# Patient Record
Sex: FEMALE | Race: WHITE | NOT HISPANIC OR LATINO | Employment: OTHER | URBAN - METROPOLITAN AREA
[De-identification: names, ages, dates, MRNs, and addresses within clinical notes are randomized per-mention and may not be internally consistent; named-entity substitution may affect disease eponyms.]

---

## 2017-01-01 ENCOUNTER — APPOINTMENT (OUTPATIENT)
Dept: GENERAL RADIOLOGY | Facility: CLINIC | Age: 77
End: 2017-01-01
Attending: EMERGENCY MEDICINE
Payer: MEDICARE

## 2017-01-01 ENCOUNTER — HOSPITAL ENCOUNTER (EMERGENCY)
Facility: CLINIC | Age: 77
Discharge: HOME | End: 2017-01-01
Attending: EMERGENCY MEDICINE | Admitting: EMERGENCY MEDICINE
Payer: MEDICARE

## 2017-01-01 VITALS
TEMPERATURE: 97.8 F | SYSTOLIC BLOOD PRESSURE: 140 MMHG | HEART RATE: 82 BPM | HEIGHT: 62 IN | RESPIRATION RATE: 26 BRPM | DIASTOLIC BLOOD PRESSURE: 68 MMHG | OXYGEN SATURATION: 97 %

## 2017-01-01 DIAGNOSIS — R05.9 COUGH: ICD-10-CM

## 2017-01-01 DIAGNOSIS — J02.0 ACUTE STREPTOCOCCAL PHARYNGITIS: ICD-10-CM

## 2017-01-01 LAB
ALBUMIN SERPL-MCNC: 3.3 G/DL (ref 3.4–5)
ALP SERPL-CCNC: 70 U/L (ref 40–150)
ALT SERPL W P-5'-P-CCNC: 17 U/L (ref 0–50)
ANION GAP SERPL CALCULATED.3IONS-SCNC: 8 MMOL/L (ref 3–14)
AST SERPL W P-5'-P-CCNC: 14 U/L (ref 0–45)
BASOPHILS # BLD AUTO: 0 10E9/L (ref 0–0.2)
BASOPHILS NFR BLD AUTO: 0.2 %
BILIRUB SERPL-MCNC: 0.2 MG/DL (ref 0.2–1.3)
BUN SERPL-MCNC: 20 MG/DL (ref 7–30)
CALCIUM SERPL-MCNC: 8.4 MG/DL (ref 8.5–10.1)
CHLORIDE SERPL-SCNC: 107 MMOL/L (ref 94–109)
CO2 SERPL-SCNC: 26 MMOL/L (ref 20–32)
CREAT SERPL-MCNC: 0.73 MG/DL (ref 0.52–1.04)
DEPRECATED S PYO AG THROAT QL EIA: ABNORMAL
DIFFERENTIAL METHOD BLD: NORMAL
EOSINOPHIL # BLD AUTO: 0 10E9/L (ref 0–0.7)
EOSINOPHIL NFR BLD AUTO: 0.3 %
ERYTHROCYTE [DISTWIDTH] IN BLOOD BY AUTOMATED COUNT: 13.8 % (ref 10–15)
GFR SERPL CREATININE-BSD FRML MDRD: 77 ML/MIN/1.7M2
GLUCOSE SERPL-MCNC: 134 MG/DL (ref 70–99)
HCT VFR BLD AUTO: 38.7 % (ref 35–47)
HGB BLD-MCNC: 12.4 G/DL (ref 11.7–15.7)
IMM GRANULOCYTES # BLD: 0 10E9/L (ref 0–0.4)
IMM GRANULOCYTES NFR BLD: 0.2 %
LYMPHOCYTES # BLD AUTO: 1.1 10E9/L (ref 0.8–5.3)
LYMPHOCYTES NFR BLD AUTO: 11.4 %
MCH RBC QN AUTO: 28.6 PG (ref 26.5–33)
MCHC RBC AUTO-ENTMCNC: 32 G/DL (ref 31.5–36.5)
MCV RBC AUTO: 89 FL (ref 78–100)
MICRO REPORT STATUS: ABNORMAL
MONOCYTES # BLD AUTO: 0.4 10E9/L (ref 0–1.3)
MONOCYTES NFR BLD AUTO: 4.1 %
NEUTROPHILS # BLD AUTO: 8 10E9/L (ref 1.6–8.3)
NEUTROPHILS NFR BLD AUTO: 83.8 %
NRBC # BLD AUTO: 0 10*3/UL
NRBC BLD AUTO-RTO: 0 /100
NT-PROBNP SERPL-MCNC: 64 PG/ML (ref 0–1800)
PLATELET # BLD AUTO: 191 10E9/L (ref 150–450)
POTASSIUM SERPL-SCNC: 3.4 MMOL/L (ref 3.4–5.3)
PROT SERPL-MCNC: 7.1 G/DL (ref 6.8–8.8)
RBC # BLD AUTO: 4.34 10E12/L (ref 3.8–5.2)
SODIUM SERPL-SCNC: 141 MMOL/L (ref 133–144)
SPECIMEN SOURCE: ABNORMAL
TROPONIN I SERPL-MCNC: NORMAL UG/L (ref 0–0.04)
WBC # BLD AUTO: 9.6 10E9/L (ref 4–11)

## 2017-01-01 PROCEDURE — 99284 EMERGENCY DEPT VISIT MOD MDM: CPT | Mod: 25

## 2017-01-01 PROCEDURE — 71020 XR CHEST 2 VW: CPT

## 2017-01-01 PROCEDURE — 87880 STREP A ASSAY W/OPTIC: CPT | Performed by: EMERGENCY MEDICINE

## 2017-01-01 PROCEDURE — 84484 ASSAY OF TROPONIN QUANT: CPT | Performed by: EMERGENCY MEDICINE

## 2017-01-01 PROCEDURE — 85025 COMPLETE CBC W/AUTO DIFF WBC: CPT | Performed by: EMERGENCY MEDICINE

## 2017-01-01 PROCEDURE — 80053 COMPREHEN METABOLIC PANEL: CPT | Performed by: EMERGENCY MEDICINE

## 2017-01-01 PROCEDURE — 83880 ASSAY OF NATRIURETIC PEPTIDE: CPT | Performed by: EMERGENCY MEDICINE

## 2017-01-01 RX ORDER — PENICILLIN V POTASSIUM 500 MG/1
500 TABLET, FILM COATED ORAL 3 TIMES DAILY
Qty: 21 TABLET | Refills: 0 | Status: SHIPPED | OUTPATIENT
Start: 2017-01-01 | End: 2017-01-08

## 2017-01-01 ASSESSMENT — ENCOUNTER SYMPTOMS
VOMITING: 1
SHORTNESS OF BREATH: 1
NAUSEA: 1
COUGH: 1

## 2017-01-01 NOTE — ED AVS SNAPSHOT
[facility] Emergency Department    [address] [place] [address]    Phone:  [phone]    Fax:  [phone]                                       Victoria Montalvo   MRN: [id number]    Department:  [facility] Emergency Department   Date of Visit:  1/1/2017           Patient Information     Date Of Birth          1940        Your diagnoses for this visit were:     Acute streptococcal pharyngitis     Cough        You were seen by Lola Molina MD.      Follow-up Information     Follow up with Deisi Liu MD.    Specialties:  Internal Medicine, Pediatrics    Contact information:    [facility]  [address] [place] [id number] [phone]          Discharge Instructions         Pharyngitis: Strep (Presumed)    You have pharyngitis (sore throat). The cause is thought to be the streptococcus, or strep, bacterium. Strep throat infection can cause throat pain that is worse when swallowing, aching all over, headache, and fever. The infection may be spread by coughing, kissing, or touching others after touching your mouth or nose. Antibiotic medications are given to treat the infection.  Home care    Rest at home. Drink plenty of fluids to avoid dehydration.    No work or school for the first 2 days of taking the antibiotics. After this time, you will not be contagious. You can then return to work or school if you are feeling better.     The antibiotic medication must be taken for the full 10 days, even if you feel better. This is very important to ensure the infection is treated. It is also important to prevent drug-resistant organisms from developing. If you were given an antibiotic shot, no more antibiotics are needed.    You may use acetaminophen or ibuprofen to control pain or fever, unless another medicine was prescribed for this. If you have chronic liver or kidney disease or ever had a stomach ulcer or GI bleeding, talk with your doctor  before using these medicines.    Throat lozenges or a throat-numbing sprays can help reduce throat pain. Gargling with warm salt water can also help. Dissolve 1/2 teaspoon of salt in 1 8 ounce glass of warm water.     Avoid salty or spicy foods, which can irritate the throat.  Follow-up care  Follow up with your healthcare provider or our staff if you are not improving over the next week.  When to seek medical advice  Call your healthcare provider right away if any of these occur:    Fever as directed by your doctor.     New or worsening ear pain, sinus pain, or headache    Painful lumps in the back of neck    Stiff neck    Lymph nodes are getting larger    Inability to swallow liquids, excessive drooling, or inability to open mouth wide due to throat pain    Signs of dehydration (very dark urine or no urine, sunken eyes, dizziness)    Trouble breathing or noisy breathing    Muffled voice    New rash    0402-0040 [employer]. [address] [place] [address]. All rights reserved. This information is not intended as a substitute for professional medical care. Always follow your healthcare professional's instructions.          Discharge References/Attachments     PHARYNGITIS, STREP (CONFIRMED) (ENGLISH)      Future Appointments        Provider Department Dept Phone Center    1/23/2017 2:15 PM Yogi Yepez MD [facility] [place] [phone] [facility]      24 Hour Appointment Hotline       To make an appointment at any [facility] clinic, call [phone]). If you don't have a family doctor or clinic, we will help you find one. [facility] clinics are conveniently located to serve the needs of you and your family.             Review of your medicines      START taking        Dose / Directions Last dose taken    penicillin V potassium 500 MG tablet   Commonly known as:  VEETID   Dose:  500 mg   Quantity:  21 tablet        Take 1 tablet (500  mg) by mouth 3 times daily for 7 days   Refills:  0          Our records show that you are taking the medicines listed below. If these are incorrect, please call your family doctor or clinic.        Dose / Directions Last dose taken    * albuterol (2.5 MG/3ML) 0.083% neb solution   Dose:  1 vial   Quantity:  1 Box        Take 1 vial (2.5 mg) by nebulization every 6 hours as needed for shortness of breath / dyspnea   Refills:  3        * albuterol 108 (90 BASE) MCG/ACT Inhaler   Commonly known as:  PROAIR HFA/PROVENTIL HFA/VENTOLIN HFA   Dose:  2 puff   Quantity:  3 Inhaler        Inhale 2 puffs into the lungs every 6 hours as needed for shortness of breath / dyspnea or wheezing   Refills:  0        aspirin 81 MG tablet   Dose:  81 mg        Take 81 mg by mouth daily.   Refills:  0        biotin 2.5 mg/mL        Take by mouth daily   Refills:  0        Calcium 9422-9644 MG-UNIT Chew   Dose:  1 tablet        Take 1 tablet by mouth daily.   Refills:  0        COENZYME Q-10 PO   Dose:  400 mg        Take 400 mg by mouth daily   Refills:  0        FISH OIL DOUBLE STRENGTH 1200 MG capsule   Dose:  1 capsule   Generic drug:  omega-3 fatty acids        Take 1 capsule by mouth 2 times daily   Refills:  0        fluticasone 50 MCG/ACT spray   Commonly known as:  FLONASE   Dose:  1-2 spray   Quantity:  48 g        Spray 1-2 sprays into both nostrils daily   Refills:  3        fluticasone-salmeterol 500-50 MCG/DOSE diskus inhaler   Commonly known as:  ADVAIR   Dose:  1 puff   Quantity:  3 Inhaler        Inhale 1 puff into the lungs 2 times daily   Refills:  3        furosemide 20 MG tablet   Commonly known as:  LASIX   Dose:  20 mg   Quantity:  30 tablet        Take 1 tablet (20 mg) by mouth daily   Refills:  0        HAIR/SKIN/NAILS PO        Take by mouth once   Refills:  0        MAGNESIUM ACETATE   Dose:  250 mg        Take 250 mg by mouth daily   Refills:  0        methimazole 10 MG tablet   Commonly known as:  TAPAZOLE    Dose:  10 mg   Quantity:  30 tablet        Take 1 tablet (10 mg) by mouth daily   Refills:  3        montelukast 10 MG tablet   Commonly known as:  SINGULAIR   Dose:  1 tablet   Quantity:  90 tablet        Take 1 tablet (10 mg) by mouth At Bedtime   Refills:  3        omeprazole 20 MG CR capsule   Commonly known as:  priLOSEC   Dose:  20 mg   Quantity:  90 capsule        Take 1 capsule (20 mg) by mouth daily   Refills:  3        * order for DME   Quantity:  1 Device        Equipment being ordered: CPAP Auto-CPAP 5-25 cm H2O  Full face mask with heated humidified air   Refills:  0        * order for DME   Quantity:  1 Device        Equipment being ordered: APAP 5-20cm/H2O full face mask with heated humidified air.   Refills:  0        * order for DME   Quantity:  1 Device        Equipment being ordered: knee immobilizer   Refills:  0        OSTEO BI-FLEX/5-LOXIN ADVANCED Tabs   Dose:  1500 mg        Take 1,500 mg by mouth 2 times daily   Refills:  0        pravastatin 40 MG tablet   Commonly known as:  PRAVACHOL   Dose:  40 mg   Quantity:  90 tablet        Take 1 tablet (40 mg) by mouth daily   Refills:  3        RESTASIS 0.05 % ophthalmic emulsion   Dose:  1 drop   Generic drug:  cycloSPORINE        Place 1 drop into both eyes 2 times daily   Refills:  0        spironolactone 25 MG tablet   Commonly known as:  ALDACTONE   Dose:  12.5 mg   Quantity:  45 tablet        Take 0.5 tablets (12.5 mg) by mouth daily   Refills:  12        VITAMIN D3 PO   Dose:  2000 Units        Take 2,000 Units by mouth daily   Refills:  0        * Notice:  This list has 5 medication(s) that are the same as other medications prescribed for you. Read the directions carefully, and ask your doctor or other care provider to review them with you.            Prescriptions were sent or printed at these locations (1 Prescription)                   Other Prescriptions                Printed at Department/Unit printer (1 of 1)         penicillin V  potassium (VEETID) 500 MG tablet                Procedures and tests performed during your visit     CBC with platelets differential    Chest XR,  PA & LAT    Comprehensive metabolic panel    Nt probnp inpatient    Peripheral IV catheter    Rapid strep screen    Troponin I      Orders Needing Specimen Collection     None      Pending Results     No orders found from 12/31/2016 to 1/2/2017.       Test Results from your hospital stay           1/1/2017  2:38 AM - Interface, Radiant Ib      Narrative     XR CHEST 2 VW 1/1/2017 2:35 AM    HISTORY: Cough.    COMPARISON: June 11, 2012.        Impression     IMPRESSION: The lungs are clear. No focal pulmonary opacities. Heart  and mediastinum are unremarkable. No acute cardiopulmonary  abnormalities.    ALEJANDRINA LAKE MD         1/1/2017  4:18 AM - Interface, Flexilab Results      Component Results     Component Value Ref Range & Units Status    WBC 9.6 4.0 - 11.0 10e9/L Final    RBC Count 4.34 3.8 - 5.2 10e12/L Final    Hemoglobin 12.4 11.7 - 15.7 g/dL Final    Hematocrit 38.7 35.0 - 47.0 % Final    MCV 89 78 - 100 fl Final    MCH 28.6 26.5 - 33.0 pg Final    MCHC 32.0 31.5 - 36.5 g/dL Final    RDW 13.8 10.0 - 15.0 % Final    Platelet Count 191 150 - 450 10e9/L Final    Diff Method Automated Method  Final    % Neutrophils 83.8 % Final    % Lymphocytes 11.4 % Final    % Monocytes 4.1 % Final    % Eosinophils 0.3 % Final    % Basophils 0.2 % Final    % Immature Granulocytes 0.2 % Final    Nucleated RBCs 0 0 /100 Final    Absolute Neutrophil 8.0 1.6 - 8.3 10e9/L Final    Absolute Lymphocytes 1.1 0.8 - 5.3 10e9/L Final    Absolute Monocytes 0.4 0.0 - 1.3 10e9/L Final    Absolute Eosinophils 0.0 0.0 - 0.7 10e9/L Final    Absolute Basophils 0.0 0.0 - 0.2 10e9/L Final    Abs Immature Granulocytes 0.0 0 - 0.4 10e9/L Final    Absolute Nucleated RBC 0.0  Final         1/1/2017  4:25 AM - Interface, Flexilab Results      Component Results     Component Value Ref Range & Units Status     N-Terminal Pro BNP Inpatient 64 0 - 1800 pg/mL Final    Reference range shown and results flagged as abnormal are suggested inpatient   cut points for confirming diagnosis if CHF in an acute setting. Establishing   a   baseline value for each individual patient is useful for follow-up. An   inpatient or emergency department NT-proPBNP <300 pg/mL effectively rules out   acute CHF, with 99% negative predictive value.  The outpatient non-acute reference range for ruling out CHF is:   0-125 pg/mL (age 18 to less than 75)   0-450 pg/mL (age 75 yrs and older)           1/1/2017  4:25 AM - Interface, Flexilab Results      Component Results     Component Value Ref Range & Units Status    Sodium 141 133 - 144 mmol/L Final    Potassium 3.4 3.4 - 5.3 mmol/L Final    Chloride 107 94 - 109 mmol/L Final    Carbon Dioxide 26 20 - 32 mmol/L Final    Anion Gap 8 3 - 14 mmol/L Final    Glucose 134 (H) 70 - 99 mg/dL Final    Urea Nitrogen 20 7 - 30 mg/dL Final    Creatinine 0.73 0.52 - 1.04 mg/dL Final    GFR Estimate 77 >60 mL/min/1.7m2 Final    Non  GFR Calc    GFR Estimate If Black >90   GFR Calc   >60 mL/min/1.7m2 Final    Calcium 8.4 (L) 8.5 - 10.1 mg/dL Final    Bilirubin Total 0.2 0.2 - 1.3 mg/dL Final    Albumin 3.3 (L) 3.4 - 5.0 g/dL Final    Protein Total 7.1 6.8 - 8.8 g/dL Final    Alkaline Phosphatase 70 40 - 150 U/L Final    ALT 17 0 - 50 U/L Final    AST 14 0 - 45 U/L Final         1/1/2017  4:25 AM - Interface, Flexilab Results      Component Results     Component Value Ref Range & Units Status    Troponin I ES  0.000 - 0.045 ug/L Final    <0.015  The 99th percentile for upper reference range is 0.045 ug/L.  Troponin values in   the range of 0.045 - 0.120 ug/L may be associated with risks of adverse   clinical events.           1/1/2017  4:27 AM - Interface, Flexilab Results      Component Results     Component    Specimen Description    Throat    Rapid Strep A Screen (Abnormal)     POSITIVE: Group A Streptococcal antigen detected by immunoassay.    Micro Report Status    FINAL 01/01/2017                Clinical Quality Measure: Blood Pressure Screening     Your blood pressure was checked while you were in the emergency department today. The last reading we obtained was  BP: 140/68 mmHg . Please read the guidelines below about what these numbers mean and what you should do about them.  If your systolic blood pressure (the top number) is less than 120 and your diastolic blood pressure (the bottom number) is less than 80, then your blood pressure is normal. There is nothing more that you need to do about it.  If your systolic blood pressure (the top number) is 120-139 or your diastolic blood pressure (the bottom number) is 80-89, your blood pressure may be higher than it should be. You should have your blood pressure rechecked within a year by a primary care provider.  If your systolic blood pressure (the top number) is 140 or greater or your diastolic blood pressure (the bottom number) is 90 or greater, you may have high blood pressure. High blood pressure is treatable, but if left untreated over time it can put you at risk for heart attack, stroke, or kidney failure. You should have your blood pressure rechecked by a primary care provider within the next 4 weeks.  If your provider in the emergency department today gave you specific instructions to follow-up with your doctor or provider even sooner than that, you should follow that instruction and not wait for up to 4 weeks for your follow-up visit.        Thank you for choosing [facility]       Thank you for choosing [facility] for your care. Our goal is always to provide you with excellent care. Hearing back from our patients is one way we can continue to improve our services. Please take a few minutes to complete the written survey that you may receive in the mail after you visit with us. Thank you!        [employer] Information     [employer] gives  you secure access to your electronic health record. If you see a primary care provider, you can also send messages to your care team and make appointments. If you have questions, please call your primary care clinic.  If you do not have a primary care provider, please call [phone] and they will assist you.        After Visit Summary       This is your record. Keep this with you and show to your community pharmacist(s) and doctor(s) at your next visit.

## 2017-01-01 NOTE — ED AVS SNAPSHOT
[facility] Emergency Department    [address]    [facility] [address]    Phone:  [phone]    Fax:  [phone]                                       June V Selvin   MRN: [id number]    Department:  [facility] Emergency Department   Date of Visit:  1/1/2017           After Visit Summary Signature Page     I have received my discharge instructions, and my questions have been answered. I have discussed any challenges I see with this plan with the nurse or doctor.    ..........................................................................................................................................  Patient/Patient Representative Signature      ..........................................................................................................................................  Patient Representative Print Name and Relationship to Patient    ..................................................               ................................................  Date                                            Time    ..........................................................................................................................................  Reviewed by Signature/Title    ...................................................              ..............................................  Date                                                            Time

## 2017-01-01 NOTE — ED PROVIDER NOTES
"  History   Chief Complaint:  Shortness of Breath      HPI   Victoria Montalvo is a 76 year old female who with a history of asthma and heart failure presents to the ED for evaluation of shortness of breath, coughing and vomiting. Patient indicates that she was coughing and secondary to that she had one episode of emesis. She notes that she is unable to sleep and feels more short of breath. Patient states that she has an albuterol inhaler at home and used it today without significant relief.   No international travel.  No fevers.  No recent antibiotics.  No chest pain.    Allergies:  NKDA     Medications:    Advair   Tapazole   Aldactone   Lasix   Prilosec    Albuterol   Flonase   Aspirin   Restasis    Past Medical History:    Asthma   GERD  CAD  Hyperlipidemia   Hyperthyroidism      Past Surgical History:    Orthopedic surgery   Vascular surgery     Family History:    Cancer  Alzheimer disease   Heart disease   Hypertension     Social History:  Former smoker   Alcohol use-occasional   Marital Status:      Review of Systems   Respiratory: Positive for cough and shortness of breath.    Gastrointestinal: Positive for nausea and vomiting.   All other systems reviewed and are negative.        SOB with throat pain   Vomiting she thinks from the coughing Unable to sleep              Physical Exam   First Vitals:  BP: 165/80 mmHg  Pulse: 82  Temp: 97.8  F (36.6  C)  Resp: 26  Height: 157.5 cm (5' 2\")  SpO2: 99 %  (normal)    Physical Exam  GEN: patient appears tired  HEAD: atraumatic, normocephalic  EYES: pupils reactive, conjunctivae normal  ENT: TMs flat and white bilaterally, oropharynx normal with minimal erythema but no exudate, mucus membranes dry  NECK: no cervical LAD  RESPIRATORY: no tachypnea, breath sounds clear to auscultation (no rales, wheezes, rhonchi),  normal phonation  CVS: normal S1/S2, I/VI systolic ej murmurs, no rubs/gallops  ABDOMEN: soft, nontender, no masses or organomegaly, no rebound, positive " "bowel sounds  BACK: no CVAT  EXTREMITIES: intact pulses x 2 (radial pulses intact),  no edema  SKIN: warm and dry  NEURO: Alert.  Motor- moves all 4 extremities Overall symmetrical exam  HEME: no bruising      Emergency Department Course   Imaging:  Radiographic findings were communicated with the patient who voiced understanding of the findings.  Chest XR, Pa & LAT  The lungs are clear. No focal pulmonary opacities. Heart  and mediastinum are unremarkable. No acute cardiopulmonary  abnormalities. As per radiology.     Laboratory:  CBC: WBC: 9.6, HGB: 12.4, PLT: 191  CMP: Glucose 134(H), Calcium 8.4(L), albumin 3.3(L), o/w WNL (Creatinine: 0.73)    Probnp: 64 (normal)  Troponin<0.015 (normal)  Rapid strep screen: Positive     Emergency Department Course:  Nursing notes and vitals reviewed. I performed an exam of the patient as documented above.    Blood drawn. This was sent to the lab for further testing, results above.    The patient was sent for x-ray while in the emergency department, findings above.     I reevaluated the patient and provided an update in regards to her ED course.      I personally reviewed the laboratory results with the Patient and answered all related questions prior to discharge.     Findings and plan explained to the Patient. Patient discharged home with instructions regarding supportive care, medications, and reasons to return. The importance of close follow-up was reviewed. The patient was prescribed Penicillin.     /68 mmHg  Pulse 82  Temp(Src) 97.8  F (36.6  C) (Temporal)  Resp 26  Ht 1.575 m (5' 2\")  SpO2 97%  Discussed results with patient.  Gave patient copies of results (applicable labs, CT scans and/or ultrasound).  Answered questions.  Asked patient to followup with PCP.    Impression & Plan    Medical Decision Making:  Victoria Montalvo is a 76 year old female who feels as though she has a sore throat and feels short of breath. She has had a dry cough. She is not running a " fever. She did vomit after the cough but currently does not have any abdominal pain. In triage her respiratory rate was elevated. By the time she presented back to the ED, her lung sounds are clear and she is not hypoxic. Chest x-ray does not show any evidence of pneumonia. Troponin does not show any signs of ischemia. The patient's chemistry panel was normal including CBC. Probnp did not show any evidence of CHF. Her rapid strep screen was positive. She was watched on the cardiac monitor and did not have any hypoxia or ectopy. We will treat her for strep and she will follow up with her PCP.      Diagnosis:    ICD-10-CM    1. Acute streptococcal pharyngitis J02.0    2. Cough R05      Discharge Medication List as of 1/1/2017  5:25 AM      START taking these medications    Details   penicillin V potassium (VEETID) 500 MG tablet Take 1 tablet (500 mg) by mouth 3 times daily for 7 days, Disp-21 tablet, R-0, Local Print           Instructions to patient:  Take all the antibiotics.    All antibiotics can cause antibiotic-associated diarrhea- consider probiotics (pills with Lactobacillus) or yogurt while taking the antibiotics.    Antibiotics can cause diarrhea and can clean out normal bacteria- please use probiotics (pills with Lactobacillus available from the pharmacy) or yogurt 2-3X day while taking antibiotics.    Any antibiotic has the potential to cause a reaction- fever, rash, aching, or other complications.    Side effects can occur with all medications, such as rash, fevers, GI upset, diarrhea.  Reasons to return: chest pain, shortness of breath, nausea/vomiting, bleeding, confusion, blood in stools, dizziness, passing out, increasing headache, weakness, inability to walk.  Also return if cough, difficulty breathing, nausea/vomiting, confusion, or any other problems.        Hair Jane  1/1/2017   [facility] EMERGENCY DEPARTMENT    I, Hair Said, am serving as a scribe on 1/1/2017 at 2:55 AM to  personally document services performed by Lola Molina MD based on my observations and the provider's statements to me.       Lola Molina MD  01/02/17 0064

## 2017-01-01 NOTE — ED NOTES
SOB with throat pain   Vomiting she thinks from the coughing Unable to sleep Came here for treatment  Respiratory rate 26

## 2017-01-02 LAB — INTERPRETATION ECG - MUSE: NORMAL

## 2017-01-02 NOTE — DISCHARGE INSTRUCTIONS
Take all the antibiotics.    All antibiotics can cause antibiotic-associated diarrhea- consider probiotics (pills with Lactobacillus) or yogurt while taking the antibiotics.    Antibiotics can cause diarrhea and can clean out normal bacteria- please use probiotics (pills with Lactobacillus available from the pharmacy) or yogurt 2-3X day while taking antibiotics.    Any antibiotic has the potential to cause a reaction- fever, rash, aching, or other complications.    Side effects can occur with all medications, such as rash, fevers, GI upset, diarrhea.  Reasons to return: chest pain, shortness of breath, nausea/vomiting, bleeding, confusion, blood in stools, dizziness, passing out, increasing headache, weakness, inability to walk.  Also return if cough, difficulty breathing, nausea/vomiting, confusion, or any other problems.    Pharyngitis: Strep (Presumed)    You have pharyngitis (sore throat). The cause is thought to be the streptococcus, or strep, bacterium. Strep throat infection can cause throat pain that is worse when swallowing, aching all over, headache, and fever. The infection may be spread by coughing, kissing, or touching others after touching your mouth or nose. Antibiotic medications are given to treat the infection.  Home care    Rest at home. Drink plenty of fluids to avoid dehydration.    No work or school for the first 2 days of taking the antibiotics. After this time, you will not be contagious. You can then return to work or school if you are feeling better.     The antibiotic medication must be taken for the full 10 days, even if you feel better. This is very important to ensure the infection is treated. It is also important to prevent drug-resistant organisms from developing. If you were given an antibiotic shot, no more antibiotics are needed.    You may use acetaminophen or ibuprofen to control pain or fever, unless another medicine was prescribed for this. If you have chronic liver or kidney  disease or ever had a stomach ulcer or GI bleeding, talk with your doctor before using these medicines.    Throat lozenges or a throat-numbing sprays can help reduce throat pain. Gargling with warm salt water can also help. Dissolve 1/2 teaspoon of salt in 1 8 ounce glass of warm water.     Avoid salty or spicy foods, which can irritate the throat.  Follow-up care  Follow up with your healthcare provider or our staff if you are not improving over the next week.  When to seek medical advice  Call your healthcare provider right away if any of these occur:    Fever as directed by your doctor.     New or worsening ear pain, sinus pain, or headache    Painful lumps in the back of neck    Stiff neck    Lymph nodes are getting larger    Inability to swallow liquids, excessive drooling, or inability to open mouth wide due to throat pain    Signs of dehydration (very dark urine or no urine, sunken eyes, dizziness)    Trouble breathing or noisy breathing    Muffled voice    New rash    4403-7603 [employer]. [address] [place] [address], [place] [address]. All rights reserved. This information is not intended as a substitute for professional medical care. Always follow your healthcare professional's instructions.

## 2017-01-12 ENCOUNTER — CARE COORDINATION (OUTPATIENT)
Dept: CARE COORDINATION | Facility: CLINIC | Age: 77
End: 2017-01-12

## 2017-01-12 NOTE — PROGRESS NOTES
Clinic Care Coordination Contact      Chart review for care coordination update status     Per chart review patient was active in [employer] tele monitoring after hip replacement in 2015    Patient has not been contacted since 10/28/15 by care coordination     Closing care coordination at this time - please refer to care coordination if future needs arise     Yudi Lopez Care Coordinator RN  [facility]  [phone]

## 2017-01-20 ENCOUNTER — PRE VISIT (OUTPATIENT)
Dept: CARDIOLOGY | Facility: CLINIC | Age: 77
End: 2017-01-20

## 2017-01-23 ENCOUNTER — OFFICE VISIT (OUTPATIENT)
Dept: CARDIOLOGY | Facility: CLINIC | Age: 77
End: 2017-01-23
Attending: INTERNAL MEDICINE
Payer: MEDICARE

## 2017-01-23 VITALS
HEART RATE: 95 BPM | BODY MASS INDEX: 28.82 KG/M2 | HEIGHT: 65 IN | DIASTOLIC BLOOD PRESSURE: 68 MMHG | OXYGEN SATURATION: 97 % | SYSTOLIC BLOOD PRESSURE: 116 MMHG | WEIGHT: 173 LBS

## 2017-01-23 DIAGNOSIS — I77.1 SUBCLAVIAN ARTERY STENOSIS, LEFT (H): ICD-10-CM

## 2017-01-23 DIAGNOSIS — R00.2 PALPITATIONS: ICD-10-CM

## 2017-01-23 DIAGNOSIS — E78.5 HYPERLIPIDEMIA LDL GOAL <130: ICD-10-CM

## 2017-01-23 DIAGNOSIS — I50.32 DIASTOLIC CHF, CHRONIC (H): ICD-10-CM

## 2017-01-23 DIAGNOSIS — R60.0 BILATERAL EDEMA OF LOWER EXTREMITY: Primary | ICD-10-CM

## 2017-01-23 PROCEDURE — 99214 OFFICE O/P EST MOD 30 MIN: CPT | Performed by: INTERNAL MEDICINE

## 2017-01-23 NOTE — MR AVS SNAPSHOT
After Visit Summary   1/23/2017    Victoria Montalvo    MRN: [id number]           Patient Information     Date Of Birth          1940        Visit Information        Provider Department      1/23/2017 2:15 PM Yogi Yepez MD [facility] [place]        Today's Diagnoses     Bilateral edema of lower extremity    -  1     Hyperlipidemia LDL goal <130         Diastolic CHF, chronic (H)         Subclavian artery stenosis, left (H)         Palpitations           Care Instructions    Try taking the SPIRONOLACTONE on Monday, Wednesday and Friday to see if we can avoid some of the dehydration.  If your legs swell up too much or if the blood pressure goes up too much, then go back to daily dose.  Please try the compression stockings again.  We will do an ultrasound of the legs to see if leaky veins are contributing.        Follow-ups after your visit        Additional Services     Follow-Up with Cardiac Advanced Practice Provider                 Your next 10 appointments already scheduled     Feb 17, 2017  9:00 AM   US LOWER EXTREMITY VENOUS COMPETENCY BILATERAL with [facility] ([facility])    [address]  [place] [id number] [phone]           Please bring a list of your medicines (including vitamins, minerals and over-the-counter drugs). Also, tell your doctor about any allergies you may have. Wear comfortable clothes and leave your valuables at home.  You do not need to do anything special to prepare for your exam.  Please call the Imaging Department at your exam site with any questions.            Mar 17, 2017 12:30 PM   Return Visit with NIMESH Leslie CNP   [facility] ([facility])    [address]  [place] [id number] [phone]              Future tests that were ordered for you today      "Open Future Orders        Priority Expected Expires Ordered    Follow-Up with Cardiac Advanced Practice Provider Routine 2/22/2017 1/23/2018 1/23/2017    US Venous Competency Bilateral Routine 2/22/2017 1/23/2018 1/23/2017            Who to contact     If you have questions or need follow up information about today's clinic visit or your schedule please contact [facility] [place] directly at [phone].  Normal or non-critical lab and imaging results will be communicated to you by [employer], letter or phone within 4 business days after the clinic has received the results. If you do not hear from us within 7 days, please contact the clinic through [facility] [employer] or phone. If you have a critical or abnormal lab result, we will notify you by phone as soon as possible.  Submit refill requests through [employer] or call your pharmacy and they will forward the refill request to us. Please allow 3 business days for your refill to be completed.          Additional Information About Your Visit        [employer] Information     [employer] gives you secure access to your electronic health record. If you see a primary care provider, you can also send messages to your care team and make appointments. If you have questions, please call your primary care clinic.  If you do not have a primary care provider, please call [phone] and they will assist you.        Care EveryWhere ID     This is your Care EveryWhere ID. This could be used by other organizations to access your [place] medical records  [id number]        Your Vitals Were     Pulse Height BMI (Body Mass Index) Pulse Oximetry          95 1.657 m (5' 5.25\") 28.58 kg/m2 97%         Blood Pressure from Last 3 Encounters:   01/23/17 116/68   01/01/17 140/68   11/08/16 124/70    Weight from Last 3 Encounters:   01/23/17 78.472 kg (173 lb)   10/17/16 77.565 kg (171 lb)   10/05/16 77.202 kg (170 lb 3.2 oz)              We Performed the Following     " Follow-Up with Cardiologist          Today's Medication Changes          These changes are accurate as of: 1/23/17  2:58 PM.  If you have any questions, ask your nurse or doctor.               These medicines have changed or have updated prescriptions.        Dose/Directions    fluticasone-salmeterol 250-50 MCG/DOSE diskus inhaler   Commonly known as:  ADVAIR   This may have changed:  Another medication with the same name was removed. Continue taking this medication, and follow the directions you see here.   Changed by:  Yogi Yepez MD        Dose:  1 puff   Inhale 1 puff into the lungs every 12 hours   Refills:  0                Primary Care Provider Office Phone # Fax #    Deisi Liu [phone]       [facility] [address] [place] [address]        Thank you!     Thank you for choosing [facility] [place]  for your care. Our goal is always to provide you with excellent care. Hearing back from our patients is one way we can continue to improve our services. Please take a few minutes to complete the written survey that you may receive in the mail after your visit with us. Thank you!             Your Updated Medication List - Protect others around you: Learn how to safely use, store and throw away your medicines at www.disposemymeds.org.          This list is accurate as of: 1/23/17  2:58 PM.  Always use your most recent med list.                   Brand Name Dispense Instructions for use    * albuterol (2.5 MG/3ML) 0.083% neb solution     1 Box    Take 1 vial (2.5 mg) by nebulization every 6 hours as needed for shortness of breath / dyspnea       * albuterol 108 (90 BASE) MCG/ACT Inhaler    PROAIR HFA/PROVENTIL HFA/VENTOLIN HFA    3 Inhaler    Inhale 2 puffs into the lungs every 6 hours as needed for shortness of breath / dyspnea or wheezing       aspirin 81 MG tablet      Take 81 mg by mouth daily.       biotin 2.5 mg/mL      Take  by mouth daily       Calcium 5694-2554 MG-UNIT Chew      Take 1 tablet by mouth daily.       COENZYME Q-10 PO      Take 400 mg by mouth daily       FISH OIL DOUBLE STRENGTH 1200 MG capsule   Generic drug:  omega-3 fatty acids      Take 1 capsule by mouth 2 times daily       fluticasone 50 MCG/ACT spray    FLONASE    48 g    Spray 1-2 sprays into both nostrils daily       fluticasone-salmeterol 250-50 MCG/DOSE diskus inhaler    ADVAIR     Inhale 1 puff into the lungs every 12 hours       furosemide 20 MG tablet    LASIX    30 tablet    Take 1 tablet (20 mg) by mouth daily       MAGNESIUM ACETATE      Take 250 mg by mouth daily       methimazole 10 MG tablet    TAPAZOLE    30 tablet    Take 1 tablet (10 mg) by mouth daily       montelukast 10 MG tablet    SINGULAIR    90 tablet    Take 1 tablet (10 mg) by mouth At Bedtime       omeprazole 20 MG CR capsule    priLOSEC    90 capsule    Take 1 capsule (20 mg) by mouth daily       * order for DME     1 Device    Equipment being ordered: CPAP Auto-CPAP 5-25 cm H2O  Full face mask with heated humidified air       * order for DME     1 Device    Equipment being ordered: APAP 5-20cm/H2O full face mask with heated humidified air.       OSTEO BI-FLEX/5-LOXIN ADVANCED Tabs      Take 1,500 mg by mouth 2 times daily       pravastatin 40 MG tablet    PRAVACHOL    90 tablet    Take 1 tablet (40 mg) by mouth daily       RESTASIS 0.05 % ophthalmic emulsion   Generic drug:  cycloSPORINE      Place 1 drop into both eyes 2 times daily       spironolactone 25 MG tablet    ALDACTONE    45 tablet    Take 0.5 tablets (12.5 mg) by mouth daily       VITAMIN D3 PO      Take 2,000 Units by mouth daily       * Notice:  This list has 4 medication(s) that are the same as other medications prescribed for you. Read the directions carefully, and ask your doctor or other care provider to review them with you.

## 2017-01-23 NOTE — Clinical Note
1/23/2017    Deisi Liu MD  [facility]  [address] [place] [address]    RE: Victoria Montalvo       Dear Colleague,    I had the pleasure of seeing Victoria Montalvo in the [facility].    Cardiology Progress Note          Assessment and Plan:     1. Peripheral arterial disease status post subclavian revascularization    Doing very well without recurrence of symptoms.    Continue pravastatin, consider changing to Crestor for better LDL control the future.      2. Lower extremity edema    We'll have her try wearing her compression stockings, may need new stockings.    Venous competency testing and follow-up with Martha Guevara CNP in one month.    Her  will also see Martha for his peripheral arterial disease after his lower extremity arterial testing and increase in Imdur dose.      This note was transcribed using electronic voice recognition software and there may be typographical errors present.                Interval History:   The patient is a very pleasant 76 year old whom I've been following for subclavian disease status post percutaneous revascularization and hypertension with good control currently.  She is a little bit tachycardic on her diuretics.  Without them, she has lower extremity edema that is uncontrolled.      She has mild compression stockings, they do not fit very well.  She would be interested in getting new stockings.  She is also interested in having further evaluation for her lower extremity edema that is quite pronounced by the end of a day.      She denies any chest pain or palpitations currently.                       Review of Systems:   Review of Systems:  Skin:  Positive for     Eyes:  Positive for glasses  ENT:  Negative    Respiratory:  Positive for shortness of breath;sleep apnea;CPAP  Cardiovascular:    edema;Positive for  Gastroenterology: Positive for heartburn;reflux  Genitourinary:  Positive for urinary  "frequency  Musculoskeletal:  Positive for arthritis;joint pain  Neurologic:  Negative    Psychiatric:  Positive for excessive stress;anxiety;depression  Heme/Lymph/Imm:  Positive for easy bruising  Endocrine:  Positive for thyroid disorder              Physical Exam:     Vitals: /68 mmHg  Pulse 95  Ht 1.657 m (5' 5.25\")  Wt 78.472 kg (173 lb)  BMI 28.58 kg/m2  SpO2 97%  Constitutional:  cooperative, alert and oriented, well developed, well nourished, in no acute distress        Skin:  warm and dry to the touch, no apparent skin lesions or masses noted        Head:  normocephalic, no masses or lesions        Eyes:  pupils equal and round, conjunctivae and lids unremarkable, sclera white, no xanthalasma, EOMS intact, no nystagmus        ENT:  no pallor or cyanosis, dentition good        Neck:  JVP normal        Chest:  normal breath sounds, clear to auscultation, normal A-P diameter, normal symmetry, normal respiratory excursion, no use of accessory muscles        Cardiac: regular rhythm;normal S1 and S2       systolic murmur;grade 1          Abdomen:      benign    Extremities and Back:  normal muscle strength and tone        Neurological:  affect appropriate, oriented to time, person and place                 Medications:     Current Outpatient Prescriptions   Medication Sig Dispense Refill     fluticasone-salmeterol (ADVAIR) 250-50 MCG/DOSE diskus inhaler Inhale 1 puff into the lungs every 12 hours       biotin 2.5 mg/mL Take by mouth daily       methimazole (TAPAZOLE) 10 MG tablet Take 1 tablet (10 mg) by mouth daily 30 tablet 3     spironolactone (ALDACTONE) 25 MG tablet Take 0.5 tablets (12.5 mg) by mouth daily 45 tablet 12     furosemide (LASIX) 20 MG tablet Take 1 tablet (20 mg) by mouth daily 30 tablet 0     omeprazole (PRILOSEC) 20 MG capsule Take 1 capsule (20 mg) by mouth daily 90 capsule 3     pravastatin (PRAVACHOL) 40 MG tablet Take 1 tablet (40 mg) by mouth daily 90 tablet 3     montelukast " (SINGULAIR) 10 MG tablet Take 1 tablet (10 mg) by mouth At Bedtime 90 tablet 3     albuterol (PROAIR HFA, PROVENTIL HFA, VENTOLIN HFA) 108 (90 BASE) MCG/ACT inhaler Inhale 2 puffs into the lungs every 6 hours as needed for shortness of breath / dyspnea or wheezing 3 Inhaler 0     albuterol (2.5 MG/3ML) 0.083% nebulizer solution Take 1 vial (2.5 mg) by nebulization every 6 hours as needed for shortness of breath / dyspnea 1 Box 3     fluticasone (FLONASE) 50 MCG/ACT nasal spray Spray 1-2 sprays into both nostrils daily 48 g 3     Cholecalciferol (VITAMIN D3 PO) Take 2,000 Units by mouth daily        COENZYME Q-10 PO Take 400 mg by mouth daily       ORDER FOR DME Equipment being ordered: APAP 5-20cm/H2O full face mask with heated humidified air. 1 Device 0     ORDER FOR DME Equipment being ordered: CPAP  Auto-CPAP 5-25 cm H2O   Full face mask with heated humidified air 1 Device 0     aspirin 81 MG tablet Take 81 mg by mouth daily.       omega-3 fatty acids (FISH OIL DOUBLE STRENGTH) 1200 MG capsule Take 1 capsule by mouth 2 times daily        Calcium 5178-3028 MG-UNIT CHEW Take 1 tablet by mouth daily.       Boswellia-Glucosamine-Vit D (OSTEO BI-FLEX/5-LOXIN ADVANCED) TABS Take 1,500 mg by mouth 2 times daily        MAGNESIUM ACETATE Take 250 mg by mouth daily        cycloSPORINE (RESTASIS) 0.05 % ophthalmic emulsion Place 1 drop into both eyes 2 times daily                   Data:   All laboratory data reviewed  No results found for this or any previous visit (from the past 24 hour(s)).    All laboratory data reviewed  CHOL      193   10/5/2016  HDL       76   10/5/2016  LDL      101   10/5/2016  TRIG       82   10/5/2016  CHOLHDLRATIO      2.5   8/20/2014  TSH   Date Value Ref Range Status   10/17/2016 <0.01* 0.40 - 4.00 mU/L Final     Last Basic Metabolic Panel:  NA      141   1/1/2017   POTASSIUM      3.4   1/1/2017  CHLORIDE      107   1/1/2017  PHILLIP      8.4   1/1/2017  CO2       26   1/1/2017  BUN       20    1/1/2017  CR     0.73   1/1/2017  GLC      134   1/1/2017  WBC      9.6   1/1/2017  RBC     4.34   1/1/2017  HGB     12.4   1/1/2017  HCT     38.7   1/1/2017  MCV       89   1/1/2017  MCH     28.6   1/1/2017  MCHC     32.0   1/1/2017  RDW     13.8   1/1/2017  PLT      191   1/1/2017    Thank you for allowing me to participate in the care of your patient.    Sincerely,     Yogi Yepez MD     [facility]

## 2017-01-23 NOTE — PROGRESS NOTES
Cardiology Progress Note          Assessment and Plan:     1. Peripheral arterial disease status post subclavian revascularization    Doing very well without recurrence of symptoms.    Continue pravastatin, consider changing to Crestor for better LDL control the future.      2. Lower extremity edema    We'll have her try wearing her compression stockings, may need new stockings.    Venous competency testing and follow-up with Martha Guevara CNP in one month.    Her  will also see Martha for his peripheral arterial disease after his lower extremity arterial testing and increase in Imdur dose.      This note was transcribed using electronic voice recognition software and there may be typographical errors present.                Interval History:   The patient is a very pleasant 76 year old whom I've been following for subclavian disease status post percutaneous revascularization and hypertension with good control currently.  She is a little bit tachycardic on her diuretics.  Without them, she has lower extremity edema that is uncontrolled.      She has mild compression stockings, they do not fit very well.  She would be interested in getting new stockings.  She is also interested in having further evaluation for her lower extremity edema that is quite pronounced by the end of a day.      She denies any chest pain or palpitations currently.                       Review of Systems:   Review of Systems:  Skin:  Positive for     Eyes:  Positive for glasses  ENT:  Negative    Respiratory:  Positive for shortness of breath;sleep apnea;CPAP  Cardiovascular:    edema;Positive for  Gastroenterology: Positive for heartburn;reflux  Genitourinary:  Positive for urinary frequency  Musculoskeletal:  Positive for arthritis;joint pain  Neurologic:  Negative    Psychiatric:  Positive for excessive stress;anxiety;depression  Heme/Lymph/Imm:  Positive for easy bruising  Endocrine:  Positive for thyroid disorder               "Physical Exam:     Vitals: /68 mmHg  Pulse 95  Ht 1.657 m (5' 5.25\")  Wt 78.472 kg (173 lb)  BMI 28.58 kg/m2  SpO2 97%  Constitutional:  cooperative, alert and oriented, well developed, well nourished, in no acute distress        Skin:  warm and dry to the touch, no apparent skin lesions or masses noted        Head:  normocephalic, no masses or lesions        Eyes:  pupils equal and round, conjunctivae and lids unremarkable, sclera white, no xanthalasma, EOMS intact, no nystagmus        ENT:  no pallor or cyanosis, dentition good        Neck:  JVP normal        Chest:  normal breath sounds, clear to auscultation, normal A-P diameter, normal symmetry, normal respiratory excursion, no use of accessory muscles        Cardiac: regular rhythm;normal S1 and S2       systolic murmur;grade 1          Abdomen:      benign    Extremities and Back:  normal muscle strength and tone        Neurological:  affect appropriate, oriented to time, person and place                 Medications:     Current Outpatient Prescriptions   Medication Sig Dispense Refill     fluticasone-salmeterol (ADVAIR) 250-50 MCG/DOSE diskus inhaler Inhale 1 puff into the lungs every 12 hours       biotin 2.5 mg/mL Take by mouth daily       methimazole (TAPAZOLE) 10 MG tablet Take 1 tablet (10 mg) by mouth daily 30 tablet 3     spironolactone (ALDACTONE) 25 MG tablet Take 0.5 tablets (12.5 mg) by mouth daily 45 tablet 12     furosemide (LASIX) 20 MG tablet Take 1 tablet (20 mg) by mouth daily 30 tablet 0     omeprazole (PRILOSEC) 20 MG capsule Take 1 capsule (20 mg) by mouth daily 90 capsule 3     pravastatin (PRAVACHOL) 40 MG tablet Take 1 tablet (40 mg) by mouth daily 90 tablet 3     montelukast (SINGULAIR) 10 MG tablet Take 1 tablet (10 mg) by mouth At Bedtime 90 tablet 3     albuterol (PROAIR HFA, PROVENTIL HFA, VENTOLIN HFA) 108 (90 BASE) MCG/ACT inhaler Inhale 2 puffs into the lungs every 6 hours as needed for shortness of breath / dyspnea " or wheezing 3 Inhaler 0     albuterol (2.5 MG/3ML) 0.083% nebulizer solution Take 1 vial (2.5 mg) by nebulization every 6 hours as needed for shortness of breath / dyspnea 1 Box 3     fluticasone (FLONASE) 50 MCG/ACT nasal spray Spray 1-2 sprays into both nostrils daily 48 g 3     Cholecalciferol (VITAMIN D3 PO) Take 2,000 Units by mouth daily        COENZYME Q-10 PO Take 400 mg by mouth daily       ORDER FOR DME Equipment being ordered: APAP 5-20cm/H2O full face mask with heated humidified air. 1 Device 0     ORDER FOR DME Equipment being ordered: CPAP  Auto-CPAP 5-25 cm H2O   Full face mask with heated humidified air 1 Device 0     aspirin 81 MG tablet Take 81 mg by mouth daily.       omega-3 fatty acids (FISH OIL DOUBLE STRENGTH) 1200 MG capsule Take 1 capsule by mouth 2 times daily        Calcium 1164-4943 MG-UNIT CHEW Take 1 tablet by mouth daily.       Boswellia-Glucosamine-Vit D (OSTEO BI-FLEX/5-LOXIN ADVANCED) TABS Take 1,500 mg by mouth 2 times daily        MAGNESIUM ACETATE Take 250 mg by mouth daily        cycloSPORINE (RESTASIS) 0.05 % ophthalmic emulsion Place 1 drop into both eyes 2 times daily                   Data:   All laboratory data reviewed  No results found for this or any previous visit (from the past 24 hour(s)).    All laboratory data reviewed  CHOL      193   10/5/2016  HDL       76   10/5/2016  LDL      101   10/5/2016  TRIG       82   10/5/2016  CHOLHDLRATIO      2.5   8/20/2014  TSH   Date Value Ref Range Status   10/17/2016 <0.01* 0.40 - 4.00 mU/L Final     Last Basic Metabolic Panel:  NA      141   1/1/2017   POTASSIUM      3.4   1/1/2017  CHLORIDE      107   1/1/2017  PHILLIP      8.4   1/1/2017  CO2       26   1/1/2017  BUN       20   1/1/2017  CR     0.73   1/1/2017  GLC      134   1/1/2017  WBC      9.6   1/1/2017  RBC     4.34   1/1/2017  HGB     12.4   1/1/2017  HCT     38.7   1/1/2017  MCV       89   1/1/2017  MCH     28.6   1/1/2017  MCHC     32.0   1/1/2017  RDW     13.8    1/1/2017  PLT      191   1/1/2017

## 2017-02-17 ENCOUNTER — HOSPITAL ENCOUNTER (OUTPATIENT)
Dept: CARDIOLOGY | Facility: CLINIC | Age: 77
Discharge: HOME | End: 2017-02-17
Attending: INTERNAL MEDICINE | Admitting: INTERNAL MEDICINE
Payer: MEDICARE

## 2017-02-17 DIAGNOSIS — R60.0 BILATERAL EDEMA OF LOWER EXTREMITY: ICD-10-CM

## 2017-02-17 PROCEDURE — 93970 EXTREMITY STUDY: CPT

## 2017-02-17 PROCEDURE — 93970 EXTREMITY STUDY: CPT | Mod: 26 | Performed by: INTERNAL MEDICINE

## 2017-03-08 ENCOUNTER — OFFICE VISIT (OUTPATIENT)
Dept: OPTOMETRY | Facility: CLINIC | Age: 77
End: 2017-03-08
Payer: MEDICARE

## 2017-03-08 DIAGNOSIS — H52.01 HYPEROPIA OF RIGHT EYE WITH ASTIGMATISM AND PRESBYOPIA: ICD-10-CM

## 2017-03-08 DIAGNOSIS — H52.4 HYPEROPIA OF RIGHT EYE WITH ASTIGMATISM AND PRESBYOPIA: ICD-10-CM

## 2017-03-08 DIAGNOSIS — H52.02 HYPEROPIA WITH PRESBYOPIA OF LEFT EYE: Primary | ICD-10-CM

## 2017-03-08 DIAGNOSIS — H04.123 DRY EYES: ICD-10-CM

## 2017-03-08 DIAGNOSIS — H52.201 HYPEROPIA OF RIGHT EYE WITH ASTIGMATISM AND PRESBYOPIA: ICD-10-CM

## 2017-03-08 DIAGNOSIS — H25.13 NUCLEAR SCLEROSIS OF BOTH EYES: ICD-10-CM

## 2017-03-08 DIAGNOSIS — H52.4 HYPEROPIA WITH PRESBYOPIA OF LEFT EYE: Primary | ICD-10-CM

## 2017-03-08 PROCEDURE — 92004 COMPRE OPH EXAM NEW PT 1/>: CPT | Performed by: OPTOMETRIST

## 2017-03-08 PROCEDURE — 92015 DETERMINE REFRACTIVE STATE: CPT | Mod: GY | Performed by: OPTOMETRIST

## 2017-03-08 ASSESSMENT — CUP TO DISC RATIO
OS_RATIO: 0.4/0.3
OD_RATIO: 0.4

## 2017-03-08 ASSESSMENT — VISUAL ACUITY
OS_SC: 20/25
METHOD: SNELLEN - LINEAR
OD_CC: 20/20
CORRECTION_TYPE: GLASSES
OD_SC+: +2
OD_CC+: -1
OD_CC: 20/60
OS_CC: 20/30
OD_SC: 20/50
OS_CC: 20/30
OS_CC+: -1

## 2017-03-08 ASSESSMENT — REFRACTION_MANIFEST
OD_AXIS: 178
OD_SPHERE: +0.50
OD_ADD: +2.50
OS_ADD: +2.50
OD_CYLINDER: +1.50
OS_SPHERE: +1.25

## 2017-03-08 ASSESSMENT — SLIT LAMP EXAM - LIDS: COMMENTS: MEIBOMIAN GLAND DYSFUNCTION

## 2017-03-08 ASSESSMENT — REFRACTION_WEARINGRX
OD_ADD: +2.75
OD_AXIS: 178
SPECS_TYPE: BIFOCAL
OD_CYLINDER: +1.50
OD_SPHERE: +1.00
OS_SPHERE: +2.00
OS_ADD: +2.75

## 2017-03-08 ASSESSMENT — TONOMETRY
OS_IOP_MMHG: 16
OD_IOP_MMHG: 16
IOP_METHOD: APPLANATION

## 2017-03-08 ASSESSMENT — CONF VISUAL FIELD
METHOD: COUNTING FINGERS
OS_NORMAL: 1
OD_NORMAL: 1

## 2017-03-08 ASSESSMENT — EXTERNAL EXAM - RIGHT EYE: OD_EXAM: NORMAL

## 2017-03-08 ASSESSMENT — EXTERNAL EXAM - LEFT EYE: OS_EXAM: NORMAL

## 2017-03-08 NOTE — PATIENT INSTRUCTIONS
Add Restasis to L eye  Add artificial tears to L eye or both  eyes twice daily   If L eye still burns we could consider removing plug    Cataracts are mild, but causing a reduction in your prescription for glasses    Monitor yearly

## 2017-03-08 NOTE — PROGRESS NOTES
Chief Complaint   Patient presents with     COMPREHENSIVE EYE EXAM     Routine Dry eyes on Restasis, has had floaters and flashes in the past 5 years ago.    Victoria Montalvo has a history of of dry eye , she has complaints of left eye watering, uses Restasis in OD only , discontinue OS as that eye does not seem dry      Last Eye Exam: 1yr [facility] with Dr. Montaño  Dilated Previously: Yes    What are you currently using to see?  glasses       Distance Vision Acuity: Noticed gradual change in both eyes    Near Vision Acuity: Not satisfied     Eye Comfort: dry  Do you use eye drops? : Yes: Restasis  Occupation or Hobbies: Computer              Medical, surgical and family histories reviewed and updated 3/8/2017.       OBJECTIVE: See Ophthalmology exam    ASSESSMENT:    ICD-10-CM    1. 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 Hyperopia of right eye with astigmatism and presbyopia H52.01     H52.201     H52.4    3. Dry eyes H04.123    4. Nuclear sclerosis of both eyes H25.13     c/o epiphora OS, she has bilatertal mgd and has discontinued restasis OS, and currently not using any artificial tears  (states she has plenty of refills)    PLAN:   Add restasis two times daily OS , continue in OD  Add artificial tears  Two times daily   If no improvement consider removal of punctal plug LLL.  Monitor cataracts yearly, update prescription     Naima Way OD

## 2017-03-08 NOTE — MR AVS SNAPSHOT
After Visit Summary   3/8/2017    Victoria Montalvo    MRN: [id number]           Patient Information     Date Of Birth          1940        Visit Information        Provider Department      3/8/2017 11:00 AM [address] OD [facility]an        Today's Diagnoses     Hyperopia with presbyopia of left eye    -  1    Hyperopia of right eye with astigmatism and presbyopia        Dry eyes        Nuclear sclerosis of both eyes          Care Instructions    Add Restasis to L eye  Add artificial tears to L eye or both  eyes twice daily   If L eye still burns we could consider removing plug    Cataracts are mild, but causing a reduction in your prescription for glasses    Monitor yearly          Follow-ups after your visit        Follow-up notes from your care team     Return in about 1 year (around 3/8/2018).      Your next 10 appointments already scheduled     Mar 17, 2017 12:30 PM CDT   Return Visit with NIMESH Leslie CNP   [facility] PHYS[facility] HE[facility] AT [place] ([facility])    [address]  [facility] [address] [phone]              Who to contact     If you have questions or need follow up information about today's clinic visit or your schedule please contact [facility] directly at [phone].  Normal or non-critical lab and imaging results will be communicated to you by MyChart, letter or phone within 4 business days after the clinic has received the results. If you do not hear from us within 7 days, please contact the clinic through MyChart or phone. If you have a critical or abnormal lab result, we will notify you by phone as soon as possible.  Submit refill requests through [employer] or call your pharmacy and they will forward the refill request to us. Please allow 3 business days for your refill to be completed.          Additional Information About Your Visit        MyChart Information     [employer]  gives you secure access to your electronic health record. If you see a primary care provider, you can also send messages to your care team and make appointments. If you have questions, please call your primary care clinic.  If you do not have a primary care provider, please call [phone] and they will assist you.        Care EveryWhere ID     This is your Care EveryWhere ID. This could be used by other organizations to access your [place] medical records  [id number]         Blood Pressure from Last 3 Encounters:   01/23/17 116/68   01/01/17 140/68   11/08/16 124/70    Weight from Last 3 Encounters:   01/23/17 78.5 kg (173 lb)   10/17/16 77.6 kg (171 lb)   10/05/16 77.2 kg (170 lb 3.2 oz)              Today, you had the following     No orders found for display       Primary Care Provider Office Phone # Fax #    Deisi Liu [phone]       [facility] [address] [place] [address] [address] [place] [address]        Thank you!     Thank you for choosing [facility]  for your care. Our goal is always to provide you with excellent care. Hearing back from our patients is one way we can continue to improve our services. Please take a few minutes to complete the written survey that you may receive in the mail after your visit with us. Thank you!             Your Updated Medication List - Protect others around you: Learn how to safely use, store and throw away your medicines at www.disposemymeds.org.          This list is accurate as of: 3/8/17 11:52 AM.  Always use your most recent med list.                   Brand Name Dispense Instructions for use    * albuterol (2.5 MG/3ML) 0.083% neb solution     1 Box    Take 1 vial (2.5 mg) by nebulization every 6 hours as needed for shortness of breath / dyspnea       * albuterol 108 (90 BASE) MCG/ACT Inhaler    PROAIR HFA/PROVENTIL HFA/VENTOLIN HFA    3 Inhaler    Inhale 2 puffs into the lungs every 6 hours as needed for shortness of  breath / dyspnea or wheezing       aspirin 81 MG tablet      Take 81 mg by mouth daily.       biotin 2.5 mg/mL Susp      Take by mouth daily       Calcium 4471-6215 MG-UNIT Chew      Take 1 tablet by mouth daily.       COENZYME Q-10 PO      Take 400 mg by mouth daily       FISH OIL DOUBLE STRENGTH 1200 MG capsule   Generic drug:  omega-3 fatty acids      Take 1 capsule by mouth 2 times daily       fluticasone 50 MCG/ACT spray    FLONASE    48 g    Spray 1-2 sprays into both nostrils daily       fluticasone-salmeterol 250-50 MCG/DOSE diskus inhaler    ADVAIR     Inhale 1 puff into the lungs every 12 hours       furosemide 20 MG tablet    LASIX    30 tablet    Take 1 tablet (20 mg) by mouth daily       MAGNESIUM ACETATE      Take 250 mg by mouth daily       methimazole 10 MG tablet    TAPAZOLE    30 tablet    Take 1 tablet (10 mg) by mouth daily       montelukast 10 MG tablet    SINGULAIR    90 tablet    Take 1 tablet (10 mg) by mouth At Bedtime       omeprazole 20 MG CR capsule    priLOSEC    90 capsule    Take 1 capsule (20 mg) by mouth daily       * order for DME     1 Device    Equipment being ordered: CPAP Auto-CPAP 5-25 cm H2O  Full face mask with heated humidified air       * order for DME     1 Device    Equipment being ordered: APAP 5-20cm/H2O full face mask with heated humidified air.       OSTEO BI-FLEX/5-LOXIN ADVANCED Tabs      Take 1,500 mg by mouth 2 times daily       pravastatin 40 MG tablet    PRAVACHOL    90 tablet    Take 1 tablet (40 mg) by mouth daily       RESTASIS 0.05 % ophthalmic emulsion   Generic drug:  cycloSPORINE      Place 1 drop into both eyes 2 times daily       spironolactone 25 MG tablet    ALDACTONE    45 tablet    Take 0.5 tablets (12.5 mg) by mouth daily       VITAMIN D3 PO      Take 2,000 Units by mouth daily       * Notice:  This list has 4 medication(s) that are the same as other medications prescribed for you. Read the directions carefully, and ask your doctor or other care  provider to review them with you.

## 2017-03-17 ENCOUNTER — OFFICE VISIT (OUTPATIENT)
Dept: CARDIOLOGY | Facility: CLINIC | Age: 77
End: 2017-03-17
Attending: INTERNAL MEDICINE
Payer: MEDICARE

## 2017-03-17 VITALS
BODY MASS INDEX: 29.32 KG/M2 | SYSTOLIC BLOOD PRESSURE: 110 MMHG | WEIGHT: 176 LBS | DIASTOLIC BLOOD PRESSURE: 68 MMHG | HEART RATE: 80 BPM | HEIGHT: 65 IN

## 2017-03-17 DIAGNOSIS — R06.02 SOB (SHORTNESS OF BREATH): ICD-10-CM

## 2017-03-17 DIAGNOSIS — R00.2 PALPITATIONS: ICD-10-CM

## 2017-03-17 DIAGNOSIS — I83.813 VARICOSE VEINS OF BOTH LOWER EXTREMITIES WITH PAIN: ICD-10-CM

## 2017-03-17 DIAGNOSIS — R60.0 BILATERAL EDEMA OF LOWER EXTREMITY: ICD-10-CM

## 2017-03-17 DIAGNOSIS — I50.32 DIASTOLIC CHF, CHRONIC (H): ICD-10-CM

## 2017-03-17 DIAGNOSIS — I77.1 SUBCLAVIAN ARTERY STENOSIS, LEFT (H): ICD-10-CM

## 2017-03-17 DIAGNOSIS — R07.9 CHEST PAIN, UNSPECIFIED TYPE: Primary | ICD-10-CM

## 2017-03-17 DIAGNOSIS — I87.2 VENOUS (PERIPHERAL) INSUFFICIENCY: ICD-10-CM

## 2017-03-17 DIAGNOSIS — E78.5 HYPERLIPIDEMIA LDL GOAL <130: ICD-10-CM

## 2017-03-17 PROCEDURE — 99214 OFFICE O/P EST MOD 30 MIN: CPT | Performed by: NURSE PRACTITIONER

## 2017-03-17 NOTE — MR AVS SNAPSHOT
After Visit Summary   3/17/2017    Victoria Montalvo    MRN: [id number]           Patient Information     Date Of Birth          1940        Visit Information        Provider Department      3/17/2017 12:30 PM Maureen Guevara APRN CNP [facility] PHYSICIANS HEART AT [place]        Today's Diagnoses     Chest pain, unspecified type    -  1    Hyperlipidemia LDL goal <130        Diastolic CHF, chronic (H)        Subclavian artery stenosis, left (H)        Palpitations        Bilateral edema of lower extremity        SOB (shortness of breath)        Venous (peripheral) insufficiency        Varicose veins of both lower extremities with pain            Follow-ups after your visit        Additional Services     Follow-Up with Cardiac Advanced Practice Provider           Follow-Up with Cardiologist                 Your next 10 appointments already scheduled     Mar 21, 2017  8:00 AM CDT   NM MPI WITH LEXISCAN with RHNM1   [facility] Nuclear Medicine ([facility])    [address] [facility] [address]   [phone]           For a ONE day exam: Allow 3-4 hours for test. For a TWO day exam: Allow 2 hours PER day for test.  You may need to stop some medicines before the test. Follow your doctor s orders. - If you take a beta blocker: Follow your doctor s specific instructions on taking it prior to and on the day of your exam. - If you take Aggrenox or dipyridamole (Persantine, Permole), stop taking it 48 hours before your test. - If you take Viagra, Cialis or Levitra, stop taking it 48 hours before your test. - If you take theophylline or aminophylline, stop taking it 12 hours before your test.  For patients with diabetes: - If you take insulin, call your diabetes care team. Ask if you should take a 1/2 dose the morning of your test. - If you take diabetes medicine by mouth, don t take it on the morning of your test. Bring it with you to take after the  test. (If you have questions, call your diabetes care team.)  Do not take nitrates on the day of your test. Do not wear your Nitro-Patch.  Stop all caffeine 12 hours before the test. This includes coffee, tea, soda pop, chocolate and certain medicines (such as Anacin, Excedrin and NoDoz). Also avoid decaf coffee and tea, as these contain small amounts of caffeine.  No alcohol, smoking or other tobacco for 12 hours before the test.  Stop eating 3 hours before the test. You may drink water.  Please wear a loose two-piece outfit. If you will have an exercise test, bring rubber-soled walking shoes.  When you arrive, please tell us if you: - Have diabetes - Are breastfeeding - May be pregnant - Have a pacemaker of ICD (implantable defibrillator).  Please call your Imaging Department at your exam site with any questions.            Mar 21, 2017  9:30 AM CDT   Ekg Stress Nm Lexiscan with RESRM3   [facility] ([facility])    [address] [facility] [address] [phone]            Mar 24, 2017  2:30 PM CDT   Return Visit with NIMESH Leslie CNP   [facility] PHYSICIANS HEART AT [place] ([facility])    [address]  [facility] [address] [phone]              Future tests that were ordered for you today     Open Future Orders        Priority Expected Expires Ordered    Follow-Up with Cardiologist Routine 3/17/2018 7/30/2018 3/17/2017     Endovenous Ablat Thpy Incmpnt 1Vein R1Vn Routine 6/24/2017 3/17/2018 3/17/2017    US Lower Extremity Venous Duplex Right Routine 6/24/2017 3/17/2018 3/17/2017    Follow-Up with Cardiac Advanced Practice Provider Routine 3/24/2017 3/17/2018 3/17/2017    NM Lexiscan stress test (nuc card) Routine 3/24/2017 3/17/2018 3/17/2017            Who to contact     If you have questions or need follow up information about today's clinic visit or your schedule please contact [facility]  "[facility] HEART AT [place] directly at [phone].  Normal or non-critical lab and imaging results will be communicated to you by MyChart, letter or phone within 4 business days after the clinic has received the results. If you do not hear from us within 7 days, please contact the clinic through MyChart or phone. If you have a critical or abnormal lab result, we will notify you by phone as soon as possible.  Submit refill requests through [employer] or call your pharmacy and they will forward the refill request to us. Please allow 3 business days for your refill to be completed.          Additional Information About Your Visit        [email or web address] Information     [employer] gives you secure access to your electronic health record. If you see a primary care provider, you can also send messages to your care team and make appointments. If you have questions, please call your primary care clinic.  If you do not have a primary care provider, please call [phone] and they will assist you.        Care EveryWhere ID     This is your Care EveryWhere ID. This could be used by other organizations to access your [place] medical records  [id number]        Your Vitals Were     Pulse Height BMI (Body Mass Index)             80 1.657 m (5' 5.25\") 29.06 kg/m2          Blood Pressure from Last 3 Encounters:   03/17/17 110/68   01/23/17 116/68   01/01/17 140/68    Weight from Last 3 Encounters:   03/17/17 79.8 kg (176 lb)   01/23/17 78.5 kg (173 lb)   10/17/16 77.6 kg (171 lb)              We Performed the Following     Follow-Up with Cardiac Advanced Practice Provider        Primary Care Provider Office Phone # Fax #    Deisi Liu [phone]       [facility] [address] [place] [address]        Thank you!     Thank you for choosing [facility]ART [facility]  for your care. Our goal is always to provide you with excellent care. Hearing back " from our patients is one way we can continue to improve our services. Please take a few minutes to complete the written survey that you may receive in the mail after your visit with us. Thank you!             Your Updated Medication List - Protect others around you: Learn how to safely use, store and throw away your medicines at www.disposemymeds.org.          This list is accurate as of: 3/17/17  1:47 PM.  Always use your most recent med list.                   Brand Name Dispense Instructions for use    * albuterol (2.5 MG/3ML) 0.083% neb solution     1 Box    Take 1 vial (2.5 mg) by nebulization every 6 hours as needed for shortness of breath / dyspnea       * albuterol 108 (90 BASE) MCG/ACT Inhaler    PROAIR HFA/PROVENTIL HFA/VENTOLIN HFA    3 Inhaler    Inhale 2 puffs into the lungs every 6 hours as needed for shortness of breath / dyspnea or wheezing       aspirin 81 MG tablet      Take 81 mg by mouth daily.       biotin 2.5 mg/mL Susp      Take by mouth daily       Calcium 8410-1979 MG-UNIT Chew      Take 1 tablet by mouth daily.       COENZYME Q-10 PO      Take 400 mg by mouth daily       FISH OIL DOUBLE STRENGTH 1200 MG capsule   Generic drug:  omega-3 fatty acids      Take 1 capsule by mouth 2 times daily       fluticasone 50 MCG/ACT spray    FLONASE    48 g    Spray 1-2 sprays into both nostrils daily       fluticasone-salmeterol 250-50 MCG/DOSE diskus inhaler    ADVAIR     Inhale 1 puff into the lungs every 12 hours       furosemide 20 MG tablet    LASIX    30 tablet    Take 1 tablet (20 mg) by mouth daily       MAGNESIUM ACETATE      Take 250 mg by mouth daily       methimazole 10 MG tablet    TAPAZOLE    30 tablet    Take 1 tablet (10 mg) by mouth daily       montelukast 10 MG tablet    SINGULAIR    90 tablet    Take 1 tablet (10 mg) by mouth At Bedtime       omeprazole 20 MG CR capsule    priLOSEC    90 capsule    Take 1 capsule (20 mg) by mouth daily       * order for DME     1 Device    Equipment  being ordered: CPAP Auto-CPAP 5-25 cm H2O  Full face mask with heated humidified air       * order for DME     1 Device    Equipment being ordered: APAP 5-20cm/H2O full face mask with heated humidified air.       OSTEO BI-FLEX/5-LOXIN ADVANCED Tabs      Take 1,500 mg by mouth 2 times daily       pravastatin 40 MG tablet    PRAVACHOL    90 tablet    Take 1 tablet (40 mg) by mouth daily       RESTASIS 0.05 % ophthalmic emulsion   Generic drug:  cycloSPORINE      Place 1 drop into both eyes 2 times daily       spironolactone 25 MG tablet    ALDACTONE    45 tablet    Take 0.5 tablets (12.5 mg) by mouth daily       VITAMIN D3 PO      Take 2,000 Units by mouth daily       * Notice:  This list has 4 medication(s) that are the same as other medications prescribed for you. Read the directions carefully, and ask your doctor or other care provider to review them with you.

## 2017-03-17 NOTE — PROGRESS NOTES
HPI and Plan:   See dictation    Orders Placed This Encounter   Procedures     NM Lexiscan stress test (nuc card)     US Endovenous Ablat Thpy Incmpnt 1Vein R1Vn     US Lower Extremity Venous Duplex Right     Follow-Up with Cardiac Advanced Practice Provider     Follow-Up with Cardiologist       No orders of the defined types were placed in this encounter.      There are no discontinued medications.      Encounter Diagnoses   Name Primary?     Hyperlipidemia LDL goal <130      Diastolic CHF, chronic (H)      Subclavian artery stenosis, left (H)      Palpitations      Bilateral edema of lower extremity      Chest pain, unspecified type Yes     SOB (shortness of breath)      Venous (peripheral) insufficiency      Varicose veins of both lower extremities with pain         CURRENT MEDICATIONS:  Current Outpatient Prescriptions   Medication Sig Dispense Refill     fluticasone-salmeterol (ADVAIR) 250-50 MCG/DOSE diskus inhaler Inhale 1 puff into the lungs every 12 hours       biotin 2.5 mg/mL Take by mouth daily       methimazole (TAPAZOLE) 10 MG tablet Take 1 tablet (10 mg) by mouth daily 30 tablet 3     spironolactone (ALDACTONE) 25 MG tablet Take 0.5 tablets (12.5 mg) by mouth daily 45 tablet 12     furosemide (LASIX) 20 MG tablet Take 1 tablet (20 mg) by mouth daily 30 tablet 0     omeprazole (PRILOSEC) 20 MG capsule Take 1 capsule (20 mg) by mouth daily 90 capsule 3     pravastatin (PRAVACHOL) 40 MG tablet Take 1 tablet (40 mg) by mouth daily 90 tablet 3     montelukast (SINGULAIR) 10 MG tablet Take 1 tablet (10 mg) by mouth At Bedtime 90 tablet 3     albuterol (PROAIR HFA, PROVENTIL HFA, VENTOLIN HFA) 108 (90 BASE) MCG/ACT inhaler Inhale 2 puffs into the lungs every 6 hours as needed for shortness of breath / dyspnea or wheezing 3 Inhaler 0     albuterol (2.5 MG/3ML) 0.083% nebulizer solution Take 1 vial (2.5 mg) by nebulization every 6 hours as needed for shortness of breath / dyspnea 1 Box 3     fluticasone  (FLONASE) 50 MCG/ACT nasal spray Spray 1-2 sprays into both nostrils daily 48 g 3     Cholecalciferol (VITAMIN D3 PO) Take 2,000 Units by mouth daily        COENZYME Q-10 PO Take 400 mg by mouth daily       ORDER FOR DME Equipment being ordered: APAP 5-20cm/H2O full face mask with heated humidified air. 1 Device 0     ORDER FOR DME Equipment being ordered: CPAP  Auto-CPAP 5-25 cm H2O   Full face mask with heated humidified air 1 Device 0     aspirin 81 MG tablet Take 81 mg by mouth daily.       omega-3 fatty acids (FISH OIL DOUBLE STRENGTH) 1200 MG capsule Take 1 capsule by mouth 2 times daily        Calcium 0445-2424 MG-UNIT CHEW Take 1 tablet by mouth daily.       Boswellia-Glucosamine-Vit D (OSTEO BI-FLEX/5-LOXIN ADVANCED) TABS Take 1,500 mg by mouth 2 times daily        MAGNESIUM ACETATE Take 250 mg by mouth daily        cycloSPORINE (RESTASIS) 0.05 % ophthalmic emulsion Place 1 drop into both eyes 2 times daily          ALLERGIES     Allergies   Allergen Reactions     Animal Dander      Kiwi Itching     Itching and red all over     Pollen Extract      Pollen,dust, trees, grass, mold-hayfever symptoms       PAST MEDICAL HISTORY:  Past Medical History   Diagnosis Date     Abnormal Papanicolaou smear of cervix and cervical HPV      colposcopy 1/97     Arthritis      Chronic rhinitis      Coronary artery disease      Gastro-oesophageal reflux disease      Hyperlipidemia      Pain in right hip      Personal history of colonic polyps      colonoscopy 9/97, 2002 (due in 2007)     Sleep apnea      Subclinical hyperthyroidism 10/17/2016     Unspecified asthma(493.90)      on preventative meds and has nebulizer       PAST SURGICAL HISTORY:  Past Surgical History   Procedure Laterality Date     C open rx ankle dislocatn+fixatn  4/18/87     Hc reduction of large breast  2/89     C nonspecific procedure  2/89, 1990     liposuction of legs and stomach     C nonspecific procedure  1990     Ankle pins removed     Hc  colonoscopy thru stoma, diagnostic  2002     Colonoscopy  01/1/08     Polyp removed, bx.     Colonoscopy  01/12/10     Colonoscopy N/A 2/17/2015     Procedure: COLONOSCOPY;  Surgeon: Gato Quiles MD;  Location:  [facility]     Vascular surgery  2013     angiogram & left subclavical artery stent     Arthroplasty hip Right 10/19/2015     Procedure: ARTHROPLASTY HIP;  Surgeon: Aron Torres MD;  Location: [facility] OR       FAMILY HISTORY:  Family History   Problem Relation Age of Onset     Alzheimer Disease Mother      GASTROINTESTINAL DISEASE Mother      CANCER Father      throat and lungs, liver,     HEART DISEASE Father 57     CABG     HEART DISEASE Brother      HEART DISEASE Sister      Hypertension Maternal Grandmother      Lipids Maternal Grandmother      CANCER Maternal Grandmother      stomach     CANCER Paternal Grandmother      stomach     Allergies Daughter      Allergies Son      Depression Brother      suicidal       SOCIAL HISTORY:  Social History     Social History     Marital status:      Spouse name: [place]on     Number of children: 2     Years of education: 12     Occupational History     retired      Social History Main Topics     Smoking status: Former Smoker     Packs/day: 0.10     Years: 10.00     Types: Cigarettes     Quit date: 5/19/2003     Smokeless tobacco: Never Used      Comment: off and on x 10 years 3-4 cigs: no smoker in the household     Alcohol use 0.0 oz/week     0 Standard drinks or equivalent per week      Comment: a couple drinks per year     Drug use: No     Sexual activity: Not Currently     Partners: Male     Other Topics Concern     Caffeine Concern No     Special Diet No     regular diet      Exercise No     shopping a lot so [place]      Social History Narrative       Review of Systems:  Skin:  Positive for bruising     Eyes:  Positive for glasses    ENT:  Positive for epistaxis epistaxis occ  Respiratory:  Positive for sleep apnea;CPAP;shortness of breath    "  Cardiovascular:    chest pain;edema;fatigue;lightheadedness;Positive for chest pain occ  Gastroenterology: Positive for heartburn;constipation    Genitourinary:  Positive for urinary frequency    Musculoskeletal:  Positive for arthritis;back pain    Neurologic:         Psychiatric:  Negative for      Heme/Lymph/Imm:  Positive for allergies    Endocrine:  Positive for thyroid disorder      Physical Exam:  Vitals: /68  Pulse 80  Ht 1.657 m (5' 5.25\")  Wt 79.8 kg (176 lb)  BMI 29.06 kg/m2    Constitutional:  cooperative, alert and oriented, well developed, well nourished, in no acute distress        Skin:  warm and dry to the touch, no apparent skin lesions or masses noted        Head:  normocephalic, no masses or lesions        Eyes:  pupils equal and round, conjunctivae and lids unremarkable, sclera white, no xanthalasma, EOMS intact, no nystagmus        ENT:  no pallor or cyanosis, dentition good        Neck:  JVP normal        Chest:  normal breath sounds, clear to auscultation, normal A-P diameter, normal symmetry, normal respiratory excursion, no use of accessory muscles          Cardiac: regular rhythm;normal S1 and S2       systolic murmur;grade 1          Abdomen:  abdomen soft   benign    Vascular: pulses full and equal                                        Extremities and Back:  normal muscle strength and tone   bilateral LE edema;1+     wearing compression stockings    Neurological:  affect appropriate, oriented to time, person and place              CC  Yogi Yepez MD   [facility]  [address]  [place] [id number]              "

## 2017-03-18 NOTE — PROGRESS NOTES
HISTORY OF PRESENT ILLNESS:  I had the pleasure of seeing both Victoria Montalvo and her  today in Cardiology Clinic followup today.  She is a pleasant 76-year-old patient of Dr. Yepez's with a history of subclavian stenosis, status post percutaneous revascularization, hypertension and lower extremity edema.  She recently saw Dr. Yepez and was doing well but complaining of lower extremity edema.  She is wearing compression stockings at the time but wondered if there was anything else that could be done.  He recommended venous competency studies and followup with me today.      Her venous competency studies did confirm venous insufficiency.  The right was worse than the left, but she did have it bilaterally with 6.1 seconds noted at the midcalf on the right and between 1.1 in 4 seconds of reflux noted in the left proximal calf through to the ankle.  She complains of lower extremity edema, worse in the evening.  She also complains of leg fatigue.      She also tells me today that she is having exertional dyspnea.  She notices that it has gotten worse recently.  Going up the stairs will provoke it, walking short distances will also provoke it.  She has vague associated chest pain with it, sometimes she gets chest discomfort just at rest.  She will describe it as a twinge, sometimes she gets it with exertion, and the other thing she has noted more recently is a general sense of fatigue.      PHYSICAL EXAMINATION:   GENERAL:  A 76-year-old woman appears healthy.   VITAL SIGNS:  Blood pressure 110/68, heart rate 80, weight 176 pounds, BMI of 29.   LUNGS:  Clear throughout auscultation.   CARDIAC:  Rate regular, normal S1, S2.   EXTREMITIES:  She has bilateral lower extremity edema, right greater than left, with superficial varicosities.      ASSESSMENT AND PLAN:   1.  Symptomatic venous insufficiency with superficial varicosities and venous competency studies demonstrating venous insufficiency.  She is currently wearing  stockings, but they are not a great fit for her.  I fitted her with proper Jobst stockings, knee high with 20-30 mmHg for compression.  She put a pair of these on in our office and felt already relief just since she put them on.  She was quite excited to have it.  I tentatively set her up for an ablation of her right leg in 3 months with Dr. Yepez, with an ultrasound following it.  My schedule is not out that far yet, but she should see me prior to make sure the stockings are helping.   2.  Exertional shortness of breath with occasional chest discomfort with a history of subclavian stenosis.  I did recommend a nuclear stress test for her to look for any large areas of ischemia.  She is currently on a statin.  She is not on a beta blocker, presumably for her asthma.  Her last echocardiogram showed an EF of 60%-65% and diastolic dysfunction.      Thank you for allowing me to see  today.  I would like her to have her nuclear stress test next week, and I can see her next Friday to review the results.  She should continue to wear her compression stockings between now and for the next few months and see how she does with that.  If that does help with her symptoms, then I would have her follow up with a venous ablation after that.      Thank you for allowing me to see  today.      NIMESH Srivastava, KAYA         D: 2017 13:47   T: 2017 21:01   MT: ARTEMIO      Name:     ALVIN PHILLIPS   MRN:      [id number]        Account:      [id number]   :      1940           Service Date: 2017      Document: [id number]

## 2017-03-21 ENCOUNTER — HOSPITAL ENCOUNTER (OUTPATIENT)
Dept: NUCLEAR MEDICINE | Facility: CLINIC | Age: 77
Setting detail: NUCLEAR MEDICINE
Discharge: HOME | End: 2017-03-21
Attending: NURSE PRACTITIONER | Admitting: NURSE PRACTITIONER
Payer: MEDICARE

## 2017-03-21 ENCOUNTER — HOSPITAL ENCOUNTER (OUTPATIENT)
Dept: CARDIOLOGY | Facility: CLINIC | Age: 77
Discharge: HOME | End: 2017-03-21
Attending: NURSE PRACTITIONER | Admitting: NURSE PRACTITIONER
Payer: MEDICARE

## 2017-03-21 DIAGNOSIS — R06.02 SOB (SHORTNESS OF BREATH): ICD-10-CM

## 2017-03-21 DIAGNOSIS — R07.9 CHEST PAIN, UNSPECIFIED TYPE: ICD-10-CM

## 2017-03-21 PROCEDURE — 93016 CV STRESS TEST SUPVJ ONLY: CPT | Performed by: INTERNAL MEDICINE

## 2017-03-21 PROCEDURE — 78452 HT MUSCLE IMAGE SPECT MULT: CPT | Mod: 26 | Performed by: INTERNAL MEDICINE

## 2017-03-21 PROCEDURE — 93018 CV STRESS TEST I&R ONLY: CPT | Performed by: INTERNAL MEDICINE

## 2017-03-21 PROCEDURE — 93017 CV STRESS TEST TRACING ONLY: CPT

## 2017-03-21 RX ORDER — REGADENOSON 0.08 MG/ML
0.4 INJECTION, SOLUTION INTRAVENOUS ONCE
Status: DISCONTINUED | OUTPATIENT
Start: 2017-03-21 | End: 2017-03-22 | Stop reason: HOSPADM

## 2017-03-21 RX ORDER — REGADENOSON 0.08 MG/ML
INJECTION, SOLUTION INTRAVENOUS
Status: DISPENSED
Start: 2017-03-21 | End: 2017-03-21

## 2017-03-21 RX ADMIN — TETROFOSMIN 33 MCI.: 0.23 INJECTION, POWDER, LYOPHILIZED, FOR SOLUTION INTRAVENOUS at 09:51

## 2017-03-21 RX ADMIN — TETROFOSMIN 10.2 MCI.: 0.23 INJECTION, POWDER, LYOPHILIZED, FOR SOLUTION INTRAVENOUS at 07:40

## 2017-03-21 NOTE — PROGRESS NOTES
Pre-procedure:    Initial vital signs: /84, HR 73, RR 18  Allergies: reviewed   Rhythm: Sinus  Medications taken within 48 hours of procedure: na   Last Caffeine: evening  Lung sounds: CTA, no wheezing, crackles or rtx  Health History (COPD, Asthma, etc): asthma    Procedure: Lexiscan  Reaction/symptoms after receiving Isabella injection: Shortness of breath    Vital Signs:/76, , RR 21    Reversal agent: N/A    Post:   Resolution of symptoms?: YES  Vital signs: /74, , RR 21  Walk: YES  Return to Radiology

## 2017-03-24 ENCOUNTER — OFFICE VISIT (OUTPATIENT)
Dept: CARDIOLOGY | Facility: CLINIC | Age: 77
End: 2017-03-24
Attending: NURSE PRACTITIONER
Payer: MEDICARE

## 2017-03-24 VITALS
HEART RATE: 78 BPM | DIASTOLIC BLOOD PRESSURE: 80 MMHG | SYSTOLIC BLOOD PRESSURE: 142 MMHG | WEIGHT: 176 LBS | HEIGHT: 65 IN | BODY MASS INDEX: 29.32 KG/M2

## 2017-03-24 DIAGNOSIS — R07.9 CHEST PAIN, UNSPECIFIED TYPE: ICD-10-CM

## 2017-03-24 DIAGNOSIS — R06.02 SOB (SHORTNESS OF BREATH): ICD-10-CM

## 2017-03-24 DIAGNOSIS — R94.39 ABNORMAL CARDIOVASCULAR STRESS TEST: Primary | ICD-10-CM

## 2017-03-24 DIAGNOSIS — I87.2 VENOUS (PERIPHERAL) INSUFFICIENCY: ICD-10-CM

## 2017-03-24 PROCEDURE — 99214 OFFICE O/P EST MOD 30 MIN: CPT | Performed by: NURSE PRACTITIONER

## 2017-03-24 RX ORDER — AMIODARONE HYDROCHLORIDE 200 MG/1
100 TABLET ORAL DAILY
COMMUNITY
End: 2017-03-24

## 2017-03-24 NOTE — LETTER
3/24/2017    Deisi Liu MD  [facility]   [address] [place] [address]    RE: Victoria Montalvo       Dear Colleague,    I had the pleasure of seeing Victoria Montalvo and her  today in Cardiology Clinic to follow up the results of her abnormal nuclear stress test and to recommend coronary angiography for further evaluation.  She is a pleasant 76-year-old patient of Dr. Yepez's with a history of subclavian stenosis, status post percutaneous revascularization, hypertension and lower extremity edema.  I saw her recently to follow up her venous competency studies which did confirm venous insufficiency, and I gave her compression stockings at the time.  She has been wearing them ever since and says her legs feel great.      When I saw her last, she also noticed worsening dyspnea on exertion with associated chest pain.  A nuclear stress test showed mostly reversible anterior apical defect consistent with mild to moderate ischemia in the distribution of the LAD artery.  There was also breast attenuation noted.  Her ejection fraction was 88%, and she was symptomatic during the test with shortness of breath.      Today her symptoms are unchanged.  She continues to have exertional shortness of breath with occasional chest pain twinges.  Her blood pressure is a little bit elevated today, but she and her  have been out all day at doctors' visits and shopping, and she says at home it has been well controlled and it was well controlled at our last visit.      PHYSICAL EXAMINATION:   GENERAL:  A 76-year-old woman who appears her age.   VITAL SIGNS:  Blood pressure 142/80, heart rate 78, weight 176 pounds, BMI of 29.   LUNGS:  Clear throughout to auscultation.   CARDIOVASCULAR:  Rate regular, normal S1, S2.   EXTREMITIES:  Bilateral lower extremity edema with bilateral compression stockings on.     Outpatient Encounter Prescriptions as of 3/24/2017   Medication Sig Dispense Refill      fluticasone-salmeterol (ADVAIR) 250-50 MCG/DOSE diskus inhaler Inhale 1 puff into the lungs every 12 hours       biotin 2.5 mg/mL Take by mouth daily 2000mcg       [DISCONTINUED] methimazole (TAPAZOLE) 10 MG tablet Take 1 tablet (10 mg) by mouth daily 30 tablet 3     spironolactone (ALDACTONE) 25 MG tablet Take 0.5 tablets (12.5 mg) by mouth daily (Patient taking differently: Take 12.5 mg by mouth Mon-wed-fri) 45 tablet 12     furosemide (LASIX) 20 MG tablet Take 1 tablet (20 mg) by mouth daily 30 tablet 0     omeprazole (PRILOSEC) 20 MG capsule Take 1 capsule (20 mg) by mouth daily 90 capsule 3     montelukast (SINGULAIR) 10 MG tablet Take 1 tablet (10 mg) by mouth At Bedtime 90 tablet 3     [DISCONTINUED] pravastatin (PRAVACHOL) 40 MG tablet Take 1 tablet (40 mg) by mouth daily 90 tablet 3     albuterol (PROAIR HFA, PROVENTIL HFA, VENTOLIN HFA) 108 (90 BASE) MCG/ACT inhaler Inhale 2 puffs into the lungs every 6 hours as needed for shortness of breath / dyspnea or wheezing 3 Inhaler 0     albuterol (2.5 MG/3ML) 0.083% nebulizer solution Take 1 vial (2.5 mg) by nebulization every 6 hours as needed for shortness of breath / dyspnea (Patient not taking: Reported on 5/11/2017) 1 Box 3     fluticasone (FLONASE) 50 MCG/ACT nasal spray Spray 1-2 sprays into both nostrils daily 48 g 3     Cholecalciferol (VITAMIN D3 PO) Take 2,000 Units by mouth daily        [DISCONTINUED] COENZYME Q-10 PO Take 400 mg by mouth daily       ORDER FOR DME Equipment being ordered: APAP 5-20cm/H2O full face mask with heated humidified air. 1 Device 0     ORDER FOR DME Equipment being ordered: CPAP  Auto-CPAP 5-25 cm H2O   Full face mask with heated humidified air 1 Device 0     aspirin 81 MG tablet Take 81 mg by mouth daily.       omega-3 fatty acids (FISH OIL DOUBLE STRENGTH) 1200 MG capsule Take 1 capsule by mouth 2 times daily        Calcium 5064-7871 MG-UNIT CHEW Take 1 tablet by mouth daily.       Boswellia-Glucosamine-Vit D (OSTEO  BI-FLEX/5-LOXIN ADVANCED) TABS Take 1,500 mg by mouth 2 times daily        MAGNESIUM ACETATE Take 250 mg by mouth daily        cycloSPORINE (RESTASIS) 0.05 % ophthalmic emulsion Place 1 drop into both eyes 2 times daily        [DISCONTINUED] amiodarone (PACERONE/CODARONE) 200 MG tablet Take 100 mg by mouth daily       No facility-administered encounter medications on file as of 3/24/2017.       ASSESSMENT AND PLAN:   1.  Typical exertional chest pain and shortness of breath, with a nuclear stress test showing a possible mild to moderate ischemia in the distribution of the LAD artery.  I recommended, after discussing with Dr. Yepez, coronary angiography for further evaluation and possible treatment of her arteries if necessary.  We reviewed the procedure today as well as the risks and benefits including the risk for bleeding, vascular trauma, stroke, heart attack, death, and renal dysfunction.  I quoted a less than 0.5% risk for complications.  She would like to go ahead and proceed with this.  She has no contraindication to dual antiplatelet therapy.  She does have a history of subclavian stenosis treated with balloon angioplasty by Dr. Rey in 2013.   2.  Venous insufficiency.  This was demonstrated by venous competency studies, and she is doing well with compression stockings.  In fact, she liked them so much that she went on-line and was able to find them at a good deal and has since received them in the mail.  She says that she notices her legs still get fatigued at the end of the day, but she has less discomfort and is feeling quite a lot better walking around when she wears her compression stockings.  This is a good result, and I will see her in June when she returns from her trip to [place] to talk about possible venous ablation at that time.      Thank you for allowing me to see Victoria today.     Sincerely,    NIMESH Espinoza [facility]

## 2017-03-24 NOTE — MR AVS SNAPSHOT
After Visit Summary   3/24/2017    Victoria Montalvo    MRN: [id number]           Patient Information     Date Of Birth          1940        Visit Information        Provider Department      3/24/2017 2:30 PM Maureen Guevara APRN CNP [facility]        Today's Diagnoses     Abnormal cardiovascular stress test    -  1    SOB (shortness of breath)        Chest pain, unspecified type        Venous (peripheral) insufficiency           Follow-ups after your visit        Additional Services     Follow-Up with Cardiac Advanced Practice Provider                 Your next 10 appointments already scheduled     Mar 29, 2017  1:00 PM CDT   LAB with RU LAB   [facility] ([facility])    [id number] [place] [address] [address]  [facility] [address] [phone]           Patient must bring picture ID.  Patient should be prepared to give a urine specimen  Please do not eat 10-12 hours before your appointment if you are coming in fasting for labs on lipids, cholesterol, or glucose (sugar).  Pregnant women should follow their Care Team instructions. Water with medications is okay. Do not drink coffee or other fluids.   If you have concerns about taking  your medications, please ask at office or if scheduling via [employer], send a message by clicking on Secure Messaging, Message Your Care Team.            Apr 06, 2017  3:10 PM CDT   Return Visit with Alia Yoo PA-C   [facility] ([facility])    [address] [place] [address] [address]  [facility] [address] [phone]            Jun 30, 2017 10:10 AM CDT   Return Visit with NIMESH [place] CNP   [facility] ([facility])    [address] [place] [address] [address]  [facility] [address] [phone]              Future tests that were ordered for you today     Open  "Future Orders        Priority Expected Expires Ordered    Follow-Up with Cardiac Advanced Practice Provider Routine 6/30/2017 3/24/2018 3/24/2017    Cardiac Cath: Coronary Angiography Adult Order Routine 3/31/2017 3/19/2018 3/24/2017            Who to contact     If you have questions or need follow up information about today's clinic visit or your schedule please contact [facility] [place] directly at [phone].  Normal or non-critical lab and imaging results will be communicated to you by [employer], letter or phone within 4 business days after the clinic has received the results. If you do not hear from us within 7 days, please contact the clinic through [employer] or phone. If you have a critical or abnormal lab result, we will notify you by phone as soon as possible.  Submit refill requests through [employer] or call your pharmacy and they will forward the refill request to us. Please allow 3 business days for your refill to be completed.          Additional Information About Your Visit        [employer] Information     [employer] gives you secure access to your electronic health record. If you see a primary care provider, you can also send messages to your care team and make appointments. If you have questions, please call your primary care clinic.  If you do not have a primary care provider, please call [phone] and they will assist you.        Care EveryWhere ID     This is your Care EveryWhere ID. This could be used by other organizations to access your [place] medical records  [id number]        Your Vitals Were     Pulse Height BMI (Body Mass Index)             78 1.657 m (5' 5.25\") 29.06 kg/m2          Blood Pressure from Last 3 Encounters:   03/24/17 142/80   03/17/17 110/68   01/23/17 116/68    Weight from Last 3 Encounters:   03/24/17 79.8 kg (176 lb)   03/17/17 79.8 kg (176 lb)   01/23/17 78.5 kg (173 lb)              We Performed the Following     Follow-Up with Cardiac " Advanced Practice Provider          Today's Medication Changes          These changes are accurate as of: 3/24/17  3:12 PM.  If you have any questions, ask your nurse or doctor.               These medicines have changed or have updated prescriptions.        Dose/Directions    spironolactone 25 MG tablet   Commonly known as:  ALDACTONE   This may have changed:    - when to take this  - additional instructions   Used for:  Hyperlipidemia LDL goal <130, Diastolic CHF, chronic (H), Subclavian artery stenosis, left (H), Palpitations        Dose:  12.5 mg   Take 0.5 tablets (12.5 mg) by mouth daily   Quantity:  45 tablet   Refills:  12                Primary Care Provider Office Phone # Fax #    Deisi Liu [phone]       [facility] [address] [place] [address]        Thank you!     Thank you for choosing [facility] [place]  for your care. Our goal is always to provide you with excellent care. Hearing back from our patients is one way we can continue to improve our services. Please take a few minutes to complete the written survey that you may receive in the mail after your visit with us. Thank you!             Your Updated Medication List - Protect others around you: Learn how to safely use, store and throw away your medicines at www.disposemymeds.org.          This list is accurate as of: 3/24/17  3:12 PM.  Always use your most recent med list.                   Brand Name Dispense Instructions for use    * albuterol (2.5 MG/3ML) 0.083% neb solution     1 Box    Take 1 vial (2.5 mg) by nebulization every 6 hours as needed for shortness of breath / dyspnea       * albuterol 108 (90 BASE) MCG/ACT Inhaler    PROAIR HFA/PROVENTIL HFA/VENTOLIN HFA    3 Inhaler    Inhale 2 puffs into the lungs every 6 hours as needed for shortness of breath / dyspnea or wheezing       aspirin 81 MG tablet      Take 81 mg by mouth daily.       biotin  2.5 mg/mL Susp      Take by mouth daily 2000mcg       Calcium 6056-6007 MG-UNIT Chew      Take 1 tablet by mouth daily.       COENZYME Q-10 PO      Take 400 mg by mouth daily       FISH OIL DOUBLE STRENGTH 1200 MG capsule   Generic drug:  omega-3 fatty acids      Take 1 capsule by mouth 2 times daily       fluticasone 50 MCG/ACT spray    FLONASE    48 g    Spray 1-2 sprays into both nostrils daily       fluticasone-salmeterol 250-50 MCG/DOSE diskus inhaler    ADVAIR     Inhale 1 puff into the lungs every 12 hours       furosemide 20 MG tablet    LASIX    30 tablet    Take 1 tablet (20 mg) by mouth daily       MAGNESIUM ACETATE      Take 250 mg by mouth daily       methimazole 10 MG tablet    TAPAZOLE    30 tablet    Take 1 tablet (10 mg) by mouth daily       montelukast 10 MG tablet    SINGULAIR    90 tablet    Take 1 tablet (10 mg) by mouth At Bedtime       omeprazole 20 MG CR capsule    priLOSEC    90 capsule    Take 1 capsule (20 mg) by mouth daily       * order for DME     1 Device    Equipment being ordered: CPAP Auto-CPAP 5-25 cm H2O  Full face mask with heated humidified air       * order for DME     1 Device    Equipment being ordered: APAP 5-20cm/H2O full face mask with heated humidified air.       OSTEO BI-FLEX/5-LOXIN ADVANCED Tabs      Take 1,500 mg by mouth 2 times daily       pravastatin 40 MG tablet    PRAVACHOL    90 tablet    Take 1 tablet (40 mg) by mouth daily       RESTASIS 0.05 % ophthalmic emulsion   Generic drug:  cycloSPORINE      Place 1 drop into both eyes 2 times daily       spironolactone 25 MG tablet    ALDACTONE    45 tablet    Take 0.5 tablets (12.5 mg) by mouth daily       VITAMIN D3 PO      Take 2,000 Units by mouth daily       * Notice:  This list has 4 medication(s) that are the same as other medications prescribed for you. Read the directions carefully, and ask your doctor or other care provider to review them with you.

## 2017-03-24 NOTE — PROGRESS NOTES
HPI and Plan:   See dictation    Orders Placed This Encounter   Procedures     Follow-Up with Cardiac Advanced Practice Provider       Orders Placed This Encounter   Medications     DISCONTD: amiodarone (PACERONE/CODARONE) 200 MG tablet     Sig: Take 100 mg by mouth daily       Medications Discontinued During This Encounter   Medication Reason     amiodarone (PACERONE/CODARONE) 200 MG tablet Erroneous Entry         Encounter Diagnoses   Name Primary?     SOB (shortness of breath)      Chest pain, unspecified type      Abnormal cardiovascular stress test Yes     Venous (peripheral) insufficiency        CURRENT MEDICATIONS:  Current Outpatient Prescriptions   Medication Sig Dispense Refill     fluticasone-salmeterol (ADVAIR) 250-50 MCG/DOSE diskus inhaler Inhale 1 puff into the lungs every 12 hours       biotin 2.5 mg/mL Take by mouth daily 2000mcg       methimazole (TAPAZOLE) 10 MG tablet Take 1 tablet (10 mg) by mouth daily 30 tablet 3     spironolactone (ALDACTONE) 25 MG tablet Take 0.5 tablets (12.5 mg) by mouth daily (Patient taking differently: Take 12.5 mg by mouth Mon-wed-fri) 45 tablet 12     furosemide (LASIX) 20 MG tablet Take 1 tablet (20 mg) by mouth daily 30 tablet 0     omeprazole (PRILOSEC) 20 MG capsule Take 1 capsule (20 mg) by mouth daily 90 capsule 3     pravastatin (PRAVACHOL) 40 MG tablet Take 1 tablet (40 mg) by mouth daily 90 tablet 3     montelukast (SINGULAIR) 10 MG tablet Take 1 tablet (10 mg) by mouth At Bedtime 90 tablet 3     albuterol (PROAIR HFA, PROVENTIL HFA, VENTOLIN HFA) 108 (90 BASE) MCG/ACT inhaler Inhale 2 puffs into the lungs every 6 hours as needed for shortness of breath / dyspnea or wheezing 3 Inhaler 0     albuterol (2.5 MG/3ML) 0.083% nebulizer solution Take 1 vial (2.5 mg) by nebulization every 6 hours as needed for shortness of breath / dyspnea 1 Box 3     fluticasone (FLONASE) 50 MCG/ACT nasal spray Spray 1-2 sprays into both nostrils daily 48 g 3     Cholecalciferol  (VITAMIN D3 PO) Take 2,000 Units by mouth daily        COENZYME Q-10 PO Take 400 mg by mouth daily       ORDER FOR DME Equipment being ordered: APAP 5-20cm/H2O full face mask with heated humidified air. 1 Device 0     ORDER FOR DME Equipment being ordered: CPAP  Auto-CPAP 5-25 cm H2O   Full face mask with heated humidified air 1 Device 0     aspirin 81 MG tablet Take 81 mg by mouth daily.       omega-3 fatty acids (FISH OIL DOUBLE STRENGTH) 1200 MG capsule Take 1 capsule by mouth 2 times daily        Calcium 8887-6275 MG-UNIT CHEW Take 1 tablet by mouth daily.       Boswellia-Glucosamine-Vit D (OSTEO BI-FLEX/5-LOXIN ADVANCED) TABS Take 1,500 mg by mouth 2 times daily        MAGNESIUM ACETATE Take 250 mg by mouth daily        cycloSPORINE (RESTASIS) 0.05 % ophthalmic emulsion Place 1 drop into both eyes 2 times daily          ALLERGIES     Allergies   Allergen Reactions     Animal Dander      Kiwi Itching     Itching and red all over     Pollen Extract      Pollen,dust, trees, grass, mold-hayfever symptoms       PAST MEDICAL HISTORY:  Past Medical History:   Diagnosis Date     Abnormal Papanicolaou smear of cervix and cervical HPV     colposcopy 1/97     Arthritis      Chronic rhinitis      Coronary artery disease      Gastro-oesophageal reflux disease      Hyperlipidemia      Pain in right hip      Personal history of colonic polyps     colonoscopy 9/97, 2002 (due in 2007)     Sleep apnea      Subclinical hyperthyroidism 10/17/2016     Unspecified asthma(493.90)     on preventative meds and has nebulizer       PAST SURGICAL HISTORY:  Past Surgical History:   Procedure Laterality Date     ARTHROPLASTY HIP Right 10/19/2015    Procedure: ARTHROPLASTY HIP;  Surgeon: Aron Torres MD;  Location: [facility] OR     C NONSPECIFIC PROCEDURE  2/89, 1990    liposuction of legs and stomach     C NONSPECIFIC PROCEDURE  1990    Ankle pins removed     C OPEN RX ANKLE DISLOCATN+FIXATN  4/18/87     COLONOSCOPY  01/1/08    Polyp  removed, bx.     COLONOSCOPY  01/12/10     COLONOSCOPY N/A 2/17/2015    Procedure: COLONOSCOPY;  Surgeon: Gato Quiles MD;  Location: [facility] GI     HC COLONOSCOPY THRU STOMA, DIAGNOSTIC  2002     HC REDUCTION OF LARGE BREAST  2/89     VASCULAR SURGERY  2013    angiogram & left subclavical artery stent       FAMILY HISTORY:  Family History   Problem Relation Age of Onset     Alzheimer Disease Mother      GASTROINTESTINAL DISEASE Mother      CANCER Father      throat and lungs, liver,     HEART DISEASE Father 57     CABG     HEART DISEASE Brother      HEART DISEASE Sister      Hypertension Maternal Grandmother      Lipids Maternal Grandmother      CANCER Maternal Grandmother      stomach     CANCER Paternal Grandmother      stomach     Allergies Daughter      Allergies Son      Depression Brother      suicidal       SOCIAL HISTORY:  Social History     Social History     Marital status:      Spouse name: [place]     Number of children: 2     Years of education: 12     Occupational History     retired      Social History Main Topics     Smoking status: Former Smoker     Packs/day: 0.10     Years: 10.00     Types: Cigarettes     Quit date: 5/19/2003     Smokeless tobacco: Never Used      Comment: off and on x 10 years 3-4 cigs: no smoker in the household     Alcohol use 0.0 oz/week     0 Standard drinks or equivalent per week      Comment: a couple drinks per year     Drug use: No     Sexual activity: Not Currently     Partners: Male     Other Topics Concern     Caffeine Concern No     Special Diet No     regular diet      Exercise No     shopping a lot so [place]      Social History Narrative       Review of Systems:  Skin:  Negative       Eyes:  Positive for glasses    ENT:  Positive for epistaxis nosebleeds occ  Respiratory:  Positive for shortness of breath;wheezing;cough;sleep apnea;CPAP     Cardiovascular:    Positive for;fatigue;edema chest discomfort occ  Gastroenterology: Positive for  "heartburn;constipation    Genitourinary:  Positive for urinary frequency    Musculoskeletal:  Positive for arthritis;muscular weakness    Neurologic:  Negative for      Psychiatric:  Negative      Heme/Lymph/Imm:  Positive for allergies    Endocrine:  Positive for thyroid disorder      Physical Exam:  Vitals: /80  Pulse 78  Ht 1.657 m (5' 5.25\")  Wt 79.8 kg (176 lb)  BMI 29.06 kg/m2    Constitutional:           Skin:           Head:           Eyes:           ENT:           Neck:           Chest:             Cardiac:                    Abdomen:           Vascular:                                          Extremities and Back:                 Neurological:                 CC  Maureen Guevara, APRN CNP   [facility]  [address]     [place] [address]              "

## 2017-03-25 NOTE — PROGRESS NOTES
HISTORY OF PRESENT ILLNESS:  I had the pleasure of seeing Victoria Montalvo and her  today in Cardiology Clinic to follow up the results of her abnormal nuclear stress test and to recommend coronary angiography for further evaluation.  She is a pleasant 76-year-old patient of Dr. Moreno with a history of subclavian stenosis, status post percutaneous revascularization, hypertension and lower extremity edema.  I saw her recently to follow up her venous competency studies which did confirm venous insufficiency, and I gave her compression stockings at the time.  She has been wearing them ever since and says her legs feel great.      When I saw her last, she also noticed worsening dyspnea on exertion with associated chest pain.  A nuclear stress test showed mostly reversible anterior apical defect consistent with mild to moderate ischemia in the distribution of the LAD artery.  There was also breast attenuation noted.  Her ejection fraction was 88%, and she was symptomatic during the test with shortness of breath.      Today her symptoms are unchanged.  She continues to have exertional shortness of breath with occasional chest pain twinges.  Her blood pressure is a little bit elevated today, but she and her  have been out all day at doctors' visits and shopping, and she says at home it has been well controlled and it was well controlled at our last visit.      PHYSICAL EXAMINATION:   GENERAL:  A 76-year-old woman who appears her age.   VITAL SIGNS:  Blood pressure 142/80, heart rate 78, weight 176 pounds, BMI of 29.   LUNGS:  Clear throughout to auscultation.   CARDIOVASCULAR:  Rate regular, normal S1, S2.   EXTREMITIES:  Bilateral lower extremity edema with bilateral compression stockings on.      ASSESSMENT AND PLAN:   1.  Typical exertional chest pain and shortness of breath, with a nuclear stress test showing a possible mild to moderate ischemia in the distribution of the LAD artery.  I recommended, after  discussing with Dr. Yepez, coronary angiography for further evaluation and possible treatment of her arteries if necessary.  We reviewed the procedure today as well as the risks and benefits including the risk for bleeding, vascular trauma, stroke, heart attack, death, and renal dysfunction.  I quoted a less than 0.5% risk for complications.  She would like to go ahead and proceed with this.  She has no contraindication to dual antiplatelet therapy.  She does have a history of subclavian stenosis treated with balloon angioplasty by Dr. Rey in .   2.  Venous insufficiency.  This was demonstrated by venous competency studies, and she is doing well with compression stockings.  In fact, she liked them so much that she went on-line and was able to find them at a good deal and has since received them in the mail.  She says that she notices her legs still get fatigued at the end of the day, but she has less discomfort and is feeling quite a lot better walking around when she wears her compression stockings.  This is a good result, and I will see her in  when she returns from her trip to [place] to talk about possible venous ablation at that time.      Thank you for allowing me to see Victoria today.        NIMESH HELM, KAYA             D: 2017 15:10   T: 2017 05:25   MT: ARTEMIO      Name:     VICTORIA PHILLIPS   MRN:      [id number]        Account:      [id number]   :      1940           Service Date: 2017      Document: [id number]

## 2017-03-29 ENCOUNTER — DOCUMENTATION ONLY (OUTPATIENT)
Dept: CARDIOLOGY | Facility: CLINIC | Age: 77
End: 2017-03-29

## 2017-03-29 DIAGNOSIS — I99.8 OTHER DISORDER OF CIRCULATORY SYSTEM: ICD-10-CM

## 2017-03-29 DIAGNOSIS — I50.32 DIASTOLIC CHF, CHRONIC (H): ICD-10-CM

## 2017-03-29 DIAGNOSIS — I25.10 MILD CORONARY ARTERY DISEASE: ICD-10-CM

## 2017-03-29 DIAGNOSIS — I25.10 MILD CORONARY ARTERY DISEASE: Primary | ICD-10-CM

## 2017-03-29 LAB
ANION GAP SERPL CALCULATED.3IONS-SCNC: 7 MMOL/L (ref 3–14)
APTT PPP: 29 SEC (ref 22–37)
BUN SERPL-MCNC: 16 MG/DL (ref 7–30)
CALCIUM SERPL-MCNC: 8.4 MG/DL (ref 8.5–10.1)
CHLORIDE SERPL-SCNC: 107 MMOL/L (ref 94–109)
CO2 SERPL-SCNC: 28 MMOL/L (ref 20–32)
CREAT SERPL-MCNC: 0.76 MG/DL (ref 0.52–1.04)
ERYTHROCYTE [DISTWIDTH] IN BLOOD BY AUTOMATED COUNT: 13.6 % (ref 10–15)
GFR SERPL CREATININE-BSD FRML MDRD: 74 ML/MIN/1.7M2
GLUCOSE SERPL-MCNC: 88 MG/DL (ref 70–99)
HCT VFR BLD AUTO: 41.2 % (ref 35–47)
HGB BLD-MCNC: 13.3 G/DL (ref 11.7–15.7)
INR PPP: 1.04 (ref 0.86–1.14)
MCH RBC QN AUTO: 28.7 PG (ref 26.5–33)
MCHC RBC AUTO-ENTMCNC: 32.3 G/DL (ref 31.5–36.5)
MCV RBC AUTO: 89 FL (ref 78–100)
PLATELET # BLD AUTO: 194 10E9/L (ref 150–450)
POTASSIUM SERPL-SCNC: 3.9 MMOL/L (ref 3.4–5.3)
RBC # BLD AUTO: 4.63 10E12/L (ref 3.8–5.2)
SODIUM SERPL-SCNC: 142 MMOL/L (ref 133–144)
WBC # BLD AUTO: 4.9 10E9/L (ref 4–11)

## 2017-03-29 PROCEDURE — 85610 PROTHROMBIN TIME: CPT | Performed by: NURSE PRACTITIONER

## 2017-03-29 PROCEDURE — 85730 THROMBOPLASTIN TIME PARTIAL: CPT | Performed by: NURSE PRACTITIONER

## 2017-03-29 PROCEDURE — 36415 COLL VENOUS BLD VENIPUNCTURE: CPT | Performed by: NURSE PRACTITIONER

## 2017-03-29 PROCEDURE — 80048 BASIC METABOLIC PNL TOTAL CA: CPT | Performed by: NURSE PRACTITIONER

## 2017-03-29 PROCEDURE — 85027 COMPLETE CBC AUTOMATED: CPT | Performed by: NURSE PRACTITIONER

## 2017-03-31 ENCOUNTER — TELEPHONE (OUTPATIENT)
Dept: CARDIOLOGY | Facility: CLINIC | Age: 77
End: 2017-03-31

## 2017-03-31 DIAGNOSIS — R06.02 SOB (SHORTNESS OF BREATH): Primary | ICD-10-CM

## 2017-03-31 RX ORDER — LIDOCAINE 40 MG/G
CREAM TOPICAL
Status: CANCELLED | OUTPATIENT
Start: 2017-04-04

## 2017-03-31 RX ORDER — ASPIRIN 81 MG/1
81 TABLET ORAL DAILY
Status: CANCELLED | OUTPATIENT
Start: 2017-03-31

## 2017-03-31 RX ORDER — POTASSIUM CHLORIDE 750 MG/1
20 TABLET, EXTENDED RELEASE ORAL
Status: CANCELLED | OUTPATIENT
Start: 2017-04-04

## 2017-03-31 RX ORDER — SODIUM CHLORIDE 9 MG/ML
INJECTION, SOLUTION INTRAVENOUS CONTINUOUS
Status: CANCELLED | OUTPATIENT
Start: 2017-04-04

## 2017-03-31 NOTE — PATIENT INSTRUCTIONS
LEFT HEART CATH PREP INSTRUCTIONS    Patient is scheduled for a left heart cath at [facility], on Tuesday 04-04-17.    Patient to be NPO after midnight on Monday 04-03-17.      Patient is on a diuretic. Patient should hold her Lasix the morning of the procedure and take it after the procedure. Patient takes her Spironolactone only on MWF.     Patient is not on insulin or oral diabetic medications. Patient is not on an anticoagulant.     Patient is on aspirin and will continue 81 mg daily.     Patient can take her other medications prior to procedure with small sips of water.    Patient will need someone available to drive her to the hospital and to drive her home.    Patient should have someone available to stay with her for at least 24 hours after the procedure.     This is a same day procedure, however I would recommend that she pack an overnight bag just in case she may need to stay overnight.    Check in time is at 930 am and procedure time is at 11 am.    Reviewed prep with patient over the phone. She had no questions. Patient to call me back if needed.          Calista YBARRA  [facility]

## 2017-04-04 ENCOUNTER — APPOINTMENT (OUTPATIENT)
Dept: CARDIOLOGY | Facility: CLINIC | Age: 77
End: 2017-04-04
Attending: NURSE PRACTITIONER
Payer: MEDICARE

## 2017-04-04 ENCOUNTER — HOSPITAL ENCOUNTER (OUTPATIENT)
Facility: CLINIC | Age: 77
Discharge: HOME | End: 2017-04-04
Attending: INTERNAL MEDICINE | Admitting: INTERNAL MEDICINE
Payer: MEDICARE

## 2017-04-04 VITALS
SYSTOLIC BLOOD PRESSURE: 164 MMHG | RESPIRATION RATE: 13 BRPM | OXYGEN SATURATION: 97 % | DIASTOLIC BLOOD PRESSURE: 72 MMHG | HEART RATE: 74 BPM

## 2017-04-04 DIAGNOSIS — Z98.61 STATUS POST CORONARY ANGIOPLASTY: ICD-10-CM

## 2017-04-04 DIAGNOSIS — R94.39 ABNORMAL CARDIOVASCULAR STRESS TEST: ICD-10-CM

## 2017-04-04 DIAGNOSIS — I25.110 CORONARY ARTERY DISEASE INVOLVING NATIVE CORONARY ARTERY OF NATIVE HEART WITH UNSTABLE ANGINA PECTORIS (H): ICD-10-CM

## 2017-04-04 DIAGNOSIS — R06.02 SOB (SHORTNESS OF BREATH): ICD-10-CM

## 2017-04-04 DIAGNOSIS — R07.9 CHEST PAIN, UNSPECIFIED TYPE: ICD-10-CM

## 2017-04-04 DIAGNOSIS — Z98.61 POSTSURGICAL PERCUTANEOUS TRANSLUMINAL CORONARY ANGIOPLASTY STATUS: ICD-10-CM

## 2017-04-04 PROCEDURE — 93010 ELECTROCARDIOGRAM REPORT: CPT | Performed by: INTERNAL MEDICINE

## 2017-04-04 PROCEDURE — 027034Z DILATION OF CORONARY ARTERY, ONE ARTERY WITH DRUG-ELUTING INTRALUMINAL DEVICE, PERCUTANEOUS APPROACH: ICD-10-PCS | Performed by: INTERNAL MEDICINE

## 2017-04-04 PROCEDURE — 75710 ARTERY X-RAYS ARM/LEG: CPT

## 2017-04-04 PROCEDURE — 93458 L HRT ARTERY/VENTRICLE ANGIO: CPT | Mod: XU

## 2017-04-04 PROCEDURE — A9270 NON-COVERED ITEM OR SERVICE: HCPCS | Mod: GY | Performed by: INTERNAL MEDICINE

## 2017-04-04 PROCEDURE — C9600 PERC DRUG-EL COR STENT SING: HCPCS | Mod: LD

## 2017-04-04 PROCEDURE — 93458 L HRT ARTERY/VENTRICLE ANGIO: CPT | Mod: 26 | Performed by: INTERNAL MEDICINE

## 2017-04-04 PROCEDURE — 27210802 ZZH SHEATH CR1

## 2017-04-04 PROCEDURE — 99153 MOD SED SAME PHYS/QHP EA: CPT

## 2017-04-04 PROCEDURE — 27210856 ZZH ACCESS HEART CATH CR2

## 2017-04-04 PROCEDURE — C1725 CATH, TRANSLUMIN NON-LASER: HCPCS

## 2017-04-04 PROCEDURE — 27210827 ZZH KIT ACIST INJECTOR CR6

## 2017-04-04 PROCEDURE — C1874 STENT, COATED/COV W/DEL SYS: HCPCS

## 2017-04-04 PROCEDURE — C1769 GUIDE WIRE: HCPCS

## 2017-04-04 PROCEDURE — C1887 CATHETER, GUIDING: HCPCS

## 2017-04-04 PROCEDURE — 25000132 ZZH RX MED GY IP 250 OP 250 PS 637: Mod: GY | Performed by: INTERNAL MEDICINE

## 2017-04-04 PROCEDURE — 25000128 H RX IP 250 OP 636

## 2017-04-04 PROCEDURE — C1760 CLOSURE DEV, VASC: HCPCS

## 2017-04-04 PROCEDURE — 25000128 H RX IP 250 OP 636: Performed by: INTERNAL MEDICINE

## 2017-04-04 PROCEDURE — 27210946 ZZH KIT HC TOTES DISP CR8

## 2017-04-04 PROCEDURE — 92928 PRQ TCAT PLMT NTRAC ST 1 LES: CPT | Mod: RC | Performed by: INTERNAL MEDICINE

## 2017-04-04 PROCEDURE — 27210759 ZZH DEVICE INFLATION CR6

## 2017-04-04 PROCEDURE — B2151ZZ FLUOROSCOPY OF LEFT HEART USING LOW OSMOLAR CONTRAST: ICD-10-PCS | Performed by: INTERNAL MEDICINE

## 2017-04-04 PROCEDURE — 25000125 ZZHC RX 250

## 2017-04-04 PROCEDURE — 4A023N7 MEASUREMENT OF CARDIAC SAMPLING AND PRESSURE, LEFT HEART, PERCUTANEOUS APPROACH: ICD-10-PCS | Performed by: INTERNAL MEDICINE

## 2017-04-04 PROCEDURE — 99152 MOD SED SAME PHYS/QHP 5/>YRS: CPT

## 2017-04-04 PROCEDURE — 99152 MOD SED SAME PHYS/QHP 5/>YRS: CPT | Performed by: INTERNAL MEDICINE

## 2017-04-04 PROCEDURE — B2111ZZ FLUOROSCOPY OF MULTIPLE CORONARY ARTERIES USING LOW OSMOLAR CONTRAST: ICD-10-PCS | Performed by: INTERNAL MEDICINE

## 2017-04-04 PROCEDURE — 99153 MOD SED SAME PHYS/QHP EA: CPT | Performed by: INTERNAL MEDICINE

## 2017-04-04 PROCEDURE — 27210742 ZZH CATH CR1

## 2017-04-04 RX ORDER — ADENOSINE 3 MG/ML
12-12000 INJECTION, SOLUTION INTRAVENOUS
Status: DISCONTINUED | OUTPATIENT
Start: 2017-04-04 | End: 2017-04-04 | Stop reason: HOSPADM

## 2017-04-04 RX ORDER — SODIUM CHLORIDE 9 MG/ML
INJECTION, SOLUTION INTRAVENOUS CONTINUOUS
Status: DISCONTINUED | OUTPATIENT
Start: 2017-04-04 | End: 2017-04-04 | Stop reason: HOSPADM

## 2017-04-04 RX ORDER — FLUMAZENIL 0.1 MG/ML
0.2 INJECTION, SOLUTION INTRAVENOUS
Status: DISCONTINUED | OUTPATIENT
Start: 2017-04-04 | End: 2017-04-04 | Stop reason: HOSPADM

## 2017-04-04 RX ORDER — PRASUGREL 10 MG/1
10 TABLET, FILM COATED ORAL DAILY
Qty: 90 TABLET | Refills: 3 | Status: SHIPPED | OUTPATIENT
Start: 2017-04-05 | End: 2017-04-17

## 2017-04-04 RX ORDER — POTASSIUM CHLORIDE 7.45 MG/ML
10 INJECTION INTRAVENOUS
Status: DISCONTINUED | OUTPATIENT
Start: 2017-04-04 | End: 2017-04-04 | Stop reason: HOSPADM

## 2017-04-04 RX ORDER — NIFEDIPINE 10 MG/1
10 CAPSULE ORAL
Status: DISCONTINUED | OUTPATIENT
Start: 2017-04-04 | End: 2017-04-04 | Stop reason: HOSPADM

## 2017-04-04 RX ORDER — METHYLPREDNISOLONE SODIUM SUCCINATE 125 MG/2ML
125 INJECTION, POWDER, LYOPHILIZED, FOR SOLUTION INTRAMUSCULAR; INTRAVENOUS
Status: DISCONTINUED | OUTPATIENT
Start: 2017-04-04 | End: 2017-04-04 | Stop reason: HOSPADM

## 2017-04-04 RX ORDER — LIDOCAINE HYDROCHLORIDE 10 MG/ML
1-10 INJECTION, SOLUTION INFILTRATION; PERINEURAL
Status: DISCONTINUED | OUTPATIENT
Start: 2017-04-04 | End: 2017-04-04 | Stop reason: HOSPADM

## 2017-04-04 RX ORDER — PRASUGREL 10 MG/1
10 TABLET, FILM COATED ORAL ONCE
Status: DISCONTINUED | OUTPATIENT
Start: 2017-04-05 | End: 2017-04-04 | Stop reason: HOSPADM

## 2017-04-04 RX ORDER — FENTANYL CITRATE 50 UG/ML
25-50 INJECTION, SOLUTION INTRAMUSCULAR; INTRAVENOUS
Status: DISCONTINUED | OUTPATIENT
Start: 2017-04-04 | End: 2017-04-04 | Stop reason: HOSPADM

## 2017-04-04 RX ORDER — ONDANSETRON 2 MG/ML
4 INJECTION INTRAMUSCULAR; INTRAVENOUS EVERY 4 HOURS PRN
Status: DISCONTINUED | OUTPATIENT
Start: 2017-04-04 | End: 2017-04-04 | Stop reason: HOSPADM

## 2017-04-04 RX ORDER — ACETAMINOPHEN 325 MG/1
325-650 TABLET ORAL EVERY 4 HOURS PRN
Status: DISCONTINUED | OUTPATIENT
Start: 2017-04-04 | End: 2017-04-04 | Stop reason: HOSPADM

## 2017-04-04 RX ORDER — NALOXONE HYDROCHLORIDE 0.4 MG/ML
0.4 INJECTION, SOLUTION INTRAMUSCULAR; INTRAVENOUS; SUBCUTANEOUS EVERY 5 MIN PRN
Status: DISCONTINUED | OUTPATIENT
Start: 2017-04-04 | End: 2017-04-04 | Stop reason: HOSPADM

## 2017-04-04 RX ORDER — DIPHENHYDRAMINE HYDROCHLORIDE 50 MG/ML
25-50 INJECTION INTRAMUSCULAR; INTRAVENOUS
Status: DISCONTINUED | OUTPATIENT
Start: 2017-04-04 | End: 2017-04-04 | Stop reason: HOSPADM

## 2017-04-04 RX ORDER — NITROGLYCERIN 0.4 MG/1
0.4 TABLET SUBLINGUAL EVERY 5 MIN PRN
Status: DISCONTINUED | OUTPATIENT
Start: 2017-04-04 | End: 2017-04-04 | Stop reason: HOSPADM

## 2017-04-04 RX ORDER — CLOPIDOGREL BISULFATE 75 MG/1
75 TABLET ORAL
Status: DISCONTINUED | OUTPATIENT
Start: 2017-04-04 | End: 2017-04-04 | Stop reason: HOSPADM

## 2017-04-04 RX ORDER — LIDOCAINE HYDROCHLORIDE 10 MG/ML
30 INJECTION, SOLUTION EPIDURAL; INFILTRATION; INTRACAUDAL; PERINEURAL
Status: DISCONTINUED | OUTPATIENT
Start: 2017-04-04 | End: 2017-04-04 | Stop reason: HOSPADM

## 2017-04-04 RX ORDER — FUROSEMIDE 10 MG/ML
20-100 INJECTION INTRAMUSCULAR; INTRAVENOUS
Status: DISCONTINUED | OUTPATIENT
Start: 2017-04-04 | End: 2017-04-04 | Stop reason: HOSPADM

## 2017-04-04 RX ORDER — CLOPIDOGREL BISULFATE 75 MG/1
300-600 TABLET ORAL
Status: DISCONTINUED | OUTPATIENT
Start: 2017-04-04 | End: 2017-04-04 | Stop reason: HOSPADM

## 2017-04-04 RX ORDER — PROTAMINE SULFATE 10 MG/ML
1-5 INJECTION, SOLUTION INTRAVENOUS
Status: DISCONTINUED | OUTPATIENT
Start: 2017-04-04 | End: 2017-04-04 | Stop reason: HOSPADM

## 2017-04-04 RX ORDER — NALOXONE HYDROCHLORIDE 0.4 MG/ML
.1-.4 INJECTION, SOLUTION INTRAMUSCULAR; INTRAVENOUS; SUBCUTANEOUS
Status: DISCONTINUED | OUTPATIENT
Start: 2017-04-04 | End: 2017-04-04 | Stop reason: HOSPADM

## 2017-04-04 RX ORDER — DEXTROSE MONOHYDRATE 25 G/50ML
12.5-5 INJECTION, SOLUTION INTRAVENOUS EVERY 30 MIN PRN
Status: DISCONTINUED | OUTPATIENT
Start: 2017-04-04 | End: 2017-04-04 | Stop reason: HOSPADM

## 2017-04-04 RX ORDER — ASPIRIN 325 MG
325 TABLET ORAL
Status: DISCONTINUED | OUTPATIENT
Start: 2017-04-04 | End: 2017-04-04 | Stop reason: HOSPADM

## 2017-04-04 RX ORDER — DOPAMINE HYDROCHLORIDE 160 MG/100ML
2-20 INJECTION, SOLUTION INTRAVENOUS CONTINUOUS PRN
Status: DISCONTINUED | OUTPATIENT
Start: 2017-04-04 | End: 2017-04-04 | Stop reason: HOSPADM

## 2017-04-04 RX ORDER — ATORVASTATIN CALCIUM 40 MG/1
40 TABLET, FILM COATED ORAL DAILY
Status: DISCONTINUED | OUTPATIENT
Start: 2017-04-04 | End: 2017-04-04 | Stop reason: HOSPADM

## 2017-04-04 RX ORDER — LIDOCAINE 40 MG/G
CREAM TOPICAL
Status: DISCONTINUED | OUTPATIENT
Start: 2017-04-04 | End: 2017-04-04 | Stop reason: HOSPADM

## 2017-04-04 RX ORDER — NITROGLYCERIN 5 MG/ML
100-500 VIAL (ML) INTRAVENOUS
Status: DISCONTINUED | OUTPATIENT
Start: 2017-04-04 | End: 2017-04-04 | Stop reason: HOSPADM

## 2017-04-04 RX ORDER — HYDRALAZINE HYDROCHLORIDE 20 MG/ML
10-20 INJECTION INTRAMUSCULAR; INTRAVENOUS
Status: DISCONTINUED | OUTPATIENT
Start: 2017-04-04 | End: 2017-04-04 | Stop reason: HOSPADM

## 2017-04-04 RX ORDER — SODIUM NITROPRUSSIDE 25 MG/ML
100-200 INJECTION INTRAVENOUS
Status: DISCONTINUED | OUTPATIENT
Start: 2017-04-04 | End: 2017-04-04 | Stop reason: HOSPADM

## 2017-04-04 RX ORDER — LORAZEPAM 2 MG/ML
.5-2 INJECTION INTRAMUSCULAR EVERY 4 HOURS PRN
Status: DISCONTINUED | OUTPATIENT
Start: 2017-04-04 | End: 2017-04-04 | Stop reason: HOSPADM

## 2017-04-04 RX ORDER — BISOPROLOL FUMARATE 5 MG/1
2.5 TABLET, FILM COATED ORAL DAILY
Qty: 60 TABLET | Refills: 3 | Status: SHIPPED | OUTPATIENT
Start: 2017-04-04 | End: 2017-04-17

## 2017-04-04 RX ORDER — PRASUGREL 10 MG/1
10-60 TABLET, FILM COATED ORAL
Status: COMPLETED | OUTPATIENT
Start: 2017-04-04 | End: 2017-04-04

## 2017-04-04 RX ORDER — MORPHINE SULFATE 4 MG/ML
1-2 INJECTION, SOLUTION INTRAMUSCULAR; INTRAVENOUS EVERY 5 MIN PRN
Status: DISCONTINUED | OUTPATIENT
Start: 2017-04-04 | End: 2017-04-04 | Stop reason: HOSPADM

## 2017-04-04 RX ORDER — PRASUGREL 10 MG/1
TABLET, FILM COATED ORAL
Status: DISCONTINUED
Start: 2017-04-04 | End: 2017-04-04 | Stop reason: HOSPADM

## 2017-04-04 RX ORDER — EPTIFIBATIDE 2 MG/ML
180 INJECTION, SOLUTION INTRAVENOUS EVERY 10 MIN PRN
Status: DISCONTINUED | OUTPATIENT
Start: 2017-04-04 | End: 2017-04-04 | Stop reason: HOSPADM

## 2017-04-04 RX ORDER — ENALAPRILAT 1.25 MG/ML
1.25-2.5 INJECTION INTRAVENOUS
Status: DISCONTINUED | OUTPATIENT
Start: 2017-04-04 | End: 2017-04-04 | Stop reason: HOSPADM

## 2017-04-04 RX ORDER — PROTAMINE SULFATE 10 MG/ML
25-100 INJECTION, SOLUTION INTRAVENOUS EVERY 5 MIN PRN
Status: DISCONTINUED | OUTPATIENT
Start: 2017-04-04 | End: 2017-04-04 | Stop reason: HOSPADM

## 2017-04-04 RX ORDER — ASPIRIN 81 MG/1
81-324 TABLET, CHEWABLE ORAL
Status: DISCONTINUED | OUTPATIENT
Start: 2017-04-04 | End: 2017-04-04 | Stop reason: HOSPADM

## 2017-04-04 RX ORDER — HEPARIN SODIUM 1000 [USP'U]/ML
INJECTION, SOLUTION INTRAVENOUS; SUBCUTANEOUS
Status: DISCONTINUED
Start: 2017-04-04 | End: 2017-04-04 | Stop reason: HOSPADM

## 2017-04-04 RX ORDER — NITROGLYCERIN 0.4 MG/1
TABLET SUBLINGUAL
Qty: 25 TABLET | Refills: 3 | Status: SHIPPED | OUTPATIENT
Start: 2017-04-04 | End: 2017-04-17

## 2017-04-04 RX ORDER — NALOXONE HYDROCHLORIDE 0.4 MG/ML
.2-.4 INJECTION, SOLUTION INTRAMUSCULAR; INTRAVENOUS; SUBCUTANEOUS
Status: DISCONTINUED | OUTPATIENT
Start: 2017-04-04 | End: 2017-04-04 | Stop reason: HOSPADM

## 2017-04-04 RX ORDER — POTASSIUM CHLORIDE 29.8 MG/ML
20 INJECTION INTRAVENOUS
Status: DISCONTINUED | OUTPATIENT
Start: 2017-04-04 | End: 2017-04-04 | Stop reason: HOSPADM

## 2017-04-04 RX ORDER — FENTANYL CITRATE 50 UG/ML
25-50 INJECTION, SOLUTION INTRAMUSCULAR; INTRAVENOUS
Status: CANCELLED | OUTPATIENT
Start: 2017-04-04 | End: 2017-04-05

## 2017-04-04 RX ORDER — NICARDIPINE HYDROCHLORIDE 2.5 MG/ML
100 INJECTION INTRAVENOUS
Status: DISCONTINUED | OUTPATIENT
Start: 2017-04-04 | End: 2017-04-04 | Stop reason: HOSPADM

## 2017-04-04 RX ORDER — IOPAMIDOL 755 MG/ML
100 INJECTION, SOLUTION INTRAVASCULAR ONCE
Status: COMPLETED | OUTPATIENT
Start: 2017-04-04 | End: 2017-04-04

## 2017-04-04 RX ORDER — HYDROCODONE BITARTRATE AND ACETAMINOPHEN 5; 325 MG/1; MG/1
1-2 TABLET ORAL EVERY 4 HOURS PRN
Status: DISCONTINUED | OUTPATIENT
Start: 2017-04-04 | End: 2017-04-04 | Stop reason: HOSPADM

## 2017-04-04 RX ORDER — HEPARIN SODIUM 1000 [USP'U]/ML
1000-10000 INJECTION, SOLUTION INTRAVENOUS; SUBCUTANEOUS EVERY 5 MIN PRN
Status: DISCONTINUED | OUTPATIENT
Start: 2017-04-04 | End: 2017-04-04 | Stop reason: HOSPADM

## 2017-04-04 RX ORDER — NITROGLYCERIN 20 MG/100ML
.07-2 INJECTION INTRAVENOUS CONTINUOUS PRN
Status: DISCONTINUED | OUTPATIENT
Start: 2017-04-04 | End: 2017-04-04 | Stop reason: HOSPADM

## 2017-04-04 RX ORDER — ASPIRIN 81 MG/1
81 TABLET ORAL DAILY
Status: DISCONTINUED | OUTPATIENT
Start: 2017-04-04 | End: 2017-04-04 | Stop reason: HOSPADM

## 2017-04-04 RX ORDER — POTASSIUM CHLORIDE 1500 MG/1
20 TABLET, EXTENDED RELEASE ORAL
Status: DISCONTINUED | OUTPATIENT
Start: 2017-04-04 | End: 2017-04-04 | Stop reason: HOSPADM

## 2017-04-04 RX ORDER — PROMETHAZINE HYDROCHLORIDE 25 MG/ML
6.25-25 INJECTION, SOLUTION INTRAMUSCULAR; INTRAVENOUS EVERY 4 HOURS PRN
Status: DISCONTINUED | OUTPATIENT
Start: 2017-04-04 | End: 2017-04-04 | Stop reason: HOSPADM

## 2017-04-04 RX ORDER — VERAPAMIL HYDROCHLORIDE 2.5 MG/ML
1-2.5 INJECTION, SOLUTION INTRAVENOUS
Status: DISCONTINUED | OUTPATIENT
Start: 2017-04-04 | End: 2017-04-04 | Stop reason: HOSPADM

## 2017-04-04 RX ORDER — BUPIVACAINE HYDROCHLORIDE 2.5 MG/ML
1-10 INJECTION, SOLUTION EPIDURAL; INFILTRATION; INTRACAUDAL
Status: DISCONTINUED | OUTPATIENT
Start: 2017-04-04 | End: 2017-04-04 | Stop reason: HOSPADM

## 2017-04-04 RX ORDER — FENTANYL CITRATE 50 UG/ML
INJECTION, SOLUTION INTRAMUSCULAR; INTRAVENOUS
Status: COMPLETED
Start: 2017-04-04 | End: 2017-04-04

## 2017-04-04 RX ORDER — DOBUTAMINE HYDROCHLORIDE 200 MG/100ML
2-20 INJECTION INTRAVENOUS CONTINUOUS PRN
Status: DISCONTINUED | OUTPATIENT
Start: 2017-04-04 | End: 2017-04-04 | Stop reason: HOSPADM

## 2017-04-04 RX ORDER — ATORVASTATIN CALCIUM 40 MG/1
40 TABLET, FILM COATED ORAL DAILY
Qty: 90 TABLET | Refills: 3 | Status: SHIPPED | OUTPATIENT
Start: 2017-04-04 | End: 2017-04-17

## 2017-04-04 RX ORDER — NITROGLYCERIN 5 MG/ML
100-200 VIAL (ML) INTRAVENOUS
Status: DISCONTINUED | OUTPATIENT
Start: 2017-04-04 | End: 2017-04-04 | Stop reason: HOSPADM

## 2017-04-04 RX ORDER — PHENYLEPHRINE HCL IN 0.9% NACL 1 MG/10 ML
20-100 SYRINGE (ML) INTRAVENOUS
Status: DISCONTINUED | OUTPATIENT
Start: 2017-04-04 | End: 2017-04-04 | Stop reason: HOSPADM

## 2017-04-04 RX ADMIN — Medication 59.9 MG: at 12:11

## 2017-04-04 RX ADMIN — SODIUM CHLORIDE: 9 INJECTION, SOLUTION INTRAVENOUS at 10:09

## 2017-04-04 RX ADMIN — IOPAMIDOL 240 ML: 755 INJECTION, SOLUTION INTRAVASCULAR at 12:00

## 2017-04-04 RX ADMIN — MIDAZOLAM 2 MG: 1 INJECTION INTRAMUSCULAR; INTRAVENOUS at 12:55

## 2017-04-04 RX ADMIN — PRASUGREL HYDROCHLORIDE 60 MG: 10 TABLET, FILM COATED ORAL at 12:12

## 2017-04-04 RX ADMIN — FENTANYL CITRATE 100 MCG: 50 INJECTION INTRAMUSCULAR; INTRAVENOUS at 12:55

## 2017-04-04 NOTE — IP AVS SNAPSHOT
[facility]    [address]    [facility] [address]    Phone:  [phone]                                       After Visit Summary   4/4/2017 June BALTA Montalvo    MRN: [id number]           After Visit Summary Signature Page     I have received my discharge instructions, and my questions have been answered. I have discussed any challenges I see with this plan with the nurse or doctor.    ..........................................................................................................................................  Patient/Patient Representative Signature      ..........................................................................................................................................  Patient Representative Print Name and Relationship to Patient    ..................................................               ................................................  Date                                            Time    ..........................................................................................................................................  Reviewed by Signature/Title    ...................................................              ..............................................  Date                                                            Time

## 2017-04-04 NOTE — IP AVS SNAPSHOT
"                  MRN:[id number]                      After Visit Summary   4/4/2017    Victoria Montalvo    MRN: [id number]           Visit Information        Department      4/4/2017  9:12 AM [facility]          Review of your medicines      START taking        Dose / Directions    atorvastatin 40 MG tablet   Commonly known as:  LIPITOR   Used for:  Coronary artery disease involving native coronary artery of native heart with unstable angina pectoris (H), Status post coronary angioplasty        Dose:  40 mg   Take 1 tablet (40 mg) by mouth daily   Quantity:  90 tablet   Refills:  3       bisoprolol 5 MG tablet   Commonly known as:  ZEBETA   Used for:  Coronary artery disease involving native coronary artery of native heart with unstable angina pectoris (H)        Dose:  2.5 mg   Take 0.5 tablets (2.5 mg) by mouth daily   Quantity:  60 tablet   Refills:  3       nitroglycerin 0.4 MG sublingual tablet   Commonly known as:  NITROSTAT   Used for:  Coronary artery disease involving native coronary artery of native heart with unstable angina pectoris (H), Status post coronary angioplasty        One tablet under the tongue every 5 minutes if needed for chest pain. May repeat every 5 minutes for a maximum of 3 doses in 15 minutes\"   Quantity:  25 tablet   Refills:  3       prasugrel 10 MG Tabs tablet   Commonly known as:  EFFIENT   Used for:  Coronary artery disease involving native coronary artery of native heart with unstable angina pectoris (H), Status post coronary angioplasty        Dose:  10 mg   Start taking on:  4/5/2017   Take 1 tablet (10 mg) by mouth daily Do not crush or break tablet.   Quantity:  90 tablet   Refills:  3         CONTINUE these medicines which may have CHANGED, or have new prescriptions. If we are uncertain of the size of tablets/capsules you have at home, strength may be listed as something that might have changed.        Dose / Directions    spironolactone 25 MG tablet   Commonly " known as:  ALDACTONE   This may have changed:    - when to take this  - additional instructions   Used for:  Hyperlipidemia LDL goal <130, Diastolic CHF, chronic (H), Subclavian artery stenosis, left (H), Palpitations        Dose:  12.5 mg   Take 0.5 tablets (12.5 mg) by mouth daily   Quantity:  45 tablet   Refills:  12         CONTINUE these medicines which have NOT CHANGED        Dose / Directions    * albuterol (2.5 MG/3ML) 0.083% neb solution   Used for:  Moderate persistent asthma, uncomplicated        Dose:  1 vial   Take 1 vial (2.5 mg) by nebulization every 6 hours as needed for shortness of breath / dyspnea   Quantity:  1 Box   Refills:  3       * albuterol 108 (90 BASE) MCG/ACT Inhaler   Commonly known as:  PROAIR HFA/PROVENTIL HFA/VENTOLIN HFA   Used for:  Moderate persistent asthma, uncomplicated        Dose:  2 puff   Inhale 2 puffs into the lungs every 6 hours as needed for shortness of breath / dyspnea or wheezing   Quantity:  3 Inhaler   Refills:  0       aspirin 81 MG tablet        Dose:  81 mg   Take 81 mg by mouth daily.   Refills:  0       biotin 2.5 mg/mL Susp        Take by mouth daily 2000mcg   Refills:  0       Calcium 5047-2421 MG-UNIT Chew        Dose:  1 tablet   Take 1 tablet by mouth daily.   Refills:  0       FISH OIL DOUBLE STRENGTH 1200 MG capsule   Generic drug:  omega-3 fatty acids        Dose:  1 capsule   Take 1 capsule by mouth 2 times daily   Refills:  0       fluticasone 50 MCG/ACT spray   Commonly known as:  FLONASE   Used for:  Chronic rhinitis        Dose:  1-2 spray   Spray 1-2 sprays into both nostrils daily   Quantity:  48 g   Refills:  3       fluticasone-salmeterol 250-50 MCG/DOSE diskus inhaler   Commonly known as:  ADVAIR        Dose:  1 puff   Inhale 1 puff into the lungs every 12 hours   Refills:  0       furosemide 20 MG tablet   Commonly known as:  LASIX   Used for:  Diastolic CHF, chronic (H)        Dose:  20 mg   Take 1 tablet (20 mg) by mouth daily   Quantity:   30 tablet   Refills:  0       MAGNESIUM ACETATE        Dose:  250 mg   Take 250 mg by mouth daily   Refills:  0       methimazole 10 MG tablet   Commonly known as:  TAPAZOLE   Used for:  Subclinical hyperthyroidism        Dose:  10 mg   Take 1 tablet (10 mg) by mouth daily   Quantity:  30 tablet   Refills:  3       montelukast 10 MG tablet   Commonly known as:  SINGULAIR   Used for:  Chronic rhinitis        Dose:  1 tablet   Take 1 tablet (10 mg) by mouth At Bedtime   Quantity:  90 tablet   Refills:  3       omeprazole 20 MG CR capsule   Commonly known as:  priLOSEC   Used for:  Gastroesophageal reflux disease without esophagitis        Dose:  20 mg   Take 1 capsule (20 mg) by mouth daily   Quantity:  90 capsule   Refills:  3       * order for DME   Used for:  KEN (obstructive sleep apnea), Obstructive lung disease (H)        Equipment being ordered: CPAP Auto-CPAP 5-25 cm H2O  Full face mask with heated humidified air   Quantity:  1 Device   Refills:  0       * order for DME   Used for:  Obstructive sleep apnea (adult) (pediatric), Chronic airway obstruction, not elsewhere classified        Equipment being ordered: APAP 5-20cm/H2O full face mask with heated humidified air.   Quantity:  1 Device   Refills:  0       OSTEO BI-FLEX/5-LOXIN ADVANCED Tabs        Dose:  1500 mg   Take 1,500 mg by mouth 2 times daily   Refills:  0       RESTASIS 0.05 % ophthalmic emulsion   Generic drug:  cycloSPORINE        Dose:  1 drop   Place 1 drop into both eyes 2 times daily   Refills:  0       VITAMIN D3 PO        Dose:  2000 Units   Take 2,000 Units by mouth daily   Refills:  0       * Notice:  This list has 4 medication(s) that are the same as other medications prescribed for you. Read the directions carefully, and ask your doctor or other care provider to review them with you.      STOP taking     COENZYME Q-10 PO           pravastatin 40 MG tablet   Commonly known as:  PRAVACHOL                Where to get your medicines       Some of these will need a paper prescription and others can be bought over the counter. Ask your nurse if you have questions.     Bring a paper prescription for each of these medications     atorvastatin 40 MG tablet    bisoprolol 5 MG tablet    nitroglycerin 0.4 MG sublingual tablet    prasugrel 10 MG Tabs tablet               Prescriptions were sent or printed at these locations (4 Prescriptions)                   Other Prescriptions                Printed at Department/Unit printer (4 of 4)         nitroglycerin (NITROSTAT) 0.4 MG sublingual tablet               prasugrel (EFFIENT) 10 MG TABS tablet               atorvastatin (LIPITOR) 40 MG tablet               bisoprolol (ZEBETA) 5 MG tablet                 Protect others around you: Learn how to safely use, store and throw away your medicines at www.disposemymeds.org.         Follow-ups after your visit        Additional Services     CARDIAC REHAB REFERRAL       Please be aware that coverage of these services is subject to the terms and limitations of your health insurance plan.  Call member services at your health plan with any benefit or coverage questions.     Order is sent electronically to central rehab scheduling. Call [phone] if you haven't been contacted regarding these appointments within 2 business days of discharge.                  Your next 10 appointments already scheduled     Apr 06, 2017  3:10 PM CDT   Return Visit with Alia Yoo PA-C   [facility])    [id number] [place] [address] [address]  [facility] [address] [phone]            Jun 30, 2017 10:10 AM CDT   Vein Return with NIMESH Leslie CNP   [facility] ([facility])    [address]  [facility] [address] [phone]               Care Instructions        After Care Instructions     Discharge Instructions - Follow up with [facility]  Nurse Practitioner        Follow up with [facility] Nurse Practitioner at [facility] of patient preference in 7-10 days.            Discharge Instructions - IF on Metformin (Glucophage or Glucovance) or Metformin containing medications       IF on Metformin (Glucophage or Glucovance) or Metformin containing medications , schedule a Basic Metabolic Panel at [facility] or Primary Clinic in 48 - 72 hours post procedure and PRIOR TO resuming the Metformin or Metformin containing medications.  Hold Metformin (Glucophage or Glucovance) or Metformin containing medications until after the Basic Metabolic Panel on the 2nd or 3rd day following the procedure.  May resume after blood draw is complete.                  Further instructions from your care team         Going Home after an Angioplasty or Stent Placement (Cardiac)  ______________________________________________    Patient Name: Victoria Montalvo  Date of Procedure: April 4, 2017    Doctor: Camelia  After you go home:    Have an adult stay with you for 24 hours.    Drink plenty of fluids.    You may eat your normal diet, unless your doctor tells you otherwise.    For 24 hours:    Relax and take it easy.    Do NOT smoke.    Do NOT make any important or legal decisions.    Do NOT drive or operate machines at home or at work.    Do NOT drink alcohol.    Remove the Band-Aid after 24 hours. If there is minor oozing, apply another Band-aid and remove it after 12 hours.    For 2 days, do NOT have sex or do any heavy exercise.    Do NOT take a bath, or use a hot tub or pool for at least 3 days. You may shower.    Care of groin site  It is normal to have a small bruise or lump at the site.    Do not scrub the site.    For the first 2 days: Do not stoop or squat. When you cough, sneeze or move your bowels, hold your hand over the puncture site and press gently.    Do not lift more than 10 pounds for at least 3 to 5 days.    Do not use lotion or powder near the puncture site  for 3 days.    If you start bleeding from the site in your groin, lie down flat and press firmly  on the site. Call your doctor as soon as you can.        Medicines    If you have started taking Plavix or Effient, do not stop taking it until you talk to your heart doctor (cardiologist).    If you are on metformin (Glucophage), do not restart it until you have blood tests (within 2 to 3 days after discharge). When your doctor tells you it is safe, you may restart the metformin.    If you have stopped any other medicines, check with your nurse or provider about when to restart them.    Call 911 right away if you have bleeding that is heavy or does not stop.    Call your doctor if:    You have a large or growing hard lump around the site.    The site is red, swollen, hot or tender.    Blood or fluid is draining from the site.    You have chills or a fever greater than 101 F (38 C).    Your leg or arm feels numb or cool.    You have hives, a rash or unusual itching.      [facility] [place]:  [phone] (7 days a week)             Additional Information About Your Visit        [employer] Information     [employer] gives you secure access to your electronic health record. If you see a primary care provider, you can also send messages to your care team and make appointments. If you have questions, please call your primary care clinic.  If you do not have a primary care provider, please call [phone] and they will assist you.        Care EveryWhere ID     This is your Care EveryWhere ID. This could be used by other organizations to access your [place] medical records  [id number]        Your Vitals Were     Blood Pressure Pulse Respirations Pulse Oximetry          133/81 72 14 98%         Primary Care Provider Office Phone # Fax #    Deisi Liu [phone]      Thank you!     Thank you for choosing [facility] for your care. Our goal is always to  provide you with excellent care. Hearing back from our patients is one way we can continue to improve our services. Please take a few minutes to complete the written survey that you may receive in the mail after you visit. If you would like to speak to someone directly about your visit please contact Patient Relations at [phone]. Thank you!               Medication List: This is a list of all your medications and when to take them. Check marks below indicate your daily home schedule. Keep this list as a reference.      Medications           Morning Afternoon Evening Bedtime As Needed    * albuterol (2.5 MG/3ML) 0.083% neb solution   Take 1 vial (2.5 mg) by nebulization every 6 hours as needed for shortness of breath / dyspnea                                * albuterol 108 (90 BASE) MCG/ACT Inhaler   Commonly known as:  PROAIR HFA/PROVENTIL HFA/VENTOLIN HFA   Inhale 2 puffs into the lungs every 6 hours as needed for shortness of breath / dyspnea or wheezing                                aspirin 81 MG tablet   Take 81 mg by mouth daily.                                atorvastatin 40 MG tablet   Commonly known as:  LIPITOR   Take 1 tablet (40 mg) by mouth daily                                biotin 2.5 mg/mL Susp   Take by mouth daily 2000mcg                                bisoprolol 5 MG tablet   Commonly known as:  ZEBETA   Take 0.5 tablets (2.5 mg) by mouth daily                                Calcium 9037-1510 MG-UNIT Chew   Take 1 tablet by mouth daily.                                FISH OIL DOUBLE STRENGTH 1200 MG capsule   Take 1 capsule by mouth 2 times daily   Generic drug:  omega-3 fatty acids                                fluticasone 50 MCG/ACT spray   Commonly known as:  FLONASE   Spray 1-2 sprays into both nostrils daily                                fluticasone-salmeterol 250-50 MCG/DOSE diskus inhaler   Commonly known as:  ADVAIR   Inhale 1 puff into the lungs every 12 hours                       "          furosemide 20 MG tablet   Commonly known as:  LASIX   Take 1 tablet (20 mg) by mouth daily                                MAGNESIUM ACETATE   Take 250 mg by mouth daily                                methimazole 10 MG tablet   Commonly known as:  TAPAZOLE   Take 1 tablet (10 mg) by mouth daily                                montelukast 10 MG tablet   Commonly known as:  SINGULAIR   Take 1 tablet (10 mg) by mouth At Bedtime                                nitroglycerin 0.4 MG sublingual tablet   Commonly known as:  NITROSTAT   One tablet under the tongue every 5 minutes if needed for chest pain. May repeat every 5 minutes for a maximum of 3 doses in 15 minutes\"                                omeprazole 20 MG CR capsule   Commonly known as:  priLOSEC   Take 1 capsule (20 mg) by mouth daily                                * order for DME   Equipment being ordered: CPAP Auto-CPAP 5-25 cm H2O  Full face mask with heated humidified air                                * order for DME   Equipment being ordered: APAP 5-20cm/H2O full face mask with heated humidified air.                                OSTEO BI-FLEX/5-LOXIN ADVANCED Tabs   Take 1,500 mg by mouth 2 times daily                                prasugrel 10 MG Tabs tablet   Commonly known as:  EFFIENT   Take 1 tablet (10 mg) by mouth daily Do not crush or break tablet.   Start taking on:  4/5/2017   Last time this was given:  60 mg on 4/4/2017 12:12 PM                                RESTASIS 0.05 % ophthalmic emulsion   Place 1 drop into both eyes 2 times daily   Generic drug:  cycloSPORINE                                spironolactone 25 MG tablet   Commonly known as:  ALDACTONE   Take 0.5 tablets (12.5 mg) by mouth daily                                VITAMIN D3 PO   Take 2,000 Units by mouth daily                                * Notice:  This list has 4 medication(s) that are the same as other medications prescribed for you. Read the directions " carefully, and ask your doctor or other care provider to review them with you.

## 2017-04-04 NOTE — DISCHARGE INSTRUCTIONS
Going Home after an Angioplasty or Stent Placement (Cardiac)  ______________________________________________    Patient Name: Victoria Montalvo  Date of Procedure: April 4, 2017    Doctor: Camelia  After you go home:    Have an adult stay with you for 24 hours.    Drink plenty of fluids.    You may eat your normal diet, unless your doctor tells you otherwise.    For 24 hours:    Relax and take it easy.    Do NOT smoke.    Do NOT make any important or legal decisions.    Do NOT drive or operate machines at home or at work.    Do NOT drink alcohol.    Remove the Band-Aid after 24 hours. If there is minor oozing, apply another Band-aid and remove it after 12 hours.    For 2 days, do NOT have sex or do any heavy exercise.    Do NOT take a bath, or use a hot tub or pool for at least 3 days. You may shower.    Care of groin site  It is normal to have a small bruise or lump at the site.    Do not scrub the site.    For the first 2 days: Do not stoop or squat. When you cough, sneeze or move your bowels, hold your hand over the puncture site and press gently.    Do not lift more than 10 pounds for at least 3 to 5 days.    Do not use lotion or powder near the puncture site for 3 days.    If you start bleeding from the site in your groin, lie down flat and press firmly  on the site. Call your doctor as soon as you can.        Medicines    If you have started taking Plavix or Effient, do not stop taking it until you talk to your heart doctor (cardiologist).    If you are on metformin (Glucophage), do not restart it until you have blood tests (within 2 to 3 days after discharge). When your doctor tells you it is safe, you may restart the metformin.    If you have stopped any other medicines, check with your nurse or provider about when to restart them.    Call 911 right away if you have bleeding that is heavy or does not stop.    Call your doctor if:    You have a large or growing hard lump around the site.    The site is red,  swollen, hot or tender.    Blood or fluid is draining from the site.    You have chills or a fever greater than 101 F (38 C).    Your leg or arm feels numb or cool.    You have hives, a rash or unusual itching.      [facility] at [place]:  [phone] (7 days a week)

## 2017-04-04 NOTE — PLAN OF CARE
Pt awake, alert and oriented, denies any pain or discomfort, VSS, right groin site clean and dry, right pedal pulses present, good CWMS to right foot, pt and  stated understanding of D/C instructions, accompanied home by spouse

## 2017-04-06 LAB
INTERPRETATION ECG - MUSE: NORMAL
INTERPRETATION ECG - MUSE: NORMAL

## 2017-04-07 ENCOUNTER — HOSPITAL ENCOUNTER (OUTPATIENT)
Dept: CARDIAC REHAB | Facility: CLINIC | Age: 77
End: 2017-04-07
Attending: INTERNAL MEDICINE
Payer: MEDICARE

## 2017-04-07 VITALS — BODY MASS INDEX: 32.5 KG/M2 | WEIGHT: 176.6 LBS | HEIGHT: 62 IN

## 2017-04-07 DIAGNOSIS — Z98.61 STATUS POST CORONARY ANGIOPLASTY: ICD-10-CM

## 2017-04-07 DIAGNOSIS — I25.110 CORONARY ARTERY DISEASE INVOLVING NATIVE CORONARY ARTERY OF NATIVE HEART WITH UNSTABLE ANGINA PECTORIS (H): ICD-10-CM

## 2017-04-07 PROCEDURE — 93797 PHYS/QHP OP CAR RHAB WO ECG: CPT | Mod: 59

## 2017-04-07 PROCEDURE — 40000575 ZZH STATISTIC OP CARDIAC VISIT #2

## 2017-04-07 PROCEDURE — 40000116 ZZH STATISTIC OP CR VISIT

## 2017-04-07 PROCEDURE — 93798 PHYS/QHP OP CAR RHAB W/ECG: CPT

## 2017-04-07 ASSESSMENT — 6 MINUTE WALK TEST (6MWT)
FEMALE CALC: 1214.91
PREDICTED: 1172.5
TOTAL DISTANCE WALKED (FT): 1550
MALE CALC: 1165.4
GENDER SELECTION: FEMALE

## 2017-04-07 NOTE — PROGRESS NOTES
04/07/17 0800   Session  June V Selvin  76 year old  Stent to PDA    I have established, reviewed and made necessary changes to the individualized treatment plan and exercise prescription for this patient.    Physician Name (printed): ________________________   Date: _______  Time: ______    Physician Signature: ___________________________________________     Session Initial Evaluation and Exercise Prescription   Certified through this date 05/06/17   Cardiac Rehab Assessment   Cardiac Rehab Assessment PT had been experiencing exertion shortness of breath and chest discomfort. PT failed a nuclear stress test and was found to have an 80% blockage in PDA that was treated with a SHERLEY. PT does have a history of peripheral vascular disease having had a previous angioplasty of a subclavian artery. PT continues to manage venous insufficiency in legs by wearing compression stockings and taking a diuretic. PT reports that she does not feel her walking is limited by venous insufficiency most of the time.  PT's main focus is to establish an exercise routine by walking and attending Silver Sneakers to maximize strength and endurance gains for better heart heatlh.     The patient's history and clinical status including hemodynamics and ECG were evaluated.  The patient was assessed to be stable and appropriate to begin exercise.   The patient's functional capacity and exercise prescription were determined by the completion of the 6 minute walk test.  See results below.  The patient was oriented to the program.  Risk factor profile was completed. Goals and objectives were discussed. CV response was WNL. No symptoms, complaints or pain were reported. Good prognosis for reaching above goals. Skilled therapy is necessary in order to monitor CV response to exercise, to provide education on risk factors and behavior change counseling needed to achieve patient's goals.  Plan to progress to 30-40 minutes of exercise prior to discharge  from cardiac rehab.  Initial THR of 20-30 beats above RHR; Effort rating of 4-6.  Initiate muscle conditioning as appropriate.  Provide risk factor education and behavior change counseling.      General Information   Treatment Diagnosis Stent to PDA   Date of Treatment Diagnosis 04/04/17   Secondary Treatment Diagnosis (None)   Significant Past CV History Clinical significant depression or depressive symptoms;PAD  (40 years ago)   Comorbidities None;PAD   Other Medical History PT did have a episode of heart failure in 2011.    Lead up symptoms Chest discomfort and dyspnea on exertion.    Hospital Location [facility]   Hospital Discharge Date 04/05/17   Signs and Symptoms Post Hospital Discharge None   Outpatient Cardiac Rehab Start Date 04/07/17   Primary Physician Deisi Liu MD   Primary Physician Follow Up Scheduled   Surgeon WILL   Cardiologist Dr. Yogi Yepez   Cardiologist Follow Up Scheduled   Ejection Fraction 50-55%  (per angiogram 4/4/2017. )   Risk Stratification High  (history of depression many years ago. )   Summary of Cath Report   Summary of Cath Report Available   Date Performed 04/04/17   Left Main Trivial plaque under 5%   LAD mild scattered plaque under 20-30% throughout.   LCX Proximal  has a smooth 35-40% narrowing. Right before PDA the LCX has a severe narrowing of 80%.   RCA moderate to severe diffuse disease in its midportion with narrowing up to 80%. Small vessel.   PDA Stent placed to left PDA due to 80% lesion.   Living and Work Status    Living Arrangements and Social Status house   Support System Live with an adult   Return to Employment Retired   Occupation Retired   Preventative Medications   CMS recommended medications Antiplatelets;Lipid Lowering  (no vaccinations. )   Falls Screen   Have you fallen two or more times in the past year? No   Have you fallen and had an injury in the past year? No   Timed up and go score if applicable NA   Referral Initiated to Physical  "Therapy No   Pain   Patient Currently in Pain Denies   Physical Assessments   Incisions WNL   Edema +3 Moderate   Right Lung Sounds normal   Left Lung Sounds normal   Limitations No limitations   Comments PT has venous insufficiency in legs and wears compression stockings.    Individualized Treatment Plan   Monitored Sessions Scheduled 24   Monitored Sessions Attended 1   Oxygen   Supplemental Oxygen needed No   Nutrition Management - Weight Management   Assessment Initial Assessment   Age 76   Weight 80.1 kg (176 lb 9.6 oz)   Height 1.575 m (5' 2\")   BMI (Calculated) 32.37   Goal Weight 68 kg (150 lb)   Initial Rate Your Plate Score. Dietary tool to assess eating patterns. Scores range from 24 to 72. The higher the score the healthier the eating pattern. 56   Weight Management Comments PT does report eating healthy and has appropriate portion sizes. PT does have edema. PT reports less exercise due to not having to take care of a yard.    Nutrition Management - Lipids   Lipids Labs Available   Date 10/05/16   Total Cholesterol 193   Triglycerides 82   HDL 76      Prescribed Lipid Medication Yes   Statin Intensity High Intensity   Nutrition Management - Diabetes   Diabetes No   Nutrition Management Summary   Dietary Recommendations Low Fat;Low Sodium   Stages of Change for Diet Compliance Maintenance   Interventions Planned Attend Nutrition Education Class(es);Educate on Weight Management Principles;Educate on Benefits of Exercise   Nutrition Summary Comments PT reports that she has been following a cardiac diet for many years as  has a strong heart history.    Psychosocial Management   Psychosocial Assessment Initial   Is there history of clinical depression or increased risk of depression? History of clinical depression  (40 years ago. )   Current Level of Stress per Patient Report Moderate    Current Coping Skills Other (see comments)  (play games and puzzles. )   Initial Patient Health Questionnaire " -9 Score (PHQ-9) for depression. 5-9 Minimal symptoms, 10-14 Minor depression, 15-19 Major depression, moderately severe, > 20 Major depression, severe  5   Initial [facility] Survey score.  Quality of Life:   If total score > 25 review individual areas where patient rated a 4 or 5.  Consider patients current medical condition and what role that plays on the score.   Adjust treatment protocol to improve areas of concern.  Consider the following:  PHQ9 score, DASI, and re-assessment within the next 30 days to assist with developing treatments.  15   Stages of Change Pre-Contemplation   Interventions Planned Patient to verbalize understanding of behavioral assessment results;Patient to verbalize understanding of negative impact of stress to personal health;Patient will recognize signs and symptoms of depression;Patient to attend stress management class(es)   Patient Goal No  (Not at this time. )   Psychosocial Comments PT's sister currently lives with her. PT has a very caring a nuturing personality and can find it hard to find time for herself.    Psychosocial Target Outcome Identify absence or presence of depression using valid screening tool;Maximize coping skills;Develop positive support system   Other Core Components - Hypertension   History of or Diagnosis of Hypertension Yes   Currently taking Anti-Hypertensives No   Other Core Components - Tobacco   History of Tobacco Use Yes   Quit Date or Planned Quit Date 01/01/03   Tobacco Use Status Former (Quit > 6 mo ago)   Tobacco Habit Cigarettes   Tobacco Use per Day (average) (3 a week to 1 ppd. )   Years of Tobacco Use 20   Stages of Change Maintenance   Tobacco Comments PT is confident she will not smoke again.    Other Core Components Summary   Interventions Planned List benefits of weight management;Educate on importance of maintaining low sodium diet;Instruct and educate to self manage Heart Failure symptoms;Attend education class(es) on Nutrition   Patient  Goals No   Activity/Exercise History   Activity/Exercise Assessment Initial   Activity/Exercise Status prior to event? Was Physically Active   Number of Days Currently participating in Moderate Physical Activity? 7   Number of Days Currently performing  Aerobic Exercise (including rehab)? 0   Number of Minutes per Session Currently of Aerobic Exercise (average)? 0   Current Stage of Change (Physical Activity) Maintenance   Current Stage of Change (Aerobic Exercise) Preparation   Patient Goals Goal #1   Goal #1 Description PT wants to walk 1-2 additional days per week for at least 30 minutes outside or at a store to maximize strength and endurance.    Goal #1 Target Date 05/31/17   Goal #1 Progress Towards Goal PT currently is very physically activie. PT plans to attend 2-3 sessions of cardiac rehab to gain a better understanding of her exercise tolerance and perform similar workloads outside of rehab.    Activity/Exercise Comments PT used to have an exercise routine about 10 years ago, but then PT got rid of equipment.    Activity/Exercise Target Outcome An Accumulation of 150  Minutes of Aerobic Activity per Week   Exercise Assessment   6 Minute Walk Predicted - Gender Selection Female   6 Minute Walk Predicted (Male) 1165.4   6 Minute Walk Predicted (Female) 1214.91   Initial 6 Minute Walk Distance (Feet) 1550 ft   Resting HR 61 bpm   Exercise  bpm   Post Exercise HR 71 bpm   Resting /60   Exercise /78   Post Exercise /62   Effort Rating 6   Current MET Level 3.2   MET Level Goal 4.5-5   ECG Rhythm Sinus rhythm   Ectopy PVCs   Current Symptoms Denies symptoms   Limitations/Restrictions Other (see comments)  (venous insufficiency in legs. Wears compression stockings. )   Exercise Prescription   Mode Treadmill;Recumbant bike;Weights   Duration/Time 15-30 min   Frequency Other (see comments)  (1-3 days per week. Depending on other appts. )   THR (85% of age predicted max HR) 121.55   OMNI  Effort Rating (0-10 Scale) 4-6/10   Progression Continuous bouts;Total exercise time of 20-30 minutes;Aerobic exercise to OMNI rating of 5-7, and heart rate at or below target;Progress peak intensity by 1/4 MET per week   Recommended Home Exercise   Type of Exercise Walking   Frequency (days per week) 1-2   Duration (minutes per session) 15-30 min   Effort Rating Recommended 4-6/10   30 Day Exercise Plan Despite venous insufficiency in legs and edema, PT reports that most of the time she does not feel limited with ambulation. PT enjoys walking. PT plans to add 1-2 additional days of structured walking for 30 minutes after attending rehab for one week. Strength training will be initiated after 3rd session.    Current Home Exercise   Type of Exercise None   Frequency (days per week) 0   Duration (minutes per session) 0   Follow-up/On-going Support   Provider follow-up needed on the following No follow-up needed   Learning Assessment   Learner Patient   Primary Language English   Preferred Learning Style Listening;Reading;Demonstration;Pictures/Video   Barriers to Learning No barriers noted   Patient Education   Education recommended Anatomy and Physiology of the Heart;Blood Pressure;Exercise Principles;Heart Failure;Medication Overview;Muscle Conditioning;Nutrition;Risk Factors;Stress Management;Weight Loss   Education Comments PT has many other appointments she attends for family members. PT is unsure of how many recommended education classes she will be able to attend.

## 2017-04-10 ENCOUNTER — HOSPITAL ENCOUNTER (OUTPATIENT)
Dept: CARDIAC REHAB | Facility: CLINIC | Age: 77
End: 2017-04-10
Attending: INTERNAL MEDICINE
Payer: MEDICARE

## 2017-04-10 PROCEDURE — 40000116 ZZH STATISTIC OP CR VISIT: Performed by: OCCUPATIONAL THERAPIST

## 2017-04-10 PROCEDURE — 93798 PHYS/QHP OP CAR RHAB W/ECG: CPT | Performed by: OCCUPATIONAL THERAPIST

## 2017-04-10 ASSESSMENT — 6 MINUTE WALK TEST (6MWT)
GENDER SELECTION: FEMALE
TOTAL DISTANCE WALKED (FT): 1550

## 2017-04-10 NOTE — PROGRESS NOTES
04/10/17 1400  Victoria V Selvin  76 year old     Physician cosignature/electronic signature indicates approval of this ITP document. I have established, reviewed and made necessary changes to the individualized treatment plan and exercise prescription for this patient.   Session 30 Day Individualized Treatment Plan  (ITP forwarded for medical director review.)   Certified through this date 05/13/17   Cardiac Rehab Assessment   Cardiac Rehab Assessment PT had been experiencing exertion shortness of breath and chest discomfort. PT failed a nuclear stress test and was found to have an 80% blockage in PDA that was treated with a SHERLEY. PT does have a history of peripheral vascular disease having had a previous angioplasty of a subclavian artery. PT continues to manage venous insufficiency in legs by wearing compression stockings and taking a diuretic. PT's main focus is to establish an exercise routine by walking and attending Silver Sneakers to maximize strength and endurance gains for better heart heatlh.    General Information   Treatment Diagnosis Stent  (to PDA)   Date of Treatment Diagnosis 04/04/17   Secondary Treatment Diagnosis (None)   Significant Past CV History Clinical significant depression or depressive symptoms;PAD  (40 years ago)   Comorbidities None;PAD   Other Medical History PT did have a episode of heart failure in 2011.    Lead up symptoms Chest discomfort and dyspnea on exertion.    Hospital Location [facility]   Hospital Discharge Date 04/05/17   Signs and Symptoms Post Hospital Discharge None   Outpatient Cardiac Rehab Start Date 04/07/17   Primary Physician Deisi Liu MD   Primary Physician Follow Up Scheduled   Surgeon WILL   Cardiologist Dr. Yogi Yepez   Cardiologist Follow Up Scheduled   Ejection Fraction 50-55%  (per angiogram 4/4/2017. )   Risk Stratification High  (history of depression many years ago. )   Summary of Cath Report   Summary of Cath Report Available   Date  Performed 04/04/17   Left Main Trivial plaque under 5%   LAD mild scattered plaque under 20-30% throughout.   LCX Proximal  has a smooth 35-40% narrowing. Right before PDA the LCX has a severe narrowing of 80%.   RCA moderate to severe diffuse disease in its midportion with narrowing up to 80%. Small vessel.   PDA Stent placed to left PDA due to 80% lesion.   Living and Work Status    Living Arrangements and Social Status house   Support System Live with an adult   Return to Employment Retired   Occupation Retired   Preventative Medications   CMS recommended medications Antiplatelets;Lipid Lowering  (no vaccinations. )   Falls Screen   Have you fallen two or more times in the past year? No   Have you fallen and had an injury in the past year? No   Timed up and go score if applicable NA   Referral Initiated to Physical Therapy No   Pain   Patient Currently in Pain Denies   Physical Assessments   Incisions WNL   Edema +3 Moderate   Right Lung Sounds normal   Left Lung Sounds normal   Limitations No limitations   Comments PT has venous insufficiency in legs and wears compression stockings.    Individualized Treatment Plan   Monitored Sessions Scheduled 24   Monitored Sessions Attended 1   Oxygen   Supplemental Oxygen needed No   Nutrition Management - Weight Management   Assessment Initial Assessment   Age 76   Goal Weight 68 kg (150 lb)   Initial Rate Your Plate Score. Dietary tool to assess eating patterns. Scores range from 24 to 72. The higher the score the healthier the eating pattern. 56   Weight Management Comments PT does report eating healthy and has appropriate portion sizes. PT does have edema. PT reports less exercise due to not having to take care of a yard.    Nutrition Management - Lipids   Lipids Labs Available   Date 10/05/16   Total Cholesterol 193   Triglycerides 82   HDL 76      Prescribed Lipid Medication Yes   Statin Intensity High Intensity   Nutrition Management - Diabetes   Diabetes No    Nutrition Management Summary   Dietary Recommendations Low Fat;Low Sodium   Stages of Change for Diet Compliance Maintenance   Interventions Planned Attend Nutrition Education Class(es);Educate on Weight Management Principles;Educate on Benefits of Exercise   Nutrition Summary Comments PT reports that she has been following a cardiac diet for many years as  has a strong heart history.    Psychosocial Management   Psychosocial Assessment Initial   Is there history of clinical depression or increased risk of depression? History of clinical depression  (40 years ago. )   Current Level of Stress per Patient Report Moderate    Current Coping Skills Other (see comments)  (play games and puzzles. )   Initial Patient Health Questionnaire -9 Score (PHQ-9) for depression. 5-9 Minimal symptoms, 10-14 Minor depression, 15-19 Major depression, moderately severe, > 20 Major depression, severe  5   Initial [facility] Survey score.  Quality of Life:   If total score > 25 review individual areas where patient rated a 4 or 5.  Consider patients current medical condition and what role that plays on the score.   Adjust treatment protocol to improve areas of concern.  Consider the following:  PHQ9 score, DASI, and re-assessment within the next 30 days to assist with developing treatments.  15   Stages of Change Pre-Contemplation   Interventions Planned Patient to verbalize understanding of behavioral assessment results;Patient to verbalize understanding of negative impact of stress to personal health;Patient will recognize signs and symptoms of depression;Patient to attend stress management class(es)   Patient Goal No  (Not at this time. )   Psychosocial Comments PT's sister currently lives with her. PT has a very caring a nuturing personality and can find it hard to find time for herself.    Psychosocial Target Outcome Identify absence or presence of depression using valid screening tool;Maximize coping skills;Develop  positive support system   Other Core Components - Hypertension   History of or Diagnosis of Hypertension Yes   Currently taking Anti-Hypertensives No   Other Core Components - Tobacco   History of Tobacco Use Yes   Quit Date or Planned Quit Date 01/01/03   Tobacco Use Status Former (Quit > 6 mo ago)   Tobacco Habit Cigarettes   Tobacco Use per Day (average) (3 a week to 1 ppd. )   Years of Tobacco Use 20   Stages of Change Maintenance   Tobacco Comments PT is confident she will not smoke again.    Other Core Components Summary   Interventions Planned List benefits of weight management;Educate on importance of maintaining low sodium diet;Instruct and educate to self manage Heart Failure symptoms;Attend education class(es) on Nutrition   Patient Goals No   Activity/Exercise History   Activity/Exercise Assessment Initial   Activity/Exercise Status prior to event? Was Physically Active   Number of Days Currently participating in Moderate Physical Activity? 7   Number of Days Currently performing  Aerobic Exercise (including rehab)? 0   Number of Minutes per Session Currently of Aerobic Exercise (average)? 0   Current Stage of Change (Physical Activity) Maintenance   Current Stage of Change (Aerobic Exercise) Preparation   Patient Goals Goal #1   Goal #1 Description PT wants to walk 1-2 additional days per week for at least 30 minutes outside or at a store to maximize strength and endurance.    Goal #1 [employer] Date 05/31/17   Goal #1 Progress Towards Goal PT currently is very physically activie. PT plans to attend 2-3 sessions of cardiac rehab to gain a better understanding of her exercise tolerance and perform similar workloads outside of rehab.    Activity/Exercise Comments PT used to have an exercise routine about 10 years ago, but then PT got rid of equipment.    Activity/Exercise Target Outcome An Accumulation of 150  Minutes of Aerobic Activity per Week   Exercise Assessment   6 Minute Walk Predicted - Gender  Selection Female   Initial 6 Minute Walk Distance (Feet) 1550 ft   Resting HR 61 bpm   Exercise  bpm   Post Exercise HR 71 bpm   Resting /60   Exercise /78   Post Exercise /62   Effort Rating 6   Current MET Level 3.2   MET Level Goal 4.5-5   ECG Rhythm Sinus rhythm   Ectopy PVCs   Current Symptoms Denies symptoms   Limitations/Restrictions Other (see comments)  (venous insufficiency in legs. Wears compression stockings. )   Exercise Prescription   Mode Treadmill;Recumbant bike;Weights   Duration/Time 15-30 min   Frequency Other (see comments)  (1-3 days per week. Depending on other appts. )   THR (85% of age predicted max HR) 121.55   OMNI Effort Rating (0-10 Scale) 4-6/10   Progression Continuous bouts;Total exercise time of 20-30 minutes;Aerobic exercise to OMNI rating of 5-7, and heart rate at or below target;Progress peak intensity by 1/4 MET per week   Recommended Home Exercise   Type of Exercise Walking   Frequency (days per week) 1-2   Duration (minutes per session) 15-30 min   Effort Rating Recommended 4-6/10   30 Day Exercise Plan Despite venous insufficiency in legs and edema, PT reports that most of the time she does not feel limited with ambulation. PT enjoys walking. PT plans to add 1-2 additional days of structured walking for 30 minutes after attending rehab for one week. Strength training will be initiated after 3rd session.    Current Home Exercise   Type of Exercise None   Frequency (days per week) 0   Duration (minutes per session) 0   Follow-up/On-going Support   Provider follow-up needed on the following No follow-up needed   Learning Assessment   Learner Patient   Primary Language English   Preferred Learning Style Listening;Reading;Demonstration;Pictures/Video   Barriers to Learning No barriers noted   Patient Education   Education recommended Anatomy and Physiology of the Heart;Blood Pressure;Exercise Principles;Heart Failure;Medication Overview;Muscle  Conditioning;Nutrition;Risk Factors;Stress Management;Weight Loss   Education Comments PT has many other appointments she attends for family members. PT is unsure of how many recommended education classes she will be able to attend.

## 2017-04-11 ENCOUNTER — HOSPITAL ENCOUNTER (OUTPATIENT)
Dept: CARDIAC REHAB | Facility: CLINIC | Age: 77
End: 2017-04-11
Attending: INTERNAL MEDICINE
Payer: MEDICARE

## 2017-04-11 PROCEDURE — 93798 PHYS/QHP OP CAR RHAB W/ECG: CPT

## 2017-04-11 PROCEDURE — 40000116 ZZH STATISTIC OP CR VISIT

## 2017-04-13 ENCOUNTER — OFFICE VISIT (OUTPATIENT)
Dept: CARDIOLOGY | Facility: CLINIC | Age: 77
End: 2017-04-13
Payer: MEDICARE

## 2017-04-13 VITALS
OXYGEN SATURATION: 97 % | WEIGHT: 180 LBS | DIASTOLIC BLOOD PRESSURE: 66 MMHG | SYSTOLIC BLOOD PRESSURE: 116 MMHG | HEART RATE: 60 BPM | HEIGHT: 65 IN | BODY MASS INDEX: 29.99 KG/M2

## 2017-04-13 DIAGNOSIS — I25.10 ASHD (ARTERIOSCLEROTIC HEART DISEASE): ICD-10-CM

## 2017-04-13 DIAGNOSIS — E78.2 MIXED HYPERLIPIDEMIA: Primary | ICD-10-CM

## 2017-04-13 PROCEDURE — 99214 OFFICE O/P EST MOD 30 MIN: CPT | Performed by: PHYSICIAN ASSISTANT

## 2017-04-13 NOTE — LETTER
4/13/2017    Deisi Liu MD  [facility]   [address] [place] [address] [place] [address]    RE: Victoria Montalvo       Dear Colleague,    I had the pleasure of meeting Victoria Montalvo along with her , Evgeny, today in the Cardiology Clinic to follow up on recent coronary angiogram.  Victoria is a patient of Dr. Yepez.      Victoria is a very pleasant 76-year-old woman with a history of hypertension, hyperlipidemia, diastolic dysfunction, left subclavian stenosis status post percutaneous revascularization in 2013, venous insufficiency, sleep apnea on CPAP and recently diagnosed coronary artery disease.      She saw Martha Guevara NP, in March and she had noted worsening dyspnea on exertion with some chest pain.  A nuclear stress test was performed that showed mostly reversible anteroapical defect consistent with mild to moderate ischemia in the distribution of the LAD.  There was also breast attenuation noted.  She was symptomatic during the test with shortness of breath.  Due to her symptoms and the results of the stress test, she was scheduled for a coronary angiogram.  This was carried out on 04/04/2017 via the right femoral artery by Dr. Denise.  There is an 80% left PDA that was stented.  There was a tiny nondominant right coronary artery that was severely diseased and was not intervened upon.  Dr. Denise started bisoprolol.  He also intensified her statin switching her from pravastatin to atorvastatin.      Victoria presents today telling me that she has done pretty well since the angiogram.  She has no chest pain.  She does have some mild shortness of breath, but this is much improved from before the angiogram.  She has more energy.  She has no bleeding concerns on dual antiplatelet therapy.  She is tolerating the atorvastatin without myalgias.  Her main concern is weight gain.  She says over the last few months she has gained at least 10 pounds and it seems like it is coming on so rapidly without any  "change in diet or activity.  She said she is going to follow up with her primary care provider or her endocrinologist regarding her thyroid function medication.  She is also losing her hair so much so that she is wearing a wig today.  When she saw Martha, Martha gave her some compression stockings and she has been wearing them ever since and her legs feel much better.      PHYSICAL EXAMINATION:   VITAL SIGNS:  116/66, pulse 60, weight 180 pounds, BMI 29.79.   GENERAL:  A 76-year-old woman in no apparent distress.   LUNGS:  Clear to auscultation bilaterally.   CARDIAC:  Regular S1, S2, very soft systolic murmur.   ABDOMEN:  Soft.  Bowel sounds positive, nontender.   EXTREMITIES:  Warm without pitting edema, though she is wearing bilateral compression stockings.  The right femoral arteriotomy site was assessed.  It was soft without hematoma.  There was mild ecchymosis and no bruit.   NEUROLOGIC:  Alert and oriented x3, no focal deficits.     Outpatient Encounter Prescriptions as of 4/13/2017   Medication Sig Dispense Refill     [DISCONTINUED] nitroglycerin (NITROSTAT) 0.4 MG sublingual tablet One tablet under the tongue every 5 minutes if needed for chest pain. May repeat every 5 minutes for a maximum of 3 doses in 15 minutes\" 25 tablet 3     [DISCONTINUED] prasugrel (EFFIENT) 10 MG TABS tablet Take 1 tablet (10 mg) by mouth daily Do not crush or break tablet. 90 tablet 3     [DISCONTINUED] atorvastatin (LIPITOR) 40 MG tablet Take 1 tablet (40 mg) by mouth daily 90 tablet 3     [DISCONTINUED] bisoprolol (ZEBETA) 5 MG tablet Take 0.5 tablets (2.5 mg) by mouth daily 60 tablet 3     fluticasone-salmeterol (ADVAIR) 250-50 MCG/DOSE diskus inhaler Inhale 1 puff into the lungs every 12 hours       biotin 2.5 mg/mL Take by mouth daily 2000mcg       [DISCONTINUED] methimazole (TAPAZOLE) 10 MG tablet Take 1 tablet (10 mg) by mouth daily 30 tablet 3     spironolactone (ALDACTONE) 25 MG tablet Take 0.5 tablets (12.5 mg) by mouth " daily (Patient taking differently: Take 12.5 mg by mouth Mon-wed-fri) 45 tablet 12     furosemide (LASIX) 20 MG tablet Take 1 tablet (20 mg) by mouth daily 30 tablet 0     omeprazole (PRILOSEC) 20 MG capsule Take 1 capsule (20 mg) by mouth daily 90 capsule 3     montelukast (SINGULAIR) 10 MG tablet Take 1 tablet (10 mg) by mouth At Bedtime 90 tablet 3     albuterol (PROAIR HFA, PROVENTIL HFA, VENTOLIN HFA) 108 (90 BASE) MCG/ACT inhaler Inhale 2 puffs into the lungs every 6 hours as needed for shortness of breath / dyspnea or wheezing 3 Inhaler 0     albuterol (2.5 MG/3ML) 0.083% nebulizer solution Take 1 vial (2.5 mg) by nebulization every 6 hours as needed for shortness of breath / dyspnea 1 Box 3     fluticasone (FLONASE) 50 MCG/ACT nasal spray Spray 1-2 sprays into both nostrils daily 48 g 3     Cholecalciferol (VITAMIN D3 PO) Take 2,000 Units by mouth daily        ORDER FOR DME Equipment being ordered: APAP 5-20cm/H2O full face mask with heated humidified air. 1 Device 0     ORDER FOR DME Equipment being ordered: CPAP  Auto-CPAP 5-25 cm H2O   Full face mask with heated humidified air 1 Device 0     aspirin 81 MG tablet Take 81 mg by mouth daily.       omega-3 fatty acids (FISH OIL DOUBLE STRENGTH) 1200 MG capsule Take 1 capsule by mouth 2 times daily        Calcium 9875-6678 MG-UNIT CHEW Take 1 tablet by mouth daily.       Boswellia-Glucosamine-Vit D (OSTEO BI-FLEX/5-LOXIN ADVANCED) TABS Take 1,500 mg by mouth 2 times daily        MAGNESIUM ACETATE Take 250 mg by mouth daily        cycloSPORINE (RESTASIS) 0.05 % ophthalmic emulsion Place 1 drop into both eyes 2 times daily        No facility-administered encounter medications on file as of 4/13/2017.       ASSESSMENT:   1.  Coronary artery disease.   a.  Symptoms of dyspnea on exertion and chest pain with abnormal nuclear stress test.     b.  Coronary angiogram 04/04/2017 - 80% left PDA status post drug-eluting stent.  There is a tiny nondominant right coronary  artery with severe disease that was not intervened upon.   c.  She has mild symptoms of shortness of breath, however, much improved.   2.  Hypertension, controlled.   3.  Hyperlipidemia.   a.  Recently switched from pravastatin to atorvastatin due to her known coronary artery disease.   4.  Diastolic dysfunction.   5.  Left subclavian stenosis, status post stenting in 2013.   6.  Venous insufficiency, wearing compression stockings with improvement in symptoms.   7.  Obstructive sleep apnea on CPAP.   8.  Thyroid disease.      PLAN:  Victoria is doing well following her coronary angiogram and stenting procedure.  Her symptoms are much improved.  She is tolerating her medications:   1.  Continue dual antiplatelet therapy, aspirin lifelong and Effient for 1 year minimum following stent placement.   2.  Cardiac rehab.   3.  Continued risk factor reduction.  I wonder if over these past couple of months she actually was less active due to her symptoms from her obstructive coronary disease and therefore this led to her weight gain.  Nonetheless, she will follow up with her primary care office/endocrinologist regarding her thyroid disease and medication management.  She will follow with Martha Guevara, nurse practitioner, in the clinic at the end of June regarding her venous insufficiency and possible ablation.  I will have her get a repeat lipid panel and ALT at that time given the change in the statin.   4.  Should she continue to have shortness of breath or worsening shortness of breath, she will give our office a call and we can consider adding Imdur for the known severely diseased small nondominant right coronary artery.            Thank you very much for allowing me to participate in the care of Victoria Montalvo.     Sincerely,    Alia Yoo PA-C     [facility]

## 2017-04-13 NOTE — PROGRESS NOTES
"HPI and Plan:   See dictation  [id number]    Orders this Visit:  Orders Placed This Encounter   Procedures     Lipid panel reflex to direct LDL     ALT     No orders of the defined types were placed in this encounter.    There are no discontinued medications.      Encounter Diagnoses   Name Primary?     Mixed hyperlipidemia Yes     ASHD (arteriosclerotic heart disease)        CURRENT MEDICATIONS:  Current Outpatient Prescriptions   Medication Sig Dispense Refill     nitroglycerin (NITROSTAT) 0.4 MG sublingual tablet One tablet under the tongue every 5 minutes if needed for chest pain. May repeat every 5 minutes for a maximum of 3 doses in 15 minutes\" 25 tablet 3     prasugrel (EFFIENT) 10 MG TABS tablet Take 1 tablet (10 mg) by mouth daily Do not crush or break tablet. 90 tablet 3     atorvastatin (LIPITOR) 40 MG tablet Take 1 tablet (40 mg) by mouth daily 90 tablet 3     bisoprolol (ZEBETA) 5 MG tablet Take 0.5 tablets (2.5 mg) by mouth daily 60 tablet 3     fluticasone-salmeterol (ADVAIR) 250-50 MCG/DOSE diskus inhaler Inhale 1 puff into the lungs every 12 hours       biotin 2.5 mg/mL Take by mouth daily 2000mcg       methimazole (TAPAZOLE) 10 MG tablet Take 1 tablet (10 mg) by mouth daily 30 tablet 3     spironolactone (ALDACTONE) 25 MG tablet Take 0.5 tablets (12.5 mg) by mouth daily (Patient taking differently: Take 12.5 mg by mouth Mon-wed-fri) 45 tablet 12     furosemide (LASIX) 20 MG tablet Take 1 tablet (20 mg) by mouth daily 30 tablet 0     omeprazole (PRILOSEC) 20 MG capsule Take 1 capsule (20 mg) by mouth daily 90 capsule 3     montelukast (SINGULAIR) 10 MG tablet Take 1 tablet (10 mg) by mouth At Bedtime 90 tablet 3     albuterol (PROAIR HFA, PROVENTIL HFA, VENTOLIN HFA) 108 (90 BASE) MCG/ACT inhaler Inhale 2 puffs into the lungs every 6 hours as needed for shortness of breath / dyspnea or wheezing 3 Inhaler 0     albuterol (2.5 MG/3ML) 0.083% nebulizer solution Take 1 vial (2.5 mg) by nebulization every 6 " hours as needed for shortness of breath / dyspnea 1 Box 3     fluticasone (FLONASE) 50 MCG/ACT nasal spray Spray 1-2 sprays into both nostrils daily 48 g 3     Cholecalciferol (VITAMIN D3 PO) Take 2,000 Units by mouth daily        ORDER FOR DME Equipment being ordered: APAP 5-20cm/H2O full face mask with heated humidified air. 1 Device 0     ORDER FOR DME Equipment being ordered: CPAP  Auto-CPAP 5-25 cm H2O   Full face mask with heated humidified air 1 Device 0     aspirin 81 MG tablet Take 81 mg by mouth daily.       omega-3 fatty acids (FISH OIL DOUBLE STRENGTH) 1200 MG capsule Take 1 capsule by mouth 2 times daily        Calcium 0668-1750 MG-UNIT CHEW Take 1 tablet by mouth daily.       Boswellia-Glucosamine-Vit D (OSTEO BI-FLEX/5-LOXIN ADVANCED) TABS Take 1,500 mg by mouth 2 times daily        MAGNESIUM ACETATE Take 250 mg by mouth daily        cycloSPORINE (RESTASIS) 0.05 % ophthalmic emulsion Place 1 drop into both eyes 2 times daily          ALLERGIES  Allergies   Allergen Reactions     Animal Dander      Kiwi Itching     Itching and red all over     Pollen Extract      Pollen,dust, trees, grass, mold-hayfever symptoms       PAST MEDICAL HISTORY:  Past Medical History:   Diagnosis Date     Abnormal Papanicolaou smear of cervix and cervical HPV     colposcopy 1/97     Arthritis      Chronic rhinitis      Coronary artery disease      Gastro-oesophageal reflux disease      Hyperlipidemia      Pain in right hip      Personal history of colonic polyps     colonoscopy 9/97, 2002 (due in 2007)     Sleep apnea      Subclinical hyperthyroidism 10/17/2016     Unspecified asthma(493.90)     on preventative meds and has nebulizer       PAST SURGICAL HISTORY:  Past Surgical History:   Procedure Laterality Date     ARTHROPLASTY HIP Right 10/19/2015    Procedure: ARTHROPLASTY HIP;  Surgeon: Aron Torres MD;  Location: [facility] OR     C NONSPECIFIC PROCEDURE  2/89, 1990    liposuction of legs and stomach     C  NONSPECIFIC PROCEDURE  1990    Ankle pins removed     C OPEN RX ANKLE DISLOCATN+FIXATN  4/18/87     COLONOSCOPY  01/1/08    Polyp removed, bx.     COLONOSCOPY  01/12/10     COLONOSCOPY N/A 2/17/2015    Procedure: COLONOSCOPY;  Surgeon: Gato Quiles MD;  Location: [facility]     HC COLONOSCOPY THRU STOMA, DIAGNOSTIC  2002     HC REDUCTION OF LARGE BREAST  2/89     VASCULAR SURGERY  2013    angiogram & left subclavical artery stent       FAMILY HISTORY:  Family History   Problem Relation Age of Onset     Alzheimer Disease Mother      GASTROINTESTINAL DISEASE Mother      CANCER Father      throat and lungs, liver,     HEART DISEASE Father 57     CABG     HEART DISEASE Brother      HEART DISEASE Sister      Hypertension Maternal Grandmother      Lipids Maternal Grandmother      CANCER Maternal Grandmother      stomach     CANCER Paternal Grandmother      stomach     Allergies Daughter      Allergies Son      Depression Brother      suicidal       SOCIAL HISTORY:  Social History     Social History     Marital status:      Spouse name: [place]on     Number of children: 2     Years of education: 12     Occupational History     retired      Social History Main Topics     Smoking status: Former Smoker     Packs/day: 0.10     Years: 10.00     Types: Cigarettes     Quit date: 5/19/2003     Smokeless tobacco: Never Used      Comment: off and on x 10 years 3-4 cigs: no smoker in the household     Alcohol use 0.0 oz/week     0 Standard drinks or equivalent per week      Comment: a couple drinks per year     Drug use: No     Sexual activity: Not Currently     Partners: Male     Other Topics Concern     Caffeine Concern No     Special Diet No     regular diet      Exercise No     shopping a lot so [place]      Social History Narrative       Review of Systems:  Skin:  Positive for     Eyes:  Positive for glasses  ENT:  Positive for epistaxis  Respiratory:  Positive for shortness of breath;sleep apnea  Cardiovascular:     "Positive for;edema  Gastroenterology: Positive for heartburn  Genitourinary:  Positive for urinary frequency  Musculoskeletal:  Positive for arthritis  Neurologic:  Negative    Psychiatric:  Positive for excessive stress  Heme/Lymph/Imm:  Positive for allergies  Endocrine:  Positive for thyroid disorder      Physical Exam:  Vitals: /66 (BP Location: Right arm, Patient Position: Chair, Cuff Size: Adult Regular)  Pulse 60  Ht 1.657 m (5' 5.25\")  Wt 81.6 kg (180 lb)  SpO2 97%  BMI 29.72 kg/m2    Constitutional:  cooperative, alert and oriented, well developed, well nourished, in no acute distress        Skin:  warm and dry to the touch, no apparent skin lesions or masses noted        Head:  normocephalic, no masses or lesions   hair loss, wearing a wig    Eyes:  pupils equal and round;sclera white;no xanthalasma        ENT:  no pallor or cyanosis, dentition good        Neck:  JVP normal        Chest:  normal breath sounds, clear to auscultation, normal A-P diameter, normal symmetry, normal respiratory excursion, no use of accessory muscles        Cardiac: regular rhythm;normal S1 and S2       systolic murmur;grade 1          Abdomen:  abdomen soft;BS normoactive;non-tender        Vascular:                                   right femoral arteriotomy site: soft, no hematoma, very mild ecchymosis, no bruit    Extremities and Back:  normal muscle strength and tone   wearing compression stockings    Neurological:  affect appropriate, oriented to time, person and place;no gross motor deficits          Recent Lab Results:  LIPID RESULTS:  Lab Results   Component Value Date    CHOL 193 10/05/2016    HDL 76 10/05/2016     (H) 10/05/2016    TRIG 82 10/05/2016    CHOLHDLRATIO 2.5 08/20/2014       LIVER ENZYME RESULTS:  Lab Results   Component Value Date    AST 14 01/01/2017    ALT 17 01/01/2017       CBC RESULTS:  Lab Results   Component Value Date    WBC 4.9 03/29/2017    RBC 4.63 03/29/2017    HGB 13.3 " 03/29/2017    HCT 41.2 03/29/2017    MCV 89 03/29/2017    MCH 28.7 03/29/2017    MCHC 32.3 03/29/2017    RDW 13.6 03/29/2017     03/29/2017       BMP RESULTS:  Lab Results   Component Value Date     03/29/2017    POTASSIUM 3.9 03/29/2017    CHLORIDE 107 03/29/2017    CO2 28 03/29/2017    ANIONGAP 7 03/29/2017    GLC 88 03/29/2017    BUN 16 03/29/2017    CR 0.76 03/29/2017    GFRESTIMATED 74 03/29/2017    GFRESTBLACK 89 03/29/2017    PHILLIP 8.4 (L) 03/29/2017        A1C RESULTS:  No results found for: A1C    INR RESULTS:  Lab Results   Component Value Date    INR 1.04 03/29/2017    INR 1.03 09/04/2013           CC  Aron Colby MD   [facility]  [id number]  [place] [id number]

## 2017-04-13 NOTE — MR AVS SNAPSHOT
After Visit Summary   4/13/2017 June BALTA Montalvo    MRN: [id number]           Patient Information     Date Of Birth          1940        Visit Information        Provider Department      4/13/2017 1:50 PM Alia Yoo PA-C [facility] [place]        Today's Diagnoses     Mixed hyperlipidemia    -  1    ASHD (arteriosclerotic heart disease)          Care Instructions    Continue to follow your weight. If it keeps trending up without any obvious change in diet or decrease in activity, please call here and we can consider increasing the diuretic for a couple days. But first, please talk with your primary/endocrinolgist regarding your thyroid function.     I am glad to hear your shortness of breath is much improved. If you find that with cardiac rehab and increasing activity, you are still having shortness of breath, please call here and we can trial Imdur (long acting nitro) for the tiny right coronary artery that can't be stented.     You are scheduled to see SHANT Thomas at the end of June. I will order the repeat lipids at that time.           Follow-ups after your visit        Your next 10 appointments already scheduled     Apr 14, 2017 10:00 AM CDT   Treatment 60 with Rh Cardiac Rehab 1   [facility] ([facility])    [id number] [place] [address], [facility] [address]  [facility] [address]   [phone]            Apr 17, 2017  3:00 PM CDT   Treatment 60 with Rh Cardiac Rehab 1   [facility] ([facility])    [address]  [facility] [address]   [phone]            Apr 19, 2017  1:00 PM CDT   Treatment 60 with Rh Cardiac Rehab 1   [facility] ([facility])    [address]  [facility] [address]   [phone]            Apr 20, 2017  1:00 PM CDT   Treatment 60 with Rh Cardiac Rehab 3   [facility] ([facility])     [address]  [facility] [address]   [phone]            Apr 24, 2017 10:00 AM CDT   Treatment 60 with Rh Cardiac Rehab 1   [facility] ([facility])    [address]  [facility] [address]   [phone]            Apr 26, 2017 10:00 AM CDT   Treatment 60 with Rh Cardiac Rehab 1   [facility] ([facility])    [address]  [facility] [address]   [phone]            Apr 28, 2017 10:00 AM CDT   Treatment 60 with Rh Cardiac Rehab 1   [facility] ([facility])    [address]  [facility] [address]   [phone]            May 01, 2017  1:00 PM CDT   Treatment 60 with Rh Cardiac Rehab 1   [facility] ([facility])    [address]  [facility] [address]   [phone]            May 03, 2017 10:00 AM CDT   Treatment 60 with Rh Cardiac Rehab 1   [facility] ([facility])    [address]  [facility] [address]   [phone]            May 05, 2017 10:00 AM CDT   Treatment 60 with Rh Cardiac Rehab 1   [facility] ([facility])    [address]  [facility] [address]   [phone]              Future tests that were ordered for you today     Open Future Orders        Priority Expected Expires Ordered    Lipid panel reflex to direct LDL Routine 6/30/2017 4/13/2019 4/13/2017    ALT Routine 6/30/2017 4/13/2019 4/13/2017            Who to contact     If you have questions or need follow up information about today's clinic visit or your schedule please contact [facility] [place] directly at [phone].  Normal or non-critical lab and imaging results will be communicated to you by MyChart, letter or phone within 4 business days after the clinic has  "received the results. If you do not hear from us within 7 days, please contact the clinic through [employer] or phone. If you have a critical or abnormal lab result, we will notify you by phone as soon as possible.  Submit refill requests through [employer] or call your pharmacy and they will forward the refill request to us. Please allow 3 business days for your refill to be completed.          Additional Information About Your Visit        [employer] Information     [employer] gives you secure access to your electronic health record. If you see a primary care provider, you can also send messages to your care team and make appointments. If you have questions, please call your primary care clinic.  If you do not have a primary care provider, please call [phone] and they will assist you.        Care EveryWhere ID     This is your Care EveryWhere ID. This could be used by other organizations to access your [facility] medical records  [id number]        Your Vitals Were     Pulse Height Pulse Oximetry BMI (Body Mass Index)          60 1.657 m (5' 5.25\") 97% 29.72 kg/m2         Blood Pressure from Last 3 Encounters:   04/13/17 116/66   04/04/17 164/72   03/24/17 142/80    Weight from Last 3 Encounters:   04/13/17 81.6 kg (180 lb)   04/07/17 80.1 kg (176 lb 9.6 oz)   03/24/17 79.8 kg (176 lb)                 Today's Medication Changes          These changes are accurate as of: 4/13/17  2:32 PM.  If you have any questions, ask your nurse or doctor.               These medicines have changed or have updated prescriptions.        Dose/Directions    spironolactone 25 MG tablet   Commonly known as:  ALDACTONE   This may have changed:    - when to take this  - additional instructions   Used for:  Hyperlipidemia LDL goal <130, Diastolic CHF, chronic (H), Subclavian artery stenosis, left (H), Palpitations        Dose:  12.5 mg   Take 0.5 tablets (12.5 mg) by mouth daily   Quantity:  45 tablet   Refills:  12                Primary Care " Provider Office Phone # Fax #    Deisi Liu [phone]       [facility] [address] [place] [address]        Thank you!     Thank you for choosing [facility] [place]  for your care. Our goal is always to provide you with excellent care. Hearing back from our patients is one way we can continue to improve our services. Please take a few minutes to complete the written survey that you may receive in the mail after your visit with us. Thank you!             Your Updated Medication List - Protect others around you: Learn how to safely use, store and throw away your medicines at www.disposemymeds.org.          This list is accurate as of: 4/13/17  2:32 PM.  Always use your most recent med list.                   Brand Name Dispense Instructions for use    * albuterol (2.5 MG/3ML) 0.083% neb solution     1 Box    Take 1 vial (2.5 mg) by nebulization every 6 hours as needed for shortness of breath / dyspnea       * albuterol 108 (90 BASE) MCG/ACT Inhaler    PROAIR HFA/PROVENTIL HFA/VENTOLIN HFA    3 Inhaler    Inhale 2 puffs into the lungs every 6 hours as needed for shortness of breath / dyspnea or wheezing       aspirin 81 MG tablet      Take 81 mg by mouth daily.       atorvastatin 40 MG tablet    LIPITOR    90 tablet    Take 1 tablet (40 mg) by mouth daily       biotin 2.5 mg/mL Susp      Take by mouth daily 2000mcg       bisoprolol 5 MG tablet    ZEBETA    60 tablet    Take 0.5 tablets (2.5 mg) by mouth daily       Calcium 7754-1804 MG-UNIT Chew      Take 1 tablet by mouth daily.       FISH OIL DOUBLE STRENGTH 1200 MG capsule   Generic drug:  omega-3 fatty acids      Take 1 capsule by mouth 2 times daily       fluticasone 50 MCG/ACT spray    FLONASE    48 g    Spray 1-2 sprays into both nostrils daily       fluticasone-salmeterol 250-50 MCG/DOSE diskus inhaler    ADVAIR     Inhale 1 puff into the lungs every 12 hours        "furosemide 20 MG tablet    LASIX    30 tablet    Take 1 tablet (20 mg) by mouth daily       MAGNESIUM ACETATE      Take 250 mg by mouth daily       methimazole 10 MG tablet    TAPAZOLE    30 tablet    Take 1 tablet (10 mg) by mouth daily       montelukast 10 MG tablet    SINGULAIR    90 tablet    Take 1 tablet (10 mg) by mouth At Bedtime       nitroglycerin 0.4 MG sublingual tablet    NITROSTAT    25 tablet    One tablet under the tongue every 5 minutes if needed for chest pain. May repeat every 5 minutes for a maximum of 3 doses in 15 minutes\"       omeprazole 20 MG CR capsule    priLOSEC    90 capsule    Take 1 capsule (20 mg) by mouth daily       * order for DME     1 Device    Equipment being ordered: CPAP Auto-CPAP 5-25 cm H2O  Full face mask with heated humidified air       * order for DME     1 Device    Equipment being ordered: APAP 5-20cm/H2O full face mask with heated humidified air.       OSTEO BI-FLEX/5-LOXIN ADVANCED Tabs      Take 1,500 mg by mouth 2 times daily       prasugrel 10 MG Tabs tablet    EFFIENT    90 tablet    Take 1 tablet (10 mg) by mouth daily Do not crush or break tablet.       RESTASIS 0.05 % ophthalmic emulsion   Generic drug:  cycloSPORINE      Place 1 drop into both eyes 2 times daily       spironolactone 25 MG tablet    ALDACTONE    45 tablet    Take 0.5 tablets (12.5 mg) by mouth daily       VITAMIN D3 PO      Take 2,000 Units by mouth daily       * Notice:  This list has 4 medication(s) that are the same as other medications prescribed for you. Read the directions carefully, and ask your doctor or other care provider to review them with you.      "

## 2017-04-13 NOTE — PATIENT INSTRUCTIONS
Continue to follow your weight. If it keeps trending up without any obvious change in diet or decrease in activity, please call here and we can consider increasing the diuretic for a couple days. But first, please talk with your primary/endocrinolgist regarding your thyroid function.     I am glad to hear your shortness of breath is much improved. If you find that with cardiac rehab and increasing activity, you are still having shortness of breath, please call here and we can trial Imdur (long acting nitro) for the tiny right coronary artery that can't be stented.     You are scheduled to see SHANT Thomas at the end of June. I will order the repeat lipids at that time.

## 2017-04-14 ENCOUNTER — HOSPITAL ENCOUNTER (OUTPATIENT)
Dept: CARDIAC REHAB | Facility: CLINIC | Age: 77
End: 2017-04-14
Attending: INTERNAL MEDICINE
Payer: MEDICARE

## 2017-04-14 PROCEDURE — 40000116 ZZH STATISTIC OP CR VISIT

## 2017-04-14 PROCEDURE — 93798 PHYS/QHP OP CAR RHAB W/ECG: CPT

## 2017-04-14 NOTE — PROGRESS NOTES
HISTORY OF PRESENT ILLNESS:  I had the pleasure of meeting Victoria Montalvo along with her , Evgeny, today in the Cardiology Clinic to follow up on recent coronary angiogram.  Victoria is a patient of Dr. Yepez.      Victoria is a very pleasant 76-year-old woman with a history of hypertension, hyperlipidemia, diastolic dysfunction, left subclavian stenosis status post percutaneous revascularization in 2013, venous insufficiency, sleep apnea on CPAP and recently diagnosed coronary artery disease.      She saw Martha Guevara NP, in March and she had noted worsening dyspnea on exertion with some chest pain.  A nuclear stress test was performed that showed mostly reversible anteroapical defect consistent with mild to moderate ischemia in the distribution of the LAD.  There was also breast attenuation noted.  She was symptomatic during the test with shortness of breath.  Due to her symptoms and the results of the stress test, she was scheduled for a coronary angiogram.  This was carried out on 04/04/2017 via the right femoral artery by Dr. Denise.  There is an 80% left PDA that was stented.  There was a tiny nondominant right coronary artery that was severely diseased and was not intervened upon.  Dr. Denise started bisoprolol.  He also intensified her statin switching her from pravastatin to atorvastatin.      Victoria presents today telling me that she has done pretty well since the angiogram.  She has no chest pain.  She does have some mild shortness of breath, but this is much improved from before the angiogram.  She has more energy.  She has no bleeding concerns on dual antiplatelet therapy.  She is tolerating the atorvastatin without myalgias.  Her main concern is weight gain.  She says over the last few months she has gained at least 10 pounds and it seems like it is coming on so rapidly without any change in diet or activity.  She said she is going to follow up with her primary care provider or her endocrinologist  regarding her thyroid function medication.  She is also losing her hair so much so that she is wearing a wig today.  When she saw Martha, Martha gave her some compression stockings and she has been wearing them ever since and her legs feel much better.      PHYSICAL EXAMINATION:   VITAL SIGNS:  116/66, pulse 60, weight 180 pounds, BMI 29.79.   GENERAL:  A 76-year-old woman in no apparent distress.   LUNGS:  Clear to auscultation bilaterally.   CARDIAC:  Regular S1, S2, very soft systolic murmur.   ABDOMEN:  Soft.  Bowel sounds positive, nontender.   EXTREMITIES:  Warm without pitting edema, though she is wearing bilateral compression stockings.  The right femoral arteriotomy site was assessed.  It was soft without hematoma.  There was mild ecchymosis and no bruit.   NEUROLOGIC:  Alert and oriented x3, no focal deficits.      ASSESSMENT:   1.  Coronary artery disease.   a.  Symptoms of dyspnea on exertion and chest pain with abnormal nuclear stress test.     b.  Coronary angiogram 04/04/2017 - 80% left PDA status post drug-eluting stent.  There is a tiny nondominant right coronary artery with severe disease that was not intervened upon.   c.  She has mild symptoms of shortness of breath, however, much improved.   2.  Hypertension, controlled.   3.  Hyperlipidemia.   a.  Recently switched from pravastatin to atorvastatin due to her known coronary artery disease.   4.  Diastolic dysfunction.   5.  Left subclavian stenosis, status post stenting in 2013.   6.  Venous insufficiency, wearing compression stockings with improvement in symptoms.   7.  Obstructive sleep apnea on CPAP.   8.  Thyroid disease.      PLAN:  Victoria is doing well following her coronary angiogram and stenting procedure.  Her symptoms are much improved.  She is tolerating her medications:   1.  Continue dual antiplatelet therapy, aspirin lifelong and Effient for 1 year minimum following stent placement.   2.  Cardiac rehab.   3.  Continued risk factor  reduction.  I wonder if over these past couple of months she actually was less active due to her symptoms from her obstructive coronary disease and therefore this led to her weight gain.  Nonetheless, she will follow up with her primary care office/endocrinologist regarding her thyroid disease and medication management.  She will follow with Martha Guevara, nurse practitioner, in the clinic at the end of  regarding her venous insufficiency and possible ablation.  I will have her get a repeat lipid panel and ALT at that time given the change in the statin.   4.  Should she continue to have shortness of breath or worsening shortness of breath, she will give our office a call and we can consider adding Imdur for the known severely diseased small nondominant right coronary artery.      Thank you very much for allowing me to participate in the care of Victoria Phillips.         RONEN CONNER PA-C             D: 2017 14:38   T: 2017 12:04   MT: EMA      Name:     VICTORIA PHILLIPS   MRN:      [id number]        Account:      [id number]   :      1940           Service Date: 2017      Document: [id number]

## 2017-04-17 ENCOUNTER — HOSPITAL ENCOUNTER (OUTPATIENT)
Dept: CARDIAC REHAB | Facility: CLINIC | Age: 77
End: 2017-04-17
Attending: INTERNAL MEDICINE
Payer: MEDICARE

## 2017-04-17 DIAGNOSIS — I25.110 CORONARY ARTERY DISEASE INVOLVING NATIVE CORONARY ARTERY OF NATIVE HEART WITH UNSTABLE ANGINA PECTORIS (H): ICD-10-CM

## 2017-04-17 DIAGNOSIS — Z98.61 STATUS POST CORONARY ANGIOPLASTY: ICD-10-CM

## 2017-04-17 DIAGNOSIS — E05.90 SUBCLINICAL HYPERTHYROIDISM: ICD-10-CM

## 2017-04-17 PROCEDURE — 40000116 ZZH STATISTIC OP CR VISIT: Performed by: OCCUPATIONAL THERAPIST

## 2017-04-17 PROCEDURE — 93798 PHYS/QHP OP CAR RHAB W/ECG: CPT | Performed by: OCCUPATIONAL THERAPIST

## 2017-04-17 RX ORDER — ATORVASTATIN CALCIUM 40 MG/1
40 TABLET, FILM COATED ORAL DAILY
Qty: 90 TABLET | Refills: 3 | Status: SHIPPED | OUTPATIENT
Start: 2017-04-17 | End: 2018-03-21

## 2017-04-17 RX ORDER — METHIMAZOLE 10 MG/1
10 TABLET ORAL DAILY
Qty: 30 TABLET | Refills: 1 | Status: SHIPPED | OUTPATIENT
Start: 2017-04-17 | End: 2017-05-11

## 2017-04-17 RX ORDER — BISOPROLOL FUMARATE 5 MG/1
2.5 TABLET, FILM COATED ORAL DAILY
Qty: 45 TABLET | Refills: 3 | Status: SHIPPED | OUTPATIENT
Start: 2017-04-17 | End: 2018-05-25

## 2017-04-17 RX ORDER — NITROGLYCERIN 0.4 MG/1
TABLET SUBLINGUAL
Qty: 25 TABLET | Refills: 3 | Status: SHIPPED | OUTPATIENT
Start: 2017-04-17 | End: 2020-04-29

## 2017-04-17 RX ORDER — PRASUGREL 10 MG/1
10 TABLET, FILM COATED ORAL DAILY
Qty: 90 TABLET | Refills: 1 | Status: SHIPPED | OUTPATIENT
Start: 2017-04-17 | End: 2017-09-28

## 2017-04-17 NOTE — TELEPHONE ENCOUNTER
Due for labs and clinic appointment.  Can see Mercedes Holliday at [facility] ([phone]) if she has earlier openings.    Please send reminder letter.

## 2017-04-17 NOTE — TELEPHONE ENCOUNTER
methimazole     Last Written Prescription Date: 11/8/16  Last Quantity: 30, # refills: 3  Last Office Visit with [facility] or [facility] prescribing provider: 11/8/16        TSH   Date Value Ref Range Status   10/17/2016 <0.01 (L) 0.40 - 4.00 mU/L Final

## 2017-04-18 NOTE — TELEPHONE ENCOUNTER
Spoke with pt.  Informed her she is due for labs and appt.  Advised she can also see Mercedes Holliday at the AV clinic.

## 2017-04-19 ENCOUNTER — HOSPITAL ENCOUNTER (OUTPATIENT)
Dept: CARDIAC REHAB | Facility: CLINIC | Age: 77
End: 2017-04-19
Attending: INTERNAL MEDICINE
Payer: MEDICARE

## 2017-04-19 VITALS — BODY MASS INDEX: 29.59 KG/M2 | WEIGHT: 177.6 LBS | HEIGHT: 65 IN

## 2017-04-19 PROCEDURE — 93798 PHYS/QHP OP CAR RHAB W/ECG: CPT | Performed by: OCCUPATIONAL THERAPIST

## 2017-04-19 PROCEDURE — 40000116 ZZH STATISTIC OP CR VISIT: Performed by: OCCUPATIONAL THERAPIST

## 2017-04-19 ASSESSMENT — 6 MINUTE WALK TEST (6MWT)
FEMALE CALC: 1268.38
PREDICTED: 1375.19
TOTAL DISTANCE WALKED (FT): 1550
MALE CALC: 1366.86
GENDER SELECTION: FEMALE

## 2017-04-19 NOTE — PROGRESS NOTES
Victoria Montalvo 76 year old   Stent PDA 04/19/17 1300   Session   Session 60 Day Individualized Treatment Plan   Certified through this date 06/10/17   Physician cosignature/electronic signature indicates approval of this ITP document. I have established, reviewed and made necessary changes to the individualized treatment plan and exercise prescription for this patient.   Cardiac Rehab Assessment   Cardiac Rehab Assessment PT had been experiencing exertion shortness of breath and chest discomfort. PT failed a nuclear stress test and was found to have an 80% blockage in PDA that was treated with a SHERLEY. PT does have a history of peripheral vascular disease having had a previous angioplasty of a subclavian artery. PT continues to manage venous insufficiency in legs by wearing compression stockings and taking a diuretic. PT's main focus is to establish an exercise routine by walking and attending [employer] to maximize strength and endurance gains for better heart heatlh.  4/19/2017 PT has had some occasional discomfort across her chest that lasts about 30-60 seconds. She has not had any sx for a couple days or when exercising. PT has yet to start an exercise program. 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 PT is planning to go to [place] in June. She states she walks alot, but has been too busy to go for a walk since her event. Due to sx. PT will need close monitoring with exercise levels as well as counseling for improved management of stress levels and establishment of a more regular exercise program.   General Information   Treatment Diagnosis Stent  (to PDA)   Date of Treatment Diagnosis 04/04/17   Secondary Treatment Diagnosis (None)   Significant Past CV History Clinical significant depression or depressive symptoms;PAD  (40 years ago)   Comorbidities None;PAD   Other Medical History PT did have a episode of heart failure in 2011.    Lead up symptoms Chest discomfort and dyspnea  "on exertion.    Hospital Location [facility]   Hospital Discharge Date 04/05/17   Signs and Symptoms Post Hospital Discharge None   Outpatient Cardiac Rehab Start Date 04/07/17   Primary Physician Deisi Liu MD   Primary Physician Follow Up Scheduled   Surgeon NA   Cardiologist Dr. Yogi Yepez   Cardiologist Follow Up Scheduled   Ejection Fraction 50-55%  (per angiogram 4/4/2017. )   Risk Stratification High  (history of depression many years ago. )   Summary of Cath Report   Summary of Cath Report Available   Date Performed 04/04/17   Left Main Trivial plaque under 5%   LAD mild scattered plaque under 20-30% throughout.   LCX Proximal  has a smooth 35-40% narrowing. Right before PDA the LCX has a severe narrowing of 80%.   RCA moderate to severe diffuse disease in its midportion with narrowing up to 80%. Small vessel.   PDA Stent placed to left PDA due to 80% lesion.   Living and Work Status    Living Arrangements and Social Status house   Support System Live with an adult   Return to Employment Retired   Occupation Retired   Preventative Medications   CMS recommended medications Antiplatelets;Lipid Lowering  (no vaccinations. )   Falls Screen   Have you fallen two or more times in the past year? No   Have you fallen and had an injury in the past year? No   Timed up and go score if applicable NA   Referral Initiated to Physical Therapy No   Pain   Patient Currently in Pain Denies   Physical Assessments   Incisions WNL   Edema +3 Moderate   Right Lung Sounds normal   Left Lung Sounds normal   Limitations No limitations   Comments PT has venous insufficiency in legs and wears compression stockings.    Individualized Treatment Plan   Monitored Sessions Scheduled 24   Monitored Sessions Attended 6   Oxygen   Supplemental Oxygen needed No   Nutrition Management - Weight Management   Assessment Re-assessment   Age 76   Weight 80.6 kg (177 lb 9.6 oz)   Height 1.657 m (5' 5.24\")   BMI (Calculated) 29.4 " "  Goal Weight 68 kg (150 lb)   Initial Rate Your Plate Score. Dietary tool to assess eating patterns. Scores range from 24 to 72. The higher the score the healthier the eating pattern. 56   Weight Management Comments PT does report eating healthy and has appropriate portion sizes. PT does have edema. PT reports less exercise due to not having to take care of a yard. 4/19/2017 PT states she has been losing weight. She is not interested in logging her diet as she loses weight by \"not thinking about it.\" She does admit she regained some weight since her sister moved in with her.   Nutrition Management - Lipids   Lipids Labs Available   Date 10/05/16   Total Cholesterol 193   Triglycerides 82   HDL 76      Prescribed Lipid Medication Yes   Statin Intensity High Intensity   Lipid Comments 4/19/2017 Reviewed goals for cholesterol values.   Nutrition Management - Diabetes   Diabetes No   Nutrition Management Summary   Dietary Recommendations Low Fat;Low Sodium   Stages of Change for Diet Compliance Maintenance   Interventions Planned Attend Nutrition Education Class(es);Educate on Weight Management Principles;Educate on Benefits of Exercise   Nutrition Summary Comments PT reports that she has been following a cardiac diet for many years as  has a strong heart history. 4/19/2017 PT does not feel she needs to attend the nutrition classes.    Psychosocial Management   Psychosocial Assessment Re-assessment   Is there history of clinical depression or increased risk of depression? History of clinical depression  (40 years ago. )   Current Level of Stress per Patient Report Moderate    Current Coping Skills Other (see comments)  (play games and puzzles. )   Initial Patient Health Questionnaire -9 Score (PHQ-9) for depression. 5-9 Minimal symptoms, 10-14 Minor depression, 15-19 Major depression, moderately severe, > 20 Major depression, severe  5   Initial [facility] Survey score.  Quality of Life:   If total " score > 25 review individual areas where patient rated a 4 or 5.  Consider patients current medical condition and what role that plays on the score.   Adjust treatment protocol to improve areas of concern.  Consider the following:  PHQ9 score, DASI, and re-assessment within the next 30 days to assist with developing treatments.  15   Stages of Change Pre-Contemplation   Interventions Planned Patient to verbalize understanding of behavioral assessment results;Patient to verbalize understanding of negative impact of stress to personal health;Patient will recognize signs and symptoms of depression;Patient to attend stress management class(es)   Patient Goal No  (Not at this time. )   Psychosocial Comments PT's sister currently lives with her. PT has a very caring a nuturing personality and can find it hard to find time for herself. 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 They expect her to make all appointments, cook and coordinate everything. She likes being busy, but not certain if she is able to relax. Encouraged her to attend the class on relaxation, but she is in pre-contemplation for any changes.   Psychosocial Target Outcome Identify absence or presence of depression using valid screening tool;Maximize coping skills;Develop positive support system   Other Core Components - Hypertension   History of or Diagnosis of Hypertension Yes   Currently taking Anti-Hypertensives No   Hypertension Comments 4/19/2017 BP has been well-controlled.   Other Core Components - Tobacco   History of Tobacco Use Yes   Quit Date or Planned Quit Date 01/01/03   Tobacco Use Status Former (Quit > 6 mo ago)   Tobacco Habit Cigarettes   Tobacco Use per Day (average) (3 a week to 1 ppd. )   Years of Tobacco Use 20   Stages of Change Maintenance   Tobacco Comments PT is confident she will not smoke again.    Other Core Components Summary   Interventions Planned List benefits of weight management;Educate on importance of  maintaining low sodium diet;Instruct and educate to self manage Heart Failure symptoms;Attend education class(es) on Nutrition   Patient Goals No   Activity/Exercise History   Activity/Exercise Assessment Re-assessment   Activity/Exercise Status prior to event? Was Physically Active   Number of Days Currently participating in Moderate Physical Activity? 7   Number of Days Currently performing  Aerobic Exercise (including rehab)? 2-3   Number of Minutes per Session Currently of Aerobic Exercise (average)? 30  (only in rehab)   Current Stage of Change (Physical Activity) Maintenance   Current Stage of Change (Aerobic Exercise) Preparation   Patient Goals Goal #1   Goal #1 Description PT wants to walk 1-2 additional days per week for at least 30 minutes outside or at a store to maximize strength and endurance.    Goal #1 Target Date 05/31/17   Goal #1 Progress Towards Goal PT currently is very physically active. PT plans to attend 2-3 sessions of cardiac rehab to gain a better understanding of her exercise tolerance and perform similar workloads outside of rehab. 4/19/2017 PT feels she is too busy to exercise. Encouraged her to try for a 15 minute walk at least 2 days per week and then gradually increase by 5 minute increments.   Activity/Exercise Comments PT used to have an exercise routine about 10 years ago, but then PT got rid of equipment. 4/19/2017 PT is thinking about joining [employer].   Activity/Exercise Target Outcome An Accumulation of 150  Minutes of Aerobic Activity per Week   Exercise Assessment   6 Minute Walk Predicted - Gender Selection Female   6 Minute Walk Predicted (Male) 1366.86   6 Minute Walk Predicted (Female) 1268.38   Initial 6 Minute Walk Distance (Feet) 1550 ft   Resting HR 75 bpm   Exercise  bpm   Resting /62   Exercise /70   Effort Rating 5   Current MET Level 3.5   MET Level Goal 4.5-5   ECG Rhythm Sinus rhythm   Ectopy PVCs   Current Symptoms Denies symptoms    Limitations/Restrictions Other (see comments)  (venous insufficiency in legs. Wears compression stockings. )   Exercise Prescription   Mode Treadmill;Weights;Nustep   Duration/Time 30-45 min   Frequency Other (see comments)  (1-3 days per week. Depending on other appts. )   THR (85% of age predicted max HR) 121.55   OMNI Effort Rating (0-10 Scale) 4-6/10   Progression Continuous bouts;Progress peak intensity by 1/4 MET per week;Total exercise time of 30-45 minutes;Aerobic exercise to OMNI rating of 6 or below and at or below THR   Recommended Home Exercise   Type of Exercise Walking   Frequency (days per week) 1-2   Duration (minutes per session) 15-30 min   Effort Rating Recommended 4-6/10   30 Day Exercise Plan Despite venous insufficiency in legs and edema, PT reports that most of the time she does not feel limited with ambulation. PT enjoys walking. PT plans to add 1-2 additional days of structured walking for 30 minutes after attending rehab for one week. Strength training will be initiated after 3rd session.    Current Home Exercise   Type of Exercise None   Frequency (days per week) 0   Duration (minutes per session) 0   Follow-up/On-going Support   Provider follow-up needed on the following No follow-up needed   Learning Assessment   Learner Patient   Primary Language English   Preferred Learning Style Listening;Reading;Demonstration;Pictures/Video   Barriers to Learning No barriers noted   Patient Education   Education recommended Anatomy and Physiology of the Heart;Blood Pressure;Exercise Principles;Heart Failure;Medication Overview;Muscle Conditioning;Nutrition;Risk Factors;Stress Management;Weight Loss   Education Comments PT has many other appointments she attends for family members. PT is unsure of how many recommended education classes she will be able to attend. 4/19/2017 Uncertain about attending education classes as she has in the past been to many with her .

## 2017-04-20 ENCOUNTER — HOSPITAL ENCOUNTER (OUTPATIENT)
Dept: CARDIAC REHAB | Facility: CLINIC | Age: 77
End: 2017-04-20
Attending: INTERNAL MEDICINE
Payer: MEDICARE

## 2017-04-20 PROCEDURE — 40000116 ZZH STATISTIC OP CR VISIT: Performed by: OCCUPATIONAL THERAPIST

## 2017-04-20 PROCEDURE — 93798 PHYS/QHP OP CAR RHAB W/ECG: CPT | Performed by: OCCUPATIONAL THERAPIST

## 2017-04-22 ENCOUNTER — HOSPITAL ENCOUNTER (EMERGENCY)
Facility: CLINIC | Age: 77
Discharge: HOME | End: 2017-04-22
Attending: EMERGENCY MEDICINE | Admitting: EMERGENCY MEDICINE
Payer: MEDICARE

## 2017-04-22 VITALS
OXYGEN SATURATION: 98 % | HEART RATE: 71 BPM | DIASTOLIC BLOOD PRESSURE: 86 MMHG | TEMPERATURE: 98 F | RESPIRATION RATE: 16 BRPM | BODY MASS INDEX: 32.39 KG/M2 | SYSTOLIC BLOOD PRESSURE: 139 MMHG | WEIGHT: 176 LBS | HEIGHT: 62 IN

## 2017-04-22 DIAGNOSIS — R04.0 EPISTAXIS: ICD-10-CM

## 2017-04-22 PROCEDURE — 99283 EMERGENCY DEPT VISIT LOW MDM: CPT

## 2017-04-22 RX ORDER — OXYMETAZOLINE HYDROCHLORIDE 0.05 G/100ML
SPRAY NASAL
Status: DISCONTINUED
Start: 2017-04-22 | End: 2017-04-22 | Stop reason: HOSPADM

## 2017-04-22 RX ORDER — LIDOCAINE HYDROCHLORIDE AND EPINEPHRINE 10; 10 MG/ML; UG/ML
INJECTION, SOLUTION INFILTRATION; PERINEURAL
Status: DISCONTINUED
Start: 2017-04-22 | End: 2017-04-22 | Stop reason: HOSPADM

## 2017-04-22 NOTE — ED AVS SNAPSHOT
" [facility] Emergency Department    [address] [place] [address]    Phone:  [phone]    Fax:  [phone]                                       Victoria Montalvo   MRN: [id number]    Department:  [facility] Emergency Department   Date of Visit:  4/22/2017           Patient Information     Date Of Birth          1940        Your diagnoses for this visit were:     Epistaxis        You were seen by Sonya Brar MD.      Follow-up Information     Follow up with Deisi Liu MD In 3 days.    Specialties:  Internal Medicine, Pediatrics    Contact information:    [facility] [address] [place] [address]  [phone]          Discharge Instructions       Please follow up closely with your regular physician. Please return to the ED if your symptoms worsen or if you develop new or concerning symptoms.       Discharge Instructions  Epistaxis    Today you were seen for a nosebleed.   Nosebleeds (the medical term is \"epistaxis\") are very common. Almost every person has had at least one in their lifetime.  Although the amount of blood loss can appear dramatic, nosebleeds rarely cause serious problems.  The most common causes are dry air or nose picking, but they also are common in people who have allergies, high blood pressure or are on blood thinners, such as Coumadin, Aspirin or Plavix. If you or your child gets a nosebleed, the important thing is to know how to take care of it. With the right self-care, most nosebleeds will stop on their own.    Return to the Emergency Department if:    Your nose is bleeding a very large amount of blood.    You get very pale, faint, or tired.    You cannot get the bleeding to stop after following these instructions.    A nosebleed happens after recent nasal surgery or if you have a known nasal tumor.    You have new, serious symptoms, such as chest pain.    You get a nosebleed after an injury, such " as being hit in the face, and you are concerned that you could have other injuries (like a broken bone).    Treatment:    Your doctor may tell you to use a decongestant nose spray, like Afrin  (oxymetazoline), in both nostrils in the morning and at night for the next three days. Do not use this medicine for more than three days at a time.  If you do it will cause nasal congestion.     You should apply a small amount of Vaseline  to the inside of your nostrils for moisture before bed.  Using a humidifier in your bedroom will help as well.    For the next three days, do not blow your nose or put anything in your nose. You may sniffle, or dab the outside of your nose.    Do not bend with your head below your waist for the next three days. Do not lift anything so heavy that you have to strain.     If you received nasal packing, please do not remove the packing until seen by an Ear Nose and Throat specialist.  If antibiotics have been given with the packing please take as directed.    If your nose starts to bleed again:    Blow your nose to get rid of some of the clots that have formed inside your nostrils. This may increase the bleeding temporarily, but that's OK.    Spray decongestant nose spray (like Afrin ) into both nostrils to constrict the blood vessels.    Sit or stand while bending forward slightly at the waist. Do not lie down or tilt your head back. This may cause you to swallow blood and can lead to vomiting.     the soft part of BOTH nostrils at the bottom of your nose and squeeze your nose closed for at least 5 minutes (for children) or 10 to 15 minutes (for adults). You can use your fingers, or the clip we gave you here. Use a clock to time yourself. Do not release the pressure every so often to check whether the bleeding has stopped. Many people hurt their chances of stopping the bleeding by releasing the pressure too soon or too often.    If you follow the steps outlined above, and your nose  continues to bleed, repeat all the steps once more. Apply pressure for a total of at least 30 minutes. If you continue to bleed even then, return to the Emergency Department or go to an urgent care clinic.    If you keep having nose bleeds, schedule an appointment with your regular doctor, or with an Ear, Nose, and Throat Specialist.  If you were given a prescription for medicine here today, be sure to read all of the information (including the package insert) that comes with your prescription.  This will include important information about the medicine, its side effects, and any warnings that you need to know about.  The pharmacist who fills the prescription can provide more information and answer questions you may have about the medicine.  If you have questions or concerns that the pharmacist cannot address, please call or return to the Emergency Department.   Opioid Medication Information    Pain medications are among the most commonly prescribed medicines, so we are including this information for all our patients. If you did not receive pain medication or get a prescription for pain medicine, you can ignore it.     You may have been given a prescription for an opioid (narcotic) pain medicine and/or have received a pain medicine while here in the Emergency Department. These medicines can make you drowsy or impaired. You must not drive, operate dangerous equipment, or engage in any other dangerous activities while taking these medications. If you drive while taking these medications, you could be arrested for DUI, or driving under the influence. Do not drink any alcohol while you are taking these medications.     Opioid pain medications can cause addiction. If you have a history of chemical dependency of any type, you are at a higher risk of becoming addicted to pain medications.  Only take these prescribed medications to treat your pain when all other options have been tried. Take it for as short a time and as  few doses as possible. Store your pain pills in a secure place, as they are frequently stolen and provide a dangerous opportunity for children or visitors in your house to start abusing these powerful medications. We will not replace any lost or stolen medicine.  As soon as your pain is better, you should flush all your remaining medication.     Many prescription pain medications contain Tylenol  (acetaminophen), including Vicodin , Tylenol #3 , Norco , Lortab , and Percocet .  You should not take any extra pills of Tylenol  if you are using these prescription medications or you can get very sick.  Do not ever take more than 3000 mg of acetaminophen in any 24 hour period.    All opioids tend to cause constipation. Drink plenty of water and eat foods that have a lot of fiber, such as fruits, vegetables, prune juice, apple juice and high fiber cereal.  Take a laxative if you don t move your bowels at least every other day. Miralax , Milk of Magnesia, Colace , or Senna  can be used to keep you regular.      Remember that you can always come back to the Emergency Department if you are not able to see your regular doctor in the amount of time listed above, if you get any new symptoms, or if there is anything that worries you.        Future Appointments        Provider Department Dept Phone Center    4/24/2017 10:00 AM [facility] [phone] [place]    4/26/2017 10:00 AM [facility] [place] [phone] [place]    4/28/2017 10:00 AM [facility] [place] [phone] [place]    5/1/2017 1:00 PM [facility] [place] [phone] [place]    5/2/2017 9:30 AM [facility] [phone] [place]    5/3/2017 10:00 AM [facility] [place] [phone] [place]    5/5/2017 10:00 AM [facility]  [place] [phone] [place]    5/8/2017 10:00 AM [facility] Rehab [facility] [place] [phone] [place]    5/10/2017 10:00 AM [facility] [place] [phone] [place]    5/11/2017 11:30 AM Annie Alvarez MD [facility] [phone] [place]    5/12/2017 10:00 AM [facility] [phone] [place]    5/15/2017 10:00 AM [facility] [phone] [place]    5/17/2017 10:00 AM [facility] [place] [phone] [place]    5/19/2017 10:00 AM [facility] [place] [phone] [place]    5/22/2017 10:00 AM [facility] [place] [phone] [place]    5/24/2017 10:00 AM [facility] [place] [phone] [place]    5/26/2017 10:00 AM [facility] [place] [phone] [place]    6/30/2017 9:15 AM [facility] [place] [address] [phone] [facility]    6/30/2017 10:10 AM NIMESH Espinoza CNP [facility] AT [place] [address] [phone] [facility]      24 Hour Appointment Hotline       To make an appointment at any [facility] clinic, call [phone] ([phone]). If you don't have a family doctor or clinic, we will help you find one. [facility] clinics are conveniently located to serve the needs of you and your family.             Review of your medicines      Our records show that you are taking the medicines listed below. If these are incorrect, please call your family doctor or clinic.        Dose / Directions Last dose taken    * albuterol (2.5 MG/3ML) 0.083% neb solution   Dose:  1 vial   Quantity:  1 Box        Take 1 vial (2.5 mg) by nebulization every 6 hours as needed for shortness of breath / dyspnea    Refills:  3        * albuterol 108 (90 BASE) MCG/ACT Inhaler   Commonly known as:  PROAIR HFA/PROVENTIL HFA/VENTOLIN HFA   Dose:  2 puff   Quantity:  3 Inhaler        Inhale 2 puffs into the lungs every 6 hours as needed for shortness of breath / dyspnea or wheezing   Refills:  0        aspirin 81 MG tablet   Dose:  81 mg        Take 81 mg by mouth daily.   Refills:  0        atorvastatin 40 MG tablet   Commonly known as:  LIPITOR   Dose:  40 mg   Quantity:  90 tablet        Take 1 tablet (40 mg) by mouth daily   Refills:  3        biotin 2.5 mg/mL Susp        Take by mouth daily 2000mcg   Refills:  0        bisoprolol 5 MG tablet   Commonly known as:  ZEBETA   Dose:  2.5 mg   Quantity:  45 tablet        Take 0.5 tablets (2.5 mg) by mouth daily   Refills:  3        Calcium 2370-2741 MG-UNIT Chew   Dose:  1 tablet        Take 1 tablet by mouth daily.   Refills:  0        FISH OIL DOUBLE STRENGTH 1200 MG capsule   Dose:  1 capsule   Generic drug:  omega-3 fatty acids        Take 1 capsule by mouth 2 times daily   Refills:  0        fluticasone 50 MCG/ACT spray   Commonly known as:  FLONASE   Dose:  1-2 spray   Quantity:  48 g        Spray 1-2 sprays into both nostrils daily   Refills:  3        fluticasone-salmeterol 250-50 MCG/DOSE diskus inhaler   Commonly known as:  ADVAIR   Dose:  1 puff        Inhale 1 puff into the lungs every 12 hours   Refills:  0        furosemide 20 MG tablet   Commonly known as:  LASIX   Dose:  20 mg   Quantity:  30 tablet        Take 1 tablet (20 mg) by mouth daily   Refills:  0        MAGNESIUM ACETATE   Dose:  250 mg        Take 250 mg by mouth daily   Refills:  0        methimazole 10 MG tablet   Commonly known as:  TAPAZOLE   Dose:  10 mg   Quantity:  30 tablet        Take 1 tablet (10 mg) by mouth daily   Refills:  1        montelukast 10 MG tablet   Commonly known as:  SINGULAIR   Dose:  1 tablet   Quantity:  90 tablet        Take 1 tablet (10 mg) by mouth At Bedtime   Refills:  3  "       nitroglycerin 0.4 MG sublingual tablet   Commonly known as:  NITROSTAT   Quantity:  25 tablet        One tablet under the tongue every 5 minutes if needed for chest pain. May repeat every 5 minutes for a maximum of 3 doses in 15 minutes\"   Refills:  3        omeprazole 20 MG CR capsule   Commonly known as:  priLOSEC   Dose:  20 mg   Quantity:  90 capsule        Take 1 capsule (20 mg) by mouth daily   Refills:  3        * order for DME   Quantity:  1 Device        Equipment being ordered: CPAP Auto-CPAP 5-25 cm H2O  Full face mask with heated humidified air   Refills:  0        * order for DME   Quantity:  1 Device        Equipment being ordered: APAP 5-20cm/H2O full face mask with heated humidified air.   Refills:  0        OSTEO BI-FLEX/5-LOXIN ADVANCED Tabs   Dose:  1500 mg        Take 1,500 mg by mouth 2 times daily   Refills:  0        prasugrel 10 MG Tabs tablet   Commonly known as:  EFFIENT   Dose:  10 mg   Quantity:  90 tablet        Take 1 tablet (10 mg) by mouth daily Do not crush or break tablet.   Refills:  1        RESTASIS 0.05 % ophthalmic emulsion   Dose:  1 drop   Generic drug:  cycloSPORINE        Place 1 drop into both eyes 2 times daily   Refills:  0        spironolactone 25 MG tablet   Commonly known as:  ALDACTONE   Dose:  12.5 mg   Quantity:  45 tablet        Take 0.5 tablets (12.5 mg) by mouth daily   Refills:  12        VITAMIN D3 PO   Dose:  2000 Units        Take 2,000 Units by mouth daily   Refills:  0        * Notice:  This list has 4 medication(s) that are the same as other medications prescribed for you. Read the directions carefully, and ask your doctor or other care provider to review them with you.            Orders Needing Specimen Collection     Ordered          04/22/17 0316  Hemoglobin - STAT, Prio: STAT, Needs to be Collected     Scheduled Task Status   04/22/17 0316 Collect Hemoglobin Open   Order Class:  PCU Collect                04/22/17 0316  INR - STAT, Prio: STAT, " Needs to be Collected     Scheduled Task Status   04/22/17 0317 Collect INR Open   Order Class:  PCU Collect                  Pending Results     No orders found from 4/20/2017 to 4/23/2017.            Pending Culture Results     No orders found from 4/20/2017 to 4/23/2017.            Test Results From Your Hospital Stay               Clinical Quality Measure: Blood Pressure Screening     Your blood pressure was checked while you were in the emergency department today. The last reading we obtained was  BP: 149/89 . Please read the guidelines below about what these numbers mean and what you should do about them.  If your systolic blood pressure (the top number) is less than 120 and your diastolic blood pressure (the bottom number) is less than 80, then your blood pressure is normal. There is nothing more that you need to do about it.  If your systolic blood pressure (the top number) is 120-139 or your diastolic blood pressure (the bottom number) is 80-89, your blood pressure may be higher than it should be. You should have your blood pressure rechecked within a year by a primary care provider.  If your systolic blood pressure (the top number) is 140 or greater or your diastolic blood pressure (the bottom number) is 90 or greater, you may have high blood pressure. High blood pressure is treatable, but if left untreated over time it can put you at risk for heart attack, stroke, or kidney failure. You should have your blood pressure rechecked by a primary care provider within the next 4 weeks.  If your provider in the emergency department today gave you specific instructions to follow-up with your doctor or provider even sooner than that, you should follow that instruction and not wait for up to 4 weeks for your follow-up visit.        Thank you for choosing [facility]       Thank you for choosing [facility] for your care. Our goal is always to provide you with excellent care. Hearing back from our patients is one way we  can continue to improve our services. Please take a few minutes to complete the written survey that you may receive in the mail after you visit with us. Thank you!        [facility] Information     [facility] gives you secure access to your electronic health record. If you see a primary care provider, you can also send messages to your care team and make appointments. If you have questions, please call your primary care clinic.  If you do not have a primary care provider, please call [phone] and they will assist you.        Care EveryWhere ID     This is your Care EveryWhere ID. This could be used by other organizations to access your [facility] medical records  [id number]        After Visit Summary       This is your record. Keep this with you and show to your community pharmacist(s) and doctor(s) at your next visit.

## 2017-04-22 NOTE — ED NOTES
Pt stated that she feels the bleeding has stopped, denies any bloody post nasal drip. Pt stated that she wants to go home and refuse blood draw. Pt stated that she has a cardiology appointment on Monday and can call for an appointment to see her PMD. Will notify MD of pt's refusal for blood draw and wanting to go home.

## 2017-04-22 NOTE — ED PROVIDER NOTES
"  History     Chief Complaint:  Epistaxis      HPI   Victoria Montalvo is a 76 year old female who presents with epistaxis. The patient states that she developed bleeding out of her left nare at 2315. She states that the nose bleed started while she was laying in bed watching her Ipad. The patient states that she put ice on her nose, however, the bleeding would not stop. She notes that earlier she did feel blood running down the back of her throat. She notes that since she has been in the ED, bleeding has stopped. She denies trauma/injury to the nose or other concerns or complaints at this time.      Allergies:  No known drug allergies.      Medications:    Tapazole  Zebeta  Lipitor  Biotin   Aldactone  Lasix  Prilosec  Singulair  Albuterol nebulizer   Cholecalciferol  Aspirin   Omega-3 fatty acids  Boswellia-Glucosamine  Magnesium acetate  Nitroglycerin   Prasurgel  Advair     Past Medical History:    Arthritis  Chronic rhinitis  CAD  GERD  Hyperlipidemia  Asthma  Subclinical hyperthyroidism    Past Surgical History:    Right arthroplasty hip  Liposuction of legs and stomach  Ankle pins removed  Vascular surgery   Reduction of breast    Family History:    Alzheimer Disease-Mother  GI-Mother  Cancer-Father  Heart Disease-Father, Brother, Sister  Allergies-Daughter, Son  Depression-Brother    Social History:  Marital Status:   Presents to the ED alone  Tobacco Use: Former Smoker  Alcohol Use: Yes  PCP: Deisi Way [facility]      Review of Systems   HENT: Positive for nosebleeds.    All other systems reviewed and are negative.      Physical Exam   First Vitals:  BP: 149/89  Pulse: 67  Temp: 98  F (36.7  C)  Resp: 17  Height: 157.5 cm (5' 2\")  Weight: 79.8 kg (176 lb)  SpO2: 97 %    Physical Exam  Constitutional: The patient is oriented to person, place, and time. Alert and cooperative.   HENT:   Right Ear: External ear normal.   Left Ear: External ear normal.   Nose: Dried blood present in the left nostril. No " active bleeding.    Eyes: Conjunctivae, EOM and lids are normal. Pupils are equal, round, and reactive to light.   Cardiovascular: RRR, no murmurs, rubs, or gallops  Oropharynx: posterior oropharynx is unremarkable.   Pulmonary/Chest: Effort normal.   Musculoskeletal: Normal range of motion.  Neurological: Normal strength. No cranial nerve deficit or sensory deficit.   Skin: No rash noted.   Psychiatric:The patient has a normal mood and affect.       Emergency Department Course   Emergency Department Course:  Nursing notes and vitals reviewed.  I performed an exam of the patient as documented above.   Findings and plan explained to the patient. Patient discharged home with instructions regarding supportive care, medications, and reasons to return. The importance of close follow-up was reviewed.     Impression & Plan      Medical Decision Making:  Victoria Montalvo is a 76 year old female with a history of hyperlipidemia and coronary artery disease who presents to the emergency department for evaluation of epistaxis. On exam, she is well appearing. She is alert and moves all extremities equally. Cardiopulmonary exam is unremarkable. On nasal examination, the patient does have dried blood present in the left nostril. There is no active bleeding. The rest of her exam is as mentioned above. I did have the patient thoroughly blow her nose with removal of blood clots. There are no signs of active bleeding after this. Afrin was applied into the nostrils bilaterally. The patient was observed and continued to appear well without recurrent epistaxis. I did discuss with the patient that I would recommend lab evaluation of hemoglobin and coagulation studies, but the patient declined this at this time. She notes that she feels well and now that the epistaxis has resolved, she desires to be discharged to home at this time. Overall, I do feel that this is reasonable. I did discuss with the patient that I recommend close follow up with  her primary care physician, and she notes understanding and agreement with this plan.  Return instructions were given. She was stable/improved at the time of discharge.     Diagnosis:    ICD-10-CM    1. Epistaxis R04.0        Disposition:  discharged to home        Emily YOUNG am serving as a scribe on 4/22/2017 at 3:30 AM to personally document services performed by Dr. Brar based on my observations and the provider's statements to me.    4/22/2017   [facility] EMERGENCY DEPARTMENT       Sonya Brar MD  04/23/17 0019

## 2017-04-22 NOTE — ED NOTES
About 11 pm  Left nare bleeding   Has hx of nose bleeds but now on blood thinner   Nasal clip applied  But still feels it running to back of throat   Here for eval

## 2017-04-22 NOTE — ED AVS SNAPSHOT
[facility] Emergency Department    [address]    [facility] [address]    Phone:  [phone]    Fax:  [phone]                                       June V Selvin   MRN: [id number]    Department:  [facility] Emergency Department   Date of Visit:  4/22/2017           After Visit Summary Signature Page     I have received my discharge instructions, and my questions have been answered. I have discussed any challenges I see with this plan with the nurse or doctor.    ..........................................................................................................................................  Patient/Patient Representative Signature      ..........................................................................................................................................  Patient Representative Print Name and Relationship to Patient    ..................................................               ................................................  Date                                            Time    ..........................................................................................................................................  Reviewed by Signature/Title    ...................................................              ..............................................  Date                                                            Time

## 2017-04-22 NOTE — DISCHARGE INSTRUCTIONS
"Please follow up closely with your regular physician. Please return to the ED if your symptoms worsen or if you develop new or concerning symptoms.       Discharge Instructions  Epistaxis    Today you were seen for a nosebleed.   Nosebleeds (the medical term is \"epistaxis\") are very common. Almost every person has had at least one in their lifetime.  Although the amount of blood loss can appear dramatic, nosebleeds rarely cause serious problems.  The most common causes are dry air or nose picking, but they also are common in people who have allergies, high blood pressure or are on blood thinners, such as Coumadin, Aspirin or Plavix. If you or your child gets a nosebleed, the important thing is to know how to take care of it. With the right self-care, most nosebleeds will stop on their own.    Return to the Emergency Department if:    Your nose is bleeding a very large amount of blood.    You get very pale, faint, or tired.    You cannot get the bleeding to stop after following these instructions.    A nosebleed happens after recent nasal surgery or if you have a known nasal tumor.    You have new, serious symptoms, such as chest pain.    You get a nosebleed after an injury, such as being hit in the face, and you are concerned that you could have other injuries (like a broken bone).    Treatment:    Your doctor may tell you to use a decongestant nose spray, like Afrin  (oxymetazoline), in both nostrils in the morning and at night for the next three days. Do not use this medicine for more than three days at a time.  If you do it will cause nasal congestion.     You should apply a small amount of Vaseline  to the inside of your nostrils for moisture before bed.  Using a humidifier in your bedroom will help as well.    For the next three days, do not blow your nose or put anything in your nose. You may sniffle, or dab the outside of your nose.    Do not bend with your head below your waist for the next three days. Do " not lift anything so heavy that you have to strain.     If you received nasal packing, please do not remove the packing until seen by an Ear Nose and Throat specialist.  If antibiotics have been given with the packing please take as directed.    If your nose starts to bleed again:    Blow your nose to get rid of some of the clots that have formed inside your nostrils. This may increase the bleeding temporarily, but that's OK.    Spray decongestant nose spray (like Afrin ) into both nostrils to constrict the blood vessels.    Sit or stand while bending forward slightly at the waist. Do not lie down or tilt your head back. This may cause you to swallow blood and can lead to vomiting.     the soft part of BOTH nostrils at the bottom of your nose and squeeze your nose closed for at least 5 minutes (for children) or 10 to 15 minutes (for adults). You can use your fingers, or the clip we gave you here. Use a clock to time yourself. Do not release the pressure every so often to check whether the bleeding has stopped. Many people hurt their chances of stopping the bleeding by releasing the pressure too soon or too often.    If you follow the steps outlined above, and your nose continues to bleed, repeat all the steps once more. Apply pressure for a total of at least 30 minutes. If you continue to bleed even then, return to the Emergency Department or go to an urgent care clinic.    If you keep having nose bleeds, schedule an appointment with your regular doctor, or with an Ear, Nose, and Throat Specialist.  If you were given a prescription for medicine here today, be sure to read all of the information (including the package insert) that comes with your prescription.  This will include important information about the medicine, its side effects, and any warnings that you need to know about.  The pharmacist who fills the prescription can provide more information and answer questions you may have about the medicine.  If  you have questions or concerns that the pharmacist cannot address, please call or return to the Emergency Department.   Opioid Medication Information    Pain medications are among the most commonly prescribed medicines, so we are including this information for all our patients. If you did not receive pain medication or get a prescription for pain medicine, you can ignore it.     You may have been given a prescription for an opioid (narcotic) pain medicine and/or have received a pain medicine while here in the Emergency Department. These medicines can make you drowsy or impaired. You must not drive, operate dangerous equipment, or engage in any other dangerous activities while taking these medications. If you drive while taking these medications, you could be arrested for DUI, or driving under the influence. Do not drink any alcohol while you are taking these medications.     Opioid pain medications can cause addiction. If you have a history of chemical dependency of any type, you are at a higher risk of becoming addicted to pain medications.  Only take these prescribed medications to treat your pain when all other options have been tried. Take it for as short a time and as few doses as possible. Store your pain pills in a secure place, as they are frequently stolen and provide a dangerous opportunity for children or visitors in your house to start abusing these powerful medications. We will not replace any lost or stolen medicine.  As soon as your pain is better, you should flush all your remaining medication.     Many prescription pain medications contain Tylenol  (acetaminophen), including Vicodin , Tylenol #3 , Norco , Lortab , and Percocet .  You should not take any extra pills of Tylenol  if you are using these prescription medications or you can get very sick.  Do not ever take more than 3000 mg of acetaminophen in any 24 hour period.    All opioids tend to cause constipation. Drink plenty of water and eat  foods that have a lot of fiber, such as fruits, vegetables, prune juice, apple juice and high fiber cereal.  Take a laxative if you don t move your bowels at least every other day. Miralax , Milk of Magnesia, Colace , or Senna  can be used to keep you regular.      Remember that you can always come back to the Emergency Department if you are not able to see your regular doctor in the amount of time listed above, if you get any new symptoms, or if there is anything that worries you.

## 2017-04-24 ENCOUNTER — HOSPITAL ENCOUNTER (OUTPATIENT)
Dept: CARDIAC REHAB | Facility: CLINIC | Age: 77
End: 2017-04-24
Attending: INTERNAL MEDICINE
Payer: MEDICARE

## 2017-04-24 PROCEDURE — 93798 PHYS/QHP OP CAR RHAB W/ECG: CPT

## 2017-04-24 PROCEDURE — 40000116 ZZH STATISTIC OP CR VISIT

## 2017-04-26 ENCOUNTER — HOSPITAL ENCOUNTER (OUTPATIENT)
Dept: CARDIAC REHAB | Facility: CLINIC | Age: 77
End: 2017-04-26
Attending: INTERNAL MEDICINE
Payer: MEDICARE

## 2017-04-26 PROCEDURE — 93798 PHYS/QHP OP CAR RHAB W/ECG: CPT | Performed by: REHABILITATION PRACTITIONER

## 2017-04-26 PROCEDURE — 40000116 ZZH STATISTIC OP CR VISIT: Performed by: REHABILITATION PRACTITIONER

## 2017-04-27 ENCOUNTER — TELEPHONE (OUTPATIENT)
Dept: PEDIATRICS | Facility: CLINIC | Age: 77
End: 2017-04-27

## 2017-04-27 DIAGNOSIS — Z29.89 NEED FOR SUBACUTE BACTERIAL ENDOCARDITIS PROPHYLAXIS: ICD-10-CM

## 2017-04-27 RX ORDER — AMOXICILLIN 500 MG/1
2000 CAPSULE ORAL ONCE
Qty: 4 CAPSULE | Refills: 0 | Status: SHIPPED | OUTPATIENT
Start: 2017-04-27 | End: 2017-04-27

## 2017-04-27 NOTE — TELEPHONE ENCOUNTER
rx sent for amoxicillin. Please let patient know.    Thanks,  Deisi Liu MD  Internal Medicine/Pediatrics

## 2017-04-27 NOTE — TELEPHONE ENCOUNTER
Reason for Call:  Other prescription    Detailed comments: Patient is having a dental procedure done on Monday 5/1. She previously had a prescription from a provider in [place].    Phone Number Patient can be reached at: Home number on file [phone] (home)    Best Time: anytime    Can we leave a detailed message on this number? YES    Sheyla Dodson,   [facility]

## 2017-04-28 ENCOUNTER — HOSPITAL ENCOUNTER (OUTPATIENT)
Dept: CARDIAC REHAB | Facility: CLINIC | Age: 77
End: 2017-04-28
Attending: INTERNAL MEDICINE
Payer: MEDICARE

## 2017-04-28 PROCEDURE — 93798 PHYS/QHP OP CAR RHAB W/ECG: CPT

## 2017-04-28 PROCEDURE — 40000116 ZZH STATISTIC OP CR VISIT

## 2017-05-02 DIAGNOSIS — E05.90 SUBCLINICAL HYPERTHYROIDISM: ICD-10-CM

## 2017-05-02 LAB
ERYTHROCYTE [DISTWIDTH] IN BLOOD BY AUTOMATED COUNT: 14.5 % (ref 10–15)
HCT VFR BLD AUTO: 40.1 % (ref 35–47)
HGB BLD-MCNC: 13.3 G/DL (ref 11.7–15.7)
MCH RBC QN AUTO: 29.6 PG (ref 26.5–33)
MCHC RBC AUTO-ENTMCNC: 33.2 G/DL (ref 31.5–36.5)
MCV RBC AUTO: 89 FL (ref 78–100)
PLATELET # BLD AUTO: 171 10E9/L (ref 150–450)
RBC # BLD AUTO: 4.5 10E12/L (ref 3.8–5.2)
T3FREE SERPL-MCNC: 2.8 PG/ML (ref 2.3–4.2)
WBC # BLD AUTO: 5.8 10E9/L (ref 4–11)

## 2017-05-02 PROCEDURE — 36415 COLL VENOUS BLD VENIPUNCTURE: CPT | Performed by: INTERNAL MEDICINE

## 2017-05-02 PROCEDURE — 84439 ASSAY OF FREE THYROXINE: CPT | Performed by: INTERNAL MEDICINE

## 2017-05-02 PROCEDURE — 85027 COMPLETE CBC AUTOMATED: CPT | Performed by: INTERNAL MEDICINE

## 2017-05-02 PROCEDURE — 84443 ASSAY THYROID STIM HORMONE: CPT | Performed by: INTERNAL MEDICINE

## 2017-05-02 PROCEDURE — 80076 HEPATIC FUNCTION PANEL: CPT | Performed by: INTERNAL MEDICINE

## 2017-05-02 PROCEDURE — 84481 FREE ASSAY (FT-3): CPT | Performed by: INTERNAL MEDICINE

## 2017-05-03 ENCOUNTER — HOSPITAL ENCOUNTER (OUTPATIENT)
Dept: CARDIAC REHAB | Facility: CLINIC | Age: 77
End: 2017-05-03
Attending: INTERNAL MEDICINE
Payer: MEDICARE

## 2017-05-03 LAB
ALBUMIN SERPL-MCNC: 3.8 G/DL (ref 3.4–5)
ALP SERPL-CCNC: 81 U/L (ref 40–150)
ALT SERPL W P-5'-P-CCNC: 19 U/L (ref 0–50)
AST SERPL W P-5'-P-CCNC: 12 U/L (ref 0–45)
BILIRUB DIRECT SERPL-MCNC: 0.2 MG/DL (ref 0–0.2)
BILIRUB SERPL-MCNC: 0.5 MG/DL (ref 0.2–1.3)
PROT SERPL-MCNC: 7.5 G/DL (ref 6.8–8.8)
T4 FREE SERPL-MCNC: 0.89 NG/DL (ref 0.76–1.46)
TSH SERPL DL<=0.05 MIU/L-ACNC: 2.28 MU/L (ref 0.4–4)

## 2017-05-03 PROCEDURE — 93798 PHYS/QHP OP CAR RHAB W/ECG: CPT

## 2017-05-03 PROCEDURE — 40000116 ZZH STATISTIC OP CR VISIT

## 2017-05-05 ENCOUNTER — HOSPITAL ENCOUNTER (OUTPATIENT)
Dept: CARDIAC REHAB | Facility: CLINIC | Age: 77
End: 2017-05-05
Attending: INTERNAL MEDICINE
Payer: MEDICARE

## 2017-05-05 PROCEDURE — 40000116 ZZH STATISTIC OP CR VISIT: Performed by: REHABILITATION PRACTITIONER

## 2017-05-05 PROCEDURE — 93798 PHYS/QHP OP CAR RHAB W/ECG: CPT | Performed by: REHABILITATION PRACTITIONER

## 2017-05-08 ENCOUNTER — HOSPITAL ENCOUNTER (OUTPATIENT)
Dept: CARDIAC REHAB | Facility: CLINIC | Age: 77
End: 2017-05-08
Attending: INTERNAL MEDICINE
Payer: MEDICARE

## 2017-05-08 PROCEDURE — 93798 PHYS/QHP OP CAR RHAB W/ECG: CPT | Performed by: REHABILITATION PRACTITIONER

## 2017-05-08 PROCEDURE — 40000116 ZZH STATISTIC OP CR VISIT: Performed by: REHABILITATION PRACTITIONER

## 2017-05-10 ENCOUNTER — HOSPITAL ENCOUNTER (OUTPATIENT)
Dept: CARDIAC REHAB | Facility: CLINIC | Age: 77
End: 2017-05-10
Attending: INTERNAL MEDICINE
Payer: MEDICARE

## 2017-05-10 PROCEDURE — 40000116 ZZH STATISTIC OP CR VISIT: Performed by: REHABILITATION PRACTITIONER

## 2017-05-10 PROCEDURE — 93798 PHYS/QHP OP CAR RHAB W/ECG: CPT | Performed by: REHABILITATION PRACTITIONER

## 2017-05-11 ENCOUNTER — OFFICE VISIT (OUTPATIENT)
Dept: ENDOCRINOLOGY | Facility: CLINIC | Age: 77
End: 2017-05-11
Payer: MEDICARE

## 2017-05-11 VITALS
BODY MASS INDEX: 32.54 KG/M2 | HEART RATE: 77 BPM | TEMPERATURE: 97.1 F | OXYGEN SATURATION: 97 % | DIASTOLIC BLOOD PRESSURE: 84 MMHG | WEIGHT: 176.8 LBS | SYSTOLIC BLOOD PRESSURE: 128 MMHG | HEIGHT: 62 IN

## 2017-05-11 DIAGNOSIS — M85.80 OSTEOPENIA: ICD-10-CM

## 2017-05-11 DIAGNOSIS — E05.90 SUBCLINICAL HYPERTHYROIDISM: Primary | ICD-10-CM

## 2017-05-11 PROCEDURE — 99213 OFFICE O/P EST LOW 20 MIN: CPT | Performed by: INTERNAL MEDICINE

## 2017-05-11 RX ORDER — METHIMAZOLE 5 MG/1
5 TABLET ORAL DAILY
Qty: 90 TABLET | Refills: 1 | Status: SHIPPED | OUTPATIENT
Start: 2017-05-11 | End: 2017-11-14

## 2017-05-11 NOTE — NURSING NOTE
"Chief Complaint   Patient presents with     Consult     hyperthyroidism and wants to talk about medication pt is lossing hair and gaining weight        Initial /84 (BP Location: Right arm, Patient Position: Chair, Cuff Size: Adult Large)  Pulse 77  Temp 97.1  F (36.2  C) (Oral)  Ht 1.581 m (5' 2.25\")  Wt 80.2 kg (176 lb 12.8 oz)  SpO2 97%  BMI 32.08 kg/m2 Estimated body mass index is 32.08 kg/(m^2) as calculated from the following:    Height as of this encounter: 1.581 m (5' 2.25\").    Weight as of this encounter: 80.2 kg (176 lb 12.8 oz).  Medication Reconciliation: complete     ENDOCRINOLOGY INTAKE FORM    Patient Name:  Victoria Montalvo  :  1940    Is patient Diabetic?   No  Does patient have non-diabetic or other endocrine issues?  Yes: hyperthyroidism    Vitals: /84 (BP Location: Right arm, Patient Position: Chair, Cuff Size: Adult Large)  Pulse 77  Temp 97.1  F (36.2  C) (Oral)  Ht 1.581 m (5' 2.25\")  Wt 80.2 kg (176 lb 12.8 oz)  SpO2 97%  BMI 32.08 kg/m2  BMI= Body mass index is 32.08 kg/(m^2).    Flu vaccine:  No  Pneumonia vaccine:  Yes: 12/8/10    Smoking and Alcohol use:  Social History   Substance Use Topics     Smoking status: Former Smoker     Packs/day: 0.10     Years: 10.00     Types: Cigarettes     Quit date: 2003     Smokeless tobacco: Never Used      Comment: off and on x 10 years 3-4 cigs: no smoker in the household     Alcohol use 0.0 oz/week     0 Standard drinks or equivalent per week      Comment: a couple drinks per year         Staff Signature:  Britney Bashir CMA        "

## 2017-05-11 NOTE — PROGRESS NOTES
Name: Victoria Montalvo  Seen for follow up Hyperthyroidism.    HPI:  Victoria Montalvo is a 76 year old female who presents for the evaluation of Hyperthyroidism.  On methimazole 5 mg/day.  Labs in normal range.  CBC and LFT normal.    History of radiation exposure: NO  History of thyroid dysfunction: NO  Palpitations:  No.  Changes to hair or skin: Yes: + hair thinning  Diarrhea/Constipation:No  Changes in vision:Yes: sometimes not clear  Dysphagia or Shortness of breath:Yes: sometimes difficulty with swallowing.  Tremors:No  Changes in weight: + wt gain  Wt Readings from Last 2 Encounters:   05/11/17 80.2 kg (176 lb 12.8 oz)   04/22/17 79.8 kg (176 lb)     Lithium/Amiodarone: No  URI: No  CT Scans:No    PMH/PSH:  Past Medical History:   Diagnosis Date     Abnormal Papanicolaou smear of cervix and cervical HPV     colposcopy 1/97     Arthritis      Chronic rhinitis      Coronary artery disease      Gastro-oesophageal reflux disease      Hyperlipidemia      Pain in right hip      Personal history of colonic polyps     colonoscopy 9/97, 2002 (due in 2007)     Sleep apnea      Subclinical hyperthyroidism 10/17/2016     Unspecified asthma(493.90)     on preventative meds and has nebulizer     Past Surgical History:   Procedure Laterality Date     ARTHROPLASTY HIP Right 10/19/2015    Procedure: ARTHROPLASTY HIP;  Surgeon: Aron Torres MD;  Location: [facility] OR     C NONSPECIFIC PROCEDURE  2/89, 1990    liposuction of legs and stomach     C NONSPECIFIC PROCEDURE  1990    Ankle pins removed     C OPEN RX ANKLE DISLOCATN+FIXATN  4/18/87     COLONOSCOPY  01/1/08    Polyp removed, bx.     COLONOSCOPY  01/12/10     COLONOSCOPY N/A 2/17/2015    Procedure: COLONOSCOPY;  Surgeon: Gato Quiles MD;  Location: [facility]     HC COLONOSCOPY THRU STOMA, DIAGNOSTIC  2002     HC REDUCTION OF LARGE BREAST  2/89     VASCULAR SURGERY  2013    angiogram & left subclavical artery stent     Family Hx:  Family History   Problem Relation  Age of Onset     Alzheimer Disease Mother      GASTROINTESTINAL DISEASE Mother      CANCER Father      throat and lungs, liver,     HEART DISEASE Father 57     CABG     HEART DISEASE Brother      HEART DISEASE Sister      Hypertension Maternal Grandmother      Lipids Maternal Grandmother      CANCER Maternal Grandmother      stomach     CANCER Paternal Grandmother      stomach     Allergies Daughter      Allergies Son      Depression Brother      suicidal     Thyroid disease: No           Social Hx:  Social History     Social History     Marital status:      Spouse name: Trenton     Number of children: 2     Years of education: 12     Occupational History     retired      Social History Main Topics     Smoking status: Former Smoker     Packs/day: 0.10     Years: 10.00     Types: Cigarettes     Quit date: 5/19/2003     Smokeless tobacco: Never Used      Comment: off and on x 10 years 3-4 cigs: no smoker in the household     Alcohol use 0.0 oz/week     0 Standard drinks or equivalent per week      Comment: a couple drinks per year     Drug use: No     Sexual activity: Not Currently     Partners: Male     Other Topics Concern     Caffeine Concern No     Special Diet No     regular diet      Exercise No     shopping a lot so [place]      Social History Narrative          MEDICATIONS:  has a current medication list which includes the following prescription(s): methimazole, bisoprolol, atorvastatin, prasugrel, fluticasone-salmeterol, biotin, spironolactone, furosemide, omeprazole, montelukast, albuterol, fluticasone, cholecalciferol, order for dme, order for dme, aspirin, omega-3 fatty acids, calcium, osteo bi-flex/5-loxin advanced, magnesium acetate, cyclosporine, nitroglycerin, and albuterol.    ROS   ROS: 10 point ROS neg other than the symptoms noted above in the HPI.    Physical Exam   VS: /84 (BP Location: Right arm, Patient Position: Chair, Cuff Size: Adult Large)  Pulse 77  Temp 97.1  F (36.2  C)  "(Oral)  Ht 1.581 m (5' 2.25\")  Wt 80.2 kg (176 lb 12.8 oz)  SpO2 97%  BMI 32.08 kg/m2  GENERAL: AXOX3, NAD, well dressed, answering questions appropriately, appears stated age.  HEENT: No exopthalmous, no proptosis, EOMI, no lig lag, no retraction  NECK: Thyroid normal in size, non tender, no nodules were palpated.  CV: RRR  LUNGS: CTAB  ABDOMEN: +BS  NEUROLOGY: CN grossly intact, no tremors  PSYCH: normal affect and mood    LABS:  TFTs:  ENDO THYROID LABS-[facility] Latest Ref Rng & Units 5/2/2017 10/17/2016   TSH 0.40 - 4.00 mU/L 2.28 <0.01 (L)   T4 FREE 0.76 - 1.46 ng/dL 0.89 1.27   FREE T3 2.3 - 4.2 pg/mL 2.8 3.4   THYROID STIM IMMUNOG   <1.0 . . .     ENDO THYROID LABS-[facility] Latest Ref Rng 10/5/2016 1/29/2016 9/18/2006   TSH 0.40 - 4.00 mU/L <0.01 (L) 0.02 (L) 0.55   T4 FREE 0.76 - 1.46 ng/dL 1.24 1.02        CBC:  Lab Results   Component Value Date    WBC 5.8 05/02/2017     Lab Results   Component Value Date    RBC 4.50 05/02/2017     Lab Results   Component Value Date    HGB 13.3 05/02/2017     Lab Results   Component Value Date    HCT 40.1 05/02/2017     Lab Results   Component Value Date    MCV 89 05/02/2017     Lab Results   Component Value Date    MCH 29.6 05/02/2017     Lab Results   Component Value Date    MCHC 33.2 05/02/2017     Lab Results   Component Value Date    RDW 14.5 05/02/2017     Lab Results   Component Value Date     05/02/2017       LFTs:  Liver Function Studies -   Recent Labs   Lab Test  05/02/17   0913   PROTTOTAL  7.5   ALBUMIN  3.8   BILITOTAL  0.5   ALKPHOS  81   AST  12   ALT  19     NUCLEAR MEDICINE THYROID UPTAKE AND SCAN 10/26/2016 10:32 AM      HISTORY: Thyrotoxicosis, unspecified without thyrotoxic crisis or  storm.     TECHNIQUE: Patient was given 275 uCi of I-123 orally followed by  24-hour uptake and scan.     FINDINGS: Thyroid gland is homogeneous in uptake but the gland  overall appears enlarged. 24-hour uptake was calculated at 22% which  is within the normal " range.     Normal range is 10-30% 24-hour uptake.         IMPRESSION: Enlarged thyroid gland with normal 24-hour uptake at 22%.       All pertinent notes, labs, and images personally reviewed by me.     A/P  Ms.June BALTA Montalvo is a 76 year old here for the evaluation of hyperthyroidism.    Subclinical hyperthyroidism:    Neg TSI and normal thyroid uptake and scan.  H/o CHF and osteopenia  On methimazole 5 mg/day.  Labs in normal range.  CBC and LFT normal.  -- clinically looks euthyroid  -- continue methimazole 5 mg/day  Repeat labs in 4-6 months.  Discussed s/s of hypo and hyperthyroidism to watch for.    Discussed possible side effects of methimazole including liver dysfunction and agranulocytosis. Discussed to watch for s/s like jaundice, abdominal pain and skin rash. In case of prolonged unresolving fever, sore throat and infections, advise to seek medical care.    Call if:     Signs or symptoms of infection. These include a fever of 100.5 degrees or higher, chills, severe sore throat, ear or sinus pain, cough, increased sputum or change in color, painful urination, mouth sores.   Severe nausea or vomiting.   Joint pain or swelling.   Not able to eat.   Unusual bruising or bleeding.   Dark urine or yellow skin or eyes.            Follow-up:  4-6 month    Annie Alvarez MD  Endocrinology  [facility]  CC: Deisi Liu       All questions were answered.  The patient indicates understanding of the above issues and agrees with the plan set forth.

## 2017-05-11 NOTE — PATIENT INSTRUCTIONS
[facility]   Dr Alvarez, Endocrinology Department      [facility]   [address] [place] [address]  Appointment [phone]  Fax: [phone]  [place]: Monday and Tuesday         [place] [address] [place] [address]  Appointment [phone]  Fax: [phone]  [place]: Wednesday and Thursday            Continue methimazole 5 mg/day  Labs in 4-6 months  Please make a lab appointment for blood work and follow up clinic appointment in 1 week after that to discuss results.

## 2017-05-11 NOTE — MR AVS SNAPSHOT
After Visit Summary   2017    Victoria Montalvo    MRN: [id number]           Patient Information     Date Of Birth          1940        Visit Information        Provider Department      2017 11:30 AM Annie Alvarez MD [facility]        Today's Diagnoses     Subclinical hyperthyroidism    -  1    Osteopenia          Care Instructions    [facility] & [place] locations   Dr Alvarez, Endocrinology Department      [facility]   [address] [place] [address]  Appointment [phone]  Fax: [phone]  [place]: Monday and Tuesday         [place] [address] [facility] [place] [address]  Appointment [phone]  Fax: [phone]  [place]: Wednesday and Thursday            Continue methimazole 5 mg/day  Labs in 4-6 months  Please make a lab appointment for blood work and follow up clinic appointment in 1 week after that to discuss results.          Follow-ups after your visit        Your next 10 appointments already scheduled     May 12, 2017 10:00 AM CDT   Treatment 60 with Rh Cardiac Rehab 1   [facility] ([facility])    [address] [place] [address], [facility] [address]  [facility] [address]   [phone]            May 15, 2017 10:00 AM CDT   Treatment 60 with Rh Cardiac Rehab 1   [facility] ([facility])    [address] [place] [address], [facility] [address]  [facility] [address]   [phone]            May 17, 2017 10:00 AM CDT   Treatment 60 with Rh Cardiac Rehab 1   [facility] ([facility])    [address] [place] [address], [facility] [address]  [facility] [address]   [phone]            May 19, 2017 10:00 AM CDT   Treatment 60 with Rh Cardiac Rehab 1   [facility] ([facility])    [address] [facility], [facility] [address]  [facility] [address]   [phone]            May 22, 2017 10:00 AM  CDT   Treatment 60 with Rh Cardiac Rehab 1   [facility] ([facility])    [address] [facility]  [facility] [address]   [phone]            May 24, 2017 10:00 AM CDT   Treatment 60 with Rh Cardiac Rehab 1   [facility] ([facility])    [address], [address]  [facility] [address]   [phone]            May 26, 2017 10:00 AM CDT   Treatment 60 with Rh Cardiac Rehab 1   [facility] ([facility])    [address] [facility] [address] [facility], [address]  [facility] [address]   [phone]            Jun 30, 2017  9:15 AM CDT   LAB with RU LAB   [facility] ([facility])    [address] [place] [address] [address]  [facility] [address]   [phone]           Patient must bring picture ID.  Patient should be prepared to give a urine specimen  Please do not eat 10-12 hours before your appointment if you are coming in fasting for labs on lipids, cholesterol, or glucose (sugar).  Pregnant women should follow their Care Team instructions. Water with medications is okay. Do not drink coffee or other fluids.   If you have concerns about taking  your medications, please ask at office or if scheduling via [employer], send a message by clicking on Secure Messaging, Message Your Care Team.            Jun 30, 2017 10:10 AM CDT   Vein Return with NIMESH Leslie CNP   [facility] ([facility])    [address] [place] [address] [address]  [facility] [address]   [phone]              Future tests that were ordered for you today     Open Future Orders        Priority Expected Expires Ordered    T4 free Routine  3/7/2018 5/11/2017    TSH Routine  3/7/2018 5/11/2017    Hepatic panel Routine  3/7/2018 5/11/2017    WBC count Routine  3/7/2018 5/11/2017            Who to contact     If you have questions or need follow up information about  "today's clinic visit or your schedule please contact [facility] directly at [phone].  Normal or non-critical lab and imaging results will be communicated to you by MyChart, letter or phone within 4 business days after the clinic has received the results. If you do not hear from us within 7 days, please contact the clinic through [employer] or phone. If you have a critical or abnormal lab result, we will notify you by phone as soon as possible.  Submit refill requests through SKURA or call your pharmacy and they will forward the refill request to us. Please allow 3 business days for your refill to be completed.          Additional Information About Your Visit        [employer] Information     SKURA gives you secure access to your electronic health record. If you see a primary care provider, you can also send messages to your care team and make appointments. If you have questions, please call your primary care clinic.  If you do not have a primary care provider, please call [phone] and they will assist you.        Care EveryWhere ID     This is your Care EveryWhere ID. This could be used by other organizations to access your [facility] medical records  [id number]        Your Vitals Were     Pulse Temperature Height Pulse Oximetry BMI (Body Mass Index)       77 97.1  F (36.2  C) (Oral) 1.581 m (5' 2.25\") 97% 32.08 kg/m2        Blood Pressure from Last 3 Encounters:   05/11/17 128/84   04/22/17 139/86   04/13/17 116/66    Weight from Last 3 Encounters:   05/11/17 80.2 kg (176 lb 12.8 oz)   04/22/17 79.8 kg (176 lb)   04/19/17 80.6 kg (177 lb 9.6 oz)                 Today's Medication Changes          These changes are accurate as of: 5/11/17 11:51 AM.  If you have any questions, ask your nurse or doctor.               These medicines have changed or have updated prescriptions.        Dose/Directions    methimazole 5 MG tablet   Commonly known as:  TAPAZOLE   This may have changed:    - " medication strength  - how much to take   Used for:  Subclinical hyperthyroidism, Osteopenia   Changed by:  Annie Alvarez MD        Dose:  5 mg   Take 1 tablet (5 mg) by mouth daily   Quantity:  90 tablet   Refills:  1       spironolactone 25 MG tablet   Commonly known as:  ALDACTONE   This may have changed:    - when to take this  - additional instructions   Used for:  Hyperlipidemia LDL goal <130, Diastolic CHF, chronic (H), Subclavian artery stenosis, left (H), Palpitations        Dose:  12.5 mg   Take 0.5 tablets (12.5 mg) by mouth daily   Quantity:  45 tablet   Refills:  12            Where to get your medicines      These medications were sent to [facility] - [address]  [address] [facility] [address]     Phone:  [phone]     methimazole 5 MG tablet                Primary Care Provider Office Phone # Fax #    Deisi Liu [phone]       [address] [place] [address]        Thank you!     Thank you for choosing [facility]  for your care. Our goal is always to provide you with excellent care. Hearing back from our patients is one way we can continue to improve our services. Please take a few minutes to complete the written survey that you may receive in the mail after your visit with us. Thank you!             Your Updated Medication List - Protect others around you: Learn how to safely use, store and throw away your medicines at www.disposemymeds.org.          This list is accurate as of: 5/11/17 11:51 AM.  Always use your most recent med list.                   Brand Name Dispense Instructions for use    * albuterol (2.5 MG/3ML) 0.083% neb solution     1 Box    Take 1 vial (2.5 mg) by nebulization every 6 hours as needed for shortness of breath / dyspnea       * albuterol 108 (90 BASE) MCG/ACT Inhaler    PROAIR HFA/PROVENTIL HFA/VENTOLIN HFA    3 Inhaler     "Inhale 2 puffs into the lungs every 6 hours as needed for shortness of breath / dyspnea or wheezing       aspirin 81 MG tablet      Take 81 mg by mouth daily.       atorvastatin 40 MG tablet    LIPITOR    90 tablet    Take 1 tablet (40 mg) by mouth daily       biotin 2.5 mg/mL Susp      Take by mouth daily 2000mcg       bisoprolol 5 MG tablet    ZEBETA    45 tablet    Take 0.5 tablets (2.5 mg) by mouth daily       Calcium 3212-8728 MG-UNIT Chew      Take 1 tablet by mouth daily.       FISH OIL DOUBLE STRENGTH 1200 MG capsule   Generic drug:  omega-3 fatty acids      Take 1 capsule by mouth 2 times daily       fluticasone 50 MCG/ACT spray    FLONASE    48 g    Spray 1-2 sprays into both nostrils daily       fluticasone-salmeterol 250-50 MCG/DOSE diskus inhaler    ADVAIR     Inhale 1 puff into the lungs every 12 hours       furosemide 20 MG tablet    LASIX    30 tablet    Take 1 tablet (20 mg) by mouth daily       MAGNESIUM ACETATE      Take 250 mg by mouth daily       methimazole 5 MG tablet    TAPAZOLE    90 tablet    Take 1 tablet (5 mg) by mouth daily       montelukast 10 MG tablet    SINGULAIR    90 tablet    Take 1 tablet (10 mg) by mouth At Bedtime       nitroglycerin 0.4 MG sublingual tablet    NITROSTAT    25 tablet    One tablet under the tongue every 5 minutes if needed for chest pain. May repeat every 5 minutes for a maximum of 3 doses in 15 minutes\"       omeprazole 20 MG CR capsule    priLOSEC    90 capsule    Take 1 capsule (20 mg) by mouth daily       * order for DME     1 Device    Equipment being ordered: CPAP Auto-CPAP 5-25 cm H2O  Full face mask with heated humidified air       * order for DME     1 Device    Equipment being ordered: APAP 5-20cm/H2O full face mask with heated humidified air.       OSTEO BI-FLEX/5-LOXIN ADVANCED Tabs      Take 1,500 mg by mouth 2 times daily       prasugrel 10 MG Tabs tablet    EFFIENT    90 tablet    Take 1 tablet (10 mg) by mouth daily Do not crush or break " tablet.       RESTASIS 0.05 % ophthalmic emulsion   Generic drug:  cycloSPORINE      Place 1 drop into both eyes 2 times daily       spironolactone 25 MG tablet    ALDACTONE    45 tablet    Take 0.5 tablets (12.5 mg) by mouth daily       VITAMIN D3 PO      Take 2,000 Units by mouth daily       * Notice:  This list has 4 medication(s) that are the same as other medications prescribed for you. Read the directions carefully, and ask your doctor or other care provider to review them with you.

## 2017-05-12 ENCOUNTER — HOSPITAL ENCOUNTER (OUTPATIENT)
Dept: CARDIAC REHAB | Facility: CLINIC | Age: 77
End: 2017-05-12
Attending: INTERNAL MEDICINE
Payer: MEDICARE

## 2017-05-12 PROCEDURE — 93798 PHYS/QHP OP CAR RHAB W/ECG: CPT | Performed by: OCCUPATIONAL THERAPIST

## 2017-05-12 PROCEDURE — 40000116 ZZH STATISTIC OP CR VISIT: Performed by: OCCUPATIONAL THERAPIST

## 2017-05-15 ENCOUNTER — HOSPITAL ENCOUNTER (OUTPATIENT)
Dept: CARDIAC REHAB | Facility: CLINIC | Age: 77
End: 2017-05-15
Attending: INTERNAL MEDICINE
Payer: MEDICARE

## 2017-05-15 VITALS — WEIGHT: 177 LBS | HEIGHT: 62 IN | BODY MASS INDEX: 32.57 KG/M2

## 2017-05-15 PROCEDURE — 40000116 ZZH STATISTIC OP CR VISIT: Performed by: OCCUPATIONAL THERAPIST

## 2017-05-15 PROCEDURE — 93798 PHYS/QHP OP CAR RHAB W/ECG: CPT | Performed by: OCCUPATIONAL THERAPIST

## 2017-05-15 ASSESSMENT — 6 MINUTE WALK TEST (6MWT)
TOTAL DISTANCE WALKED (FT): 1550
GENDER SELECTION: FEMALE
FEMALE CALC: 1217.83
MALE CALC: 1179.73
PREDICTED: 1186.93

## 2017-05-15 NOTE — PROGRESS NOTES
05/15/17 1100   Session   Session 90 Day Individualized Treatment Plan   Certified through this date 06/08/17   Cardiac Rehab Assessment   Cardiac Rehab Assessment PT had been experiencing exertion shortness of breath and chest discomfort. PT failed a nuclear stress test and was found to have an 80% blockage in PDA that was treated with a SHERLEY. PT does have a history of peripheral vascular disease having had a previous angioplasty of a subclavian artery. PT continues to manage venous insufficiency in legs by wearing compression stockings and taking a diuretic. PT's main focus is to establish an exercise routine by walking and attending [employer] to maximize strength and endurance gains for better heart jessica.  4/19/2017 PT has had some occasional discomfort across her chest that lasts about 30-60 seconds. She has not had any sx for a couple days or when exercising. PT has yet to start an exercise program. 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 PT is planning to go to [place] in June. She states she walks alot, but has been too busy to go for a walk since her event. Due to sx. PT will need close monitoring with exercise levels as well as counseling for improved management of stress levels and establishment of a more regular exercise program.5/15/17 Progress update done today. PT will complete rehab at the end of May. She has been very tired, and thinks it may be due to one of the medications she is on. She will talk to her MD about medication side effects. The beta blocker is new for PT since procedure. She has alot of stress. She is the main caregiver for her sister and also takes care of her . He has a lot of medical issues, as does her sister. She takes on a lot of the care, and does not see that changing. She does not have a lot of time for herself, and is looking forward to her upcoming vacation. Discussed WEL program as an option for the fall. She thinks this  program may also benefit her . She has not had any recent chest discomfort. Skilled therapy needed to continue to work with PT on how to manage stress to decrease risk of another cardiac event. Will continue to assess stage of change and provide stage matched education. Today discussed outcomes of what stress does to the heart to raise PT awareness.    General Information   Treatment Diagnosis Stent  (to PDA)   Date of Treatment Diagnosis 04/04/17   Secondary Treatment Diagnosis (None)   Significant Past CV History Clinical significant depression or depressive symptoms;PAD  (40 years ago)   Comorbidities None;PAD   Other Medical History PT did have a episode of heart failure in 2011.    Lead up symptoms Chest discomfort and dyspnea on exertion.    Hospital Location [facility]   Hospital Discharge Date 04/05/17   Signs and Symptoms Post Hospital Discharge Fatigue   Outpatient Cardiac Rehab Start Date 04/07/17   Primary Physician Deisi Liu MD   Primary Physician Follow Up Scheduled   Surgeon WILL   Cardiologist Dr. Yogi Yepez   Cardiologist Follow Up Scheduled   Ejection Fraction 50-55%  (per angiogram 4/4/2017. )   Risk Stratification High  (history of depression many years ago. )   Summary of Cath Report   Summary of Cath Report Available   Date Performed 04/04/17   Left Main Trivial plaque under 5%   LAD mild scattered plaque under 20-30% throughout.   LCX Proximal  has a smooth 35-40% narrowing. Right before PDA the LCX has a severe narrowing of 80%.   RCA moderate to severe diffuse disease in its midportion with narrowing up to 80%. Small vessel.   PDA Stent placed to left PDA due to 80% lesion.   Living and Work Status    Living Arrangements and Social Status house   Support System Live with an adult   Return to Employment Retired   Occupation Retired   Preventative Medications   CMS recommended medications Antiplatelets;Lipid Lowering  (no vaccinations. )   Falls Screen   Have you  "fallen two or more times in the past year? No   Have you fallen and had an injury in the past year? No   Timed up and go score if applicable NA   Referral Initiated to Physical Therapy No   Pain   Patient Currently in Pain Denies   Physical Assessments   Incisions WNL   Edema +3 Moderate   Right Lung Sounds normal   Left Lung Sounds normal   Limitations No limitations   Comments PT has venous insufficiency in legs and wears compression stockings. She will have valves in her legs repaired at a later date.    Individualized Treatment Plan   Monitored Sessions Scheduled 24   Monitored Sessions Attended 16   Oxygen   Supplemental Oxygen needed No   Nutrition Management - Weight Management   Assessment Re-assessment   Age 76   Weight 80.3 kg (177 lb)   Height 1.581 m (5' 2.24\")   BMI (Calculated) 32.19   Goal Weight 68 kg (150 lb)   Initial Rate Your Plate Score. Dietary tool to assess eating patterns. Scores range from 24 to 72. The higher the score the healthier the eating pattern. 56   Weight Management Comments PT does report eating healthy and has appropriate portion sizes. PT does have edema. PT reports less exercise due to not having to take care of a yard. 4/19/2017 PT states she has been losing weight. She is not interested in logging her diet as she loses weight by \"not thinking about it.\" She does admit she regained some weight since her sister moved in with her.   Nutrition Management - Lipids   Lipids Labs Available   Date 10/05/16   Total Cholesterol 193   Triglycerides 82   HDL 76      Prescribed Lipid Medication Yes   Statin Intensity High Intensity   Lipid Comments 4/19/2017 Reviewed goals for cholesterol values.   Nutrition Management - Diabetes   Diabetes No   Nutrition Management Summary   Dietary Recommendations Low Fat;Low Sodium   Stages of Change for Diet Compliance Maintenance   Interventions Planned Attend Nutrition Education Class(es);Educate on Weight Management Principles;Educate on " Benefits of Exercise   Nutrition Summary Comments PT reports that she has been following a cardiac diet for many years as  has a strong heart history. 4/19/2017 PT does not feel she needs to attend the nutrition classes.    Psychosocial Management   Psychosocial Assessment Re-assessment   Is there history of clinical depression or increased risk of depression? History of clinical depression  (40 years ago. )   Current Level of Stress per Patient Report Moderate    Current Coping Skills Other (see comments)  (play games and puzzles. )   Initial Patient Health Questionnaire -9 Score (PHQ-9) for depression. 5-9 Minimal symptoms, 10-14 Minor depression, 15-19 Major depression, moderately severe, > 20 Major depression, severe  5   Initial [facility] Survey score.  Quality of Life:   If total score > 25 review individual areas where patient rated a 4 or 5.  Consider patients current medical condition and what role that plays on the score.   Adjust treatment protocol to improve areas of concern.  Consider the following:  PHQ9 score, DASI, and re-assessment within the next 30 days to assist with developing treatments.  15   Stages of Change Pre-Contemplation   Interventions Planned Patient to verbalize understanding of behavioral assessment results;Patient to verbalize understanding of negative impact of stress to personal health;Patient will recognize signs and symptoms of depression;Patient to attend stress management class(es)   Patient Goal No  (Not at this time. )   Psychosocial Comments PT's sister currently lives with her. PT has a very caring a nurturing personality and can find it hard to find time for herself. 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 They expect her to make all appointments, cook and coordinate everything. She likes being busy, but not certain if she is able to relax. Encouraged her to attend the class on relaxation, but she is in pre-contemplation for any changes.    Psychosocial Target Outcome Identify absence or presence of depression using valid screening tool;Maximize coping skills;Develop positive support system   Other Core Components - Hypertension   History of or Diagnosis of Hypertension Yes   Currently taking Anti-Hypertensive's Beta blocker;Yes   Hypertension Comments 4/19/2017 BP has been well-controlled.   Other Core Components - Tobacco   History of Tobacco Use Yes   Quit Date or Planned Quit Date 01/01/03   Tobacco Use Status Former (Quit > 6 mo ago)   Tobacco Habit Cigarettes   Tobacco Use per Day (average) (3 a week to 1 ppd. )   Years of Tobacco Use 20   Stages of Change Maintenance   Tobacco Comments PT is confident she will not smoke again.    Other Core Components Summary   Interventions Planned List benefits of weight management;Educate on importance of maintaining low sodium diet;Instruct and educate to self manage Heart Failure symptoms;Attend education class(es) on Nutrition   Interventions In Progress or Completed Educated on importance of monitoring daily weight;Educated on importance of maintaining low sodium diet   Patient Goals No   Activity/Exercise History   Activity/Exercise Assessment Re-assessment   Activity/Exercise Status prior to event? Was Physically Active   Number of Days Currently participating in Moderate Physical Activity? 7   Number of Days Currently performing  Aerobic Exercise (including rehab)? 2-3   Number of Minutes per Session Currently of Aerobic Exercise (average)? 30  (only in rehab)   Current Stage of Change (Physical Activity) Maintenance   Current Stage of Change (Aerobic Exercise) Preparation   Patient Goals Goal #1   Goal #1 Description PT wants to walk 1-2 additional days per week for at least 30 minutes outside or at a store to maximize strength and endurance.    Goal #1 Target Date 05/31/17   Goal #1 Progress Towards Goal PT currently is very physically active. PT plans to attend 2-3 sessions of cardiac rehab to  gain a better understanding of her exercise tolerance and perform similar workloads outside of rehab. 4/19/2017 PT feels she is too busy to exercise. Encouraged her to try for a 15 minute walk at least 2 days per week and then gradually increase by 5 minute increments.5/15/17 At this time, PT feels she only has time to exercise in rehab. She is caring for her  and sister.    Activity/Exercise Comments PT used to have an exercise routine about 10 years ago, but then PT got rid of equipment. 4/19/2017 PT is thinking about joining [employer].   Activity/Exercise Target Outcome An Accumulation of 150  Minutes of Aerobic Activity per Week   Exercise Assessment   6 Minute Walk Predicted - Gender Selection Female   6 Minute Walk Predicted (Male) 1179.73   6 Minute Walk Predicted (Female) 1217.83   Initial 6 Minute Walk Distance (Feet) 1550 ft   Resting HR 63 bpm   Exercise  bpm   Post Exercise HR 85 bpm   Resting /60   Exercise /60   Post Exercise /58   Effort Rating 5   Current MET Level 4.1   MET Level Goal 4.5-5   ECG Rhythm Sinus rhythm   Ectopy PVC's   Current Symptoms Denies symptoms   Limitations/Restrictions Other (see comments)  (venous insufficiency in legs. Wears compression stockings. )   Exercise Prescription   Mode Treadmill;Weights;NuStep   Duration/Time 30-45 min   Frequency Other (see comments)  (1-3 days per week. Depending on other appts. )   THR (85% of age predicted max HR) 121.55   OMNI Effort Rating (0-10 Scale) 4-6/10   Progression Continuous bouts;Progress peak intensity by 1/4 MET per week;Total exercise time of 30-45 minutes;Aerobic exercise to OMNI rating of 6 or below and at or below THR   Recommended Home Exercise   Type of Exercise Walking   Frequency (days per week) 1-2   Duration (minutes per session) 15-30 min   Effort Rating Recommended 4-6/10   30 Day Exercise Plan Despite venous insufficiency in legs and edema, PT reports that most of the time she  does not feel limited with ambulation. PT enjoys walking. PT plans to add 1-2 additional days of structured walking for 30 minutes after attending rehab for one week. Strength training will be initiated after 3rd session.    Current Home Exercise   Type of Exercise None   Frequency (days per week) 0   Duration (minutes per session) 0   Follow-up/On-going Support   Provider follow-up needed on the following Other (see comments)  (fatigue)   Comments Encouraged PT to contact MD regarding fatigue levels.    Learning Assessment   Learner Patient   Primary Language English   Preferred Learning Style Listening;Reading;Demonstration;Pictures/Video   Barriers to Learning No barriers noted   Patient Education   Education recommended Anatomy and Physiology of the Heart;Blood Pressure;Exercise Principles;Heart Failure;Medication Overview;Muscle Conditioning;Nutrition;Risk Factors;Stress Management;Weight Loss   Education Comments PT has many other appointments she attends for family members. PT is unsure of how many recommended education classes she will be able to attend. 4/19/2017 Uncertain about attending education classes as she has in the past been to many with her .

## 2017-05-17 ENCOUNTER — HOSPITAL ENCOUNTER (OUTPATIENT)
Dept: CARDIAC REHAB | Facility: CLINIC | Age: 77
End: 2017-05-17
Attending: INTERNAL MEDICINE
Payer: MEDICARE

## 2017-05-17 PROCEDURE — 40000116 ZZH STATISTIC OP CR VISIT: Performed by: REHABILITATION PRACTITIONER

## 2017-05-17 PROCEDURE — 93798 PHYS/QHP OP CAR RHAB W/ECG: CPT | Performed by: REHABILITATION PRACTITIONER

## 2017-05-22 ENCOUNTER — HOSPITAL ENCOUNTER (OUTPATIENT)
Dept: CARDIAC REHAB | Facility: CLINIC | Age: 77
End: 2017-05-22
Attending: INTERNAL MEDICINE
Payer: MEDICARE

## 2017-05-22 PROCEDURE — 93798 PHYS/QHP OP CAR RHAB W/ECG: CPT

## 2017-05-22 PROCEDURE — 40000116 ZZH STATISTIC OP CR VISIT

## 2017-05-24 ENCOUNTER — HOSPITAL ENCOUNTER (OUTPATIENT)
Dept: CARDIAC REHAB | Facility: CLINIC | Age: 77
End: 2017-05-24
Attending: INTERNAL MEDICINE
Payer: MEDICARE

## 2017-05-24 VITALS — WEIGHT: 179 LBS | HEIGHT: 62 IN | BODY MASS INDEX: 32.94 KG/M2

## 2017-05-24 PROCEDURE — 93797 PHYS/QHP OP CAR RHAB WO ECG: CPT | Mod: 59

## 2017-05-24 PROCEDURE — 40000575 ZZH STATISTIC OP CARDIAC VISIT #2

## 2017-05-24 PROCEDURE — 93798 PHYS/QHP OP CAR RHAB W/ECG: CPT

## 2017-05-24 PROCEDURE — 40000116 ZZH STATISTIC OP CR VISIT

## 2017-05-24 ASSESSMENT — 6 MINUTE WALK TEST (6MWT)
PREDICTED: 1181.65
GENDER SELECTION: FEMALE
MALE CALC: 1174.49
TOTAL DISTANCE WALKED (FT): 1622
FEMALE CALC: 1211
TOTAL DISTANCE WALKED (FT): 1550

## 2017-05-24 NOTE — PROGRESS NOTES
Victoria Montalvo  1940  76 year old  Stent 05/24/17 1000   Physician cosignature/electronic signature indicates agreements with the ITP document and approval of discharge.  Session   Session Discharge Note   Certified through this date 06/08/17   Cardiac Rehab Assessment   Cardiac Rehab Assessment PT had been experiencing exertion shortness of breath and chest discomfort. PT failed a nuclear stress test and was found to have an 80% blockage in PDA that was treated with a SHERLEY. PT does have a history of peripheral vascular disease having had a previous angioplasty of a subclavian artery. PT continues to manage venous insufficiency in legs by wearing compression stockings and taking a diuretic. PT's main focus is to establish an exercise routine by walking and attending [employer] to maximize strength and endurance gains for better heart heatlh.  4/19/2017 PT has had some occasional discomfort across her chest that lasts about 30-60 seconds. She has not had any sx for a couple days or when exercising. PT has yet to start an exercise program. 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 PT is planning to go to [place] in June. She states she walks alot, but has been too busy to go for a walk since her event. Due to sx. PT will need close monitoring with exercise levels as well as counseling for improved management of stress levels and establishment of a more regular exercise program.5/15/17 Progress update done today. PT will complete rehab at the end of May. She has been very tired, and thinks it may be due to one of the medications she is on. She will talk to her MD about medication side effects. The beta blocker is new for PT since procedure. She has alot of stress. She is the main caregiver for her sister and also takes care of her . He has a lot of medical issues, as does her sister. She takes on alot of the care, and does not see that changing. She does not have a lot of  time for herself, and is looking forward to her upcoming vacation. Discussed WEL program as an option for the fall. She thinks this program may also benefit her . She has not had any recent chest discomfort. Skilled therapy needed to continue to work with PT on how to manage stress to decrease risk of another cardiac event. Will continue to assess stage of change and provide stage matched education. Today discussed outcomes of what stress does to the heart to raise PT awareness.   Pt made significant gains in exercise tolerance. Initially patient tolerated 25 minutes at 3.3METs, now tolerating 35 minutes at 4.3 METs. Patient also increased 6-minute walk test by 5% (an increase of 72 feet.) The PT was given instructions on frequency, intensity, and duration for continued exercise as well as muscle conditioning and stretching exercises.  Your PT plans to continue to exercise at the [facility] program after going on her 2 week trip to [place]. She will follow the guidelines given to her in cardiac rehab. She feels she knows what she needs to do but just needs to find the time to do it. She has an at home blood pressure cuff that she will use to monitor her blood pressure biweekly and is able to verbalize nitro protocol. She will bring her nitro with her to [place].      General Information   Treatment Diagnosis Stent  (to PDA)   Date of Treatment Diagnosis 04/04/17   Secondary Treatment Diagnosis (None)   Significant Past CV History Clinical significant depression or depressive symptoms;PAD  (40 years ago)   Comorbidities None;PAD   Other Medical History PT did have a episode of heart failure in 2011.    Lead up symptoms Chest discomfort and dyspnea on exertion.    Hospital Location [facility]   Hospital Discharge Date 04/05/17   Signs and Symptoms Post Hospital Discharge Fatigue   Outpatient Cardiac Rehab Start Date 04/07/17   Primary Physician Deisi Liu MD   Primary Physician Follow  "Up Completed   Surgeon NA   Cardiologist Dr. Yogi Yepez   Cardiologist Follow Up Completed   Ejection Fraction 50-55%  (per angiogram 4/4/2017. )   Risk Stratification High  (history of depression many years ago. )   Summary of Cath Report   Summary of Cath Report Available   Date Performed 04/04/17   Left Main Trivial plaque under 5%   LAD mild scattered plaque under 20-30% throughout.   LCX Proximal  has a smooth 35-40% narrowing. Right before PDA the LCX has a severe narrowing of 80%.   RCA moderate to severe diffuse disease in its midportion with narrowing up to 80%. Small vessel.   PDA Stent placed to left PDA due to 80% lesion.   Living and Work Status    Living Arrangements and Social Status house   Support System Live with an adult   Return to Employment Retired   Occupation Retired   Preventative Medications   CMS recommended medications Antiplatelets;Lipid Lowering;Beta Blocker  (no vaccinations. )   Falls Screen   Have you fallen two or more times in the past year? No   Have you fallen and had an injury in the past year? No   Timed up and go score if applicable NA   Referral Initiated to Physical Therapy No   Pain   Patient Currently in Pain Denies   Physical Assessments   Incisions WNL   Edema +3 Moderate   Right Lung Sounds normal   Left Lung Sounds normal   Limitations No limitations   Comments PT has venous insufficiency in legs and wears compression stockings. She will have valves in her legs repaired at a later date.    Individualized Treatment Plan   Monitored Sessions Scheduled 21   Monitored Sessions Attended 21   Oxygen   Supplemental Oxygen needed No   Nutrition Management - Weight Management   Assessment Re-assessment   Age 76   Weight 81.2 kg (179 lb)   Height 1.581 m (5' 2.24\")   BMI (Calculated) 32.55   Goal Weight 68 kg (150 lb)   Initial Rate Your Plate Score. Dietary tool to assess eating patterns. Scores range from 24 to 72. The higher the score the healthier the eating pattern. 56 " "  Weight Management Comments PT does report eating healthy and has appropriate portion sizes. PT does have edema. PT reports less exercise due to not having to take care of a yard. 4/19/2017 PT states she has been losing weight. She is not interested in logging her diet as she loses weight by \"not thinking about it.\" She does admit she regained some weight since her sister moved in with her. 5/24 Pt's weight has remained relatively stable throughout rehab. She is not interested in food logging yet.    Nutrition Management - Lipids   Lipids Labs Available   Date 10/05/16   Total Cholesterol 193   Triglycerides 82   HDL 76      Prescribed Lipid Medication Yes   Statin Intensity High Intensity   Lipid Comments 4/19/2017 Reviewed goals for cholesterol values.   Nutrition Management - Diabetes   Diabetes No   Nutrition Management Summary   Dietary Recommendations Low Fat;Low Sodium   Stages of Change for Diet Compliance Maintenance   Interventions Planned Attend Nutrition Education Class(es);Educate on Weight Management Principles;Educate on Benefits of Exercise   Interventions In Progress or Completed Understands how to accurately read Food Labels;Educated on Benefits of Exercise   Nutrition Summary Comments PT reports that she has been following a cardiac diet for many years as  has a strong heart history. 4/19/2017 PT does not feel she needs to attend the nutrition classes.    Psychosocial Management   Psychosocial Assessment Re-assessment   Is there history of clinical depression or increased risk of depression? History of clinical depression  (40 years ago. )   Current Level of Stress per Patient Report Moderate    Current Coping Skills Other (see comments)  (play games and puzzles. )   Initial Patient Health Questionnaire -9 Score (PHQ-9) for depression. 5-9 Minimal symptoms, 10-14 Minor depression, 15-19 Major depression, moderately severe, > 20 Major depression, severe  5   Reassessment PHQ-9 Score " for Depression 0   Initial [employer] COOP Survey score.  Quality of Life:   If total score > 25 review individual areas where patient rated a 4 or 5.  Consider patients current medical condition and what role that plays on the score.   Adjust treatment protocol to improve areas of concern.  Consider the following:  PHQ9 score, DASI, and re-assessment within the next 30 days to assist with developing treatments.  15   Reassessment [employer] COOP Survey Score 16   Stages of Change Pre-Contemplation   Interventions Planned Patient to verbalize understanding of behavioral assessment results;Patient to verbalize understanding of negative impact of stress to personal health;Patient will recognize signs and symptoms of depression;Patient to attend stress management class(es)   Patient Goal No  (Not at this time. )   Psychosocial Comments PT's sister currently lives with her. PT has a very caring a nuturing personality and can find it hard to find time for herself. 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 They expect her to make all appointments, cook and coordinate everything. She likes being busy, but not certain if she is able to relax. Encouraged her to attend the class on relaxation, but she is in pre-contemplation for any changes. 5/24 No change since last update.    Psychosocial Target Outcome Identify absence or presence of depression using valid screening tool;Maximize coping skills;Develop positive support system   Other Core Components - Hypertension   History of or Diagnosis of Hypertension Yes   Currently taking Anti-Hypertensives Beta blocker;Yes   Hypertension Comments 4/19/2017 BP has been well-controlled. 5/24 No change   Other Core Components - Tobacco   History of Tobacco Use Yes   Quit Date or Planned Quit Date 01/01/03   Tobacco Use Status Former (Quit > 6 mo ago)   Tobacco Habit Cigarettes   Tobacco Use per Day (average) (3 a week to 1 ppd. )   Years of Tobacco Use 20   Stages of Change  Maintenance   Tobacco Comments PT is confident she will not smoke again.    Other Core Components Summary   Interventions Planned List benefits of weight management;Educate on importance of maintaining low sodium diet;Instruct and educate to self manage Heart Failure symptoms;Attend education class(es) on Nutrition   Interventions In Progress or Completed Educated on importance of monitoring daily weight;Educated on importance of maintaining low sodium diet   Patient Goals No   Activity/Exercise History   Activity/Exercise Assessment Re-assessment   Activity/Exercise Status prior to event? Was Physically Active   Number of Days Currently participating in Moderate Physical Activity? 7   Number of Days Currently performing  Aerobic Exercise (including rehab)? 2-3   Number of Minutes per Session Currently of Aerobic Exercise (average)? 30  (only in rehab)   Current Stage of Change (Physical Activity) Maintenance   Current Stage of Change (Aerobic Exercise) Preparation   Patient Goals Goal #1   Goal #1 Description PT wants to walk 1-2 additional days per week for at least 30 minutes outside or at a store to maximize strength and endurance.    Goal #1 Target Date 05/31/17   Goal #1 Progress Towards Goal PT currently is very physically active. PT plans to attend 2-3 sessions of cardiac rehab to gain a better understanding of her exercise tolerance and perform similar workloads outside of rehab. 4/19/2017 PT feels she is too busy to exercise. Encouraged her to try for a 15 minute walk at least 2 days per week and then gradually increase by 5 minute increments.5/15/17 At this time, PT feels she only has time to exercise in rehab. She is caring for her  and sister.  5/24 No change since previous update. Pt is preparing to go on a trip to [place] and feels she does not have time to exercise otuside of CR at this time. She does want to join the silver sneakers program at the [facility] after completing rehab and getting back  from [place].    Activity/Exercise Comments PT used to have an exercise routine about 10 years ago, but then PT got rid of equipment. 4/19/2017 PT is thinking about joining [employer].   Activity/Exercise Target Outcome An Accumulation of 150  Minutes of Aerobic Activity per Week   Exercise Assessment   6 Minute Walk Predicted - Gender Selection Female   6 Minute Walk Predicted (Male) 1174.49   6 Minute Walk Predicted (Female) 1211   Initial 6 Minute Walk Distance (Feet) 1550 ft   Discharge 6 Minute Walk Distance (Feet) 1622   Resting HR 70 bpm   Exercise  bpm   Post Exercise HR 81 bpm   Resting /68   Exercise /72   Post Exercise /68   Effort Rating 5   Current MET Level 4.3   MET Level Goal 4.5-5   ECG Rhythm Sinus rhythm   Ectopy PVCs   Current Symptoms Denies symptoms   Limitations/Restrictions Other (see comments)  (venous insufficiency in legs. Wears compression stockings. )   Exercise Prescription   Mode Treadmill;Weights;Nustep   Duration/Time 30-45 min   Frequency Other (see comments)  (1-3 days per week. Depending on other appts. )   THR (85% of age predicted max HR) 121.55   OMNI Effort Rating (0-10 Scale) 4-6/10   Progression Continuous bouts;Progress peak intensity by 1/4 MET per week;Total exercise time of 30-45 minutes;Aerobic exercise to OMNI rating of 6 or below and at or below THR   Recommended Home Exercise   Type of Exercise Walking   Frequency (days per week) 3-5   Duration (minutes per session) 30-45 min   Effort Rating Recommended 4-6/10   30 Day Exercise Plan Despite venous insufficiency in legs and edema, PT reports that most of the time she does not feel limited with ambulation. PT enjoys walking. PT plans to add 1-2 additional days of structured walking for 30 minutes after attending rehab for one week. Strength training will be initiated after 3rd session. 5/24 Pt to transition to silver sneakers program 3-5 days per week completing aerobic exercise, muscle  conditioning and stretching.   Current Home Exercise   Type of Exercise None   Frequency (days per week) 0   Duration (minutes per session) 0   Follow-up/On-going Support   Provider follow-up needed on the following Other (see comments)  (fatigue)   Comments Encouraged PT to contact MD regarding fatigue levels.  5/24 Pt has not yet consulted with MD regarding symptoms   Learning Assessment   Learner Patient   Primary Language English   Preferred Learning Style Listening;Reading;Demonstration;Pictures/Video   Barriers to Learning No barriers noted   Patient Education   Education recommended Anatomy and Physiology of the Heart;Blood Pressure;Exercise Principles;Heart Failure;Medication Overview;Muscle Conditioning;Nutrition;Risk Factors;Stress Management;Weight Loss   Education Comments PT has many other appointments she attends for family members. PT is unsure of how many recommended education classes she will be able to attend. 4/19/2017 Uncertain about attending education classes as she has in the past been to many with her .

## 2017-06-30 ENCOUNTER — OFFICE VISIT (OUTPATIENT)
Dept: CARDIOLOGY | Facility: CLINIC | Age: 77
End: 2017-06-30
Attending: NURSE PRACTITIONER
Payer: MEDICARE

## 2017-06-30 VITALS
DIASTOLIC BLOOD PRESSURE: 58 MMHG | WEIGHT: 174 LBS | HEART RATE: 70 BPM | SYSTOLIC BLOOD PRESSURE: 100 MMHG | BODY MASS INDEX: 28.99 KG/M2 | HEIGHT: 65 IN

## 2017-06-30 DIAGNOSIS — R06.02 SOB (SHORTNESS OF BREATH): Primary | ICD-10-CM

## 2017-06-30 DIAGNOSIS — R06.02 SOB (SHORTNESS OF BREATH): ICD-10-CM

## 2017-06-30 DIAGNOSIS — I87.2 VENOUS (PERIPHERAL) INSUFFICIENCY: ICD-10-CM

## 2017-06-30 DIAGNOSIS — E78.2 MIXED HYPERLIPIDEMIA: ICD-10-CM

## 2017-06-30 DIAGNOSIS — I25.10 ASHD (ARTERIOSCLEROTIC HEART DISEASE): ICD-10-CM

## 2017-06-30 LAB
ALT SERPL W P-5'-P-CCNC: 25 U/L (ref 0–50)
CHOLEST SERPL-MCNC: 166 MG/DL
HDLC SERPL-MCNC: 83 MG/DL
HGB BLD-MCNC: 13.2 G/DL (ref 11.7–15.7)
LDLC SERPL CALC-MCNC: 70 MG/DL
NONHDLC SERPL-MCNC: 83 MG/DL
NT-PROBNP SERPL-MCNC: 67 PG/ML (ref 0–450)
TRIGL SERPL-MCNC: 63 MG/DL

## 2017-06-30 PROCEDURE — 99214 OFFICE O/P EST MOD 30 MIN: CPT | Performed by: NURSE PRACTITIONER

## 2017-06-30 PROCEDURE — 83880 ASSAY OF NATRIURETIC PEPTIDE: CPT | Performed by: NURSE PRACTITIONER

## 2017-06-30 PROCEDURE — 85018 HEMOGLOBIN: CPT | Performed by: NURSE PRACTITIONER

## 2017-06-30 PROCEDURE — 80061 LIPID PANEL: CPT | Performed by: PHYSICIAN ASSISTANT

## 2017-06-30 PROCEDURE — 36415 COLL VENOUS BLD VENIPUNCTURE: CPT | Performed by: PHYSICIAN ASSISTANT

## 2017-06-30 PROCEDURE — 84460 ALANINE AMINO (ALT) (SGPT): CPT | Performed by: PHYSICIAN ASSISTANT

## 2017-06-30 NOTE — LETTER
6/30/2017    Deisi Liu MD  [facility]   [address] [place] [address]    RE: Victoria Montalvo       Dear Colleague,    I had the pleasure of seeing Victoria Montalvo in the [facility].    HPI and Plan:    I had the pleasure of seeing Victoria Montalvo and her  today in Cardiology Clinic to follow up the results of her abnormal nuclear stress test and to recommend coronary angiography for further evaluation.  She is a pleasant 77-year-old patient of Dr. Yepez's with a history of subclavian stenosis, status post percutaneous revascularization, hypertension and lower extremity edema.   She has been wearing compression stockings for her venous insufficiency since Marcy 17, 2017 and finds they provide a lot of relief, but were hot when she was walking recently in [place], she is interested in ablation, but wanted to wait until after this trip.      When I saw her last, she also noticed worsening dyspnea on exertion with associated chest pain.   Her nuclear stress test was abnormal and so she went on for coronary angiography.  she had mild to moderate disease in the LAD.  Her most significant lesion was in her's small left PDA which was 80% narrowed and treated with a 2.25 x 18 mm Vin's drug-eluting stent.  Her right coronary artery had diffuse disease in the midportion up to 80% but the vessel was small in diameter, 1.5 mm, and none dominant.  She was placed on prasugrel and zebeta and switched from Pravachol to Lipitor for a LDL of 101.    In follow-up Victoria saw Rosalia Yoo and was doing pretty well.  She described a little bit of shortness of breath but it wasn't too bothersome.  Victoria went on to travel extensively in [place] and return last Thursday.  While she was in [place] it was pretty hot in the 90s and she walked a lot.  She noticed that she felt some shortness of breath while she was walking and occasionally she gets an twinge of pressure in her left  pectoral area, sometimes it looks from her description like it may radiate across the chest.  She has only been home a week and she had to sleep for the first couple of days for jet lag.  She says until yesterday she was still feeling poorly but today she is doing better.  She said today is the first time she hasn't felt short of breath. She continues to wear her compression stockings and says that they help a lot, however they were difficult to wear in the extreme heat when walking in [place].    Labs reviewed.  Sodium 142,, potassium 3.9 creatinine 0.76 LDL 70 HDL 83.    Physical exam is described above    Assessment and plan    1. Coronary artery disease with stenting of the small PDA on April 4, 2017 with recommendations to be an dual antiplatelet therapy with Effient and aspirin.  She should be on Effient for at least a year.  She is tolerating the Lipitor and her cholesterol level is much improved. She's having occasional twinges of chest discomfort,, these are not clearly exertional.      2. Shortness of breath. She noticed this a lot when she was walking in [place]..  She wonders if it's due to deconditioning or doing to the heat that was there.  She looks euvolemic to me on exam.  Her last echocardiogram was in 2014 she had a normal ejection fraction then.  The recommendation is that we repeat the echocardiogram.  I will also check an T pro BMP today before she leaves as well as a hemoglobin.  I'd like her to follow-up with either myself or Dr. Yepez for the results.    3. Venous insufficiency.  She did have venous competency studies done in March of this year and she is wearing compression stockings, 20-30 mmHg, ever since then.  She really likes how she feels when she wears these.  However they are uncomfortable when it's hot out.  She does have bilateral venous insufficiency.  She is more symptomatic on the right with the swelling.  She is interested in pursuing ablation for this.  I will would like her to  "talk about this when she sees Dr. Yepez in follow-up, I'm not sure if he was start with the more symptomatic leg which is the right or the leg that's more incompetent which is left.   In the meantime she'll continue to wear compression therapy.  She would have to be able to do the ablation on dual antiplatelet therapy.    Thank you for allowing me to see Victoria today.  She will have echo labs and follow-up with Dr. Marleni Guevara APRN, CNP    Orders Placed This Encounter   Procedures     N terminal pro BNP outpatient     Hemoglobin     Follow-Up with Cardiologist     Echocardiogram       No orders of the defined types were placed in this encounter.      There are no discontinued medications.      Encounter Diagnoses   Name Primary?     Venous (peripheral) insufficiency      SOB (shortness of breath) Yes       CURRENT MEDICATIONS:  Current Outpatient Prescriptions   Medication Sig Dispense Refill     methimazole (TAPAZOLE) 5 MG tablet Take 1 tablet (5 mg) by mouth daily 90 tablet 1     bisoprolol (ZEBETA) 5 MG tablet Take 0.5 tablets (2.5 mg) by mouth daily 45 tablet 3     atorvastatin (LIPITOR) 40 MG tablet Take 1 tablet (40 mg) by mouth daily 90 tablet 3     nitroglycerin (NITROSTAT) 0.4 MG sublingual tablet One tablet under the tongue every 5 minutes if needed for chest pain. May repeat every 5 minutes for a maximum of 3 doses in 15 minutes\" 25 tablet 3     prasugrel (EFFIENT) 10 MG TABS tablet Take 1 tablet (10 mg) by mouth daily Do not crush or break tablet. 90 tablet 1     fluticasone-salmeterol (ADVAIR) 250-50 MCG/DOSE diskus inhaler Inhale 1 puff into the lungs every 12 hours       biotin 2.5 mg/mL Take by mouth daily 2000mcg       spironolactone (ALDACTONE) 25 MG tablet Take 0.5 tablets (12.5 mg) by mouth daily (Patient taking differently: Take 12.5 mg by mouth Mon-wed-fri) 45 tablet 12     furosemide (LASIX) 20 MG tablet Take 1 tablet (20 mg) by mouth daily 30 tablet 0     omeprazole (PRILOSEC) 20 MG " capsule Take 1 capsule (20 mg) by mouth daily 90 capsule 3     montelukast (SINGULAIR) 10 MG tablet Take 1 tablet (10 mg) by mouth At Bedtime 90 tablet 3     albuterol (PROAIR HFA, PROVENTIL HFA, VENTOLIN HFA) 108 (90 BASE) MCG/ACT inhaler Inhale 2 puffs into the lungs every 6 hours as needed for shortness of breath / dyspnea or wheezing 3 Inhaler 0     albuterol (2.5 MG/3ML) 0.083% nebulizer solution Take 1 vial (2.5 mg) by nebulization every 6 hours as needed for shortness of breath / dyspnea 1 Box 3     fluticasone (FLONASE) 50 MCG/ACT nasal spray Spray 1-2 sprays into both nostrils daily 48 g 3     Cholecalciferol (VITAMIN D3 PO) Take 2,000 Units by mouth daily        ORDER FOR DME Equipment being ordered: APAP 5-20cm/H2O full face mask with heated humidified air. 1 Device 0     ORDER FOR DME Equipment being ordered: CPAP  Auto-CPAP 5-25 cm H2O   Full face mask with heated humidified air 1 Device 0     aspirin 81 MG tablet Take 81 mg by mouth daily.       omega-3 fatty acids (FISH OIL DOUBLE STRENGTH) 1200 MG capsule Take 1 capsule by mouth 2 times daily        Calcium 4073-6192 MG-UNIT CHEW Take 1 tablet by mouth daily.       Boswellia-Glucosamine-Vit D (OSTEO BI-FLEX/5-LOXIN ADVANCED) TABS Take 1,500 mg by mouth 2 times daily        MAGNESIUM ACETATE Take 250 mg by mouth daily        cycloSPORINE (RESTASIS) 0.05 % ophthalmic emulsion Place 1 drop into both eyes 2 times daily          ALLERGIES     Allergies   Allergen Reactions     Animal Dander      Kiwi Itching     Itching and red all over     Pollen Extract      Pollen,dust, trees, grass, mold-hayfever symptoms       PAST MEDICAL HISTORY:  Past Medical History:   Diagnosis Date     Abnormal Papanicolaou smear of cervix and cervical HPV     colposcopy 1/97     Arthritis      Chronic rhinitis      Coronary artery disease      Gastro-oesophageal reflux disease      Hyperlipidemia      Pain in right hip      Personal history of colonic polyps     colonoscopy  9/97, 2002 (due in 2007)     Sleep apnea      Subclinical hyperthyroidism 10/17/2016     Unspecified asthma(493.90)     on preventative meds and has nebulizer       PAST SURGICAL HISTORY:  Past Surgical History:   Procedure Laterality Date     ARTHROPLASTY HIP Right 10/19/2015    Procedure: ARTHROPLASTY HIP;  Surgeon: Aron Torres MD;  Location: [facility] OR     C NONSPECIFIC PROCEDURE  2/89, 1990    liposuction of legs and stomach     C NONSPECIFIC PROCEDURE  1990    Ankle pins removed     C OPEN RX ANKLE DISLOCATN+FIXATN  4/18/87     COLONOSCOPY  01/1/08    Polyp removed, bx.     COLONOSCOPY  01/12/10     COLONOSCOPY N/A 2/17/2015    Procedure: COLONOSCOPY;  Surgeon: Gato Quiles MD;  Location: [facility]     HC COLONOSCOPY THRU STOMA, DIAGNOSTIC  2002     HC REDUCTION OF LARGE BREAST  2/89     VASCULAR SURGERY  2013    angiogram & left subclavical artery stent       FAMILY HISTORY:  Family History   Problem Relation Age of Onset     Alzheimer Disease Mother      GASTROINTESTINAL DISEASE Mother      CANCER Father      throat and lungs, liver,     HEART DISEASE Father 57     CABG     HEART DISEASE Brother      HEART DISEASE Sister      Hypertension Maternal Grandmother      Lipids Maternal Grandmother      CANCER Maternal Grandmother      stomach     CANCER Paternal Grandmother      stomach     Allergies Daughter      Allergies Son      Depression Brother      suicidal       SOCIAL HISTORY:  Social History     Social History     Marital status:      Spouse name: Trenton     Number of children: 2     Years of education: 12     Occupational History     retired      Social History Main Topics     Smoking status: Former Smoker     Packs/day: 0.10     Years: 10.00     Types: Cigarettes     Quit date: 5/19/2003     Smokeless tobacco: Never Used      Comment: off and on x 10 years 3-4 cigs: no smoker in the household     Alcohol use 0.0 oz/week     0 Standard drinks or equivalent per week      Comment: kirt  "couple drinks per year     Drug use: No     Sexual activity: Not Currently     Partners: Male     Other Topics Concern     Caffeine Concern No     Special Diet No     regular diet      Exercise No     shopping a lot so [place]      Social History Narrative       Review of Systems:  Skin:  Positive for bruising     Eyes:  Positive for glasses;visual blurring    ENT:  Positive for epistaxis    Respiratory:  Positive for sleep apnea;shortness of breath;dyspnea on exertion     Cardiovascular:    chest pain;edema;lightheadedness;Positive for chest pain occ  Gastroenterology: Positive for heartburn;diarrhea    Genitourinary:  Positive for urinary frequency    Musculoskeletal:    arthritis;back pain    Neurologic:  Negative for      Psychiatric:         Heme/Lymph/Imm:  Positive for allergies    Endocrine:  Positive for thyroid disorder      Physical Exam:  Vitals: /58  Pulse 70  Ht 1.657 m (5' 5.25\")  Wt 78.9 kg (174 lb)  BMI 28.73 kg/m2    Constitutional:  cooperative, alert and oriented, well developed, well nourished, in no acute distress        Skin:  warm and dry to the touch, no apparent skin lesions or masses noted diaphoretic      Head:  normocephalic, no masses or lesions        Eyes:  pupils equal and round, conjunctivae and lids unremarkable, sclera white, no xanthalasma, EOMS intact, no nystagmus        ENT:  no pallor or cyanosis, dentition good        Neck:  JVP normal        Chest:  normal breath sounds, clear to auscultation, normal A-P diameter, normal symmetry, normal respiratory excursion, no use of accessory muscles          Cardiac: regular rhythm, normal S1/S2, no S3 or S4, apical impulse not displaced, no murmurs, gallops or rubs                  Abdomen:  abdomen soft        Vascular: pulses full and equal                                        Extremities and Back:      bilateral LE edema;1+;varicose vein;telangiectasia     ankle edema, just took compression stockings off for " exam    Neurological:  no gross motor deficits;affect appropriate, oriented to time, person and place        Thank you for allowing me to participate in the care of your patient.    Sincerely,     NIMESH Espinoza [facility]

## 2017-06-30 NOTE — MR AVS SNAPSHOT
After Visit Summary   6/30/2017    Victoria Montalvo    MRN: [id number]           Patient Information     Date Of Birth          1940        Visit Information        Provider Department      6/30/2017 10:10 AM Maureen Guevara APRN CNP [facility] [place]        Today's Diagnoses     SOB (shortness of breath)    -  1    Venous (peripheral) insufficiency           Follow-ups after your visit        Additional Services     Follow-Up with Cardiologist                 Your next 10 appointments already scheduled     Jul 06, 2017  8:30 AM CDT   Ech Complete with [address] ([facility])    [address]  [facility] [address] [phone]           1.  Please bring or wear a comfortable two-piece outfit. 2.  You may eat, drink and take your normal medicines. 3.  For any questions that cannot be answered, please contact the ordering physician ***Please check-in at the [place] Registration Office located in [address] in the [facility] building. When you are finished registering, please go to [address] and have a seat. The technician will call your name for the test.            Jul 13, 2017 10:15 AM CDT   Return Visit with Yogi Yepez MD   [facility] ([facility])    [address]  [facility] [id number] [phone]              Future tests that were ordered for you today     Open Future Orders        Priority Expected Expires Ordered    Follow-Up with Cardiologist Routine 7/30/2017 8/10/2018 6/30/2017    N terminal pro BNP outpatient Routine 6/30/2017 6/25/2018 6/30/2017    Hemoglobin Routine 6/30/2017 6/25/2018 6/30/2017    Echocardiogram Routine 7/7/2017 6/30/2018 6/30/2017            Who to contact     If you have questions or need follow up information about today's clinic visit or your  "schedule please contact [facility] [place] directly at [phone].  Normal or non-critical lab and imaging results will be communicated to you by MyChart, letter or phone within 4 business days after the clinic has received the results. If you do not hear from us within 7 days, please contact the clinic through [employer] or phone. If you have a critical or abnormal lab result, we will notify you by phone as soon as possible.  Submit refill requests through [employer] or call your pharmacy and they will forward the refill request to us. Please allow 3 business days for your refill to be completed.          Additional Information About Your Visit        [email or web address] ([employer]) Information     [employer] gives you secure access to your electronic health record. If you see a primary care provider, you can also send messages to your care team and make appointments. If you have questions, please call your primary care clinic.  If you do not have a primary care provider, please call [phone] and they will assist you.        Care EveryWhere ID     This is your Care EveryWhere ID. This could be used by other organizations to access your [place] medical records  [id number]        Your Vitals Were     Pulse Height BMI (Body Mass Index)             70 1.657 m (5' 5.25\") 28.73 kg/m2          Blood Pressure from Last 3 Encounters:   06/30/17 100/58   05/11/17 128/84   04/22/17 139/86    Weight from Last 3 Encounters:   06/30/17 78.9 kg (174 lb)   05/24/17 81.2 kg (179 lb)   05/15/17 80.3 kg (177 lb)              We Performed the Following     Follow-Up with Cardiac Advanced Practice Provider          Today's Medication Changes          These changes are accurate as of: 6/30/17 10:56 AM.  If you have any questions, ask your nurse or doctor.               These medicines have changed or have updated prescriptions.        Dose/Directions    spironolactone 25 MG tablet   Commonly known as:  ALDACTONE   This may " have changed:    - when to take this  - additional instructions   Used for:  Hyperlipidemia LDL goal <130, Diastolic CHF, chronic (H), Subclavian artery stenosis, left (H), Palpitations        Dose:  12.5 mg   Take 0.5 tablets (12.5 mg) by mouth daily   Quantity:  45 tablet   Refills:  12                Primary Care Provider Office Phone # Fax #    Deisi Liu [phone]       [place] [facility] [address] [place] [address]        Equal Access to Services     OLESYA ARAGON : Hadii aad ku hadasho Soomaali, waaxda luqadaha, qaybta kaalmada adeegyada, waxay idiin hayaan sharan barrios . So [facility] [phone].    ATENCIÓN: Si habla español, tiene a yang disposición servicios gratuitos de asistencia lingüística. [facility] [phone].    We comply with applicable federal civil rights laws and [place] laws. We do not discriminate on the basis of race, color, national origin, age, disability sex, sexual orientation or gender identity.            Thank you!     Thank you for choosing [facility] [place]  for your care. Our goal is always to provide you with excellent care. Hearing back from our patients is one way we can continue to improve our services. Please take a few minutes to complete the written survey that you may receive in the mail after your visit with us. Thank you!             Your Updated Medication List - Protect others around you: Learn how to safely use, store and throw away your medicines at www.disposemymeds.org.          This list is accurate as of: 6/30/17 10:56 AM.  Always use your most recent med list.                   Brand Name Dispense Instructions for use Diagnosis    * albuterol (2.5 MG/3ML) 0.083% neb solution     1 Box    Take 1 vial (2.5 mg) by nebulization every 6 hours as needed for shortness of breath / dyspnea    Moderate persistent asthma, uncomplicated       * albuterol 108 (90 BASE) MCG/ACT Inhaler  "   PROAIR HFA/PROVENTIL HFA/VENTOLIN HFA    3 Inhaler    Inhale 2 puffs into the lungs every 6 hours as needed for shortness of breath / dyspnea or wheezing    Moderate persistent asthma, uncomplicated       aspirin 81 MG tablet      Take 81 mg by mouth daily.        atorvastatin 40 MG tablet    LIPITOR    90 tablet    Take 1 tablet (40 mg) by mouth daily    Coronary artery disease involving native coronary artery of native heart with unstable angina pectoris (H), Status post coronary angioplasty       biotin 2.5 mg/mL Susp      Take by mouth daily 2000mcg        bisoprolol 5 MG tablet    ZEBETA    45 tablet    Take 0.5 tablets (2.5 mg) by mouth daily    Coronary artery disease involving native coronary artery of native heart with unstable angina pectoris (H)       Calcium 8801-2649 MG-UNIT Chew      Take 1 tablet by mouth daily.        FISH OIL DOUBLE STRENGTH 1200 MG capsule   Generic drug:  omega-3 fatty acids      Take 1 capsule by mouth 2 times daily        fluticasone 50 MCG/ACT spray    FLONASE    48 g    Spray 1-2 sprays into both nostrils daily    Chronic rhinitis       fluticasone-salmeterol 250-50 MCG/DOSE diskus inhaler    ADVAIR     Inhale 1 puff into the lungs every 12 hours        furosemide 20 MG tablet    LASIX    30 tablet    Take 1 tablet (20 mg) by mouth daily    Diastolic CHF, chronic (H)       MAGNESIUM ACETATE      Take 250 mg by mouth daily        methimazole 5 MG tablet    TAPAZOLE    90 tablet    Take 1 tablet (5 mg) by mouth daily    Subclinical hyperthyroidism, Osteopenia       montelukast 10 MG tablet    SINGULAIR    90 tablet    Take 1 tablet (10 mg) by mouth At Bedtime    Chronic rhinitis       nitroGLYcerin 0.4 MG sublingual tablet    NITROSTAT    25 tablet    One tablet under the tongue every 5 minutes if needed for chest pain. May repeat every 5 minutes for a maximum of 3 doses in 15 minutes\"    Coronary artery disease involving native coronary artery of native heart with unstable " angina pectoris (H), Status post coronary angioplasty       omeprazole 20 MG CR capsule    priLOSEC    90 capsule    Take 1 capsule (20 mg) by mouth daily    Gastroesophageal reflux disease without esophagitis       * order for DME     1 Device    Equipment being ordered: CPAP Auto-CPAP 5-25 cm H2O  Full face mask with heated humidified air    KEN (obstructive sleep apnea), Obstructive lung disease (H)       * order for DME     1 Device    Equipment being ordered: APAP 5-20cm/H2O full face mask with heated humidified air.    Obstructive sleep apnea (adult) (pediatric), Chronic airway obstruction, not elsewhere classified       OSTEO BI-FLEX/5-LOXIN ADVANCED Tabs      Take 1,500 mg by mouth 2 times daily        prasugrel 10 MG Tabs tablet    EFFIENT    90 tablet    Take 1 tablet (10 mg) by mouth daily Do not crush or break tablet.    Coronary artery disease involving native coronary artery of native heart with unstable angina pectoris (H), Status post coronary angioplasty       RESTASIS 0.05 % ophthalmic emulsion   Generic drug:  cycloSPORINE      Place 1 drop into both eyes 2 times daily        spironolactone 25 MG tablet    ALDACTONE    45 tablet    Take 0.5 tablets (12.5 mg) by mouth daily    Hyperlipidemia LDL goal <130, Diastolic CHF, chronic (H), Subclavian artery stenosis, left (H), Palpitations       VITAMIN D3 PO      Take 2,000 Units by mouth daily        * Notice:  This list has 4 medication(s) that are the same as other medications prescribed for you. Read the directions carefully, and ask your doctor or other care provider to review them with you.

## 2017-06-30 NOTE — PROGRESS NOTES
HPI and Plan:    I had the pleasure of seeing Victoria Montalvo and her  today in [facility]iology [facility] to follow up the results of her abnormal nuclear stress test and to recommend coronary angiography for further evaluation.  She is a pleasant 77-year-old patient of Dr. Yepez's with a history of subclavian stenosis, status post percutaneous revascularization, hypertension and lower extremity edema.  She has been wearing compression stockings for her venous insufficiency since Marcy 17, 2017 and finds they provide a lot of relief, but were hot when she was walking recently in [place], she is interested in ablation, but wanted to wait until after this trip.      When I saw her last, she also noticed worsening dyspnea on exertion with associated chest pain.  Her nuclear stress test was abnormal and so she went on for coronary angiography.  she had mild to moderate disease in the LAD.  Her most significant lesion was in her's small left PDA which was 80% narrowed and treated with a 2.25 x 18 mm Vin's drug-eluting stent.  Her right coronary artery had diffuse disease in the midportion up to 80% but the vessel was small in diameter, 1.5 mm, and none dominant.  She was placed on prasugrel and zebeta and switched from Pravachol to Lipitor for a LDL of 101.    In follow-up Victoria saw Rosalia Yoo and was doing pretty well.  She described a little bit of shortness of breath but it wasn't too bothersome.  Victoria went on to travel extensively in [place] and return last Thursday.  While she was in [place] it was pretty hot in the 90s and she walked a lot.  She noticed that she felt some shortness of breath while she was walking and occasionally she gets an twinge of pressure in her left pectoral area, sometimes it looks from her description like it may radiate across the chest.  She has only been home a week and she had to sleep for the first couple of days for jet lag.  She says until yesterday she was still feeling poorly but today she  is doing better.  She said today is the first time she hasn't felt short of breath. She continues to wear her compression stockings and says that they help a lot, however they were difficult to wear in the extreme heat when walking in [place].    Labs reviewed.  Sodium 142,, potassium 3.9 creatinine 0.76 LDL 70 HDL 83.    Physical exam is described above    Assessment and plan    1. Coronary artery disease with stenting of the small PDA on April 4, 2017 with recommendations to be an dual antiplatelet therapy with Effient and aspirin.  She should be on Effient for at least a year.  She is tolerating the Lipitor and her cholesterol level is much improved. She's having occasional twinges of chest discomfort,, these are not clearly exertional.      2. Shortness of breath. She noticed this a lot when she was walking in [place]..  She wonders if it's due to deconditioning or doing to the heat that was there.  She looks euvolemic to me on exam.  Her last echocardiogram was in 2014 she had a normal ejection fraction then.  The recommendation is that we repeat the echocardiogram.  I will also check an T pro BMP today before she leaves as well as a hemoglobin.  I'd like her to follow-up with either myself or Dr. Yepez for the results.    3. Venous insufficiency.  She did have venous competency studies done in March of this year and she is wearing compression stockings, 20-30 mmHg, ever since then.  She really likes how she feels when she wears these.  However they are uncomfortable when it's hot out.  She does have bilateral venous insufficiency.  She is more symptomatic on the right with the swelling.  She is interested in pursuing ablation for this.  I will would like her to talk about this when she sees Dr. Yepez in follow-up, I'm not sure if he was start with the more symptomatic leg which is the right or the leg that's more incompetent which is left.   In the meantime she'll continue to wear compression therapy.  She would have  "to be able to do the ablation on dual antiplatelet therapy.    Thank you for allowing me to see Victoria today.  She will have echo labs and follow-up with Dr. Marleni BEAVERS CNP    Orders Placed This Encounter   Procedures     N terminal pro BNP outpatient     Hemoglobin     Follow-Up with Cardiologist     Echocardiogram       No orders of the defined types were placed in this encounter.      There are no discontinued medications.      Encounter Diagnoses   Name Primary?     Venous (peripheral) insufficiency      SOB (shortness of breath) Yes       CURRENT MEDICATIONS:  Current Outpatient Prescriptions   Medication Sig Dispense Refill     methimazole (TAPAZOLE) 5 MG tablet Take 1 tablet (5 mg) by mouth daily 90 tablet 1     bisoprolol (ZEBETA) 5 MG tablet Take 0.5 tablets (2.5 mg) by mouth daily 45 tablet 3     atorvastatin (LIPITOR) 40 MG tablet Take 1 tablet (40 mg) by mouth daily 90 tablet 3     nitroglycerin (NITROSTAT) 0.4 MG sublingual tablet One tablet under the tongue every 5 minutes if needed for chest pain. May repeat every 5 minutes for a maximum of 3 doses in 15 minutes\" 25 tablet 3     prasugrel (EFFIENT) 10 MG TABS tablet Take 1 tablet (10 mg) by mouth daily Do not crush or break tablet. 90 tablet 1     fluticasone-salmeterol (ADVAIR) 250-50 MCG/DOSE diskus inhaler Inhale 1 puff into the lungs every 12 hours       biotin 2.5 mg/mL Take by mouth daily 2000mcg       spironolactone (ALDACTONE) 25 MG tablet Take 0.5 tablets (12.5 mg) by mouth daily (Patient taking differently: Take 12.5 mg by mouth Mon-wed-fri) 45 tablet 12     furosemide (LASIX) 20 MG tablet Take 1 tablet (20 mg) by mouth daily 30 tablet 0     omeprazole (PRILOSEC) 20 MG capsule Take 1 capsule (20 mg) by mouth daily 90 capsule 3     montelukast (SINGULAIR) 10 MG tablet Take 1 tablet (10 mg) by mouth At Bedtime 90 tablet 3     albuterol (PROAIR HFA, PROVENTIL HFA, VENTOLIN HFA) 108 (90 BASE) MCG/ACT inhaler Inhale 2 puffs into " the lungs every 6 hours as needed for shortness of breath / dyspnea or wheezing 3 Inhaler 0     albuterol (2.5 MG/3ML) 0.083% nebulizer solution Take 1 vial (2.5 mg) by nebulization every 6 hours as needed for shortness of breath / dyspnea 1 Box 3     fluticasone (FLONASE) 50 MCG/ACT nasal spray Spray 1-2 sprays into both nostrils daily 48 g 3     Cholecalciferol (VITAMIN D3 PO) Take 2,000 Units by mouth daily        ORDER FOR DME Equipment being ordered: APAP 5-20cm/H2O full face mask with heated humidified air. 1 Device 0     ORDER FOR DME Equipment being ordered: CPAP  Auto-CPAP 5-25 cm H2O   Full face mask with heated humidified air 1 Device 0     aspirin 81 MG tablet Take 81 mg by mouth daily.       omega-3 fatty acids (FISH OIL DOUBLE STRENGTH) 1200 MG capsule Take 1 capsule by mouth 2 times daily        Calcium 8125-5226 MG-UNIT CHEW Take 1 tablet by mouth daily.       Boswellia-Glucosamine-Vit D (OSTEO BI-FLEX/5-LOXIN ADVANCED) TABS Take 1,500 mg by mouth 2 times daily        MAGNESIUM ACETATE Take 250 mg by mouth daily        cycloSPORINE (RESTASIS) 0.05 % ophthalmic emulsion Place 1 drop into both eyes 2 times daily          ALLERGIES     Allergies   Allergen Reactions     Animal Dander      Kiwi Itching     Itching and red all over     Pollen Extract      Pollen,dust, trees, grass, mold-hayfever symptoms       PAST MEDICAL HISTORY:  Past Medical History:   Diagnosis Date     Abnormal Papanicolaou smear of cervix and cervical HPV     colposcopy 1/97     Arthritis      Chronic rhinitis      Coronary artery disease      Gastro-oesophageal reflux disease      Hyperlipidemia      Pain in right hip      Personal history of colonic polyps     colonoscopy 9/97, 2002 (due in 2007)     Sleep apnea      Subclinical hyperthyroidism 10/17/2016     Unspecified asthma(493.90)     on preventative meds and has nebulizer       PAST SURGICAL HISTORY:  Past Surgical History:   Procedure Laterality Date     ARTHROPLASTY HIP  Right 10/19/2015    Procedure: ARTHROPLASTY HIP;  Surgeon: Aron Torres MD;  Location: [facility] OR     C NONSPECIFIC PROCEDURE  2/89, 1990    liposuction of legs and stomach     C NONSPECIFIC PROCEDURE  1990    Ankle pins removed     C OPEN RX ANKLE DISLOCATN+FIXATN  4/18/87     COLONOSCOPY  01/1/08    Polyp removed, bx.     COLONOSCOPY  01/12/10     COLONOSCOPY N/A 2/17/2015    Procedure: COLONOSCOPY;  Surgeon: Gato Quiles MD;  Location: [facility]     HC COLONOSCOPY THRU STOMA, DIAGNOSTIC  2002     HC REDUCTION OF LARGE BREAST  2/89     VASCULAR SURGERY  2013    angiogram & left subclavical artery stent       FAMILY HISTORY:  Family History   Problem Relation Age of Onset     Alzheimer Disease Mother      GASTROINTESTINAL DISEASE Mother      CANCER Father      throat and lungs, liver,     HEART DISEASE Father 57     CABG     HEART DISEASE Brother      HEART DISEASE Sister      Hypertension Maternal Grandmother      Lipids Maternal Grandmother      CANCER Maternal Grandmother      stomach     CANCER Paternal Grandmother      stomach     Allergies Daughter      Allergies Son      Depression Brother      suicidal       SOCIAL HISTORY:  Social History     Social History     Marital status:      Spouse name: Trenton     Number of children: 2     Years of education: 12     Occupational History     retired      Social History Main Topics     Smoking status: Former Smoker     Packs/day: 0.10     Years: 10.00     Types: Cigarettes     Quit date: 5/19/2003     Smokeless tobacco: Never Used      Comment: off and on x 10 years 3-4 cigs: no smoker in the household     Alcohol use 0.0 oz/week     0 Standard drinks or equivalent per week      Comment: a couple drinks per year     Drug use: No     Sexual activity: Not Currently     Partners: Male     Other Topics Concern     Caffeine Concern No     Special Diet No     regular diet      Exercise No     shopping a lot [employer]      Social History Narrative  "      Review of Systems:  Skin:  Positive for bruising     Eyes:  Positive for glasses;visual blurring    ENT:  Positive for epistaxis    Respiratory:  Positive for sleep apnea;shortness of breath;dyspnea on exertion     Cardiovascular:    chest pain;edema;lightheadedness;Positive for chest pain occ  Gastroenterology: Positive for heartburn;diarrhea    Genitourinary:  Positive for urinary frequency    Musculoskeletal:    arthritis;back pain    Neurologic:  Negative for      Psychiatric:         Heme/Lymph/Imm:  Positive for allergies    Endocrine:  Positive for thyroid disorder      Physical Exam:  Vitals: /58  Pulse 70  Ht 1.657 m (5' 5.25\")  Wt 78.9 kg (174 lb)  BMI 28.73 kg/m2    Constitutional:  cooperative, alert and oriented, well developed, well nourished, in no acute distress        Skin:  warm and dry to the touch, no apparent skin lesions or masses noted diaphoretic      Head:  normocephalic, no masses or lesions        Eyes:  pupils equal and round, conjunctivae and lids unremarkable, sclera white, no xanthalasma, EOMS intact, no nystagmus        ENT:  no pallor or cyanosis, dentition good        Neck:  JVP normal        Chest:  normal breath sounds, clear to auscultation, normal A-P diameter, normal symmetry, normal respiratory excursion, no use of accessory muscles          Cardiac: regular rhythm, normal S1/S2, no S3 or S4, apical impulse not displaced, no murmurs, gallops or rubs                  Abdomen:  abdomen soft        Vascular: pulses full and equal                                        Extremities and Back:      bilateral LE edema;1+;varicose vein;telangiectasia     ankle edema, just took compression stockings off for exam    Neurological:  no gross motor deficits;affect appropriate, oriented to time, person and place              MIKE HerreraMadison State Hospitalnicolás, NIMESH CNP   [facility]  [address]     [place] [address]              "

## 2017-07-06 ENCOUNTER — HOSPITAL ENCOUNTER (OUTPATIENT)
Dept: CARDIOLOGY | Facility: CLINIC | Age: 77
Discharge: HOME | End: 2017-07-06
Attending: NURSE PRACTITIONER | Admitting: NURSE PRACTITIONER
Payer: MEDICARE

## 2017-07-06 DIAGNOSIS — R06.02 SOB (SHORTNESS OF BREATH): ICD-10-CM

## 2017-07-06 PROCEDURE — 93306 TTE W/DOPPLER COMPLETE: CPT | Mod: 26 | Performed by: INTERNAL MEDICINE

## 2017-07-06 PROCEDURE — 93306 TTE W/DOPPLER COMPLETE: CPT

## 2017-07-13 ENCOUNTER — OFFICE VISIT (OUTPATIENT)
Dept: CARDIOLOGY | Facility: CLINIC | Age: 77
End: 2017-07-13
Attending: NURSE PRACTITIONER
Payer: MEDICARE

## 2017-07-13 VITALS
HEIGHT: 65 IN | HEART RATE: 67 BPM | BODY MASS INDEX: 29.66 KG/M2 | DIASTOLIC BLOOD PRESSURE: 70 MMHG | OXYGEN SATURATION: 96 % | SYSTOLIC BLOOD PRESSURE: 124 MMHG | WEIGHT: 178 LBS

## 2017-07-13 DIAGNOSIS — R06.02 SOB (SHORTNESS OF BREATH): ICD-10-CM

## 2017-07-13 PROCEDURE — 99214 OFFICE O/P EST MOD 30 MIN: CPT | Performed by: INTERNAL MEDICINE

## 2017-07-13 NOTE — LETTER
7/13/2017    Deisi Liu MD  [facility]   [address] [place] [address]    RE: Victoria GIFFORD Montalvo       Dear Colleague,    I had the pleasure of seeing Victoria BALTA Montalvo in the [facility].    Cardiology Progress Note          Assessment and Plan:     1. Vein disease with edema and skin changes    We discussed that sclerotherapy may be an option.  I do not know that treating her veins will make a large impact given the location of her saphenous vein reflux.  She is okay with continuing compression stockings at this point.    Follow-up with Martha Guevara CNP in six months      2. Dyspnea on exertion    Patient has found herself to have more dyspnea on exertion.  She has borderline bradycardic today.  She has not tolerated stronger beta blockers in the past.  Okay to try being off of the bisoprolol for a couple of weeks and see if any improvement.      This note was transcribed using electronic voice recognition software and there may be typographical errors present.                Interval History:   The patient is a very pleasant 77 year old whom I have been following for lower extremity edema, hypertension, subclavian stenosis status post PCI and coronary artery disease status post PCI of the left PDA.  Patient feels well.  Echocardiogram 7/6/2017 shows normal LV function and just a little bit of pulmonary hypertension.      She has been doing well with compression stockings.  I have reviewed her lower extremity venous testing from earlier in the year.  There is not much saphenous vein reflux in the thigh, mainly limited to the calves.                       Review of Systems:   Review of Systems:  Skin:  Positive for bruising   Eyes:  Positive for glasses  ENT:  Negative    Respiratory:  Positive for sleep apnea;CPAP;dyspnea on exertion;wheezing  Cardiovascular:    lightheadedness;Positive for;chest pain;palpitations;edema  Gastroenterology: Positive for  "heartburn;reflux  Genitourinary:  Positive for urinary frequency  Musculoskeletal:  Positive for back pain  Neurologic:  Positive for headaches;numbness or tingling of hands  Psychiatric:  Negative    Heme/Lymph/Imm:  Positive for    Endocrine:  Positive for thyroid disorder              Physical Exam:     Vitals: /70 (BP Location: Right arm, Patient Position: Sitting, Cuff Size: Adult Regular)  Pulse 67  Ht 1.657 m (5' 5.25\")  Wt 80.7 kg (178 lb)  SpO2 96%  BMI 29.39 kg/m2  Constitutional:  cooperative, alert and oriented, well developed, well nourished, in no acute distress        Skin:  warm and dry to the touch, no apparent skin lesions or masses noted diaphoretic      Head:  normocephalic, no masses or lesions        Eyes:  pupils equal and round, conjunctivae and lids unremarkable, sclera white, no xanthalasma, EOMS intact, no nystagmus        ENT:  no pallor or cyanosis, dentition good        Neck:  JVP normal        Chest:  normal breath sounds, clear to auscultation, normal A-P diameter, normal symmetry, normal respiratory excursion, no use of accessory muscles        Cardiac: regular rhythm;normal S1 and S2       systolic murmur;grade 1          Neurological:  no gross motor deficits;affect appropriate, oriented to time, person and place                 Medications:     Current Outpatient Prescriptions   Medication Sig Dispense Refill     methimazole (TAPAZOLE) 5 MG tablet Take 1 tablet (5 mg) by mouth daily 90 tablet 1     bisoprolol (ZEBETA) 5 MG tablet Take 0.5 tablets (2.5 mg) by mouth daily 45 tablet 3     atorvastatin (LIPITOR) 40 MG tablet Take 1 tablet (40 mg) by mouth daily 90 tablet 3     nitroglycerin (NITROSTAT) 0.4 MG sublingual tablet One tablet under the tongue every 5 minutes if needed for chest pain. May repeat every 5 minutes for a maximum of 3 doses in 15 minutes\" 25 tablet 3     prasugrel (EFFIENT) 10 MG TABS tablet Take 1 tablet (10 mg) by mouth daily Do not crush or break " tablet. 90 tablet 1     fluticasone-salmeterol (ADVAIR) 250-50 MCG/DOSE diskus inhaler Inhale 1 puff into the lungs every 12 hours       biotin 2.5 mg/mL Take by mouth daily 2000mcg       spironolactone (ALDACTONE) 25 MG tablet Take 0.5 tablets (12.5 mg) by mouth daily (Patient taking differently: Take 12.5 mg by mouth Mon-wed-fri) 45 tablet 12     furosemide (LASIX) 20 MG tablet Take 1 tablet (20 mg) by mouth daily 30 tablet 0     omeprazole (PRILOSEC) 20 MG capsule Take 1 capsule (20 mg) by mouth daily 90 capsule 3     montelukast (SINGULAIR) 10 MG tablet Take 1 tablet (10 mg) by mouth At Bedtime 90 tablet 3     albuterol (PROAIR HFA, PROVENTIL HFA, VENTOLIN HFA) 108 (90 BASE) MCG/ACT inhaler Inhale 2 puffs into the lungs every 6 hours as needed for shortness of breath / dyspnea or wheezing 3 Inhaler 0     albuterol (2.5 MG/3ML) 0.083% nebulizer solution Take 1 vial (2.5 mg) by nebulization every 6 hours as needed for shortness of breath / dyspnea 1 Box 3     fluticasone (FLONASE) 50 MCG/ACT nasal spray Spray 1-2 sprays into both nostrils daily 48 g 3     Cholecalciferol (VITAMIN D3 PO) Take 2,000 Units by mouth daily        aspirin 81 MG tablet Take 81 mg by mouth daily.       omega-3 fatty acids (FISH OIL DOUBLE STRENGTH) 1200 MG capsule Take 1 capsule by mouth 2 times daily        Calcium 3607-3014 MG-UNIT CHEW Take 1 tablet by mouth daily.       Boswellia-Glucosamine-Vit D (OSTEO BI-FLEX/5-LOXIN ADVANCED) TABS Take 1,500 mg by mouth 2 times daily        MAGNESIUM ACETATE Take 250 mg by mouth daily        cycloSPORINE (RESTASIS) 0.05 % ophthalmic emulsion Place 1 drop into both eyes 2 times daily                   Data:   All laboratory data reviewed  No results found for this or any previous visit (from the past 24 hour(s)).    All laboratory data reviewed  Lab Results   Component Value Date    CHOL 166 06/30/2017     Lab Results   Component Value Date    HDL 83 06/30/2017     Lab Results   Component Value  Date    LDL 70 06/30/2017     Lab Results   Component Value Date    TRIG 63 06/30/2017     Lab Results   Component Value Date    CHOLHDLRATIO 2.5 08/20/2014     TSH   Date Value Ref Range Status   05/02/2017 2.28 0.40 - 4.00 mU/L Final     Last Basic Metabolic Panel:  Lab Results   Component Value Date     03/29/2017      Lab Results   Component Value Date    POTASSIUM 3.9 03/29/2017     Lab Results   Component Value Date    CHLORIDE 107 03/29/2017     Lab Results   Component Value Date    PHILLIP 8.4 03/29/2017     Lab Results   Component Value Date    CO2 28 03/29/2017     Lab Results   Component Value Date    BUN 16 03/29/2017     Lab Results   Component Value Date    CR 0.76 03/29/2017     Lab Results   Component Value Date    GLC 88 03/29/2017     Lab Results   Component Value Date    WBC 5.8 05/02/2017     Lab Results   Component Value Date    RBC 4.50 05/02/2017     Lab Results   Component Value Date    HGB 13.2 06/30/2017     Lab Results   Component Value Date    HCT 40.1 05/02/2017     Lab Results   Component Value Date    MCV 89 05/02/2017     Lab Results   Component Value Date    MCH 29.6 05/02/2017     Lab Results   Component Value Date    MCHC 33.2 05/02/2017     Lab Results   Component Value Date    RDW 14.5 05/02/2017     Lab Results   Component Value Date     05/02/2017     Thank you for allowing me to participate in the care of your patient.    Sincerely,     Yogi Yepez MD

## 2017-07-13 NOTE — PROGRESS NOTES
Cardiology Progress Note          Assessment and Plan:     1. Vein disease with edema and skin changes    We discussed that sclerotherapy may be an option.  I do not know that treating her veins will make a large impact given the location of her saphenous vein reflux.  She is okay with continuing compression stockings at this point.    Follow-up with Martha Guevara CNP in six months      2. Dyspnea on exertion    Patient has found herself to have more dyspnea on exertion.  She has borderline bradycardic today.  She has not tolerated stronger beta blockers in the past.  Okay to try being off of the bisoprolol for a couple of weeks and see if any improvement.      This note was transcribed using electronic voice recognition software and there may be typographical errors present.                Interval History:   The patient is a very pleasant 77 year old whom I have been following for lower extremity edema, hypertension, subclavian stenosis status post PCI and coronary artery disease status post PCI of the left PDA.  Patient feels well.  Echocardiogram 7/6/2017 shows normal LV function and just a little bit of pulmonary hypertension.      She has been doing well with compression stockings.  I have reviewed her lower extremity venous testing from earlier in the year.  There is not much saphenous vein reflux in the thigh, mainly limited to the calves.                       Review of Systems:   Review of Systems:  Skin:  Positive for bruising   Eyes:  Positive for glasses  ENT:  Negative    Respiratory:  Positive for sleep apnea;CPAP;dyspnea on exertion;wheezing  Cardiovascular:    lightheadedness;Positive for;chest pain;palpitations;edema  Gastroenterology: Positive for heartburn;reflux  Genitourinary:  Positive for urinary frequency  Musculoskeletal:  Positive for back pain  Neurologic:  Positive for headaches;numbness or tingling of hands  Psychiatric:  Negative    Heme/Lymph/Imm:  Positive for    Endocrine:   "Positive for thyroid disorder              Physical Exam:     Vitals: /70 (BP Location: Right arm, Patient Position: Sitting, Cuff Size: Adult Regular)  Pulse 67  Ht 1.657 m (5' 5.25\")  Wt 80.7 kg (178 lb)  SpO2 96%  BMI 29.39 kg/m2  Constitutional:  cooperative, alert and oriented, well developed, well nourished, in no acute distress        Skin:  warm and dry to the touch, no apparent skin lesions or masses noted diaphoretic      Head:  normocephalic, no masses or lesions        Eyes:  pupils equal and round, conjunctivae and lids unremarkable, sclera white, no xanthalasma, EOMS intact, no nystagmus        ENT:  no pallor or cyanosis, dentition good        Neck:  JVP normal        Chest:  normal breath sounds, clear to auscultation, normal A-P diameter, normal symmetry, normal respiratory excursion, no use of accessory muscles        Cardiac: regular rhythm;normal S1 and S2       systolic murmur;grade 1          Neurological:  no gross motor deficits;affect appropriate, oriented to time, person and place                 Medications:     Current Outpatient Prescriptions   Medication Sig Dispense Refill     methimazole (TAPAZOLE) 5 MG tablet Take 1 tablet (5 mg) by mouth daily 90 tablet 1     bisoprolol (ZEBETA) 5 MG tablet Take 0.5 tablets (2.5 mg) by mouth daily 45 tablet 3     atorvastatin (LIPITOR) 40 MG tablet Take 1 tablet (40 mg) by mouth daily 90 tablet 3     nitroglycerin (NITROSTAT) 0.4 MG sublingual tablet One tablet under the tongue every 5 minutes if needed for chest pain. May repeat every 5 minutes for a maximum of 3 doses in 15 minutes\" 25 tablet 3     prasugrel (EFFIENT) 10 MG TABS tablet Take 1 tablet (10 mg) by mouth daily Do not crush or break tablet. 90 tablet 1     fluticasone-salmeterol (ADVAIR) 250-50 MCG/DOSE diskus inhaler Inhale 1 puff into the lungs every 12 hours       biotin 2.5 mg/mL Take by mouth daily 2000mcg       spironolactone (ALDACTONE) 25 MG tablet Take 0.5 tablets " (12.5 mg) by mouth daily (Patient taking differently: Take 12.5 mg by mouth Mon-wed-fri) 45 tablet 12     furosemide (LASIX) 20 MG tablet Take 1 tablet (20 mg) by mouth daily 30 tablet 0     omeprazole (PRILOSEC) 20 MG capsule Take 1 capsule (20 mg) by mouth daily 90 capsule 3     montelukast (SINGULAIR) 10 MG tablet Take 1 tablet (10 mg) by mouth At Bedtime 90 tablet 3     albuterol (PROAIR HFA, PROVENTIL HFA, VENTOLIN HFA) 108 (90 BASE) MCG/ACT inhaler Inhale 2 puffs into the lungs every 6 hours as needed for shortness of breath / dyspnea or wheezing 3 Inhaler 0     albuterol (2.5 MG/3ML) 0.083% nebulizer solution Take 1 vial (2.5 mg) by nebulization every 6 hours as needed for shortness of breath / dyspnea 1 Box 3     fluticasone (FLONASE) 50 MCG/ACT nasal spray Spray 1-2 sprays into both nostrils daily 48 g 3     Cholecalciferol (VITAMIN D3 PO) Take 2,000 Units by mouth daily        aspirin 81 MG tablet Take 81 mg by mouth daily.       omega-3 fatty acids (FISH OIL DOUBLE STRENGTH) 1200 MG capsule Take 1 capsule by mouth 2 times daily        Calcium 5628-5121 MG-UNIT CHEW Take 1 tablet by mouth daily.       Boswellia-Glucosamine-Vit D (OSTEO BI-FLEX/5-LOXIN ADVANCED) TABS Take 1,500 mg by mouth 2 times daily        MAGNESIUM ACETATE Take 250 mg by mouth daily        cycloSPORINE (RESTASIS) 0.05 % ophthalmic emulsion Place 1 drop into both eyes 2 times daily                   Data:   All laboratory data reviewed  No results found for this or any previous visit (from the past 24 hour(s)).    All laboratory data reviewed  Lab Results   Component Value Date    CHOL 166 06/30/2017     Lab Results   Component Value Date    HDL 83 06/30/2017     Lab Results   Component Value Date    LDL 70 06/30/2017     Lab Results   Component Value Date    TRIG 63 06/30/2017     Lab Results   Component Value Date    CHOLHDLRATIO 2.5 08/20/2014     TSH   Date Value Ref Range Status   05/02/2017 2.28 0.40 - 4.00 mU/L Final     Last  Basic Metabolic Panel:  Lab Results   Component Value Date     03/29/2017      Lab Results   Component Value Date    POTASSIUM 3.9 03/29/2017     Lab Results   Component Value Date    CHLORIDE 107 03/29/2017     Lab Results   Component Value Date    PHILLIP 8.4 03/29/2017     Lab Results   Component Value Date    CO2 28 03/29/2017     Lab Results   Component Value Date    BUN 16 03/29/2017     Lab Results   Component Value Date    CR 0.76 03/29/2017     Lab Results   Component Value Date    GLC 88 03/29/2017     Lab Results   Component Value Date    WBC 5.8 05/02/2017     Lab Results   Component Value Date    RBC 4.50 05/02/2017     Lab Results   Component Value Date    HGB 13.2 06/30/2017     Lab Results   Component Value Date    HCT 40.1 05/02/2017     Lab Results   Component Value Date    MCV 89 05/02/2017     Lab Results   Component Value Date    MCH 29.6 05/02/2017     Lab Results   Component Value Date    MCHC 33.2 05/02/2017     Lab Results   Component Value Date    RDW 14.5 05/02/2017     Lab Results   Component Value Date     05/02/2017

## 2017-07-13 NOTE — MR AVS SNAPSHOT
After Visit Summary   7/13/2017    Victoria Montalvo    MRN: [id number]           Patient Information     Date Of Birth          1940        Visit Information        Provider Department      7/13/2017 10:15 AM Yogi Yepez MD [facility]        Today's Diagnoses     SOB (shortness of breath)          Care Instructions    Try stopping the Bisoprolol for a week or two, see if that helps the shortness of breath. If it gets worse, call or message us to let us know.          Follow-ups after your visit        Additional Services     Follow-Up with Cardiac Advanced Practice Provider                 Future tests that were ordered for you today     Open Future Orders        Priority Expected Expires Ordered    Follow-Up with Cardiac Advanced Practice Provider Routine 1/9/2018 7/13/2018 7/13/2017            Who to contact     If you have questions or need follow up information about today's clinic visit or your schedule please contact [facility] directly at [phone].  Normal or non-critical lab and imaging results will be communicated to you by [email or web address], letter or phone within 4 business days after the clinic has received the results. If you do not hear from us within 7 days, please contact the clinic through ROLIt or phone. If you have a critical or abnormal lab result, we will notify you by phone as soon as possible.  Submit refill requests through Dapper or call your pharmacy and they will forward the refill request to us. Please allow 3 business days for your refill to be completed.          Additional Information About Your Visit        MyChart Information     Dapper gives you secure access to your electronic health record. If you see a primary care provider, you can also send messages to your care team and make appointments. If you have questions, please call your primary care clinic.  If you do not have a  "primary care provider, please call [phone] and they will assist you.        Care EveryWhere ID     This is your Care EveryWhere ID. This could be used by other organizations to access your [facility] medical records  [id number]        Your Vitals Were     Pulse Height Pulse Oximetry BMI (Body Mass Index)          67 1.657 m (5' 5.25\") 96% 29.39 kg/m2         Blood Pressure from Last 3 Encounters:   07/13/17 124/70   06/30/17 100/58   05/11/17 128/84    Weight from Last 3 Encounters:   07/13/17 80.7 kg (178 lb)   06/30/17 78.9 kg (174 lb)   05/24/17 81.2 kg (179 lb)              We Performed the Following     Follow-Up with Cardiologist          Today's Medication Changes          These changes are accurate as of: 7/13/17 10:56 AM.  If you have any questions, ask your nurse or doctor.               These medicines have changed or have updated prescriptions.        Dose/Directions    spironolactone 25 MG tablet   Commonly known as:  ALDACTONE   This may have changed:    - when to take this  - additional instructions   Used for:  Hyperlipidemia LDL goal <130, Diastolic CHF, chronic (H), Subclavian artery stenosis, left (H), Palpitations        Dose:  12.5 mg   Take 0.5 tablets (12.5 mg) by mouth daily   Quantity:  45 tablet   Refills:  12                Primary Care Provider Office Phone # Fax #    Deisi Liu [phone]       Devol SEAN [address] [place] [address]        Equal Access to Services     [facility] AH: Hadii mercedes ku hadasho Soomaali, waaxda luqadaha, qaybta kaalmada adeegyada, estefani [place]. So [facility] [phone].    ATENCIÓN: Si lewla raul, tiene a yang disposición servicios gratuitos de asistencia lingüística. Llame al [phone].    We comply with applicable federal civil rights laws and [place] laws. We do not discriminate on the basis of race, color, national origin, age, disability sex, sexual orientation or " gender identity.            Thank you!     Thank you for choosing [facility] AT [place]  for your care. Our goal is always to provide you with excellent care. Hearing back from our patients is one way we can continue to improve our services. Please take a few minutes to complete the written survey that you may receive in the mail after your visit with us. Thank you!             Your Updated Medication List - Protect others around you: Learn how to safely use, store and throw away your medicines at www.disposemymeds.org.          This list is accurate as of: 7/13/17 10:56 AM.  Always use your most recent med list.                   Brand Name Dispense Instructions for use Diagnosis    * albuterol (2.5 MG/3ML) 0.083% neb solution     1 Box    Take 1 vial (2.5 mg) by nebulization every 6 hours as needed for shortness of breath / dyspnea    Moderate persistent asthma, uncomplicated       * albuterol 108 (90 BASE) MCG/ACT Inhaler    PROAIR HFA/PROVENTIL HFA/VENTOLIN HFA    3 Inhaler    Inhale 2 puffs into the lungs every 6 hours as needed for shortness of breath / dyspnea or wheezing    Moderate persistent asthma, uncomplicated       aspirin 81 MG tablet      Take 81 mg by mouth daily.        atorvastatin 40 MG tablet    LIPITOR    90 tablet    Take 1 tablet (40 mg) by mouth daily    Coronary artery disease involving native coronary artery of native heart with unstable angina pectoris (H), Status post coronary angioplasty       biotin 2.5 mg/mL Susp      Take by mouth daily 2000mcg        bisoprolol 5 MG tablet    ZEBETA    45 tablet    Take 0.5 tablets (2.5 mg) by mouth daily    Coronary artery disease involving native coronary artery of native heart with unstable angina pectoris (H)       Calcium 7120-7767 MG-UNIT Chew      Take 1 tablet by mouth daily.        FISH OIL DOUBLE STRENGTH 1200 MG capsule   Generic drug:  omega-3 fatty acids      Take 1 capsule by mouth 2 times daily         "fluticasone 50 MCG/ACT spray    FLONASE    48 g    Spray 1-2 sprays into both nostrils daily    Chronic rhinitis       fluticasone-salmeterol 250-50 MCG/DOSE diskus inhaler    ADVAIR     Inhale 1 puff into the lungs every 12 hours        furosemide 20 MG tablet    LASIX    30 tablet    Take 1 tablet (20 mg) by mouth daily    Diastolic CHF, chronic (H)       MAGNESIUM ACETATE      Take 250 mg by mouth daily        methimazole 5 MG tablet    TAPAZOLE    90 tablet    Take 1 tablet (5 mg) by mouth daily    Subclinical hyperthyroidism, Osteopenia       montelukast 10 MG tablet    SINGULAIR    90 tablet    Take 1 tablet (10 mg) by mouth At Bedtime    Chronic rhinitis       nitroGLYcerin 0.4 MG sublingual tablet    NITROSTAT    25 tablet    One tablet under the tongue every 5 minutes if needed for chest pain. May repeat every 5 minutes for a maximum of 3 doses in 15 minutes\"    Coronary artery disease involving native coronary artery of native heart with unstable angina pectoris (H), Status post coronary angioplasty       omeprazole 20 MG CR capsule    priLOSEC    90 capsule    Take 1 capsule (20 mg) by mouth daily    Gastroesophageal reflux disease without esophagitis       OSTEO BI-FLEX/5-LOXIN ADVANCED Tabs      Take 1,500 mg by mouth 2 times daily        prasugrel 10 MG Tabs tablet    EFFIENT    90 tablet    Take 1 tablet (10 mg) by mouth daily Do not crush or break tablet.    Coronary artery disease involving native coronary artery of native heart with unstable angina pectoris (H), Status post coronary angioplasty       RESTASIS 0.05 % ophthalmic emulsion   Generic drug:  cycloSPORINE      Place 1 drop into both eyes 2 times daily        spironolactone 25 MG tablet    ALDACTONE    45 tablet    Take 0.5 tablets (12.5 mg) by mouth daily    Hyperlipidemia LDL goal <130, Diastolic CHF, chronic (H), Subclavian artery stenosis, left (H), Palpitations       VITAMIN D3 PO      Take 2,000 Units by mouth daily        * Notice:  " This list has 2 medication(s) that are the same as other medications prescribed for you. Read the directions carefully, and ask your doctor or other care provider to review them with you.

## 2017-07-13 NOTE — PATIENT INSTRUCTIONS
Try stopping the Bisoprolol for a week or two, see if that helps the shortness of breath. If it gets worse, call or message us to let us know.

## 2017-07-24 ENCOUNTER — TELEPHONE (OUTPATIENT)
Dept: PEDIATRICS | Facility: CLINIC | Age: 77
End: 2017-07-24

## 2017-07-24 DIAGNOSIS — I50.32 DIASTOLIC CHF, CHRONIC (H): ICD-10-CM

## 2017-07-24 DIAGNOSIS — J31.0 CHRONIC RHINITIS: ICD-10-CM

## 2017-07-24 NOTE — TELEPHONE ENCOUNTER
furosemide (LASIX) 20 MG tablet      Last Written Prescription Date: 9/13/2016  Last Fill Quantity: 30, # refills: 0  Last Office Visit with G, P or [facility] prescribing provider: 10/5/2016       Potassium   Date Value Ref Range Status   03/29/2017 3.9 3.4 - 5.3 mmol/L Final     Creatinine   Date Value Ref Range Status   03/29/2017 0.76 0.52 - 1.04 mg/dL Final     BP Readings from Last 3 Encounters:   07/13/17 124/70   06/30/17 100/58   05/11/17 128/84

## 2017-07-24 NOTE — TELEPHONE ENCOUNTER
fluticasone (FLONASE) 50 MCG/ACT nasal spray       Last Written Prescription Date: 4/6/2016  Last Fill Quantity: 48 g, # refills: 3    Last Office Visit with [facility], [facility] or [facility] prescribing provider:  10/5/2016   Future Office Visit:       Date of Last Asthma Action Plan Letter:   Asthma Action Plan Q1 Year    Topic Date Due     Asthma Action Plan - yearly  10/05/2017      Asthma Control Test:   ACT Total Scores 10/5/2016   ACT TOTAL SCORE -   ASTHMA ER VISITS -   ASTHMA HOSPITALIZATIONS -   ACT TOTAL SCORE (Goal Greater than or Equal to 20) 21   In the past 12 months, how many times did you visit the emergency room for your asthma without being admitted to the hospital? 0   In the past 12 months, how many times were you hospitalized overnight because of your asthma? 0       Date of Last Spirometry Test:   No results found for this or any previous visit.

## 2017-07-26 RX ORDER — FUROSEMIDE 20 MG
TABLET ORAL
Qty: 30 TABLET | OUTPATIENT
Start: 2017-07-26

## 2017-07-26 RX ORDER — FLUTICASONE PROPIONATE 50 MCG
SPRAY, SUSPENSION (ML) NASAL
Qty: 48 G | Refills: 0 | Status: SHIPPED | OUTPATIENT
Start: 2017-07-26 | End: 2017-10-24

## 2017-07-26 NOTE — TELEPHONE ENCOUNTER
Prescription approved per [facility] Refill Protocol. Due for annual visit in October.  Zuri Freeman, RN  Triage Nurse

## 2017-07-26 NOTE — TELEPHONE ENCOUNTER
I spoke with patient and she is taking Lasix.  States this has been ordered by Cardiology, and she will contact Cardiology for a refill.  Is scheduled her for a wellness exam.  RX denied.  MARGARITO Mayer RN

## 2017-07-26 NOTE — TELEPHONE ENCOUNTER
Please call patient and find out if she has been taking this. Doesn't look like it has been filled since 9/2016. If she has not been taking it, I recommend she contact her Cardiologist for a refill so that they are aware she is not taking the medication and make sure they want her to restart it.    Thanks,  Deisi Liu MD  Internal Medicine/Pediatrics

## 2017-07-26 NOTE — TELEPHONE ENCOUNTER
Routing refill request to provider for review/approval because:  A break in medication-not filled since 9/16.  Zuri Freeman, RN  Triage Nurse

## 2017-07-28 ENCOUNTER — MYC MEDICAL ADVICE (OUTPATIENT)
Dept: CARDIOLOGY | Facility: CLINIC | Age: 77
End: 2017-07-28

## 2017-07-28 DIAGNOSIS — I50.32 DIASTOLIC CHF, CHRONIC (H): ICD-10-CM

## 2017-07-31 RX ORDER — FUROSEMIDE 20 MG
20 TABLET ORAL DAILY
Qty: 90 TABLET | Refills: 3 | Status: SHIPPED | OUTPATIENT
Start: 2017-07-31 | End: 2018-07-09

## 2017-08-03 NOTE — PATIENT INSTRUCTIONS
Preventive Health Recommendations    Female Ages 65 +    Yearly exam:     See your health care provider every year in order to  o Review health changes.   o Discuss preventive care.    o Review your medicines if your doctor has prescribed any.      You no longer need a yearly Pap test unless you've had an abnormal Pap test in the past 10 years. If you have vaginal symptoms, such as bleeding or discharge, be sure to talk with your provider about a Pap test.      Every 1 to 2 years, have a mammogram.  If you are over 69, talk with your health care provider about whether or not you want to continue having screening mammograms.      Every 10 years, have a colonoscopy. Or, have a yearly FIT test (stool test). These exams will check for colon cancer.       Have a cholesterol test every 5 years, or more often if your doctor advises it.       Have a diabetes test (fasting glucose) every three years. If you are at risk for diabetes, you should have this test more often.       At age 65, have a bone density scan (DEXA) to check for osteoporosis (brittle bone disease).    Shots:    Get a flu shot each year.    Get a tetanus shot every 10 years.    Talk to your doctor about your pneumonia vaccines. There are now two you should receive - Pneumovax (PPSV 23) and Prevnar (PCV 13).    Talk to your doctor about the shingles vaccine.    Talk to your doctor about the hepatitis B vaccine.    Nutrition:     Eat at least 5 servings of fruits and vegetables each day.      Eat whole-grain bread, whole-wheat pasta and brown rice instead of white grains and rice.      Talk to your provider about Calcium and Vitamin D.     Lifestyle    Exercise at least 150 minutes a week (30 minutes a day, 5 days a week). This will help you control your weight and prevent disease.      Limit alcohol to one drink per day.      No smoking.       Wear sunscreen to prevent skin cancer.       See your dentist twice a year for an exam and cleaning.      See your  eye doctor every 1 to 2 years to screen for conditions such as glaucoma, macular degeneration and cataracts.    ------------   1. Could try saline spray or vaseline to nose for nose bleeds - if gets worse let me know and would have see ENT  2. If cut yourself, recommend a tetanus shot  3. Sent refills on advair, omeprazole and singulair for when you need them  4. Schedule mammogram and bone density scan - [phone]

## 2017-08-03 NOTE — PROGRESS NOTES
SUBJECTIVE:   Victoria Montalvo is a 77 year old female who presents for Preventive Visit.    Are you in the first 12 months of your Medicare coverage?  No    Physical   Annual:     Getting at least 3 servings of Calcium per day::  Yes    Bi-annual eye exam::  Yes    Dental care twice a year::  NO    Sleep apnea or symptoms of sleep apnea::  Sleep apnea    Diet::  Regular (no restrictions)    Frequency of exercise::  4-5 days/week    Duration of exercise::  15-30 minutes    Taking medications regularly::  Yes    Medication side effects::  None    Additional concerns today::  No    COGNITIVE SCREEN  1) Repeat 3 items (Banana, Sunrise, Chair)    2) Clock draw: NORMAL  3) 3 item recall: Recalls 3 objects  Results: 3 items recalled: COGNITIVE IMPAIRMENT LESS LIKELY    Mini-CogTM Copyright S Kamille. Licensed by the author for use in [place] [facility]; reprinted with permission (ankit@[facility]). All rights reserved.      Nose bleeds - used to get in teens. Now started up again. Sometimes will take 3-4 hours to stop. Had to go to the ER once to get stopped. Getting once every couple of weeks. On ASA and also on Effient. Nosebleeds started before started Effient. Plans to take Effient for 1 year after last stent in April.     Swelling gets worse as the day goes on.     Reviewed and updated as needed this visit by clinical staff  Tobacco  Allergies  Meds  Problems  Med Hx  Surg Hx  Fam Hx  Soc Hx          Reviewed and updated as needed this visit by Provider  Allergies  Meds  Problems        Social History   Substance Use Topics     Smoking status: Former Smoker     Packs/day: 0.10     Years: 10.00     Types: Cigarettes     Quit date: 5/19/2003     Smokeless tobacco: Never Used      Comment: off and on x 10 years 3-4 cigs: no smoker in the household     Alcohol use 0.0 oz/week     0 Standard drinks or equivalent per week      Comment: a couple drinks per year       The patient does not drink >3 drinks per day nor  >7 drinks per week.    Hyperlipidemia Follow-Up      Rate your low fat/cholesterol diet?: good    Taking statin?  Yes, no muscle aches from statin    Other lipid medications/supplements?:  Fish oil/Omega 3, dose 1200mg without side effects    Hypertension Follow-up      Outpatient blood pressures are not being checked.    Low Salt Diet: no added salt    Asthma Follow-Up    Was ACT completed today?    Yes    ACT Total Scores 8/4/2017   ACT TOTAL SCORE -   ASTHMA ER VISITS -   ASTHMA HOSPITALIZATIONS -   ACT TOTAL SCORE (Goal Greater than or Equal to 20) 14   In the past 12 months, how many times did you visit the emergency room for your asthma without being admitted to the hospital? 0   In the past 12 months, how many times were you hospitalized overnight because of your asthma? 0       Recent asthma triggers that patient is dealing with: dust mites and change of seasons, animals, stress    Worse with humidity. Recently in [place] and humid and did a lot of walking. Made asthma worse. Feels like improving. Doesn't always remember to take inhaler.     Today's PHQ-2 Score:   PHQ-2 ( 1999 Pfizer) 7/28/2017   Q1: Little interest or pleasure in doing things 0   Q2: Feeling down, depressed or hopeless 0   PHQ-2 Score 0   Q1: Little interest or pleasure in doing things Not at all   Q2: Feeling down, depressed or hopeless Not at all   PHQ-2 Score 0     Do you feel safe in your environment - Yes    Do you have a Health Care Directive?: Yes: Patient states has Advance Directive and will bring in a copy to clinic.    Current providers sharing in care for this patient include:   Patient Care Team:  Deisi Liu MD as PCP - General (Internal Medicine)  Aron Torres MD as MD (Orthopedics)      Hearing impairment: No    Ability to successfully perform activities of daily living: Yes, no assistance needed     Fall risk:  Fallen 2 or more times in the past year?: No  Any fall with injury in the past year?:  "No      Home safety:  none identified    The following health maintenance items are reviewed in Epic and correct as of today:  Health Maintenance   Topic Date Due     PNEUMOCOCCAL (2 of 2 - PCV13) 12/08/2011     HF ACTION PLAN Q3 YR  07/03/2016     MEDICARE ANNUAL WELLNESS VISIT  01/29/2017     FALL RISK ASSESSMENT  01/29/2017     ADVANCE DIRECTIVE PLANNING Q5 YRS  04/02/2017     ASTHMA CONTROL TEST Q6 MOS  04/05/2017     TETANUS IMMUNIZATION (SYSTEM ASSIGNED)  04/14/2017     INFLUENZA VACCINE (SYSTEM ASSIGNED)  09/01/2017     BMP Q6 MOS  09/29/2017     ASTHMA ACTION PLAN Q1 YR  10/05/2017     CBC Q1 YR  05/02/2018     ALT Q1 YR  06/30/2018     LIPID MONITORING Q1 YEAR  06/30/2018     COLONOSCOPY Q5 YR  02/17/2020     DEXA SCAN SCREENING (SYSTEM ASSIGNED)  Completed     ROS:  Constitutional, HEENT, cardiovascular, pulmonary, GI, , musculoskeletal, neuro, skin, endocrine and psych systems are negative, except as otherwise noted.      OBJECTIVE:   /62 (BP Location: Right arm, Patient Position: Chair, Cuff Size: Adult Large)  Pulse 79  Temp 97.3  F (36.3  C) (Tympanic)  Ht 5' 5.25\" (1.657 m)  Wt 175 lb 11.2 oz (79.7 kg)  SpO2 97%  BMI 29.01 kg/m2 Estimated body mass index is 29.01 kg/(m^2) as calculated from the following:    Height as of this encounter: 5' 5.25\" (1.657 m).    Weight as of this encounter: 175 lb 11.2 oz (79.7 kg).  EXAM:   GENERAL APPEARANCE: healthy, alert and no distress  EYES: Eyes grossly normal to inspection, PERRL and conjunctivae and sclerae normal  HENT: ear canals and TM's normal, nose and mouth without ulcers or lesions, oropharynx clear and oral mucous membranes moist  NECK: no adenopathy, no asymmetry, masses, or scars and thyroid normal to palpation  RESP: lungs clear to auscultation - no rales, rhonchi or wheezes  BREAST: normal without masses, tenderness or nipple discharge and no palpable axillary masses or adenopathy  CV: regular rate and rhythm, normal S1 S2, no S3 or " S4, no murmur, click or rub, trace peripheral edema and peripheral pulses strong  ABDOMEN: soft, nontender, no hepatosplenomegaly, no masses and bowel sounds normal  MS: no musculoskeletal defects are noted and gait is age appropriate without ataxia  SKIN: no suspicious lesions or rashes  NEURO: Normal strength and tone, sensory exam grossly normal, mentation intact and speech normal  PSYCH: mentation appears normal and affect normal/bright    ASSESSMENT / PLAN:   1. Medicare annual wellness visit, subsequent  Will schedule mammo and Dexa  Tetanus if cuts self  Declines Prevnar    2. Gastroesophageal reflux disease without esophagitis  controlled  - omeprazole (PRILOSEC) 20 MG CR capsule; Take 1 capsule (20 mg) by mouth daily  Dispense: 90 capsule; Refill: 3    3. Chronic non-seasonal allergic rhinitis, unspecified trigger  Controlled.  - montelukast (SINGULAIR) 10 MG tablet; Take 1 tablet (10 mg) by mouth At Bedtime  Dispense: 90 tablet; Refill: 3    4. Uncomplicated severe persistent asthma  Not controlled, but improving after prolonged trip. Will try to take advair more regularly and let me know if not improving.   - fluticasone-salmeterol (ADVAIR) 250-50 MCG/DOSE diskus inhaler; Inhale 1 puff into the lungs every 12 hours  Dispense: 3 Inhaler; Refill: 3    5. Epistaxis  More frequent. Discussed saline, vaseline. Hgb stable. Hopefully will decrease when comes off of Efficient, but that is not for another 9 months. If worsens or becomes bothersome will have see ENT.    6. Mild coronary artery disease  S/p stent 4/2017 ot left PDA  - continue ASA, Effient, Lipitor  - Currently off of BB due to concerns for dyspnea, but does not seem to have improved much  - f/u with Cardiology as scheduled    7. Diastolic CHF, chronic (H)  euvolemic today.  Continue lasix.    8. Subclavian artery stenosis, left (H)  H/o stenosis.    9. Asymptomatic menopausal state  Due for repeat DEXA.  - DX Hip/Pelvis/Spine; Future    10.  "Encounter for screening mammogram for breast cancer  - MA Screening Digital Bilateral; Future    End of Life Planning:  Patient currently has an advanced directive: Yes.  Practitioner is supportive of decision.    COUNSELING:  Reviewed preventive health counseling, as reflected in patient instructions       Regular exercise       Healthy diet/nutrition       Osteoporosis Prevention/Bone Health       Advanced Planning   Estimated body mass index is 29.01 kg/(m^2) as calculated from the following:    Height as of this encounter: 5' 5.25\" (1.657 m).    Weight as of this encounter: 175 lb 11.2 oz (79.7 kg).     reports that she quit smoking about 14 years ago. Her smoking use included Cigarettes. She has a 1.00 pack-year smoking history. She has never used smokeless tobacco.        Appropriate preventive services were discussed with this patient, including applicable screening as appropriate for cardiovascular disease, diabetes, osteopenia/osteoporosis, and glaucoma.  As appropriate for age/gender, discussed screening for colorectal cancer, prostate cancer, breast cancer, and cervical cancer. Checklist reviewing preventive services available has been given to the patient.    Reviewed patients plan of care and provided an AVS. The Basic Care Plan (routine screening as documented in Health Maintenance) for June meets the Care Plan requirement. This Care Plan has been established and reviewed with the Patient.    Counseling Resources:  ATP IV Guidelines  Pooled Cohorts Equation Calculator  Breast Cancer Risk Calculator  FRAX Risk Assessment  ICSI Preventive Guidelines  Dietary Guidelines for Americans, 2010  USDA's MyPlate  ASA Prophylaxis  Lung CA Screening    Deisi Liu MD  [facility]  Answers for HPI/ROS submitted by the patient on 7/28/2017   PHQ-2 Score: 0    "

## 2017-08-04 ENCOUNTER — OFFICE VISIT (OUTPATIENT)
Dept: PEDIATRICS | Facility: CLINIC | Age: 77
End: 2017-08-04
Payer: MEDICARE

## 2017-08-04 VITALS
OXYGEN SATURATION: 97 % | HEIGHT: 65 IN | HEART RATE: 79 BPM | TEMPERATURE: 97.3 F | DIASTOLIC BLOOD PRESSURE: 62 MMHG | WEIGHT: 175.7 LBS | BODY MASS INDEX: 29.27 KG/M2 | SYSTOLIC BLOOD PRESSURE: 104 MMHG

## 2017-08-04 DIAGNOSIS — I77.1 SUBCLAVIAN ARTERY STENOSIS, LEFT (H): ICD-10-CM

## 2017-08-04 DIAGNOSIS — Z78.0 ASYMPTOMATIC MENOPAUSAL STATE: ICD-10-CM

## 2017-08-04 DIAGNOSIS — J30.89 CHRONIC NON-SEASONAL ALLERGIC RHINITIS, UNSPECIFIED TRIGGER: ICD-10-CM

## 2017-08-04 DIAGNOSIS — I50.32 DIASTOLIC CHF, CHRONIC (H): ICD-10-CM

## 2017-08-04 DIAGNOSIS — K21.9 GASTROESOPHAGEAL REFLUX DISEASE WITHOUT ESOPHAGITIS: ICD-10-CM

## 2017-08-04 DIAGNOSIS — Z00.00 MEDICARE ANNUAL WELLNESS VISIT, SUBSEQUENT: Primary | ICD-10-CM

## 2017-08-04 DIAGNOSIS — R04.0 EPISTAXIS: ICD-10-CM

## 2017-08-04 DIAGNOSIS — I25.10 MILD CORONARY ARTERY DISEASE: ICD-10-CM

## 2017-08-04 DIAGNOSIS — J45.50 UNCOMPLICATED SEVERE PERSISTENT ASTHMA (H): ICD-10-CM

## 2017-08-04 DIAGNOSIS — Z12.31 ENCOUNTER FOR SCREENING MAMMOGRAM FOR BREAST CANCER: ICD-10-CM

## 2017-08-04 PROCEDURE — G0439 PPPS, SUBSEQ VISIT: HCPCS | Performed by: INTERNAL MEDICINE

## 2017-08-04 RX ORDER — MONTELUKAST SODIUM 10 MG/1
1 TABLET ORAL AT BEDTIME
Qty: 90 TABLET | Refills: 3 | Status: SHIPPED | OUTPATIENT
Start: 2017-08-04 | End: 2020-07-17

## 2017-08-04 NOTE — LETTER
My Heart Failure Action Plan   Name: Victoria Montalvo    YOB: 1940   Date: 8/4/2017    My doctor: Deisi Liu     [address]  [address]  [place] [address] [phone]  My Diagnosis: Diastolic Heart Failure   My Ejection Fraction: Over 50%    My Exercise Goal: 30 minutes daily  .     My Weight Goal: 175  Wt Readings from Last 2 Encounters:   08/04/17 175 lb 11.2 oz (79.7 kg)   07/13/17 178 lb (80.7 kg)     Weigh yourself daily using the same scale. If you gain more than 2 pounds in 24 hours or 5 pounds in a week call the clinic    My Diet Goal: No added salt    Emergency Room Visits:    Our goal is to improve your quality of life and help you avoid a visit to the emergency room or hospital.  If we work together, we can achieve this goal. But, if you feel you need to call 911 or go to the emergency room, please do so.  If you go to the emergency room, please bring your list of medicines and your daily weight chart with you.       GREEN ZONE     Doing well today    Weight gained is no more than 2 pounds a day or 5 pounds a week.    No swelling in feet, ankles, legs or stomach.    No more swelling than usual.    No more trouble breathing than usual.    No change in my sleep.    No other problems. Actions:    I am doing fine.  I will take my medicine, follow my diet, see my doctor, exercise, and watch for symptoms.           YELLOW ZONE         Having a bad day or flare up    Weight gain of more than 2 pounds in one day or 5 pounds in one week.    New swelling in ankle, leg, knee or thigh.    Bloating in belly, pants feel tighter.    Swelling in hands or face.    Coughing or trouble breathing while walking or talking.    Harder to breathe last night.    Have trouble sleeping, wake up short of breath.    Much more tired than usual.    Not eating.    Pain in my chest or bad leg cramps.    Feel weak or dizzy. Actions:    I need to take action and  call my doctor or nurse today.                 RED ZONE         Need medical care now    Weight gain of 5 pounds overnight.    Chest pain or pressure that does not go away.    Feel less alert.    Wheezing or have trouble breathing when at rest.    Cannot sleep lying down.    Cannot take my water pill.    Pass out or faint. Actions:    I need to call my doctor or nurse now!    Call 911 if I have chest pain or cannot breathe.        Electronically signed by: Deisi Seamus Serum, August 4, 2017

## 2017-08-04 NOTE — MR AVS SNAPSHOT
After Visit Summary   8/4/2017 June BALTA Montalvo    MRN: [id number]           Patient Information     Date Of Birth          1940        Visit Information        Provider Department      8/4/2017 10:20 AM Deisi Liu MD [facility] [place]        Today's Diagnoses     Medicare annual wellness visit, subsequent    -  1    Gastroesophageal reflux disease without esophagitis        Chronic non-seasonal allergic rhinitis, unspecified trigger        Uncomplicated severe persistent asthma        Asymptomatic menopausal state        Encounter for screening mammogram for breast cancer          Care Instructions      Preventive Health Recommendations    Female Ages 65 +    Yearly exam:     See your health care provider every year in order to  o Review health changes.   o Discuss preventive care.    o Review your medicines if your doctor has prescribed any.      You no longer need a yearly Pap test unless you've had an abnormal Pap test in the past 10 years. If you have vaginal symptoms, such as bleeding or discharge, be sure to talk with your provider about a Pap test.      Every 1 to 2 years, have a mammogram.  If you are over 69, talk with your health care provider about whether or not you want to continue having screening mammograms.      Every 10 years, have a colonoscopy. Or, have a yearly FIT test (stool test). These exams will check for colon cancer.       Have a cholesterol test every 5 years, or more often if your doctor advises it.       Have a diabetes test (fasting glucose) every three years. If you are at risk for diabetes, you should have this test more often.       At age 65, have a bone density scan (DEXA) to check for osteoporosis (brittle bone disease).    Shots:    Get a flu shot each year.    Get a tetanus shot every 10 years.    Talk to your doctor about your pneumonia vaccines. There are now two you should receive - Pneumovax (PPSV 23) and Prevnar (PCV 13).    Talk to  your doctor about the shingles vaccine.    Talk to your doctor about the hepatitis B vaccine.    Nutrition:     Eat at least 5 servings of fruits and vegetables each day.      Eat whole-grain bread, whole-wheat pasta and brown rice instead of white grains and rice.      Talk to your provider about Calcium and Vitamin D.     Lifestyle    Exercise at least 150 minutes a week (30 minutes a day, 5 days a week). This will help you control your weight and prevent disease.      Limit alcohol to one drink per day.      No smoking.       Wear sunscreen to prevent skin cancer.       See your dentist twice a year for an exam and cleaning.      See your eye doctor every 1 to 2 years to screen for conditions such as glaucoma, macular degeneration and cataracts.    ------------   1. Could try saline spray or vaseline to nose for nose bleeds - if gets worse let me know and would have see ENT  2. If cut yourself, recommend a tetanus shot  3. Sent refills on advair, omeprazole and singulair for when you need them  4. Schedule mammogram and bone density scan - [phone]          Follow-ups after your visit        Follow-up notes from your care team     Return in about 1 year (around 8/4/2018).      Future tests that were ordered for you today     Open Future Orders        Priority Expected Expires Ordered    MA Screening Digital Bilateral Routine  8/4/2018 8/4/2017    DX Hip/Pelvis/Spine Routine  8/4/2018 8/4/2017            Who to contact     If you have questions or need follow up information about today's clinic visit or your schedule [facility] at [phone].  Normal or non-critical lab and imaging results will be communicated to you by MyChart, letter or phone within 4 business days after the clinic has received the results. If you do not hear from us within 7 days, please contact the clinic through MyChart or phone. If you have a critical or abnormal lab result, we will notify you by  "phone as soon as possible.  Submit refill requests through Elli or call your pharmacy and they will forward the refill request to us. Please allow 3 business days for your refill to be completed.          Additional Information About Your Visit        Elli Information     Elli gives you secure access to your electronic health record. If you see a primary care provider, you can also send messages to your care team and make appointments. If you have questions, please call your primary care clinic.  If you do not have a primary care provider, please call [phone] and they will assist you.        Care EveryWhere ID     This is your Care EveryWhere ID. This could be used by other organizations to access your [facility] medical records  [id number]        Your Vitals Were     Pulse Temperature Height Pulse Oximetry BMI (Body Mass Index)       79 97.3  F (36.3  C) (Tympanic) 5' 5.25\" (1.657 m) 97% 29.01 kg/m2        Blood Pressure from Last 3 Encounters:   08/04/17 104/62   07/13/17 124/70   06/30/17 100/58    Weight from Last 3 Encounters:   08/04/17 175 lb 11.2 oz (79.7 kg)   07/13/17 178 lb (80.7 kg)   06/30/17 174 lb (78.9 kg)                 Today's Medication Changes          These changes are accurate as of: 8/4/17 10:58 AM.  If you have any questions, ask your nurse or doctor.               These medicines have changed or have updated prescriptions.        Dose/Directions    spironolactone 25 MG tablet   Commonly known as:  ALDACTONE   This may have changed:    - when to take this  - additional instructions   Used for:  Hyperlipidemia LDL goal <130, Diastolic CHF, chronic (H), Subclavian artery stenosis, left (H), Palpitations        Dose:  12.5 mg   Take 0.5 tablets (12.5 mg) by mouth daily   Quantity:  45 tablet   Refills:  12            Where to get your medicines      These medications were sent to [facility] - [facility], [place] - [address]  [address] [place] [address]     Phone:  [phone]     fluticasone-salmeterol 250-50 MCG/DOSE diskus inhaler    montelukast 10 MG tablet    omeprazole 20 MG CR capsule                Primary Care Provider Office Phone # Fax #    Deisi Seamus Liu [phone]       [facility] [address] [place] [address]        Equal Access to Services     [facility]: Hadii aad ku hadasho Soomaali, waaxda luqadaha, qaybta kaalmada adeegyada, waxay idiin hayaan adeeg kharash la'aan ah. So [facility] [phone].    ATENCIÓN: Si habla español, tiene a yang disposición servicios gratuitos de asistencia lingüística. [facility] [phone].    We comply with applicable federal civil rights laws and [place] laws. We do not discriminate on the basis of race, color, national origin, age, disability sex, sexual orientation or gender identity.            Thank you!     Thank you for choosing [facility]  for your care. Our goal is always to provide you with excellent care. Hearing back from our patients is one way we can continue to improve our services. Please take a few minutes to complete the written survey that you may receive in the mail after your visit with us. Thank you!             Your Updated Medication List - Protect others around you: Learn how to safely use, store and throw away your medicines at www.disposemymeds.org.          This list is accurate as of: 8/4/17 10:58 AM.  Always use your most recent med list.                   Brand Name Dispense Instructions for use Diagnosis    * albuterol (2.5 MG/3ML) 0.083% neb solution     1 Box    Take 1 vial (2.5 mg) by nebulization every 6 hours as needed for shortness of breath / dyspnea    Moderate persistent asthma, uncomplicated       * albuterol 108 (90 BASE) MCG/ACT Inhaler    PROAIR HFA/PROVENTIL HFA/VENTOLIN HFA    3 Inhaler    Inhale 2 puffs into the lungs every 6 hours as needed for shortness of breath / dyspnea or wheezing     "Moderate persistent asthma, uncomplicated       aspirin 81 MG tablet      Take 81 mg by mouth daily.        atorvastatin 40 MG tablet    LIPITOR    90 tablet    Take 1 tablet (40 mg) by mouth daily    Coronary artery disease involving native coronary artery of native heart with unstable angina pectoris (H), Status post coronary angioplasty       biotin 2.5 mg/mL Susp      Take by mouth daily 2000mcg        bisoprolol 5 MG tablet    ZEBETA    45 tablet    Take 0.5 tablets (2.5 mg) by mouth daily    Coronary artery disease involving native coronary artery of native heart with unstable angina pectoris (H)       Calcium 5121-7969 MG-UNIT Chew      Take 1 tablet by mouth daily.        FISH OIL DOUBLE STRENGTH 1200 MG capsule   Generic drug:  omega-3 fatty acids      Take 1 capsule by mouth 2 times daily        fluticasone 50 MCG/ACT spray    FLONASE    48 g    USE 1 TO 2 SPRAYS IN EACH NOSTRIL DAILY    Chronic rhinitis       fluticasone-salmeterol 250-50 MCG/DOSE diskus inhaler    ADVAIR    3 Inhaler    Inhale 1 puff into the lungs every 12 hours    Uncomplicated severe persistent asthma       furosemide 20 MG tablet    LASIX    90 tablet    Take 1 tablet (20 mg) by mouth daily    Diastolic CHF, chronic (H)       MAGNESIUM ACETATE      Take 250 mg by mouth daily        methimazole 5 MG tablet    TAPAZOLE    90 tablet    Take 1 tablet (5 mg) by mouth daily    Subclinical hyperthyroidism, Osteopenia       montelukast 10 MG tablet    SINGULAIR    90 tablet    Take 1 tablet (10 mg) by mouth At Bedtime    Chronic non-seasonal allergic rhinitis, unspecified trigger       nitroGLYcerin 0.4 MG sublingual tablet    NITROSTAT    25 tablet    One tablet under the tongue every 5 minutes if needed for chest pain. May repeat every 5 minutes for a maximum of 3 doses in 15 minutes\"    Coronary artery disease involving native coronary artery of native heart with unstable angina pectoris (H), Status post coronary angioplasty       " omeprazole 20 MG CR capsule    priLOSEC    90 capsule    Take 1 capsule (20 mg) by mouth daily    Gastroesophageal reflux disease without esophagitis       OSTEO BI-FLEX/5-LOXIN ADVANCED Tabs      Take 1,500 mg by mouth 2 times daily        prasugrel 10 MG Tabs tablet    EFFIENT    90 tablet    Take 1 tablet (10 mg) by mouth daily Do not crush or break tablet.    Coronary artery disease involving native coronary artery of native heart with unstable angina pectoris (H), Status post coronary angioplasty       RESTASIS 0.05 % ophthalmic emulsion   Generic drug:  cycloSPORINE      Place 1 drop into both eyes 2 times daily        spironolactone 25 MG tablet    ALDACTONE    45 tablet    Take 0.5 tablets (12.5 mg) by mouth daily    Hyperlipidemia LDL goal <130, Diastolic CHF, chronic (H), Subclavian artery stenosis, left (H), Palpitations       VITAMIN D3 PO      Take 2,000 Units by mouth daily        * Notice:  This list has 2 medication(s) that are the same as other medications prescribed for you. Read the directions carefully, and ask your doctor or other care provider to review them with you.

## 2017-08-04 NOTE — NURSING NOTE
"Chief Complaint   Patient presents with     Medicare Visit       Initial /62 (BP Location: Right arm, Patient Position: Chair, Cuff Size: Adult Large)  Pulse 79  Temp 97.3  F (36.3  C) (Tympanic)  Ht 5' 5.25\" (1.657 m)  Wt 175 lb 11.2 oz (79.7 kg)  SpO2 97%  BMI 29.01 kg/m2 Estimated body mass index is 29.01 kg/(m^2) as calculated from the following:    Height as of this encounter: 5' 5.25\" (1.657 m).    Weight as of this encounter: 175 lb 11.2 oz (79.7 kg).  Medication Reconciliation: complete   Cherie William LPN      "

## 2017-08-05 ASSESSMENT — ASTHMA QUESTIONNAIRES: ACT_TOTALSCORE: 14

## 2017-08-07 ENCOUNTER — RADIANT APPOINTMENT (OUTPATIENT)
Dept: MAMMOGRAPHY | Facility: CLINIC | Age: 77
End: 2017-08-07
Attending: INTERNAL MEDICINE
Payer: MEDICARE

## 2017-08-07 DIAGNOSIS — Z12.31 ENCOUNTER FOR SCREENING MAMMOGRAM FOR BREAST CANCER: ICD-10-CM

## 2017-08-07 PROCEDURE — 77063 BREAST TOMOSYNTHESIS BI: CPT | Mod: TC

## 2017-08-07 PROCEDURE — G0202 SCR MAMMO BI INCL CAD: HCPCS | Mod: TC

## 2017-08-09 ENCOUNTER — RADIANT APPOINTMENT (OUTPATIENT)
Dept: BONE DENSITY | Facility: CLINIC | Age: 77
End: 2017-08-09
Attending: INTERNAL MEDICINE
Payer: MEDICARE

## 2017-08-09 DIAGNOSIS — Z78.0 ASYMPTOMATIC MENOPAUSAL STATE: ICD-10-CM

## 2017-08-09 PROCEDURE — 77085 DXA BONE DENSITY AXL VRT FX: CPT | Performed by: FAMILY MEDICINE

## 2017-08-09 PROCEDURE — 77081 DXA BONE DENSITY APPENDICULR: CPT | Mod: 59 | Performed by: FAMILY MEDICINE

## 2017-08-25 ENCOUNTER — OFFICE VISIT (OUTPATIENT)
Dept: PEDIATRICS | Facility: CLINIC | Age: 77
End: 2017-08-25
Payer: MEDICARE

## 2017-08-25 VITALS
TEMPERATURE: 97.5 F | WEIGHT: 181.3 LBS | DIASTOLIC BLOOD PRESSURE: 60 MMHG | HEART RATE: 80 BPM | HEIGHT: 65 IN | BODY MASS INDEX: 30.21 KG/M2 | OXYGEN SATURATION: 96 % | SYSTOLIC BLOOD PRESSURE: 118 MMHG

## 2017-08-25 DIAGNOSIS — M81.0 AGE-RELATED OSTEOPOROSIS WITHOUT CURRENT PATHOLOGICAL FRACTURE: Primary | ICD-10-CM

## 2017-08-25 PROCEDURE — 99213 OFFICE O/P EST LOW 20 MIN: CPT | Performed by: INTERNAL MEDICINE

## 2017-08-25 RX ORDER — ALENDRONATE SODIUM 70 MG/1
70 TABLET ORAL
Qty: 12 TABLET | Refills: 3 | Status: SHIPPED | OUTPATIENT
Start: 2017-08-25 | End: 2017-10-30

## 2017-08-25 NOTE — PATIENT INSTRUCTIONS
1. Continue calcium/vitamin D  2. Will check vitamin D levels with next set of labs  3. Continue regular exercise  4. Start fosamax (alendronate) - 1 pill once a week. - take on empty stomach with big glass of water and stay upright for 30-60 min after  5. Let me know if you have side effects  6. Will repeat bone density scan in 3 years      Osteoporosis Medications: Bisphosphonates  Depending on your needs, your healthcare provider may prescribe medicines to prevent or treat osteoporosis.    Bisphosphonates  Several medicines make up the class of drugs known as bisphosphonates. Bisphosphonates are the most common type of medicine used to help prevent and treat bone loss. Bisphosphonates are given in pill or injectable form as an IV infusion. They must be taken exactly as directed. Benefits may include:    Reducing bone loss    Increasing bone density in the hip and spine    Reducing risk of fractures in the spine, hip, and wrist  Side effects may include:    Heartburn    Nausea    Abdominal pain    Bone or muscle pain  Taking bisphosphonates pills  Always read medicine information closely. Certain bisphosphonates must be taken:    On an empty stomach.    With a full glass of water (8 oz) first thing in the morning.    At least 30 minutes to one hour before any food, drink, or other medications.    While sitting or standing. You should not lie down for at least 30 minutes after taking the medicine.  Newer medicines can be taken weekly or monthly. Talk with your doctor to find out which one is right for you.   Date Last Reviewed: 10/11/2015    8703-3938 [employer]. [address] [place] [address], [place] [address]. All rights reserved. This information is not intended as a substitute for professional medical care. Always follow your healthcare professional's instructions.

## 2017-08-25 NOTE — NURSING NOTE
"Chief Complaint   Patient presents with     RECHECK     follow up on Dexa       Initial /60 (BP Location: Right arm, Patient Position: Chair, Cuff Size: Adult Regular)  Pulse 80  Temp 97.5  F (36.4  C) (Tympanic)  Ht 5' 5.25\" (1.657 m)  Wt 181 lb 4.8 oz (82.2 kg)  SpO2 96%  BMI 29.94 kg/m2 Estimated body mass index is 29.94 kg/(m^2) as calculated from the following:    Height as of this encounter: 5' 5.25\" (1.657 m).    Weight as of this encounter: 181 lb 4.8 oz (82.2 kg).  Medication Reconciliation: linda William LPN      "

## 2017-08-25 NOTE — MR AVS SNAPSHOT
After Visit Summary   8/25/2017    Victoria Montalvo    MRN: [id number]           Patient Information     Date Of Birth          1940        Visit Information        Provider Department      8/25/2017 10:00 AM Deisi Liu MD [facility]        Today's Diagnoses     Age-related osteoporosis without current pathological fracture    -  1      Care Instructions    1. Continue calcium/vitamin D  2. Will check vitamin D levels with next set of labs  3. Continue regular exercise  4. Start fosamax (alendronate) - 1 pill once a week. - take on empty stomach with big glass of water and stay upright for 30-60 min after  5. Let me know if you have side effects  6. Will repeat bone density scan in 3 years      Osteoporosis Medications: Bisphosphonates  Depending on your needs, your healthcare provider may prescribe medicines to prevent or treat osteoporosis.    Bisphosphonates  Several medicines make up the class of drugs known as bisphosphonates. Bisphosphonates are the most common type of medicine used to help prevent and treat bone loss. Bisphosphonates are given in pill or injectable form as an IV infusion. They must be taken exactly as directed. Benefits may include:    Reducing bone loss    Increasing bone density in the hip and spine    Reducing risk of fractures in the spine, hip, and wrist  Side effects may include:    Heartburn    Nausea    Abdominal pain    Bone or muscle pain  Taking bisphosphonates pills  Always read medicine information closely. Certain bisphosphonates must be taken:    On an empty stomach.    With a full glass of water (8 oz) first thing in the morning.    At least 30 minutes to one hour before any food, drink, or other medications.    While sitting or standing. You should not lie down for at least 30 minutes after taking the medicine.  Newer medicines can be taken weekly or monthly. Talk with your doctor to find out which one is right for you.   Date Last  "Reviewed: 10/11/2015    [id number] [employer]. [address] [place] [address], [place] [address]. All rights reserved. This information is not intended as a substitute for professional medical care. Always follow your healthcare professional's instructions.                Follow-ups after your visit        Who to contact     If you have questions or need follow up information about today's clinic visit or your schedule please contact [facility] directly at [phone].  Normal or non-critical lab and imaging results will be communicated to you by Syandushart, letter or phone within 4 business days after the clinic has received the results. If you do not hear from us within 7 days, please contact the clinic through Ev[employer] or phone. If you have a critical or abnormal lab result, we will notify you by phone as soon as possible.  Submit refill requests through [employer] or call your pharmacy and they will forward the refill request to us. Please allow 3 business days for your refill to be completed.          Additional Information About Your Visit        SyandusharUrlist Information     [employer] gives you secure access to your electronic health record. If you see a primary care provider, you can also send messages to your care team and make appointments. If you have questions, please call your primary care clinic.  If you do not have a primary care provider, please call [phone] and they will assist you.        Care EveryWhere ID     This is your Care EveryWhere ID. This could be used by other organizations to access your [facility] medical records  [id number]        Your Vitals Were     Pulse Temperature Height Pulse Oximetry BMI (Body Mass Index)       80 97.5  F (36.4  C) (Tympanic) 5' 5.25\" (1.657 m) 96% 29.94 kg/m2        Blood Pressure from Last 3 Encounters:   08/25/17 118/60   08/04/17 104/62   07/13/17 124/70    Weight from Last 3 Encounters:   08/25/17 181 lb 4.8 oz (82.2 kg)   08/04/17 175 lb 11.2 oz " (79.7 kg)   07/13/17 178 lb (80.7 kg)              Today, you had the following     No orders found for display         Today's Medication Changes          These changes are accurate as of: 8/25/17 10:15 AM.  If you have any questions, ask your nurse or doctor.               Start taking these medicines.        Dose/Directions    alendronate 70 MG tablet   Commonly known as:  FOSAMAX   Used for:  Age-related osteoporosis without current pathological fracture   Started by:  Deisi Liu MD        Dose:  70 mg   Take 1 tablet (70 mg) by mouth every 7 days Take 60 minutes before am meal with 8 oz. water. Remain upright for 30 minutes.   Quantity:  12 tablet   Refills:  3         These medicines have changed or have updated prescriptions.        Dose/Directions    spironolactone 25 MG tablet   Commonly known as:  ALDACTONE   This may have changed:    - when to take this  - additional instructions   Used for:  Hyperlipidemia LDL goal <130, Diastolic CHF, chronic (H), Subclavian artery stenosis, left (H), Palpitations        Dose:  12.5 mg   Take 0.5 tablets (12.5 mg) by mouth daily   Quantity:  45 tablet   Refills:  12            Where to get your medicines      These medications were sent to [facility] [address] [place] [id number]    Hours:  24-hours Phone:  [phone]     alendronate 70 MG tablet                Primary Care Provider Office Phone # Fax #    Deisi Liu [phone]       [facility] [place] [address]        Equal Access to Services     [facility] AH: Hadii mercedes ku hadasho Soomaali, waaxda luqadaha, qaybta kaalmada adeegyada, estefani [place]. So [facility] [phone].    ATENCIÓN: Si habla español, tiene a yang disposición servicios gratuitos de asistencia lingüística. Llame al [phone].    We comply with applicable federal  civil rights laws and [place] laws. We do not discriminate on the basis of race, color, national origin, age, disability sex, sexual orientation or gender identity.            Thank you!     Thank you for choosing [facility]  for your care. Our goal is always to provide you with excellent care. Hearing back from our patients is one way we can continue to improve our services. Please take a few minutes to complete the written survey that you may receive in the mail after your visit with us. Thank you!             Your Updated Medication List - Protect others around you: Learn how to safely use, store and throw away your medicines at www.disposemymeds.org.          This list is accurate as of: 8/25/17 10:15 AM.  Always use your most recent med list.                   Brand Name Dispense Instructions for use Diagnosis    * albuterol (2.5 MG/3ML) 0.083% neb solution     1 Box    Take 1 vial (2.5 mg) by nebulization every 6 hours as needed for shortness of breath / dyspnea    Moderate persistent asthma, uncomplicated       * albuterol 108 (90 BASE) MCG/ACT Inhaler    PROAIR HFA/PROVENTIL HFA/VENTOLIN HFA    3 Inhaler    Inhale 2 puffs into the lungs every 6 hours as needed for shortness of breath / dyspnea or wheezing    Moderate persistent asthma, uncomplicated       alendronate 70 MG tablet    FOSAMAX    12 tablet    Take 1 tablet (70 mg) by mouth every 7 days Take 60 minutes before am meal with 8 oz. water. Remain upright for 30 minutes.    Age-related osteoporosis without current pathological fracture       aspirin 81 MG tablet      Take 81 mg by mouth daily.        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        FISH OIL DOUBLE STRENGTH 1200 MG capsule   Generic drug:  omega-3 fatty acids      Take 1 capsule by mouth 2 times daily        fluticasone 50 MCG/ACT spray    FLONASE    48 g    USE 1 TO 2 SPRAYS IN EACH NOSTRIL DAILY    Chronic rhinitis       fluticasone-salmeterol 250-50 MCG/DOSE diskus inhaler    ADVAIR    3 Inhaler    Inhale 1 puff into the lungs every 12 hours    Uncomplicated severe persistent asthma       furosemide 20 MG tablet    LASIX    90 tablet    Take 1 tablet (20 mg) by mouth daily    Diastolic CHF, chronic (H)       MAGNESIUM ACETATE      Take 250 mg by mouth daily        methimazole 5 MG tablet    TAPAZOLE    90 tablet    Take 1 tablet (5 mg) by mouth daily    Subclinical hyperthyroidism, Osteopenia       montelukast 10 MG tablet    SINGULAIR    90 tablet    Take 1 tablet (10 mg) by mouth At Bedtime    Chronic non-seasonal allergic rhinitis, unspecified trigger       nitroGLYcerin 0.4 MG sublingual tablet    NITROSTAT    25 tablet    One tablet under the tongue every 5 minutes if needed for chest pain. May repeat every 5 minutes for a maximum of 3 doses in 15 minutes\"    Coronary artery disease involving native coronary artery of native heart with unstable angina pectoris (H), Status post coronary angioplasty       omeprazole 20 MG CR capsule    priLOSEC    90 capsule    Take 1 capsule (20 mg) by mouth daily    Gastroesophageal reflux disease without esophagitis       OSTEO BI-FLEX/5-LOXIN ADVANCED Tabs      Take 1,500 mg by mouth 2 times daily        prasugrel 10 MG Tabs tablet    EFFIENT    90 tablet    Take 1 tablet (10 mg) by mouth daily Do not crush or break tablet.    Coronary artery disease involving native coronary artery of native heart with unstable " angina pectoris (H), Status post coronary angioplasty       RESTASIS 0.05 % ophthalmic emulsion   Generic drug:  cycloSPORINE      Place 1 drop into both eyes 2 times daily        spironolactone 25 MG tablet    ALDACTONE    45 tablet    Take 0.5 tablets (12.5 mg) by mouth daily    Hyperlipidemia LDL goal <130, Diastolic CHF, chronic (H), Subclavian artery stenosis, left (H), Palpitations       VITAMIN D3 PO      Take 2,000 Units by mouth daily        * Notice:  This list has 2 medication(s) that are the same as other medications prescribed for you. Read the directions carefully, and ask your doctor or other care provider to review them with you.

## 2017-08-25 NOTE — PROGRESS NOTES
"  SUBJECTIVE:   Victoria Montalvo is a 77 year old female who presents to clinic today for the following health issues:      Patient here for follow up on Dexa results    Had recent DEXA that showed osteoporosis of forearm. Changed from previous. Taking 1200 mg of calcium and 2000 units of vitamin D. Has not had vitamin D levels checked since 2011. Patient is a former smoker. Has started exercising regularly at the [facility]. Ankle fracture at age 47 from fall and at 59 from twisting and fall.    Reviewed and updated as needed this visit by clinical staff  Tobacco  Allergies  Meds  Problems  Med Hx  Surg Hx  Fam Hx  Soc Hx        Reviewed and updated as needed this visit by Provider  Allergies  Meds  Problems       -------------------------------------    Problem list and histories reviewed & adjusted, as indicated.  Additional history: as documented    ROS:  Constitutional, HEENT, cardiovascular, pulmonary, gi and gu systems are negative, except as otherwise noted.      Problem list, Medication list, Allergies, and Medical/Social/Surgical histories reviewed in [facility] and updated as appropriate.    OBJECTIVE:                                                    /60 (BP Location: Right arm, Patient Position: Chair, Cuff Size: Adult Regular)  Pulse 80  Temp 97.5  F (36.4  C) (Tympanic)  Ht 5' 5.25\" (1.657 m)  Wt 181 lb 4.8 oz (82.2 kg)  SpO2 96%  BMI 29.94 kg/m2   Body mass index is 29.94 kg/(m^2).  General Appearance: healthy, alert and no distress  Eyes:   no discharge, erythema.  Normal pupils.  Skin: no rashes or lesions.  Well perfused and normal turgor.    Diagnostic Test Results:  DEXA (8/9/2017):  FINDINGS:               Lumbar Spine (L1-L4)      T-score:  0.9               Left Femoral Neck                      T-score:  -1.3               Right Femoral Neck                    T-score:  Hip replacement               Forearm (radius 33%)      T-score:  -2.6                   Lumbar (L1-L4) " BMD: 1.301            Previous: 1.318                                    Left Total Hip Mean BMD: 0.917                 Previous: 0.958 Total Hip Mean Bilateral     ASSESSMENT/PLAN:                                                      (M81.0) Age-related osteoporosis without current pathological fracture  (primary encounter diagnosis)  Comment: osteoporosis of forearm  Plan: alendronate (FOSAMAX) 70 MG tablet  - getting adequate calcium/vitamin D  - recommend check vitamin D levels with next labs - ordered  - discussed regular weight bearing exercise  - discussed starting fosamax for fracture reduction  - discussed possible side effects including acid-reflux, esophagitis, muscle pains, osteonecrosis of the jaw and atypical hip fractures  - advised any planned dental work should be completed before starting the medication  - she will let me know if she has side effects  - repeat dexa in 3 years      Follow up with Provider - annual visit and as needed     Deisi Way [facility]

## 2017-08-29 ENCOUNTER — TELEPHONE (OUTPATIENT)
Dept: PEDIATRICS | Facility: CLINIC | Age: 77
End: 2017-08-29

## 2017-08-29 DIAGNOSIS — Z71.89 ADVANCED DIRECTIVES, COUNSELING/DISCUSSION: Chronic | ICD-10-CM

## 2017-08-29 NOTE — LETTER
[address]  [place] [address]  [phone]      August 29, 2017 June BALTA Montalvo  [address] [facility] [address]    Dear Victoria  :    A review of your medical record indicated:      ð    You have no Health Care Directive on file.    We greatly value the opportunity to assist you in documenting your choices and to honor your wishes. We have several options to assist you in completing or updating your document.     Copies of the [facility] Choices Health Care Directive and various informational materials can be viewed and printed for free at our web site: [email or web address]    Free group classes on Advance Care Planning and completing a Health Care Directive are available at multiple locations and times. These 90 minute classes are led by trained facilitators who will provide information and guide you through a Health Care Directive form. They can also review, notarize and add your Health Care Directive to your medical record. [place] for a class at:  [email or web address] or by calling [employer] at [phone] or toll free [phone].     ð  Call me at the contact information listed below for assistance or to make an appointment to discuss creating a Health Care Directive, OR  ð Email us at [email or web address] for questions and assistance with creating a Health Care Directive.    COPIES of completed Health Care Directives can be brought or mailed to any of  our locations including the address listed below. You may also e-mail a copy to: [email or web address]    Sincerely,     Stacie Raymundo RN  Advance Care Planning Facilitator   [address] [place] [address]  [phone]

## 2017-08-29 NOTE — TELEPHONE ENCOUNTER
Advance Care Planning 8/29/2017: ACP Review of Chart / Resources Provided:  Reviewed chart for advance care plan.  Victoria Montalvo has no plan and Full code status on file. Advance Care Planning letter sent.   Added by Stacie Raymundo

## 2017-09-28 DIAGNOSIS — I25.110 CORONARY ARTERY DISEASE INVOLVING NATIVE CORONARY ARTERY OF NATIVE HEART WITH UNSTABLE ANGINA PECTORIS (H): ICD-10-CM

## 2017-09-28 DIAGNOSIS — Z98.61 STATUS POST CORONARY ANGIOPLASTY: ICD-10-CM

## 2017-09-28 RX ORDER — PRASUGREL 10 MG/1
10 TABLET, FILM COATED ORAL DAILY
Qty: 90 TABLET | Refills: 1 | Status: SHIPPED | OUTPATIENT
Start: 2017-09-28 | End: 2018-03-28

## 2017-10-10 DIAGNOSIS — K21.9 GASTROESOPHAGEAL REFLUX DISEASE WITHOUT ESOPHAGITIS: ICD-10-CM

## 2017-10-10 DIAGNOSIS — E05.90 SUBCLINICAL HYPERTHYROIDISM: ICD-10-CM

## 2017-10-10 NOTE — TELEPHONE ENCOUNTER
omeprazole (PRILOSEC) 20 MG CR capsule      Last Written Prescription Date: 8/4/2017  Last Fill Quantity: 90,  # refills: 3   Last Office Visit with [facility], [facility] or [facility] prescribing provider: 8/25/2017

## 2017-10-10 NOTE — LETTER
[facility]  [address] [place] [address]  [phone]      October 18, 2017 June BALTA Montalvo  [address] [place] [address]                  Dear June     This is to remind you that your physician expected you to return for a follow up lab appointment with a clinic visit one week after the lab appointment.  You may call our office at [phone] ([place]) or [phone] ([place]) to schedule an appointment.    Please disregard this notice if you have recently had an appointment.      Sincerely,      Dr. Annie Alvarez

## 2017-10-11 RX ORDER — METHIMAZOLE 10 MG/1
TABLET ORAL
Qty: 30 TABLET | Refills: 1 | Status: SHIPPED | OUTPATIENT
Start: 2017-10-11 | End: 2017-11-14 | Stop reason: DRUGHIGH

## 2017-10-11 NOTE — TELEPHONE ENCOUNTER
Rx sent.    Due for labs and OV  Please make a lab appointment for blood work and follow up clinic appointment in 1 week after that to discuss results.    Please send reminder letter to patient.

## 2017-10-11 NOTE — TELEPHONE ENCOUNTER
Methimazole      Last Written Prescription Date:  5/11/17  Last Fill Quantity: 90,   # refills: 1  [facility]: 8/25/17  Future Office visit:       Routing refill request to provider for review/approval because:  Drug not on the [facility] refill protocol or controlled substance.

## 2017-10-18 ENCOUNTER — NURSE TRIAGE (OUTPATIENT)
Dept: NURSING | Facility: CLINIC | Age: 77
End: 2017-10-18

## 2017-10-18 NOTE — TELEPHONE ENCOUNTER
Patient wanted to know if she needs to be fasting for her lab draw. Looking at her history of lab draws, it doesn't look like the labs she's had in the past, required fasting so advised she doesn't need to fast.  Mallika Sullivan RN-[facility]

## 2017-10-23 DIAGNOSIS — E05.90 SUBCLINICAL HYPERTHYROIDISM: ICD-10-CM

## 2017-10-23 DIAGNOSIS — M85.80 OSTEOPENIA: ICD-10-CM

## 2017-10-23 LAB
ALBUMIN SERPL-MCNC: 3.3 G/DL (ref 3.4–5)
ALP SERPL-CCNC: 64 U/L (ref 40–150)
ALT SERPL W P-5'-P-CCNC: 20 U/L (ref 0–50)
AST SERPL W P-5'-P-CCNC: 15 U/L (ref 0–45)
BILIRUB DIRECT SERPL-MCNC: 0.1 MG/DL (ref 0–0.2)
BILIRUB SERPL-MCNC: 0.3 MG/DL (ref 0.2–1.3)
PROT SERPL-MCNC: 7.2 G/DL (ref 6.8–8.8)
T4 FREE SERPL-MCNC: 0.89 NG/DL (ref 0.76–1.46)
TSH SERPL DL<=0.005 MIU/L-ACNC: 2.39 MU/L (ref 0.4–4)
WBC # BLD AUTO: 5.6 10E9/L (ref 4–11)

## 2017-10-23 PROCEDURE — 84443 ASSAY THYROID STIM HORMONE: CPT | Performed by: INTERNAL MEDICINE

## 2017-10-23 PROCEDURE — 80076 HEPATIC FUNCTION PANEL: CPT | Performed by: INTERNAL MEDICINE

## 2017-10-23 PROCEDURE — 84439 ASSAY OF FREE THYROXINE: CPT | Performed by: INTERNAL MEDICINE

## 2017-10-23 PROCEDURE — 85048 AUTOMATED LEUKOCYTE COUNT: CPT | Performed by: INTERNAL MEDICINE

## 2017-10-23 PROCEDURE — 36415 COLL VENOUS BLD VENIPUNCTURE: CPT | Performed by: INTERNAL MEDICINE

## 2017-10-24 DIAGNOSIS — J31.0 CHRONIC RHINITIS: ICD-10-CM

## 2017-10-25 RX ORDER — FLUTICASONE PROPIONATE 50 MCG
SPRAY, SUSPENSION (ML) NASAL
Qty: 48 G | Refills: 2 | Status: SHIPPED | OUTPATIENT
Start: 2017-10-25 | End: 2018-07-22

## 2017-10-25 NOTE — TELEPHONE ENCOUNTER
Prescription approved per [facility] Refill Protocol.  Zuri Freeman, RN  Triage Nurse

## 2017-10-30 ENCOUNTER — MYC MEDICAL ADVICE (OUTPATIENT)
Dept: PEDIATRICS | Facility: CLINIC | Age: 77
End: 2017-10-30

## 2017-10-30 DIAGNOSIS — M81.0 AGE-RELATED OSTEOPOROSIS WITHOUT CURRENT PATHOLOGICAL FRACTURE: ICD-10-CM

## 2017-10-30 RX ORDER — ALENDRONATE SODIUM 70 MG/1
70 TABLET ORAL
Qty: 12 TABLET | Refills: 2 | Status: SHIPPED | OUTPATIENT
Start: 2017-10-30 | End: 2018-06-22

## 2017-10-30 NOTE — TELEPHONE ENCOUNTER
Alendronate was ordered on 08/25/17 for Qty of 12 with 3 refills.  Remainder of refills sent to [facility] per patient request.  MARGARITO Mayer RN

## 2017-11-14 ENCOUNTER — OFFICE VISIT (OUTPATIENT)
Dept: ENDOCRINOLOGY | Facility: CLINIC | Age: 77
End: 2017-11-14
Payer: MEDICARE

## 2017-11-14 VITALS
OXYGEN SATURATION: 97 % | WEIGHT: 177.1 LBS | BODY MASS INDEX: 29.25 KG/M2 | TEMPERATURE: 97.4 F | HEART RATE: 64 BPM | SYSTOLIC BLOOD PRESSURE: 128 MMHG | DIASTOLIC BLOOD PRESSURE: 78 MMHG

## 2017-11-14 DIAGNOSIS — E05.90 SUBCLINICAL HYPERTHYROIDISM: Primary | ICD-10-CM

## 2017-11-14 PROCEDURE — 99214 OFFICE O/P EST MOD 30 MIN: CPT | Performed by: INTERNAL MEDICINE

## 2017-11-14 RX ORDER — METHIMAZOLE 5 MG/1
2.5 TABLET ORAL DAILY
Qty: 45 TABLET | Refills: 1 | Status: SHIPPED | OUTPATIENT
Start: 2017-11-14 | End: 2018-01-17

## 2017-11-14 NOTE — NURSING NOTE
"Chief Complaint   Patient presents with     RECHECK     discuss meds / hyperthyroidism       Initial /78  Pulse 64  Temp 97.4  F (36.3  C)  Wt 80.3 kg (177 lb 1.6 oz)  SpO2 97%  BMI 29.25 kg/m2 Estimated body mass index is 29.25 kg/(m^2) as calculated from the following:    Height as of 17: 1.657 m (5' 5.25\").    Weight as of this encounter: 80.3 kg (177 lb 1.6 oz).  Medication Reconciliation: complete     ENDOCRINOLOGY INTAKE FORM    Patient Name:  Victoria Montalvo  :  1940    Is patient Diabetic?   No  Does patient have non-diabetic or other endocrine issues?  Yes: hyperthyroidism    Vitals: /78  Pulse 64  Temp 97.4  F (36.3  C)  Wt 80.3 kg (177 lb 1.6 oz)  SpO2 97%  BMI 29.25 kg/m2  BMI= Body mass index is 29.25 kg/(m^2).    Flu vaccine:  no  Pneumonia vaccine:  No    Smoking and Alcohol use:no              Staff Signature:    Anna Kim CMA        "

## 2017-11-14 NOTE — MR AVS SNAPSHOT
After Visit Summary   2017    Victoria Montalvo    MRN: [id number]           Patient Information     Date Of Birth          1940        Visit Information        Provider Department      2017 11:30 AM Annie Alvarez MD [facility]        Today's Diagnoses     Subclinical hyperthyroidism    -  1      Care Instructions    [facility]   Dr Alvarez, Endocrinology Department      [facility]   [address]  [place] [address]  Appointment [phone]  Fax: [phone]  [place]: Monday and Tuesday         [place] [address]  [place] [address]  Appointment [phone]  Fax: [phone]  [place]: Wednesday and Thursday            Decrease methimazole to 2.5 mg/day  Labs in 6-8 weeks  Please make a lab appointment for blood work and follow up clinic appointment in 1 week after that to discuss results.    Discussed possible side effects of methimazole including liver dysfunction and agranulocytosis. Discussed to watch for s/s like jaundice, abdominal pain and skin rash. In case of prolonged unresolving fever, sore throat and infections, advise to seek medical care.    Call if:     Signs or symptoms of infection. These include a fever of 100.5 degrees or higher, chills, severe sore throat, ear or sinus pain, cough, increased sputum or change in color, painful urination, mouth sores.   Severe nausea or vomiting.   Joint pain or swelling.   Not able to eat.   Unusual bruising or bleeding.   Dark urine or yellow skin or eyes.              Follow-ups after your visit        Your next 10 appointments already scheduled     2018 12:30 PM CST   Return Visit with Alia Yoo PA-C   [facility] ([facility])    [address]  [facility] [address] [phone]               Future tests that were ordered for you today     Open Future Orders        Priority Expected Expires Ordered    T3 Free Routine  9/10/2018 11/14/2017    T4 free Routine  9/10/2018 11/14/2017    TSH Routine  9/10/2018 11/14/2017    WBC count Routine  9/10/2018 11/14/2017    Hepatic panel Routine  9/10/2018 11/14/2017            Who to contact     If you have questions or need follow up information about today's clinic visit or your schedule please contact [facility] directly at [phone].  Normal or non-critical lab and imaging results will be communicated to you by [email or web address], letter or phone within 4 business days after the clinic has received the results. If you do not hear from us within 7 days, please contact the clinic through [employer] or phone. If you have a critical or abnormal lab result, we will notify you by phone as soon as possible.  Submit refill requests through [employer] or call your pharmacy and they will forward the refill request to us. Please allow 3 business days for your refill to be completed.          Additional Information About Your Visit        [email or web address] Information     [employer] gives you secure access to your electronic health record. If you see a primary care provider, you can also send messages to your care team and make appointments. If you have questions, please call your primary care clinic.  If you do not have a primary care provider, please call [phone] and they will assist you.        Care EveryWhere ID     This is your Care EveryWhere ID. This could be used by other organizations to access your [facility] medical records  [id number]        Your Vitals Were     Pulse Temperature Pulse Oximetry BMI (Body Mass Index)          64 97.4  F (36.3  C) 97% 29.25 kg/m2         Blood Pressure from Last 3 Encounters:   11/14/17 128/78   08/25/17 118/60   08/04/17 104/62    Weight from Last 3 Encounters:   11/14/17 80.3 kg (177 lb 1.6 oz)   08/25/17 82.2 kg (181 lb 4.8 oz)    08/04/17 79.7 kg (175 lb 11.2 oz)                 Today's Medication Changes          These changes are accurate as of: 11/14/17 11:53 AM.  If you have any questions, ask your nurse or doctor.               These medicines have changed or have updated prescriptions.        Dose/Directions    methimazole 5 MG tablet   Commonly known as:  TAPAZOLE   This may have changed:    - how much to take  - Another medication with the same name was removed. Continue taking this medication, and follow the directions you see here.   Used for:  Subclinical hyperthyroidism   Changed by:  Annie Alvarez MD        Dose:  2.5 mg   Take 0.5 tablets (2.5 mg) by mouth daily   Quantity:  45 tablet   Refills:  1       spironolactone 25 MG tablet   Commonly known as:  ALDACTONE   This may have changed:    - when to take this  - additional instructions   Used for:  Hyperlipidemia LDL goal <130, Diastolic CHF, chronic (H), Subclavian artery stenosis, left (H), Palpitations        Dose:  12.5 mg   Take 0.5 tablets (12.5 mg) by mouth daily   Quantity:  45 tablet   Refills:  12            Where to get your medicines      These medications were sent to [facility] - [address] [address]     Phone:  [phone]     methimazole 5 MG tablet                Primary Care Provider Office Phone # Fax #    Deisi Seamus Liu [phone] [address] [place] [address]        Equal Access to Services     [facility]: Hadii mercedes pineda hadasho Sochris, waaxda luqadaha, qaybta kaalmada adeegyada, estefani lake. So [facility] [phone].    ATENCIÓN: Si habla español, tiene a yang disposición servicios gratuitos de asistencia lingüística. Llame al [phone].    We comply with applicable federal civil rights laws and [place] laws. We do not discriminate on the basis of race, color, national origin, age,  disability, sex, sexual orientation, or gender identity.            Thank you!     Thank you for choosing [facility]  for your care. Our goal is always to provide you with excellent care. Hearing back from our patients is one way we can continue to improve our services. Please take a few minutes to complete the written survey that you may receive in the mail after your visit with us. Thank you!             Your Updated Medication List - Protect others around you: Learn how to safely use, store and throw away your medicines at www.disposemymeds.org.          This list is accurate as of: 11/14/17 11:53 AM.  Always use your most recent med list.                   Brand Name Dispense Instructions for use Diagnosis    * albuterol (2.5 MG/3ML) 0.083% neb solution     1 Box    Take 1 vial (2.5 mg) by nebulization every 6 hours as needed for shortness of breath / dyspnea    Moderate persistent asthma, uncomplicated       * albuterol 108 (90 BASE) MCG/ACT Inhaler    PROAIR HFA/PROVENTIL HFA/VENTOLIN HFA    3 Inhaler    Inhale 2 puffs into the lungs every 6 hours as needed for shortness of breath / dyspnea or wheezing    Moderate persistent asthma, uncomplicated       alendronate 70 MG tablet    FOSAMAX    12 tablet    Take 1 tablet (70 mg) by mouth every 7 days Take 60 minutes before am meal with 8 oz. water. Remain upright for 30 minutes.    Age-related osteoporosis without current pathological fracture       aspirin 81 MG tablet      Take 81 mg by mouth daily.        atorvastatin 40 MG tablet    LIPITOR    90 tablet    Take 1 tablet (40 mg) by mouth daily    Coronary artery disease involving native coronary artery of native heart with unstable angina pectoris (H), Status post coronary angioplasty       biotin 2.5 mg/mL Susp      Take by mouth daily 2000mcg        bisoprolol 5 MG tablet    ZEBETA    45 tablet    Take 0.5 tablets (2.5 mg) by mouth daily    Coronary artery disease involving native coronary artery  "of native heart with unstable angina pectoris (H)       Calcium 2321-8073 MG-UNIT Chew      Take 1 tablet by mouth daily.        FISH OIL DOUBLE STRENGTH 1200 MG capsule   Generic drug:  omega-3 fatty acids      Take 1 capsule by mouth 2 times daily        fluticasone 50 MCG/ACT spray    FLONASE    48 g    USE 1 TO 2 SPRAYS IN EACH NOSTRIL DAILY    Chronic rhinitis       fluticasone-salmeterol 250-50 MCG/DOSE diskus inhaler    ADVAIR    3 Inhaler    Inhale 1 puff into the lungs every 12 hours    Uncomplicated severe persistent asthma       furosemide 20 MG tablet    LASIX    90 tablet    Take 1 tablet (20 mg) by mouth daily    Diastolic CHF, chronic (H)       MAGNESIUM ACETATE      Take 250 mg by mouth daily        methimazole 5 MG tablet    TAPAZOLE    45 tablet    Take 0.5 tablets (2.5 mg) by mouth daily    Subclinical hyperthyroidism       montelukast 10 MG tablet    SINGULAIR    90 tablet    Take 1 tablet (10 mg) by mouth At Bedtime    Chronic non-seasonal allergic rhinitis, unspecified trigger       nitroGLYcerin 0.4 MG sublingual tablet    NITROSTAT    25 tablet    One tablet under the tongue every 5 minutes if needed for chest pain. May repeat every 5 minutes for a maximum of 3 doses in 15 minutes\"    Coronary artery disease involving native coronary artery of native heart with unstable angina pectoris (H), Status post coronary angioplasty       omeprazole 20 MG CR capsule    priLOSEC    90 capsule    Take 1 capsule (20 mg) by mouth daily    Gastroesophageal reflux disease without esophagitis       OSTEO BI-FLEX/5-LOXIN ADVANCED Tabs      Take 1,500 mg by mouth 2 times daily        prasugrel 10 MG Tabs tablet    EFFIENT    90 tablet    Take 1 tablet (10 mg) by mouth daily Do not crush or break tablet.    Coronary artery disease involving native coronary artery of native heart with unstable angina pectoris (H), Status post coronary angioplasty       RESTASIS 0.05 % ophthalmic emulsion   Generic drug:  " cycloSPORINE      Place 1 drop into both eyes 2 times daily        spironolactone 25 MG tablet    ALDACTONE    45 tablet    Take 0.5 tablets (12.5 mg) by mouth daily    Hyperlipidemia LDL goal <130, Diastolic CHF, chronic (H), Subclavian artery stenosis, left (H), Palpitations       VITAMIN D3 PO      Take 2,000 Units by mouth daily        * Notice:  This list has 2 medication(s) that are the same as other medications prescribed for you. Read the directions carefully, and ask your doctor or other care provider to review them with you.

## 2017-11-14 NOTE — PATIENT INSTRUCTIONS
[facility]   Dr Alvarez, Endocrinology Department      [facility]   [address]  [place] [address]  Appointment [phone]  Fax: [phone]  [place]: Monday and Tuesday         [facility]   [address]  [place] [address]  Appointment [phone]  Fax: [phone]  [place]: Wednesday and Thursday            Decrease methimazole to 2.5 mg/day  Labs in 6-8 weeks  Please make a lab appointment for blood work and follow up clinic appointment in 1 week after that to discuss results.    Discussed possible side effects of methimazole including liver dysfunction and agranulocytosis. Discussed to watch for s/s like jaundice, abdominal pain and skin rash. In case of prolonged unresolving fever, sore throat and infections, advise to seek medical care.    Call if:     Signs or symptoms of infection. These include a fever of 100.5 degrees or higher, chills, severe sore throat, ear or sinus pain, cough, increased sputum or change in color, painful urination, mouth sores.   Severe nausea or vomiting.   Joint pain or swelling.   Not able to eat.   Unusual bruising or bleeding.   Dark urine or yellow skin or eyes.

## 2017-11-14 NOTE — PROGRESS NOTES
Name: Victoria Montalvo  Seen for follow up Hyperthyroidism.    HPI:  Victoria Montalvo is a 76 year old female who presents for the evaluation of Hyperthyroidism.  On methimazole 5 mg/day.  Labs in normal range.  CBC and LFT normal.    Feeling ok.  No major complaints.    History of radiation exposure: NO  History of thyroid dysfunction: NO  Palpitations:  No.  Changes to hair or skin: No  Diarrhea/Constipation:No  Changes in vision:Yes: sometimes not clear  Dysphagia or Shortness of breath:Yes: sometimes difficulty with swallowing.  Tremors:No  Changes in weight: + wt gain  Wt Readings from Last 2 Encounters:   11/14/17 80.3 kg (177 lb 1.6 oz)   08/25/17 82.2 kg (181 lb 4.8 oz)     Lithium/Amiodarone: No  URI: No  CT Scans:No    PMH/PSH:  Past Medical History:   Diagnosis Date     Abnormal Papanicolaou smear of cervix and cervical HPV     colposcopy 1/97     Arthritis      Chronic rhinitis      Coronary artery disease     4/4/17 SHERLEY--PDA     Gastro-oesophageal reflux disease      Hyperlipidemia      Pain in right hip      Personal history of colonic polyps     colonoscopy 9/97, 2002 (due in 2007)     Pulmonary HTN      Sleep apnea      Subclinical hyperthyroidism 10/17/2016     Unspecified asthma(493.90)     on preventative meds and has nebulizer     Past Surgical History:   Procedure Laterality Date     ARTHROPLASTY HIP Right 10/19/2015    Procedure: ARTHROPLASTY HIP;  Surgeon: Arno Torres MD;  Location: [facility] OR     C NONSPECIFIC PROCEDURE  2/89, 1990    liposuction of legs and stomach     C NONSPECIFIC PROCEDURE  1990    Ankle pins removed     C OPEN RX ANKLE DISLOCATN+FIXATN  4/18/87     COLONOSCOPY  01/1/08    Polyp removed, bx.     COLONOSCOPY  01/12/10     COLONOSCOPY N/A 2/17/2015    Procedure: COLONOSCOPY;  Surgeon: Gato Quiles MD;  Location: [facility] GI     CT CORONARY ANGIOGRAM  04/04/2017    SHERLEY to PDA     HC COLONOSCOPY THRU STOMA, DIAGNOSTIC  2002     HC REDUCTION OF LARGE BREAST  2/89      VASCULAR SURGERY  2013    angiogram & left subclavical artery stent     Family Hx:  Family History   Problem Relation Age of Onset     Alzheimer Disease Mother      GASTROINTESTINAL DISEASE Mother      CANCER Father      throat and lungs, liver,     HEART DISEASE Father 57     CABG     HEART DISEASE Brother      HEART DISEASE Sister      Hypertension Maternal Grandmother      Lipids Maternal Grandmother      CANCER Maternal Grandmother      stomach     CANCER Paternal Grandmother      stomach     Allergies Daughter      Allergies Son      Depression Brother      suicidal     Thyroid disease: No           Social Hx:  Social History     Social History     Marital status:      Spouse name: Trenton     Number of children: 2     Years of education: 12     Occupational History     retired      Social History Main Topics     Smoking status: Former Smoker     Packs/day: 0.10     Years: 10.00     Types: Cigarettes     Quit date: 5/19/2003     Smokeless tobacco: Never Used      Comment: off and on x 10 years 3-4 cigs: no smoker in the household     Alcohol use 0.0 oz/week     0 Standard drinks or equivalent per week      Comment: a couple drinks per year     Drug use: No     Sexual activity: Not Currently     Partners: Male     Other Topics Concern     Caffeine Concern No     Special Diet No     regular diet      Exercise No     shopping a lot so [place]      Social History Narrative          MEDICATIONS:  has a current medication list which includes the following prescription(s): alendronate, fluticasone, prasugrel, omeprazole, montelukast, fluticasone-salmeterol, furosemide, methimazole, bisoprolol, atorvastatin, nitroglycerin, biotin, spironolactone, albuterol, albuterol, cholecalciferol, aspirin, omega-3 fatty acids, calcium, osteo bi-flex/5-loxin advanced, magnesium acetate, and cyclosporine.    ROS   ROS: 10 point ROS neg other than the symptoms noted above in the HPI.    Physical Exam   VS: /78  Pulse 64   Temp 97.4  F (36.3  C)  Wt 80.3 kg (177 lb 1.6 oz)  SpO2 97%  BMI 29.25 kg/m2  GENERAL: AXOX3, NAD, well dressed, answering questions appropriately, appears stated age.  HEENT: No exopthalmous, no proptosis, EOMI, no lig lag, no retraction  NECK: Thyroid normal in size, non tender, no nodules were palpated.  CV: RRR  LUNGS: CTAB  ABDOMEN: +BS  NEUROLOGY: CN grossly intact, no tremors  PSYCH: normal affect and mood    LABS:  TFTs:  ENDO THYROID LABS-[facility] Latest Ref Rng & Units 10/23/2017   TSH 0.40 - 4.00 mU/L 2.39   T4 FREE 0.76 - 1.46 ng/dL 0.89     ENDO THYROID LABS-[facility] Latest Ref Rng & Units 5/2/2017 10/17/2016   TSH 0.40 - 4.00 mU/L 2.28 <0.01 (L)   T4 FREE 0.76 - 1.46 ng/dL 0.89 1.27   FREE T3 2.3 - 4.2 pg/mL 2.8 3.4   THYROID STIM IMMUNOG   <1.0 . . .     ENDO THYROID LABS-[facility] Latest Ref Rng 10/5/2016 1/29/2016 9/18/2006   TSH 0.40 - 4.00 mU/L <0.01 (L) 0.02 (L) 0.55   T4 FREE 0.76 - 1.46 ng/dL 1.24 1.02        CBC:  WBC   Date Value Ref Range Status   10/23/2017 5.6 4.0 - 11.0 10e9/L Final     LFTs:  Liver Function Studies -   Recent Labs   Lab Test  10/23/17   0916   PROTTOTAL  7.2   ALBUMIN  3.3*   BILITOTAL  0.3   ALKPHOS  64   AST  15   ALT  20         NUCLEAR MEDICINE THYROID UPTAKE AND SCAN 10/26/2016 10:32 AM      HISTORY: Thyrotoxicosis, unspecified without thyrotoxic crisis or  storm.     TECHNIQUE: Patient was given 275 uCi of I-123 orally followed by  24-hour uptake and scan.     FINDINGS: Thyroid gland is homogeneous in uptake but the gland  overall appears enlarged. 24-hour uptake was calculated at 22% which  is within the normal range.     Normal range is 10-30% 24-hour uptake.         IMPRESSION: Enlarged thyroid gland with normal 24-hour uptake at 22%.       All pertinent notes, labs, and images personally reviewed by me.     A/P  Ms.June BALTA Montalvo is a 76 year old here for the evaluation of hyperthyroidism.    Subclinical hyperthyroidism:    Neg TSI and normal thyroid uptake and  scan.  24-hour uptake was calculated at 22% which is within the normal range.  H/o CHF and osteopenia  On methimazole 5 mg/day.  Labs in normal range.  CBC and LFT normal.  -- clinically looks euthyroid  -- plan to decrease methimazole to 2.5 mg/day (11/14/2017)  Repeat labs in 6-8 weeks  Please make a lab appointment for blood work and follow up clinic appointment in 1 week after that to discuss results.  Discussed s/s of hypo and hyperthyroidism to watch for.    Discussed possible side effects of methimazole including liver dysfunction and agranulocytosis. Discussed to watch for s/s like jaundice, abdominal pain and skin rash. In case of prolonged unresolving fever, sore throat and infections, advise to seek medical care.    Call if:     Signs or symptoms of infection. These include a fever of 100.5 degrees or higher, chills, severe sore throat, ear or sinus pain, cough, increased sputum or change in color, painful urination, mouth sores.   Severe nausea or vomiting.   Joint pain or swelling.   Not able to eat.   Unusual bruising or bleeding.   Dark urine or yellow skin or eyes.            Follow-up:  6-8 weeks    Annie Alvarez MD  Endocrinology  [facility]  CC: Deisi Liu       All questions were answered.  The patient indicates understanding of the above issues and agrees with the plan set forth.

## 2017-12-10 ENCOUNTER — APPOINTMENT (OUTPATIENT)
Dept: GENERAL RADIOLOGY | Facility: CLINIC | Age: 77
End: 2017-12-10
Attending: EMERGENCY MEDICINE
Payer: MEDICARE

## 2017-12-10 ENCOUNTER — HOSPITAL ENCOUNTER (EMERGENCY)
Facility: CLINIC | Age: 77
Discharge: HOME | End: 2017-12-10
Admitting: EMERGENCY MEDICINE
Payer: MEDICARE

## 2017-12-10 VITALS
RESPIRATION RATE: 17 BRPM | SYSTOLIC BLOOD PRESSURE: 111 MMHG | DIASTOLIC BLOOD PRESSURE: 69 MMHG | OXYGEN SATURATION: 96 %

## 2017-12-10 DIAGNOSIS — Z96.649 DISLOCATION OF HIP PROSTHESIS, INITIAL ENCOUNTER (H): ICD-10-CM

## 2017-12-10 DIAGNOSIS — T84.029A DISLOCATION OF HIP PROSTHESIS, INITIAL ENCOUNTER (H): ICD-10-CM

## 2017-12-10 PROCEDURE — 99152 MOD SED SAME PHYS/QHP 5/>YRS: CPT

## 2017-12-10 PROCEDURE — 40000275 ZZH STATISTIC RCP TIME EA 10 MIN

## 2017-12-10 PROCEDURE — 40000986 XR PELVIS 1/2 VW

## 2017-12-10 PROCEDURE — 72170 X-RAY EXAM OF PELVIS: CPT

## 2017-12-10 PROCEDURE — 27265 TREAT HIP DISLOCATION: CPT | Mod: RT

## 2017-12-10 PROCEDURE — 99285 EMERGENCY DEPT VISIT HI MDM: CPT | Mod: 25

## 2017-12-10 RX ORDER — PROPOFOL 10 MG/ML
5-75 INJECTION, EMULSION INTRAVENOUS CONTINUOUS
Status: DISCONTINUED | OUTPATIENT
Start: 2017-12-10 | End: 2017-12-10 | Stop reason: HOSPADM

## 2017-12-10 RX ORDER — PROPOFOL 10 MG/ML
INJECTION, EMULSION INTRAVENOUS
Status: DISCONTINUED
Start: 2017-12-10 | End: 2017-12-10 | Stop reason: HOSPADM

## 2017-12-10 RX ORDER — FENTANYL CITRATE 50 UG/ML
50 INJECTION, SOLUTION INTRAMUSCULAR; INTRAVENOUS
Status: DISCONTINUED | OUTPATIENT
Start: 2017-12-10 | End: 2017-12-10 | Stop reason: HOSPADM

## 2017-12-10 RX ORDER — ONDANSETRON 2 MG/ML
INJECTION INTRAMUSCULAR; INTRAVENOUS
Status: DISCONTINUED
Start: 2017-12-10 | End: 2017-12-10 | Stop reason: HOSPADM

## 2017-12-10 RX ORDER — ONDANSETRON 2 MG/ML
4 INJECTION INTRAMUSCULAR; INTRAVENOUS ONCE
Status: DISCONTINUED | OUTPATIENT
Start: 2017-12-10 | End: 2017-12-10 | Stop reason: HOSPADM

## 2017-12-10 NOTE — ED PROVIDER NOTES
CHIEF COMPLAINT:  Right hip pain.      HISTORY OF PRESENT ILLNESS:  Victoria Montalvo is a 77-year-old female who presents via EMS.  She was getting out of bed and slid to the floor.  She has not been able to walk since that time, and has 10/10 pain in her right hip.  It is internally rotated and foreshortened.  She is concerned it may be dislocated.  She had her hip replaced in 10/2015.  There have been no dislocations since that time.  She was administered 75 mcg of intravenous fentanyl en route by the paramedics, and does feel better as long as she is not moving.  No other injuries or head trauma as a result of the fall.      PAST MEDICAL HISTORY:   1.  History of abnormal Pap smear and cervical HPV.   2.  Arthritis.   3.  Chronic rhinitis.   4.  Coronary artery disease.   5.  Gastroesophageal reflux disease.   6.  Hyperlipidemia.   7.  Colonic polyps.   8.  Pulmonary hypertension.   9.  Sleep apnea.   10.  Subclinical hyperthyroidism.   11.  Asthma.      PAST SURGICAL HISTORY:   1.  Right hip replacement, 10/2015.   2.  Liposuction of the legs and stomach.   3.  Open reduction internal fixation of ankle, status post removal of hardware.   3.  Multiple colonoscopies   4.  Coronary artery angiogram with stent placement.   5.  Angiogram and left subclavicular artery stent.   6.  Breast reduction.      MEDICATIONS:   1.  Fosamax.   2.  Methimazole.   3.  Flonase.   4.  Effient.   5.  Prilosec.   6.  Singulair.   7.  Advair.   8.  Lasix.   9.  Zebeta.   10.  Lipitor.   11.  Nitroglycerin p.r.n.   12.  Biotin.   13.  Aldactone.   14.  Albuterol.   15.  Vitamin D3.   16.  Aspirin.   17.  Omega-3 fatty acids.   18.  Calcium.   19.  Magnesium.   20.  Restasis ophthalmologic drops.      ALLERGIES:   1.  Animal dander   2.  Kiwi.   3.  Pollen extract.      SOCIAL HISTORY:  The patient presents via EMS.  Her  is with her.  She is a former smoker.      REVIEW OF SYSTEMS:  Pertinent 10-point review of systems is negative  except for that noted in the HPI.      PHYSICAL EXAM:   GENERAL:  The patient is uncomfortable-appearing, with her right hip foreshortened and internally rotated.   VITAL SIGNS:  Blood pressure 125/71, heart rate 69, respiratory rate 16, oxygen 97% on room air, temperature 97.4 degrees.   HEENT:  Atraumatic.  Moist mucous membranes.   CARDIOVASCULAR:  Regular rhythm.  Normal rate.  No murmurs.   RESPIRATORY:  Clear to auscultation bilaterally without wheezes, rales or rhonchi.   GASTROINTESTINAL:  Soft, nondistended, nontender.   MUSCULOSKELETAL:  The upper extremities and left lower extremity are normal.  The right lower extremity is foreshortened, internally rotated, flexed at the hip and knee.  She has 2+ dorsalis pedis pulse.   NEUROLOGIC:  The patient is alert, oriented x4.  Normal strength and sensation in the distal right lower extremity.   SKIN:  No pertinent skin findings.   PSYCHIATRIC:  The patient has normal mood and affect.      LABORATORY EVALUATION AND DIAGNOSTIC STUDIES:      Plain x-ray of the right hip shows an obvious prosthetic hip dislocation.  Official radiology read is pending at this time.      Post-reduction x-ray of the right hip shows successful reduction of the prosthetic hip with normal anatomy.  No fractures noted.  Again, final interpretation by radiology is pending.      PROCEDURE NOTES:   1.  Procedural sedation.    Indication is closed reduction of right hip dislocation.    Written consent was obtained from the patient prior to the procedure.  Risks and benefits discussed.    N.p.o. status was more than 4 hours.    ASA and Mallampati scores were 1.    We used propofol with a total of 40 mg administered in a single bolus.  Sedation time totalled 12 minutes.  Monitoring by respiratory therapy as well as an independent nurse throughout the procedure was maintained.  She was monitored with a heart rate monitor, pulse oximetry, capnography, blood pressure, and constant attendance by  medical staff.  There were no complications and the patient recovered well.     2.  Closed reduction of a right prosthetic hip dislocation.    Technique:  Under sedation, as detailed in the procedure note above, the right hip was hyperflexed, and traction/countertraction was used to reduce the hip successfully.  Slight external rotation helped, and a palpable pop was felt as the hip reduced into the prosthetic socket.  Following reduction, the right lower extremity was at the normal length compared to the left lower extremity.  Neurovascular exam remained normal before and after the procedure.  Follow-up x-rays were normal, and the patient is now ambulatory.      EMERGENCY DEPARTMENT COURSE AND MEDICAL DECISION MAKING:  Victoria is a 77-year-old female who presented with a dislocation of her right prosthetic hip.  This was reduced, as per the procedure note above.  No obvious fracture or joint hardware abnormalities on plain x-ray.  Neurovascularly intact before and after the procedure.      I have advised her to avoid hyperflexion and external rotation of the hip, and she needs to see her orthopedic physician this week.  She is comfortable with this plan and will be discharged home.      DIAGNOSIS:  Acute closed prosthetic right hip dislocation, status post reduction.      PLAN OF CARE:  Discharge home.  Orthopedic follow-up.         LEONARD PERALTA MD             D: 12/10/2017 05:43   T: 12/10/2017 06:10   MT: [id number]#[id number]      Name:     VICTORIA PHILLIPS   MRN:      [id number]        Account:      [id number]   :      1940           Visit Date:   12/10/2017      Document: [id number]       cc: Deisi Liu MD

## 2017-12-10 NOTE — PROGRESS NOTES
RT at bedside to assist MD with conscious sedation. Vital signs monitored throughout procedure including HR, RR, SPO2 and ETCO2. BVM, suction and nasal/oral airways at bedside. Patient tolerated procedure well and was awake and alert at end of procedure.     Modesta Jeong,  12/10/2017, 5:09 AM

## 2017-12-10 NOTE — ED AVS SNAPSHOT
[facility] Emergency Department    [address]    [facility] [address]    Phone:  [phone]    Fax:  [phone]                                       Victoria Montalvo   MRN: [id number]    Department:  [facility] Emergency Department   Date of Visit:  12/10/2017           Patient Information     Date Of Birth          1940        Your diagnoses for this visit were:     Dislocation of hip prosthesis, initial encounter (H)       Future Appointments        Provider Department Dept Phone Center    1/9/2018 10:45 AM [facility] [phone] [place]    1/11/2018 12:30 PM Alia Yoo PA-C [facility] [phone] [facility]    1/17/2018 10:30 AM Annie Alvarez MD [facility] [phone] [place]      24 Hour Appointment Hotline       To make an appointment at any [facility], call [phone] ([phone]). If you don't have a family doctor or clinic, we will help you find one. [facility] clinics are conveniently located to serve the needs of you and your family.             Review of your medicines      Our records show that you are taking the medicines listed below. If these are incorrect, please call your family doctor or clinic.        Dose / Directions Last dose taken    * albuterol (2.5 MG/3ML) 0.083% neb solution   Dose:  1 vial   Quantity:  1 Box        Take 1 vial (2.5 mg) by nebulization every 6 hours as needed for shortness of breath / dyspnea   Refills:  3        * albuterol 108 (90 BASE) MCG/ACT Inhaler   Commonly known as:  PROAIR HFA/PROVENTIL HFA/VENTOLIN HFA   Dose:  2 puff   Quantity:  3 Inhaler        Inhale 2 puffs into the lungs every 6 hours as needed for shortness of breath / dyspnea or wheezing   Refills:  0        alendronate 70 MG tablet   Commonly known as:  FOSAMAX   Dose:  70 mg   Quantity:  12 tablet        Take 1 tablet (70 mg) by mouth every  7 days Take 60 minutes before am meal with 8 oz. water. Remain upright for 30 minutes.   Refills:  2        aspirin 81 MG tablet   Dose:  81 mg        Take 81 mg by mouth daily.   Refills:  0        atorvastatin 40 MG tablet   Commonly known as:  LIPITOR   Dose:  40 mg   Quantity:  90 tablet        Take 1 tablet (40 mg) by mouth daily   Refills:  3        biotin 2.5 mg/mL Susp        Take by mouth daily 2000mcg   Refills:  0        bisoprolol 5 MG tablet   Commonly known as:  ZEBETA   Dose:  2.5 mg   Quantity:  45 tablet        Take 0.5 tablets (2.5 mg) by mouth daily   Refills:  3        Calcium 3884-5181 MG-UNIT Chew   Dose:  1 tablet        Take 1 tablet by mouth daily.   Refills:  0        FISH OIL DOUBLE STRENGTH 1200 MG capsule   Dose:  1 capsule   Generic drug:  omega-3 fatty acids        Take 1 capsule by mouth 2 times daily   Refills:  0        fluticasone 50 MCG/ACT spray   Commonly known as:  FLONASE   Quantity:  48 g        USE 1 TO 2 SPRAYS IN EACH NOSTRIL DAILY   Refills:  2        fluticasone-salmeterol 250-50 MCG/DOSE diskus inhaler   Commonly known as:  ADVAIR   Dose:  1 puff   Quantity:  3 Inhaler        Inhale 1 puff into the lungs every 12 hours   Refills:  3        furosemide 20 MG tablet   Commonly known as:  LASIX   Dose:  20 mg   Quantity:  90 tablet        Take 1 tablet (20 mg) by mouth daily   Refills:  3        MAGNESIUM ACETATE   Dose:  250 mg        Take 250 mg by mouth daily   Refills:  0        methimazole 5 MG tablet   Commonly known as:  TAPAZOLE   Dose:  2.5 mg   Quantity:  45 tablet        Take 0.5 tablets (2.5 mg) by mouth daily   Refills:  1        montelukast 10 MG tablet   Commonly known as:  SINGULAIR   Dose:  1 tablet   Quantity:  90 tablet        Take 1 tablet (10 mg) by mouth At Bedtime   Refills:  3        nitroGLYcerin 0.4 MG sublingual tablet   Commonly known as:  NITROSTAT   Quantity:  25 tablet        One tablet under the tongue every 5 minutes if needed for chest  "pain. May repeat every 5 minutes for a maximum of 3 doses in 15 minutes\"   Refills:  3        omeprazole 20 MG CR capsule   Commonly known as:  priLOSEC   Dose:  20 mg   Quantity:  90 capsule        Take 1 capsule (20 mg) by mouth daily   Refills:  3        OSTEO BI-FLEX/5-LOXIN ADVANCED Tabs   Dose:  1500 mg        Take 1,500 mg by mouth 2 times daily   Refills:  0        prasugrel 10 MG Tabs tablet   Commonly known as:  EFFIENT   Dose:  10 mg   Quantity:  90 tablet        Take 1 tablet (10 mg) by mouth daily Do not crush or break tablet.   Refills:  1        RESTASIS 0.05 % ophthalmic emulsion   Dose:  1 drop   Generic drug:  cycloSPORINE        Place 1 drop into both eyes 2 times daily   Refills:  0        spironolactone 25 MG tablet   Commonly known as:  ALDACTONE   Dose:  12.5 mg   Quantity:  45 tablet        Take 0.5 tablets (12.5 mg) by mouth daily   Refills:  12        VITAMIN D3 PO   Dose:  2000 Units        Take 2,000 Units by mouth daily   Refills:  0        * Notice:  This list has 2 medication(s) that are the same as other medications prescribed for you. Read the directions carefully, and ask your doctor or other care provider to review them with you.            Procedures and tests performed during your visit     XR Pelvis 1/2 Views      Orders Needing Specimen Collection     None      Pending Results     Date and Time Order Name Status Description    12/10/2017 0547 XR Pelvis 1/2 Views In process             Pending Culture Results     No orders found from 12/8/2017 to 12/11/2017.            Pending Results Instructions     If you had any lab results that were not finalized at the time of your Discharge, you can call the ED Lab Result RN at [phone]. You will be contacted by this team for any positive Lab results or changes in treatment. The nurses are available 7 days a week from 10A to 6:30P.  You can leave a message 24 hours per day and they will return your call.        Test Results From Your " Hospital Stay        12/10/2017  6:09 AM      Result not yet available     Exam Begun                Clinical Quality Measure: Blood Pressure Screening     Your blood pressure was checked while you were in the emergency department today. The last reading we obtained was  BP: 111/69 . Please read the guidelines below about what these numbers mean and what you should do about them.  If your systolic blood pressure (the top number) is less than 120 and your diastolic blood pressure (the bottom number) is less than 80, then your blood pressure is normal. There is nothing more that you need to do about it.  If your systolic blood pressure (the top number) is 120-139 or your diastolic blood pressure (the bottom number) is 80-89, your blood pressure may be higher than it should be. You should have your blood pressure rechecked within a year by a primary care provider.  If your systolic blood pressure (the top number) is 140 or greater or your diastolic blood pressure (the bottom number) is 90 or greater, you may have high blood pressure. High blood pressure is treatable, but if left untreated over time it can put you at risk for heart attack, stroke, or kidney failure. You should have your blood pressure rechecked by a primary care provider within the next 4 weeks.  If your provider in the emergency department today gave you specific instructions to follow-up with your doctor or provider even sooner than that, you should follow that instruction and not wait for up to 4 weeks for your follow-up visit.        Thank you for choosing [facility]       Thank you for choosing [facility] for your care. Our goal is always to provide you with excellent care. Hearing back from our patients is one way we can continue to improve our services. Please take a few minutes to complete the written survey that you may receive in the mail after you visit with us. Thank you!        [facility] Information     [email or web address] gives you secure access to your  electronic health record. If you see a primary care provider, you can also send messages to your care team and make appointments. If you have questions, please call your primary care clinic.  If you do not have a primary care provider, please call [phone] and they will assist you.        Care EveryWhere ID     This is your Care EveryWhere ID. This could be used by other organizations to access your [facility] medical records  [id number]        Equal Access to Services     KEANU ARAGON : Kolby Alvares, emily lopez, jerry ramos, estefani lake. So [facility] [phone].    ATENCIÓN: Si habla español, tiene a yang disposición servicios gratuitos de asistencia lingüística. Llame al [phone].    We comply with applicable federal civil rights laws and [place] laws. We do not discriminate on the basis of race, color, national origin, age, disability, sex, sexual orientation, or gender identity.            After Visit Summary       This is your record. Keep this with you and show to your community pharmacist(s) and doctor(s) at your next visit.

## 2017-12-10 NOTE — ED AVS SNAPSHOT
[facility] Emergency Department    [address]    [facility] [address]    Phone:  [phone]    Fax:  [phone]                                       June V Selvin   MRN: [id number]    Department:  [facility] Emergency Department   Date of Visit:  12/10/2017           After Visit Summary Signature Page     I have received my discharge instructions, and my questions have been answered. I have discussed any challenges I see with this plan with the nurse or doctor.    ..........................................................................................................................................  Patient/Patient Representative Signature      ..........................................................................................................................................  Patient Representative Print Name and Relationship to Patient    ..................................................               ................................................  Date                                            Time    ..........................................................................................................................................  Reviewed by Signature/Title    ...................................................              ..............................................  Date                                                            Time

## 2018-01-08 DIAGNOSIS — E05.90 SUBCLINICAL HYPERTHYROIDISM: ICD-10-CM

## 2018-01-08 LAB
T3FREE SERPL-MCNC: 2.3 PG/ML (ref 2.3–4.2)
WBC # BLD AUTO: 6.3 10E9/L (ref 4–11)

## 2018-01-08 PROCEDURE — 84481 FREE ASSAY (FT-3): CPT | Performed by: INTERNAL MEDICINE

## 2018-01-08 PROCEDURE — 84443 ASSAY THYROID STIM HORMONE: CPT | Performed by: INTERNAL MEDICINE

## 2018-01-08 PROCEDURE — 36415 COLL VENOUS BLD VENIPUNCTURE: CPT | Performed by: INTERNAL MEDICINE

## 2018-01-08 PROCEDURE — 85048 AUTOMATED LEUKOCYTE COUNT: CPT | Performed by: INTERNAL MEDICINE

## 2018-01-08 PROCEDURE — 84439 ASSAY OF FREE THYROXINE: CPT | Performed by: INTERNAL MEDICINE

## 2018-01-08 PROCEDURE — 80076 HEPATIC FUNCTION PANEL: CPT | Performed by: INTERNAL MEDICINE

## 2018-01-09 LAB
ALBUMIN SERPL-MCNC: 3.4 G/DL (ref 3.4–5)
ALP SERPL-CCNC: 61 U/L (ref 40–150)
ALT SERPL W P-5'-P-CCNC: 21 U/L (ref 0–50)
AST SERPL W P-5'-P-CCNC: 20 U/L (ref 0–45)
BILIRUB DIRECT SERPL-MCNC: 0.1 MG/DL (ref 0–0.2)
BILIRUB SERPL-MCNC: 0.4 MG/DL (ref 0.2–1.3)
PROT SERPL-MCNC: 7.1 G/DL (ref 6.8–8.8)
T4 FREE SERPL-MCNC: 0.87 NG/DL (ref 0.76–1.46)
TSH SERPL DL<=0.005 MIU/L-ACNC: 1.42 MU/L (ref 0.4–4)

## 2018-01-11 ENCOUNTER — OFFICE VISIT (OUTPATIENT)
Dept: CARDIOLOGY | Facility: CLINIC | Age: 78
End: 2018-01-11
Attending: INTERNAL MEDICINE
Payer: MEDICARE

## 2018-01-11 VITALS
BODY MASS INDEX: 33.79 KG/M2 | HEART RATE: 66 BPM | HEIGHT: 61 IN | DIASTOLIC BLOOD PRESSURE: 76 MMHG | WEIGHT: 179 LBS | SYSTOLIC BLOOD PRESSURE: 140 MMHG

## 2018-01-11 DIAGNOSIS — R06.09 DOE (DYSPNEA ON EXERTION): Primary | ICD-10-CM

## 2018-01-11 DIAGNOSIS — I25.10 ASHD (ARTERIOSCLEROTIC HEART DISEASE): ICD-10-CM

## 2018-01-11 DIAGNOSIS — R06.02 SOB (SHORTNESS OF BREATH): ICD-10-CM

## 2018-01-11 PROCEDURE — 99214 OFFICE O/P EST MOD 30 MIN: CPT | Performed by: PHYSICIAN ASSISTANT

## 2018-01-11 RX ORDER — ISOSORBIDE MONONITRATE 30 MG/1
30 TABLET, EXTENDED RELEASE ORAL DAILY
Qty: 90 TABLET | Refills: 3 | Status: SHIPPED | OUTPATIENT
Start: 2018-01-11 | End: 2018-12-20

## 2018-01-11 NOTE — LETTER
2018    Deisi Liu MD  [address] [place] [address]    RE: Victoria Montalvo       Dear Colleague,    I had the pleasure of seeing Victoria Montalvo in the [facility].    CARDIOLOGY CLINIC PROGRESS NOTE    DOS: 2018    Victoria Montalvo  : 1940, 77 year old  MRN: [id number]      History: I had the pleasure of following up with Victoria Montalvo today in the Cardiology Clinic.  Victoria is a patient of Dr. Yepez.       Victoria is a very pleasant 77-year-old woman with a history of hypertension, hyperlipidemia, diastolic dysfunction, left subclavian stenosis status post percutaneous revascularization in , chronic venous insufficiency, sleep apnea on CPAP and more recently diagnosed coronary artery disease.       She saw Martha Guevara NP, in 2017 and she had noted worsening dyspnea on exertion with some chest pain.  A nuclear stress test was performed that showed mostly reversible anteroapical defect consistent with mild to moderate ischemia in the distribution of the LAD.  There was also breast attenuation noted.  She was symptomatic during the test with shortness of breath.   She underwent a coronary angiogram 2017 via the right femoral artery by Dr. Denise.  There  was an 80% left PDA that was stented.  There was a tiny nondominant right coronary artery that was severely diseased and was not intervened upon.  There was mild to moderate disease in the LAD.  Dr. Denise started bisoprolol.  He also intensified her statin switching her from pravastatin to atorvastatin.       In follow up, she had some ESPITIA, and an echocardiogram was done 2017 showed normal LV function and just a little bit of pulmonary hypertension.   Dr. Yepez had her trial holding bisoprolol. This made no difference, so was resumed.      Regarding her bilateral lower extremity edema and venous insufficiency, she has been wearing compression socks since 2017. They really help  with her symptoms, though are bothersome in the summer months. Dr. Yepez did consider sclerotherapy, but given the location of the saphenous vein reflux, the impact may not be optimal.  She tells me she did get a second opinion at [place] on the urging of her children to be evaluated. She says they said the same thing as Dr. Yepez.      She has some continued ESPITIA. This is with stairs. It does not occur with walking moderate distances. It is not worsening. And she does not have any chest pain associated with it as she did in April 2017. She continues to have edema but it is unchanged. She wears her compression socks.  She was in the ER 12/18/17 after a fall and hip dislocation and it was reduced in the ER.       ROS:  Skin:  Positive for bruising   Eyes:  Positive for glasses  ENT:  Positive for epistaxis;nasal congestion;sinus trouble  Respiratory:  Positive for sleep apnea;CPAP;dyspnea on exertion;wheezing  Cardiovascular:    lightheadedness;Positive for;edema  Gastroenterology: Positive for heartburn;reflux  Genitourinary:  Positive for urinary frequency  Musculoskeletal:  Positive for back pain  Neurologic:  Positive for headaches;numbness or tingling of hands  Psychiatric:  Negative    Heme/Lymph/Imm:  Positive for allergies  Endocrine:  Positive for thyroid disorder    PAST MEDICAL HISTORY:  Past Medical History:   Diagnosis Date     Abnormal Papanicolaou smear of cervix and cervical HPV     colposcopy 1/97     Arthritis      Chronic rhinitis      Coronary artery disease     4/4/17 SHERLEY--PDA     Diastolic CHF, chronic (H) 2/22/2012     Gastro-oesophageal reflux disease      Hyperlipidemia      Hyperlipidemia LDL goal <130 10/31/2011     Pain in right hip      Personal history of colonic polyps     colonoscopy 9/97, 2002 (due in 2007)     Pulmonary HTN      Sleep apnea      Subclavian artery stenosis, left (H) 8/7/2013    S/p stent in 2013      Subclinical hyperthyroidism 10/17/2016     Unspecified asthma(493.90)     on  preventative meds and has nebulizer       PAST SURGICAL HISTORY:  Past Surgical History:   Procedure Laterality Date     ARTHROPLASTY HIP Right 10/19/2015    Procedure: ARTHROPLASTY HIP;  Surgeon: Aron Torres MD;  Location: [facility] OR     C NONSPECIFIC PROCEDURE  2/89, 1990    liposuction of legs and stomach     C NONSPECIFIC PROCEDURE  1990    Ankle pins removed     C OPEN RX ANKLE DISLOCATN+FIXATN  4/18/87     COLONOSCOPY  01/1/08    Polyp removed, bx.     COLONOSCOPY  01/12/10     COLONOSCOPY N/A 2/17/2015    Procedure: COLONOSCOPY;  Surgeon: Gato Quiles MD;  Location: [facility] GI     CT CORONARY ANGIOGRAM  04/04/2017    SHERLEY to PDA     HC COLONOSCOPY THRU STOMA, DIAGNOSTIC  2002     HC REDUCTION OF LARGE BREAST  2/89     VASCULAR SURGERY  2013    angiogram & left subclavical artery stent       SOCIAL HISTORY:  Social History     Social History     Marital status:      Spouse name: Trenton     Number of children: 2     Years of education: 12     Occupational History     retired      Social History Main Topics     Smoking status: Former Smoker     Packs/day: 0.10     Years: 10.00     Types: Cigarettes     Quit date: 5/19/2003     Smokeless tobacco: Never Used      Comment: off and on x 10 years 3-4 cigs: no smoker in the household     Alcohol use 0.0 oz/week     0 Standard drinks or equivalent per week      Comment: a couple drinks per year     Drug use: No     Sexual activity: Not Currently     Partners: Male     Other Topics Concern     Caffeine Concern No     Special Diet No     regular diet      Exercise No     shopping a lot so [place]      Social History Narrative       FAMILY HISTORY:  Family History   Problem Relation Age of Onset     Alzheimer Disease Mother      GASTROINTESTINAL DISEASE Mother      CANCER Father      throat and lungs, liver,     HEART DISEASE Father 57     CABG     HEART DISEASE Brother      suicidal     Pacemaker Brother      HEART DISEASE Sister      Hypertension  "Maternal Grandmother      Lipids Maternal Grandmother      CANCER Maternal Grandmother      stomach     CANCER Paternal Grandmother      stomach     Allergies Daughter      Allergies Son        MEDS:   Current Outpatient Prescriptions on File Prior to Visit:  methimazole (TAPAZOLE) 5 MG tablet Take 0.5 tablets (2.5 mg) by mouth daily   alendronate (FOSAMAX) 70 MG tablet Take 1 tablet (70 mg) by mouth every 7 days Take 60 minutes before am meal with 8 oz. water. Remain upright for 30 minutes.   fluticasone (FLONASE) 50 MCG/ACT spray USE 1 TO 2 SPRAYS IN EACH NOSTRIL DAILY   prasugrel (EFFIENT) 10 MG TABS tablet Take 1 tablet (10 mg) by mouth daily Do not crush or break tablet.   omeprazole (PRILOSEC) 20 MG CR capsule Take 1 capsule (20 mg) by mouth daily   montelukast (SINGULAIR) 10 MG tablet Take 1 tablet (10 mg) by mouth At Bedtime   fluticasone-salmeterol (ADVAIR) 250-50 MCG/DOSE diskus inhaler Inhale 1 puff into the lungs every 12 hours   furosemide (LASIX) 20 MG tablet Take 1 tablet (20 mg) by mouth daily   bisoprolol (ZEBETA) 5 MG tablet Take 0.5 tablets (2.5 mg) by mouth daily   atorvastatin (LIPITOR) 40 MG tablet Take 1 tablet (40 mg) by mouth daily   nitroglycerin (NITROSTAT) 0.4 MG sublingual tablet One tablet under the tongue every 5 minutes if needed for chest pain. May repeat every 5 minutes for a maximum of 3 doses in 15 minutes\"   biotin 2.5 mg/mL Take by mouth daily 2000mcg   spironolactone (ALDACTONE) 25 MG tablet Take 0.5 tablets (12.5 mg) by mouth daily (Patient taking differently: Take 12.5 mg by mouth Mon-wed-fri)   albuterol (PROAIR HFA, PROVENTIL HFA, VENTOLIN HFA) 108 (90 BASE) MCG/ACT inhaler Inhale 2 puffs into the lungs every 6 hours as needed for shortness of breath / dyspnea or wheezing   albuterol (2.5 MG/3ML) 0.083% nebulizer solution Take 1 vial (2.5 mg) by nebulization every 6 hours as needed for shortness of breath / dyspnea   Cholecalciferol (VITAMIN D3 PO) Take 2,000 Units by mouth " "daily    aspirin 81 MG tablet Take 81 mg by mouth daily.   omega-3 fatty acids (FISH OIL DOUBLE STRENGTH) 1200 MG capsule Take 1 capsule by mouth 2 times daily    Calcium 9991-4349 MG-UNIT CHEW Take 1 tablet by mouth daily.   Boswellia-Glucosamine-Vit D (OSTEO BI-FLEX/5-LOXIN ADVANCED) TABS Take 1,500 mg by mouth 2 times daily    MAGNESIUM ACETATE Take 250 mg by mouth daily    cycloSPORINE (RESTASIS) 0.05 % ophthalmic emulsion Place 1 drop into both eyes 2 times daily      No current facility-administered medications on file prior to visit.     ALLERGIES:   Allergies   Allergen Reactions     Animal Dander      Kiwi Itching     Itching and red all over     Pollen Extract      Pollen,dust, trees, grass, mold-hayfever symptoms     Seasonal Allergies        PHYSICAL EXAM:  Vitals: /76 (BP Location: Right arm, Patient Position: Sitting, Cuff Size: Adult Small)  Pulse 66  Ht 1.549 m (5' 1\")  Wt 81.2 kg (179 lb)  BMI 33.82 kg/m2  Constitutional:  cooperative, alert and oriented, well developed, well nourished, in no acute distress        Skin:  warm and dry to the touch, no apparent skin lesions or masses noted        Head:  normocephalic, no masses or lesions        Eyes:  pupils equal and round;sclera white;no xanthalasma        ENT:  no pallor or cyanosis, dentition good        Neck:  JVP normal        Chest:  normal breath sounds, clear to auscultation, normal A-P diameter, normal symmetry, normal respiratory excursion, no use of accessory muscles        Cardiac: regular rhythm;normal S1 and S2       systolic murmur;grade 1          Abdomen:  abdomen soft;BS normoactive;non-tender        Vascular:                                        Extremities and Back:  normal muscle strength and tone   wearing compression stockings    Neurological:  no gross motor deficits;affect appropriate          LABS/DATA:  I reviewed the followin18 TSH 1.42  18 ALT 21, ALT 20  18 WBC 6.3      ASSESSMENT:  1.  " Coronary artery disease.   a.  Symptoms of dyspnea on exertion and chest pain with abnormal nuclear stress test.     b.  Coronary angiogram 04/04/2017 - 80% left PDA status post drug-eluting stent.  There is a tiny nondominant right coronary artery with severe disease that was not intervened upon.   c.   on DAPT through 4/4/18. Also on statin.   d.  Continues to have stable ESPITIA (stairs) without chest pain. Unremarkable echo and no change off bisoprolol. Will add in Imdur given her known tiny RCA that is severely disease. She is educated on side effects. She also knows to be seen if symptoms worsen.  She will follow up in 1 month.     2.  Hypertension.    a.  Adding Imdur today.     3.  Hyperlipidemia.   a.   4/2017 switched from pravastatin to atorvastatin due to her known coronary artery disease.     4.  Diastolic dysfunction.     5.  Left subclavian stenosis, status post stenting in 2013.     6.  Venous insufficiency, bilateral.   a.  Had venous competency studies March 2017.   b.  Wearing compression stockings with improvement in symptoms.  c.  Dr. Yepez could consider sclerotherapy, but impact uncertain given location of saphenous reflux in calves mostly.      7.  Obstructive sleep apnea on CPAP.     8.  Thyroid disease.           Thank you very much for allowing me to participate in the care of Victoria Montalvo.       Sincerely,     Alia Yoo PA-C     [facility]

## 2018-01-11 NOTE — MR AVS SNAPSHOT
After Visit Summary   1/11/2018    Victoria Montalvo    MRN: [id number]           Patient Information     Date Of Birth          1940        Visit Information        Provider Department      1/11/2018 12:30 PM Alia Yoo PA-C [facility]        Today's Diagnoses     ESPITIA (dyspnea on exertion)    -  1    SOB (shortness of breath)        ASHD (arteriosclerotic heart disease)          Care Instructions    1.  Given the shortness of breath did not improve off the bisoprolol, and it continues (and you have know disease in that small right coronary artery), we will trial Imdur 30 mg daily.     2.  If you develop a headache, you can take Tylenol (acetaminophen). If the headache continues after a few days, please call us.     3.  Please follow up with myself, Dr. Yepez or Martha (Martha only in [place] now) in about 1 month. If you have worsening symptoms, please call or be seen urgently.           Follow-ups after your visit        Additional Services     Follow-Up with Cardiac Advanced Practice Provider                 Your next 10 appointments already scheduled     Jan 17, 2018 10:30 AM CST   [facility]    [address]  [facility] [id number] [phone]            Feb 15, 2018 12:30 PM CST   Return Visit with Alia Yoo PA-C   [facility] ([facility])    [address] [place] [address] [address]  [facility] [id number] [phone]            Mar 29, 2018  2:15 PM CDT   Return Visit with Yogi Yepez MD   [facility] ([facility])    [address] [place] [address] [address]  [facility] [id number] [phone]              Future tests that were ordered for you today     Open Future Orders        Priority Expected Expires Ordered    Follow-Up with Cardiac  "Advanced Practice Provider Routine 2/8/2018 1/11/2019 1/11/2018            Who to contact     If you have questions or need follow up information about today's clinic visit or your schedule please contact [facility] directly at [phone].  Normal or non-critical lab and imaging results will be communicated to you by MyChart, letter or phone within 4 business days after the clinic has received the results. If you do not hear from us within 7 days, please contact the clinic through [employer] or phone. If you have a critical or abnormal lab result, we will notify you by phone as soon as possible.  Submit refill requests through [employer] or call your pharmacy and they will forward the refill request to us. Please allow 3 business days for your refill to be completed.          Additional Information About Your Visit        MyChart Information     [employer] gives you secure access to your electronic health record. If you see a primary care provider, you can also send messages to your care team and make appointments. If you have questions, please call your primary care clinic.  If you do not have a primary care provider, please call [phone] and they will assist you.        Care EveryWhere ID     This is your Care EveryWhere ID. This could be used by other organizations to access your [place] medical records  [id number]        Your Vitals Were     Pulse Height BMI (Body Mass Index)             66 1.549 m (5' 1\") 33.82 kg/m2          Blood Pressure from Last 3 Encounters:   01/11/18 140/76   12/14/17 130/85   12/10/17 111/69    Weight from Last 3 Encounters:   01/11/18 81.2 kg (179 lb)   12/14/17 77.1 kg (170 lb)   11/14/17 80.3 kg (177 lb 1.6 oz)              We Performed the Following     Follow-Up with Cardiac Advanced Practice Provider          Today's Medication Changes          These changes are accurate as of: 1/11/18  1:03 PM.  If you have any questions, ask your " nurse or doctor.               Start taking these medicines.        Dose/Directions    isosorbide mononitrate 30 MG 24 hr tablet   Commonly known as:  IMDUR   Used for:  ESPITIA (dyspnea on exertion), ASHD (arteriosclerotic heart disease)   Started by:  Alia Yoo PA-C        Dose:  30 mg   Take 1 tablet (30 mg) by mouth daily   Quantity:  90 tablet   Refills:  3         These medicines have changed or have updated prescriptions.        Dose/Directions    spironolactone 25 MG tablet   Commonly known as:  ALDACTONE   This may have changed:    - when to take this  - additional instructions   Used for:  Hyperlipidemia LDL goal <130, Diastolic CHF, chronic (H), Subclavian artery stenosis, left (H), Palpitations        Dose:  12.5 mg   Take 0.5 tablets (12.5 mg) by mouth daily   Quantity:  45 tablet   Refills:  12            Where to get your medicines      These medications were sent to [facility] - [place], [place] - [address] [place] [address]     Phone:  [phone]     isosorbide mononitrate 30 MG 24 hr tablet                Primary Care Provider Office Phone # Fax #    Deisi Liu [phone]       [facility] [address] [place] [address]        Equal Access to Services     KEANU ARAGON AH: Hadii mercedes pineda hadasho Soomaali, waaxda luqadaha, qaybta kaalmada adeegyada, estefani lake. So [facility] [phone].    ATENCIÓN: Si habla español, tiene a yang disposición servicios gratuitos de asistencia lingüística. Llame al [phone].    We comply with applicable federal civil rights laws and [place] laws. We do not discriminate on the basis of race, color, national origin, age, disability, sex, sexual orientation, or gender identity.            Thank you!     Thank you for choosing [facility]  for your care. Our goal is always to provide you with excellent care. Hearing  back from our patients is one way we can continue to improve our services. Please take a few minutes to complete the written survey that you may receive in the mail after your visit with us. Thank you!             Your Updated Medication List - Protect others around you: Learn how to safely use, store and throw away your medicines at www.disposemymeds.org.          This list is accurate as of: 1/11/18  1:03 PM.  Always use your most recent med list.                   Brand Name Dispense Instructions for use Diagnosis    * albuterol (2.5 MG/3ML) 0.083% neb solution     1 Box    Take 1 vial (2.5 mg) by nebulization every 6 hours as needed for shortness of breath / dyspnea    Moderate persistent asthma, uncomplicated       * albuterol 108 (90 BASE) MCG/ACT Inhaler    PROAIR HFA/PROVENTIL HFA/VENTOLIN HFA    3 Inhaler    Inhale 2 puffs into the lungs every 6 hours as needed for shortness of breath / dyspnea or wheezing    Moderate persistent asthma, uncomplicated       alendronate 70 MG tablet    FOSAMAX    12 tablet    Take 1 tablet (70 mg) by mouth every 7 days Take 60 minutes before am meal with 8 oz. water. Remain upright for 30 minutes.    Age-related osteoporosis without current pathological fracture       aspirin 81 MG tablet      Take 81 mg by mouth daily.        atorvastatin 40 MG tablet    LIPITOR    90 tablet    Take 1 tablet (40 mg) by mouth daily    Coronary artery disease involving native coronary artery of native heart with unstable angina pectoris (H), Status post coronary angioplasty       biotin 2.5 mg/mL Susp      Take by mouth daily 2000mcg        bisoprolol 5 MG tablet    ZEBETA    45 tablet    Take 0.5 tablets (2.5 mg) by mouth daily    Coronary artery disease involving native coronary artery of native heart with unstable angina pectoris (H)       Calcium 0596-2380 MG-UNIT Chew      Take 1 tablet by mouth daily.        FISH OIL DOUBLE STRENGTH 1200 MG capsule   Generic drug:  omega-3 fatty acids     "  Take 1 capsule by mouth 2 times daily        fluticasone 50 MCG/ACT spray    FLONASE    48 g    USE 1 TO 2 SPRAYS IN EACH NOSTRIL DAILY    Chronic rhinitis       fluticasone-salmeterol 250-50 MCG/DOSE diskus inhaler    ADVAIR    3 Inhaler    Inhale 1 puff into the lungs every 12 hours    Uncomplicated severe persistent asthma       furosemide 20 MG tablet    LASIX    90 tablet    Take 1 tablet (20 mg) by mouth daily    Diastolic CHF, chronic (H)       isosorbide mononitrate 30 MG 24 hr tablet    IMDUR    90 tablet    Take 1 tablet (30 mg) by mouth daily    ESPITIA (dyspnea on exertion), ASHD (arteriosclerotic heart disease)       MAGNESIUM ACETATE      Take 250 mg by mouth daily        methimazole 5 MG tablet    TAPAZOLE    45 tablet    Take 0.5 tablets (2.5 mg) by mouth daily    Subclinical hyperthyroidism       montelukast 10 MG tablet    SINGULAIR    90 tablet    Take 1 tablet (10 mg) by mouth At Bedtime    Chronic non-seasonal allergic rhinitis, unspecified trigger       nitroGLYcerin 0.4 MG sublingual tablet    NITROSTAT    25 tablet    One tablet under the tongue every 5 minutes if needed for chest pain. May repeat every 5 minutes for a maximum of 3 doses in 15 minutes\"    Coronary artery disease involving native coronary artery of native heart with unstable angina pectoris (H), Status post coronary angioplasty       omeprazole 20 MG CR capsule    priLOSEC    90 capsule    Take 1 capsule (20 mg) by mouth daily    Gastroesophageal reflux disease without esophagitis       OSTEO BI-FLEX/5-LOXIN ADVANCED Tabs      Take 1,500 mg by mouth 2 times daily        prasugrel 10 MG Tabs tablet    EFFIENT    90 tablet    Take 1 tablet (10 mg) by mouth daily Do not crush or break tablet.    Coronary artery disease involving native coronary artery of native heart with unstable angina pectoris (H), Status post coronary angioplasty       RESTASIS 0.05 % ophthalmic emulsion   Generic drug:  cycloSPORINE      Place 1 drop into both " eyes 2 times daily        spironolactone 25 MG tablet    ALDACTONE    45 tablet    Take 0.5 tablets (12.5 mg) by mouth daily    Hyperlipidemia LDL goal <130, Diastolic CHF, chronic (H), Subclavian artery stenosis, left (H), Palpitations       VITAMIN D3 PO      Take 2,000 Units by mouth daily        * Notice:  This list has 2 medication(s) that are the same as other medications prescribed for you. Read the directions carefully, and ask your doctor or other care provider to review them with you.

## 2018-01-11 NOTE — PROGRESS NOTES
CARDIOLOGY CLINIC PROGRESS NOTE    DOS: 2018    Victoria Montalvo  : 1940, 77 year old  MRN: [id number]      History: I had the pleasure of following up with Victoria Montalvo today in the Cardiology Clinic.  Victoria is a patient of Dr. Yepez.       Victoria is a very pleasant 77-year-old woman with a history of hypertension, hyperlipidemia, diastolic dysfunction, left subclavian stenosis status post percutaneous revascularization in , chronic venous insufficiency, sleep apnea on CPAP and more recently diagnosed coronary artery disease.       She saw Martha Guevara NP, in 2017 and she had noted worsening dyspnea on exertion with some chest pain.  A nuclear stress test was performed that showed mostly reversible anteroapical defect consistent with mild to moderate ischemia in the distribution of the LAD.  There was also breast attenuation noted.  She was symptomatic during the test with shortness of breath.  She underwent a coronary angiogram 2017 via the right femoral artery by Dr. Denise.  There was an 80% left PDA that was stented.  There was a tiny nondominant right coronary artery that was severely diseased and was not intervened upon.  There was mild to moderate disease in the LAD.  Dr. Denise started bisoprolol.  He also intensified her statin switching her from pravastatin to atorvastatin.       In follow up, she had some ESPITIA, and an echocardiogram was done 2017 showed normal LV function and just a little bit of pulmonary hypertension.  Dr. Yepez had her trial holding bisoprolol. This made no difference, so was resumed.      Regarding her bilateral lower extremity edema and venous insufficiency, she has been wearing compression socks since 2017. They really help with her symptoms, though are bothersome in the summer months. Dr. Yepez did consider sclerotherapy, but given the location of the saphenous vein reflux, the impact may not be optimal.  She tells me she did get a second opinion at  [facility] on the urging of her children to be evaluated. She says they said the same thing as Dr. Yepez.      She has some continued ESPITIA. This is with stairs. It does not occur with walking moderate distances. It is not worsening. And she does not have any chest pain associated with it as she did in April 2017. She continues to have edema but it is unchanged. She wears her compression socks.  She was in the ER 12/18/17 after a fall and hip dislocation and it was reduced in the ER.       ROS:  Skin:  Positive for bruising   Eyes:  Positive for glasses  ENT:  Positive for epistaxis;nasal congestion;sinus trouble  Respiratory:  Positive for sleep apnea;CPAP;dyspnea on exertion;wheezing  Cardiovascular:    lightheadedness;Positive for;edema  Gastroenterology: Positive for heartburn;reflux  Genitourinary:  Positive for urinary frequency  Musculoskeletal:  Positive for back pain  Neurologic:  Positive for headaches;numbness or tingling of hands  Psychiatric:  Negative    Heme/Lymph/Imm:  Positive for allergies  Endocrine:  Positive for thyroid disorder    PAST MEDICAL HISTORY:  Past Medical History:   Diagnosis Date     Abnormal Papanicolaou smear of cervix and cervical HPV     colposcopy 1/97     Arthritis      Chronic rhinitis      Coronary artery disease     4/4/17 SHERLEY--PDA     Diastolic CHF, chronic (H) 2/22/2012     Gastro-oesophageal reflux disease      Hyperlipidemia      Hyperlipidemia LDL goal <130 10/31/2011     Pain in right hip      Personal history of colonic polyps     colonoscopy 9/97, 2002 (due in 2007)     Pulmonary HTN      Sleep apnea      Subclavian artery stenosis, left (H) 8/7/2013    S/p stent in 2013      Subclinical hyperthyroidism 10/17/2016     Unspecified asthma(493.90)     on preventative meds and has nebulizer       PAST SURGICAL HISTORY:  Past Surgical History:   Procedure Laterality Date     ARTHROPLASTY HIP Right 10/19/2015    Procedure: ARTHROPLASTY HIP;  Surgeon: Aron Torres MD;   Location: [facility]     C NONSPECIFIC PROCEDURE  2/89, 1990    liposuction of legs and stomach     C NONSPECIFIC PROCEDURE  1990    Ankle pins removed     C OPEN RX ANKLE DISLOCATN+FIXATN  4/18/87     COLONOSCOPY  01/1/08    Polyp removed, bx.     COLONOSCOPY  01/12/10     COLONOSCOPY N/A 2/17/2015    Procedure: COLONOSCOPY;  Surgeon: Gato Quiles MD;  Location: [facility]     CT CORONARY ANGIOGRAM  04/04/2017    SHERLEY to PDA     HC COLONOSCOPY THRU STOMA, DIAGNOSTIC  2002     HC REDUCTION OF LARGE BREAST  2/89     VASCULAR SURGERY  2013    angiogram & left subclavical artery stent       SOCIAL HISTORY:  Social History     Social History     Marital status:      Spouse name: Trenton     Number of children: 2     Years of education: 12     Occupational History     retired      Social History Main Topics     Smoking status: Former Smoker     Packs/day: 0.10     Years: 10.00     Types: Cigarettes     Quit date: 5/19/2003     Smokeless tobacco: Never Used      Comment: off and on x 10 years 3-4 cigs: no smoker in the household     Alcohol use 0.0 oz/week     0 Standard drinks or equivalent per week      Comment: a couple drinks per year     Drug use: No     Sexual activity: Not Currently     Partners: Male     Other Topics Concern     Caffeine Concern No     Special Diet No     regular diet      Exercise No     shopping a lot so [place]      Social History Narrative       FAMILY HISTORY:  Family History   Problem Relation Age of Onset     Alzheimer Disease Mother      GASTROINTESTINAL DISEASE Mother      CANCER Father      throat and lungs, liver,     HEART DISEASE Father 57     CABG     HEART DISEASE Brother      suicidal     Pacemaker Brother      HEART DISEASE Sister      Hypertension Maternal Grandmother      Lipids Maternal Grandmother      CANCER Maternal Grandmother      stomach     CANCER Paternal Grandmother      stomach     Allergies Daughter      Allergies Son        MEDS:   Current Outpatient  "Prescriptions on File Prior to Visit:  methimazole (TAPAZOLE) 5 MG tablet Take 0.5 tablets (2.5 mg) by mouth daily   alendronate (FOSAMAX) 70 MG tablet Take 1 tablet (70 mg) by mouth every 7 days Take 60 minutes before am meal with 8 oz. water. Remain upright for 30 minutes.   fluticasone (FLONASE) 50 MCG/ACT spray USE 1 TO 2 SPRAYS IN EACH NOSTRIL DAILY   prasugrel (EFFIENT) 10 MG TABS tablet Take 1 tablet (10 mg) by mouth daily Do not crush or break tablet.   omeprazole (PRILOSEC) 20 MG CR capsule Take 1 capsule (20 mg) by mouth daily   montelukast (SINGULAIR) 10 MG tablet Take 1 tablet (10 mg) by mouth At Bedtime   fluticasone-salmeterol (ADVAIR) 250-50 MCG/DOSE diskus inhaler Inhale 1 puff into the lungs every 12 hours   furosemide (LASIX) 20 MG tablet Take 1 tablet (20 mg) by mouth daily   bisoprolol (ZEBETA) 5 MG tablet Take 0.5 tablets (2.5 mg) by mouth daily   atorvastatin (LIPITOR) 40 MG tablet Take 1 tablet (40 mg) by mouth daily   nitroglycerin (NITROSTAT) 0.4 MG sublingual tablet One tablet under the tongue every 5 minutes if needed for chest pain. May repeat every 5 minutes for a maximum of 3 doses in 15 minutes\"   biotin 2.5 mg/mL Take by mouth daily 2000mcg   spironolactone (ALDACTONE) 25 MG tablet Take 0.5 tablets (12.5 mg) by mouth daily (Patient taking differently: Take 12.5 mg by mouth Mon-wed-fri)   albuterol (PROAIR HFA, PROVENTIL HFA, VENTOLIN HFA) 108 (90 BASE) MCG/ACT inhaler Inhale 2 puffs into the lungs every 6 hours as needed for shortness of breath / dyspnea or wheezing   albuterol (2.5 MG/3ML) 0.083% nebulizer solution Take 1 vial (2.5 mg) by nebulization every 6 hours as needed for shortness of breath / dyspnea   Cholecalciferol (VITAMIN D3 PO) Take 2,000 Units by mouth daily    aspirin 81 MG tablet Take 81 mg by mouth daily.   omega-3 fatty acids (FISH OIL DOUBLE STRENGTH) 1200 MG capsule Take 1 capsule by mouth 2 times daily    Calcium 7789-2881 MG-UNIT CHEW Take 1 tablet by mouth " "daily.   Boswellia-Glucosamine-Vit D (OSTEO BI-FLEX/5-LOXIN ADVANCED) TABS Take 1,500 mg by mouth 2 times daily    MAGNESIUM ACETATE Take 250 mg by mouth daily    cycloSPORINE (RESTASIS) 0.05 % ophthalmic emulsion Place 1 drop into both eyes 2 times daily      No current facility-administered medications on file prior to visit.     ALLERGIES:   Allergies   Allergen Reactions     Animal Dander      Kiwi Itching     Itching and red all over     Pollen Extract      Pollen,dust, trees, grass, mold-hayfever symptoms     Seasonal Allergies        PHYSICAL EXAM:  Vitals: /76 (BP Location: Right arm, Patient Position: Sitting, Cuff Size: Adult Small)  Pulse 66  Ht 1.549 m (5' 1\")  Wt 81.2 kg (179 lb)  BMI 33.82 kg/m2  Constitutional:  cooperative, alert and oriented, well developed, well nourished, in no acute distress        Skin:  warm and dry to the touch, no apparent skin lesions or masses noted        Head:  normocephalic, no masses or lesions        Eyes:  pupils equal and round;sclera white;no xanthalasma        ENT:  no pallor or cyanosis, dentition good        Neck:  JVP normal        Chest:  normal breath sounds, clear to auscultation, normal A-P diameter, normal symmetry, normal respiratory excursion, no use of accessory muscles        Cardiac: regular rhythm;normal S1 and S2       systolic murmur;grade 1          Abdomen:  abdomen soft;BS normoactive;non-tender        Vascular:                                        Extremities and Back:  normal muscle strength and tone   wearing compression stockings    Neurological:  no gross motor deficits;affect appropriate          LABS/DATA:  I reviewed the followin18 TSH 1.42  18 ALT 21, ALT 20  18 WBC 6.3      ASSESSMENT:  1.  Coronary artery disease.   a.  Symptoms of dyspnea on exertion and chest pain with abnormal nuclear stress test.     b.  Coronary angiogram 2017 - 80% left PDA status post drug-eluting stent.  There is a tiny " nondominant right coronary artery with severe disease that was not intervened upon.   c.  on DAPT through 4/4/18. Also on statin.   d.  Continues to have stable ESPITIA (stairs) without chest pain. Unremarkable echo and no change off bisoprolol. Will add in Imdur given her known tiny RCA that is severely disease. She is educated on side effects. She also knows to be seen if symptoms worsen.  She will follow up in 1 month.     2.  Hypertension.   a.  Adding Imdur today.     3.  Hyperlipidemia.   a.  4/2017 switched from pravastatin to atorvastatin due to her known coronary artery disease.     4.  Diastolic dysfunction.     5.  Left subclavian stenosis, status post stenting in 2013.     6.  Venous insufficiency, bilateral.   a.  Had venous competency studies March 2017.   b.  Wearing compression stockings with improvement in symptoms.  c.  Dr. Yepez could consider sclerotherapy, but impact uncertain given location of saphenous reflux in calves mostly.      7.  Obstructive sleep apnea on CPAP.     8.  Thyroid disease.           Thank you very much for allowing me to participate in the care of Victoria Montalvo.         Alia Yoo PA-C

## 2018-01-11 NOTE — PATIENT INSTRUCTIONS
1.  Given the shortness of breath did not improve off the bisoprolol, and it continues (and you have know disease in that small right coronary artery), we will trial Imdur 30 mg daily.     2.  If you develop a headache, you can take Tylenol (acetaminophen). If the headache continues after a few days, please call us.     3.  Please follow up with myself, Dr. Yepez or Martha (Martha only in [place] now) in about 1 month. If you have worsening symptoms, please call or be seen urgently.

## 2018-01-17 ENCOUNTER — OFFICE VISIT (OUTPATIENT)
Dept: ENDOCRINOLOGY | Facility: CLINIC | Age: 78
End: 2018-01-17
Payer: MEDICARE

## 2018-01-17 VITALS
HEIGHT: 61 IN | SYSTOLIC BLOOD PRESSURE: 104 MMHG | TEMPERATURE: 97.4 F | DIASTOLIC BLOOD PRESSURE: 62 MMHG | OXYGEN SATURATION: 96 % | HEART RATE: 89 BPM | BODY MASS INDEX: 33.91 KG/M2 | WEIGHT: 179.6 LBS

## 2018-01-17 DIAGNOSIS — E05.90 SUBCLINICAL HYPERTHYROIDISM: ICD-10-CM

## 2018-01-17 PROCEDURE — 99213 OFFICE O/P EST LOW 20 MIN: CPT | Performed by: INTERNAL MEDICINE

## 2018-01-17 RX ORDER — METHIMAZOLE 5 MG/1
2.5 TABLET ORAL DAILY
Qty: 45 TABLET | Refills: 1 | Status: SHIPPED | OUTPATIENT
Start: 2018-01-17 | End: 2018-07-22

## 2018-01-17 NOTE — PROGRESS NOTES
Name: Victoria Montalvo  Seen for follow up Hyperthyroidism.    HPI:  Victoria Montalvo is a 76 year old female who presents for the evaluation of Hyperthyroidism.  H/o CAD s/p stent placement. Last was 4/2017.  H/o osteoporosis-- on fosamax.    On methimazole 2.5 mg/day. Since 11/2017. Before that she was on 5 mg/day  F/u Labs in normal range. (1/2018)  CBC and LFT normal.    Feeling ok.  No major complaints.    History of radiation exposure: NO  History of thyroid dysfunction: NO  Palpitations:  No.  Changes to hair or skin: No  Diarrhea/Constipation:No  Changes in vision:Yes: sometimes not clear  Dysphagia or Shortness of breath:Yes: sometimes difficulty with swallowing. Sometimes SOB.  Tremors:No  Changes in weight: no. Stable.  Wt Readings from Last 2 Encounters:   01/17/18 81.5 kg (179 lb 9.6 oz)   01/11/18 81.2 kg (179 lb)     Lithium/Amiodarone: No  URI: No  CT Scans:No    PMH/PSH:  Past Medical History:   Diagnosis Date     Abnormal Papanicolaou smear of cervix and cervical HPV     colposcopy 1/97     Arthritis      Chronic rhinitis      Coronary artery disease     4/4/17 SHERLEY--PDA     Diastolic CHF, chronic (H) 2/22/2012     Gastro-oesophageal reflux disease      Hyperlipidemia      Hyperlipidemia LDL goal <130 10/31/2011     Pain in right hip      Personal history of colonic polyps     colonoscopy 9/97, 2002 (due in 2007)     Pulmonary HTN      Sleep apnea      Subclavian artery stenosis, left (H) 8/7/2013    S/p stent in 2013      Subclinical hyperthyroidism 10/17/2016     Unspecified asthma(493.90)     on preventative meds and has nebulizer     Past Surgical History:   Procedure Laterality Date     ARTHROPLASTY HIP Right 10/19/2015    Procedure: ARTHROPLASTY HIP;  Surgeon: Aron Torres MD;  Location: [facility]     C NONSPECIFIC PROCEDURE  2/89, 1990    liposuction of legs and stomach     C NONSPECIFIC PROCEDURE  1990    Ankle pins removed     C OPEN RX ANKLE DISLOCATN+FIXATN  4/18/87     COLONOSCOPY   01/1/08    Polyp removed, bx.     COLONOSCOPY  01/12/10     COLONOSCOPY N/A 2/17/2015    Procedure: COLONOSCOPY;  Surgeon: Gato Quiles MD;  Location: [facility]     CT CORONARY ANGIOGRAM  04/04/2017    SHERLEY to PDA     HC COLONOSCOPY THRU STOMA, DIAGNOSTIC  2002     HC REDUCTION OF LARGE BREAST  2/89     VASCULAR SURGERY  2013    angiogram & left subclavical artery stent     Family Hx:  Family History   Problem Relation Age of Onset     Alzheimer Disease Mother      GASTROINTESTINAL DISEASE Mother      CANCER Father      throat and lungs, liver,     HEART DISEASE Father 57     CABG     HEART DISEASE Brother      suicidal     Pacemaker Brother      HEART DISEASE Sister      Hypertension Maternal Grandmother      Lipids Maternal Grandmother      CANCER Maternal Grandmother      stomach     CANCER Paternal Grandmother      stomach     Allergies Daughter      Allergies Son      Thyroid disease: No           Social Hx:  Social History     Social History     Marital status:      Spouse name: Trenton     Number of children: 2     Years of education: 12     Occupational History     retired      Social History Main Topics     Smoking status: Former Smoker     Packs/day: 0.10     Years: 10.00     Types: Cigarettes     Quit date: 5/19/2003     Smokeless tobacco: Never Used      Comment: off and on x 10 years 3-4 cigs: no smoker in the household     Alcohol use 0.0 oz/week     0 Standard drinks or equivalent per week      Comment: a couple drinks per year     Drug use: No     Sexual activity: Not Currently     Partners: Male     Other Topics Concern     Caffeine Concern No     Special Diet No     regular diet      Exercise No     shopping a lot [place]      Social History Narrative          MEDICATIONS:  has a current medication list which includes the following prescription(s): isosorbide mononitrate, methimazole, alendronate, fluticasone, prasugrel, omeprazole, montelukast, fluticasone-salmeterol, furosemide,  "bisoprolol, atorvastatin, nitroglycerin, biotin, spironolactone, albuterol, albuterol, cholecalciferol, aspirin, omega-3 fatty acids, calcium, osteo bi-flex/5-loxin advanced, magnesium acetate, and cyclosporine.    ROS   ROS: 10 point ROS neg other than the symptoms noted above in the HPI.    Physical Exam   VS: /62 (BP Location: Right arm, Patient Position: Chair, Cuff Size: Adult Large)  Pulse 89  Temp 97.4  F (36.3  C) (Oral)  Ht 1.549 m (5' 1\")  Wt 81.5 kg (179 lb 9.6 oz)  SpO2 96%  BMI 33.94 kg/m2  GENERAL: AXOX3, NAD, well dressed, answering questions appropriately, appears stated age.  HEENT: No exopthalmous, no proptosis, EOMI, no lig lag, no retraction  NECK: Thyroid normal in size, non tender, no nodules were palpated.  CV: RRR  LUNGS: CTAB  ABDOMEN: +BS  NEUROLOGY: CN grossly intact, no tremors  PSYCH: normal affect and mood    LABS:  TFTs:  ENDO THYROID LABS-[facility] Latest Ref Rng & Units 1/8/2018   TSH 0.40 - 4.00 mU/L 1.42   T4 FREE 0.76 - 1.46 ng/dL 0.87   FREE T3 2.3 - 4.2 pg/mL 2.3     [facility] 10/23/2017   TSH 0.40 - 4.00 mU/L 2.39   T4 FREE 0.76 - 1.46 ng/dL 0.89     [facility] 5/2/2017 10/17/2016   TSH 0.40 - 4.00 mU/L 2.28 <0.01 (L)   T4 FREE 0.76 - 1.46 ng/dL 0.89 1.27   FREE T3 2.3 - 4.2 pg/mL 2.8 3.4   THYROID STIM IMMUNOG   <1.0 . . .     END[facility] THYROID LABS[facility] Latest Ref Rng 10/5/2016 1/29/2016 9/18/2006   TSH 0.40 - 4.00 mU/L <0.01 (L) 0.02 (L) 0.55   T4 FREE 0.76 - 1.46 ng/dL 1.24 1.02        CBC:  WBC   Date Value Ref Range Status   01/08/2018 6.3 4.0 - 11.0 10e9/L Final     LFTs:  Liver Function Studies -   Recent Labs   Lab Test  01/08/18   1027   PROTTOTAL  7.1   ALBUMIN  3.4   BILITOTAL  0.4   ALKPHOS  61   AST  20   ALT  21       NUCLEAR MEDICINE THYROID UPTAKE AND SCAN 10/26/2016 10:32 AM      HISTORY: Thyrotoxicosis, unspecified without thyrotoxic crisis or  storm.     TECHNIQUE: Patient was given 275 uCi " of I-123 orally followed by  24-hour uptake and scan.     FINDINGS: Thyroid gland is homogeneous in uptake but the gland  overall appears enlarged. 24-hour uptake was calculated at 22% which  is within the normal range.     Normal range is 10-30% 24-hour uptake.         IMPRESSION: Enlarged thyroid gland with normal 24-hour uptake at 22%.       All pertinent notes, labs, and images personally reviewed by me.     A/P  Ms.June BALTA Montalvo is a 76 year old here for the evaluation of hyperthyroidism.    Subclinical hyperthyroidism:    Neg TSI and normal thyroid uptake and scan.  24-hour uptake was calculated at 22% which is within the normal range.  H/o CHF, CAD and osteopenia  On methimazole 2.5 mg/day since 11/2017-- before that she was on 5 mg/day.  F/u Labs in normal range 1/2018  CBC and LFT normal.  -- clinically looks euthyroid  -- plan to continue methimazole to 2.5 mg/day (on this dose since 11/14/2017)-- given h/o CAD, osteoporosis.  Repeat labs in 3-4 months.  Discussed s/s of hypo and hyperthyroidism to watch for.    Discussed possible side effects of methimazole including liver dysfunction and agranulocytosis. Discussed to watch for s/s like jaundice, abdominal pain and skin rash. In case of prolonged unresolving fever, sore throat and infections, advise to seek medical care.    Call if:     Signs or symptoms of infection. These include a fever of 100.5 degrees or higher, chills, severe sore throat, ear or sinus pain, cough, increased sputum or change in color, painful urination, mouth sores.   Severe nausea or vomiting.   Joint pain or swelling.   Not able to eat.   Unusual bruising or bleeding.   Dark urine or yellow skin or eyes.            Follow-up:  3-4 months    Annie Alvarez MD  Endocrinology  [facility]  CC: Deisi Liu       All questions were answered.  The patient indicates understanding of the above issues and agrees with the plan set forth. 20 min.

## 2018-01-17 NOTE — PATIENT INSTRUCTIONS
[facility]   Dr Alvarez, Endocrinology Department      [facility]   [address]  [place] [address]  Appointment [phone]  Fax: [phone]  [place]: Monday and Tuesday         [facility]   [address]  [place] [address]  Appointment [phone]  Fax: [phone]  [place]: Wednesday and Thursday            Continue methimazole 2.5 mg/day  Labs in 3-4 months  Please make a lab appointment for blood work and follow up clinic appointment in 1 week after that to discuss results.    Discussed possible side effects of methimazole including liver dysfunction and agranulocytosis. Discussed to watch for s/s like jaundice, abdominal pain and skin rash. In case of prolonged unresolving fever, sore throat and infections, advise to seek medical care.    Call if:     Signs or symptoms of infection. These include a fever of 100.5 degrees or higher, chills, severe sore throat, ear or sinus pain, cough, increased sputum or change in color, painful urination, mouth sores.   Severe nausea or vomiting.   Joint pain or swelling.   Not able to eat.   Unusual bruising or bleeding.   Dark urine or yellow skin or eyes.

## 2018-01-17 NOTE — MR AVS SNAPSHOT
After Visit Summary   2018    Victoria Montalvo    MRN: [id number]           Patient Information     Date Of Birth          1940        Visit Information        Provider Department      2018 10:30 AM Annie Alvarez MD [facility]        Today's Diagnoses     Subclinical hyperthyroidism          Care Instructions    [facility] & [place] locations   Dr Alvarez, Endocrinology Department      [facility]   [address]  [place] [address]  Appointment [phone]  Fax: [phone]  [place]: Monday and Tuesday         [facility]   [address]  [place] [address]  Appointment [phone]  Fax: [phone]  [place]: Wednesday and Thursday            Continue methimazole 2.5 mg/day  Labs in 3-4 months  Please make a lab appointment for blood work and follow up clinic appointment in 1 week after that to discuss results.    Discussed possible side effects of methimazole including liver dysfunction and agranulocytosis. Discussed to watch for s/s like jaundice, abdominal pain and skin rash. In case of prolonged unresolving fever, sore throat and infections, advise to seek medical care.    Call if:     Signs or symptoms of infection. These include a fever of 100.5 degrees or higher, chills, severe sore throat, ear or sinus pain, cough, increased sputum or change in color, painful urination, mouth sores.   Severe nausea or vomiting.   Joint pain or swelling.   Not able to eat.   Unusual bruising or bleeding.   Dark urine or yellow skin or eyes.              Follow-ups after your visit        Your next 10 appointments already scheduled     Feb 15, 2018 12:30 PM CST   Return Visit with Alia Yoo PA-C   [facility] ([facility])    [address]  [facility] [id number] [phone]             Mar 29, 2018  2:15 PM CDT   Return Visit with Yogi Yepez MD   [facility] ([facility])    [address]  [facility] [place] [facility] [address] [phone]              Future tests that were ordered for you today     Open Future Orders        Priority Expected Expires Ordered    T4 free Routine  11/13/2018 1/17/2018    T3 Free Routine  11/13/2018 1/17/2018    TSH Routine  11/13/2018 1/17/2018    Hepatic panel Routine  11/13/2018 1/17/2018    WBC count Routine  11/13/2018 1/17/2018            Who to contact     If you have questions or need follow up information about today's clinic visit or your schedule please contact [facility] directly at [phone].  Normal or non-critical lab and imaging results will be communicated to you by MyChart, letter or phone within 4 business days after the clinic has received the results. If you do not hear from us within 7 days, please contact the clinic through Superbhart or phone. If you have a critical or abnormal lab result, we will notify you by phone as soon as possible.  Submit refill requests through FlexEl or call your pharmacy and they will forward the refill request to us. Please allow 3 business days for your refill to be completed.          Additional Information About Your Visit        FlexEl Information     FlexEl gives you secure access to your electronic health record. If you see a primary care provider, you can also send messages to your care team and make appointments. If you have questions, please call your primary care clinic.  If you do not have a primary care provider, please call [phone] and they will assist you.        Care EveryWhere ID     This is your Care EveryWhere ID. This could be used by other organizations to access your [facility] medical records  [id number]        Your Vitals Were     Pulse Temperature Height Pulse Oximetry BMI (Body Mass Index)       89 97.4  " F (36.3  C) (Oral) 1.549 m (5' 1\") 96% 33.94 kg/m2        Blood Pressure from Last 3 Encounters:   01/17/18 104/62   01/11/18 140/76   12/14/17 130/85    Weight from Last 3 Encounters:   01/17/18 81.5 kg (179 lb 9.6 oz)   01/11/18 81.2 kg (179 lb)   12/14/17 77.1 kg (170 lb)                 Today's Medication Changes          These changes are accurate as of: 1/17/18 10:40 AM.  If you have any questions, ask your nurse or doctor.               These medicines have changed or have updated prescriptions.        Dose/Directions    spironolactone 25 MG tablet   Commonly known as:  ALDACTONE   This may have changed:    - when to take this  - additional instructions   Used for:  Hyperlipidemia LDL goal <130, Diastolic CHF, chronic (H), Subclavian artery stenosis, left (H), Palpitations        Dose:  12.5 mg   Take 0.5 tablets (12.5 mg) by mouth daily   Quantity:  45 tablet   Refills:  12            Where to get your medicines      These medications were sent to [facility] - [address] [address]     Phone:  [phone]     methimazole 5 MG tablet                Primary Care Provider Office Phone # Fax #    Deisi Liu [phone] [address] [place] [address]        Equal Access to Services     [facility] Center[facility] AH: Hadii mercedes ku hadasho Soomaali, waaxda luqadaha, qaybta kaalmada adeegyada, estefani lake. So [facility] [phone].    ATENCIÓN: Si habla español, tiene a yang disposición servicios gratuitos de asistencia lingüística. Llame al [phone].    We comply with applicable federal civil rights laws and [place] laws. We do not discriminate on the basis of race, color, national origin, age, disability, sex, sexual orientation, or gender identity.            Thank you!     Thank you for choosing [facility]  for your care. Our goal is always to provide you " with excellent care. Hearing back from our patients is one way we can continue to improve our services. Please take a few minutes to complete the written survey that you may receive in the mail after your visit with us. Thank you!             Your Updated Medication List - Protect others around you: Learn how to safely use, store and throw away your medicines at www.disposemymeds.org.          This list is accurate as of: 1/17/18 10:40 AM.  Always use your most recent med list.                   Brand Name Dispense Instructions for use Diagnosis    * albuterol (2.5 MG/3ML) 0.083% neb solution     1 Box    Take 1 vial (2.5 mg) by nebulization every 6 hours as needed for shortness of breath / dyspnea    Moderate persistent asthma, uncomplicated       * albuterol 108 (90 BASE) MCG/ACT Inhaler    PROAIR HFA/PROVENTIL HFA/VENTOLIN HFA    3 Inhaler    Inhale 2 puffs into the lungs every 6 hours as needed for shortness of breath / dyspnea or wheezing    Moderate persistent asthma, uncomplicated       alendronate 70 MG tablet    FOSAMAX    12 tablet    Take 1 tablet (70 mg) by mouth every 7 days Take 60 minutes before am meal with 8 oz. water. Remain upright for 30 minutes.    Age-related osteoporosis without current pathological fracture       aspirin 81 MG tablet      Take 81 mg by mouth daily.        atorvastatin 40 MG tablet    LIPITOR    90 tablet    Take 1 tablet (40 mg) by mouth daily    Coronary artery disease involving native coronary artery of native heart with unstable angina pectoris (H), Status post coronary angioplasty       biotin 2.5 mg/mL Susp      Take by mouth daily 2000mcg        bisoprolol 5 MG tablet    ZEBETA    45 tablet    Take 0.5 tablets (2.5 mg) by mouth daily    Coronary artery disease involving native coronary artery of native heart with unstable angina pectoris (H)       Calcium 2280-7331 MG-UNIT Chew      Take 1 tablet by mouth daily.        FISH OIL DOUBLE STRENGTH 1200 MG capsule   Generic  "drug:  omega-3 fatty acids      Take 1 capsule by mouth 2 times daily        fluticasone 50 MCG/ACT spray    FLONASE    48 g    USE 1 TO 2 SPRAYS IN EACH NOSTRIL DAILY    Chronic rhinitis       fluticasone-salmeterol 250-50 MCG/DOSE diskus inhaler    ADVAIR    3 Inhaler    Inhale 1 puff into the lungs every 12 hours    Uncomplicated severe persistent asthma       furosemide 20 MG tablet    LASIX    90 tablet    Take 1 tablet (20 mg) by mouth daily    Diastolic CHF, chronic (H)       isosorbide mononitrate 30 MG 24 hr tablet    IMDUR    90 tablet    Take 1 tablet (30 mg) by mouth daily    ESPITIA (dyspnea on exertion), ASHD (arteriosclerotic heart disease)       MAGNESIUM ACETATE      Take 250 mg by mouth daily        methimazole 5 MG tablet    TAPAZOLE    45 tablet    Take 0.5 tablets (2.5 mg) by mouth daily    Subclinical hyperthyroidism       montelukast 10 MG tablet    SINGULAIR    90 tablet    Take 1 tablet (10 mg) by mouth At Bedtime    Chronic non-seasonal allergic rhinitis, unspecified trigger       nitroGLYcerin 0.4 MG sublingual tablet    NITROSTAT    25 tablet    One tablet under the tongue every 5 minutes if needed for chest pain. May repeat every 5 minutes for a maximum of 3 doses in 15 minutes\"    Coronary artery disease involving native coronary artery of native heart with unstable angina pectoris (H), Status post coronary angioplasty       omeprazole 20 MG CR capsule    priLOSEC    90 capsule    Take 1 capsule (20 mg) by mouth daily    Gastroesophageal reflux disease without esophagitis       OSTEO BI-FLEX/5-LOXIN ADVANCED Tabs      Take 1,500 mg by mouth 2 times daily        prasugrel 10 MG Tabs tablet    EFFIENT    90 tablet    Take 1 tablet (10 mg) by mouth daily Do not crush or break tablet.    Coronary artery disease involving native coronary artery of native heart with unstable angina pectoris (H), Status post coronary angioplasty       RESTASIS 0.05 % ophthalmic emulsion   Generic drug:  " cycloSPORINE      Place 1 drop into both eyes 2 times daily        spironolactone 25 MG tablet    ALDACTONE    45 tablet    Take 0.5 tablets (12.5 mg) by mouth daily    Hyperlipidemia LDL goal <130, Diastolic CHF, chronic (H), Subclavian artery stenosis, left (H), Palpitations       VITAMIN D3 PO      Take 2,000 Units by mouth daily        * Notice:  This list has 2 medication(s) that are the same as other medications prescribed for you. Read the directions carefully, and ask your doctor or other care provider to review them with you.

## 2018-01-17 NOTE — NURSING NOTE
"Chief Complaint   Patient presents with     Thyroid Problem     follow up subclinical hyperthyroidism        Initial /62 (BP Location: Right arm, Patient Position: Chair, Cuff Size: Adult Large)  Pulse 89  Temp 97.4  F (36.3  C) (Oral)  Ht 1.549 m (5' 1\")  Wt 81.5 kg (179 lb 9.6 oz)  SpO2 96%  BMI 33.94 kg/m2 Estimated body mass index is 33.94 kg/(m^2) as calculated from the following:    Height as of this encounter: 1.549 m (5' 1\").    Weight as of this encounter: 81.5 kg (179 lb 9.6 oz).  Medication Reconciliation: complete     ENDOCRINOLOGY INTAKE FORM    Patient Name:  Victoria Montalvo  :  1940    Is patient Diabetic?   No  Does patient have non-diabetic or other endocrine issues?  Yes: hyperthyroidism     Vitals: /62 (BP Location: Right arm, Patient Position: Chair, Cuff Size: Adult Large)  Pulse 89  Temp 97.4  F (36.3  C) (Oral)  Ht 1.549 m (5' 1\")  Wt 81.5 kg (179 lb 9.6 oz)  SpO2 96%  BMI 33.94 kg/m2  BMI= Body mass index is 33.94 kg/(m^2).    Flu vaccine:  No  Pneumonia vaccine:  Yes: 12/8/10    Smoking and Alcohol use:  Social History   Substance Use Topics     Smoking status: Former Smoker     Packs/day: 0.10     Years: 10.00     Types: Cigarettes     Quit date: 2003     Smokeless tobacco: Never Used      Comment: off and on x 10 years 3-4 cigs: no smoker in the household     Alcohol use 0.0 oz/week     0 Standard drinks or equivalent per week      Comment: a couple drinks per year       Staff Signature:  Britney Bashir CMA (AAMA)        "

## 2018-01-17 NOTE — LETTER
1/17/2018         RE: Victoria Montalvo  [address] [place] [address]        Dear Colleague,    Thank you for referring your patient, Victoria Montalvo, to the [facility]. Please see a copy of my visit note below.    Name: Victoria Montalvo  Seen for follow up Hyperthyroidism.    HPI:  Victoria Montalvo is a 76 year old female who presents for the evaluation of Hyperthyroidism.  H/o CAD s/p stent placement. Last was 4/2017.  H/o osteoporosis-- on fosamax.    On methimazole 2.5 mg/day. Since 11/2017. Before that she was on 5 mg/day  F/u Labs in normal range. (1/2018)  CBC and LFT normal.    Feeling ok.  No major complaints.    History of radiation exposure: NO  History of thyroid dysfunction: NO  Palpitations:  No.  Changes to hair or skin: No  Diarrhea/Constipation:No  Changes in vision:Yes: sometimes not clear  Dysphagia or Shortness of breath:Yes: sometimes difficulty with swallowing. Sometimes SOB.  Tremors:No  Changes in weight: no. Stable.  Wt Readings from Last 2 Encounters:   01/17/18 81.5 kg (179 lb 9.6 oz)   01/11/18 81.2 kg (179 lb)     Lithium/Amiodarone: No  URI: No  CT Scans:No    PMH/PSH:  Past Medical History:   Diagnosis Date     Abnormal Papanicolaou smear of cervix and cervical HPV     colposcopy 1/97     Arthritis      Chronic rhinitis      Coronary artery disease     4/4/17 SHERLEY--PDA     Diastolic CHF, chronic (H) 2/22/2012     Gastro-oesophageal reflux disease      Hyperlipidemia      Hyperlipidemia LDL goal <130 10/31/2011     Pain in right hip      Personal history of colonic polyps     colonoscopy 9/97, 2002 (due in 2007)     Pulmonary HTN      Sleep apnea      Subclavian artery stenosis, left (H) 8/7/2013    S/p stent in 2013      Subclinical hyperthyroidism 10/17/2016     Unspecified asthma(493.90)     on preventative meds and has nebulizer     Past Surgical History:   Procedure Laterality Date     ARTHROPLASTY HIP Right 10/19/2015    Procedure: ARTHROPLASTY HIP;  Surgeon:  Aron Torres MD;  Location: [facility] OR     C NONSPECIFIC PROCEDURE  2/89, 1990    liposuction of legs and stomach     C NONSPECIFIC PROCEDURE  1990    Ankle pins removed     C OPEN RX ANKLE DISLOCATN+FIXATN  4/18/87     COLONOSCOPY  01/1/08    Polyp removed, bx.     COLONOSCOPY  01/12/10     COLONOSCOPY N/A 2/17/2015    Procedure: COLONOSCOPY;  Surgeon: Gato Quiles MD;  Location: [facility] GI     CT CORONARY ANGIOGRAM  04/04/2017    SHERLEY to PDA     HC COLONOSCOPY THRU STOMA, DIAGNOSTIC  2002     HC REDUCTION OF LARGE BREAST  2/89     VASCULAR SURGERY  2013    angiogram & left subclavical artery stent     Family Hx:  Family History   Problem Relation Age of Onset     Alzheimer Disease Mother      GASTROINTESTINAL DISEASE Mother      CANCER Father      throat and lungs, liver,     HEART DISEASE Father 57     CABG     HEART DISEASE Brother      suicidal     Pacemaker Brother      HEART DISEASE Sister      Hypertension Maternal Grandmother      Lipids Maternal Grandmother      CANCER Maternal Grandmother      stomach     CANCER Paternal Grandmother      stomach     Allergies Daughter      Allergies Son      Thyroid disease: No           Social Hx:  Social History     Social History     Marital status:      Spouse name: Trenton     Number of children: 2     Years of education: 12     Occupational History     retired      Social History Main Topics     Smoking status: Former Smoker     Packs/day: 0.10     Years: 10.00     Types: Cigarettes     Quit date: 5/19/2003     Smokeless tobacco: Never Used      Comment: off and on x 10 years 3-4 cigs: no smoker in the household     Alcohol use 0.0 oz/week     0 Standard drinks or equivalent per week      Comment: a couple drinks per year     Drug use: No     Sexual activity: Not Currently     Partners: Male     Other Topics Concern     Caffeine Concern No     Special Diet No     regular diet      Exercise No     shopping a lot howard delarosa      Social History  "Narrative          MEDICATIONS:  has a current medication list which includes the following prescription(s): isosorbide mononitrate, methimazole, alendronate, fluticasone, prasugrel, omeprazole, montelukast, fluticasone-salmeterol, furosemide, bisoprolol, atorvastatin, nitroglycerin, biotin, spironolactone, albuterol, albuterol, cholecalciferol, aspirin, omega-3 fatty acids, calcium, osteo bi-flex/5-loxin advanced, magnesium acetate, and cyclosporine.    ROS   ROS: 10 point ROS neg other than the symptoms noted above in the HPI.    Physical Exam   VS: /62 (BP Location: Right arm, Patient Position: Chair, Cuff Size: Adult Large)  Pulse 89  Temp 97.4  F (36.3  C) (Oral)  Ht 1.549 m (5' 1\")  Wt 81.5 kg (179 lb 9.6 oz)  SpO2 96%  BMI 33.94 kg/m2  GENERAL: AXOX3, NAD, well dressed, answering questions appropriately, appears stated age.  HEENT: No exopthalmous, no proptosis, EOMI, no lig lag, no retraction  NECK: Thyroid normal in size, non tender, no nodules were palpated.  CV: RRR  LUNGS: CTAB  ABDOMEN: +BS  NEUROLOGY: CN grossly intact, no tremors  PSYCH: normal affect and mood    LABS:  TFTs:  ENDO THYROID LABS-[facility] Latest Ref Rng & Units 1/8/2018   TSH 0.40 - 4.00 mU/L 1.42   T4 FREE 0.76 - 1.46 ng/dL 0.87   FREE T3 2.3 - 4.2 pg/mL 2.3     ENDO THYROID LABS-[facility] Latest Ref Rng & Units 10/23/2017   TSH 0.40 - 4.00 mU/L 2.39   T4 FREE 0.76 - 1.46 ng/dL 0.89     ENDO THYROID LABS-[facility] Latest Ref Rng & Units 5/2/2017 10/17/2016   TSH 0.40 - 4.00 mU/L 2.28 <0.01 (L)   T4 FREE 0.76 - 1.46 ng/dL 0.89 1.27   FREE T3 2.3 - 4.2 pg/mL 2.8 3.4   THYROID STIM IMMUNOG   <1.0 . . .     ENDO THYROID LABS-[facility] Latest Ref Rng 10/5/2016 1/29/2016 9/18/2006   TSH 0.40 - 4.00 mU/L <0.01 (L) 0.02 (L) 0.55   T4 FREE 0.76 - 1.46 ng/dL 1.24 1.02        CBC:  WBC   Date Value Ref Range Status   01/08/2018 6.3 4.0 - 11.0 10e9/L Final     LFTs:  Liver Function Studies -   Recent Labs   Lab Test  01/08/18   1027   PROTTOTAL  7.1 "   ALBUMIN  3.4   BILITOTAL  0.4   ALKPHOS  61   AST  20   ALT  21       NUCLEAR MEDICINE THYROID UPTAKE AND SCAN 10/26/2016 10:32 AM      HISTORY: Thyrotoxicosis, unspecified without thyrotoxic crisis or  storm.     TECHNIQUE: Patient was given 275 uCi of I-123 orally followed by  24-hour uptake and scan.     FINDINGS: Thyroid gland is homogeneous in uptake but the gland  overall appears enlarged. 24-hour uptake was calculated at 22% which  is within the normal range.     Normal range is 10-30% 24-hour uptake.         IMPRESSION: Enlarged thyroid gland with normal 24-hour uptake at 22%.       All pertinent notes, labs, and images personally reviewed by me.     A/P  Ms.June BALTA Montalvo is a 76 year old here for the evaluation of hyperthyroidism.    Subclinical hyperthyroidism:    Neg TSI and normal thyroid uptake and scan.  24-hour uptake was calculated at 22% which is within the normal range.  H/o CHF, CAD and osteopenia  On methimazole 2.5 mg/day since 11/2017-- before that she was on 5 mg/day.  F/u Labs in normal range 1/2018  CBC and LFT normal.  -- clinically looks euthyroid  -- plan to continue methimazole to 2.5 mg/day (on this dose since 11/14/2017)-- given h/o CAD, osteoporosis.  Repeat labs in 3-4 months.  Discussed s/s of hypo and hyperthyroidism to watch for.    Discussed possible side effects of methimazole including liver dysfunction and agranulocytosis. Discussed to watch for s/s like jaundice, abdominal pain and skin rash. In case of prolonged unresolving fever, sore throat and infections, advise to seek medical care.    Call if:     Signs or symptoms of infection. These include a fever of 100.5 degrees or higher, chills, severe sore throat, ear or sinus pain, cough, increased sputum or change in color, painful urination, mouth sores.   Severe nausea or vomiting.   Joint pain or swelling.   Not able to eat.   Unusual bruising or bleeding.   Dark urine or yellow skin or eyes.            Follow-up:  3-4  months    nAnie Alvarez MD  Endocrinology  [facility]  CC: Deisi Liu       All questions were answered.  The patient indicates understanding of the above issues and agrees with the plan set forth. 20 min.         Again, thank you for allowing me to participate in the care of your patient.        Sincerely,        Annie Alvarez MD

## 2018-02-15 ENCOUNTER — OFFICE VISIT (OUTPATIENT)
Dept: CARDIOLOGY | Facility: CLINIC | Age: 78
End: 2018-02-15
Attending: PHYSICIAN ASSISTANT
Payer: MEDICARE

## 2018-02-15 VITALS
DIASTOLIC BLOOD PRESSURE: 70 MMHG | SYSTOLIC BLOOD PRESSURE: 118 MMHG | BODY MASS INDEX: 34.21 KG/M2 | WEIGHT: 181.2 LBS | OXYGEN SATURATION: 98 % | HEART RATE: 68 BPM | HEIGHT: 61 IN

## 2018-02-15 DIAGNOSIS — I25.10 ASHD (ARTERIOSCLEROTIC HEART DISEASE): ICD-10-CM

## 2018-02-15 DIAGNOSIS — R06.09 DOE (DYSPNEA ON EXERTION): ICD-10-CM

## 2018-02-15 PROCEDURE — 99213 OFFICE O/P EST LOW 20 MIN: CPT | Performed by: PHYSICIAN ASSISTANT

## 2018-02-15 NOTE — LETTER
2/15/2018    Deisi Liu MD  [address] [place] [address]    RE: Victoria Montalvo       Dear Colleague,    I had the pleasure of seeing Victoria Montalvo in the [facility].    CARDIOLOGY CLINIC PROGRESS NOTE    DOS: 2/15/2018    Victoria Montalvo  : 1940, 77 year old  MRN: [id number]      History:  I had the pleasure of following up with Victoria Montalvo today in the Cardiology Clinic.  Victoria is a patient of Dr. Yepez.       Victoria is a very pleasant 77-year-old woman with a history of hypertension, hyperlipidemia, diastolic dysfunction, left subclavian stenosis status post percutaneous revascularization in , chronic venous insufficiency, sleep apnea not on CPAP, and more recently diagnosed coronary artery disease.       Victoria saw Martha Guevara NP, in 2017 and she had noted worsening dyspnea on exertion with some chest pain.  A nuclear stress test was performed that showed mostly reversible anteroapical defect consistent with mild to moderate ischemia in the distribution of the LAD.  There was also breast attenuation artifact noted.  She was symptomatic during the test with shortness of breath.  She underwent a coronary angiogram 2017 via the right femoral artery by Dr. Denise.  There was an 80% left PDA that was stented.  There was a tiny nondominant right coronary artery that was severely diseased and was not intervened upon.  There was mild to moderate disease in the LAD.  Dr. Denise started bisoprolol.  He also intensified her statin switching her from pravastatin to atorvastatin.       In follow up, she had some ESPITIA, and an echocardiogram was done 2017 showed normal LV function and just a little bit of pulmonary hypertension.  Dr. Yepez had her trial holding bisoprolol. This made no difference, so was resumed. Her LDL improved to 70 with the switch to atorvastatin.      Regarding her bilateral lower extremity edema and venous insufficiency, she  has been wearing compression socks since March 2017. They really help with her symptoms, though are bothersome in the summer months. Dr. Yepez did consider sclerotherapy, but given the location of the saphenous vein reflux, the impact may not be optimal.  She tells me she did get a second opinion at [facility] on the urging of her children to be evaluated. She says they said the same thing as Dr. Yepez.       Due to some continued mild, stable ESPITIA, and known small vessel disease, I did add in Imdur.      She presents today for follow up.  She is taking and tolerating the Imdur.  It is difficult for her to know if the Imdur helped, because she has been having some asthma flares lately. She is using her nebulizer occasionally. She said every winter around this time, her asthma worsens.  No fever.  No orthopnea, worsening edema.  No chest pain.   She continues to have edema but it is unchanged. She wears her compression socks.          ROS:  Skin:  Positive for bruising   Eyes:  Positive for glasses  ENT:  Positive for epistaxis;nasal congestion;sinus trouble  Respiratory:  Positive for sleep apnea;dyspnea on exertion;wheezing  Cardiovascular:    lightheadedness;Positive for;edema;heaviness;fatigue  Gastroenterology: Positive for heartburn;reflux  Genitourinary:  Positive for urinary frequency;nocturia  Musculoskeletal:  Positive for back pain;arthritis  Neurologic:  Positive for headaches;numbness or tingling of hands  Psychiatric:  Positive for sleep disturbances  Heme/Lymph/Imm:  Positive for allergies  Endocrine:  Positive for thyroid disorder    PAST MEDICAL HISTORY:  Past Medical History:   Diagnosis Date     Abnormal Papanicolaou smear of cervix and cervical HPV     colposcopy 1/97     Arthritis      Chronic rhinitis      Coronary artery disease     4/4/17 SHERLEY--PDA     Diastolic CHF, chronic (H) 2/22/2012     Gastro-oesophageal reflux disease      Hyperlipidemia      Hyperlipidemia LDL goal <130 10/31/2011     Pain in  right hip      Personal history of colonic polyps     colonoscopy 9/97, 2002 (due in 2007)     Pulmonary HTN      Sleep apnea      Subclavian artery stenosis, left (H) 8/7/2013    S/p stent in 2013      Subclinical hyperthyroidism 10/17/2016     Unspecified asthma(493.90)     on preventative meds and has nebulizer       PAST SURGICAL HISTORY:  Past Surgical History:   Procedure Laterality Date     ARTHROPLASTY HIP Right 10/19/2015    Procedure: ARTHROPLASTY HIP;  Surgeon: Aron Torres MD;  Location: [facility] OR     C NONSPECIFIC PROCEDURE  2/89, 1990    liposuction of legs and stomach     C NONSPECIFIC PROCEDURE  1990    Ankle pins removed     C OPEN RX ANKLE DISLOCATN+FIXATN  4/18/87     COLONOSCOPY  01/1/08    Polyp removed, bx.     COLONOSCOPY  01/12/10     COLONOSCOPY N/A 2/17/2015    Procedure: COLONOSCOPY;  Surgeon: Gato Quiles MD;  Location: [facility] GI     CT CORONARY ANGIOGRAM  04/04/2017    SEHRLEY to PDA     HC COLONOSCOPY THRU STOMA, DIAGNOSTIC  2002     HC REDUCTION OF LARGE BREAST  2/89     VASCULAR SURGERY  2013    angiogram & left subclavical artery stent       SOCIAL HISTORY:  Social History     Social History     Marital status:      Spouse name: Trenton     Number of children: 2     Years of education: 12     Occupational History     retired      Social History Main Topics     Smoking status: Former Smoker     Packs/day: 0.10     Years: 10.00     Types: Cigarettes     Quit date: 5/19/2003     Smokeless tobacco: Never Used      Comment: off and on x 10 years 3-4 cigs: no smoker in the household     Alcohol use 0.0 oz/week     0 Standard drinks or equivalent per week      Comment: a couple drinks per year     Drug use: No     Sexual activity: Not Currently     Partners: Male     Other Topics Concern     Caffeine Concern No     Special Diet No     regular diet      Exercise No     shopping a lot howard delarosa      Social History Narrative       FAMILY HISTORY:  Family History   Problem  "Relation Age of Onset     Alzheimer Disease Mother      GASTROINTESTINAL DISEASE Mother      CANCER Father      throat and lungs, liver,     HEART DISEASE Father 57     CABG     HEART DISEASE Brother      suicidal     Pacemaker Brother      HEART DISEASE Sister      Hypertension Maternal Grandmother      Lipids Maternal Grandmother      CANCER Maternal Grandmother      stomach     CANCER Paternal Grandmother      stomach     Allergies Daughter      Allergies Son        MEDS:   Current Outpatient Prescriptions on File Prior to Visit:  methimazole (TAPAZOLE) 5 MG tablet Take 0.5 tablets (2.5 mg) by mouth daily   isosorbide mononitrate (IMDUR) 30 MG 24 hr tablet Take 1 tablet (30 mg) by mouth daily   alendronate (FOSAMAX) 70 MG tablet Take 1 tablet (70 mg) by mouth every 7 days Take 60 minutes before am meal with 8 oz. water. Remain upright for 30 minutes.   fluticasone (FLONASE) 50 MCG/ACT spray USE 1 TO 2 SPRAYS IN EACH NOSTRIL DAILY   prasugrel (EFFIENT) 10 MG TABS tablet Take 1 tablet (10 mg) by mouth daily Do not crush or break tablet.   omeprazole (PRILOSEC) 20 MG CR capsule Take 1 capsule (20 mg) by mouth daily   montelukast (SINGULAIR) 10 MG tablet Take 1 tablet (10 mg) by mouth At Bedtime   fluticasone-salmeterol (ADVAIR) 250-50 MCG/DOSE diskus inhaler Inhale 1 puff into the lungs every 12 hours   furosemide (LASIX) 20 MG tablet Take 1 tablet (20 mg) by mouth daily   bisoprolol (ZEBETA) 5 MG tablet Take 0.5 tablets (2.5 mg) by mouth daily   atorvastatin (LIPITOR) 40 MG tablet Take 1 tablet (40 mg) by mouth daily   nitroglycerin (NITROSTAT) 0.4 MG sublingual tablet One tablet under the tongue every 5 minutes if needed for chest pain. May repeat every 5 minutes for a maximum of 3 doses in 15 minutes\"   biotin 2.5 mg/mL Take by mouth daily 2000mcg   spironolactone (ALDACTONE) 25 MG tablet Take 0.5 tablets (12.5 mg) by mouth daily (Patient taking differently: Take 12.5 mg by mouth Mon-wed-fri)   albuterol (PROAIR " "HFA, PROVENTIL HFA, VENTOLIN HFA) 108 (90 BASE) MCG/ACT inhaler Inhale 2 puffs into the lungs every 6 hours as needed for shortness of breath / dyspnea or wheezing   albuterol (2.5 MG/3ML) 0.083% nebulizer solution Take 1 vial (2.5 mg) by nebulization every 6 hours as needed for shortness of breath / dyspnea   Cholecalciferol (VITAMIN D3 PO) Take 2,000 Units by mouth daily    aspirin 81 MG tablet Take 81 mg by mouth daily.   omega-3 fatty acids (FISH OIL DOUBLE STRENGTH) 1200 MG capsule Take 1 capsule by mouth 2 times daily    Calcium 6069-2550 MG-UNIT CHEW Take 1 tablet by mouth daily.   Boswellia-Glucosamine-Vit D (OSTEO BI-FLEX/5-LOXIN ADVANCED) TABS Take 1,500 mg by mouth 2 times daily    MAGNESIUM ACETATE Take 250 mg by mouth daily    cycloSPORINE (RESTASIS) 0.05 % ophthalmic emulsion Place 1 drop into both eyes 2 times daily      No current facility-administered medications on file prior to visit.     ALLERGIES:   Allergies   Allergen Reactions     Animal Dander      Kiwi Itching     Itching and red all over     Pollen Extract      Pollen,dust, trees, grass, mold-hayfever symptoms     Seasonal Allergies        PHYSICAL EXAM:  Vitals: /70 (BP Location: Right arm, Patient Position: Chair, Cuff Size: Adult Regular)  Pulse 68  Ht 1.549 m (5' 1\")  Wt 82.2 kg (181 lb 3.2 oz)  SpO2 98%  BMI 34.24 kg/m2  Constitutional:  cooperative, alert and oriented, well developed, well nourished, in no acute distress        Skin:  warm and dry to the touch, no apparent skin lesions or masses noted        Head:  normocephalic, no masses or lesions        Eyes:  pupils equal and round;sclera white;no xanthalasma        ENT:  no pallor or cyanosis, dentition good        Neck:  JVP normal        Chest:  normal breath sounds, clear to auscultation, normal A-P diameter, normal symmetry, normal respiratory excursion, no use of accessory muscles        Cardiac: regular rhythm;normal S1 and S2       systolic murmur;grade 1      "     Abdomen:  abdomen soft;BS normoactive;non-tender        Vascular:                                        Extremities and Back:  normal muscle strength and tone   wearing compression stockings    Neurological:  no gross motor deficits;affect appropriate            ASSESSMENT/PLAN:  1.  Coronary artery disease.   a.  Symptoms of dyspnea on exertion and chest pain with abnormal nuclear stress test.     b.  Coronary angiogram 04/04/2017 - 80% left PDA status post drug-eluting stent.  There is a tiny nondominant right coronary artery with severe disease that was not intervened upon.   c.  on DAPT through 4/4/18. Also on BB, statin.   d.  Continues to have stable ESPITIA (stairs) without chest pain. Unremarkable echo, no change off bisoprolol. Started Imdur, which she will continue and see if this helps her sxs.      2.  Hypertension.   a.   Controlled on Imdur 30 mg, bisoprolol 2.5 mg, spironolactone 12.5 mg MWF, Lasix 20 mg.      3.  Hyperlipidemia.   a.  4/2017 switched from pravastatin to atorvastatin due to her known coronary artery disease.  6/2017 LDL 70.      4.  Diastolic dysfunction.   a.  On Lasix 20 mg daily, spironolactone 12.5 mg MWF, without e/o CHF.      5.  Left subclavian stenosis, status post stenting in 2013.      6.  Venous insufficiency, bilateral.   a.  Had venous competency studies March 2017.   b.  Wearing compression stockings with improvement in symptoms.  c.  Dr. Yepez could consider sclerotherapy, but impact uncertain given location of saphenous reflux in calves mostly.       7.  Obstructive sleep apnea not on CPAP.      8.  Thyroid disease.         Follow up:  She is scheduled to see Dr. Yepez at the end of March before coming off DAPT.       Thank you very much for allowing me to participate in the care of Victoria Montalvo.     Sincerely,     Alia Yoo PA-C     [facility]

## 2018-02-15 NOTE — MR AVS SNAPSHOT
After Visit Summary   2/15/2018    Victoria Montalvo    MRN: [id number]           Patient Information     Date Of Birth          1940        Visit Information        Provider Department      2/15/2018 12:30 PM Alia Yoo PA-C [facility]        Today's Diagnoses     ESPITIA (dyspnea on exertion)        ASHD (arteriosclerotic heart disease)          Care Instructions    1.  Continue to Imdur for now.   2.  Continue to treat the asthma as needed.   3.  See me in 2 months to reevaluate, and discuss coming off the Effient.   4.  If symptoms worsen before 2 months, please call or be seen.          Follow-ups after your visit        Additional Services     Follow-Up with Cardiac Advanced Practice Provider                 Your next 10 appointments already scheduled     Mar 29, 2018  2:15 PM CDT   Return Visit with Yogi Yepez MD   [facility] ([facility])    [id number] [place] [address] [address]  [facility] [id number] [phone]              Future tests that were ordered for you today     Open Future Orders        Priority Expected Expires Ordered    Follow-Up with Cardiac Advanced Practice Provider Routine 4/17/2018 2/15/2019 2/15/2018            Who to contact     If you have questions or need follow up information about today's clinic visit or your schedule please contact [facility] directly at [phone].  Normal or non-critical lab and imaging results will be communicated to you by MyChart, letter or phone within 4 business days after the clinic has received the results. If you do not hear from us within 7 days, please contact the clinic through MyChart or phone. If you have a critical or abnormal lab result, we will notify you by phone as soon as possible.  Submit refill requests through frintit or call your pharmacy and they will forward the refill request  "to us. Please allow 3 business days for your refill to be completed.          Additional Information About Your Visit        etaskrhart Information     [employer] gives you secure access to your electronic health record. If you see a primary care provider, you can also send messages to your care team and make appointments. If you have questions, please call your primary care clinic.  If you do not have a primary care provider, please call [phone] and they will assist you.        Care EveryWhere ID     This is your Care EveryWhere ID. This could be used by other organizations to access your [place] medical records  [id number]        Your Vitals Were     Pulse Height Pulse Oximetry BMI (Body Mass Index)          68 1.549 m (5' 1\") 98% 34.24 kg/m2         Blood Pressure from Last 3 Encounters:   02/15/18 118/70   01/17/18 104/62   01/11/18 140/76    Weight from Last 3 Encounters:   02/15/18 82.2 kg (181 lb 3.2 oz)   01/17/18 81.5 kg (179 lb 9.6 oz)   01/11/18 81.2 kg (179 lb)              We Performed the Following     Follow-Up with Cardiac Advanced Practice Provider          Today's Medication Changes          These changes are accurate as of 2/15/18  1:05 PM.  If you have any questions, ask your nurse or doctor.               These medicines have changed or have updated prescriptions.        Dose/Directions    spironolactone 25 MG tablet   Commonly known as:  ALDACTONE   This may have changed:    - when to take this  - additional instructions   Used for:  Hyperlipidemia LDL goal <130, Diastolic CHF, chronic (H), Subclavian artery stenosis, left (H), Palpitations        Dose:  12.5 mg   Take 0.5 tablets (12.5 mg) by mouth daily   Quantity:  45 tablet   Refills:  12                Primary Care Provider Office Phone # Fax #    Deisi Liu [phone] [address] [place] [address]        Equal Access to Services     KEANU ARAGON AH: emily Rivers " john estefani randolph ah. So [facility] [phone].    ATENCIÓN: Si amber carter, tiene a yang disposición servicios gratuitos de asistencia lingüística. Ruthie al [phone].    We comply with applicable federal civil rights laws and [place] laws. We do not discriminate on the basis of race, color, national origin, age, disability, sex, sexual orientation, or gender identity.            Thank you!     Thank you for choosing [facility]  for your care. Our goal is always to provide you with excellent care. Hearing back from our patients is one way we can continue to improve our services. Please take a few minutes to complete the written survey that you may receive in the mail after your visit with us. Thank you!             Your Updated Medication List - Protect others around you: Learn how to safely use, store and throw away your medicines at www.disposemymeds.org.          This list is accurate as of 2/15/18  1:05 PM.  Always use your most recent med list.                   Brand Name Dispense Instructions for use Diagnosis    * albuterol (2.5 MG/3ML) 0.083% neb solution     1 Box    Take 1 vial (2.5 mg) by nebulization every 6 hours as needed for shortness of breath / dyspnea    Moderate persistent asthma, uncomplicated       * albuterol 108 (90 BASE) MCG/ACT Inhaler    PROAIR HFA/PROVENTIL HFA/VENTOLIN HFA    3 Inhaler    Inhale 2 puffs into the lungs every 6 hours as needed for shortness of breath / dyspnea or wheezing    Moderate persistent asthma, uncomplicated       alendronate 70 MG tablet    FOSAMAX    12 tablet    Take 1 tablet (70 mg) by mouth every 7 days Take 60 minutes before am meal with 8 oz. water. Remain upright for 30 minutes.    Age-related osteoporosis without current pathological fracture       aspirin 81 MG tablet      Take 81 mg by mouth daily.        atorvastatin 40 MG tablet    LIPITOR    90  "tablet    Take 1 tablet (40 mg) by mouth daily    Coronary artery disease involving native coronary artery of native heart with unstable angina pectoris (H), Status post coronary angioplasty       biotin 2.5 mg/mL Susp      Take by mouth daily 2000mcg        bisoprolol 5 MG tablet    ZEBETA    45 tablet    Take 0.5 tablets (2.5 mg) by mouth daily    Coronary artery disease involving native coronary artery of native heart with unstable angina pectoris (H)       Calcium 6129-1476 MG-UNIT Chew      Take 1 tablet by mouth daily.        FISH OIL DOUBLE STRENGTH 1200 MG capsule   Generic drug:  omega-3 fatty acids      Take 1 capsule by mouth 2 times daily        fluticasone 50 MCG/ACT spray    FLONASE    48 g    USE 1 TO 2 SPRAYS IN EACH NOSTRIL DAILY    Chronic rhinitis       fluticasone-salmeterol 250-50 MCG/DOSE diskus inhaler    ADVAIR    3 Inhaler    Inhale 1 puff into the lungs every 12 hours    Uncomplicated severe persistent asthma       furosemide 20 MG tablet    LASIX    90 tablet    Take 1 tablet (20 mg) by mouth daily    Diastolic CHF, chronic (H)       isosorbide mononitrate 30 MG 24 hr tablet    IMDUR    90 tablet    Take 1 tablet (30 mg) by mouth daily    ESPITIA (dyspnea on exertion), ASHD (arteriosclerotic heart disease)       MAGNESIUM ACETATE      Take 250 mg by mouth daily        methimazole 5 MG tablet    TAPAZOLE    45 tablet    Take 0.5 tablets (2.5 mg) by mouth daily    Subclinical hyperthyroidism       montelukast 10 MG tablet    SINGULAIR    90 tablet    Take 1 tablet (10 mg) by mouth At Bedtime    Chronic non-seasonal allergic rhinitis, unspecified trigger       nitroGLYcerin 0.4 MG sublingual tablet    NITROSTAT    25 tablet    One tablet under the tongue every 5 minutes if needed for chest pain. May repeat every 5 minutes for a maximum of 3 doses in 15 minutes\"    Coronary artery disease involving native coronary artery of native heart with unstable angina pectoris (H), Status post coronary " angioplasty       omeprazole 20 MG CR capsule    priLOSEC    90 capsule    Take 1 capsule (20 mg) by mouth daily    Gastroesophageal reflux disease without esophagitis       OSTEO BI-FLEX/5-LOXIN ADVANCED Tabs      Take 1,500 mg by mouth 2 times daily        prasugrel 10 MG Tabs tablet    EFFIENT    90 tablet    Take 1 tablet (10 mg) by mouth daily Do not crush or break tablet.    Coronary artery disease involving native coronary artery of native heart with unstable angina pectoris (H), Status post coronary angioplasty       RESTASIS 0.05 % ophthalmic emulsion   Generic drug:  cycloSPORINE      Place 1 drop into both eyes 2 times daily        spironolactone 25 MG tablet    ALDACTONE    45 tablet    Take 0.5 tablets (12.5 mg) by mouth daily    Hyperlipidemia LDL goal <130, Diastolic CHF, chronic (H), Subclavian artery stenosis, left (H), Palpitations       VITAMIN D3 PO      Take 2,000 Units by mouth daily        * Notice:  This list has 2 medication(s) that are the same as other medications prescribed for you. Read the directions carefully, and ask your doctor or other care provider to review them with you.

## 2018-02-15 NOTE — LETTER
2/15/2018    Deisi Liu MD  [address] [place] [address]    RE: Victoria Montalvo       Dear Colleague,    I had the pleasure of seeing Victoria Montalvo in the [facility].    CARDIOLOGY CLINIC PROGRESS NOTE    DOS: 2/15/2018    Victoria Montalvo  : 1940, 77 year old  MRN: [id number]      History:  I had the pleasure of following up with Victoria Montalvo today in the Cardiology Clinic.  Victoria is a patient of Dr. Yepez.       Victoria is a very pleasant 77-year-old woman with a history of hypertension, hyperlipidemia, diastolic dysfunction, left subclavian stenosis status post percutaneous revascularization in , chronic venous insufficiency, sleep apnea not on CPAP, and more recently diagnosed coronary artery disease.       Victoria saw Martha Guevara NP, in 2017 and she had noted worsening dyspnea on exertion with some chest pain.  A nuclear stress test was performed that showed mostly reversible anteroapical defect consistent with mild to moderate ischemia in the distribution of the LAD.  There was also breast attenuation artifact noted.  She was symptomatic during the test with shortness of breath.  She underwent a coronary angiogram 2017 via the right femoral artery by Dr. Denise.  There was an 80% left PDA that was stented.  There was a tiny nondominant right coronary artery that was severely diseased and was not intervened upon.  There was mild to moderate disease in the LAD.  Dr. Denise started bisoprolol.  He also intensified her statin switching her from pravastatin to atorvastatin.       In follow up, she had some ESPITIA, and an echocardiogram was done 2017 showed normal LV function and just a little bit of pulmonary hypertension.  Dr. Yepez had her trial holding bisoprolol. This made no difference, so was resumed. Her LDL improved to 70 with the switch to atorvastatin.      Regarding her bilateral lower extremity edema and venous insufficiency, she  has been wearing compression socks since March 2017. They really help with her symptoms, though are bothersome in the summer months. Dr. Yepez did consider sclerotherapy, but given the location of the saphenous vein reflux, the impact may not be optimal.  She tells me she did get a second opinion at [place] on the urging of her children to be evaluated. She says they said the same thing as Dr. Yepez.       Due to some continued mild, stable ESPITIA, and known small vessel disease, I did add in Imdur.      She presents today for follow up.  She is taking and tolerating the Imdur.  It is difficult for her to know if the Imdur helped, because she has been having some asthma flares lately. She is using her nebulizer occasionally. She said every winter around this time, her asthma worsens.  No fever.  No orthopnea, worsening edema.  No chest pain.   She continues to have edema but it is unchanged. She wears her compression socks.          ROS:  Skin:  Positive for bruising   Eyes:  Positive for glasses  ENT:  Positive for epistaxis;nasal congestion;sinus trouble  Respiratory:  Positive for sleep apnea;dyspnea on exertion;wheezing  Cardiovascular:    lightheadedness;Positive for;edema;heaviness;fatigue  Gastroenterology: Positive for heartburn;reflux  Genitourinary:  Positive for urinary frequency;nocturia  Musculoskeletal:  Positive for back pain;arthritis  Neurologic:  Positive for headaches;numbness or tingling of hands  Psychiatric:  Positive for sleep disturbances  Heme/Lymph/Imm:  Positive for allergies  Endocrine:  Positive for thyroid disorder    PAST MEDICAL HISTORY:  Past Medical History:   Diagnosis Date     Abnormal Papanicolaou smear of cervix and cervical HPV     colposcopy 1/97     Arthritis      Chronic rhinitis      Coronary artery disease     4/4/17 SHERLEY--PDA     Diastolic CHF, chronic (H) 2/22/2012     Gastro-oesophageal reflux disease      Hyperlipidemia      Hyperlipidemia LDL goal <130 10/31/2011     Pain in  right hip      Personal history of colonic polyps     colonoscopy 9/97, 2002 (due in 2007)     Pulmonary HTN      Sleep apnea      Subclavian artery stenosis, left (H) 8/7/2013    S/p stent in 2013      Subclinical hyperthyroidism 10/17/2016     Unspecified asthma(493.90)     on preventative meds and has nebulizer       PAST SURGICAL HISTORY:  Past Surgical History:   Procedure Laterality Date     ARTHROPLASTY HIP Right 10/19/2015    Procedure: ARTHROPLASTY HIP;  Surgeon: Aron Torres MD;  Location: [facility] OR     C NONSPECIFIC PROCEDURE  2/89, 1990    liposuction of legs and stomach     C NONSPECIFIC PROCEDURE  1990    Ankle pins removed     C OPEN RX ANKLE DISLOCATN+FIXATN  4/18/87     COLONOSCOPY  01/1/08    Polyp removed, bx.     COLONOSCOPY  01/12/10     COLONOSCOPY N/A 2/17/2015    Procedure: COLONOSCOPY;  Surgeon: Gato Quiles MD;  Location: [facility]     CT CORONARY ANGIOGRAM  04/04/2017    SHERLEY to PDA     HC COLONOSCOPY THRU STOMA, DIAGNOSTIC  2002     HC REDUCTION OF LARGE BREAST  2/89     VASCULAR SURGERY  2013    angiogram & left subclavical artery stent       SOCIAL HISTORY:  Social History     Social History     Marital status:      Spouse name: Trenton     Number of children: 2     Years of education: 12     Occupational History     retired      Social History Main Topics     Smoking status: Former Smoker     Packs/day: 0.10     Years: 10.00     Types: Cigarettes     Quit date: 5/19/2003     Smokeless tobacco: Never Used      Comment: off and on x 10 years 3-4 cigs: no smoker in the household     Alcohol use 0.0 oz/week     0 Standard drinks or equivalent per week      Comment: a couple drinks per year     Drug use: No     Sexual activity: Not Currently     Partners: Male     Other Topics Concern     Caffeine Concern No     Special Diet No     regular diet      Exercise No     shopping a lot [place]      Social History Narrative       FAMILY HISTORY:  Family History   Problem  "Relation Age of Onset     Alzheimer Disease Mother      GASTROINTESTINAL DISEASE Mother      CANCER Father      throat and lungs, liver,     HEART DISEASE Father 57     CABG     HEART DISEASE Brother      suicidal     Pacemaker Brother      HEART DISEASE Sister      Hypertension Maternal Grandmother      Lipids Maternal Grandmother      CANCER Maternal Grandmother      stomach     CANCER Paternal Grandmother      stomach     Allergies Daughter      Allergies Son        MEDS:   Current Outpatient Prescriptions on File Prior to Visit:  methimazole (TAPAZOLE) 5 MG tablet Take 0.5 tablets (2.5 mg) by mouth daily   isosorbide mononitrate (IMDUR) 30 MG 24 hr tablet Take 1 tablet (30 mg) by mouth daily   alendronate (FOSAMAX) 70 MG tablet Take 1 tablet (70 mg) by mouth every 7 days Take 60 minutes before am meal with 8 oz. water. Remain upright for 30 minutes.   fluticasone (FLONASE) 50 MCG/ACT spray USE 1 TO 2 SPRAYS IN EACH NOSTRIL DAILY   prasugrel (EFFIENT) 10 MG TABS tablet Take 1 tablet (10 mg) by mouth daily Do not crush or break tablet.   omeprazole (PRILOSEC) 20 MG CR capsule Take 1 capsule (20 mg) by mouth daily   montelukast (SINGULAIR) 10 MG tablet Take 1 tablet (10 mg) by mouth At Bedtime   fluticasone-salmeterol (ADVAIR) 250-50 MCG/DOSE diskus inhaler Inhale 1 puff into the lungs every 12 hours   furosemide (LASIX) 20 MG tablet Take 1 tablet (20 mg) by mouth daily   bisoprolol (ZEBETA) 5 MG tablet Take 0.5 tablets (2.5 mg) by mouth daily   atorvastatin (LIPITOR) 40 MG tablet Take 1 tablet (40 mg) by mouth daily   nitroglycerin (NITROSTAT) 0.4 MG sublingual tablet One tablet under the tongue every 5 minutes if needed for chest pain. May repeat every 5 minutes for a maximum of 3 doses in 15 minutes\"   biotin 2.5 mg/mL Take by mouth daily 2000mcg   spironolactone (ALDACTONE) 25 MG tablet Take 0.5 tablets (12.5 mg) by mouth daily (Patient taking differently: Take 12.5 mg by mouth Mon-wed-fri)   albuterol (PROAIR " "HFA, PROVENTIL HFA, VENTOLIN HFA) 108 (90 BASE) MCG/ACT inhaler Inhale 2 puffs into the lungs every 6 hours as needed for shortness of breath / dyspnea or wheezing   albuterol (2.5 MG/3ML) 0.083% nebulizer solution Take 1 vial (2.5 mg) by nebulization every 6 hours as needed for shortness of breath / dyspnea   Cholecalciferol (VITAMIN D3 PO) Take 2,000 Units by mouth daily    aspirin 81 MG tablet Take 81 mg by mouth daily.   omega-3 fatty acids (FISH OIL DOUBLE STRENGTH) 1200 MG capsule Take 1 capsule by mouth 2 times daily    Calcium 5034-8292 MG-UNIT CHEW Take 1 tablet by mouth daily.   Boswellia-Glucosamine-Vit D (OSTEO BI-FLEX/5-LOXIN ADVANCED) TABS Take 1,500 mg by mouth 2 times daily    MAGNESIUM ACETATE Take 250 mg by mouth daily    cycloSPORINE (RESTASIS) 0.05 % ophthalmic emulsion Place 1 drop into both eyes 2 times daily      No current facility-administered medications on file prior to visit.     ALLERGIES:   Allergies   Allergen Reactions     Animal Dander      Kiwi Itching     Itching and red all over     Pollen Extract      Pollen,dust, trees, grass, mold-hayfever symptoms     Seasonal Allergies        PHYSICAL EXAM:  Vitals: /70 (BP Location: Right arm, Patient Position: Chair, Cuff Size: Adult Regular)  Pulse 68  Ht 1.549 m (5' 1\")  Wt 82.2 kg (181 lb 3.2 oz)  SpO2 98%  BMI 34.24 kg/m2  Constitutional:  cooperative, alert and oriented, well developed, well nourished, in no acute distress        Skin:  warm and dry to the touch, no apparent skin lesions or masses noted        Head:  normocephalic, no masses or lesions        Eyes:  pupils equal and round;sclera white;no xanthalasma        ENT:  no pallor or cyanosis, dentition good        Neck:  JVP normal        Chest:  normal breath sounds, clear to auscultation, normal A-P diameter, normal symmetry, normal respiratory excursion, no use of accessory muscles        Cardiac: regular rhythm;normal S1 and S2       systolic murmur;grade 1      "     Abdomen:  abdomen soft;BS normoactive;non-tender        Vascular:                                        Extremities and Back:  normal muscle strength and tone   wearing compression stockings    Neurological:  no gross motor deficits;affect appropriate            ASSESSMENT/PLAN:  1.  Coronary artery disease.   a.  Symptoms of dyspnea on exertion and chest pain with abnormal nuclear stress test.     b.  Coronary angiogram 04/04/2017 - 80% left PDA status post drug-eluting stent.  There is a tiny nondominant right coronary artery with severe disease that was not intervened upon.   c.  on DAPT through 4/4/18. Also on BB, statin.   d.  Continues to have stable ESPITIA (stairs) without chest pain. Unremarkable echo, no change off bisoprolol. Started Imdur, which she will continue and see if this helps her sxs.      2.  Hypertension.   a.   Controlled on Imdur 30 mg, bisoprolol 2.5 mg, spironolactone 12.5 mg MWF, Lasix 20 mg.      3.  Hyperlipidemia.   a.  4/2017 switched from pravastatin to atorvastatin due to her known coronary artery disease.  6/2017 LDL 70.      4.  Diastolic dysfunction.   a.  On Lasix 20 mg daily, spironolactone 12.5 mg MWF, without e/o CHF.      5.  Left subclavian stenosis, status post stenting in 2013.      6.  Venous insufficiency, bilateral.   a.  Had venous competency studies March 2017.   b.  Wearing compression stockings with improvement in symptoms.  c.  Dr. Yepez could consider sclerotherapy, but impact uncertain given location of saphenous reflux in calves mostly.       7.  Obstructive sleep apnea not on CPAP.      8.  Thyroid disease.         Follow up:  She is scheduled to see Dr. Yepez at the end of March before coming off DAPT.       Thank you very much for allowing me to participate in the care of Victoria Montalvo.     Alia Yoo PA-C    Thank you for allowing me to participate in the care of your patient.      Sincerely,     Alia Yoo PA-C     [facility]    cc:   Alia Yoo PA-C  [address]  [address] [place] [address]

## 2018-02-15 NOTE — PROGRESS NOTES
CARDIOLOGY CLINIC PROGRESS NOTE    DOS: 2/15/2018    Victoria Montalvo  : 1940, 77 year old  MRN: [id number]      History:  I had the pleasure of following up with Victoria Montalvo today in the Cardiology Clinic.  Victoria is a patient of Dr. Yepez.       Victoria is a very pleasant 77-year-old woman with a history of hypertension, hyperlipidemia, diastolic dysfunction, left subclavian stenosis status post percutaneous revascularization in , chronic venous insufficiency, sleep apnea not on CPAP, and more recently diagnosed coronary artery disease.       Victoria saw Mratha Guevara NP, in 2017 and she had noted worsening dyspnea on exertion with some chest pain.  A nuclear stress test was performed that showed mostly reversible anteroapical defect consistent with mild to moderate ischemia in the distribution of the LAD.  There was also breast attenuation artifact noted.  She was symptomatic during the test with shortness of breath.  She underwent a coronary angiogram 2017 via the right femoral artery by Dr. Denise.  There was an 80% left PDA that was stented.  There was a tiny nondominant right coronary artery that was severely diseased and was not intervened upon.  There was mild to moderate disease in the LAD.  Dr. Denise started bisoprolol.  He also intensified her statin switching her from pravastatin to atorvastatin.       In follow up, she had some ESPITIA, and an echocardiogram was done 2017 showed normal LV function and just a little bit of pulmonary hypertension.  Dr. Yepez had her trial holding bisoprolol. This made no difference, so was resumed. Her LDL improved to 70 with the switch to atorvastatin.      Regarding her bilateral lower extremity edema and venous insufficiency, she has been wearing compression socks since 2017. They really help with her symptoms, though are bothersome in the summer months. Dr. Yepez did consider sclerotherapy, but given the location of the saphenous vein reflux, the  impact may not be optimal.  She tells me she did get a second opinion at [place] on the urging of her children to be evaluated. She says they said the same thing as Dr. Yepez.       Due to some continued mild, stable ESPITIA, and known small vessel disease, I did add in Imdur.      She presents today for follow up.  She is taking and tolerating the Imdur.  It is difficult for her to know if the Imdur helped, because she has been having some asthma flares lately. She is using her nebulizer occasionally. She said every winter around this time, her asthma worsens.  No fever.  No orthopnea, worsening edema.  No chest pain.   She continues to have edema but it is unchanged. She wears her compression socks.          ROS:  Skin:  Positive for bruising   Eyes:  Positive for glasses  ENT:  Positive for epistaxis;nasal congestion;sinus trouble  Respiratory:  Positive for sleep apnea;dyspnea on exertion;wheezing  Cardiovascular:    lightheadedness;Positive for;edema;heaviness;fatigue  Gastroenterology: Positive for heartburn;reflux  Genitourinary:  Positive for urinary frequency;nocturia  Musculoskeletal:  Positive for back pain;arthritis  Neurologic:  Positive for headaches;numbness or tingling of hands  Psychiatric:  Positive for sleep disturbances  Heme/Lymph/Imm:  Positive for allergies  Endocrine:  Positive for thyroid disorder    PAST MEDICAL HISTORY:  Past Medical History:   Diagnosis Date     Abnormal Papanicolaou smear of cervix and cervical HPV     colposcopy 1/97     Arthritis      Chronic rhinitis      Coronary artery disease     4/4/17 SHERLEY--PDA     Diastolic CHF, chronic (H) 2/22/2012     Gastro-oesophageal reflux disease      Hyperlipidemia      Hyperlipidemia LDL goal <130 10/31/2011     Pain in right hip      Personal history of colonic polyps     colonoscopy 9/97, 2002 (due in 2007)     Pulmonary HTN      Sleep apnea      Subclavian artery stenosis, left (H) 8/7/2013    S/p stent in 2013      Subclinical  hyperthyroidism 10/17/2016     Unspecified asthma(493.90)     on preventative meds and has nebulizer       PAST SURGICAL HISTORY:  Past Surgical History:   Procedure Laterality Date     ARTHROPLASTY HIP Right 10/19/2015    Procedure: ARTHROPLASTY HIP;  Surgeon: Aron Torres MD;  Location: [facility] OR     C NONSPECIFIC PROCEDURE  2/89, 1990    liposuction of legs and stomach     C NONSPECIFIC PROCEDURE  1990    Ankle pins removed     C OPEN RX ANKLE DISLOCATN+FIXATN  4/18/87     COLONOSCOPY  01/1/08    Polyp removed, bx.     COLONOSCOPY  01/12/10     COLONOSCOPY N/A 2/17/2015    Procedure: COLONOSCOPY;  Surgeon: Gato Quiles MD;  Location: [facility]     CT CORONARY ANGIOGRAM  04/04/2017    SHERLEY to PDA     HC COLONOSCOPY THRU STOMA, DIAGNOSTIC  2002     HC REDUCTION OF LARGE BREAST  2/89     VASCULAR SURGERY  2013    angiogram & left subclavical artery stent       SOCIAL HISTORY:  Social History     Social History     Marital status:      Spouse name: Trenton     Number of children: 2     Years of education: 12     Occupational History     retired      Social History Main Topics     Smoking status: Former Smoker     Packs/day: 0.10     Years: 10.00     Types: Cigarettes     Quit date: 5/19/2003     Smokeless tobacco: Never Used      Comment: off and on x 10 years 3-4 cigs: no smoker in the household     Alcohol use 0.0 oz/week     0 Standard drinks or equivalent per week      Comment: a couple drinks per year     Drug use: No     Sexual activity: Not Currently     Partners: Male     Other Topics Concern     Caffeine Concern No     Special Diet No     regular diet      Exercise No     shopping a lot so [place]      Social History Narrative       FAMILY HISTORY:  Family History   Problem Relation Age of Onset     Alzheimer Disease Mother      GASTROINTESTINAL DISEASE Mother      CANCER Father      throat and lungs, liver,     HEART DISEASE Father 57     CABG     HEART DISEASE Brother      suicidal      "Pacemaker Brother      HEART DISEASE Sister      Hypertension Maternal Grandmother      Lipids Maternal Grandmother      CANCER Maternal Grandmother      stomach     CANCER Paternal Grandmother      stomach     Allergies Daughter      Allergies Son        MEDS:   Current Outpatient Prescriptions on File Prior to Visit:  methimazole (TAPAZOLE) 5 MG tablet Take 0.5 tablets (2.5 mg) by mouth daily   isosorbide mononitrate (IMDUR) 30 MG 24 hr tablet Take 1 tablet (30 mg) by mouth daily   alendronate (FOSAMAX) 70 MG tablet Take 1 tablet (70 mg) by mouth every 7 days Take 60 minutes before am meal with 8 oz. water. Remain upright for 30 minutes.   fluticasone (FLONASE) 50 MCG/ACT spray USE 1 TO 2 SPRAYS IN EACH NOSTRIL DAILY   prasugrel (EFFIENT) 10 MG TABS tablet Take 1 tablet (10 mg) by mouth daily Do not crush or break tablet.   omeprazole (PRILOSEC) 20 MG CR capsule Take 1 capsule (20 mg) by mouth daily   montelukast (SINGULAIR) 10 MG tablet Take 1 tablet (10 mg) by mouth At Bedtime   fluticasone-salmeterol (ADVAIR) 250-50 MCG/DOSE diskus inhaler Inhale 1 puff into the lungs every 12 hours   furosemide (LASIX) 20 MG tablet Take 1 tablet (20 mg) by mouth daily   bisoprolol (ZEBETA) 5 MG tablet Take 0.5 tablets (2.5 mg) by mouth daily   atorvastatin (LIPITOR) 40 MG tablet Take 1 tablet (40 mg) by mouth daily   nitroglycerin (NITROSTAT) 0.4 MG sublingual tablet One tablet under the tongue every 5 minutes if needed for chest pain. May repeat every 5 minutes for a maximum of 3 doses in 15 minutes\"   biotin 2.5 mg/mL Take by mouth daily 2000mcg   spironolactone (ALDACTONE) 25 MG tablet Take 0.5 tablets (12.5 mg) by mouth daily (Patient taking differently: Take 12.5 mg by mouth Mon-wed-fri)   albuterol (PROAIR HFA, PROVENTIL HFA, VENTOLIN HFA) 108 (90 BASE) MCG/ACT inhaler Inhale 2 puffs into the lungs every 6 hours as needed for shortness of breath / dyspnea or wheezing   albuterol (2.5 MG/3ML) 0.083% nebulizer solution " "Take 1 vial (2.5 mg) by nebulization every 6 hours as needed for shortness of breath / dyspnea   Cholecalciferol (VITAMIN D3 PO) Take 2,000 Units by mouth daily    aspirin 81 MG tablet Take 81 mg by mouth daily.   omega-3 fatty acids (FISH OIL DOUBLE STRENGTH) 1200 MG capsule Take 1 capsule by mouth 2 times daily    Calcium 8552-8483 MG-UNIT CHEW Take 1 tablet by mouth daily.   Boswellia-Glucosamine-Vit D (OSTEO BI-FLEX/5-LOXIN ADVANCED) TABS Take 1,500 mg by mouth 2 times daily    MAGNESIUM ACETATE Take 250 mg by mouth daily    cycloSPORINE (RESTASIS) 0.05 % ophthalmic emulsion Place 1 drop into both eyes 2 times daily      No current facility-administered medications on file prior to visit.     ALLERGIES:   Allergies   Allergen Reactions     Animal Dander      Kiwi Itching     Itching and red all over     Pollen Extract      Pollen,dust, trees, grass, mold-hayfever symptoms     Seasonal Allergies        PHYSICAL EXAM:  Vitals: /70 (BP Location: Right arm, Patient Position: Chair, Cuff Size: Adult Regular)  Pulse 68  Ht 1.549 m (5' 1\")  Wt 82.2 kg (181 lb 3.2 oz)  SpO2 98%  BMI 34.24 kg/m2  Constitutional:  cooperative, alert and oriented, well developed, well nourished, in no acute distress        Skin:  warm and dry to the touch, no apparent skin lesions or masses noted        Head:  normocephalic, no masses or lesions        Eyes:  pupils equal and round;sclera white;no xanthalasma        ENT:  no pallor or cyanosis, dentition good        Neck:  JVP normal        Chest:  normal breath sounds, clear to auscultation, normal A-P diameter, normal symmetry, normal respiratory excursion, no use of accessory muscles        Cardiac: regular rhythm;normal S1 and S2       systolic murmur;grade 1          Abdomen:  abdomen soft;BS normoactive;non-tender        Vascular:                                        Extremities and Back:  normal muscle strength and tone   wearing compression stockings    Neurological:  " no gross motor deficits;affect appropriate            ASSESSMENT/PLAN:  1.  Coronary artery disease.   a.  Symptoms of dyspnea on exertion and chest pain with abnormal nuclear stress test.     b.  Coronary angiogram 04/04/2017 - 80% left PDA status post drug-eluting stent.  There is a tiny nondominant right coronary artery with severe disease that was not intervened upon.   c.  on DAPT through 4/4/18. Also on BB, statin.   d.  Continues to have stable ESPITIA (stairs) without chest pain. Unremarkable echo, no change off bisoprolol. Started Imdur, which she will continue and see if this helps her sxs.      2.  Hypertension.   a.  Controlled on Imdur 30 mg, bisoprolol 2.5 mg, spironolactone 12.5 mg MWF, Lasix 20 mg.      3.  Hyperlipidemia.   a.  4/2017 switched from pravastatin to atorvastatin due to her known coronary artery disease.  6/2017 LDL 70.      4.  Diastolic dysfunction.   a.  On Lasix 20 mg daily, spironolactone 12.5 mg MWF, without e/o CHF.      5.  Left subclavian stenosis, status post stenting in 2013.      6.  Venous insufficiency, bilateral.   a.  Had venous competency studies March 2017.   b.  Wearing compression stockings with improvement in symptoms.  c.  Dr. Yepez could consider sclerotherapy, but impact uncertain given location of saphenous reflux in calves mostly.       7.  Obstructive sleep apnea not on CPAP.      8.  Thyroid disease.         Follow up:  She is scheduled to see Dr. Yepez at the end of March before coming off DAPT.       Thank you very much for allowing me to participate in the care of Victoria Montalvo.     Alia Yoo PA-C

## 2018-02-15 NOTE — PATIENT INSTRUCTIONS
1.  Continue to Imdur for now.   2.  Continue to treat the asthma as needed.   3.  See me in 2 months to reevaluate, and discuss coming off the Effient.   4.  If symptoms worsen before 2 months, please call or be seen.

## 2018-02-26 ENCOUNTER — HOSPITAL ENCOUNTER (OUTPATIENT)
Facility: CLINIC | Age: 78
Setting detail: OBSERVATION
Discharge: HOME | End: 2018-02-27
Attending: EMERGENCY MEDICINE | Admitting: INTERNAL MEDICINE
Payer: MEDICARE

## 2018-02-26 ENCOUNTER — APPOINTMENT (OUTPATIENT)
Dept: GENERAL RADIOLOGY | Facility: CLINIC | Age: 78
End: 2018-02-26
Attending: EMERGENCY MEDICINE
Payer: MEDICARE

## 2018-02-26 ENCOUNTER — CARE COORDINATION (OUTPATIENT)
Dept: CARDIOLOGY | Facility: CLINIC | Age: 78
End: 2018-02-26

## 2018-02-26 DIAGNOSIS — R07.9 CHEST PAIN, UNSPECIFIED TYPE: ICD-10-CM

## 2018-02-26 DIAGNOSIS — I25.10 CORONARY ARTERY DISEASE INVOLVING NATIVE HEART, ANGINA PRESENCE UNSPECIFIED, UNSPECIFIED VESSEL OR LESION TYPE: ICD-10-CM

## 2018-02-26 LAB
ANION GAP SERPL CALCULATED.3IONS-SCNC: 4 MMOL/L (ref 3–14)
BASOPHILS # BLD AUTO: 0 10E9/L (ref 0–0.2)
BASOPHILS NFR BLD AUTO: 0.3 %
BUN SERPL-MCNC: 14 MG/DL (ref 7–30)
CALCIUM SERPL-MCNC: 8.4 MG/DL (ref 8.5–10.1)
CHLORIDE SERPL-SCNC: 107 MMOL/L (ref 94–109)
CO2 SERPL-SCNC: 29 MMOL/L (ref 20–32)
CREAT SERPL-MCNC: 0.83 MG/DL (ref 0.52–1.04)
D DIMER PPP FEU-MCNC: 0.3 UG/ML FEU (ref 0–0.5)
DIFFERENTIAL METHOD BLD: NORMAL
EOSINOPHIL # BLD AUTO: 0.1 10E9/L (ref 0–0.7)
EOSINOPHIL NFR BLD AUTO: 1.2 %
ERYTHROCYTE [DISTWIDTH] IN BLOOD BY AUTOMATED COUNT: 14 % (ref 10–15)
ERYTHROCYTE [DISTWIDTH] IN BLOOD BY AUTOMATED COUNT: 14.2 % (ref 10–15)
GFR SERPL CREATININE-BSD FRML MDRD: 66 ML/MIN/1.7M2
GLUCOSE SERPL-MCNC: 67 MG/DL (ref 70–99)
HCT VFR BLD AUTO: 34.9 % (ref 35–47)
HCT VFR BLD AUTO: 37.3 % (ref 35–47)
HGB BLD-MCNC: 11.1 G/DL (ref 11.7–15.7)
HGB BLD-MCNC: 12.2 G/DL (ref 11.7–15.7)
IMM GRANULOCYTES # BLD: 0 10E9/L (ref 0–0.4)
IMM GRANULOCYTES NFR BLD: 0.1 %
INTERPRETATION ECG - MUSE: NORMAL
LMWH PPP CHRO-ACNC: 0.34 IU/ML
LYMPHOCYTES # BLD AUTO: 1.6 10E9/L (ref 0.8–5.3)
LYMPHOCYTES NFR BLD AUTO: 23.8 %
MCH RBC QN AUTO: 28.4 PG (ref 26.5–33)
MCH RBC QN AUTO: 29.3 PG (ref 26.5–33)
MCHC RBC AUTO-ENTMCNC: 31.8 G/DL (ref 31.5–36.5)
MCHC RBC AUTO-ENTMCNC: 32.7 G/DL (ref 31.5–36.5)
MCV RBC AUTO: 89 FL (ref 78–100)
MCV RBC AUTO: 90 FL (ref 78–100)
MONOCYTES # BLD AUTO: 0.4 10E9/L (ref 0–1.3)
MONOCYTES NFR BLD AUTO: 6.6 %
NEUTROPHILS # BLD AUTO: 4.5 10E9/L (ref 1.6–8.3)
NEUTROPHILS NFR BLD AUTO: 68 %
NRBC # BLD AUTO: 0 10*3/UL
NRBC BLD AUTO-RTO: 0 /100
NT-PROBNP SERPL-MCNC: 219 PG/ML (ref 0–1800)
PLATELET # BLD AUTO: 182 10E9/L (ref 150–450)
PLATELET # BLD AUTO: 214 10E9/L (ref 150–450)
POTASSIUM SERPL-SCNC: 3.3 MMOL/L (ref 3.4–5.3)
POTASSIUM SERPL-SCNC: 4.1 MMOL/L (ref 3.4–5.3)
RBC # BLD AUTO: 3.91 10E12/L (ref 3.8–5.2)
RBC # BLD AUTO: 4.16 10E12/L (ref 3.8–5.2)
SODIUM SERPL-SCNC: 140 MMOL/L (ref 133–144)
TROPONIN I SERPL-MCNC: <0.015 UG/L (ref 0–0.04)
WBC # BLD AUTO: 6.7 10E9/L (ref 4–11)
WBC # BLD AUTO: 6.9 10E9/L (ref 4–11)

## 2018-02-26 PROCEDURE — 84484 ASSAY OF TROPONIN QUANT: CPT | Performed by: INTERNAL MEDICINE

## 2018-02-26 PROCEDURE — 99285 EMERGENCY DEPT VISIT HI MDM: CPT | Mod: 25

## 2018-02-26 PROCEDURE — 93005 ELECTROCARDIOGRAM TRACING: CPT

## 2018-02-26 PROCEDURE — 80048 BASIC METABOLIC PNL TOTAL CA: CPT | Performed by: EMERGENCY MEDICINE

## 2018-02-26 PROCEDURE — A9270 NON-COVERED ITEM OR SERVICE: HCPCS | Mod: GY | Performed by: INTERNAL MEDICINE

## 2018-02-26 PROCEDURE — 99223 1ST HOSP IP/OBS HIGH 75: CPT | Mod: AI | Performed by: INTERNAL MEDICINE

## 2018-02-26 PROCEDURE — 36415 COLL VENOUS BLD VENIPUNCTURE: CPT | Performed by: INTERNAL MEDICINE

## 2018-02-26 PROCEDURE — 85379 FIBRIN DEGRADATION QUANT: CPT | Performed by: INTERNAL MEDICINE

## 2018-02-26 PROCEDURE — 85025 COMPLETE CBC W/AUTO DIFF WBC: CPT | Performed by: EMERGENCY MEDICINE

## 2018-02-26 PROCEDURE — 12000007 ZZH R&B INTERMEDIATE

## 2018-02-26 PROCEDURE — A9270 NON-COVERED ITEM OR SERVICE: HCPCS | Mod: GY | Performed by: EMERGENCY MEDICINE

## 2018-02-26 PROCEDURE — 96365 THER/PROPH/DIAG IV INF INIT: CPT

## 2018-02-26 PROCEDURE — 84132 ASSAY OF SERUM POTASSIUM: CPT | Mod: 91 | Performed by: INTERNAL MEDICINE

## 2018-02-26 PROCEDURE — 83880 ASSAY OF NATRIURETIC PEPTIDE: CPT | Performed by: EMERGENCY MEDICINE

## 2018-02-26 PROCEDURE — 25000132 ZZH RX MED GY IP 250 OP 250 PS 637: Mod: GY | Performed by: INTERNAL MEDICINE

## 2018-02-26 PROCEDURE — 85027 COMPLETE CBC AUTOMATED: CPT | Performed by: INTERNAL MEDICINE

## 2018-02-26 PROCEDURE — 71046 X-RAY EXAM CHEST 2 VIEWS: CPT

## 2018-02-26 PROCEDURE — 25000132 ZZH RX MED GY IP 250 OP 250 PS 637: Mod: GY | Performed by: EMERGENCY MEDICINE

## 2018-02-26 PROCEDURE — 85520 HEPARIN ASSAY: CPT | Performed by: INTERNAL MEDICINE

## 2018-02-26 PROCEDURE — 84484 ASSAY OF TROPONIN QUANT: CPT | Performed by: EMERGENCY MEDICINE

## 2018-02-26 PROCEDURE — 25000128 H RX IP 250 OP 636: Performed by: EMERGENCY MEDICINE

## 2018-02-26 RX ORDER — MONTELUKAST SODIUM 10 MG/1
10 TABLET ORAL AT BEDTIME
Status: DISCONTINUED | OUTPATIENT
Start: 2018-02-26 | End: 2018-02-27 | Stop reason: HOSPADM

## 2018-02-26 RX ORDER — POLYETHYLENE GLYCOL 3350 17 G/17G
17 POWDER, FOR SOLUTION ORAL DAILY PRN
Status: DISCONTINUED | OUTPATIENT
Start: 2018-02-26 | End: 2018-02-27 | Stop reason: HOSPADM

## 2018-02-26 RX ORDER — ALBUTEROL SULFATE 0.83 MG/ML
1 SOLUTION RESPIRATORY (INHALATION) EVERY 6 HOURS PRN
Status: DISCONTINUED | OUTPATIENT
Start: 2018-02-26 | End: 2018-02-27 | Stop reason: HOSPADM

## 2018-02-26 RX ORDER — POTASSIUM CL/LIDO/0.9 % NACL 10MEQ/0.1L
10 INTRAVENOUS SOLUTION, PIGGYBACK (ML) INTRAVENOUS
Status: DISCONTINUED | OUTPATIENT
Start: 2018-02-26 | End: 2018-02-27 | Stop reason: HOSPADM

## 2018-02-26 RX ORDER — OXYCODONE HYDROCHLORIDE 5 MG/1
5 TABLET ORAL EVERY 4 HOURS PRN
Status: DISCONTINUED | OUTPATIENT
Start: 2018-02-26 | End: 2018-02-27 | Stop reason: HOSPADM

## 2018-02-26 RX ORDER — ONDANSETRON 2 MG/ML
4 INJECTION INTRAMUSCULAR; INTRAVENOUS EVERY 6 HOURS PRN
Status: DISCONTINUED | OUTPATIENT
Start: 2018-02-26 | End: 2018-02-27 | Stop reason: HOSPADM

## 2018-02-26 RX ORDER — PROCHLORPERAZINE 25 MG
12.5 SUPPOSITORY, RECTAL RECTAL EVERY 12 HOURS PRN
Status: DISCONTINUED | OUTPATIENT
Start: 2018-02-26 | End: 2018-02-27 | Stop reason: HOSPADM

## 2018-02-26 RX ORDER — ASPIRIN 81 MG/1
81 TABLET ORAL DAILY
Status: DISCONTINUED | OUTPATIENT
Start: 2018-02-27 | End: 2018-02-27 | Stop reason: HOSPADM

## 2018-02-26 RX ORDER — CYCLOSPORINE 0.5 MG/ML
1 EMULSION OPHTHALMIC 2 TIMES DAILY
Status: DISCONTINUED | OUTPATIENT
Start: 2018-02-26 | End: 2018-02-27 | Stop reason: HOSPADM

## 2018-02-26 RX ORDER — METHIMAZOLE 5 MG/1
2.5 TABLET ORAL DAILY
Status: DISCONTINUED | OUTPATIENT
Start: 2018-02-27 | End: 2018-02-27 | Stop reason: HOSPADM

## 2018-02-26 RX ORDER — POTASSIUM CHLORIDE 7.45 MG/ML
10 INJECTION INTRAVENOUS
Status: DISCONTINUED | OUTPATIENT
Start: 2018-02-26 | End: 2018-02-27 | Stop reason: HOSPADM

## 2018-02-26 RX ORDER — NITROGLYCERIN 80 MG/1
1 PATCH TRANSDERMAL ONCE
Status: COMPLETED | OUTPATIENT
Start: 2018-02-26 | End: 2018-02-26

## 2018-02-26 RX ORDER — POTASSIUM CHLORIDE 29.8 MG/ML
20 INJECTION INTRAVENOUS
Status: DISCONTINUED | OUTPATIENT
Start: 2018-02-26 | End: 2018-02-27 | Stop reason: HOSPADM

## 2018-02-26 RX ORDER — SPIRONOLACTONE 25 MG
12.5 TABLET ORAL SEE ADMIN INSTRUCTIONS
COMMUNITY
End: 2020-04-29

## 2018-02-26 RX ORDER — LIDOCAINE 40 MG/G
CREAM TOPICAL
Status: DISCONTINUED | OUTPATIENT
Start: 2018-02-26 | End: 2018-02-27 | Stop reason: HOSPADM

## 2018-02-26 RX ORDER — BISACODYL 10 MG
10 SUPPOSITORY, RECTAL RECTAL DAILY PRN
Status: DISCONTINUED | OUTPATIENT
Start: 2018-02-26 | End: 2018-02-27 | Stop reason: HOSPADM

## 2018-02-26 RX ORDER — PRASUGREL 10 MG/1
10 TABLET, FILM COATED ORAL DAILY
Status: DISCONTINUED | OUTPATIENT
Start: 2018-02-27 | End: 2018-02-27 | Stop reason: HOSPADM

## 2018-02-26 RX ORDER — ONDANSETRON 4 MG/1
4 TABLET, ORALLY DISINTEGRATING ORAL EVERY 6 HOURS PRN
Status: DISCONTINUED | OUTPATIENT
Start: 2018-02-26 | End: 2018-02-27 | Stop reason: HOSPADM

## 2018-02-26 RX ORDER — ACETAMINOPHEN 325 MG/1
650 TABLET ORAL EVERY 4 HOURS PRN
Status: DISCONTINUED | OUTPATIENT
Start: 2018-02-26 | End: 2018-02-27 | Stop reason: HOSPADM

## 2018-02-26 RX ORDER — POTASSIUM CHLORIDE 1500 MG/1
20-40 TABLET, EXTENDED RELEASE ORAL
Status: DISCONTINUED | OUTPATIENT
Start: 2018-02-26 | End: 2018-02-27 | Stop reason: HOSPADM

## 2018-02-26 RX ORDER — DIPHENHYDRAMINE HCL 25 MG
25 CAPSULE ORAL
Status: DISCONTINUED | OUTPATIENT
Start: 2018-02-26 | End: 2018-02-27 | Stop reason: HOSPADM

## 2018-02-26 RX ORDER — AMOXICILLIN 250 MG
1 CAPSULE ORAL 2 TIMES DAILY PRN
Status: DISCONTINUED | OUTPATIENT
Start: 2018-02-26 | End: 2018-02-27 | Stop reason: HOSPADM

## 2018-02-26 RX ORDER — AMOXICILLIN 250 MG
2 CAPSULE ORAL 2 TIMES DAILY PRN
Status: DISCONTINUED | OUTPATIENT
Start: 2018-02-26 | End: 2018-02-27 | Stop reason: HOSPADM

## 2018-02-26 RX ORDER — NALOXONE HYDROCHLORIDE 0.4 MG/ML
.1-.4 INJECTION, SOLUTION INTRAMUSCULAR; INTRAVENOUS; SUBCUTANEOUS
Status: DISCONTINUED | OUTPATIENT
Start: 2018-02-26 | End: 2018-02-27 | Stop reason: HOSPADM

## 2018-02-26 RX ORDER — FLUTICASONE PROPIONATE 50 MCG
1 SPRAY, SUSPENSION (ML) NASAL DAILY
Status: DISCONTINUED | OUTPATIENT
Start: 2018-02-27 | End: 2018-02-27 | Stop reason: HOSPADM

## 2018-02-26 RX ORDER — NITROGLYCERIN 0.4 MG/1
0.4 TABLET SUBLINGUAL EVERY 5 MIN PRN
Status: DISCONTINUED | OUTPATIENT
Start: 2018-02-26 | End: 2018-02-27 | Stop reason: HOSPADM

## 2018-02-26 RX ORDER — ATORVASTATIN CALCIUM 40 MG/1
40 TABLET, FILM COATED ORAL DAILY
Status: DISCONTINUED | OUTPATIENT
Start: 2018-02-27 | End: 2018-02-27 | Stop reason: HOSPADM

## 2018-02-26 RX ORDER — PROCHLORPERAZINE MALEATE 5 MG
5 TABLET ORAL EVERY 6 HOURS PRN
Status: DISCONTINUED | OUTPATIENT
Start: 2018-02-26 | End: 2018-02-27 | Stop reason: HOSPADM

## 2018-02-26 RX ORDER — POTASSIUM CHLORIDE 1.5 G/1.58G
20-40 POWDER, FOR SOLUTION ORAL
Status: DISCONTINUED | OUTPATIENT
Start: 2018-02-26 | End: 2018-02-27 | Stop reason: HOSPADM

## 2018-02-26 RX ORDER — ISOSORBIDE MONONITRATE 30 MG/1
30 TABLET, EXTENDED RELEASE ORAL DAILY
Status: DISCONTINUED | OUTPATIENT
Start: 2018-02-27 | End: 2018-02-27 | Stop reason: HOSPADM

## 2018-02-26 RX ORDER — ASPIRIN 81 MG/1
324 TABLET, CHEWABLE ORAL ONCE
Status: COMPLETED | OUTPATIENT
Start: 2018-02-26 | End: 2018-02-26

## 2018-02-26 RX ORDER — NITROGLYCERIN 0.4 MG/1
0.4 TABLET SUBLINGUAL EVERY 5 MIN PRN
Status: DISCONTINUED | OUTPATIENT
Start: 2018-02-26 | End: 2018-02-26

## 2018-02-26 RX ORDER — HYDROMORPHONE HYDROCHLORIDE 1 MG/ML
0.2 INJECTION, SOLUTION INTRAMUSCULAR; INTRAVENOUS; SUBCUTANEOUS
Status: DISCONTINUED | OUTPATIENT
Start: 2018-02-26 | End: 2018-02-27 | Stop reason: HOSPADM

## 2018-02-26 RX ORDER — BISOPROLOL FUMARATE 5 MG/1
2.5 TABLET, FILM COATED ORAL DAILY
Status: DISCONTINUED | OUTPATIENT
Start: 2018-02-27 | End: 2018-02-27 | Stop reason: HOSPADM

## 2018-02-26 RX ADMIN — POTASSIUM CHLORIDE 20 MEQ: 1500 TABLET, EXTENDED RELEASE ORAL at 20:22

## 2018-02-26 RX ADMIN — ASPIRIN 81 MG 324 MG: 81 TABLET ORAL at 14:11

## 2018-02-26 RX ADMIN — Medication 3550 UNITS: at 16:18

## 2018-02-26 RX ADMIN — NITROGLYCERIN 1 PATCH: 0.4 PATCH TRANSDERMAL at 16:09

## 2018-02-26 RX ADMIN — NITROGLYCERIN 0.4 MG: 0.4 TABLET SUBLINGUAL at 14:12

## 2018-02-26 RX ADMIN — HEPARIN SODIUM 700 UNITS/HR: 10000 INJECTION, SOLUTION INTRAVENOUS at 16:18

## 2018-02-26 RX ADMIN — MONTELUKAST SODIUM 10 MG: 10 TABLET, FILM COATED ORAL at 22:11

## 2018-02-26 RX ADMIN — POTASSIUM CHLORIDE 40 MEQ: 1500 TABLET, EXTENDED RELEASE ORAL at 18:20

## 2018-02-26 ASSESSMENT — ENCOUNTER SYMPTOMS
LIGHT-HEADEDNESS: 1
FEVER: 0
COUGH: 0

## 2018-02-26 ASSESSMENT — ACTIVITIES OF DAILY LIVING (ADL): ADLS_ACUITY_SCORE: 11

## 2018-02-26 NOTE — PHARMACY-ADMISSION MEDICATION HISTORY
Admission medication history interview status for this patient is complete. See [facility] admission navigator for allergy information, prior to admission medications and immunization status.     Medication history interview source(s):Patient  Medication history resources (including written lists, pill bottles, clinic record): med list    Changes made to PTA medication list:  Added: none  Deleted: none  Changed: spironolactone from daily to MWF  For patients on insulin therapy: n/a    Actions taken by pharmacist (provider contacted, etc):None     Additional medication history information:None    Medication reconciliation/reorder completed by provider prior to medication history? No    Prior to Admission medications    Medication Sig Last Dose Taking? Auth Provider   spironolactone (ALDACTONE) 12.5 mg TABS half-tab Take 100 mg by mouth See Admin Instructions Monday, Wedn, Friday 2/26/2018 Yes Reported, Patient   methimazole (TAPAZOLE) 5 MG tablet Take 0.5 tablets (2.5 mg) by mouth daily 2/26/2018 Yes Annie Alvarez MD   isosorbide mononitrate (IMDUR) 30 MG 24 hr tablet Take 1 tablet (30 mg) by mouth daily 2/26/2018 Yes Alia Yoo PA-C   alendronate (FOSAMAX) 70 MG tablet Take 1 tablet (70 mg) by mouth every 7 days Take 60 minutes before am meal with 8 oz. water. Remain upright for 30 minutes.  Yes Deisi Lui MD   fluticasone (FLONASE) 50 MCG/ACT spray USE 1 TO 2 SPRAYS IN EACH NOSTRIL DAILY 2/26/2018 Yes Deisi Liu MD   prasugrel (EFFIENT) 10 MG TABS tablet Take 1 tablet (10 mg) by mouth daily Do not crush or break tablet. 2/26/2018 Yes Handy Denise MD   omeprazole (PRILOSEC) 20 MG CR capsule Take 1 capsule (20 mg) by mouth daily 2/26/2018 Yes Deisi Liu MD   montelukast (SINGULAIR) 10 MG tablet Take 1 tablet (10 mg) by mouth At Bedtime 2/25/2018 Yes Deisi Liu MD   fluticasone-salmeterol (ADVAIR) 250-50 MCG/DOSE diskus inhaler Inhale 1 puff into the  "lungs every 12 hours 2/26/2018 at am Yes Deisi Liu MD   furosemide (LASIX) 20 MG tablet Take 1 tablet (20 mg) by mouth daily 2/26/2018 Yes Yogi Yepez MD   bisoprolol (ZEBETA) 5 MG tablet Take 0.5 tablets (2.5 mg) by mouth daily 2/26/2018 Yes Handy Denise MD   atorvastatin (LIPITOR) 40 MG tablet Take 1 tablet (40 mg) by mouth daily 2/26/2018 Yes Handy Denise MD   biotin 2.5 mg/mL Take by mouth daily 2000mcg 2/25/2018 Yes Reported, Patient   Cholecalciferol (VITAMIN D3 PO) Take 2,000 Units by mouth daily  2/25/2018 Yes Reported, Patient   aspirin 81 MG tablet Take 81 mg by mouth daily. 2/26/2018 Yes Reported, Patient   omega-3 fatty acids (FISH OIL DOUBLE STRENGTH) 1200 MG capsule Take 1 capsule by mouth 2 times daily  2/25/2018 Yes Reported, Patient   Calcium 1942-1746 MG-UNIT CHEW Take 1 tablet by mouth daily. 2/25/2018 Yes Reported, Patient   Boswellia-Glucosamine-Vit D (OSTEO BI-FLEX/5-LOXIN ADVANCED) TABS Take 1,500 mg by mouth 2 times daily  2/25/2018 Yes Reported, Patient   MAGNESIUM ACETATE Take 250 mg by mouth daily  2/25/2018 Yes Reported, Patient   cycloSPORINE (RESTASIS) 0.05 % ophthalmic emulsion Place 1 drop into both eyes 2 times daily  2/26/2018 at am Yes Reported, Patient   nitroglycerin (NITROSTAT) 0.4 MG sublingual tablet One tablet under the tongue every 5 minutes if needed for chest pain. May repeat every 5 minutes for a maximum of 3 doses in 15 minutes\"   Handy Denise MD   albuterol (PROAIR HFA, PROVENTIL HFA, VENTOLIN HFA) 108 (90 BASE) MCG/ACT inhaler Inhale 2 puffs into the lungs every 6 hours as needed for shortness of breath / dyspnea or wheezing   Deisi Liu MD   albuterol (2.5 MG/3ML) 0.083% nebulizer solution Take 1 vial (2.5 mg) by nebulization every 6 hours as needed for shortness of breath / dyspnea   Elsy Bah MD             "

## 2018-02-26 NOTE — ED NOTES
"[facility]  ED Nurse Handoff Report    June V Selvin is a 77 year old female   ED Chief complaint: Chest Pain  . ED Diagnosis:   Final diagnoses:   Chest pain, unspecified type   Coronary artery disease involving native heart, angina presence unspecified, unspecified vessel or lesion type     Allergies:   Allergies   Allergen Reactions     Animal Dander      Kiwi Itching     Itching and red all over     Pollen Extract      Pollen,dust, trees, grass, mold-hayfever symptoms     Seasonal Allergies        Code Status: Full Code  Activity level - Baseline/Home:  Independent. Activity Level - Current:   Stand with Assist. Lift room needed: No. Bariatric: No   Needed: No   Isolation: No. Infection: Not Applicable.     Vital Signs:   Vitals:    02/26/18 1339 02/26/18 1345 02/26/18 1445   BP: 145/77 145/77 103/87   Pulse:  66    Resp:   8   Temp:  97.7  F (36.5  C)    TempSrc:  Oral    SpO2:  98% 96%   Weight:  77.1 kg (170 lb)    Height:  1.549 m (5' 1\")        Cardiac Rhythm:  ,      Pain level: 0-10 Pain Scale: 2  Patient confused: No. Patient Falls Risk: Yes.   Elimination Status: Has voided   Patient Report - Initial Complaint: chest pain. Focused Assessment: Triage: Patient reports feeling extremely light headed for the past week.  Saturday 2/24 started having chest pain which she states she took nitro and the pain subsided.  Last night felt more chest discomfort 10/10, took nitro x3, and the pain subsided after a few hours of relaxing.  States that she is having \"2/10 chest soreness.\"  Slightly nauseous.  A&O, ABCs intact.    Was given 4 ASA and 1 nitro, which relieved chest pain 0/10.  Tests Performed:   Labs Ordered and Resulted from Time of ED Arrival Up to the Time of Departure from the ED   BASIC METABOLIC PANEL - Abnormal; Notable for the following:        Result Value    Potassium 3.3 (*)     Glucose 67 (*)     Calcium 8.4 (*)     All other components within normal limits   CBC WITH " PLATELETS DIFFERENTIAL   TROPONIN I     XR Chest 2 Views   Preliminary Result   IMPRESSION:  No acute process. Lungs are clear. Aortic calcification.   Vascular stent just superior to the aortic arch.          Treatments provided: See MAR. Nitro patch Left chest    Family Comments:  at bedside  OBS brochure/video discussed/provided to patient:  Yes  ED Medications:   Medications   nitroGLYcerin (NITROSTAT) sublingual tablet 0.4 mg (0.4 mg Sublingual Given 2/26/18 1412)   aspirin chewable tablet 324 mg (324 mg Oral Given 2/26/18 1411)     Drips infusing:  Heparin  For the majority of the shift, the patient's behavior Green. Interventions performed were Rounding.     Severe Sepsis OR Septic Shock Diagnosis Present: No      ED Nurse Name/Phone Number: Dorothy Mittal,   3:02 PM  RECEIVING UNIT ED HANDOFF REVIEW    Above ED Nurse Handoff Report was reviewed: Yes  Reviewed by: Emily Garcia on February 26, 2018 at 4:52 PM

## 2018-02-26 NOTE — IP AVS SNAPSHOT
MRN:[id number]                      After Visit Summary   2/26/2018    Victoria Montalvo    MRN: [id number]           Thank you!     Thank you for choosing [facility] for your care. Our goal is always to provide you with excellent care. Hearing back from our patients is one way we can continue to improve our services. Please take a few minutes to complete the written survey that you may receive in the mail after you visit. If you would like to speak to someone directly about your visit please contact Patient Relations at [phone]. Thank you!          Patient Information     Date Of Birth          1940        Designated Caregiver       Most Recent Value    Caregiver    Will someone help with your care after discharge? yes    Name of designated caregiver Trenton [Evgeny]    Phone number of caregiver see face sheet    Caregiver address see face sheet      About your hospital stay     You were admitted on:  February 26, 2018 You last received care in the:  [place] [address] [facility]    You were discharged on:  February 27, 2018        Reason for your hospital stay       Chest pain-resolved                  Who to Call     For medical emergencies, please call 911.  For non-urgent questions about your medical care, please call your primary care provider or clinic, [phone]          Attending Provider     Provider Specialty    Jordon Edwards MD Emergency Medicine    Familia Caballero,  Internal Medicine    Moisés Olivarez MD Internal Medicine       Primary Care Provider Office Phone # Fax #    Deisi Liu [phone]      After Care Instructions     Activity       Your activity upon discharge: activity as tolerated            Diet       Follow this diet upon discharge: Orders Placed This Encounter      Combination Diet Low Saturated Fat Na <2400mg Diet, No Caffeine Diet                  Follow-up Appointments     Follow-up and  "recommended labs and tests        Follow up with primary care provider, Deisi Liu, within 7 days                  Your next 10 appointments already scheduled     Mar 29, 2018  2:15 PM CDT   Return Visit with Yogi Yepez MD   [facility] ([facility]    [address]  [place] [address] [phone]              Pending Results     No orders found for last 3 day(s).            Statement of Approval     Ordered          02/27/18 4896  I have reviewed and agree with all the recommendations and orders detailed in this document.  EFFECTIVE NOW     Approved and electronically signed by:  Moisés Olivarez MD             Admission Information     Date & Time Provider Department Dept. Phone    2/26/2018 Moisés Olivarez MD [place] [address] [facility] [phone]      Your Vitals Were     Blood Pressure Pulse Temperature Respirations Height Weight    133/62 (BP Location: Left arm) 65 96.1  F (35.6  C) (Oral) 18 1.549 m (5' 1\") 80.9 kg (178 lb 4.8 oz)    Pulse Oximetry BMI (Body Mass Index)                96% 33.69 kg/m2          [employer] Information     [employer] gives you secure access to your electronic health record. If you see a primary care provider, you can also send messages to your care team and make appointments. If you have questions, please call your primary care clinic.  If you do not have a primary care provider, please call [phone] and they will assist you.        Care EveryWhere ID     This is your Care EveryWhere ID. This could be used by other organizations to access your [facility] medical records  [id number]        Equal Access to Services     [facility] : Hadsaman Alvares, wasatish luantoine, qaybestefani maldonado. So [facility] [phone].    ATENCIÓN: Si habla español, tiene a yang disposición servicios gratuitos de asistencia " lingüísticaRafita Hendricks al [phone].    We comply with applicable federal civil rights laws and [place] laws. We do not discriminate on the basis of race, color, national origin, age, disability, sex, sexual orientation, or gender identity.               Review of your medicines      CONTINUE these medicines which have NOT CHANGED        Dose / Directions    * albuterol (2.5 MG/3ML) 0.083% neb solution   Used for:  Moderate persistent asthma, uncomplicated        Dose:  1 vial   Take 1 vial (2.5 mg) by nebulization every 6 hours as needed for shortness of breath / dyspnea   Quantity:  1 Box   Refills:  3       * albuterol 108 (90 BASE) MCG/ACT Inhaler   Commonly known as:  PROAIR HFA/PROVENTIL HFA/VENTOLIN HFA   Used for:  Moderate persistent asthma, uncomplicated        Dose:  2 puff   Inhale 2 puffs into the lungs every 6 hours as needed for shortness of breath / dyspnea or wheezing   Quantity:  3 Inhaler   Refills:  0       alendronate 70 MG tablet   Commonly known as:  FOSAMAX   Used for:  Age-related osteoporosis without current pathological fracture        Dose:  70 mg   Take 1 tablet (70 mg) by mouth every 7 days Take 60 minutes before am meal with 8 oz. water. Remain upright for 30 minutes.   Quantity:  12 tablet   Refills:  2       aspirin 81 MG tablet        Dose:  81 mg   Take 81 mg by mouth daily.   Refills:  0       atorvastatin 40 MG tablet   Commonly known as:  LIPITOR   Used for:  Coronary artery disease involving native coronary artery of native heart with unstable angina pectoris (H), Status post coronary angioplasty        Dose:  40 mg   Take 1 tablet (40 mg) by mouth daily   Quantity:  90 tablet   Refills:  3       biotin 2.5 mg/mL Susp        Take by mouth daily 2000mcg   Refills:  0       bisoprolol 5 MG tablet   Commonly known as:  ZEBETA   Used for:  Coronary artery disease involving native coronary artery of native heart with unstable angina pectoris (H)        Dose:  2.5 mg   Take 0.5  tablets (2.5 mg) by mouth daily   Quantity:  45 tablet   Refills:  3       Calcium 1157-7806 MG-UNIT Chew        Dose:  1 tablet   Take 1 tablet by mouth daily.   Refills:  0       FISH OIL DOUBLE STRENGTH 1200 MG capsule   Generic drug:  omega-3 fatty acids        Dose:  1 capsule   Take 1 capsule by mouth 2 times daily   Refills:  0       fluticasone 50 MCG/ACT spray   Commonly known as:  FLONASE   Used for:  Chronic rhinitis        USE 1 TO 2 SPRAYS IN EACH NOSTRIL DAILY   Quantity:  48 g   Refills:  2       fluticasone-salmeterol 250-50 MCG/DOSE diskus inhaler   Commonly known as:  ADVAIR   Used for:  Uncomplicated severe persistent asthma        Dose:  1 puff   Inhale 1 puff into the lungs every 12 hours   Quantity:  3 Inhaler   Refills:  3       furosemide 20 MG tablet   Commonly known as:  LASIX   Used for:  Diastolic CHF, chronic (H)        Dose:  20 mg   Take 1 tablet (20 mg) by mouth daily   Quantity:  90 tablet   Refills:  3       isosorbide mononitrate 30 MG 24 hr tablet   Commonly known as:  IMDUR   Used for:  ESPITIA (dyspnea on exertion), ASHD (arteriosclerotic heart disease)        Dose:  30 mg   Take 1 tablet (30 mg) by mouth daily   Quantity:  90 tablet   Refills:  3       MAGNESIUM ACETATE        Dose:  250 mg   Take 250 mg by mouth daily   Refills:  0       methimazole 5 MG tablet   Commonly known as:  TAPAZOLE   Used for:  Subclinical hyperthyroidism        Dose:  2.5 mg   Take 0.5 tablets (2.5 mg) by mouth daily   Quantity:  45 tablet   Refills:  1       montelukast 10 MG tablet   Commonly known as:  SINGULAIR   Used for:  Chronic non-seasonal allergic rhinitis, unspecified trigger        Dose:  1 tablet   Take 1 tablet (10 mg) by mouth At Bedtime   Quantity:  90 tablet   Refills:  3       nitroGLYcerin 0.4 MG sublingual tablet   Commonly known as:  NITROSTAT   Used for:  Coronary artery disease involving native coronary artery of native heart with unstable angina pectoris (H), Status post coronary  "angioplasty        One tablet under the tongue every 5 minutes if needed for chest pain. May repeat every 5 minutes for a maximum of 3 doses in 15 minutes\"   Quantity:  25 tablet   Refills:  3       omeprazole 20 MG CR capsule   Commonly known as:  priLOSEC   Used for:  Gastroesophageal reflux disease without esophagitis        Dose:  20 mg   Take 1 capsule (20 mg) by mouth daily   Quantity:  90 capsule   Refills:  3       OSTEO BI-FLEX/5-LOXIN ADVANCED Tabs        Dose:  1500 mg   Take 1,500 mg by mouth 2 times daily   Refills:  0       prasugrel 10 MG Tabs tablet   Commonly known as:  EFFIENT   Used for:  Coronary artery disease involving native coronary artery of native heart with unstable angina pectoris (H), Status post coronary angioplasty        Dose:  10 mg   Take 1 tablet (10 mg) by mouth daily Do not crush or break tablet.   Quantity:  90 tablet   Refills:  1       RESTASIS 0.05 % ophthalmic emulsion   Generic drug:  cycloSPORINE        Dose:  1 drop   Place 1 drop into both eyes 2 times daily   Refills:  0       spironolactone 12.5 mg Tabs half-tab   Commonly known as:  ALDACTONE        Dose:  100 mg   Take 100 mg by mouth See Admin Instructions Monday, Wedn, Friday   Refills:  0       VITAMIN D3 PO        Dose:  2000 Units   Take 2,000 Units by mouth daily   Refills:  0       * Notice:  This list has 2 medication(s) that are the same as other medications prescribed for you. Read the directions carefully, and ask your doctor or other care provider to review them with you.             Protect others around you: Learn how to safely use, store and throw away your medicines at www.disposemymeds.org.             Medication List: This is a list of all your medications and when to take them. Check marks below indicate your daily home schedule. Keep this list as a reference.      Medications           Morning Afternoon Evening Bedtime As Needed    * albuterol (2.5 MG/3ML) 0.083% neb solution   Take 1 vial (2.5 " mg) by nebulization every 6 hours as needed for shortness of breath / dyspnea                                   * albuterol 108 (90 BASE) MCG/ACT Inhaler   Commonly known as:  PROAIR HFA/PROVENTIL HFA/VENTOLIN HFA   Inhale 2 puffs into the lungs every 6 hours as needed for shortness of breath / dyspnea or wheezing                                   alendronate 70 MG tablet   Commonly known as:  FOSAMAX   Take 1 tablet (70 mg) by mouth every 7 days Take 60 minutes before am meal with 8 oz. water. Remain upright for 30 minutes.                                aspirin 81 MG tablet   Take 81 mg by mouth daily.   Next Dose Due:  Tomorrow 2/28/2018                                   atorvastatin 40 MG tablet   Commonly known as:  LIPITOR   Take 1 tablet (40 mg) by mouth daily   Last time this was given:  40 mg on 2/27/2018  8:26 AM   Next Dose Due:  Tomorrow 2/28/2018                                   biotin 2.5 mg/mL Susp   Take by mouth daily 2000mcg   Next Dose Due:  Back on schedule                                 bisoprolol 5 MG tablet   Commonly known as:  ZEBETA   Take 0.5 tablets (2.5 mg) by mouth daily                                Calcium 3945-5957 MG-UNIT Chew   Take 1 tablet by mouth daily.                                FISH OIL DOUBLE STRENGTH 1200 MG capsule   Take 1 capsule by mouth 2 times daily   Generic drug:  omega-3 fatty acids                                fluticasone 50 MCG/ACT spray   Commonly known as:  FLONASE   USE 1 TO 2 SPRAYS IN EACH NOSTRIL DAILY   Last time this was given:  1 spray on 2/27/2018  8:26 AM   Next Dose Due:  Tomorrow 2/28/2018                                   fluticasone-salmeterol 250-50 MCG/DOSE diskus inhaler   Commonly known as:  ADVAIR   Inhale 1 puff into the lungs every 12 hours   Next Dose Due:  Tonight 2/27/2018                                      furosemide 20 MG tablet   Commonly known as:  LASIX   Take 1 tablet (20 mg) by mouth daily   Next Dose Due:  Tomorrow  "2/28/2018                                   isosorbide mononitrate 30 MG 24 hr tablet   Commonly known as:  IMDUR   Take 1 tablet (30 mg) by mouth daily   Last time this was given:  30 mg on 2/27/2018  8:23 AM   Next Dose Due:  Tomorrow 2/28/2018                                   MAGNESIUM ACETATE   Take 250 mg by mouth daily                                methimazole 5 MG tablet   Commonly known as:  TAPAZOLE   Take 0.5 tablets (2.5 mg) by mouth daily   Last time this was given:  2.5 mg on 2/27/2018  8:24 AM   Next Dose Due:  Tomorrow 2/28/2018                                   montelukast 10 MG tablet   Commonly known as:  SINGULAIR   Take 1 tablet (10 mg) by mouth At Bedtime   Last time this was given:  10 mg on 2/26/2018 10:11 PM   Next Dose Due:  Tonight 2/27/2018                                   nitroGLYcerin 0.4 MG sublingual tablet   Commonly known as:  NITROSTAT   One tablet under the tongue every 5 minutes if needed for chest pain. May repeat every 5 minutes for a maximum of 3 doses in 15 minutes\"   Last time this was given:  0.4 mg on 2/26/2018  2:12 PM                                   omeprazole 20 MG CR capsule   Commonly known as:  priLOSEC   Take 1 capsule (20 mg) by mouth daily   Last time this was given:  20 mg on 2/27/2018  8:26 AM   Next Dose Due:  Tomorrow 2/28/2018                                   OSTEO BI-FLEX/5-LOXIN ADVANCED Tabs   Take 1,500 mg by mouth 2 times daily                                prasugrel 10 MG Tabs tablet   Commonly known as:  EFFIENT   Take 1 tablet (10 mg) by mouth daily Do not crush or break tablet.   Last time this was given:  10 mg on 2/27/2018  8:26 AM   Next Dose Due:  Tomorrow 2/28/2018                                   RESTASIS 0.05 % ophthalmic emulsion   Place 1 drop into both eyes 2 times daily   Generic drug:  cycloSPORINE                                      spironolactone 12.5 mg Tabs half-tab   Commonly known as:  ALDACTONE   Take 100 mg by mouth See " Admin Instructions Monday, Wedn, Friday   Next Dose Due:  Back on schedule                                   VITAMIN D3 PO   Take 2,000 Units by mouth daily   Last time this was given:  2,000 Units on 2/27/2018  8:24 AM   Next Dose Due:  Tomorrow 2/28/2018                                   * Notice:  This list has 2 medication(s) that are the same as other medications prescribed for you. Read the directions carefully, and ask your doctor or other care provider to review them with you.

## 2018-02-26 NOTE — PROGRESS NOTES
"Pt sent [facility] message c/o chest pain. Called pt who states she does have a hx of chest pain and stents, is on Imdur; having worsened chest pain that is different than her \"typical chest pain\". Last week she states she was lightheaded for a couple days, having to hold onto the walls in her house to make sure she didn't fall. She would rest and it did improve. On Saturday 2/24/18 in the evening, she started to have chest pain described as \"6 big jabs\" and nausea. She took 1 nitro and it helped but it did not resolve completely. On Sunday 2/25/18 (yesterday) she was at a family birthday party and the pain returned, described as a progressive ache on the center and left side of her chest. Over 4 hours, she took 3 nitro and it did not improve. She rested for a few hours and it eventually improved. Pt states she is always short of breath, but has not been worse than normal. She states she knows \"something is going on\" and she just \"doesn't feel right\". She states she has hx of reflux and heart burn of which she takes tums as needed, however this is a different kind of pain. She didn't want to go to the ED last night because she didn't want to wait there for hours, however she is willing to go today. She is still having some chest discomfort and she is uncomfortable waiting for an office visit (none with Alia Yoo PA-C or Dr. Yepez available this week). I did offer for her to meet with a different cardiologist, however she would rather just go to the ED today. Pt will have her  bring her.     Last OV 2/15/18 w/ Alia Yoo PA-C noted:  \" Coronary artery disease.   a.  Symptoms of dyspnea on exertion and chest pain with abnormal nuclear stress test.     b.  Coronary angiogram 04/04/2017 - 80% left PDA status post drug-eluting stent.  There is a tiny nondominant right coronary artery with severe disease that was not intervened upon.   c.  on DAPT through 4/4/18. Also on BB, statin.   d.  Continues to have stable " "ESPITIA (stairs) without chest pain. Unremarkable echo, no change off bisoprolol. Started Imdur, which she will continue and see if this helps her sxs\".     Scheduled to see Dr. Yepez 3/29/18.  Frantz YBARRA    "

## 2018-02-26 NOTE — H&P
[facility]  Hospitalist Admission Note    Name: Victoria Montalvo      MRN: [id number]  YOB: 1940    Age: 77 year old  Date of admission: 2/26/2018  Primary care provider: Deisi Liu            Assessment and Plan:   Victoria Montalvo is a 77 year old female with a history of coronary artery disease, sleep apnea, hyperthyroidism, asthma, hyperlipidemia, GERD, and diastolic CHF who presents with chest pain.    1.  Chest pain.  Does have known coronary artery disease.  Previous stent placement.  Has had some symptoms of dyspnea on exertion for the past few months.  It does sound like the symptoms have been worsening recently.  Began having chest pain 2 days ago.  Monitor on telemetry overnight.  Check serial troponins.  Cardiology consult.  Continue heparin drip that was started in the ER for possible unstable angina.  Continue aspirin, Prasugrel, Imdur, Lipitor, and bisoprolol.  Will also check a D dimer.    2.  Hyperlipidemia.  Continue Lipitor.     3.  Chronic diastolic CHF.  Does not appear fluid overloaded at this time.  Hold her doses of Lasix and spironolactone for tomorrow morning until she has been seen by cardiology.  Continue bisoprolol.    4.  Hypertension. Continue bisoprolol and Imdur.    5.  GERD.  Continue omeprazole.    6.  COPD.  No acute exacerbation.  Continue Advair, Singulair, and albuterol as needed.    7.  Hypokalemia.  Potassium replacement protocol.    8.  Hyperthyroidism.  Continue bisoprolol and methimazole.    Code status: Full code.  Admit to inpatient.  Prophylaxis: Heparin drip.              Chief Complaint:   Chest pain.         History of Present Illness:   Victoria Montalvo is a 77 year old female who presents with chest pain.  Chest pain first began 2 days ago.  2 days ago she had pain that was brief.  Felt like a sharp stabbing pain in the center of her chest.  This pain was relieved with sublingual nitro.  She was not doing anything when this pain  started.  Yesterday, she had a different pain.  Pain yesterday was a dull achy pain that happened in the left upper chest.  Radiated to her left shoulder.  She was helping prepare food for a birthday party when this pain started.  Pain lasted for approximately 4 hours.  During this 4 hour time period, she was attending a birthday party.  Pain finally went away when she was able to sit and relax after the party was over.  She also took nitroglycerin at this time which helped the pain.  She has had a few small brief episodes of pain today.  The pain today has been short lasting.  She has not needed taking any pain medications before arriving at the ER today.  She has been feeling short of breath with any activity recently.  Shortness of breath with activity began several months ago.  The shortness of breath with activity has been gradually worsening.  Is now to the point where she cannot even climb half a flight of stairs before having to stop because of shortness of breath.  She has not had a cough.  No fevers or chills.  No other complaints.            Past Medical History:     Past Medical History:   Diagnosis Date     Abnormal Papanicolaou smear of cervix and cervical HPV     colposcopy 1/97     Arthritis      Chronic rhinitis      Coronary artery disease     4/4/17 SHERLEY--PDA     Diastolic CHF, chronic (H) 2/22/2012     Gastro-oesophageal reflux disease      Hyperlipidemia      Hyperlipidemia LDL goal <130 10/31/2011     Pain in right hip      Personal history of colonic polyps     colonoscopy 9/97, 2002 (due in 2007)     Pulmonary HTN      Sleep apnea      Subclavian artery stenosis, left (H) 8/7/2013    S/p stent in 2013      Subclinical hyperthyroidism 10/17/2016     Unspecified asthma(493.90)     on preventative meds and has nebulizer             Past Surgical History:     Past Surgical History:   Procedure Laterality Date     ARTHROPLASTY HIP Right 10/19/2015    Procedure: ARTHROPLASTY HIP;  Surgeon: Melissa  Aron Still MD;  Location: [facility] OR     C NONSPECIFIC PROCEDURE  2/89, 1990    liposuction of legs and stomach     C NONSPECIFIC PROCEDURE  1990    Ankle pins removed     C OPEN RX ANKLE DISLOCATN+FIXATN  4/18/87     COLONOSCOPY  01/1/08    Polyp removed, bx.     COLONOSCOPY  01/12/10     COLONOSCOPY N/A 2/17/2015    Procedure: COLONOSCOPY;  Surgeon: Gato Quiles MD;  Location: [facility]     CT CORONARY ANGIOGRAM  04/04/2017    SHERLEY to PDA     HC COLONOSCOPY THRU STOMA, DIAGNOSTIC  2002     HC REDUCTION OF LARGE BREAST  2/89     VASCULAR SURGERY  2013    angiogram & left subclavical artery stent             Social History:     Social History   Substance Use Topics     Smoking status: Former Smoker     Packs/day: 0.10     Years: 10.00     Types: Cigarettes     Quit date: 5/19/2003     Smokeless tobacco: Never Used      Comment: off and on x 10 years 3-4 cigs: no smoker in the household     Alcohol use 0.0 oz/week     0 Standard drinks or equivalent per week      Comment: a couple drinks per year             Family History:   Father, brother, 2 sisters, and a daughter with coronary artery disease.         Allergies:     Allergies   Allergen Reactions     Animal Dander      Kiwi Itching     Itching and red all over     Pollen Extract      Pollen,dust, trees, grass, mold-hayfever symptoms     Seasonal Allergies              Medications:     Prior to Admission medications    Medication Sig Last Dose Taking? Auth Provider   spironolactone (ALDACTONE) 12.5 mg TABS half-tab Take 100 mg by mouth See Admin Instructions Monday, Wedn, Friday 2/26/2018 Yes Reported, Patient   methimazole (TAPAZOLE) 5 MG tablet Take 0.5 tablets (2.5 mg) by mouth daily 2/26/2018 Yes Annie Alvarez MD   isosorbide mononitrate (IMDUR) 30 MG 24 hr tablet Take 1 tablet (30 mg) by mouth daily 2/26/2018 Yes Alia Yoo PA-C   alendronate (FOSAMAX) 70 MG tablet Take 1 tablet (70 mg) by mouth every 7 days Take 60 minutes before am meal  with 8 oz. water. Remain upright for 30 minutes.  Yes Deisi Liu MD   fluticasone (FLONASE) 50 MCG/ACT spray USE 1 TO 2 SPRAYS IN EACH NOSTRIL DAILY 2/26/2018 Yes Deisi Liu MD   prasugrel (EFFIENT) 10 MG TABS tablet Take 1 tablet (10 mg) by mouth daily Do not crush or break tablet. 2/26/2018 Yes Handy Denise MD   omeprazole (PRILOSEC) 20 MG CR capsule Take 1 capsule (20 mg) by mouth daily 2/26/2018 Yes Deisi Liu MD   montelukast (SINGULAIR) 10 MG tablet Take 1 tablet (10 mg) by mouth At Bedtime 2/25/2018 Yes Deisi Liu MD   fluticasone-salmeterol (ADVAIR) 250-50 MCG/DOSE diskus inhaler Inhale 1 puff into the lungs every 12 hours 2/26/2018 at am Yes Deisi Liu MD   furosemide (LASIX) 20 MG tablet Take 1 tablet (20 mg) by mouth daily 2/26/2018 Yes Yogi Yepez MD   bisoprolol (ZEBETA) 5 MG tablet Take 0.5 tablets (2.5 mg) by mouth daily 2/26/2018 Yes Handy Denise MD   atorvastatin (LIPITOR) 40 MG tablet Take 1 tablet (40 mg) by mouth daily 2/26/2018 Yes Handy Denise MD   biotin 2.5 mg/mL Take by mouth daily 2000mcg 2/25/2018 Yes Reported, Patient   Cholecalciferol (VITAMIN D3 PO) Take 2,000 Units by mouth daily  2/25/2018 Yes Reported, Patient   aspirin 81 MG tablet Take 81 mg by mouth daily. 2/26/2018 Yes Reported, Patient   omega-3 fatty acids (FISH OIL DOUBLE STRENGTH) 1200 MG capsule Take 1 capsule by mouth 2 times daily  2/25/2018 Yes Reported, Patient   Calcium 6663-8300 MG-UNIT CHEW Take 1 tablet by mouth daily. 2/25/2018 Yes Reported, Patient   Boswellia-Glucosamine-Vit D (OSTEO BI-FLEX/5-LOXIN ADVANCED) TABS Take 1,500 mg by mouth 2 times daily  2/25/2018 Yes Reported, Patient   MAGNESIUM ACETATE Take 250 mg by mouth daily  2/25/2018 Yes Reported, Patient   cycloSPORINE (RESTASIS) 0.05 % ophthalmic emulsion Place 1 drop into both eyes 2 times daily  2/26/2018 at am Yes Reported, Patient   nitroglycerin (NITROSTAT) 0.4  "MG sublingual tablet One tablet under the tongue every 5 minutes if needed for chest pain. May repeat every 5 minutes for a maximum of 3 doses in 15 minutes\"   Handy Denise MD   albuterol (PROAIR HFA, PROVENTIL HFA, VENTOLIN HFA) 108 (90 BASE) MCG/ACT inhaler Inhale 2 puffs into the lungs every 6 hours as needed for shortness of breath / dyspnea or wheezing   Deisi Liu MD   albuterol (2.5 MG/3ML) 0.083% nebulizer solution Take 1 vial (2.5 mg) by nebulization every 6 hours as needed for shortness of breath / dyspnea   Elsy Bah MD             Review of Systems:   A Comprehensive greater than 10 system review of systems was carried out.  Pertinent positives and negatives are noted above.  Otherwise negative for contributory information.           Physical Exam:   Blood pressure 132/78, pulse 64, temperature 96.3  F (35.7  C), resp. rate 18, height 1.549 m (5' 1\"), weight 80.9 kg (178 lb 4.8 oz), SpO2 97 %, not currently breastfeeding.  Wt Readings from Last 1 Encounters:   02/26/18 80.9 kg (178 lb 4.8 oz)     Exam:  GENERAL: No apparent distress. Awake, alert, and fully oriented.  HEENT: Normocephalic, atraumatic. Extraocular movements intact.  CARDIOVASCULAR: Regular rate and rhythm without murmurs or rubs. No S3.  PULMONARY: Clear to auscultation bilaterally.  ABDOMINAL: Soft, non-tender, non-distended. Bowel sounds normoactive.   EXTREMITIES: No cyanosis or clubbing. No appreciable edema.  NEUROLOGICAL: CN 2-12 grossly intact, no focal neurological deficits.  DERMATOLOGICAL: No rash, ulcer, bruising, nor jaundice.          Data:   EKG:  Personally reviewed.  No acute ischemic changes.    Laboratory:    Recent Labs  Lab 02/26/18  1341   WBC 6.7   HGB 12.2   HCT 37.3   MCV 90          Recent Labs  Lab 02/26/18  1341      POTASSIUM 3.3*   CHLORIDE 107   CO2 29   ANIONGAP 4   GLC 67*   BUN 14   CR 0.83   GFRESTIMATED 66   GFRESTBLACK 80   PHILLIP 8.4*     No results for input(s): " CULT in the last 168 hours.    Imaging:  Recent Results (from the past 24 hour(s))   XR Chest 2 Views    Narrative    CHEST TWO VIEWS  2/26/2018 3:46 PM    HISTORY:  Chest pain.     COMPARISON:  None.      Impression    IMPRESSION:  No acute process. Lungs are clear. Aortic calcification.  Vascular stent just superior to the aortic arch.

## 2018-02-26 NOTE — ED NOTES
"Patient reports feeling extremely light headed for the past week.  Saturday 2/24 started having chest pain which she states she took nitro and the pain subsided.  Last night felt more chest discomfort 10/10, took nitro x3, and the pain subsided after a few hours of relaxing.  States that she is having \"2/10 chest soreness.\"  Slightly nauseous.  A&O, ABCs intact.  "

## 2018-02-26 NOTE — ED PROVIDER NOTES
History     Chief Complaint:  Chest Pain      HPI   June BALTA Montalvo is a 77 year old female, with a history of CAD and CHF, who presents with chest pain beginning three nights ago. She has had episodes of chest pain since then, the longest of which lasted 4 hours and was located on her left side. This was last night, and subsided after taking three nitroglycerin. Patient then went to sleep at 2230.The pain does not occur with exertion. Today she notes she is experiencing more of an intermittent chest discomfort. She states before these chest pains began she had about one week on lightheadedness which has since resolved. Patient denies fevers and cough.     Cardiac Risk Factors   Sex: Female   Tobacco: Negative   Hypertension: Negative  Diabetes: Negative  Hyperlipidemia: Positive  Family History: Positive    Allergies:  Animal dander  Kiwi  Pollen extract  Seasonal allergies     Medications:    Methimazole  Imdur  Fosamax  Flonase  Effient  Prilosec  Singulair  Advair  Lasix  Zebeta  Nitroglycerin  Aldactone  Proair  Vitamin D  Aspirin 81 mg  Fish oil  Restasis     Past Medical History:    Arthritis  Chronic rhinitis  CAD  CHF  GERD  HLD  Pulmonary HTN  Subclavian artery stenosis   Asthma  Hyperthyroidism  KEN    Past Surgical History:    Hip arthroplasty  Liposuction of legs  Colonoscopy  Angiogram and left subclavian artery stent  Breast reduction  Coronary angiography SHERLEY to PDA  Ankle ORIF    Family History:    Alzheimer's disease  GI disease  Throat, lung, liver cancer  CABG  Suicidal  Pacemaker  Heart disease  Allergies    Social History:  Marital Status:   Presents to the ED with her .   Tobacco Use: Former smoker (Quit: 5/19/2003)  Alcohol Use: 2 drinks/year  PCP: Deisi Liu     Review of Systems   Constitutional: Negative for fever.   Respiratory: Negative for cough.    Cardiovascular: Positive for chest pain.   Neurological: Positive for light-headedness.   All other systems  "reviewed and are negative.    Physical Exam   First Vitals:  BP: 145/77  Pulse: 66  Temp: 97.7  F (36.5  C)  Height: 154.9 cm (5' 1\")  Weight: 77.1 kg (170 lb)  SpO2: 98 %      Physical Exam  Constitutional: Alert, attentive  HENT:    Nose: Nose normal.    Mouth/Throat: Oropharynx is clear, mucous membranes are moist   Eyes: EOM are normal. Pupils are equal, round, and reactive to light.   CV: regular rate and rhythm; no murmurs, rubs or gallups  Chest: Effort normal and breath sounds normal.   GI:  There is no tenderness. No distension. Normal bowel sounds  MSK: Normal range of motion.   Neurological: Alert, attentive  Skin: Skin is warm and dry.      Emergency Department Course   ECG:  @ 1341  Indication: Chest pain  Vent. Rate 66 bpm. SD interval 150 ms. QRS duration 72 ms. QT/QTc 396/415 ms. P-R-T axis 42 -9 5.   Normal sinus rhythm. Low voltage QRS. Borderline ECG.  No significant change when compared to previous ECG from 4/4/17   Read @ 1500 by Dr. Edwards.    Imaging:  Chest XR, per radiology:  IMPRESSION:  No acute process. Lungs are clear. Aortic calcification.  Vascular stent just superior to the aortic arch.    Radiographic findings were communicated with the patient who voiced understanding of the findings.    Laboratory:  CBC:  WBC 6.7, HGB 12.2, , otherwise WNL  BMP: Potassium 3.3 (L), glucose 67 (L), calcium 8.4 (L), otherwise WNL (Creatinine 0.83)  Troponin: <0.015  BNP: 219    Interventions:  1411: Aspirin, 324 mg, oral  1412: Nitroglycerin, 0.4 mg, sublingual  1609: Nitroglycerin, 0.4 mg, sublingual  1618: Heparin loading dose, 3550 units, IV injection  1618: Heparin infusion, 48724 units, IV injection    Emergency Department Course:  Nursing notes and vitals reviewed.  1400: I performed an exam of the patient as documented above.  The above workup was undertaken.  1530: I rechecked the patient and discussed results.  Findings and plan explained to the patient who consents to admission. "   1620: I discussed the patient with Dr. Caballero of the hospitalist service, who will admit the patient to a cardiac tele bed for further monitoring, evaluation, and treatment.    Impression & Plan      Medical Decision Making:  This is a 77-year-old female with history of multiple medical problems including CAD, with stent last placed April 2017, who presents for evaluation of chest pain.  There are some atypical features but some concerning ones that may indicate this is increasing angina.  She is chest pain-free at this time but has had 2 episodes while in the ED, one immediately prior to arrival and one during x-ray.  Workup here is negative thus far including a negative chest x-ray, negative EKG and troponin.  Given the above, she was placed on heparin drip and nitro patch with plan for cardiology evaluation and possible repeat stress test.  Symptoms are not consistent with PE this time.  The patient is safe for admission.    Diagnosis:    ICD-10-CM   1. Chest pain, unspecified type R07.9   2. Coronary artery disease involving native heart, angina presence unspecified, unspecified vessel or lesion type I25.10       Disposition:  Admitted to cardiac tele.       I, Radha Echols, am serving as a scribe on 2/26/2018 at 2:00 PM to personally document services performed by Dr. Edwards based on my observations and the provider's statements to me.   [facility] EMERGENCY DEPARTMENT       Jordon Edwards MD  02/26/18 1790

## 2018-02-26 NOTE — IP AVS SNAPSHOT
[place] [address] [facility]    [address]    [facility] [address]    Phone:  [phone]    Fax:  [phone]                                       After Visit Summary   2/26/2018 June BALTA Montalvo    MRN: [id number]           After Visit Summary Signature Page     I have received my discharge instructions, and my questions have been answered. I have discussed any challenges I see with this plan with the nurse or doctor.    ..........................................................................................................................................  Patient/Patient Representative Signature      ..........................................................................................................................................  Patient Representative Print Name and Relationship to Patient    ..................................................               ................................................  Date                                            Time    ..........................................................................................................................................  Reviewed by Signature/Title    ...................................................              ..............................................  Date                                                            Time

## 2018-02-27 ENCOUNTER — APPOINTMENT (OUTPATIENT)
Dept: NUCLEAR MEDICINE | Facility: CLINIC | Age: 78
End: 2018-02-27
Attending: NURSE PRACTITIONER
Payer: MEDICARE

## 2018-02-27 VITALS
HEART RATE: 65 BPM | OXYGEN SATURATION: 96 % | HEIGHT: 61 IN | BODY MASS INDEX: 33.66 KG/M2 | RESPIRATION RATE: 18 BRPM | SYSTOLIC BLOOD PRESSURE: 133 MMHG | WEIGHT: 178.3 LBS | TEMPERATURE: 96.1 F | DIASTOLIC BLOOD PRESSURE: 62 MMHG

## 2018-02-27 LAB
ANION GAP SERPL CALCULATED.3IONS-SCNC: 4 MMOL/L (ref 3–14)
BUN SERPL-MCNC: 13 MG/DL (ref 7–30)
CALCIUM SERPL-MCNC: 8.5 MG/DL (ref 8.5–10.1)
CHLORIDE SERPL-SCNC: 108 MMOL/L (ref 94–109)
CO2 SERPL-SCNC: 30 MMOL/L (ref 20–32)
CREAT SERPL-MCNC: 0.68 MG/DL (ref 0.52–1.04)
ERYTHROCYTE [DISTWIDTH] IN BLOOD BY AUTOMATED COUNT: 14.2 % (ref 10–15)
GFR SERPL CREATININE-BSD FRML MDRD: 84 ML/MIN/1.7M2
GLUCOSE SERPL-MCNC: 97 MG/DL (ref 70–99)
HCT VFR BLD AUTO: 36.6 % (ref 35–47)
HGB BLD-MCNC: 11.9 G/DL (ref 11.7–15.7)
LMWH PPP CHRO-ACNC: 0.34 IU/ML
MCH RBC QN AUTO: 29.4 PG (ref 26.5–33)
MCHC RBC AUTO-ENTMCNC: 32.5 G/DL (ref 31.5–36.5)
MCV RBC AUTO: 90 FL (ref 78–100)
PLATELET # BLD AUTO: 180 10E9/L (ref 150–450)
POTASSIUM SERPL-SCNC: 4 MMOL/L (ref 3.4–5.3)
RBC # BLD AUTO: 4.05 10E12/L (ref 3.8–5.2)
SODIUM SERPL-SCNC: 142 MMOL/L (ref 133–144)
WBC # BLD AUTO: 4.7 10E9/L (ref 4–11)

## 2018-02-27 PROCEDURE — 93016 CV STRESS TEST SUPVJ ONLY: CPT | Performed by: INTERNAL MEDICINE

## 2018-02-27 PROCEDURE — 40000275 ZZH STATISTIC RCP TIME EA 10 MIN

## 2018-02-27 PROCEDURE — 36415 COLL VENOUS BLD VENIPUNCTURE: CPT | Performed by: INTERNAL MEDICINE

## 2018-02-27 PROCEDURE — 94640 AIRWAY INHALATION TREATMENT: CPT

## 2018-02-27 PROCEDURE — 93017 CV STRESS TEST TRACING ONLY: CPT

## 2018-02-27 PROCEDURE — 99239 HOSP IP/OBS DSCHRG MGMT >30: CPT | Performed by: INTERNAL MEDICINE

## 2018-02-27 PROCEDURE — 78452 HT MUSCLE IMAGE SPECT MULT: CPT

## 2018-02-27 PROCEDURE — 85027 COMPLETE CBC AUTOMATED: CPT | Performed by: INTERNAL MEDICINE

## 2018-02-27 PROCEDURE — 25000132 ZZH RX MED GY IP 250 OP 250 PS 637: Mod: GY | Performed by: INTERNAL MEDICINE

## 2018-02-27 PROCEDURE — 96366 THER/PROPH/DIAG IV INF ADDON: CPT

## 2018-02-27 PROCEDURE — G0378 HOSPITAL OBSERVATION PER HR: HCPCS

## 2018-02-27 PROCEDURE — 25000128 H RX IP 250 OP 636

## 2018-02-27 PROCEDURE — 85520 HEPARIN ASSAY: CPT | Performed by: INTERNAL MEDICINE

## 2018-02-27 PROCEDURE — A9502 TC99M TETROFOSMIN: HCPCS | Performed by: INTERNAL MEDICINE

## 2018-02-27 PROCEDURE — 34300033 ZZH RX 343: Performed by: INTERNAL MEDICINE

## 2018-02-27 PROCEDURE — 99214 OFFICE O/P EST MOD 30 MIN: CPT | Mod: 25 | Performed by: INTERNAL MEDICINE

## 2018-02-27 PROCEDURE — A9270 NON-COVERED ITEM OR SERVICE: HCPCS | Mod: GY | Performed by: INTERNAL MEDICINE

## 2018-02-27 PROCEDURE — 78452 HT MUSCLE IMAGE SPECT MULT: CPT | Mod: 26 | Performed by: INTERNAL MEDICINE

## 2018-02-27 PROCEDURE — 93018 CV STRESS TEST I&R ONLY: CPT | Performed by: INTERNAL MEDICINE

## 2018-02-27 PROCEDURE — 80048 BASIC METABOLIC PNL TOTAL CA: CPT | Performed by: INTERNAL MEDICINE

## 2018-02-27 RX ORDER — REGADENOSON 0.08 MG/ML
INJECTION, SOLUTION INTRAVENOUS
Status: COMPLETED
Start: 2018-02-27 | End: 2018-02-27

## 2018-02-27 RX ADMIN — OMEPRAZOLE 20 MG: 20 CAPSULE, DELAYED RELEASE ORAL at 08:26

## 2018-02-27 RX ADMIN — ATORVASTATIN CALCIUM 40 MG: 40 TABLET, FILM COATED ORAL at 08:26

## 2018-02-27 RX ADMIN — TETROFOSMIN 10.2 MCI.: 1.38 INJECTION, POWDER, LYOPHILIZED, FOR SOLUTION INTRAVENOUS at 10:36

## 2018-02-27 RX ADMIN — DIPHENHYDRAMINE HYDROCHLORIDE 25 MG: 25 CAPSULE ORAL at 00:08

## 2018-02-27 RX ADMIN — REGADENOSON 0.4 MG: 0.08 INJECTION, SOLUTION INTRAVENOUS at 12:08

## 2018-02-27 RX ADMIN — Medication 2.5 MG: at 08:24

## 2018-02-27 RX ADMIN — FLUTICASONE FUROATE AND VILANTEROL TRIFENATATE 1 PUFF: 200; 25 POWDER RESPIRATORY (INHALATION) at 08:15

## 2018-02-27 RX ADMIN — FLUTICASONE PROPIONATE 1 SPRAY: 50 SPRAY, METERED NASAL at 08:26

## 2018-02-27 RX ADMIN — TETROFOSMIN 31.5 MCI.: 1.38 INJECTION, POWDER, LYOPHILIZED, FOR SOLUTION INTRAVENOUS at 11:57

## 2018-02-27 RX ADMIN — VITAMIN D, TAB 1000IU (100/BT) 2000 UNITS: 25 TAB at 08:24

## 2018-02-27 RX ADMIN — ISOSORBIDE MONONITRATE 30 MG: 30 TABLET, EXTENDED RELEASE ORAL at 08:23

## 2018-02-27 RX ADMIN — PRASUGREL HYDROCHLORIDE 10 MG: 10 TABLET, FILM COATED ORAL at 08:26

## 2018-02-27 RX ADMIN — ASPIRIN 81 MG: 81 TABLET, COATED ORAL at 08:26

## 2018-02-27 ASSESSMENT — ACTIVITIES OF DAILY LIVING (ADL)
ADLS_ACUITY_SCORE: 9

## 2018-02-27 NOTE — PLAN OF CARE
Problem: Patient Care Overview  Goal: Plan of Care/Patient Progress Review  Observation ended at 1710.

## 2018-02-27 NOTE — PLAN OF CARE
PRIMARY DIAGNOSIS: CHEST PAIN  OUTPATIENT/OBSERVATION GOALS TO BE MET BEFORE DISCHARGE:  1. Ruled out acute coronary syndrome (negative or stable Troponin):  No  2. Pain Status: Pain free.  3. Appropriate provocative testing performed: Yes  - Stress Test Procedure: Nuclear  - Interpretation of cardiac rhythm per telemetry tech: NS     4. Cleared by Consultants (if applicable):No  5. Return to near baseline physical activity: Yes  Discharge Planner Nurse   Safe discharge environment identified: No  Barriers to discharge: No       Entered by: Sommer Ruggiero 02/27/2018 11:47 AM     Please review provider order for any additional goals.   Nurse to notify provider when observation goals have been met and patient is ready for discharge.

## 2018-02-27 NOTE — PLAN OF CARE
Problem: Patient Care Overview  Goal: Plan of Care/Patient Progress Review  Outcome: Adequate for Discharge Date Met: 02/27/18  Patient verbalized understanding of discharge instruction, patient discharged home.

## 2018-02-27 NOTE — PROGRESS NOTES
Pre-procedure: NO COMPLAINTS    Initial vital signs: /72, HR 73, RR 20  Allergies reviewed: YES   Rhythm: Sinus  Medications taken within 48 hours of procedure: DENIES   Last Caffeine: 48 HOURS  Lung sounds: CTA, no wheezing, crackles or rtx  Health History (COPD, Asthma, etc): ASTHMA    Procedure: Lexiscan  Reaction/symptoms after receiving Isabella injection: Shortness of breath  Intensity of Pain: NONE  Rhythm: SINUS TACH 110'S  1. Vital Signs:/74, , RR 24  2. Vital Signs:/64, , RR 20     Reversal agent: COLA AND CRACKERS    Post:   Resolution of symptoms?: YES  Vital signs: /82, HR 88, RR 20    Rhythm: SINUS RHYTHM  Walk: YES  Comment: TOLERATE FLUIDS  Return to Radiology

## 2018-02-27 NOTE — PROGRESS NOTES
Nuclear scan negative for evidence of ischemia.   Recommend acid suppression therapy for probable GERD  Follow up with cardiology as previously scheduled.   Ok to d/c from cardiac standpoint.

## 2018-02-27 NOTE — CONSULTS
"Cardiology Consultation     Victoria V Selvin MRN# [id number]   YOB: 1940 Age: 77 year old   Date of Admission: 2/26/2018     Reason for consult: We were asked by Dr. Caballero to evaluate this patient for chest pain in the setting of known CAD           Assessment and Plan:   1:  Prolonged CP:  Atypical for angina.  However, history of significant CAD w/ previous SHERLEY to        PDA and remaining significant dx in small RCA.  Multiple CVRF.  Has r/o for MI.  No significant   ST abnormalities.  Due to concern for recent CP episodes, will have pt undergo Lexiscan NUC for  further delineation.  (previous NUC 2017, showed anterior abnormalities likely soft-tissue       attenuation)        Continue current medical regimen for small vessel disease:       Bblocker/Imdur/ARB/ASA/statin.     Pending ischemic evaluation, consider alternative etiology for CP ( GERD in differential)  2:  HTN:  BP well controlled  3:  Chronic Leg swelling:  Likely combination of chronic diastolic HF/venous     insufficiency/untreated KEN    Stable on low dose lasix/spironolactone               Chief Complaint:   Prolonged episode of resting chest pain    History is obtained from the patient         History of Present Illness:   This patient is a 77 year old female who presents after experiencing 5 hour resting left-sided chest \"tightness\" while sitting down to eat dinner and a generalized unwell feeling. Took a total of 3 SL NTG and rest in bed before resolving.  When pain improved, pt admitted to epigastric burning which resolved with multiple TUMS.  Due to the urging of family, presented to ER the following morning.  Denies any associated SOB, diaphoresis, radiation.  She admits to previous episode of similar brief \"chest jabbing\" few evenings prior when she was sitting at the computer that resolved on its own.  She has known chronic dyspnea on exertion which recently improved with increased use of her nebulizer.  Pt has also had 2 " "episodes of lightheadedness early in the morning when getting out of bed, described as \"my head spinning\"  However, past few days, denies any lightheadedness.      Since admission, the patient has not had any more chest pain.  Her troponin initially was normal. Hemodynamically stable.  EKG showed SR w/o significant ST abnormalities She has been placed on a Heparin drip.     Pt has history of SHERLEY to PDA (4/2017) after c/o of CP/SOB and abnormal NUC showing anterior ischemia.  Cath also demonstrated significant small distal RCA dx, mild LAD dx being medically managed with bblocker and more recently Imdur.  She previously has undergone left subclavian stenting for severe stenosis(13).  ECHO (7/2017)  LVEF 55-60%, grade I LVD, NL RVF, mild PHTN, 1-2+TR,      She has a longstanding hx of asthma on multiple inhalers, with chronic ESPITIA.  Longstanding hx of GERD w/ a remote hx of ulcer and takes daily Prilosec and TUMS at times. There has been no recent GI w/u.  She has a history of chronic venous insufficiency treated w/ Lasix/spironolactone and support stockings.  She has known KEN, but is noncompliant with CPAP              Past Medical History:     Past Medical History:   Diagnosis Date     Abnormal Papanicolaou smear of cervix and cervical HPV     colposcopy 1/97     Arthritis      Chronic rhinitis      Coronary artery disease     4/4/17 SHERLEY--PDA     Diastolic CHF, chronic (H) 2/22/2012     Gastro-oesophageal reflux disease      Hyperlipidemia      Hyperlipidemia LDL goal <130 10/31/2011     Pain in right hip      Personal history of colonic polyps     colonoscopy 9/97, 2002 (due in 2007)     Pulmonary HTN      Sleep apnea      Subclavian artery stenosis, left (H) 8/7/2013    S/p stent in 2013      Subclinical hyperthyroidism 10/17/2016     Unspecified asthma(493.90)     on preventative meds and has nebulizer             Past Surgical History:     Past Surgical History:   Procedure Laterality Date     ARTHROPLASTY HIP " Right 10/19/2015    Procedure: ARTHROPLASTY HIP;  Surgeon: Aron Torres MD;  Location: [facility]     C NONSPECIFIC PROCEDURE  2/89, 1990    liposuction of legs and stomach     C NONSPECIFIC PROCEDURE  1990    Ankle pins removed     C OPEN RX ANKLE DISLOCATN+FIXATN  4/18/87     COLONOSCOPY  01/1/08    Polyp removed, bx.     COLONOSCOPY  01/12/10     COLONOSCOPY N/A 2/17/2015    Procedure: COLONOSCOPY;  Surgeon: Gato Quiles MD;  Location: [facility]     CT CORONARY ANGIOGRAM  04/04/2017    SHERLEY to PDA     HC COLONOSCOPY THRU STOMA, DIAGNOSTIC  2002     HC REDUCTION OF LARGE BREAST  2/89     VASCULAR SURGERY  2013    angiogram & left subclavical artery stent                Social History:     Social History   Substance Use Topics     Smoking status: Former Smoker     Packs/day: 0.10     Years: 10.00     Types: Cigarettes     Quit date: 5/19/2003     Smokeless tobacco: Never Used      Comment: off and on x 10 years 3-4 cigs: no smoker in the household     Alcohol use 0.0 oz/week     0 Standard drinks or equivalent per week      Comment: a couple drinks per year             Family History:     Family History   Problem Relation Age of Onset     Alzheimer Disease Mother      GASTROINTESTINAL DISEASE Mother      CANCER Father      throat and lungs, liver,     HEART DISEASE Father 57     CABG     HEART DISEASE Brother      suicidal     Pacemaker Brother      HEART DISEASE Sister      Hypertension Maternal Grandmother      Lipids Maternal Grandmother      CANCER Maternal Grandmother      stomach     CANCER Paternal Grandmother      stomach     Allergies Daughter      Allergies Son              Immunizations:           Allergies:     Allergies   Allergen Reactions     Animal Dander      Kiwi Itching     Itching and red all over     Pollen Extract      Pollen,dust, trees, grass, mold-hayfever symptoms     Seasonal Allergies              Medications:     Prescriptions Prior to Admission   Medication Sig Dispense  Refill Last Dose     spironolactone (ALDACTONE) 12.5 mg TABS half-tab Take 100 mg by mouth See Admin Instructions Monday, Wedn, Friday 2/26/2018     methimazole (TAPAZOLE) 5 MG tablet Take 0.5 tablets (2.5 mg) by mouth daily 45 tablet 1 2/26/2018     isosorbide mononitrate (IMDUR) 30 MG 24 hr tablet Take 1 tablet (30 mg) by mouth daily 90 tablet 3 2/26/2018     alendronate (FOSAMAX) 70 MG tablet Take 1 tablet (70 mg) by mouth every 7 days Take 60 minutes before am meal with 8 oz. water. Remain upright for 30 minutes. 12 tablet 2 Taking     fluticasone (FLONASE) 50 MCG/ACT spray USE 1 TO 2 SPRAYS IN EACH NOSTRIL DAILY 48 g 2 2/26/2018     prasugrel (EFFIENT) 10 MG TABS tablet Take 1 tablet (10 mg) by mouth daily Do not crush or break tablet. 90 tablet 1 2/26/2018     omeprazole (PRILOSEC) 20 MG CR capsule Take 1 capsule (20 mg) by mouth daily 90 capsule 3 2/26/2018     montelukast (SINGULAIR) 10 MG tablet Take 1 tablet (10 mg) by mouth At Bedtime 90 tablet 3 2/25/2018     fluticasone-salmeterol (ADVAIR) 250-50 MCG/DOSE diskus inhaler Inhale 1 puff into the lungs every 12 hours 3 Inhaler 3 2/26/2018 at am     furosemide (LASIX) 20 MG tablet Take 1 tablet (20 mg) by mouth daily 90 tablet 3 2/26/2018     bisoprolol (ZEBETA) 5 MG tablet Take 0.5 tablets (2.5 mg) by mouth daily 45 tablet 3 2/26/2018     atorvastatin (LIPITOR) 40 MG tablet Take 1 tablet (40 mg) by mouth daily 90 tablet 3 2/26/2018     biotin 2.5 mg/mL Take by mouth daily 2000mcg   2/25/2018     Cholecalciferol (VITAMIN D3 PO) Take 2,000 Units by mouth daily    2/25/2018     aspirin 81 MG tablet Take 81 mg by mouth daily.   2/26/2018     omega-3 fatty acids (FISH OIL DOUBLE STRENGTH) 1200 MG capsule Take 1 capsule by mouth 2 times daily    2/25/2018     Calcium 0999-4084 MG-UNIT CHEW Take 1 tablet by mouth daily.   2/25/2018     Boswellia-Glucosamine-Vit D (OSTEO BI-FLEX/5-LOXIN ADVANCED) TABS Take 1,500 mg by mouth 2 times daily    2/25/2018      "MAGNESIUM ACETATE Take 250 mg by mouth daily    2/25/2018     cycloSPORINE (RESTASIS) 0.05 % ophthalmic emulsion Place 1 drop into both eyes 2 times daily    2/26/2018 at am     nitroglycerin (NITROSTAT) 0.4 MG sublingual tablet One tablet under the tongue every 5 minutes if needed for chest pain. May repeat every 5 minutes for a maximum of 3 doses in 15 minutes\" 25 tablet 3 Taking     albuterol (PROAIR HFA, PROVENTIL HFA, VENTOLIN HFA) 108 (90 BASE) MCG/ACT inhaler Inhale 2 puffs into the lungs every 6 hours as needed for shortness of breath / dyspnea or wheezing 3 Inhaler 0 Taking     albuterol (2.5 MG/3ML) 0.083% nebulizer solution Take 1 vial (2.5 mg) by nebulization every 6 hours as needed for shortness of breath / dyspnea 1 Box 3 Taking             Review of Systems:   The 10 point Review of Systems is negative other than noted in the HPI           Physical Exam:   Vitals were reviewed  Temp: 95.7  F (35.4  C) Temp src: Oral BP: 134/60 Pulse: 65 Heart Rate: 70 Resp: 20 SpO2: 97 % O2 Device: None (Room air)      NEURO:  Alert and oriented  NAD  LUNGS:  CTA bilaterally  CARDIO:  RRR, S1, S2  I/VI systolic murmur  ABD:  Soft  +BS  EXT:  1+DP bilat  Trace soft ankle edema bilat          Data:   LABS:  Na:  142                                              WBC:   4.7              K:     4.0                                               HGB:   11.9                CL:   108                                              HCT:   36              BUN: 13                                               PLT:    180                              CR:   0.68              BNP: 219              Trop  <0.015 x2              D-dimer:  0.3    CXR:  No acute process.  Lungs clear     Telemetry:  Remains in SR      "

## 2018-02-27 NOTE — PLAN OF CARE
PRIMARY DIAGNOSIS: CHEST PAIN  OUTPATIENT/OBSERVATION GOALS TO BE MET BEFORE DISCHARGE:  1. Ruled out acute coronary syndrome (negative or stable Troponin):  No  2. Pain Status: Pain free.  3. Appropriate provocative testing performed: Yes  - Stress Test Procedure: Nuclear  - Interpretation of cardiac rhythm per telemetry tech: SR    4. Cleared by Consultants (if applicable):No  5. Return to near baseline physical activity: Yes  Discharge Planner Nurse   Safe discharge environment identified: No  Barriers to discharge: No       Entered by: Sommer Ruggiero 02/27/2018 2:33 PM     Please review provider order for any additional goals.   Nurse to notify provider when observation goals have been met and patient is ready for discharge.

## 2018-02-27 NOTE — PLAN OF CARE
Problem: Patient Care Overview  Goal: Plan of Care/Patient Progress Review  Outcome: No Change  Pt A&Ox4, up with a SBA to the bathroom. Pt was new admit on this shift c/o ongoing chest pain for three day, relieved with nitro given in the ER.  Nitro patch on left chest. Hep gtt at 700 units/hr.  Cardiac diet with no caffeine, d/t cards consult in am.  Tele NSR with slight ST depression.  K was 3.3, pt on replacement protocol, ordered recheck.  Reviewed POC with pt and SO, makes her needs known.

## 2018-02-27 NOTE — PLAN OF CARE
Problem: Patient Care Overview  Goal: Plan of Care/Patient Progress Review  Outcome: No Change  A&Ox4. VSS. Heparin gtt 700 units/hr. LS clear. Nuclear lexiscan performed this afternoon - came back to room at 1345. Cardiology following. Denies pain. Diet - low sat fat. Possible D/C 1-2 days.

## 2018-02-27 NOTE — PROGRESS NOTES
[facility]    Hospitalist Progress Note    Date of Service (when I saw the patient): 02/27/2018    Assessment & Plan   Victoria Montalvo is a 77 year old female with a history of coronary artery disease, sleep apnea, hyperthyroidism, asthma, hyperlipidemia, GERD, and diastolic CHF who presents with chest pain.     1.  Chest pain suspect unstable angina.    --Does have known coronary artery disease with previous stent placement. Has had some symptoms of dyspnea on exertion for the past few months. Troponin negative.  --Continue heparin drip that was started in the ER for possible unstable angina.    --Continue aspirin, Prasugrel, Imdur, Lipitor, and bisoprolol. Will also check a D dimer.  --Cardiology consulted and recommending Lexiscan nuclear stress test.     2.  Hyperlipidemia.  Continue Lipitor.      3.  Chronic diastolic CHF.  No evidence of fluid overload at this time. Lasix and spironolactone held until evaluation by cardiology. Continue bisoprolol.     4.  Hypertension. Continue bisoprolol and Imdur.     5.  GERD.  Continue omeprazole.     6.  COPD.  No acute exacerbation.  Continue Advair, Singulair, and albuterol as needed.     7.  Hypokalemia.  Potassium replacement protocol.     8.  Hyperthyroidism.  Continue bisoprolol and methimazole.     Code status: Full code.    Prophylaxis: Heparin drip.    Pain Assessment:   Current Pain Score 2/27/2018 2/26/2018 4/22/2017   Patient currently in pain? denies denies -   Pain score (0-10) - 2 0   Pain location - - -   Pain descriptors - - -   Victoria s pain level was assessed and she currently denies pain.      Incidental Findings:None  DVT Prophylaxis: Pneumatic Compression Devices  Code Status: Full Code    Disposition: Expected discharge in 1-2 days    Moisés Olivarez MD    Interval History   Patient seen and examined. He stated that he has no nausea or vomiting. She has no nausea or vomiting.      -Data reviewed today: I reviewed all new labs and imaging  results over the last 24 hours.    Physical Exam   Temp: 95.7  F (35.4  C) Temp src: Oral BP: 134/60 Pulse: 65 Heart Rate: 70 Resp: 20 SpO2: 97 % O2 Device: None (Room air)    Vitals:    02/26/18 1345 02/26/18 1725   Weight: 77.1 kg (170 lb) 80.9 kg (178 lb 4.8 oz)     Vital Signs with Ranges  Temp:  [95.7  F (35.4  C)-97.7  F (36.5  C)] 95.7  F (35.4  C)  Pulse:  [64-66] 65  Heart Rate:  [60-70] 70  Resp:  [8-74] 20  BP: (101-145)/(50-87) 134/60  SpO2:  [95 %-98 %] 97 %  I/O last 3 completed shifts:  In: 230 [P.O.:230]  Out: -     GEN:  Alert, oriented x 3, appears comfortable, NAD.  HEENT:  Normocephalic/atraumatic, no scleral icterus, no nasal discharge, mouth moist.  CV:  Regular rate and rhythm, no murmur or JVD.  S1 + S2 noted, no S3 or S4.  LUNGS:  Clear to auscultation bilaterally without rales/rhonchi/wheezing/retractions.  Symmetric chest rise on inhalation noted.  ABD:  Active bowel sounds, soft, non-tender/non-distended.  No rebound/guarding/rigidity.  EXT:  No edema or cyanosis.  Hands/feet warm to touch with good signs of peripheral perfusion.  No joint synovitis noted.  SKIN:  Dry to touch, no exanthems noted in the visualized areas.  NEURO:  Symmetric muscle strength, sensation to touch grossly intact.  No new focal deficits appreciated.    Medications     HEParin 700 Units/hr (02/26/18 9421)     - MEDICATION INSTRUCTIONS -         technetium Tc 99m tetrofosmin 2UD study  3-42 mCi Intravenous every 2 hours     atorvastatin  40 mg Oral Daily     bisoprolol  2.5 mg Oral Daily     fluticasone  1 spray Both Nostrils Daily     fluticasone-vilanterol  1 puff Inhalation Daily     isosorbide mononitrate  30 mg Oral Daily     methimazole  2.5 mg Oral Daily     montelukast  10 mg Oral At Bedtime     omeprazole  20 mg Oral Daily     prasugrel  10 mg Oral Daily     aspirin EC  81 mg Oral Daily     cholecalciferol (vitamin D3) tablet 2,000 Units  2,000 Units Oral Daily     cycloSPORINE  1 drop Both Eyes BID      sodium chloride (PF)  3 mL Intracatheter Q8H     hypromellose-dextran  1 drop Both Eyes BID     nitroGLYcerin   Transdermal QAM       Data     Recent Labs  Lab 02/27/18  0757 02/26/18  2147 02/26/18  1737 02/26/18  1341   WBC 4.7  --  6.9 6.7   HGB 11.9  --  11.1* 12.2   MCV 90  --  89 90     --  182 214     --   --  140   POTASSIUM 4.0 4.1  --  3.3*   CHLORIDE 108  --   --  107   CO2 30  --   --  29   BUN 13  --   --  14   CR 0.68  --   --  0.83   ANIONGAP 4  --   --  4   PHILLIP 8.5  --   --  8.4*   GLC 97  --   --  67*   TROPI  --  <0.015 <0.015 <0.015       Recent Results (from the past 24 hour(s))   XR Chest 2 Views    Narrative    CHEST TWO VIEWS  2/26/2018 3:46 PM    HISTORY:  Chest pain.     COMPARISON:  None.      Impression    IMPRESSION:  No acute process. Lungs are clear. Aortic calcification.  Vascular stent just superior to the aortic arch.

## 2018-02-28 ENCOUNTER — CARE COORDINATION (OUTPATIENT)
Dept: CARDIOLOGY | Facility: CLINIC | Age: 78
End: 2018-02-28

## 2018-02-28 ENCOUNTER — TELEPHONE (OUTPATIENT)
Dept: PEDIATRICS | Facility: CLINIC | Age: 78
End: 2018-02-28

## 2018-02-28 NOTE — TELEPHONE ENCOUNTER
Please contact patient for In-patient follow up.  [phone] (home) None (work)    Visit date: [id number]  Diagnosis listed: Chest Pain, Unspecified Type  Number of visits in past 12 months: 2 ED / 0 IP

## 2018-02-28 NOTE — TELEPHONE ENCOUNTER
"ED / Discharge Outreach Protocol    Patient Contact    Attempt # 1    Was call answered?  Yes.  \"May I please speak with <patient name>\"  Is patient available?   Yes    Hospital/TCU/ED for chronic condition Discharge Protocol    \"Hi, my name is Shea Chen, a registered nurse, and I am calling from [facility].  I am calling to follow up and see how things are going for you after your recent emergency visit/hospital/TCU stay.\"    Tell me how you are doing now that you are home?\" good      Discharge Instructions    \"Let's review your discharge instructions.  What is/are the follow-up recommendations?  Pt. Response: to see PCP, and cardiology    \"Has an appointment with your primary care provider been scheduled?\"   No (schedule appointment)    \"When you see the provider, I would recommend that you bring your medications with you.\"    Medications    \"Tell me what changed about your medicines when you discharged?\"    Changes to chronic meds?    0-1    \"What questions do you have about your medications?\"    None     New diagnoses of heart failure, COPD, diabetes, or MI?    No              Medication reconciliation completed? Yes  Was MTM referral placed (*Make sure to put transitions as reason for referral)?   No    Call Summary    \"What questions or concerns do you have about your recent visit and your follow-up care?\"     none    \"If you have questions or things don't continue to improve, we encourage you contact us through the main clinic number (give number).  Even if the clinic is not open, triage nurses are available 24/7 to help you.     We would like you to know that our clinic has extended hours (provide information).  We also have urgent care (provide details on closest location and hours/contact info)\"      \"Thank you for your time and take care!\"               "

## 2018-02-28 NOTE — PROGRESS NOTES
Patient was evaluated by cardiology while inpatient for chest pain (suspected GI etiology). Called patient to discuss any post hospital d/c questions she may have, review medication changes, and confirm f/u appts.Patient denied any questions regarding new medications or changes to some of her current medications that she was taking prior to admission. Patient denied any SOB, chest pain, or light headedness. RN confirmed with patient that she has an apt scheduled on 3/29/18 with Dr. Yepez. Patient advised to call clinic with any cardiac related questions or concerns prior to her apt on 3/29/18. Patient verbalized understanding and agreed with plan.

## 2018-02-28 NOTE — DISCHARGE SUMMARY
[facility]    Discharge Summary  Hospitalist    Date of Admission:  2/26/2018  Date of Discharge:  2/27/2018  5:20 PM  Discharging Provider: Moisés Olivarez MD  Date of Service (when I saw the patient): 2/27/18    Discharge Diagnoses       1.  Chest pain suspect unstable angina     2.  Hyperlipidemia      3.  Chronic diastolic CHF      4.  Hypertension      5.  GERD      6.  COPD      7.  Hypokalemia      8.  Hyperthyroidism        History of Present Illness   Victoria Montalvo is an 77 year old female who presented with chest pain.  Please see H&P for details    Hospital Course   Victoria Montalvo is a 77 year old female with a history of coronary artery disease, sleep apnea, hyperthyroidism, asthma, hyperlipidemia, GERD, and diastolic CHF who presents with chest pain.      1.  Chest pain suspect unstable angina.    --Does have known coronary artery disease with previous stent placement. Has had some symptoms of dyspnea on exertion for the past few months. Troponin negative.  Patient was initially started on heparin drip for suspected unstable angina.  Serial troponin was done and it was negative.  She was continued on aspirin, Prasugrel, Imdur, Lipitor, and bisoprolol.  Cardiology was consulted and recommended Lexiscan nuclear stress test.  Stress test was done and showed no evidence of significant ischemia.  Cardiology recommended to discharge patient on her regular cardiac medications.  She was advised to follow-up as outpatient     2.  Hyperlipidemia.  Continued on Lipitor.       3.  Chronic diastolic CHF.  There was no evidence of fluid overload during her hospital course.  Lasix and spironolactone was initially held but resumed on discharge.  She was also continued on bisoprolol.        4.  Hypertension.  Continued on bisoprolol and Imdur.      5.  GERD.  Continued on omeprazole.      6.  COPD.  No acute exacerbation.  Continue Advair, Singulair, and albuterol as needed.      7.  Hypokalemia.   Potassium replacement protocol.      8.  Hyperthyroidism. Continued on bisoprolol and methimazole.    Patient remained stable during hospital course.  She was discharged home in stable condition.      Significant Results and Procedures   Results for orders placed or performed during the hospital encounter of 02/26/18   XR Chest 2 Views    Narrative    CHEST TWO VIEWS  2/26/2018 3:46 PM    HISTORY:  Chest pain.     COMPARISON:  None.      Impression    IMPRESSION:  No acute process. Lungs are clear. Aortic calcification.  Vascular stent just superior to the aortic arch.    TALYA LAWTON MD   NM MPI w Lexiscan    Narrative    GATED MYOCARDIAL PERFUSION SCINTIGRAPHY WITH INTRAVENOUS PHARMACOLOGIC  VASODILATATION LEXISCAN -ONE DAY STUDY     2/27/2018 1:25 PM  JUNE BALTA PHILLIPS  77 years  Female  1940.    Indication/Clinical History: 77-year-old female with stent to the PDA  in April 2017 undergoing stress test for chest pain.    Impression  1.  Myocardial perfusion imaging using single isotope technique  demonstrated normal perfusion, no ischemia or infarct.   2. Gated images demonstrated normal wall motion.  The left ventricular  systolic function is greater than 75% at rest and with stress.  3. Compared to the prior study from March 2017, there is previously  ischemia of the anteroapical segments, this is no longer appreciated .    Procedure  Pharmacologic stress testing was performed with Lexiscan at a rate of  0.08 mg/ml rapid bolus injection, for 15 seconds, 0.4 mg/5ml  intravenously. Low-level exercise was performed along with the  vasodilator infusion.  The heart rate was 81 at baseline and leif to  110 beats per minute during the Lexiscan infusion. The rest blood  pressure was 102/72 mmHg and was 118/64 mm Hg during Lexiscan  infusion. The patient experienced dyspnea  during the test.    Myocardial perfusion imaging was performed at rest, approximately 45  minutes after the injection intravenously of 10.2 mCi  of Tc-99m  Myoview. At peak pharmacologic effect, 10-20 seconds after Lexiscan,   the patient was injected intravenously with 31.5 mCi of  Tc-99m  Myoview. The post-stress tomographic imaging was performed  approximately 60 minutes after stress.    EKG Findings  The resting EKG demonstrated low-voltage, sinus rhythm, no ST segment  abnormalities. The stress EKG demonstrated no changes from baseline.    Tomographic Findings  Overall, the study quality is excellent . On the stress images, normal  perfusion. On the rest images, normal perfusion . Gated images  demonstrated normal wall motion. The left ventricular ejection  fraction was calculated to be greater than 75% at rest and with  stress. TID was was not appreciated.    MATHEW VILLALBA MD         Pending Results   None    Code Status   Full Code       Primary Care Physician   Deisi Liu    Discharge Disposition   Discharged to home  Condition at discharge: Stable    Consultations This Hospital Stay   PHARMACY TO DOSE HEPARIN  PHARMACY TO DOSE HEPARIN  CARDIOLOGY IP CONSULT    Time Spent on this Encounter   I, Moisés Olivarez MD, personally saw the patient today and spent greater than 30 minutes discharging this patient.    Discharge Orders     Reason for your hospital stay   Chest pain-resolved     Follow-up and recommended labs and tests    Follow up with primary care provider, Deisi Liu, within 7 days     Activity   Your activity upon discharge: activity as tolerated     Full Code     Diet   Follow this diet upon discharge: Orders Placed This Encounter     Combination Diet Low Saturated Fat Na <2400mg Diet, No Caffeine Diet       Discharge Medications   Discharge Medication List as of 2/27/2018  4:50 PM      CONTINUE these medications which have NOT CHANGED    Details   spironolactone (ALDACTONE) 12.5 mg TABS half-tab Take 100 mg by mouth See Admin Instructions Monday, Wedn, Friday, Historical      methimazole (TAPAZOLE) 5 MG  tablet Take 0.5 tablets (2.5 mg) by mouth daily, Disp-45 tablet, R-1, E-Prescribe      isosorbide mononitrate (IMDUR) 30 MG 24 hr tablet Take 1 tablet (30 mg) by mouth daily, Disp-90 tablet, R-3, E-Prescribe      alendronate (FOSAMAX) 70 MG tablet Take 1 tablet (70 mg) by mouth every 7 days Take 60 minutes before am meal with 8 oz. water. Remain upright for 30 minutes., Disp-12 tablet, R-2, E-Prescribe      fluticasone (FLONASE) 50 MCG/ACT spray USE 1 TO 2 SPRAYS IN EACH NOSTRIL DAILY, Disp-48 g, R-2, E-Prescribe      prasugrel (EFFIENT) 10 MG TABS tablet Take 1 tablet (10 mg) by mouth daily Do not crush or break tablet., Disp-90 tablet, R-1, E-Prescribe      omeprazole (PRILOSEC) 20 MG CR capsule Take 1 capsule (20 mg) by mouth daily, Disp-90 capsule, R-3, E-PrescribePlease place on file, do not fill      montelukast (SINGULAIR) 10 MG tablet Take 1 tablet (10 mg) by mouth At Bedtime, Disp-90 tablet, R-3, E-PrescribePlease place on file, do not fill      fluticasone-salmeterol (ADVAIR) 250-50 MCG/DOSE diskus inhaler Inhale 1 puff into the lungs every 12 hours, Disp-3 Inhaler, R-3, E-PrescribePlease place on file, do not fill      furosemide (LASIX) 20 MG tablet Take 1 tablet (20 mg) by mouth daily, Disp-90 tablet, R-3, E-Prescribe      bisoprolol (ZEBETA) 5 MG tablet Take 0.5 tablets (2.5 mg) by mouth daily, Disp-45 tablet, R-3, E-Prescribe      atorvastatin (LIPITOR) 40 MG tablet Take 1 tablet (40 mg) by mouth daily, Disp-90 tablet, R-3, E-Prescribe      biotin 2.5 mg/mL Take by mouth daily 2000mcg, Historical      Cholecalciferol (VITAMIN D3 PO) Take 2,000 Units by mouth daily , Historical      aspirin 81 MG tablet Take 81 mg by mouth daily., Historical      omega-3 fatty acids (FISH OIL DOUBLE STRENGTH) 1200 MG capsule Take 1 capsule by mouth 2 times daily , Historical      Calcium 3207-2103 MG-UNIT CHEW Take 1 tablet by mouth daily., Historical      Boswellia-Glucosamine-Vit D (OSTEO BI-FLEX/5-LOXIN ADVANCED)  "TABS Take 1,500 mg by mouth 2 times daily , Historical      MAGNESIUM ACETATE Take 250 mg by mouth daily , Historical      cycloSPORINE (RESTASIS) 0.05 % ophthalmic emulsion Place 1 drop into both eyes 2 times daily , Historical      nitroglycerin (NITROSTAT) 0.4 MG sublingual tablet One tablet under the tongue every 5 minutes if needed for chest pain. May repeat every 5 minutes for a maximum of 3 doses in 15 minutes\", Disp-25 tablet, R-3, E-Prescribe      albuterol (PROAIR HFA, PROVENTIL HFA, VENTOLIN HFA) 108 (90 BASE) MCG/ACT inhaler Inhale 2 puffs into the lungs every 6 hours as needed for shortness of breath / dyspnea or wheezing, Disp-3 Inhaler, R-0, E-Prescribe      albuterol (2.5 MG/3ML) 0.083% nebulizer solution Take 1 vial (2.5 mg) by nebulization every 6 hours as needed for shortness of breath / dyspnea, Disp-1 Box, R-3, E-Prescribe           Pain Assessment:   Current Pain Score 2/27/2018 2/27/2018 2/26/2018   Patient currently in pain? denies denies denies   Pain score (0-10) - - 2   Pain location - - -   Pain descriptors - - -   June s pain level was assessed and she currently denies pain.      Allergies   Allergies   Allergen Reactions     Animal Dander      Kiwi Itching     Itching and red all over     Pollen Extract      Pollen,dust, trees, grass, mold-hayfever symptoms     Seasonal Allergies      Data   Most Recent 3 CBC's:  Recent Labs   Lab Test  02/27/18   0757  02/26/18   1737  02/26/18   1341   WBC  4.7  6.9  6.7   HGB  11.9  11.1*  12.2   MCV  90  89  90   PLT  180  182  214      Most Recent 3 BMP's:  Recent Labs   Lab Test  02/27/18   0757  02/26/18   2147  02/26/18   1341  03/29/17   1244   NA  142   --   140  142   POTASSIUM  4.0  4.1  3.3*  3.9   CHLORIDE  108   --   107  107   CO2  30   --   29  28   BUN  13   --   14  16   CR  0.68   --   0.83  0.76   ANIONGAP  4   --   4  7   PHILLIP  8.5   --   8.4*  8.4*   GLC  97   --   67*  88     Most Recent 2 LFT's:  Recent Labs   Lab Test  " 01/08/18   1027  10/23/17   0916   AST  20  15   ALT  21  20   ALKPHOS  61  64   BILITOTAL  0.4  0.3     Most Recent INR's and Anticoagulation Dosing History:  Anticoagulation Dose History     Recent Dosing and Labs Latest Ref Rng & Units 9/4/2013 3/29/2017    INR 0.86 - 1.14 1.03 1.04        Most Recent 3 Troponin's:  Recent Labs   Lab Test  02/26/18   2147  02/26/18   1737  02/26/18   1341   TROPI  <0.015  <0.015  <0.015     Most Recent Cholesterol Panel:  Recent Labs   Lab Test  06/30/17   0853   CHOL  166   LDL  70   HDL  83   TRIG  63     Most Recent 6 Bacteria Isolates From Any Culture (See EPIC Reports for Culture Details):No lab results found.  Most Recent TSH, T4 and A1c Labs:  Recent Labs   Lab Test  01/08/18   1027   TSH  1.42   T4  0.87

## 2018-03-01 NOTE — UTILIZATION REVIEW
"Admission Status; Secondary Review Determination     Under the authority of the Utilization Management Committee, the utilization review process indicated a secondary review on the above patient.  The review outcome is based on review of the medical records, discussions with staff, and applying clinical experience noted on the date of the review.       (x) Observation Status Appropriate - This patient does not meet hospital inpatient criteria and is placed in observation status. If this patient's primary payer is Medicare and was admitted as an inpatient, Condition Code 44 should be used and patient status changed to \"observation\".     RATIONALE FOR DETERMINATION:  77-year-old female with prior history of coronary artery disease now presenting with history of dyspnea on exertion as well as new chest discomfort.  Patient admitted to rule out new unstable angina with plans for cardiac stress testing.  This would be appropriate for observation care until abnormal troponin levels or new abnormal stress tests results are found.      The severity of illness, intensity of service provided, expected LOS and risk for adverse outcome make the care appropriate for further observation; however, doesn't meet criteria for hospital inpatient admission. This was discussed with attending physician who concurred with this determination.    The information on this document is developed by the utilization review team in order for the business office to ensure compliance.  This only denotes the appropriateness of proper admission status and does not reflect the quality of care rendered.         The definitions of Inpatient Status and Observation Status used in making the determination above are those provided in the CMS Coverage Manual, Chapter 1 and Chapter 6, section 70.4.      Sincerely,     Feroz Craig MD    Physician Advisor  Utilization Review/ Case Management  [facility].    "

## 2018-03-05 ENCOUNTER — OFFICE VISIT (OUTPATIENT)
Dept: PEDIATRICS | Facility: CLINIC | Age: 78
End: 2018-03-05
Payer: MEDICARE

## 2018-03-05 VITALS
SYSTOLIC BLOOD PRESSURE: 114 MMHG | TEMPERATURE: 97.8 F | HEIGHT: 61 IN | OXYGEN SATURATION: 98 % | DIASTOLIC BLOOD PRESSURE: 62 MMHG | WEIGHT: 182.9 LBS | HEART RATE: 67 BPM | BODY MASS INDEX: 34.53 KG/M2

## 2018-03-05 DIAGNOSIS — R07.9 CHEST PAIN, UNSPECIFIED TYPE: Primary | ICD-10-CM

## 2018-03-05 DIAGNOSIS — G47.33 OSA (OBSTRUCTIVE SLEEP APNEA): ICD-10-CM

## 2018-03-05 DIAGNOSIS — J45.50 SEVERE PERSISTENT ASTHMA WITHOUT COMPLICATION (H): ICD-10-CM

## 2018-03-05 DIAGNOSIS — R06.09 DYSPNEA ON EXERTION: ICD-10-CM

## 2018-03-05 DIAGNOSIS — I77.1 SUBCLAVIAN ARTERY STENOSIS, LEFT (H): ICD-10-CM

## 2018-03-05 DIAGNOSIS — I25.10 MILD CORONARY ARTERY DISEASE: ICD-10-CM

## 2018-03-05 PROCEDURE — 99495 TRANSJ CARE MGMT MOD F2F 14D: CPT | Performed by: INTERNAL MEDICINE

## 2018-03-05 NOTE — PATIENT INSTRUCTIONS
1. Try taking Advair 500/50 twice a day for a couple of weeks and see if it helps with your breathing  2. Follow-up with Cardiology as scheduled

## 2018-03-05 NOTE — MR AVS SNAPSHOT
After Visit Summary   3/5/2018    Victoria Montalvo    MRN: [id number]           Patient Information     Date Of Birth          1940        Visit Information        Provider Department      3/5/2018 9:40 AM Deisi Liu MD [facility]        Care Instructions    1. Try taking Advair 500/50 twice a day for a couple of weeks and see if it helps with your breathing  2. Follow-up with Cardiology as scheduled          Follow-ups after your visit        Your next 10 appointments already scheduled     Mar 29, 2018  2:15 PM CDT   Return Visit with Yogi Yepez MD   [facility] ([facility])    [address]  [place] [address] [phone]              Who to contact     If you have questions or need follow up information about today's clinic visit or your schedule please contact [facility] directly at [phone].  Normal or non-critical lab and imaging results will be communicated to you by MyChart, letter or phone within 4 business days after the clinic has received the results. If you do not hear from us within 7 days, please contact the clinic through [employer] or phone. If you have a critical or abnormal lab result, we will notify you by phone as soon as possible.  Submit refill requests through [employer] or call your pharmacy and they will forward the refill request to us. Please allow 3 business days for your refill to be completed.          Additional Information About Your Visit        [employer] Information     [employer] gives you secure access to your electronic health record. If you see a primary care provider, you can also send messages to your care team and make appointments. If you have questions, please call your primary care clinic.  If you do not have a primary care provider, please call [phone] and they will assist you.        Care EveryWhere ID     This is your Care EveryWhere ID.  "This could be used by other organizations to access your [place] medical records  [id number]        Your Vitals Were     Pulse Temperature Height Pulse Oximetry BMI (Body Mass Index)       67 97.8  F (36.6  C) (Tympanic) 5' 1\" (1.549 m) 98% 34.56 kg/m2        Blood Pressure from Last 3 Encounters:   03/05/18 114/62   02/27/18 133/62   02/15/18 118/70    Weight from Last 3 Encounters:   03/05/18 182 lb 14.4 oz (83 kg)   02/26/18 178 lb 4.8 oz (80.9 kg)   02/15/18 181 lb 3.2 oz (82.2 kg)              Today, you had the following     No orders found for display       Primary Care Provider Office Phone # Fax #    Deisi Liu [phone] [address] [place] [address]        Equal Access to Services     [facility]: Hadii aad ku hadasho Sochirs, waaxda luqadaha, qaybta kaalmada adeodalysyada, estefani barrios . So [facility] [phone].    ATENCIÓN: Si habla español, tiene a yang disposición servicios gratuitos de asistencia lingüística. Llame al [phone].    We comply with applicable federal civil rights laws and [place] laws. We do not discriminate on the basis of race, color, national origin, age, disability, sex, sexual orientation, or gender identity.            Thank you!     [facility]  [facility] your care. Our goal is always to provide you with excellent care. Hearing back from our patients is one way we can continue to improve our services. Please take a few minutes to complete the written survey that you may receive in the mail after your visit with us. Thank you!             Your Updated Medication List - Protect others around you: Learn how to safely use, store and throw away your medicines at www.disposemymeds.org.          This list is accurate as of 3/5/18 10:20 AM.  Always use your most recent med list.                   Brand Name Dispense Instructions for use Diagnosis    * albuterol (2.5 MG/3ML) " 0.083% neb solution     1 Box    Take 1 vial (2.5 mg) by nebulization every 6 hours as needed for shortness of breath / dyspnea    Moderate persistent asthma, uncomplicated       * albuterol 108 (90 BASE) MCG/ACT Inhaler    PROAIR HFA/PROVENTIL HFA/VENTOLIN HFA    3 Inhaler    Inhale 2 puffs into the lungs every 6 hours as needed for shortness of breath / dyspnea or wheezing    Moderate persistent asthma, uncomplicated       alendronate 70 MG tablet    FOSAMAX    12 tablet    Take 1 tablet (70 mg) by mouth every 7 days Take 60 minutes before am meal with 8 oz. water. Remain upright for 30 minutes.    Age-related osteoporosis without current pathological fracture       aspirin 81 MG tablet      Take 81 mg by mouth daily.        atorvastatin 40 MG tablet    LIPITOR    90 tablet    Take 1 tablet (40 mg) by mouth daily    Coronary artery disease involving native coronary artery of native heart with unstable angina pectoris (H), Status post coronary angioplasty       biotin 2.5 mg/mL Susp      Take by mouth daily 2000mcg        bisoprolol 5 MG tablet    ZEBETA    45 tablet    Take 0.5 tablets (2.5 mg) by mouth daily    Coronary artery disease involving native coronary artery of native heart with unstable angina pectoris (H)       Calcium 2099-6597 MG-UNIT Chew      Take 1 tablet by mouth daily.        FISH OIL DOUBLE STRENGTH 1200 MG capsule   Generic drug:  omega-3 fatty acids      Take 1 capsule by mouth 2 times daily        fluticasone 50 MCG/ACT spray    FLONASE    48 g    USE 1 TO 2 SPRAYS IN EACH NOSTRIL DAILY    Chronic rhinitis       fluticasone-salmeterol 250-50 MCG/DOSE diskus inhaler    ADVAIR    3 Inhaler    Inhale 1 puff into the lungs every 12 hours    Uncomplicated severe persistent asthma       furosemide 20 MG tablet    LASIX    90 tablet    Take 1 tablet (20 mg) by mouth daily    Diastolic CHF, chronic (H)       isosorbide mononitrate 30 MG 24 hr tablet    IMDUR    90 tablet    Take 1 tablet (30 mg) by  "mouth daily    ESPITIA (dyspnea on exertion), ASHD (arteriosclerotic heart disease)       MAGNESIUM ACETATE      Take 250 mg by mouth daily        methimazole 5 MG tablet    TAPAZOLE    45 tablet    Take 0.5 tablets (2.5 mg) by mouth daily    Subclinical hyperthyroidism       montelukast 10 MG tablet    SINGULAIR    90 tablet    Take 1 tablet (10 mg) by mouth At Bedtime    Chronic non-seasonal allergic rhinitis, unspecified trigger       nitroGLYcerin 0.4 MG sublingual tablet    NITROSTAT    25 tablet    One tablet under the tongue every 5 minutes if needed for chest pain. May repeat every 5 minutes for a maximum of 3 doses in 15 minutes\"    Coronary artery disease involving native coronary artery of native heart with unstable angina pectoris (H), Status post coronary angioplasty       omeprazole 20 MG CR capsule    priLOSEC    90 capsule    Take 1 capsule (20 mg) by mouth daily    Gastroesophageal reflux disease without esophagitis       OSTEO BI-FLEX/5-LOXIN ADVANCED Tabs      Take 1,500 mg by mouth 2 times daily        prasugrel 10 MG Tabs tablet    EFFIENT    90 tablet    Take 1 tablet (10 mg) by mouth daily Do not crush or break tablet.    Coronary artery disease involving native coronary artery of native heart with unstable angina pectoris (H), Status post coronary angioplasty       RESTASIS 0.05 % ophthalmic emulsion   Generic drug:  cycloSPORINE      Place 1 drop into both eyes 2 times daily        spironolactone 12.5 mg Tabs half-tab    ALDACTONE     Take 100 mg by mouth See Admin Instructions Monday, Wedn, Friday        VITAMIN D3 PO      Take 2,000 Units by mouth daily        * Notice:  This list has 2 medication(s) that are the same as other medications prescribed for you. Read the directions carefully, and ask your doctor or other care provider to review them with you.      "

## 2018-03-05 NOTE — PROGRESS NOTES
SUBJECTIVE:   Victoria Montalvo is a 77 year old female who presents to clinic today for the following health issues:    Hospital Follow-up Visit:    Hospital/Nursing Home/IP Rehab Facility: [facility]  Date of Admission: 2/26/18  Date of Discharge: 2/27/18  Reason(s) for Admission: chest pain            Problems taking medications regularly:  None       Medication changes since discharge: None       Problems adhering to non-medication therapy:  None    Summary of hospitalization:  [facility] discharge summary reviewed  Diagnostic Tests/Treatments reviewed.  Follow up needed: none  Other Healthcare Providers Involved in Patient s Care:         Specialist appointment - Cardiology  Update since discharge: improved.     Post Discharge Medication Reconciliation: discharge medications reconciled and changed, per note/orders (see AVS).  Plan of care communicated with patient    76 y/o F with h/o CAD, subclavian artery stenosis, asthma and KEN was admitted for chest pain and had negative lab work-up and stress test. Patient reports, she initially had 6 really sharp pains in the middle. The next night, was at daughter's birthday party and when sitting down to eat had tightness in chest and pain. Family wanted her to get it checked out. Took until Saturday until started feeling better. Not having any further chest pain. Has had some dyspnea on exertion that has been worsening over last several months. Mostly when climbs stairs. Improves with rest. Has seen Cardiology for this prior to hospitalization and was started on Imdur. This has not helped her ESPITIA. Feels asthma has been a little worse with the cold weather. Is currently taking Singulair and Advair 500/50 once a day. Is using up prescription from a previous illness and then plans to go back to 250/50 bid dosing. Has needed albuterol a little more frequently.     Was told need to use her cpap; however, she has not tolerated this. Has tried many  "different masks and currently has nasal prong system. Has difficulty with getting leak and wakes at night. Was planning to try again to see if can tolerate. Has also used dental appliance in past, but was causing her jaw to dislocate.  refused cpap and has nocturnal O2. He does not use it and she has tried it on occasion and does tolerate the plan O2.      Reviewed and updated as needed this visit by clinical staff  Tobacco  Allergies  Meds  Problems  Med Hx  Surg Hx  Fam Hx  Soc Hx        Reviewed and updated as needed this visit by Provider  Allergies  Meds  Problems       -------------------------------------    ROS:  Constitutional, HEENT, cardiovascular, pulmonary, GI, , musculoskeletal, neuro, skin, endocrine and psych systems are negative, except as otherwise noted.    OBJECTIVE:                                                    /62 (BP Location: Right arm, Patient Position: Sitting, Cuff Size: Adult Large)  Pulse 67  Temp 97.8  F (36.6  C) (Tympanic)  Ht 5' 1\" (1.549 m)  Wt 182 lb 14.4 oz (83 kg)  SpO2 98%  BMI 34.56 kg/m2   Body mass index is 34.56 kg/(m^2).  General Appearance: healthy, alert and no distress  Eyes:   no discharge, erythema.  Normal pupils.  Neck: Supple.  No adenopathy, no asymmetry, masses, or scar  Respiratory: lungs clear to auscultation - no rales, rhonchi or wheezes.  Cardiovascular: regular rate and rhythm, normal S1 S2, no S3 or S4 and no murmur, click or rub.  No peripheral edema.  Skin: no rashes or lesions.  Well perfused and normal turgor.    Diagnostic Test Results:  none      ASSESSMENT/PLAN:                                                      (R07.9) Chest pain, unspecified type  (primary encounter diagnosis)  (R06.09) Dyspnea on Exertion  Comment: hospitalized for chest pain. Negative trops and stress test. Pain now gone. Etiology of pain unclear. Could be due asthma vs subclavian stenosis vs GERD vs other. ESPITIA persists. This could be due to " untreated KEN vs worsening asthma or development of COPD  Plan:   - will try increasing advair to 500/50  - has f/u with Cardiology later this month  - will try using cpap  - if unable to tolerate cpap, will have f/u with sleep medicine and see if nocturnal O2 might be useful  - if ESPITIA not improving, would recommend repeating spirometry - last done in 2014    (I25.10) Mild coronary artery disease  (I77.1) Subclavian artery stenosis, left (H)  Comment: recent stress negative  Plan:   - continue ASA, prasugrel, bisoprolol and atorvastatin    (G47.33) KEN (obstructive sleep apnea)  Comment: currently untreated with some pulmonary hypertension on echo  Plan:   - will try using cpap  - if unable to tolerate cpap, will have f/u with sleep medicine and see if nocturnal O2 might be useful    (J45.50) Severe persistent asthma without complication  Comment: has ESPITIA, unclear if due to her asthma  Plan:   - will try increasing advair to 500/50  - continue singulair  - albuterol as needed  - consider repeating spirometry if not improving    Follow up with Provider - 3-6 months     [facility] SE[facility]

## 2018-03-06 ASSESSMENT — ASTHMA QUESTIONNAIRES: ACT_TOTALSCORE: 17

## 2018-03-21 DIAGNOSIS — K21.9 GASTROESOPHAGEAL REFLUX DISEASE WITHOUT ESOPHAGITIS: ICD-10-CM

## 2018-03-21 DIAGNOSIS — I25.110 CORONARY ARTERY DISEASE INVOLVING NATIVE CORONARY ARTERY OF NATIVE HEART WITH UNSTABLE ANGINA PECTORIS (H): ICD-10-CM

## 2018-03-21 DIAGNOSIS — Z98.61 STATUS POST CORONARY ANGIOPLASTY: ICD-10-CM

## 2018-03-21 RX ORDER — ATORVASTATIN CALCIUM 40 MG/1
40 TABLET, FILM COATED ORAL DAILY
Qty: 90 TABLET | Refills: 3 | Status: SHIPPED | OUTPATIENT
Start: 2018-03-21 | End: 2019-03-19

## 2018-03-21 NOTE — TELEPHONE ENCOUNTER
Not due for a refill.     omeprazole (PRILOSEC) 20 MG CR capsule was filled on 8/4/2017, qty 90 with 3 refills.

## 2018-03-27 ENCOUNTER — PRE VISIT (OUTPATIENT)
Dept: CARDIOLOGY | Facility: CLINIC | Age: 78
End: 2018-03-27

## 2018-03-28 RX ORDER — PRASUGREL 10 MG/1
10 TABLET, FILM COATED ORAL DAILY
Qty: 90 TABLET | Refills: 3 | Status: SHIPPED | OUTPATIENT
Start: 2018-03-28 | End: 2019-03-27

## 2018-03-29 ENCOUNTER — OFFICE VISIT (OUTPATIENT)
Dept: CARDIOLOGY | Facility: CLINIC | Age: 78
End: 2018-03-29
Attending: NURSE PRACTITIONER
Payer: MEDICARE

## 2018-03-29 VITALS
WEIGHT: 179 LBS | HEART RATE: 74 BPM | DIASTOLIC BLOOD PRESSURE: 76 MMHG | HEIGHT: 61 IN | OXYGEN SATURATION: 97 % | BODY MASS INDEX: 33.79 KG/M2 | SYSTOLIC BLOOD PRESSURE: 128 MMHG

## 2018-03-29 DIAGNOSIS — I50.32 DIASTOLIC CHF, CHRONIC (H): ICD-10-CM

## 2018-03-29 DIAGNOSIS — I25.10 CORONARY ARTERY DISEASE DUE TO LIPID RICH PLAQUE: Primary | ICD-10-CM

## 2018-03-29 DIAGNOSIS — E78.5 HYPERLIPIDEMIA LDL GOAL <130: ICD-10-CM

## 2018-03-29 DIAGNOSIS — I77.1 SUBCLAVIAN ARTERY STENOSIS, LEFT (H): ICD-10-CM

## 2018-03-29 DIAGNOSIS — I25.83 CORONARY ARTERY DISEASE DUE TO LIPID RICH PLAQUE: Primary | ICD-10-CM

## 2018-03-29 PROCEDURE — 99214 OFFICE O/P EST MOD 30 MIN: CPT | Performed by: INTERNAL MEDICINE

## 2018-03-29 NOTE — PROGRESS NOTES
Cardiology Progress Note          Assessment and Plan:     1. Coronary artery disease status post PCI of left-sided PDA, negative nuclear stress test 2/27/2018    Continue medical management.      2. Dyspnea on exertion with walking, but not with elliptical    Patient will get a portable pulse oximeter and keep track of her saturation and heart rates with exertion.  If her heart rates are very elevated or conversely blunted, she will let us know we may consider medication adjustment or further workup.    Otherwise, routine follow-up with Alia Yoo in 1 year.    This note was transcribed using electronic voice recognition software and there may be typographical errors present.                Interval History:   The patient is a very pleasant 77 year old whom I have been following for coronary artery disease status post PCI of subclavian artery and left sided PDA.  Nuclear stress test 2/27/2018 was normal.  Patient has more right-sided lower extremity edema symptoms but more venous reflux in the left leg.  She is comfortable with continued medical management.  She still has some shortness of breath when she first gets started walking, but not when she is doing the elliptical .  Otherwise, she feels pretty well.  She did not notice any difference in her dyspnea when the bisoprolol was stopped.  She is here with her daughter today who also has a coronary artery history.                     Review of Systems:   Review of Systems:  Skin:  Positive for bruising   Eyes:  Positive for glasses  ENT:  Negative    Respiratory:  Positive for shortness of breath;dyspnea on exertion;cough;wheezing  Cardiovascular:    lightheadedness;dizziness;Positive for;edema  Gastroenterology: Positive for reflux  Genitourinary:  Positive for urinary frequency;nocturia  Musculoskeletal:  Positive for arthritis  Neurologic:  Negative    Psychiatric:  Negative    Heme/Lymph/Imm:  Positive for easy bruising  Endocrine:  Positive for  "thyroid disorder              Physical Exam:     Vitals: /76 (BP Location: Right arm, Patient Position: Sitting, Cuff Size: Adult Regular)  Pulse 74  Ht 1.549 m (5' 1\")  Wt 81.2 kg (179 lb)  SpO2 97%  BMI 33.82 kg/m2  Constitutional:  cooperative, alert and oriented, well developed, well nourished, in no acute distress        Skin:  warm and dry to the touch, no apparent skin lesions or masses noted        Head:  normocephalic, no masses or lesions        Eyes:  pupils equal and round;sclera white;no xanthalasma        ENT:  no pallor or cyanosis, dentition good        Neck:  JVP normal        Chest:  normal breath sounds, clear to auscultation, normal A-P diameter, normal symmetry, normal respiratory excursion, no use of accessory muscles        Cardiac: regular rhythm;normal S1 and S2       systolic murmur;grade 1          Abdomen:      benign    Extremities and Back:  normal muscle strength and tone;no deformities, clubbing, cyanosis, erythema observed   wearing compression stockings    Neurological:  no gross motor deficits;affect appropriate                 Medications:     Current Outpatient Prescriptions   Medication Sig Dispense Refill     prasugrel (EFFIENT) 10 MG TABS tablet Take 1 tablet (10 mg) by mouth daily Do not crush or break tablet. 90 tablet 3     omeprazole (PRILOSEC) 20 MG CR capsule Take 1 capsule (20 mg) by mouth daily 90 capsule 3     atorvastatin (LIPITOR) 40 MG tablet Take 1 tablet (40 mg) by mouth daily 90 tablet 3     spironolactone (ALDACTONE) 12.5 mg TABS half-tab Take 100 mg by mouth See Admin Instructions Monday, Wedn, Friday       methimazole (TAPAZOLE) 5 MG tablet Take 0.5 tablets (2.5 mg) by mouth daily 45 tablet 1     isosorbide mononitrate (IMDUR) 30 MG 24 hr tablet Take 1 tablet (30 mg) by mouth daily 90 tablet 3     alendronate (FOSAMAX) 70 MG tablet Take 1 tablet (70 mg) by mouth every 7 days Take 60 minutes before am meal with 8 oz. water. Remain upright for 30 " "minutes. 12 tablet 2     fluticasone (FLONASE) 50 MCG/ACT spray USE 1 TO 2 SPRAYS IN EACH NOSTRIL DAILY 48 g 2     montelukast (SINGULAIR) 10 MG tablet Take 1 tablet (10 mg) by mouth At Bedtime 90 tablet 3     fluticasone-salmeterol (ADVAIR) 250-50 MCG/DOSE diskus inhaler Inhale 1 puff into the lungs every 12 hours 3 Inhaler 3     furosemide (LASIX) 20 MG tablet Take 1 tablet (20 mg) by mouth daily 90 tablet 3     bisoprolol (ZEBETA) 5 MG tablet Take 0.5 tablets (2.5 mg) by mouth daily 45 tablet 3     nitroglycerin (NITROSTAT) 0.4 MG sublingual tablet One tablet under the tongue every 5 minutes if needed for chest pain. May repeat every 5 minutes for a maximum of 3 doses in 15 minutes\" 25 tablet 3     biotin 2.5 mg/mL Take by mouth daily 2000mcg       albuterol (PROAIR HFA, PROVENTIL HFA, VENTOLIN HFA) 108 (90 BASE) MCG/ACT inhaler Inhale 2 puffs into the lungs every 6 hours as needed for shortness of breath / dyspnea or wheezing 3 Inhaler 0     albuterol (2.5 MG/3ML) 0.083% nebulizer solution Take 1 vial (2.5 mg) by nebulization every 6 hours as needed for shortness of breath / dyspnea 1 Box 3     Cholecalciferol (VITAMIN D3 PO) Take 2,000 Units by mouth daily        aspirin 81 MG tablet Take 81 mg by mouth daily.       omega-3 fatty acids (FISH OIL DOUBLE STRENGTH) 1200 MG capsule Take 1 capsule by mouth 2 times daily        Calcium 6066-1111 MG-UNIT CHEW Take 1 tablet by mouth daily.       Boswellia-Glucosamine-Vit D (OSTEO BI-FLEX/5-LOXIN ADVANCED) TABS Take 1,500 mg by mouth 2 times daily        MAGNESIUM ACETATE Take 250 mg by mouth daily        cycloSPORINE (RESTASIS) 0.05 % ophthalmic emulsion Place 1 drop into both eyes 2 times daily                   Data:   All laboratory data reviewed  No results found for this or any previous visit (from the past 24 hour(s)).    All laboratory data reviewed  Lab Results   Component Value Date    CHOL 166 06/30/2017     Lab Results   Component Value Date    HDL 83 " 06/30/2017     Lab Results   Component Value Date    LDL 70 06/30/2017     Lab Results   Component Value Date    TRIG 63 06/30/2017     Lab Results   Component Value Date    CHOLHDLRATIO 2.5 08/20/2014     TSH   Date Value Ref Range Status   01/08/2018 1.42 0.40 - 4.00 mU/L Final     Last Basic Metabolic Panel:  Lab Results   Component Value Date     02/27/2018      Lab Results   Component Value Date    POTASSIUM 4.0 02/27/2018     Lab Results   Component Value Date    CHLORIDE 108 02/27/2018     Lab Results   Component Value Date    PHILLIP 8.5 02/27/2018     Lab Results   Component Value Date    CO2 30 02/27/2018     Lab Results   Component Value Date    BUN 13 02/27/2018     Lab Results   Component Value Date    CR 0.68 02/27/2018     Lab Results   Component Value Date    GLC 97 02/27/2018     Lab Results   Component Value Date    WBC 4.7 02/27/2018     Lab Results   Component Value Date    RBC 4.05 02/27/2018     Lab Results   Component Value Date    HGB 11.9 02/27/2018     Lab Results   Component Value Date    HCT 36.6 02/27/2018     Lab Results   Component Value Date    MCV 90 02/27/2018     Lab Results   Component Value Date    MCH 29.4 02/27/2018     Lab Results   Component Value Date    MCHC 32.5 02/27/2018     Lab Results   Component Value Date    RDW 14.2 02/27/2018     Lab Results   Component Value Date     02/27/2018

## 2018-03-29 NOTE — MR AVS SNAPSHOT
After Visit Summary   3/29/2018    Victoria Montalvo    MRN: [id number]           Patient Information     Date Of Birth          1940        Visit Information        Provider Department      3/29/2018 2:15 PM Yogi Yepez MD [facility]        Today's Diagnoses     Coronary artery disease due to lipid rich plaque    -  1    Hyperlipidemia LDL goal <130        Diastolic CHF, chronic (H)        Subclavian artery stenosis, left (H)           Follow-ups after your visit        Additional Services     Follow-Up with Cardiac Advanced Practice Provider                 Future tests that were ordered for you today     Open Future Orders        Priority Expected Expires Ordered    Follow-Up with Cardiac Advanced Practice Provider Routine 3/29/2019 3/30/2019 3/29/2018            Who to contact     If you have questions or need follow up information about today's clinic visit or your schedule please contact [facility] directly at [phone].  Normal or non-critical lab and imaging results will be communicated to you by SignalFusehart, letter or phone within 4 business days after the clinic has received the results. If you do not hear from us within 7 days, please contact the clinic through SignalFusehart or phone. If you have a critical or abnormal lab result, we will notify you by phone as soon as possible.  Submit refill requests through ItsPlatonic or call your pharmacy and they will forward the refill request to us. Please allow 3 business days for your refill to be completed.          Additional Information About Your Visit        MyChart Information     ItsPlatonic gives you secure access to your electronic health record. If you see a primary care provider, you can also send messages to your care team and make appointments. If you have questions, please call your primary care clinic.  If you do not have a primary care provider,  "please call [phone] and they will assist you.        Care EveryWhere ID     This is your Care EveryWhere ID. This could be used by other organizations to access your [facility] medical records  [id number]        Your Vitals Were     Pulse Height Pulse Oximetry BMI (Body Mass Index)          74 1.549 m (5' 1\") 97% 33.82 kg/m2         Blood Pressure from Last 3 Encounters:   03/29/18 128/76   03/05/18 114/62   02/27/18 133/62    Weight from Last 3 Encounters:   03/29/18 81.2 kg (179 lb)   03/05/18 83 kg (182 lb 14.4 oz)   02/26/18 80.9 kg (178 lb 4.8 oz)              We Performed the Following     Follow-Up with Cardiologist        Primary Care Provider Office Phone # Fax #    Deisi Liu [phone] [address] [place] [address]        Equal Access to Services     [facility]: Hadii aad ku hadasho Soomaali, waaxda luqadaha, qaybta kaalmada adeegyada, waxay ronin haypradeepn sharan barrios . So [facility] [phone].    ATENCIÓN: Si habla español, tiene a yang disposición servicios gratuitos de asistencia lingüística. Llame al [phone].    We comply with applicable federal civil rights laws and [place] laws. We do not discriminate on the basis of race, color, national origin, age, disability, sex, sexual orientation, or gender identity.            Thank you!     Thank you for choosing [facility]  for your care. Our goal is always to provide you with excellent care. Hearing back from our patients is one way we can continue to improve our services. Please take a few minutes to complete the written survey that you may receive in the mail after your visit with us. Thank you!             Your Updated Medication List - Protect others around you: Learn how to safely use, store and throw away your medicines at www.disposemymeds.org.          This list is accurate as of 3/29/18  2:51 PM.  Always use your most recent med list. "                   Brand Name Dispense Instructions for use Diagnosis    * albuterol (2.5 MG/3ML) 0.083% neb solution     1 Box    Take 1 vial (2.5 mg) by nebulization every 6 hours as needed for shortness of breath / dyspnea    Moderate persistent asthma, uncomplicated       * albuterol 108 (90 BASE) MCG/ACT Inhaler    PROAIR HFA/PROVENTIL HFA/VENTOLIN HFA    3 Inhaler    Inhale 2 puffs into the lungs every 6 hours as needed for shortness of breath / dyspnea or wheezing    Moderate persistent asthma, uncomplicated       alendronate 70 MG tablet    FOSAMAX    12 tablet    Take 1 tablet (70 mg) by mouth every 7 days Take 60 minutes before am meal with 8 oz. water. Remain upright for 30 minutes.    Age-related osteoporosis without current pathological fracture       aspirin 81 MG tablet      Take 81 mg by mouth daily.        atorvastatin 40 MG tablet    LIPITOR    90 tablet    Take 1 tablet (40 mg) by mouth daily    Coronary artery disease involving native coronary artery of native heart with unstable angina pectoris (H), Status post coronary angioplasty       biotin 2.5 mg/mL Susp      Take by mouth daily 2000mcg        bisoprolol 5 MG tablet    ZEBETA    45 tablet    Take 0.5 tablets (2.5 mg) by mouth daily    Coronary artery disease involving native coronary artery of native heart with unstable angina pectoris (H)       Calcium 3043-1036 MG-UNIT Chew      Take 1 tablet by mouth daily.        FISH OIL DOUBLE STRENGTH 1200 MG capsule   Generic drug:  omega-3 fatty acids      Take 1 capsule by mouth 2 times daily        fluticasone 50 MCG/ACT spray    FLONASE    48 g    USE 1 TO 2 SPRAYS IN EACH NOSTRIL DAILY    Chronic rhinitis       fluticasone-salmeterol 250-50 MCG/DOSE diskus inhaler    ADVAIR    3 Inhaler    Inhale 1 puff into the lungs every 12 hours    Uncomplicated severe persistent asthma       furosemide 20 MG tablet    LASIX    90 tablet    Take 1 tablet (20 mg) by mouth daily    Diastolic CHF, chronic (H)  "      isosorbide mononitrate 30 MG 24 hr tablet    IMDUR    90 tablet    Take 1 tablet (30 mg) by mouth daily    ESPITIA (dyspnea on exertion), ASHD (arteriosclerotic heart disease)       MAGNESIUM ACETATE      Take 250 mg by mouth daily        methimazole 5 MG tablet    TAPAZOLE    45 tablet    Take 0.5 tablets (2.5 mg) by mouth daily    Subclinical hyperthyroidism       montelukast 10 MG tablet    SINGULAIR    90 tablet    Take 1 tablet (10 mg) by mouth At Bedtime    Chronic non-seasonal allergic rhinitis, unspecified trigger       nitroGLYcerin 0.4 MG sublingual tablet    NITROSTAT    25 tablet    One tablet under the tongue every 5 minutes if needed for chest pain. May repeat every 5 minutes for a maximum of 3 doses in 15 minutes\"    Coronary artery disease involving native coronary artery of native heart with unstable angina pectoris (H), Status post coronary angioplasty       omeprazole 20 MG CR capsule    priLOSEC    90 capsule    Take 1 capsule (20 mg) by mouth daily    Gastroesophageal reflux disease without esophagitis       OSTEO BI-FLEX/5-LOXIN ADVANCED Tabs      Take 1,500 mg by mouth 2 times daily        prasugrel 10 MG Tabs tablet    EFFIENT    90 tablet    Take 1 tablet (10 mg) by mouth daily Do not crush or break tablet.    Coronary artery disease involving native coronary artery of native heart with unstable angina pectoris (H), Status post coronary angioplasty       RESTASIS 0.05 % ophthalmic emulsion   Generic drug:  cycloSPORINE      Place 1 drop into both eyes 2 times daily        spironolactone 12.5 mg Tabs half-tab    ALDACTONE     Take 100 mg by mouth See Admin Instructions Monday, Wedn, Friday        VITAMIN D3 PO      Take 2,000 Units by mouth daily        * Notice:  This list has 2 medication(s) that are the same as other medications prescribed for you. Read the directions carefully, and ask your doctor or other care provider to review them with you.      "

## 2018-03-29 NOTE — LETTER
3/29/2018    Deisi Liu MD  [address] [place] [address]    RE: Victoria Montalvo       Dear Colleague,    I had the pleasure of seeing Victoria Montalvo in the [facility].    Cardiology Progress Note          Assessment and Plan:     1. Coronary artery disease status post PCI of left-sided PDA, negative nuclear stress test 2/27/2018    Continue medical management.      2. Dyspnea on exertion with walking, but not with elliptical    Patient will get a portable pulse oximeter and keep track of her saturation and heart rates with exertion.  If her heart rates are very elevated or conversely blunted, she will let us know we may consider medication adjustment or further workup.    Otherwise, routine follow-up with Alia Yoo in 1 year.    This note was transcribed using electronic voice recognition software and there may be typographical errors present.                Interval History:   The patient is a very pleasant 77 year old whom I have been following for coronary artery disease status post PCI of subclavian artery and left sided PDA.  Nuclear stress test 2/27/2018 was normal.  Patient has more right-sided lower extremity edema symptoms but more venous reflux in the left leg.  She is comfortable with continued medical management.  She still has some shortness of breath when she first gets started walking, but not when she is doing the elliptical .  Otherwise, she feels pretty well.  She did not notice any difference in her dyspnea when the bisoprolol was stopped.  She is here with her daughter today who also has a coronary artery history.                     Review of Systems:   Review of Systems:  Skin:  Positive for bruising   Eyes:  Positive for glasses  ENT:  Negative    Respiratory:  Positive for shortness of breath;dyspnea on exertion;cough;wheezing  Cardiovascular:    lightheadedness;dizziness;Positive for;edema  Gastroenterology: Positive for  "reflux  Genitourinary:  Positive for urinary frequency;nocturia  Musculoskeletal:  Positive for arthritis  Neurologic:  Negative    Psychiatric:  Negative    Heme/Lymph/Imm:  Positive for easy bruising  Endocrine:  Positive for thyroid disorder              Physical Exam:     Vitals: /76 (BP Location: Right arm, Patient Position: Sitting, Cuff Size: Adult Regular)  Pulse 74  Ht 1.549 m (5' 1\")  Wt 81.2 kg (179 lb)  SpO2 97%  BMI 33.82 kg/m2  Constitutional:  cooperative, alert and oriented, well developed, well nourished, in no acute distress        Skin:  warm and dry to the touch, no apparent skin lesions or masses noted        Head:  normocephalic, no masses or lesions        Eyes:  pupils equal and round;sclera white;no xanthalasma        ENT:  no pallor or cyanosis, dentition good        Neck:  JVP normal        Chest:  normal breath sounds, clear to auscultation, normal A-P diameter, normal symmetry, normal respiratory excursion, no use of accessory muscles        Cardiac: regular rhythm;normal S1 and S2       systolic murmur;grade 1          Abdomen:      benign    Extremities and Back:  normal muscle strength and tone;no deformities, clubbing, cyanosis, erythema observed   wearing compression stockings    Neurological:  no gross motor deficits;affect appropriate                 Medications:     Current Outpatient Prescriptions   Medication Sig Dispense Refill     prasugrel (EFFIENT) 10 MG TABS tablet Take 1 tablet (10 mg) by mouth daily Do not crush or break tablet. 90 tablet 3     omeprazole (PRILOSEC) 20 MG CR capsule Take 1 capsule (20 mg) by mouth daily 90 capsule 3     atorvastatin (LIPITOR) 40 MG tablet Take 1 tablet (40 mg) by mouth daily 90 tablet 3     spironolactone (ALDACTONE) 12.5 mg TABS half-tab Take 100 mg by mouth See Admin Instructions Monday, Wedn, Friday       methimazole (TAPAZOLE) 5 MG tablet Take 0.5 tablets (2.5 mg) by mouth daily 45 tablet 1     isosorbide mononitrate " "(IMDUR) 30 MG 24 hr tablet Take 1 tablet (30 mg) by mouth daily 90 tablet 3     alendronate (FOSAMAX) 70 MG tablet Take 1 tablet (70 mg) by mouth every 7 days Take 60 minutes before am meal with 8 oz. water. Remain upright for 30 minutes. 12 tablet 2     fluticasone (FLONASE) 50 MCG/ACT spray USE 1 TO 2 SPRAYS IN EACH NOSTRIL DAILY 48 g 2     montelukast (SINGULAIR) 10 MG tablet Take 1 tablet (10 mg) by mouth At Bedtime 90 tablet 3     fluticasone-salmeterol (ADVAIR) 250-50 MCG/DOSE diskus inhaler Inhale 1 puff into the lungs every 12 hours 3 Inhaler 3     furosemide (LASIX) 20 MG tablet Take 1 tablet (20 mg) by mouth daily 90 tablet 3     bisoprolol (ZEBETA) 5 MG tablet Take 0.5 tablets (2.5 mg) by mouth daily 45 tablet 3     nitroglycerin (NITROSTAT) 0.4 MG sublingual tablet One tablet under the tongue every 5 minutes if needed for chest pain. May repeat every 5 minutes for a maximum of 3 doses in 15 minutes\" 25 tablet 3     biotin 2.5 mg/mL Take by mouth daily 2000mcg       albuterol (PROAIR HFA, PROVENTIL HFA, VENTOLIN HFA) 108 (90 BASE) MCG/ACT inhaler Inhale 2 puffs into the lungs every 6 hours as needed for shortness of breath / dyspnea or wheezing 3 Inhaler 0     albuterol (2.5 MG/3ML) 0.083% nebulizer solution Take 1 vial (2.5 mg) by nebulization every 6 hours as needed for shortness of breath / dyspnea 1 Box 3     Cholecalciferol (VITAMIN D3 PO) Take 2,000 Units by mouth daily        aspirin 81 MG tablet Take 81 mg by mouth daily.       omega-3 fatty acids (FISH OIL DOUBLE STRENGTH) 1200 MG capsule Take 1 capsule by mouth 2 times daily        Calcium 6165-5327 MG-UNIT CHEW Take 1 tablet by mouth daily.       Boswellia-Glucosamine-Vit D (OSTEO BI-FLEX/5-LOXIN ADVANCED) TABS Take 1,500 mg by mouth 2 times daily        MAGNESIUM ACETATE Take 250 mg by mouth daily        cycloSPORINE (RESTASIS) 0.05 % ophthalmic emulsion Place 1 drop into both eyes 2 times daily                   Data:   All laboratory data " reviewed  No results found for this or any previous visit (from the past 24 hour(s)).    All laboratory data reviewed  Lab Results   Component Value Date    CHOL 166 06/30/2017     Lab Results   Component Value Date    HDL 83 06/30/2017     Lab Results   Component Value Date    LDL 70 06/30/2017     Lab Results   Component Value Date    TRIG 63 06/30/2017     Lab Results   Component Value Date    CHOLHDLRATIO 2.5 08/20/2014     TSH   Date Value Ref Range Status   01/08/2018 1.42 0.40 - 4.00 mU/L Final     Last Basic Metabolic Panel:  Lab Results   Component Value Date     02/27/2018      Lab Results   Component Value Date    POTASSIUM 4.0 02/27/2018     Lab Results   Component Value Date    CHLORIDE 108 02/27/2018     Lab Results   Component Value Date    PHILLIP 8.5 02/27/2018     Lab Results   Component Value Date    CO2 30 02/27/2018     Lab Results   Component Value Date    BUN 13 02/27/2018     Lab Results   Component Value Date    CR 0.68 02/27/2018     Lab Results   Component Value Date    GLC 97 02/27/2018     Lab Results   Component Value Date    WBC 4.7 02/27/2018     Lab Results   Component Value Date    RBC 4.05 02/27/2018     Lab Results   Component Value Date    HGB 11.9 02/27/2018     Lab Results   Component Value Date    HCT 36.6 02/27/2018     Lab Results   Component Value Date    MCV 90 02/27/2018     Lab Results   Component Value Date    MCH 29.4 02/27/2018     Lab Results   Component Value Date    MCHC 32.5 02/27/2018     Lab Results   Component Value Date    RDW 14.2 02/27/2018     Lab Results   Component Value Date     02/27/2018       Thank you for allowing me to participate in the care of your patient.    Sincerely,     Yogi Yepez MD     [facility]

## 2018-05-25 ENCOUNTER — APPOINTMENT (OUTPATIENT)
Dept: GENERAL RADIOLOGY | Facility: CLINIC | Age: 78
End: 2018-05-25
Attending: EMERGENCY MEDICINE
Payer: MEDICARE

## 2018-05-25 ENCOUNTER — HOSPITAL ENCOUNTER (EMERGENCY)
Facility: CLINIC | Age: 78
Discharge: HOME | End: 2018-05-25
Attending: EMERGENCY MEDICINE | Admitting: EMERGENCY MEDICINE
Payer: MEDICARE

## 2018-05-25 VITALS
TEMPERATURE: 97.6 F | HEIGHT: 61 IN | RESPIRATION RATE: 18 BRPM | OXYGEN SATURATION: 96 % | DIASTOLIC BLOOD PRESSURE: 81 MMHG | SYSTOLIC BLOOD PRESSURE: 163 MMHG | WEIGHT: 170 LBS | BODY MASS INDEX: 32.1 KG/M2 | HEART RATE: 68 BPM

## 2018-05-25 DIAGNOSIS — I25.110 CORONARY ARTERY DISEASE INVOLVING NATIVE CORONARY ARTERY OF NATIVE HEART WITH UNSTABLE ANGINA PECTORIS (H): ICD-10-CM

## 2018-05-25 DIAGNOSIS — S73.004A DISLOCATION OF RIGHT HIP, INITIAL ENCOUNTER (H): ICD-10-CM

## 2018-05-25 PROCEDURE — 96374 THER/PROPH/DIAG INJ IV PUSH: CPT | Mod: 59

## 2018-05-25 PROCEDURE — 27265 TREAT HIP DISLOCATION: CPT | Mod: RT

## 2018-05-25 PROCEDURE — 40000275 ZZH STATISTIC RCP TIME EA 10 MIN

## 2018-05-25 PROCEDURE — 25000128 H RX IP 250 OP 636: Performed by: EMERGENCY MEDICINE

## 2018-05-25 PROCEDURE — 99285 EMERGENCY DEPT VISIT HI MDM: CPT | Mod: 25

## 2018-05-25 PROCEDURE — 99152 MOD SED SAME PHYS/QHP 5/>YRS: CPT

## 2018-05-25 PROCEDURE — 40000986 XR PELVIS AND HIP RIGHT 1 VIEW

## 2018-05-25 PROCEDURE — 73502 X-RAY EXAM HIP UNI 2-3 VIEWS: CPT

## 2018-05-25 RX ORDER — HYDROCODONE BITARTRATE AND ACETAMINOPHEN 5; 325 MG/1; MG/1
1 TABLET ORAL EVERY 4 HOURS PRN
Qty: 14 TABLET | Refills: 0 | Status: SHIPPED | OUTPATIENT
Start: 2018-05-25 | End: 2018-12-29

## 2018-05-25 RX ORDER — BISOPROLOL FUMARATE 5 MG/1
2.5 TABLET, FILM COATED ORAL DAILY
Qty: 45 TABLET | Refills: 3 | Status: SHIPPED | OUTPATIENT
Start: 2018-05-25 | End: 2019-05-09

## 2018-05-25 RX ORDER — FENTANYL CITRATE 50 UG/ML
50 INJECTION, SOLUTION INTRAMUSCULAR; INTRAVENOUS ONCE
Status: COMPLETED | OUTPATIENT
Start: 2018-05-25 | End: 2018-05-25

## 2018-05-25 RX ORDER — PROPOFOL 10 MG/ML
200 INJECTION, EMULSION INTRAVENOUS ONCE
Status: COMPLETED | OUTPATIENT
Start: 2018-05-25 | End: 2018-05-25

## 2018-05-25 RX ADMIN — PROPOFOL 80 MG: 10 INJECTION, EMULSION INTRAVENOUS at 14:09

## 2018-05-25 RX ADMIN — FENTANYL CITRATE 50 MCG: 50 INJECTION INTRAMUSCULAR; INTRAVENOUS at 13:16

## 2018-05-25 ASSESSMENT — ENCOUNTER SYMPTOMS
WEAKNESS: 0
NUMBNESS: 0
MYALGIAS: 1
BACK PAIN: 0
JOINT SWELLING: 0

## 2018-05-25 NOTE — ED PROVIDER NOTES
History     Chief Complaint:  Hip pain    HPI   June V Selvin is a 77 year old female with a history of CHF, CAD, hypertension, hyperlipidemia, and not on blood thinners who presents to the ED via EMS for evaluation of hip pain. The patient reports that she [place] and when she stood up from a seated position and heard a pop in her right hip. She experienced instant pain, notably worse to the lateral aspect of the right hip/thigh. She denies pain shooting down her leg or up into her back, numbness or weakness of the right leg, or any other symptoms. Patient is unable to ambulate due to pain. Received fentanyl by EMS with improvement of pain. Patient previously had a right hip replacement October 2015 and dislocated the hip this past December as well.     Allergies:  No known drug allergies     Medications:    albuterol (2.5 MG/3ML) 0.083% nebulizer solution  albuterol (PROAIR HFA, PROVENTIL HFA, VENTOLIN HFA) 108 (90 BASE) MCG/ACT inhaler  alendronate (FOSAMAX) 70 MG tablet  aspirin 81 MG tablet  atorvastatin (LIPITOR) 40 MG tablet  biotin 2.5 mg/mL  bisoprolol (ZEBETA) 5 MG tablet  Boswellia-Glucosamine-Vit D (OSTEO BI-FLEX/5-LOXIN ADVANCED) TABS  Calcium 0778-0554 MG-UNIT CHEW  Cholecalciferol (VITAMIN D3 PO)  cycloSPORINE (RESTASIS) 0.05 % ophthalmic emulsion  fluticasone (FLONASE) 50 MCG/ACT spray  fluticasone-salmeterol (ADVAIR) 250-50 MCG/DOSE diskus inhaler  furosemide (LASIX) 20 MG tablet  isosorbide mononitrate (IMDUR) 30 MG 24 hr tablet  MAGNESIUM ACETATE  methimazole (TAPAZOLE) 5 MG tablet  montelukast (SINGULAIR) 10 MG tablet  nitroglycerin (NITROSTAT) 0.4 MG sublingual tablet  omega-3 fatty acids (FISH OIL DOUBLE STRENGTH) 1200 MG capsule  omeprazole (PRILOSEC) 20 MG CR capsule  prasugrel (EFFIENT) 10 MG TABS tablet  spironolactone (ALDACTONE) 12.5 mg TABS half-tab     Past Medical History:    Abnormal Papanicolaou smear of cervix and cervical  "HPV   Arthritis   Chronic rhinitis   Coronary artery disease   Diastolic CHF, chronic (H)   Gastro-oesophageal reflux disease   Hyperlipidemia    Pain in right hip   Personal history of colonic polyps   Pulmonary HTN   Subclavian artery stenosis, left (H)   Subclinical hyperthyroidism   Unspecified asthma  Lumbosacral neuritis, numbness left thigh  Colonic polyps  Female stress incontinence  Esophageal reflux  Osteopenia  KEN  Hip osteoarthritis    Past Surgical History:    Arthroplasty hip, right  Liposuction of legs and stomach  Ankle pins removed  C open rx ankle dislocatn+fixatn  Colonscopy thru stoma, diagnostic  HC reduction of large breast  Vascular surgery - angiogram and left subclavical artery stent    Family History:    Alzheimer disease  Gastrointestinal disease  Throat cancer  Lung cancer  Liver cancer  Heart disease - CABG  Suicidal  Allergies     Social History:  Smoking status: Former  Alcohol use: Rarely  Marital Status:       Review of Systems   Cardiovascular: Negative for leg swelling.   Musculoskeletal: Positive for gait problem and myalgias (right hip). Negative for back pain and joint swelling.   Neurological: Negative for weakness and numbness.   All other systems reviewed and are negative.    Physical Exam   Patient Vitals for the past 24 hrs:   BP Temp Pulse Heart Rate Resp SpO2 Height Weight   05/25/18 1445 - - - 59 - 96 % - -   05/25/18 1422 - - - 64 - 98 % - -   05/25/18 1421 104/71 - - 62 - 97 % - -   05/25/18 1415 118/59 - - - - 98 % - -   05/25/18 1400 142/82 - - - - 96 % - -   05/25/18 1315 111/57 - - - - 96 % - -   05/25/18 1300 133/84 - - - - 95 % - -   05/25/18 1245 122/57 - - - - 94 % - -   05/25/18 1241 - - - - - 96 % - -   05/25/18 1236 - - 68 - - - - -   05/25/18 1232 133/68 97.6  F (36.4  C) - - 18 - 1.549 m (5' 1\") 77.1 kg (170 lb)     Physical Exam    General:   Pleasant, age appropriate female lying in bed in obvious discomfort.  EYES:   Conjunctiva normal.  NECK:  "   Supple, no meningismus.   CV:     Regular rate and rhythm     No murmurs, rubs or gallops.       2+ DP pulses bilateral.  PULM:    Clear to auscultation bilateral.       No respiratory distress.      No wheezing, rales or stridor.  ABD:    Soft, non-tender, non-distended.  No pulsatile masses.       No rebound or guarding.  MSK:     Right lower extremity :      Internally rotated and shortened      No tenderness at the knee or ankle.      Severe pain with palpation of the hip      Unable to move the hip secondary to pain  LYMPH:   No cervical lymphadenopathy.  NEURO:   Right lower extremity :Strength is 5/5      Sensation intact.  SKIN:    Warm, dry and intact.       No rash.  PSYCH:    Mood is good and affect is appropriate.      Emergency Department Course     Imaging:  Radiographic findings were communicated with the patient and family who voiced understanding of the findings.    X-ray Right Pelvis w/ Hip, 1 views:  IMPRESSION: Superior dislocation of right femoral prosthesis. No  evidence of fracture appreciated.  Result per radiology.     X-ray Right Pelvis w/ Hip, 1 views:  IMPRESSION: The right hip dislocation has been reduced. No fracture is  Seen.    Result per radiology.     XR Pelvis and Hip Right 1 View   Final Result   IMPRESSION: The right hip dislocation has been reduced. No fracture is   seen.      ROSEMARY LIND MD      XR Pelvis w Hip Right 1 View   Final Result   IMPRESSION: Superior dislocation of right femoral prosthesis. No   evidence of fracture appreciated.      ALEJANDRINA LAKE MD          Procedures:      Sedation:      PERFORMED BY: Dr. Ellis.    Pre-Procedure Assessment done at 1400.    EXPECTED LEVEL:  Moderate Sedation    INDICATION:  Sedation is required to allow for joint reduction    Consent obtained from patient after discussing the risks, benefits and alternatives.    PO INTAKE: Appropriately NPO for procedure    ASA CLASS: Class 2 - MILD SYSTEMIC DISEASE, NO ACUTE PROBLEMS, NO  FUNCTIONAL LIMITATIONS.    MALLAMPATI: Grade 1:  Soft palate, uvula, tonsillar pillars, and posterior pharyngeal wall visible    LUNGS: Lungs Clear with good breath sounds bilaterally.     HEART: Normal heart sounds and rate    History and physical reviewed and no updates needed. I have reviewed the lab findings, diagnostic data, medications, and the plan for sedation. I have determined this patient to be an appropriate candidate for the planned sedation and procedure and have reassessed the patient IMMEDIATELY PRIOR to sedation and procedure.    Sedation Post Procedure Summary:    Prior to the start of the procedure and with procedural staff participation, I verbally confirmed the patient s identity using two indicators, relevant allergies, that the procedure was appropriate and matched the consent or emergent situation, and that the correct equipment/implants were available. Immediately prior to starting the procedure I conducted the Time Out with the procedural staff and re-confirmed the patient s name, procedure, and site/side. (The Joint Commission universal protocol was followed.)  Yes      SEDATIVES: Propofol    Vital signs, airway, End Tidal CO2 and pulse oximetry were monitored and remained stable throughout the procedure and sedation was maintained until the procedure was complete.  The patient was monitored by staff until sedation discharge criteria were met.    PATIENT TOLERANCE: Patient tolerated the procedure well with no immediate complications.    TIME OF SEDATION IN MINUTES:  15 minutes from beginning to end of physician one to one monitoring.      Reduction     SITE: Right hip     PROCEDURE PROVIDER: Dr. Ellis.     CONSENT: Risks (including but not limited to: decreased respirations, oxygen perfusion, aspiration, hypotension), benefits, and alternatives were discussed with patient and consent for procedure was obtained.     MONITORING: Monitoring consisted of heart rate, cardiac monitor,  "continuous pulse oximetry, continuous capnometry, frequent blood pressure checks, level of consciousness, IV access, constant attendance by RN until patient recovered, constant attendance by MD until patient stable and intubation and emergency airway management equipment available.     REDUCTION COMMENTS: The patient's leg was held in traction and internally and externally rotated until a \"pop\" was felt. The patient's hip then appeared reduced with improved alignment. Post reductions X-rays were obtained and showed good reduction.     PATIENT STATUS: Dislocation alignment improved post procedure and both sensation and circulation are intact. Vital signs remained stable, airway patent, and O2 saturations remained above 95%. Post-procedure, the patient was alert and responds to verbal stimuli. Patient was monitored during recovery and returned to pre-procedure baseline.     Interventions:  1316: Fentanyl 50mcg IV injection  1409: Diprivan 80mg IV injection    Emergency Department Course:  The patient arrived in the emergency department via EMS.  Past medical records, nursing notes, and vitals reviewed.  1226: I performed an exam of the patient and obtained history, as documented above. GCS 15.    IV inserted and blood drawn. The patient was placed on continuous cardiac monitoring and pulse oximetry.    The patient was sent for a x-ray while in the emergency department, findings above.    1407: Reduction procedure    The patient was sent for a x-ray while in the emergency department, findings above.    1511: I rechecked the patient. Findings and plan explained to the Patient. Patient discharged home with instructions regarding supportive care, medications, and reasons to return. The importance of close follow-up was reviewed.     Impression & Plan      Medical Decision Makin-year-old female presented to the ED with acute onset of right hip pain and a clinical examination consistent with prostatic hip dislocation. "  This is confirmed by plain films.  Patient underwent conscious sedation and closed reduction with success.  No evidence of associated fracture.  Patient was able to ambulate in the ED.  Patient is safe for discharge home.  She will be provided an adductor pillow as well as analgesics.  Close follow-up with her primary orthopedic surgeon Dr. Torres and return to ED for any worsening symptoms.    Diagnosis:    ICD-10-CM    1. Dislocation of right hip, initial encounter (H) S73.004A        Disposition:  discharged to home    Discharge Medications:  Discharge Medication List as of 5/25/2018  3:40 PM      START taking these medications    Details   HYDROcodone-acetaminophen (NORCO) 5-325 MG per tablet Take 1 tablet by mouth every 4 hours as needed for pain, Disp-14 tablet, R-0, Local Print               Carin Butts  5/25/2018   [facility] EMERGENCY DEPARTMENT  I, Carin Butts, am serving as a scribe at 12:26 PM on 5/25/2018 to document services personally performed by Micheal Ellis MD based on my observations and the provider's statements to me.        Micheal Ellis MD  05/26/18 0741

## 2018-05-25 NOTE — ED AVS SNAPSHOT
[facility] Emergency Department    [address]    [facility] [address]    Phone:  [phone]    Fax:  [phone]                                       June V Selvin   MRN: [id number]    Department:  [facility] Emergency Department   Date of Visit:  5/25/2018           After Visit Summary Signature Page     I have received my discharge instructions, and my questions have been answered. I have discussed any challenges I see with this plan with the nurse or doctor.    ..........................................................................................................................................  Patient/Patient Representative Signature      ..........................................................................................................................................  Patient Representative Print Name and Relationship to Patient    ..................................................               ................................................  Date                                            Time    ..........................................................................................................................................  Reviewed by Signature/Title    ...................................................              ..............................................  Date                                                            Time

## 2018-05-25 NOTE — ED NOTES
Pt IV removed from right upper arm. Discharge instructions completed. Abduction pillow provided to patient per MD request.

## 2018-05-25 NOTE — PROGRESS NOTES
Assisted with conscious sedation. Respirations 6-20, heart rate 60s, SpO2 97% and EtCO2 monitored throughout procedure. Respiratory status maintained without incident. RT to follow.

## 2018-05-25 NOTE — ED TRIAGE NOTES
[facility] and stood up from a seated position .  Pt felt a pop in the right hip and fell to the ground.  Denies any other problems.  Pt received 100mcg fentanyl prior to arrival.

## 2018-05-25 NOTE — ED AVS SNAPSHOT
[facility] Emergency Department    [address]    [facility] [address]    Phone:  [phone]    Fax:  [phone]                                       June V Selvin   MRN: [id number]    Department:  [facility] Emergency Department   Date of Visit:  5/25/2018           Patient Information     Date Of Birth          1940        Your diagnoses for this visit were:     Dislocation of right hip, initial encounter (H)        You were seen by Micheal Ellis MD.      Follow-up Information     Follow up with Aron Torres MD. Schedule an appointment as soon as possible for a visit in 4 days.    Specialty:  Orthopedics    Contact information:    [address] [place]  [address] [address]  [facility] [id number] [phone]          Discharge Instructions         Dislocation After Hip Replacement, Reduced    The hip is a ball-and-socket type of joint. After a hip replacement surgery, the muscles and ligaments that normally keep the hip in place are weakened. The ball of the new hip is more easily forced out of its position in the socket. This is called a dislocation. This is the most common problem following hip replacement surgery.   Your hip joint has been put back in place. This is called a reduction. But you are at risk for another dislocation. Dislocations can damage the implant. Follow the advice below to prevent this. Leg muscle strengthening exercises may prevent another dislocation. Your healthcare provider may suggest these once you have no pain and can walk without crutches.  If you have had a few dislocations, it may be helpful to have another surgery to reposition the implants or to insert new implants. You can talk about these options with your orthopedic surgeon.  Home care    Follow your healthcare provider's advice about weight bearing and using crutches or a walker.    Take pain medicine as directed.    Avoid these positions to prevent recurrence of  dislocation:  ? Crossing your legs  ? Bending forward from the hips more than 90 . Be careful to avoid low seats, sofas and toilets.    Try using crutches or a walker if your provider approves. Ask your provider when this is safe. A physical therapist may also help with your recovery.  Follow-up care  Follow up with your healthcare provider, or as advised.  When to seek medical advice  Contact your healthcare provider if any of these occur:    Increasing hip pain or deformity    Leg becomes pale or cold    Numbness or weakness in the affected leg    Increasing swelling, redness, or pain of the lower leg    Shortness of breath or chest pain    Dizziness, weakness, or fainting  Date Last Reviewed: 12/2/2015 2000-2017 [employer]. [address] [place] [address], [place] [id number]. All rights reserved. This information is not intended as a substitute for professional medical care. Always follow your healthcare professional's instructions.          24 Hour Appointment Hotline       To make an appointment at any [facility] [place], call [phone] ([phone]). If you don't have a family doctor or clinic, we will help you find one. [facility] are conveniently located to serve the needs of you and your family.             Review of your medicines      START taking        Dose / Directions Last dose taken    HYDROcodone-acetaminophen 5-325 MG per tablet   Commonly known as:  NORCO   Dose:  1 tablet   Quantity:  14 tablet        Take 1 tablet by mouth every 4 hours as needed for pain   Refills:  0          Our records show that you are taking the medicines listed below. If these are incorrect, please call your family doctor or clinic.        Dose / Directions Last dose taken    * albuterol (2.5 MG/3ML) 0.083% neb solution   Dose:  1 vial   Quantity:  1 Box        Take 1 vial (2.5 mg) by nebulization every 6 hours as needed for shortness of breath / dyspnea   Refills:  3        * albuterol 108 (90 Base)  MCG/ACT Inhaler   Commonly known as:  PROAIR HFA/PROVENTIL HFA/VENTOLIN HFA   Dose:  2 puff   Quantity:  3 Inhaler        Inhale 2 puffs into the lungs every 6 hours as needed for shortness of breath / dyspnea or wheezing   Refills:  0        alendronate 70 MG tablet   Commonly known as:  FOSAMAX   Dose:  70 mg   Quantity:  12 tablet        Take 1 tablet (70 mg) by mouth every 7 days Take 60 minutes before am meal with 8 oz. water. Remain upright for 30 minutes.   Refills:  2        aspirin 81 MG tablet   Dose:  81 mg        Take 81 mg by mouth daily.   Refills:  0        atorvastatin 40 MG tablet   Commonly known as:  LIPITOR   Dose:  40 mg   Quantity:  90 tablet        Take 1 tablet (40 mg) by mouth daily   Refills:  3        biotin 2.5 mg/mL Susp        Take by mouth daily 2000mcg   Refills:  0        bisoprolol 5 MG tablet   Commonly known as:  ZEBETA   Dose:  2.5 mg   Quantity:  45 tablet        Take 0.5 tablets (2.5 mg) by mouth daily   Refills:  3        Calcium 0150-8035 MG-UNIT Chew   Dose:  1 tablet        Take 1 tablet by mouth daily.   Refills:  0        FISH OIL DOUBLE STRENGTH 1200 MG capsule   Dose:  1 capsule   Generic drug:  omega-3 fatty acids        Take 1 capsule by mouth 2 times daily   Refills:  0        fluticasone 50 MCG/ACT spray   Commonly known as:  FLONASE   Quantity:  48 g        USE 1 TO 2 SPRAYS IN EACH NOSTRIL DAILY   Refills:  2        fluticasone-salmeterol 250-50 MCG/DOSE diskus inhaler   Commonly known as:  ADVAIR   Dose:  1 puff   Quantity:  3 Inhaler        Inhale 1 puff into the lungs every 12 hours   Refills:  3        furosemide 20 MG tablet   Commonly known as:  LASIX   Dose:  20 mg   Quantity:  90 tablet        Take 1 tablet (20 mg) by mouth daily   Refills:  3        isosorbide mononitrate 30 MG 24 hr tablet   Commonly known as:  IMDUR   Dose:  30 mg   Quantity:  90 tablet        Take 1 tablet (30 mg) by mouth daily   Refills:  3        MAGNESIUM ACETATE   Dose:  250 mg     "    Take 250 mg by mouth daily   Refills:  0        methimazole 5 MG tablet   Commonly known as:  TAPAZOLE   Dose:  2.5 mg   Quantity:  45 tablet        Take 0.5 tablets (2.5 mg) by mouth daily   Refills:  1        montelukast 10 MG tablet   Commonly known as:  SINGULAIR   Dose:  1 tablet   Quantity:  90 tablet        Take 1 tablet (10 mg) by mouth At Bedtime   Refills:  3        nitroGLYcerin 0.4 MG sublingual tablet   Commonly known as:  NITROSTAT   Quantity:  25 tablet        One tablet under the tongue every 5 minutes if needed for chest pain. May repeat every 5 minutes for a maximum of 3 doses in 15 minutes\"   Refills:  3        omeprazole 20 MG CR capsule   Commonly known as:  priLOSEC   Dose:  20 mg   Quantity:  90 capsule        Take 1 capsule (20 mg) by mouth daily   Refills:  3        OSTEO BI-FLEX/5-LOXIN ADVANCED Tabs   Dose:  1500 mg        Take 1,500 mg by mouth 2 times daily   Refills:  0        prasugrel 10 MG Tabs tablet   Commonly known as:  EFFIENT   Dose:  10 mg   Quantity:  90 tablet        Take 1 tablet (10 mg) by mouth daily Do not crush or break tablet.   Refills:  3        RESTASIS 0.05 % ophthalmic emulsion   Dose:  1 drop   Generic drug:  cycloSPORINE        Place 1 drop into both eyes 2 times daily   Refills:  0        spironolactone 12.5 mg Tabs half-tab   Commonly known as:  ALDACTONE   Dose:  100 mg        Take 100 mg by mouth See Admin Instructions Monday, Wedn, Friday   Refills:  0        VITAMIN D3 PO   Dose:  2000 Units        Take 2,000 Units by mouth daily   Refills:  0        * Notice:  This list has 2 medication(s) that are the same as other medications prescribed for you. Read the directions carefully, and ask your doctor or other care provider to review them with you.            Information about OPIOIDS     PRESCRIPTION OPIOIDS: WHAT YOU NEED TO KNOW   You have a prescription for an opioid (narcotic) pain medicine. Opioids can cause addiction. If you have a history of " chemical dependency of any type, you are at a higher risk of becoming addicted to opioids. Only take this medicine after all other options have been tried. Take it for as short a time and as few doses as possible.     Do not:    Drive. If you drive while taking these medicines, you could be arrested for driving under the influence (DUI).    Operate heavy machinery    Do any other dangerous activities while taking these medicines.     Drink any alcohol while taking these medicines.      Take with any other medicines that contain acetaminophen. Read all labels carefully. Look for the word  acetaminophen  or  Tylenol.  Ask your pharmacist if you have questions or are unsure.    Store your pills in a secure place, locked if possible. We will not replace any lost or stolen medicine. If you don t finish your medicine, please throw away (dispose) as directed by your pharmacist. The [place] [employer]ution [employer] has more information about safe disposal: https://www.pca.[facility].[place].us/living-green/managing-unwanted-medications    All opioids tend to cause constipation. Drink plenty of water and eat foods that have a lot of fiber, such as fruits, vegetables, prune juice, apple juice and high-fiber cereal. Take a laxative (Miralax, milk of magnesia, Colace, Senna) if you don t move your bowels at least every other day.         Prescriptions were sent or printed at these locations (1 Prescription)                   Other Prescriptions                Printed at Department/Unit printer (1 of 1)         HYDROcodone-acetaminophen (NORCO) 5-325 MG per tablet                Procedures and tests performed during your visit     XR Pelvis and Hip Right 1 View    XR Pelvis w Hip Right 1 View      Orders Needing Specimen Collection     None      Pending Results     Date and Time Order Name Status Description    5/25/2018 1419 XR Pelvis and Hip Right 1 View In process             Pending Culture Results     No orders found from 5/23/2018  to 5/26/2018.            Pending Results Instructions     If you had any lab results that were not finalized at the time of your Discharge, you can call the ED Lab Result RN at [phone]. You will be contacted by this team for any positive Lab results or changes in treatment. The nurses are available 7 days a week from 10A to 6:30P.  You can leave a message 24 hours per day and they will return your call.        Test Results From Your Hospital Stay              5/25/2018  2:15 PM      Narrative     XR PELVIS AND HIP RIGHT 1 VIEW 5/25/2018 1:42 PM    HISTORY: Pain.    COMPARISON: December 10, 2017.        Impression     IMPRESSION: Superior dislocation of right femoral prosthesis. No  evidence of fracture appreciated.    ALEJANDRINA LAKE MD         5/25/2018  2:50 PM      Result not yet available     Exam Ended                Clinical Quality Measure: Blood Pressure Screening     Your blood pressure was checked while you were in the emergency department today. The last reading we obtained was  BP: 104/71 . Please read the guidelines below about what these numbers mean and what you should do about them.  If your systolic blood pressure (the top number) is less than 120 and your diastolic blood pressure (the bottom number) is less than 80, then your blood pressure is normal. There is nothing more that you need to do about it.  If your systolic blood pressure (the top number) is 120-139 or your diastolic blood pressure (the bottom number) is 80-89, your blood pressure may be higher than it should be. You should have your blood pressure rechecked within a year by a primary care provider.  If your systolic blood pressure (the top number) is 140 or greater or your diastolic blood pressure (the bottom number) is 90 or greater, you may have high blood pressure. High blood pressure is treatable, but if left untreated over time it can put you at risk for heart attack, stroke, or kidney failure. You should have your blood  pressure rechecked by a primary care provider within the next 4 weeks.  If your provider in the emergency department today gave you specific instructions to follow-up with your doctor or provider even sooner than that, you should follow that instruction and not wait for up to 4 weeks for your follow-up visit.        Thank you for choosing [facility]       Thank you for choosing [facility] for your care. Our goal is always to provide you with excellent care. Hearing back from our patients is one way we can continue to improve our services. Please take a few minutes to complete the written survey that you may receive in the mail after you visit with us. Thank you!        [employer] Information     [employer] gives you secure access to your electronic health record. If you see a primary care provider, you can also send messages to your care team and make appointments. If you have questions, please call your primary care clinic.  If you do not have a primary care provider, please call [phone] and they will assist you.        Care EveryWhere ID     This is your Care EveryWhere ID. This could be used by other organizations to access your [facility] medical records  [id number]        Equal Access to Services     KEANU ARAGON : Hadsaman Alvares, wasatish lopez, qareid ramos, estefani barrios . [employer] [place] [phone].    ATENCIÓN: Si habla español, tiene a yang disposición servicios gratuitos de asistencia lingüística. Llame al [phone].    We comply with applicable federal civil rights laws and [place] laws. We do not discriminate on the basis of race, color, national origin, age, disability, sex, sexual orientation, or gender identity.            After Visit Summary       This is your record. Keep this with you and show to your community pharmacist(s) and doctor(s) at your next visit.

## 2018-05-25 NOTE — ED NOTES
Bed: ED06  Expected date: 5/25/18  Expected time: 12:07 PM  Means of arrival: Ambulance  Comments:  VANESSA

## 2018-05-25 NOTE — DISCHARGE INSTRUCTIONS
Dislocation After Hip Replacement, Reduced    The hip is a ball-and-socket type of joint. After a hip replacement surgery, the muscles and ligaments that normally keep the hip in place are weakened. The ball of the new hip is more easily forced out of its position in the socket. This is called a dislocation. This is the most common problem following hip replacement surgery.   Your hip joint has been put back in place. This is called a reduction. But you are at risk for another dislocation. Dislocations can damage the implant. Follow the advice below to prevent this. Leg muscle strengthening exercises may prevent another dislocation. Your healthcare provider may suggest these once you have no pain and can walk without crutches.  If you have had a few dislocations, it may be helpful to have another surgery to reposition the implants or to insert new implants. You can talk about these options with your orthopedic surgeon.  Home care    Follow your healthcare provider's advice about weight bearing and using crutches or a walker.    Take pain medicine as directed.    Avoid these positions to prevent recurrence of dislocation:  ? Crossing your legs  ? Bending forward from the hips more than 90 . Be careful to avoid low seats, sofas and toilets.    Try using crutches or a walker if your provider approves. Ask your provider when this is safe. A physical therapist may also help with your recovery.  Follow-up care  Follow up with your healthcare provider, or as advised.  When to seek medical advice  Contact your healthcare provider if any of these occur:    Increasing hip pain or deformity    Leg becomes pale or cold    Numbness or weakness in the affected leg    Increasing swelling, redness, or pain of the lower leg    Shortness of breath or chest pain    Dizziness, weakness, or fainting  Date Last Reviewed: 12/2/2015 2000-2017 [employer]. [address] [place] [address]. All rights reserved.  This information is not intended as a substitute for professional medical care. Always follow your healthcare professional's instructions.

## 2018-06-22 DIAGNOSIS — M81.0 AGE-RELATED OSTEOPOROSIS WITHOUT CURRENT PATHOLOGICAL FRACTURE: ICD-10-CM

## 2018-06-22 RX ORDER — ALENDRONATE SODIUM 70 MG/1
TABLET ORAL
Qty: 12 TABLET | Refills: 0 | Status: SHIPPED | OUTPATIENT
Start: 2018-06-22 | End: 2018-09-14

## 2018-06-28 ENCOUNTER — MYC MEDICAL ADVICE (OUTPATIENT)
Dept: PEDIATRICS | Facility: CLINIC | Age: 78
End: 2018-06-28

## 2018-07-09 DIAGNOSIS — I50.32 DIASTOLIC CHF, CHRONIC (H): ICD-10-CM

## 2018-07-09 RX ORDER — FUROSEMIDE 20 MG
20 TABLET ORAL DAILY
Qty: 90 TABLET | Refills: 3 | Status: SHIPPED | OUTPATIENT
Start: 2018-07-09 | End: 2019-07-05

## 2018-07-09 NOTE — TELEPHONE ENCOUNTER
Received refill request for:  Furosemide  Last OV was: 3/29/2018 with Dr. Yepez  Labs/EKG: last BMP 2/27/2018  F/U scheduled: orders in Epic for 3/2019  New script sent to: [facility]

## 2018-07-22 DIAGNOSIS — E05.90 SUBCLINICAL HYPERTHYROIDISM: ICD-10-CM

## 2018-07-22 DIAGNOSIS — J31.0 CHRONIC RHINITIS: ICD-10-CM

## 2018-07-23 RX ORDER — FLUTICASONE PROPIONATE 50 MCG
SPRAY, SUSPENSION (ML) NASAL
Qty: 48 G | Refills: 1 | Status: SHIPPED | OUTPATIENT
Start: 2018-07-23 | End: 2018-10-01

## 2018-07-23 NOTE — TELEPHONE ENCOUNTER
"Requested Prescriptions   Pending Prescriptions Disp Refills     fluticasone (FLONASE) 50 MCG/ACT spray [Pharmacy Med Name: FLUTICASONE [place] NS SP 16GM RX 50MCG]    Last Written Prescription Date:  10/25/2017  Last Fill Quantity: 48 g,  # refills: 2   Last office visit: 3/5/2018 with prescribing provider:  Deisi Liu     Future Office Visit:     48 g 2     Sig: USE 1 TO 2 SPRAYS IN EACH NOSTRIL DAILY    Inhaled Steroids Protocol Failed    7/22/2018  7:41 AM       Failed - Asthma control assessment score within normal limits in last 6 months    Please review ACT score.     ACT Total Scores 10/5/2016 8/4/2017 3/5/2018   ACT TOTAL SCORE - - -   ASTHMA ER VISITS - - -   ASTHMA HOSPITALIZATIONS - - -   ACT TOTAL SCORE (Goal Greater than or Equal to 20) 21 14 17   In the past 12 months, how many times did you visit the emergency room for your asthma without being admitted to the hospital? 0 0 0   In the past 12 months, how many times were you hospitalized overnight because of your asthma? 0 0 0              Passed - Patient is age 12 or older       Passed - Recent (6 mo) or future (30 days) visit within the authorizing provider's specialty    Patient had office visit in the last 6 months or has a visit in the next 30 days with authorizing provider or within the authorizing provider's specialty.  See \"Patient Info\" tab in inbasket, or \"Choose Columns\" in Meds & Orders section of the refill encounter.              "

## 2018-07-24 RX ORDER — METHIMAZOLE 5 MG/1
TABLET ORAL
Qty: 45 TABLET | Refills: 1 | Status: SHIPPED | OUTPATIENT
Start: 2018-07-24 | End: 2019-01-18

## 2018-07-24 NOTE — TELEPHONE ENCOUNTER
Requested Prescriptions   Pending Prescriptions Disp Refills     methimazole (TAPAZOLE) 5 MG tablet [Pharmacy Med Name: METHIMAZOLE TABS 5MG] 45 tablet 1     Sig: TAKE ONE-HALF (1/2) TABLET DAILY    There is no refill protocol information for this order

## 2018-08-23 ENCOUNTER — HOSPITAL ENCOUNTER (OUTPATIENT)
Dept: MAMMOGRAPHY | Facility: CLINIC | Age: 78
Discharge: HOME | End: 2018-08-23
Attending: PEDIATRICS | Admitting: PEDIATRICS
Payer: MEDICARE

## 2018-08-23 DIAGNOSIS — Z12.31 VISIT FOR SCREENING MAMMOGRAM: ICD-10-CM

## 2018-08-23 PROCEDURE — 77067 SCR MAMMO BI INCL CAD: CPT

## 2018-09-14 DIAGNOSIS — M81.0 AGE-RELATED OSTEOPOROSIS WITHOUT CURRENT PATHOLOGICAL FRACTURE: ICD-10-CM

## 2018-09-14 NOTE — TELEPHONE ENCOUNTER
"Requested Prescriptions   Pending Prescriptions Disp Refills     alendronate (FOSAMAX) 70 MG tablet [Pharmacy Med Name: ALENDRONATE SOD TABS 4'S 70MG]    Last Written Prescription Date:  6/22/2018  Last Fill Quantity: 12,  # refills: 0   Last office visit: 3/5/2018 with prescribing provider:  Deisi Liu     Future Office Visit:     12 tablet 0     Sig: TAKE 1 TABLET EVERY 7 DAYS, TAKE 60 MINUTES BEFORE A.M. MEAL WITH 8 OUNCE WATER, REMAIN UPRIGHT FOR 30 MINUTES    Bisphosphonates Passed    9/14/2018  2:19 AM       Passed - Recent (12 mo) or future (30 days) visit within the authorizing provider's specialty    Patient had office visit in the last 12 months or has a visit in the next 30 days with authorizing provider or within the authorizing provider's specialty.  See \"Patient Info\" tab in inbasket, or \"Choose Columns\" in Meds & Orders section of the refill encounter.           Passed - Dexa on file within past 2 years    Please review last Dexa result.          Passed - Patient is age 18 or older       Passed - Normal serum creatinine on file within past 12 months    Recent Labs   Lab Test  02/27/18   0757   CR  0.68               "

## 2018-09-17 RX ORDER — ALENDRONATE SODIUM 70 MG/1
TABLET ORAL
Qty: 12 TABLET | Refills: 3 | Status: SHIPPED | OUTPATIENT
Start: 2018-09-17 | End: 2019-08-18

## 2018-10-01 ENCOUNTER — OFFICE VISIT (OUTPATIENT)
Dept: PEDIATRICS | Facility: CLINIC | Age: 78
End: 2018-10-01
Payer: MEDICARE

## 2018-10-01 VITALS
SYSTOLIC BLOOD PRESSURE: 88 MMHG | HEIGHT: 61 IN | TEMPERATURE: 97.9 F | DIASTOLIC BLOOD PRESSURE: 50 MMHG | BODY MASS INDEX: 33.99 KG/M2 | OXYGEN SATURATION: 95 % | WEIGHT: 180 LBS | HEART RATE: 66 BPM

## 2018-10-01 DIAGNOSIS — R23.3 EASY BRUISING: Primary | ICD-10-CM

## 2018-10-01 DIAGNOSIS — J31.0 CHRONIC RHINITIS: ICD-10-CM

## 2018-10-01 DIAGNOSIS — J45.40 MODERATE PERSISTENT ASTHMA, UNCOMPLICATED: ICD-10-CM

## 2018-10-01 LAB
ERYTHROCYTE [DISTWIDTH] IN BLOOD BY AUTOMATED COUNT: 14.6 % (ref 10–15)
HCT VFR BLD AUTO: 32 % (ref 35–47)
HGB BLD-MCNC: 10.2 G/DL (ref 11.7–15.7)
INR PPP: 1.15 (ref 0.86–1.14)
LMWH PPP CHRO-ACNC: <0.1 IU/ML
MCH RBC QN AUTO: 28.3 PG (ref 26.5–33)
MCHC RBC AUTO-ENTMCNC: 31.9 G/DL (ref 31.5–36.5)
MCV RBC AUTO: 89 FL (ref 78–100)
PLATELET # BLD AUTO: 189 10E9/L (ref 150–450)
RBC # BLD AUTO: 3.6 10E12/L (ref 3.8–5.2)
WBC # BLD AUTO: 6.8 10E9/L (ref 4–11)

## 2018-10-01 PROCEDURE — 85520 HEPARIN ASSAY: CPT | Performed by: PEDIATRICS

## 2018-10-01 PROCEDURE — 99213 OFFICE O/P EST LOW 20 MIN: CPT | Performed by: PEDIATRICS

## 2018-10-01 PROCEDURE — 85027 COMPLETE CBC AUTOMATED: CPT | Performed by: PEDIATRICS

## 2018-10-01 PROCEDURE — 85610 PROTHROMBIN TIME: CPT | Performed by: PEDIATRICS

## 2018-10-01 PROCEDURE — 36415 COLL VENOUS BLD VENIPUNCTURE: CPT | Performed by: PEDIATRICS

## 2018-10-01 RX ORDER — FLUTICASONE PROPIONATE 50 MCG
SPRAY, SUSPENSION (ML) NASAL
Qty: 48 G | Refills: 1 | Status: SHIPPED | OUTPATIENT
Start: 2018-10-01 | End: 2019-03-30

## 2018-10-01 RX ORDER — ALBUTEROL SULFATE 90 UG/1
2 AEROSOL, METERED RESPIRATORY (INHALATION) EVERY 6 HOURS PRN
Qty: 3 INHALER | Refills: 0 | Status: SHIPPED | OUTPATIENT
Start: 2018-10-01 | End: 2018-12-16

## 2018-10-01 NOTE — PROGRESS NOTES
"  SUBJECTIVE:   Victoria Montalvo is a 78 year old female who presents to clinic today for the following health issues:      Dark spot      Duration: 2 weeks    Description (location/character/radiation): Left arm by elbow. 3 spots, dark, start out purple and raised. Now some are red and getting bigger.      Intensity:  moderate    Accompanying signs and symptoms: None    History (similar episodes/previous evaluation): 1 year ago    Precipitating or alleviating factors: None    Therapies tried and outcome: None     Patient is on both aspirin and prasugrel for CAD.  No recent changes in dosing.  Denies trauma for this injury.  She is concerned if this could be skin cancer.     Problem list and histories reviewed & adjusted, as indicated.  Additional history: as documented    Reviewed and updated as needed this visit by clinical staff       Reviewed and updated as needed this visit by Provider         ROS:  Constitutional, HEENT, cardiovascular, pulmonary, gi and gu systems are negative, except as otherwise noted.    OBJECTIVE:     BP (!) 88/50 (BP Location: Right arm, Cuff Size: Adult Large)  Pulse 66  Temp 97.9  F (36.6  C) (Oral)  Ht 5' 1\" (1.549 m)  Wt 180 lb (81.6 kg)  SpO2 95%  BMI 34.01 kg/m2  Body mass index is 34.01 kg/(m^2).  SKIN: left forarm with 3mm round flat brown hyperpigmented patch, three <1cm flat hematoma appearing patches    Diagnostic Test Results:  none     ASSESSMENT/PLAN:       1. Easy bruising  Reassured about normal appearing mole.  Other lesions most likely from thinning skin on patient on blood thinners.  Likely from mild trauma.  No other bleeding at this time, no gingival bleeding.  Patient states that she occassional gets nose bleeds and they take about 30 min to stop.  Will check basic labs as below.  - CBC with platelets  - INR  - Heparin 10a Level    2. Moderate persistent asthma, uncomplicated  refill  - albuterol (PROAIR HFA/PROVENTIL HFA/VENTOLIN HFA) 108 (90 Base) MCG/ACT " inhaler; Inhale 2 puffs into the lungs every 6 hours as needed for shortness of breath / dyspnea or wheezing  Dispense: 3 Inhaler; Refill: 0    3. Chronic rhinitis  refill  - fluticasone (FLONASE) 50 MCG/ACT spray; USE 1 TO 2 SPRAYS IN EACH NOSTRIL DAILY  Dispense: 48 g; Refill: 1    Patient Instructions   Do labs today to check reason for bruising.    Dr. Niurka Bah MD  [facility]

## 2018-10-01 NOTE — MR AVS SNAPSHOT
After Visit Summary   10/1/2018    Victoria Montalvo    MRN: [id number]           Patient Information     Date Of Birth          1940        Visit Information        Provider Department      10/1/2018 1:20 PM Elsy Bah MD [facility]        Today's Diagnoses     Easy bruising    -  1    Moderate persistent asthma, uncomplicated        Chronic rhinitis          Care Instructions    Do labs today to check reason for bruising.    Dr. Bah          Follow-ups after your visit        Follow-up notes from your care team     Return in about 3 months (around 1/1/2019).      Your next 10 appointments already scheduled     Jan 09, 2019 11:40 AM CST   SHORT with Elsy Bah MD   [facility] ([facility])    [address]  [address]  [facility] [address] [phone]              Who to contact     If you have questions or need follow up information about today's clinic visit or your schedule please contact [facility] directly at [phone].  Normal or non-critical lab and imaging results will be communicated to you by [employer], letter or phone within 4 business days after the clinic has received the results. If you do not hear from us within 7 days, please contact the clinic through Logant or phone. If you have a critical or abnormal lab result, we will notify you by phone as soon as possible.  Submit refill requests through [employer] or call your pharmacy and they will forward the refill request to us. Please allow 3 business days for your refill to be completed.          Additional Information About Your Visit        [employer] Information     Hex[employer] gives you secure access to your electronic health record. If you see a primary care provider, you can also send messages to your care team and make appointments. If you have questions, please call your primary care clinic.  If you do not have a primary care provider, please call  "[phone] and they will assist you.        Care EveryWhere ID     This is your Care EveryWhere ID. This could be used by other organizations to access your [facility] medical records  [id number]        Your Vitals Were     Pulse Temperature Height Pulse Oximetry BMI (Body Mass Index)       66 97.9  F (36.6  C) (Oral) 5' 1\" (1.549 m) 95% 34.01 kg/m2        Blood Pressure from Last 3 Encounters:   10/01/18 (!) 88/50   05/25/18 163/81   03/29/18 128/76    Weight from Last 3 Encounters:   10/01/18 180 lb (81.6 kg)   05/25/18 170 lb (77.1 kg)   03/29/18 179 lb (81.2 kg)              We Performed the Following     CBC with platelets     Heparin 10a Level     INR          Where to get your medicines      These medications were sent to [facility] - [address] [address]     Phone:  [phone]     albuterol 108 (90 Base) MCG/ACT inhaler    fluticasone 50 MCG/ACT spray          Primary Care Provider Office Phone # Fax #    Deisi Liu [phone]       [facility] [place] [address]        Equal Access to Services     OLESYA ARAGON AH: Hadii mercedes pineda hadasho Soomaali, waaxda luqadaha, qaybta kaalmada adeegyada, estefani barrios . So [facility] [phone].    ATENCIÓN: Si habla español, tiene a yang disposición servicios gratuitos de asistencia lingüística. Llame al [phone].    We comply with applicable federal civil rights laws and [place] laws. We do not discriminate on the basis of race, color, national origin, age, disability, sex, sexual orientation, or gender identity.            Thank you!     [facility]  [facility]. Our goal is always to provide you with excellent care. Hearing back from our patients is one way we can continue to improve our services. Please take a few minutes to complete the written survey that you may receive in the " mail after your visit with us. Thank you!             Your Updated Medication List - Protect others around you: Learn how to safely use, store and throw away your medicines at www.disposemymeds.org.          This list is accurate as of 10/1/18  2:30 PM.  Always use your most recent med list.                   Brand Name Dispense Instructions for use Diagnosis    * albuterol (2.5 MG/3ML) 0.083% neb solution     1 Box    Take 1 vial (2.5 mg) by nebulization every 6 hours as needed for shortness of breath / dyspnea    Moderate persistent asthma, uncomplicated       * albuterol 108 (90 Base) MCG/ACT inhaler    PROAIR HFA/PROVENTIL HFA/VENTOLIN HFA    3 Inhaler    Inhale 2 puffs into the lungs every 6 hours as needed for shortness of breath / dyspnea or wheezing    Moderate persistent asthma, uncomplicated       alendronate 70 MG tablet    FOSAMAX    12 tablet    TAKE 1 TABLET EVERY 7 DAYS, TAKE 60 MINUTES BEFORE A.M. MEAL WITH 8 OUNCE WATER, REMAIN UPRIGHT FOR 30 MINUTES    Age-related osteoporosis without current pathological fracture       aspirin 81 MG tablet      Take 81 mg by mouth daily.        atorvastatin 40 MG tablet    LIPITOR    90 tablet    Take 1 tablet (40 mg) by mouth daily    Coronary artery disease involving native coronary artery of native heart with unstable angina pectoris (H), Status post coronary angioplasty       biotin 2.5 mg/mL Susp      Take by mouth daily 2000mcg        bisoprolol 5 MG tablet    ZEBETA    45 tablet    Take 0.5 tablets (2.5 mg) by mouth daily    Coronary artery disease involving native coronary artery of native heart with unstable angina pectoris (H)       Calcium 8551-5576 MG-UNIT Chew      Take 1 tablet by mouth daily.        FISH OIL DOUBLE STRENGTH 1200 MG capsule   Generic drug:  omega-3 fatty acids      Take 1 capsule by mouth 2 times daily        fluticasone 50 MCG/ACT spray    FLONASE    48 g    USE 1 TO 2 SPRAYS IN EACH NOSTRIL DAILY    Chronic rhinitis        "fluticasone-salmeterol 250-50 MCG/DOSE diskus inhaler    ADVAIR    3 Inhaler    Inhale 1 puff into the lungs every 12 hours    Uncomplicated severe persistent asthma       furosemide 20 MG tablet    LASIX    90 tablet    Take 1 tablet (20 mg) by mouth daily    Diastolic CHF, chronic (H)       HYDROcodone-acetaminophen 5-325 MG per tablet    NORCO    14 tablet    Take 1 tablet by mouth every 4 hours as needed for pain        isosorbide mononitrate 30 MG 24 hr tablet    IMDUR    90 tablet    Take 1 tablet (30 mg) by mouth daily    ESPITIA (dyspnea on exertion), ASHD (arteriosclerotic heart disease)       MAGNESIUM ACETATE      Take 250 mg by mouth daily        methimazole 5 MG tablet    TAPAZOLE    45 tablet    TAKE ONE-HALF (1/2) TABLET DAILY    Subclinical hyperthyroidism       montelukast 10 MG tablet    SINGULAIR    90 tablet    Take 1 tablet (10 mg) by mouth At Bedtime    Chronic non-seasonal allergic rhinitis, unspecified trigger       nitroGLYcerin 0.4 MG sublingual tablet    NITROSTAT    25 tablet    One tablet under the tongue every 5 minutes if needed for chest pain. May repeat every 5 minutes for a maximum of 3 doses in 15 minutes\"    Coronary artery disease involving native coronary artery of native heart with unstable angina pectoris (H), Status post coronary angioplasty       omeprazole 20 MG CR capsule    priLOSEC    90 capsule    Take 1 capsule (20 mg) by mouth daily    Gastroesophageal reflux disease without esophagitis       OSTEO BI-FLEX/5-LOXIN ADVANCED Tabs      Take 1,500 mg by mouth 2 times daily        prasugrel 10 MG Tabs tablet    EFFIENT    90 tablet    Take 1 tablet (10 mg) by mouth daily Do not crush or break tablet.    Coronary artery disease involving native coronary artery of native heart with unstable angina pectoris (H), Status post coronary angioplasty       RESTASIS 0.05 % ophthalmic emulsion   Generic drug:  cycloSPORINE      Place 1 drop into both eyes 2 times daily        " spironolactone 12.5 mg Tabs half-tab    ALDACTONE     Take 100 mg by mouth See Admin Instructions Monday, Wedn, Friday        VITAMIN D3 PO      Take 2,000 Units by mouth daily        * Notice:  This list has 2 medication(s) that are the same as other medications prescribed for you. Read the directions carefully, and ask your doctor or other care provider to review them with you.

## 2018-12-16 DIAGNOSIS — J45.40 MODERATE PERSISTENT ASTHMA, UNCOMPLICATED: ICD-10-CM

## 2018-12-16 NOTE — TELEPHONE ENCOUNTER
"Requested Prescriptions   Pending Prescriptions Disp Refills     PROAIR  (90 Base) MCG/ACT inhaler [Pharmacy Med Name: PROAIR HFA INH 8.5GM W/COUNT 90MCG]  Last Written Prescription Date:  10/01/2018  Last Fill Quantity: 3 inhaler,  # refills: 0   Last office visit: 10/1/2018 with prescribing provider:  Elsy Bah MD    Future Office Visit:   Next 5 appointments (look out 90 days)    Jan 09, 2019 11:40 AM CST  SHORT with Elsy Bah MD  [facility] [address] [place] [address]  [address]  [facility] [address]  [phone]          25.5 g 0     Sig: USE 2 INHALATIONS EVERY 6 HOURS AS NEEDED FOR SHORTNESS OF BREATH/DYSPNEA OR WHEEZING    Asthma Maintenance Inhalers - Anticholinergics Failed - 12/16/2018  6:14 AM       Failed - Asthma control assessment score within normal limits in last 6 months    Please review ACT score.   ACT Total Scores 10/5/2016 8/4/2017 3/5/2018   ACT TOTAL SCORE - - -   ASTHMA ER VISITS - - -   ASTHMA HOSPITALIZATIONS - - -   ACT TOTAL SCORE (Goal Greater than or Equal to 20) 21 14 17   In the past 12 months, how many times did you visit the emergency room for your asthma without being admitted to the hospital? 0 0 0   In the past 12 months, how many times were you hospitalized overnight because of your asthma? 0 0 0            Passed - Patient is age 12 years or older       Passed - Recent (6 mo) or future (30 days) visit within the authorizing provider's specialty    Patient had office visit in the last 6 months or has a visit in the next 30 days with authorizing provider or within the authorizing provider's specialty.  See \"Patient Info\" tab in inbasket, or \"Choose Columns\" in Meds & Orders section of the refill encounter.              "

## 2018-12-18 NOTE — TELEPHONE ENCOUNTER
Routing refill request to provider for review/approval because:  ACT < FMG protocol for RN fill  Follow up was advised 3 months from MICHELLE PIMENTEL RN - Triage  [facility]

## 2018-12-19 RX ORDER — ALBUTEROL SULFATE 90 UG/1
AEROSOL, METERED RESPIRATORY (INHALATION)
Qty: 25.5 G | Refills: 0 | Status: SHIPPED | OUTPATIENT
Start: 2018-12-19 | End: 2019-03-07

## 2018-12-19 NOTE — TELEPHONE ENCOUNTER
rx sent. Due for office visit and fasting labs in January. Please let know    Deisi Liu MD  Internal Medicine/Pediatrics

## 2018-12-20 DIAGNOSIS — I25.10 ASHD (ARTERIOSCLEROTIC HEART DISEASE): ICD-10-CM

## 2018-12-20 DIAGNOSIS — R06.09 DOE (DYSPNEA ON EXERTION): ICD-10-CM

## 2018-12-20 RX ORDER — ISOSORBIDE MONONITRATE 30 MG/1
30 TABLET, EXTENDED RELEASE ORAL DAILY
Qty: 90 TABLET | Refills: 0 | Status: SHIPPED | OUTPATIENT
Start: 2018-12-20 | End: 2019-03-20

## 2018-12-29 ENCOUNTER — APPOINTMENT (OUTPATIENT)
Dept: GENERAL RADIOLOGY | Facility: CLINIC | Age: 78
End: 2018-12-29
Attending: EMERGENCY MEDICINE
Payer: MEDICARE

## 2018-12-29 ENCOUNTER — HOSPITAL ENCOUNTER (EMERGENCY)
Facility: CLINIC | Age: 78
Discharge: HOME | End: 2018-12-29
Attending: EMERGENCY MEDICINE | Admitting: EMERGENCY MEDICINE
Payer: MEDICARE

## 2018-12-29 VITALS
DIASTOLIC BLOOD PRESSURE: 87 MMHG | HEART RATE: 70 BPM | OXYGEN SATURATION: 95 % | SYSTOLIC BLOOD PRESSURE: 97 MMHG | TEMPERATURE: 97.7 F | RESPIRATION RATE: 16 BRPM

## 2018-12-29 DIAGNOSIS — S73.004A CLOSED DISLOCATION OF RIGHT HIP, INITIAL ENCOUNTER (H): ICD-10-CM

## 2018-12-29 PROCEDURE — 27265 TREAT HIP DISLOCATION: CPT | Mod: RT

## 2018-12-29 PROCEDURE — 40000275 ZZH STATISTIC RCP TIME EA 10 MIN

## 2018-12-29 PROCEDURE — 96374 THER/PROPH/DIAG INJ IV PUSH: CPT | Mod: 59

## 2018-12-29 PROCEDURE — 99152 MOD SED SAME PHYS/QHP 5/>YRS: CPT

## 2018-12-29 PROCEDURE — 99285 EMERGENCY DEPT VISIT HI MDM: CPT | Mod: 25

## 2018-12-29 PROCEDURE — 73502 X-RAY EXAM HIP UNI 2-3 VIEWS: CPT

## 2018-12-29 PROCEDURE — 25000128 H RX IP 250 OP 636: Performed by: EMERGENCY MEDICINE

## 2018-12-29 PROCEDURE — 99153 MOD SED SAME PHYS/QHP EA: CPT

## 2018-12-29 PROCEDURE — 40000986 XR PELVIS AND HIP RIGHT 2 VIEWS

## 2018-12-29 RX ORDER — MORPHINE SULFATE 4 MG/ML
4 INJECTION, SOLUTION INTRAMUSCULAR; INTRAVENOUS ONCE
Status: COMPLETED | OUTPATIENT
Start: 2018-12-29 | End: 2018-12-29

## 2018-12-29 RX ORDER — PROPOFOL 10 MG/ML
40 INJECTION, EMULSION INTRAVENOUS ONCE
Status: COMPLETED | OUTPATIENT
Start: 2018-12-29 | End: 2018-12-29

## 2018-12-29 RX ORDER — MORPHINE SULFATE 4 MG/ML
6 INJECTION, SOLUTION INTRAMUSCULAR; INTRAVENOUS ONCE
Status: COMPLETED | OUTPATIENT
Start: 2018-12-29 | End: 2018-12-29

## 2018-12-29 RX ORDER — HYDROCODONE BITARTRATE AND ACETAMINOPHEN 5; 325 MG/1; MG/1
1 TABLET ORAL EVERY 6 HOURS PRN
Qty: 15 TABLET | Refills: 0 | Status: SHIPPED | OUTPATIENT
Start: 2018-12-29 | End: 2019-05-09

## 2018-12-29 RX ORDER — PROPOFOL 10 MG/ML
20 INJECTION, EMULSION INTRAVENOUS ONCE
Status: COMPLETED | OUTPATIENT
Start: 2018-12-29 | End: 2018-12-29

## 2018-12-29 RX ORDER — FENTANYL CITRATE 50 UG/ML
75 INJECTION, SOLUTION INTRAMUSCULAR; INTRAVENOUS ONCE
Status: COMPLETED | OUTPATIENT
Start: 2018-12-29 | End: 2018-12-29

## 2018-12-29 RX ORDER — FENTANYL CITRATE 50 UG/ML
INJECTION, SOLUTION INTRAMUSCULAR; INTRAVENOUS
Status: DISCONTINUED
Start: 2018-12-29 | End: 2018-12-29 | Stop reason: WASHOUT

## 2018-12-29 RX ADMIN — MORPHINE SULFATE 4 MG: 4 INJECTION INTRAVENOUS at 20:18

## 2018-12-29 RX ADMIN — PROPOFOL 40 MG: 10 INJECTION, EMULSION INTRAVENOUS at 21:17

## 2018-12-29 RX ADMIN — PROPOFOL 40 MG: 10 INJECTION, EMULSION INTRAVENOUS at 21:07

## 2018-12-29 RX ADMIN — MORPHINE SULFATE 6 MG: 4 INJECTION INTRAVENOUS at 19:57

## 2018-12-29 RX ADMIN — FENTANYL CITRATE 75 MCG: 50 INJECTION, SOLUTION INTRAMUSCULAR; INTRAVENOUS at 21:04

## 2018-12-29 RX ADMIN — PROPOFOL 20 MG: 10 INJECTION, EMULSION INTRAVENOUS at 21:09

## 2018-12-29 NOTE — ED AVS SNAPSHOT
[facility] Emergency Department  [address]  [facility] [address]  Phone:  [phone]  Fax:  [phone]                                    June V Selvin   MRN: [id number]    Department:  [facility] Emergency Department   Date of Visit:  12/29/2018           After Visit Summary Signature Page    I have received my discharge instructions, and my questions have been answered. I have discussed any challenges I see with this plan with the nurse or doctor.    ..........................................................................................................................................  Patient/Patient Representative Signature      ..........................................................................................................................................  Patient Representative Print Name and Relationship to Patient    ..................................................               ................................................  Date                                   Time    ..........................................................................................................................................  Reviewed by Signature/Title    ...................................................              ..............................................  Date                                               Time          22EPIC Rev 08/18

## 2018-12-30 NOTE — ED NOTES
Bed: ED11  Expected date: 12/29/18  Expected time: 7:31 PM  Means of arrival: Ambulance  Comments:  [id number]

## 2018-12-30 NOTE — PROGRESS NOTES
RT present with emergency airway equipment and ETCO2 monitoring for conscious sedation. PT SPO2 maintained above 90% with 3L O2 during procedure and ETCO2 maintained between 25-35. Pt had a spell of apnea at the end of the procedure and bag mask was used to maintain SPO2 in the 90s until pt regained consciousness and spontaneous breathing a few minutes later. Pt is currently on room air with SPO2 93% awake and alert.

## 2018-12-30 NOTE — ED PROVIDER NOTES
History     Chief Complaint:  Right Hip Pain     The history is provided by the patient and the EMS personnel.      Victoria Montalvo is a 78 year old female, status post-right hip replacement in October of 20015, with past medical history pertinent for CHF, CAD, and hypertension who presents to the emergency department today via EMS for evaluation of right hip pain. Patient states she was standing up from a seated position out of a chair when she heard a pop in her right hip that feels similar to previous episodes of hip dislocation. She highlights 3 incidents of similar events the since her hip replacement. She states dinner was at 1700 and claims she had about 6oz of wine.     Allergies:  Animal dander  Dust mites  Kiwi  Pollen extract  Seasonal allergies    Medications:    Lipitor  Zebeta  Lasix  Imdur  Nitrostat  Effient   Biotin  Tapazol  Montelukast    Past Medical History:    Abnormal pap smear of cervix and cervical HPV  Arthritis  Chronic rhinitis  CAD  CHF  GERD  Hyperlipidemia  Right hip pain  Left subclavian artery stenosis  Hyperthyroidism  Asthma  Pulmonary hypertension    Past Surgical History:    Hip arthroplasty  Liposuction of legs and stomach  Ankle pins removed  Ankle dislocation and fixation  Angiogram and left subclavical artery stent    Family History:    Mother: Alzheimer disease, GI disease  Father: throat, lung, and liver cancer, as well as CABG  Brother: suicidal, pacemaker  Maternal grandmother: heart disease, hypertension, lipids  Paternal grandmother: stomach cancer  Daughter: allergies  Son: allergies    Social History:  The patient was accompanied to the ED by daughter.  Smoking Status: Former Smoker  Smokeless Tobacco: Never Used  Alcohol Use: Positive  Marital Status:      Review of Systems   Musculoskeletal:        Right hip pain   All other systems reviewed and are negative.      Physical Exam     Patient Vitals for the past 24 hrs:   BP Temp Temp src Pulse Heart Rate Resp  SpO2   12/29/18 2200 97/87 -- -- 70 -- -- 95 %   12/29/18 2134 -- -- -- -- 70 16 91 %   12/29/18 2133 (!) 110/96 -- -- 71 -- -- --   12/29/18 2001 -- -- -- -- -- -- 96 %   12/29/18 1948 (!) 153/91 97.7  F (36.5  C) Oral 73 -- 18 98 %     Physical Exam  Constitutional: Vital signs reviewed as above.  Head: No external signs of trauma noted.  Eyes: Pupils are equal, round, and reactive to light.   Oropharynx: Non edematous. Normal mucous membranes.   Neck: No JVD noted  Cardiovascular: Normal rate, regular rhythm and normal heart sounds.  No murmur heard. Equal B/L peripheral pulses.  Pulmonary/Chest: Effort normal and breath sounds normal. No respiratory distress. Patient has no wheezes. Patient has no rales.   Gastrointestinal: Soft. There is no tenderness.   Musculoskeletal/Extremities: No ASIS tenderness. There is tenderness at the right trochanteric area. No LLE tenderness.  Neurological: Patient is alert and oriented to person, place, and time.   Skin: Skin is warm and dry. There is no diaphoresis noted.   Psychiatric: Appropriate for condition.    Emergency Department Course     Imaging:  Radiology findings were communicated with the patient who voiced understanding of the findings.    XR Pelvis w Hip Right 1 View  Arthroplasty components are dislocated from one another.  BEATRIZ WEIR MD  Reading per radiology    XR Pelvis w Hip Right G/E 2 Views (2nd XR)   Final Result   IMPRESSION: Successful reduction.      BEATRIZ WEIR MD      XR Pelvis w Hip Right 1 View (1st XR)   Final Result   IMPRESSION: Arthroplasty components are dislocated from one another.      BEATRIZ WEIR MD        Procedures:    [facility] Procedure Note        Sedation:      Performed by: Nelson Ko MD    Pre-Procedure Assessment done at 2000.    Expected Level:  Deep Sedation    Indication:  Sedation is required to allow for joint reduction    Consent obtained from patient after discussing the risks, benefits and  alternatives.    PO Intake:  Appropriately NPO for procedure    ASA Class:  Class 2 - MILD SYSTEMIC DISEASE, NO ACUTE PROBLEMS, NO FUNCTIONAL LIMITATIONS.    Mallampati:  Grade 2:  Soft palate, base of uvula, tonsillar pillars, and portion of posterior pharyngeal wall visible    Lungs: Lungs Clear with good breath sounds bilaterally.     Heart: Normal heart sounds and rate    History and physical reviewed and no updates needed. I have reviewed the lab findings, diagnostic data, medications, and the plan for sedation. I have determined this patient to be an appropriate candidate for the planned sedation and procedure and have reassessed the patient IMMEDIATELY PRIOR to sedation and procedure.  Sedation Post Procedure Summary:    Prior to the start of the procedure and with procedural staff participation, I verbally confirmed the patient s identity using two indicators, relevant allergies, that the procedure was appropriate and matched the consent or emergent situation, and that the correct equipment/implants were available. Immediately prior to starting the procedure I conducted the Time Out with the procedural staff and re-confirmed the patient s name, procedure, and site/side. (The Joint Commission universal protocol was followed.)  Yes      Sedatives: Fentanyl and Propofol    Vital signs, airway, End Tidal CO2 and pulse oximetry were monitored and remained stable throughout the procedure and sedation was maintained until the procedure was complete.  The patient was monitored by staff until sedation discharge criteria were met.    Patient tolerance: Oxygen Desaturation down to 75%%, resolved with:   Supplementing/Increasing oxygen, Airway Repositioning (e.g. jaw thrust, chin lift) and Positive Pressure ventillation applied (e.g. bag mask).    Time of sedation in minutes:  25 minutes from beginning to end of physician one to one monitoring.    Dislocation Reduction Procedure Note:     Procedure:  Closed dislocation  "reduction of right hip    Indications: Right hip dislocation    Physician: Nelson Ko MD    Consent:  Informed verbal consent obtained, after thorough discussion of benefits as well as potential risks of soft tissue injury, nerve/vascular injury, and bony injury.    Procedure note:  After proper sedation was achieve as detailed above, the affected extremity was identified. Traction with internal and external rotation was not successful. Additional stabilization of the B/L ASIS and inferior pressure on the right femoral head was also not successful. The \"Captain Barrera\" technique was not successful. Eventually, I stood on the bed and applied traction while my colleague, Dr. Wells, applied inferior pressure on the femoral head. The hip was then successfully reduced.  Improvement in pain and range of motion was noted following the procedure.  No neurovascular compromise both pre- and post-reduction was noted.    Interventions:  1957: Morphine 6 mg IV  2018: Morphine 4 mg IV  75 mcg total fentanyl  100 mg total propofol    Emergency Department Course:    1945 Nursing notes and vitals reviewed.    1947 I performed an exam of the patient as documented above.     2022 The patient was sent for a x-ray while in the emergency department, results above.     2059 I performed sedation and reduction as detailed above.     2142 The patient was sent for a x-ray while in the emergency department, results above.      Recheck and update.     I personally reviewed the imaging results with the patient and answered all related questions prior to discharge.    Impression & Plan      Medical Decision Making:  Victoria Montalvo is a 78 year old female who presents to the emergency department today for evaluation of a dislocated right hip.  Please see the HPI and exam for specifics.  The patient's hip was quite difficult to relocate.  This was eventually obtained.  Postreduction x-ray confirms successful reduction.  Patient was " recovered in the ED. She was discharged to follow-up with orthopedic surgeon in the outpatient setting.  Anticipatory guidance given to patient and family prior to discharge.    Diagnosis:    ICD-10-CM   1. Closed dislocation of right hip, initial encounter (H) S73.004A     Disposition:   The patient is discharged to home.    Scribe Disclosure:  Rafael YOUNG, am serving as a scribe at 7:54 PM on 12/29/2018 to document services personally performed by Nelson Ko DO based on my observations and the provider's statements to me.    [facility] EMERGENCY DEPARTMENT       Nelson Ko DO  12/29/18 4802

## 2018-12-30 NOTE — ED TRIAGE NOTES
Pt presents to ED via EMS after feeling like her right hip dislocated.  History of hip replacement, and right hip has dislocated x3 times previous.  ABCs intact, alert and orientated.

## 2019-01-09 ENCOUNTER — OFFICE VISIT (OUTPATIENT)
Dept: PEDIATRICS | Facility: CLINIC | Age: 79
End: 2019-01-09
Payer: MEDICARE

## 2019-01-09 VITALS
SYSTOLIC BLOOD PRESSURE: 120 MMHG | HEART RATE: 71 BPM | WEIGHT: 172 LBS | BODY MASS INDEX: 32.47 KG/M2 | OXYGEN SATURATION: 97 % | HEIGHT: 61 IN | DIASTOLIC BLOOD PRESSURE: 64 MMHG | TEMPERATURE: 98 F

## 2019-01-09 DIAGNOSIS — R19.7 DIARRHEA, UNSPECIFIED TYPE: ICD-10-CM

## 2019-01-09 DIAGNOSIS — L60.8 NAIL DEFORMITY: Primary | ICD-10-CM

## 2019-01-09 DIAGNOSIS — J45.50 SEVERE PERSISTENT ASTHMA WITHOUT COMPLICATION (H): ICD-10-CM

## 2019-01-09 DIAGNOSIS — R79.9 ABNORMAL FINDING OF BLOOD CHEMISTRY: ICD-10-CM

## 2019-01-09 DIAGNOSIS — S73.004S DISLOCATION OF RIGHT HIP, SEQUELA: ICD-10-CM

## 2019-01-09 PROCEDURE — 99214 OFFICE O/P EST MOD 30 MIN: CPT | Performed by: PEDIATRICS

## 2019-01-09 ASSESSMENT — MIFFLIN-ST. JEOR: SCORE: 1192.57

## 2019-01-09 NOTE — PROGRESS NOTES
"  SUBJECTIVE:   Victoria Montalvo is a 78 year old female who presents to clinic today for the following health issues:      Medication Followup of     Taking Medication as prescribed: yes    Side Effects:  None    Medication Helping Symptoms:  yes     1. Recurrent right hip dislocation - patient states she has had 3 dislocations in the past year - 12/17, 5/18 and last month 12/18 - she initially had surgery 10/19/15 and is asking me if the joint device is in the correct place, too lose?  Patient is considering second opinion at [place].  When she saw her ortho she was not pleased that no solution was offered to this problem - was just told to sit with legs forward and keep everything at countertop level.    2. Nails peeling - patient has noticed this over the past few months - now using nail vitamins and clear nail polish to keep from peeling. Denies diet changes or trauma.    3. Diarrhea - patient had been passing small amounts of stool eat time she passed gas - she has somewhat fixed this problem by taking immodium a few times per week.  She is also still taking magnesium.    4. COPD - using Advair - has to use nebs when going up the stairs with laundry.     Problem list and histories reviewed & adjusted, as indicated.  Additional history: as documented    Reviewed and updated as needed this visit by clinical staff       Reviewed and updated as needed this visit by Provider         ROS:  Constitutional, HEENT, cardiovascular, pulmonary, gi and gu systems are negative, except as otherwise noted.    OBJECTIVE:     /64 (BP Location: Right arm, Cuff Size: Adult Large)   Pulse 71   Temp 98  F (36.7  C) (Oral)   Ht 1.549 m (5' 1\")   Wt 78 kg (172 lb)   SpO2 97%   BMI 32.50 kg/m    Body mass index is 32.5 kg/m .  GENERAL APPEARANCE: healthy, alert and no distress  RESP: lungs clear to auscultation - no rales, rhonchi or wheezes  CV: regular rates and rhythm, normal S1 S2, no S3 or S4 and no murmur, click or " rub  SKIN: nail with slight peeling at tips, no other abnormalities.     Diagnostic Test Results:  none     ASSESSMENT/PLAN:       1. Nail deformity  Will check for nutritional deficiencies, then would advise to continue vitmains and use clear polish to prevent peeling. Could offer derm referral.  - CBC with platelets  - Iron and iron binding capacity  - Ferritin  - TSH with free T4 reflex    2. Abnormal finding of blood chemistry   - Iron and iron binding capacity  - Ferritin    3. Severe persistent asthma without complication  Controlled - wants a new neb machine  - order for DME; Equipment being ordered: Nebulizer  Dispense: 1 Units; Refill: 0    4. Dislocation of right hip, sequela  Support seeking second opinion - patient planning to go to [place]    5. Diarrhea, unspecified type  Stop magnesium .    Patient needs fasting labs - not fasting today.  Advised to fast when going to cardiology apt.       Patient Instructions   1. Stop the magnesium as this may be causing diarrhea    2. For nails - will start by checking labs    3. I fully support you getting a second opinion for your hip.    4. Make sure you come fasting to your cardiology appt so lipids can be checked.    Please consider getting the tetanus vaccine at you next visit.      Elsy Bah MD  [facility]

## 2019-01-09 NOTE — PATIENT INSTRUCTIONS
1. Stop the magnesium as this may be causing diarrhea    2. For nails - will start by checking labs    3. I fully support you getting a second opinion for your hip.    4. Make sure you come fasting to your cardiology appt so lipids can be checked.    Please consider getting the tetanus vaccine at you next visit.

## 2019-01-10 ASSESSMENT — ASTHMA QUESTIONNAIRES: ACT_TOTALSCORE: 13

## 2019-01-11 ENCOUNTER — TELEPHONE (OUTPATIENT)
Dept: PEDIATRICS | Facility: CLINIC | Age: 79
End: 2019-01-11

## 2019-01-11 DIAGNOSIS — J45.50 SEVERE PERSISTENT ASTHMA WITHOUT COMPLICATION (H): ICD-10-CM

## 2019-01-11 NOTE — TELEPHONE ENCOUNTER
[facility] calling, patient is there now to get a Nebulizer machine.     Per insurance requirements the DME for Nebulizer needs the Albuterol neb rx details on the DME. They also need chart notes.     Reordered DME for Nebulizer with requested information. Printed LOV notes.     Faxed all to [phone].

## 2019-01-14 NOTE — TELEPHONE ENCOUNTER
Received form from [facility] to complete for the nebs.     Placed in Dr. Bah's inbasket for signature.

## 2019-01-18 DIAGNOSIS — E05.90 SUBCLINICAL HYPERTHYROIDISM: ICD-10-CM

## 2019-01-21 NOTE — TELEPHONE ENCOUNTER
"Requested Prescriptions   Pending Prescriptions Disp Refills     methimazole (TAPAZOLE) 5 MG tablet [Pharmacy Med Name: METHIMAZOLE TABS 5MG] 45 tablet 1    Last Written Prescription Date:  07/24/18  Last Fill Quantity: 45,  # refills: 1   Last office visit: 1/17/2018 with prescribing provider:  Yes - SENT MESSAGE TO MAKE APPT!!   Future Office Visit:     Sig: TAKE ONE-HALF (1/2) TABLET DAILY    Anti-Thyroid Agents Protocol Failed - 1/18/2019  1:33 AM       Failed - Recent (12 mo) or future (30 days) visit within the authorizing provider's specialty    Patient had office visit in the last 12 months or has a visit in the next 30 days with authorizing provider or within the authorizing provider's specialty.  See \"Patient Info\" tab in inbasket, or \"Choose Columns\" in Meds & Orders section of the refill encounter.             Failed - Refills for this medication group require provider approval       Failed - Normal TSH in past 12 months    Recent Labs   Lab Test 01/08/18  1027   TSH 1.42             Passed - CBC is on file within the past 12 months    Recent Labs   Lab Test 10/01/18  1444   WBC 6.8   RBC 3.60*   HGB 10.2*   HCT 32.0*                   Passed - Medication is active on the patient's med list       Passed - Patient is age 18 or older       Passed - No active pregnancy on record       Passed - No positive pregnancy test in past 12 months          "

## 2019-01-23 RX ORDER — METHIMAZOLE 5 MG/1
TABLET ORAL
Qty: 45 TABLET | Refills: 0 | Status: SHIPPED | OUTPATIENT
Start: 2019-01-23 | End: 2019-04-18

## 2019-01-23 NOTE — TELEPHONE ENCOUNTER
Rx sent.     last visit 1/2018  TSH   Date Value Ref Range Status   01/08/2018 1.42 0.40 - 4.00 mU/L Final     Due for clinic visit.  Please send reminder letter.  Needs clinic visit before further refills.

## 2019-03-07 DIAGNOSIS — J45.40 MODERATE PERSISTENT ASTHMA, UNCOMPLICATED: ICD-10-CM

## 2019-03-08 RX ORDER — ALBUTEROL SULFATE 90 UG/1
AEROSOL, METERED RESPIRATORY (INHALATION)
Qty: 25.5 G | Refills: 0 | Status: SHIPPED | OUTPATIENT
Start: 2019-03-08 | End: 2019-05-23

## 2019-03-08 NOTE — TELEPHONE ENCOUNTER
Prescription approved per [facility] Refill Protocol.    Yvonne Koenig RN

## 2019-03-08 NOTE — TELEPHONE ENCOUNTER
"Requested Prescriptions   Pending Prescriptions Disp Refills     PROAIR  (90 Base) MCG/ACT inhaler [Pharmacy Med Name: PROAIR HFA INH 8.5GM W/COUNT 90MCG]  Last Written Prescription Date:  12/19/18  Last Fill Quantity: 25.5 G,  # refills: 0   Last office visit: 1/9/2019 with prescribing provider:  TORITO   Future Office Visit:   Next 5 appointments (look out 90 days)    Mar 21, 2019  1:50 PM CDT  Return Visit with Alia Yoo PA-C  [facility] ([facility]) [address]  [facility] [facility] [id number] [phone]   Mar 28, 2019 11:00 AM CDT  Return Visit with Annie Alvarez MD  [facility] [address]  [facility] [id number] [phone]          25.5 g 0     Sig: USE 2 INHALATIONS EVERY 6 HOURS AS NEEDED FOR SHORTNESS OF BREATH, DYSPNEA OR WHEEZING    Asthma Maintenance Inhalers - Anticholinergics Failed - 3/7/2019  6:26 PM       Failed - Asthma control assessment score within normal limits in last 6 months    Please review ACT score.   ACT Total Scores 8/4/2017 3/5/2018 1/9/2019   ACT TOTAL SCORE - - -   ASTHMA ER VISITS - - -   ASTHMA HOSPITALIZATIONS - - -   ACT TOTAL SCORE (Goal Greater than or Equal to 20) 14 17 13   In the past 12 months, how many times did you visit the emergency room for your asthma without being admitted to the hospital? 0 0 0   In the past 12 months, how many times were you hospitalized overnight because of your asthma? 0 0 0              Passed - Patient is age 12 years or older       Passed - Medication is active on med list       Passed - Recent (6 mo) or future (30 days) visit within the authorizing provider's specialty    Patient had office visit in the last 6 months or has a visit in the next 30 days with authorizing provider or within the authorizing provider's specialty.  See \"Patient Info\" tab in inbasket, or \"Choose Columns\" in Meds & " Orders section of the refill encounter.

## 2019-03-19 DIAGNOSIS — Z98.61 STATUS POST CORONARY ANGIOPLASTY: ICD-10-CM

## 2019-03-19 DIAGNOSIS — I25.110 CORONARY ARTERY DISEASE INVOLVING NATIVE CORONARY ARTERY OF NATIVE HEART WITH UNSTABLE ANGINA PECTORIS (H): ICD-10-CM

## 2019-03-19 RX ORDER — ATORVASTATIN CALCIUM 40 MG/1
40 TABLET, FILM COATED ORAL DAILY
Qty: 90 TABLET | Refills: 3 | Status: SHIPPED | OUTPATIENT
Start: 2019-03-19 | End: 2020-03-18

## 2019-03-20 DIAGNOSIS — R06.09 DOE (DYSPNEA ON EXERTION): ICD-10-CM

## 2019-03-20 DIAGNOSIS — I25.10 ASHD (ARTERIOSCLEROTIC HEART DISEASE): ICD-10-CM

## 2019-03-20 RX ORDER — ISOSORBIDE MONONITRATE 30 MG/1
30 TABLET, EXTENDED RELEASE ORAL DAILY
Qty: 90 TABLET | Refills: 3 | Status: SHIPPED | OUTPATIENT
Start: 2019-03-20 | End: 2020-03-16

## 2019-03-23 DIAGNOSIS — K21.9 GASTROESOPHAGEAL REFLUX DISEASE WITHOUT ESOPHAGITIS: ICD-10-CM

## 2019-03-23 NOTE — TELEPHONE ENCOUNTER
"Requested Prescriptions   Pending Prescriptions Disp Refills     omeprazole (PRILOSEC) 20 MG DR capsule [Pharmacy Med Name: OMEPRAZOLE DR CAPS 20MG]  Last Written Prescription Date:  03/26/2018  Last Fill Quantity: 90 capsule,  # refills: 3   Last office visit: 1/9/2019 with prescribing provider:  Elsy Bah MD    Future Office Visit:   Next 5 appointments (look out 90 days)    Apr 17, 2019 12:30 PM CDT  Return Visit with Alia Yoo PA-C  [facility] ([facility]) [address]  [facility] [id number] [phone]   May 09, 2019 11:00 AM CDT  Return Visit with Annie Alvarez MD  [facility] [address]  [facility] [id number] [phone]          90 capsule 3     Sig: TAKE 1 CAPSULE DAILY    PPI Protocol Passed - 3/23/2019  2:26 AM       Passed - Not on Clopidogrel (unless Pantoprazole ordered)       Passed - No diagnosis of osteoporosis on record       Passed - Recent (12 mo) or future (30 days) visit within the authorizing provider's specialty    Patient had office visit in the last 12 months or has a visit in the next 30 days with authorizing provider or within the authorizing provider's specialty.  See \"Patient Info\" tab in inbasket, or \"Choose Columns\" in Meds & Orders section of the refill encounter.             Passed - Medication is active on med list       Passed - Patient is age 18 or older       Passed - No active pregnacy on record       Passed - No positive pregnancy test in past 12 months          "

## 2019-03-27 DIAGNOSIS — I25.110 CORONARY ARTERY DISEASE INVOLVING NATIVE CORONARY ARTERY OF NATIVE HEART WITH UNSTABLE ANGINA PECTORIS (H): ICD-10-CM

## 2019-03-27 DIAGNOSIS — Z98.61 STATUS POST CORONARY ANGIOPLASTY: ICD-10-CM

## 2019-03-27 RX ORDER — PRASUGREL 10 MG/1
10 TABLET, FILM COATED ORAL DAILY
Qty: 90 TABLET | Refills: 0 | Status: SHIPPED | OUTPATIENT
Start: 2019-03-27 | End: 2019-06-17

## 2019-03-30 DIAGNOSIS — J31.0 CHRONIC RHINITIS: ICD-10-CM

## 2019-03-30 NOTE — TELEPHONE ENCOUNTER
"Requested Prescriptions   Pending Prescriptions Disp Refills     fluticasone (FLONASE) 50 MCG/ACT nasal spray [Pharmacy Med Name: FLUTICASONE CO NS SP 16GM RX 50MCG]  Last Written Prescription Date:  10/01/2018  Last Fill Quantity: 48g,  # refills: 1   Last office visit: 1/9/2019 with prescribing provider:  Elsy Bah MD    Future Office Visit:   Next 5 appointments (look out 90 days)    Apr 17, 2019 12:30 PM CDT  [facility] Visit [facility] Alia Yoo PA-C  [facility]) [address]  [facility] [id number] [phone]   May 09, 2019 11:00 AM CDT  Return Visit with Annie Alvarez MD  [facility] [address]  [facility] [id number] [phone]          48 g 1     Sig: USE 1 TO 2 SPRAYS IN EACH NOSTRIL DAILY    Inhaled Steroids Protocol Failed - 3/30/2019  2:19 AM       Failed - Asthma control assessment score within normal limits in last 6 months    Please review ACT score.   ACT Total Scores 8/4/2017 3/5/2018 1/9/2019   ACT TOTAL SCORE - - -   ASTHMA ER VISITS - - -   ASTHMA HOSPITALIZATIONS - - -   ACT TOTAL SCORE (Goal Greater than or Equal to 20) 14 17 13   In the past 12 months, how many times did you visit the emergency room for your asthma without being admitted to the hospital? 0 0 0   In the past 12 months, how many times were you hospitalized overnight because of your asthma? 0 0 0              Passed - Patient is age 12 or older       Passed - Medication is active on med list       Passed - Recent (6 mo) or future (30 days) visit within the authorizing provider's specialty    Patient had office visit in the last 6 months or has a visit in the next 30 days with authorizing provider or within the authorizing provider's specialty.  See \"Patient Info\" tab in inbasket, or \"Choose Columns\" in Meds & Orders section of the refill encounter.              "

## 2019-04-01 RX ORDER — FLUTICASONE PROPIONATE 50 MCG
SPRAY, SUSPENSION (ML) NASAL
Qty: 48 G | Refills: 3 | Status: SHIPPED | OUTPATIENT
Start: 2019-04-01 | End: 2020-07-29

## 2019-04-17 ENCOUNTER — OFFICE VISIT (OUTPATIENT)
Dept: CARDIOLOGY | Facility: CLINIC | Age: 79
End: 2019-04-17
Payer: MEDICARE

## 2019-04-17 VITALS
HEART RATE: 80 BPM | SYSTOLIC BLOOD PRESSURE: 100 MMHG | BODY MASS INDEX: 33.04 KG/M2 | DIASTOLIC BLOOD PRESSURE: 70 MMHG | HEIGHT: 61 IN | OXYGEN SATURATION: 96 % | WEIGHT: 175 LBS

## 2019-04-17 DIAGNOSIS — I50.32 DIASTOLIC CHF, CHRONIC (H): ICD-10-CM

## 2019-04-17 DIAGNOSIS — I87.2 VENOUS (PERIPHERAL) INSUFFICIENCY: ICD-10-CM

## 2019-04-17 DIAGNOSIS — E78.5 HYPERLIPIDEMIA LDL GOAL <130: ICD-10-CM

## 2019-04-17 DIAGNOSIS — I77.1 SUBCLAVIAN ARTERY STENOSIS, LEFT (H): ICD-10-CM

## 2019-04-17 DIAGNOSIS — I25.110 CORONARY ARTERY DISEASE INVOLVING NATIVE CORONARY ARTERY OF NATIVE HEART WITH UNSTABLE ANGINA PECTORIS (H): Primary | ICD-10-CM

## 2019-04-17 PROCEDURE — 99214 OFFICE O/P EST MOD 30 MIN: CPT | Performed by: PHYSICIAN ASSISTANT

## 2019-04-17 ASSESSMENT — MIFFLIN-ST. JEOR: SCORE: 1206.17

## 2019-04-17 NOTE — LETTER
2019    Elsy Bah MD  [address] [place] [address]    RE: Victoria Montalvo       Dear Colleague,    I had the pleasure of seeing Victoria Montalvo in the [facility].    CARDIOLOGY CLINIC PROGRESS NOTE    DOS: 2019      Victoria Montalvo  : 1940, 78 year old  MRN: [id number]      History:   I had the pleasure of following up with Victoria Montalvo today in the Cardiology Clinic.  Victoria is a patient of Dr. Yepez.       Victoria is a very pleasant 78-year-old woman with a history of hypertension, hyperlipidemia, diastolic dysfunction, left subclavian stenosis status post percutaneous revascularization in , chronic venous insufficiency, sleep apnea not on CPAP, and more recently diagnosed coronary artery disease.       Please see my prior note from 2/15/18 for details.     Interval History:   She was admitted in 2018.  She had a negative nuclear stress test.     She was last seen by Dr. Yepez 3/29/18.  At that time she was stable.  She had some SOB when she started up walking, but none on the elliptical.     She presents today for annual follow up.   She still has some ESPITIA.   She has not checked her heart rate with activity as Dr. Yepez recommended.       ROS:  Skin:  Positive for bruising   Eyes:  Positive for glasses  ENT:  Negative    Respiratory:  Positive for dyspnea on exertion;wheezing  Cardiovascular:    edema;Positive for  Gastroenterology: Positive for reflux  Genitourinary:  Positive for urinary frequency;nocturia  Musculoskeletal:  Positive for back pain;arthritis  Neurologic:  Positive for numbness or tingling of hands  Psychiatric:  Positive for depression;anxiety;excessive stress  Heme/Lymph/Imm:  Positive for easy bruising  Endocrine:  Positive for thyroid disorder    PAST MEDICAL HISTORY:  Past Medical History:   Diagnosis Date     Abnormal Papanicolaou smear of cervix and cervical HPV     colposcopy      Arthritis      Chronic rhinitis      COPD  (chronic obstructive pulmonary disease) (H)      Coronary artery disease     4/4/17 SHERLEY--PDA     Diastolic CHF, chronic (H) 2/22/2012     Dislocation of hip (H)     5/25/18 and 12/29/18 with successful reductions in ED     Gastro-oesophageal reflux disease      H/O heart artery stent 04/04/2017    SHERLEY to PDA     Hyperlipidemia LDL goal <130 10/31/2011     KEN (obstructive sleep apnea)     CPAP     Pain in right hip      Personal history of colonic polyps     colonoscopy 9/97, 2002 (due in 2007)     Pulmonary HTN (H)      Subclavian artery stenosis, left (H) 8/7/2013    S/p stent in 2013      Subclinical hyperthyroidism 10/17/2016     Unspecified asthma(493.90)     on preventative meds and has nebulizer       PAST SURGICAL HISTORY:  Past Surgical History:   Procedure Laterality Date     ARTHROPLASTY HIP Right 10/19/2015    Procedure: ARTHROPLASTY HIP;  Surgeon: Aron Torres MD;  Location: [facility] OR     C NONSPECIFIC PROCEDURE  2/89, 1990    liposuction of legs and stomach     C NONSPECIFIC PROCEDURE  1990    Ankle pins removed     C OPEN RX ANKLE DISLOCATN+FIXATN  4/18/87     COLONOSCOPY  01/1/08    Polyp removed, bx.     COLONOSCOPY  01/12/10     COLONOSCOPY N/A 2/17/2015    Procedure: COLONOSCOPY;  Surgeon: Gato Quiles MD;  Location: [facility] GI     CT CORONARY ANGIOGRAM  04/04/2017    SHERLEY to PDA     HC COLONOSCOPY THRU STOMA, DIAGNOSTIC  2002     HC REDUCTION OF LARGE BREAST  2/89     VASCULAR SURGERY  2013    angiogram & left subclavical artery stent       SOCIAL HISTORY:  Social History     Socioeconomic History     Marital status:      Spouse name: Trenton     Number of children: 2     Years of education: 12     Highest education level: Not on file   Occupational History     Occupation: retired   Social Needs     Financial resource strain: Not on file     Food insecurity:     Worry: Not on file     Inability: Not on file     Transportation needs:     Medical: Not on file     Non-medical: Not  on file   Tobacco Use     Smoking status: Former Smoker     Packs/day: 0.10     Years: 10.00     Pack years: 1.00     Types: Cigarettes     Last attempt to quit: 5/19/2003     Years since quitting: 15.9     Smokeless tobacco: Never Used     Tobacco comment: off and on x 10 years 3-4 cigs: no smoker in the household   Substance and Sexual Activity     Alcohol use: Yes     Alcohol/week: 0.0 oz     Comment: a couple drinks per year     Drug use: No     Sexual activity: Not Currently     Partners: Male   Lifestyle     Physical activity:     Days per week: Not on file     Minutes per session: Not on file     Stress: Not on file   Relationships     Social connections:     Talks on phone: Not on file     Gets together: Not on file     Attends Gnosticist service: Not on file     Active member of club or organization: Not on file     Attends meetings of clubs or organizations: Not on file     Relationship status: Not on file     Intimate partner violence:     Fear of current or ex partner: Not on file     Emotionally abused: Not on file     Physically abused: Not on file     Forced sexual activity: Not on file   Other Topics Concern      Service Not Asked     Blood Transfusions Not Asked     Caffeine Concern No     Occupational Exposure Not Asked     Hobby Hazards Not Asked     Sleep Concern Not Asked     Stress Concern Not Asked     Weight Concern Not Asked     Special Diet No     Comment: regular diet      Back Care Not Asked     Exercise No     Comment: shopping a lot so [employer]      Bike Helmet Not Asked     Seat Belt Not Asked     Self-Exams Not Asked   Social History Narrative     Not on file       FAMILY HISTORY:  Family History   Problem Relation Age of Onset     Alzheimer Disease Mother      Gastrointestinal Disease Mother      Cancer Father         throat and lungs, liver,     Heart Disease Father 57        CABG     Heart Disease Brother         suicidal     Pacemaker Brother      Heart Disease Sister       "Hypertension Maternal Grandmother      Lipids Maternal Grandmother      Cancer Maternal Grandmother         stomach     Cancer Paternal Grandmother         stomach     Allergies Daughter      Allergies Son        MEDS:   Current Outpatient Medications on File Prior to Visit:  albuterol (2.5 MG/3ML) 0.083% nebulizer solution Take 1 vial (2.5 mg) by nebulization every 6 hours as needed for shortness of breath / dyspnea   alendronate (FOSAMAX) 70 MG tablet TAKE 1 TABLET EVERY 7 DAYS, TAKE 60 MINUTES BEFORE A.M. MEAL WITH 8 OUNCE WATER, REMAIN UPRIGHT FOR 30 MINUTES   aspirin 81 MG tablet Take 81 mg by mouth daily.   atorvastatin (LIPITOR) 40 MG tablet Take 1 tablet (40 mg) by mouth daily   biotin 2.5 mg/mL Take by mouth daily 2000mcg   bisoprolol (ZEBETA) 5 MG tablet Take 0.5 tablets (2.5 mg) by mouth daily   Boswellia-Glucosamine-Vit D (OSTEO BI-FLEX/5-LOXIN ADVANCED) TABS Take 1,500 mg by mouth 2 times daily    Calcium 4189-6622 MG-UNIT CHEW Take 1 tablet by mouth daily.   Cholecalciferol (VITAMIN D3 PO) Take 2,000 Units by mouth daily    fluticasone (FLONASE) 50 MCG/ACT nasal spray USE 1 TO 2 SPRAYS IN EACH NOSTRIL DAILY   fluticasone-salmeterol (ADVAIR) 250-50 MCG/DOSE diskus inhaler Inhale 1 puff into the lungs every 12 hours   furosemide (LASIX) 20 MG tablet Take 1 tablet (20 mg) by mouth daily   isosorbide mononitrate (IMDUR) 30 MG 24 hr tablet Take 1 tablet (30 mg) by mouth daily   MAGNESIUM ACETATE Take 250 mg by mouth daily    methimazole (TAPAZOLE) 5 MG tablet TAKE ONE-HALF (1/2) TABLET DAILY   montelukast (SINGULAIR) 10 MG tablet Take 1 tablet (10 mg) by mouth At Bedtime   nitroglycerin (NITROSTAT) 0.4 MG sublingual tablet One tablet under the tongue every 5 minutes if needed for chest pain. May repeat every 5 minutes for a maximum of 3 doses in 15 minutes\"   omega-3 fatty acids (FISH OIL DOUBLE STRENGTH) 1200 MG capsule Take 1 capsule by mouth 2 times daily    omeprazole (PRILOSEC) 20 MG DR capsule TAKE 1 " "CAPSULE DAILY   order for DME Equipment being ordered: Nebulizer. Uses: albuterol (2.5 MG/3ML) 0.083% nebulizer solution- Sig - Route: Take 1 vial (2.5 mg) by nebulization every 6 hours as needed for shortness of breath / dyspnea.   prasugrel (EFFIENT) 10 MG TABS tablet Take 1 tablet (10 mg) by mouth daily Do not crush or break tablet.   PROAIR  (90 Base) MCG/ACT inhaler USE 2 INHALATIONS EVERY 6 HOURS AS NEEDED FOR SHORTNESS OF BREATH, DYSPNEA OR WHEEZING   spironolactone (ALDACTONE) 12.5 mg TABS half-tab Take 12.5 mg by mouth See Admin Instructions Monday, Wed, Friday    cycloSPORINE (RESTASIS) 0.05 % ophthalmic emulsion Place 1 drop into both eyes 2 times daily    [] docusate sodium (COLACE) 50 MG capsule Take 2 capsules (100 mg) by mouth 2 times daily as needed for constipation   [] HYDROcodone-acetaminophen (NORCO) 5-325 MG tablet Take 1 tablet by mouth every 6 hours as needed for severe pain     No current facility-administered medications on file prior to visit.     ALLERGIES:   Allergies   Allergen Reactions     Animal Dander      Dust Mites      Kiwi Itching     Itching and red all over     Pollen Extract      Pollen,dust, trees, grass, mold-hayfever symptoms     Seasonal Allergies        PHYSICAL EXAM:  Vitals: /70 (BP Location: Right arm, Patient Position: Sitting, Cuff Size: Adult Regular)   Pulse 80   Ht 1.549 m (5' 1\")   Wt 79.4 kg (175 lb)   SpO2 96%   BMI 33.07 kg/m     Constitutional:  cooperative, alert and oriented, well developed, well nourished, in no acute distress        Skin:  warm and dry to the touch, no apparent skin lesions or masses noted        Head:  normocephalic, no masses or lesions        Eyes:  pupils equal and round;sclera white;no xanthalasma;conjunctivae and lids unremarkable        ENT:  no pallor or cyanosis, dentition good        Neck:  JVP normal;no carotid bruit        Respiratory:  normal breath sounds, clear to auscultation, normal A-P " diameter, normal symmetry, normal respiratory excursion, no use of accessory muscles        Cardiac: regular rhythm;normal S1 and S2       systolic murmur;grade 1          GI:      benign    Vascular:                                        Extremities and Musculoskeletal:  normal muscle strength and tone;no deformities, clubbing, cyanosis, erythema observed        Neurological:  no gross motor deficits;affect appropriate            LABS/DATA:  I reviewed the following:  No new labs today      ASSESSMENT/PLAN:  1.  Coronary artery disease.   - Symptoms of dyspnea on exertion and chest pain with abnormal nuclear stress test.   Coronary angiogram 04/04/2017 - 80% left PDA status post drug-eluting stent.  There is a tiny nondominant right coronary artery with severe disease that was not intervened upon.   - Negative nuclear stress test 2/27/2018  - No chest pain  - Continue ASA, BB, statin , Imdur      2.  Hypertension.   - Controlled on Imdur 30 mg, bisoprolol 2.5 mg, spironolactone 12.5 mg MWF, Lasix 20 mg.      3.  Hyperlipidemia.   - 4/2017 switched from pravastatin to atorvastatin due to her known coronary artery disease.  6/2017 LDL 70.      4.  Diastolic dysfunction.   - On Lasix 20 mg daily, spironolactone 12.5 mg MWF, without e/o CHF.      5.  Left subclavian stenosis, status post stenting in 2013.   - Continue ASA, statin     6.  Venous insufficiency, bilateral.   - Had venous competency studies March 2017.   - Patient has more right-sided lower extremity edema symptoms but more venous reflux in the left leg.    - She is comfortable with continued medical management.    - Wearing compression stockings with improvement in symptoms     7.  Obstructive sleep apnea not on CPAP.      8.  Thyroid disease.   - Seeing Dr. Alvarez in May 2019      9.  SOB  - Negative nuc 2/27/18  - No change when bisoprolol was stopped  - Does have asthma   - Does have untreated KEN  - Will check her O2 sats and HR with exertion and  let us know if abnormal   - No significant progression      Follow up: 1 year with Dr. Yepez, with labs      Alia Yoo PA-C    Thank you for allowing me to participate in the care of your patient.      Sincerely,     Alia Yoo PA-C     [facility]    cc:   No referring provider defined for this encounter.

## 2019-04-17 NOTE — LETTER
2019    Elsy Bah MD  [address] [place] [address]    RE: Victoria Montalvo       Dear Colleague,    I had the pleasure of seeing Victoria Montalvo in the [facility].    CARDIOLOGY CLINIC PROGRESS NOTE    DOS: 2019      Victoria Montalvo  : 1940, 78 year old  MRN: [id number]      History:   I had the pleasure of following up with Victoria Montalvo today in the Cardiology Clinic.  Victoria is a patient of Dr. Yepez.       Victoria is a very pleasant 78-year-old woman with a history of hypertension, hyperlipidemia, diastolic dysfunction, left subclavian stenosis status post percutaneous revascularization in , chronic venous insufficiency, sleep apnea not on CPAP, and more recently diagnosed coronary artery disease.       Please see my prior note from 2/15/18 for details.     Interval History:   She was admitted in 2018.  She had a negative nuclear stress test.     She was last seen by Dr. Yepez 3/29/18.  At that time she was stable.  She had some SOB when she started up walking, but none on the elliptical.     She presents today for annual follow up.   She still has some ESPITIA.   She has not checked her heart rate with activity as Dr. Yepez recommended.       ROS:  Skin:  Positive for bruising   Eyes:  Positive for glasses  ENT:  Negative    Respiratory:  Positive for dyspnea on exertion;wheezing  Cardiovascular:    edema;Positive for  Gastroenterology: Positive for reflux  Genitourinary:  Positive for urinary frequency;nocturia  Musculoskeletal:  Positive for back pain;arthritis  Neurologic:  Positive for numbness or tingling of hands  Psychiatric:  Positive for depression;anxiety;excessive stress  Heme/Lymph/Imm:  Positive for easy bruising  Endocrine:  Positive for thyroid disorder    PAST MEDICAL HISTORY:  Past Medical History:   Diagnosis Date     Abnormal Papanicolaou smear of cervix and cervical HPV     colposcopy      Arthritis      Chronic rhinitis      COPD  (chronic obstructive pulmonary disease) (H)      Coronary artery disease     4/4/17 SHERLEY--PDA     Diastolic CHF, chronic (H) 2/22/2012     Dislocation of hip (H)     5/25/18 and 12/29/18 with successful reductions in ED     Gastro-oesophageal reflux disease      H/O heart artery stent 04/04/2017    SHERLEY to PDA     Hyperlipidemia LDL goal <130 10/31/2011     KEN (obstructive sleep apnea)     CPAP     Pain in right hip      Personal history of colonic polyps     colonoscopy 9/97, 2002 (due in 2007)     Pulmonary HTN (H)      Subclavian artery stenosis, left (H) 8/7/2013    S/p stent in 2013      Subclinical hyperthyroidism 10/17/2016     Unspecified asthma(493.90)     on preventative meds and has nebulizer       PAST SURGICAL HISTORY:  Past Surgical History:   Procedure Laterality Date     ARTHROPLASTY HIP Right 10/19/2015    Procedure: ARTHROPLASTY HIP;  Surgeon: Aron Torres MD;  Location: [facility] OR     C NONSPECIFIC PROCEDURE  2/89, 1990    liposuction of legs and stomach     C NONSPECIFIC PROCEDURE  1990    Ankle pins removed     C OPEN RX ANKLE DISLOCATN+FIXATN  4/18/87     COLONOSCOPY  01/1/08    Polyp removed, bx.     COLONOSCOPY  01/12/10     COLONOSCOPY N/A 2/17/2015    Procedure: COLONOSCOPY;  Surgeon: Gato Quiles MD;  Location: [facility] GI     CT CORONARY ANGIOGRAM  04/04/2017    SHERLEY to PDA     HC COLONOSCOPY THRU STOMA, DIAGNOSTIC  2002     HC REDUCTION OF LARGE BREAST  2/89     VASCULAR SURGERY  2013    angiogram & left subclavical artery stent       SOCIAL HISTORY:  Social History     Socioeconomic History     Marital status:      Spouse name: Trenton     Number of children: 2     Years of education: 12     Highest education level: Not on file   Occupational History     Occupation: retired   Social Needs     Financial resource strain: Not on file     Food insecurity:     Worry: Not on file     Inability: Not on file     Transportation needs:     Medical: Not on file     Non-medical: Not  on file   Tobacco Use     Smoking status: Former Smoker     Packs/day: 0.10     Years: 10.00     Pack years: 1.00     Types: Cigarettes     Last attempt to quit: 5/19/2003     Years since quitting: 15.9     Smokeless tobacco: Never Used     Tobacco comment: off and on x 10 years 3-4 cigs: no smoker in the household   Substance and Sexual Activity     Alcohol use: Yes     Alcohol/week: 0.0 oz     Comment: a couple drinks per year     Drug use: No     Sexual activity: Not Currently     Partners: Male   Lifestyle     Physical activity:     Days per week: Not on file     Minutes per session: Not on file     Stress: Not on file   Relationships     Social connections:     Talks on phone: Not on file     Gets together: Not on file     Attends Latter-day service: Not on file     Active member of club or organization: Not on file     Attends meetings of clubs or organizations: Not on file     Relationship status: Not on file     Intimate partner violence:     Fear of current or ex partner: Not on file     Emotionally abused: Not on file     Physically abused: Not on file     Forced sexual activity: Not on file   Other Topics Concern      Service Not Asked     Blood Transfusions Not Asked     Caffeine Concern No     Occupational Exposure Not Asked     Hobby Hazards Not Asked     Sleep Concern Not Asked     Stress Concern Not Asked     Weight Concern Not Asked     Special Diet No     Comment: regular diet      Back Care Not Asked     Exercise No     Comment: shopping a lot so [employer]      Bike Helmet Not Asked     Seat Belt Not Asked     Self-Exams Not Asked   Social History Narrative     Not on file       FAMILY HISTORY:  Family History   Problem Relation Age of Onset     Alzheimer Disease Mother      Gastrointestinal Disease Mother      Cancer Father         throat and lungs, liver,     Heart Disease Father 57        CABG     Heart Disease Brother         suicidal     Pacemaker Brother      Heart Disease Sister       "Hypertension Maternal Grandmother      Lipids Maternal Grandmother      Cancer Maternal Grandmother         stomach     Cancer Paternal Grandmother         stomach     Allergies Daughter      Allergies Son        MEDS:   Current Outpatient Medications on File Prior to Visit:  albuterol (2.5 MG/3ML) 0.083% nebulizer solution Take 1 vial (2.5 mg) by nebulization every 6 hours as needed for shortness of breath / dyspnea   alendronate (FOSAMAX) 70 MG tablet TAKE 1 TABLET EVERY 7 DAYS, TAKE 60 MINUTES BEFORE A.M. MEAL WITH 8 OUNCE WATER, REMAIN UPRIGHT FOR 30 MINUTES   aspirin 81 MG tablet Take 81 mg by mouth daily.   atorvastatin (LIPITOR) 40 MG tablet Take 1 tablet (40 mg) by mouth daily   biotin 2.5 mg/mL Take by mouth daily 2000mcg   bisoprolol (ZEBETA) 5 MG tablet Take 0.5 tablets (2.5 mg) by mouth daily   Boswellia-Glucosamine-Vit D (OSTEO BI-FLEX/5-LOXIN ADVANCED) TABS Take 1,500 mg by mouth 2 times daily    Calcium 1720-1311 MG-UNIT CHEW Take 1 tablet by mouth daily.   Cholecalciferol (VITAMIN D3 PO) Take 2,000 Units by mouth daily    fluticasone (FLONASE) 50 MCG/ACT nasal spray USE 1 TO 2 SPRAYS IN EACH NOSTRIL DAILY   fluticasone-salmeterol (ADVAIR) 250-50 MCG/DOSE diskus inhaler Inhale 1 puff into the lungs every 12 hours   furosemide (LASIX) 20 MG tablet Take 1 tablet (20 mg) by mouth daily   isosorbide mononitrate (IMDUR) 30 MG 24 hr tablet Take 1 tablet (30 mg) by mouth daily   MAGNESIUM ACETATE Take 250 mg by mouth daily    methimazole (TAPAZOLE) 5 MG tablet TAKE ONE-HALF (1/2) TABLET DAILY   montelukast (SINGULAIR) 10 MG tablet Take 1 tablet (10 mg) by mouth At Bedtime   nitroglycerin (NITROSTAT) 0.4 MG sublingual tablet One tablet under the tongue every 5 minutes if needed for chest pain. May repeat every 5 minutes for a maximum of 3 doses in 15 minutes\"   omega-3 fatty acids (FISH OIL DOUBLE STRENGTH) 1200 MG capsule Take 1 capsule by mouth 2 times daily    omeprazole (PRILOSEC) 20 MG DR capsule TAKE 1 " "CAPSULE DAILY   order for DME Equipment being ordered: Nebulizer. Uses: albuterol (2.5 MG/3ML) 0.083% nebulizer solution- Sig - Route: Take 1 vial (2.5 mg) by nebulization every 6 hours as needed for shortness of breath / dyspnea.   prasugrel (EFFIENT) 10 MG TABS tablet Take 1 tablet (10 mg) by mouth daily Do not crush or break tablet.   PROAIR  (90 Base) MCG/ACT inhaler USE 2 INHALATIONS EVERY 6 HOURS AS NEEDED FOR SHORTNESS OF BREATH, DYSPNEA OR WHEEZING   spironolactone (ALDACTONE) 12.5 mg TABS half-tab Take 12.5 mg by mouth See Admin Instructions Monday, Wed, Friday    cycloSPORINE (RESTASIS) 0.05 % ophthalmic emulsion Place 1 drop into both eyes 2 times daily    [] docusate sodium (COLACE) 50 MG capsule Take 2 capsules (100 mg) by mouth 2 times daily as needed for constipation   [] HYDROcodone-acetaminophen (NORCO) 5-325 MG tablet Take 1 tablet by mouth every 6 hours as needed for severe pain     No current facility-administered medications on file prior to visit.     ALLERGIES:   Allergies   Allergen Reactions     Animal Dander      Dust Mites      Kiwi Itching     Itching and red all over     Pollen Extract      Pollen,dust, trees, grass, mold-hayfever symptoms     Seasonal Allergies        PHYSICAL EXAM:  Vitals: /70 (BP Location: Right arm, Patient Position: Sitting, Cuff Size: Adult Regular)   Pulse 80   Ht 1.549 m (5' 1\")   Wt 79.4 kg (175 lb)   SpO2 96%   BMI 33.07 kg/m     Constitutional:  cooperative, alert and oriented, well developed, well nourished, in no acute distress        Skin:  warm and dry to the touch, no apparent skin lesions or masses noted        Head:  normocephalic, no masses or lesions        Eyes:  pupils equal and round;sclera white;no xanthalasma;conjunctivae and lids unremarkable        ENT:  no pallor or cyanosis, dentition good        Neck:  JVP normal;no carotid bruit        Respiratory:  normal breath sounds, clear to auscultation, normal A-P " diameter, normal symmetry, normal respiratory excursion, no use of accessory muscles        Cardiac: regular rhythm;normal S1 and S2       systolic murmur;grade 1          GI:      benign    Vascular:                                        Extremities and Musculoskeletal:  normal muscle strength and tone;no deformities, clubbing, cyanosis, erythema observed        Neurological:  no gross motor deficits;affect appropriate            LABS/DATA:  I reviewed the following:  No new labs today      ASSESSMENT/PLAN:  1.  Coronary artery disease.   - Symptoms of dyspnea on exertion and chest pain with abnormal nuclear stress test.   Coronary angiogram 04/04/2017 - 80% left PDA status post drug-eluting stent.  There is a tiny nondominant right coronary artery with severe disease that was not intervened upon.   - Negative nuclear stress test 2/27/2018  - No chest pain  - Continue ASA, BB, statin , Imdur      2.  Hypertension.   - Controlled on Imdur 30 mg, bisoprolol 2.5 mg, spironolactone 12.5 mg MWF, Lasix 20 mg.      3.  Hyperlipidemia.   - 4/2017 switched from pravastatin to atorvastatin due to her known coronary artery disease.  6/2017 LDL 70.      4.  Diastolic dysfunction.   - On Lasix 20 mg daily, spironolactone 12.5 mg MWF, without e/o CHF.      5.  Left subclavian stenosis, status post stenting in 2013.   - Continue ASA, statin     6.  Venous insufficiency, bilateral.   - Had venous competency studies March 2017.   - Patient has more right-sided lower extremity edema symptoms but more venous reflux in the left leg.    - She is comfortable with continued medical management.    - Wearing compression stockings with improvement in symptoms     7.  Obstructive sleep apnea not on CPAP.      8.  Thyroid disease.   - Seeing Dr. Alvarez in May 2019      9.  SOB  - Negative nuc 2/27/18  - No change when bisoprolol was stopped  - Does have asthma   - Does have untreated KEN  - Will check her O2 sats and HR with exertion and  let us know if abnormal   - No significant progression      Follow up: 1 year with Dr. Yepez, with labs      Thank you for allowing me to participate in the care of your patient.    Sincerely,     Alia Yoo PA-C     [facility]

## 2019-04-17 NOTE — PROGRESS NOTES
CARDIOLOGY CLINIC PROGRESS NOTE    DOS: 2019      Victoria Montalvo  : 1940, 78 year old  MRN: [id number]      History:   I had the pleasure of following up with Victoria Montalvo today in the Cardiology Clinic.  Victoria is a patient of Dr. Yepez.       Victoria is a very pleasant 78-year-old woman with a history of hypertension, hyperlipidemia, diastolic dysfunction, left subclavian stenosis status post percutaneous revascularization in , chronic venous insufficiency, sleep apnea not on CPAP, and more recently diagnosed coronary artery disease.       Please see my prior note from 2/15/18 for details.     Interval History:   She was admitted in 2018.  She had a negative nuclear stress test.     She was last seen by Dr. Yepez 3/29/18.  At that time she was stable.  She had some SOB when she started up walking, but none on the elliptical.     She presents today for annual follow up.   She still has some ESPITIA.   She has not checked her heart rate with activity as Dr. Yepez recommended.       ROS:  Skin:  Positive for bruising   Eyes:  Positive for glasses  ENT:  Negative    Respiratory:  Positive for dyspnea on exertion;wheezing  Cardiovascular:    edema;Positive for  Gastroenterology: Positive for reflux  Genitourinary:  Positive for urinary frequency;nocturia  Musculoskeletal:  Positive for back pain;arthritis  Neurologic:  Positive for numbness or tingling of hands  Psychiatric:  Positive for depression;anxiety;excessive stress  Heme/Lymph/Imm:  Positive for easy bruising  Endocrine:  Positive for thyroid disorder    PAST MEDICAL HISTORY:  Past Medical History:   Diagnosis Date     Abnormal Papanicolaou smear of cervix and cervical HPV     colposcopy      Arthritis      Chronic rhinitis      COPD (chronic obstructive pulmonary disease) (H)      Coronary artery disease     17 SHERLEY--PDA     Diastolic CHF, chronic (H) 2012     Dislocation of hip (H)     18 and 18 with successful reductions in ED      Gastro-oesophageal reflux disease      H/O heart artery stent 04/04/2017    SHERLEY to PDA     Hyperlipidemia LDL goal <130 10/31/2011     KEN (obstructive sleep apnea)     CPAP     Pain in right hip      Personal history of colonic polyps     colonoscopy 9/97, 2002 (due in 2007)     Pulmonary HTN (H)      Subclavian artery stenosis, left (H) 8/7/2013    S/p stent in 2013      Subclinical hyperthyroidism 10/17/2016     Unspecified asthma(493.90)     on preventative meds and has nebulizer       PAST SURGICAL HISTORY:  Past Surgical History:   Procedure Laterality Date     ARTHROPLASTY HIP Right 10/19/2015    Procedure: ARTHROPLASTY HIP;  Surgeon: Aron Torres MD;  Location: [facility] OR     C NONSPECIFIC PROCEDURE  2/89, 1990    liposuction of legs and stomach     C NONSPECIFIC PROCEDURE  1990    Ankle pins removed     C OPEN RX ANKLE DISLOCATN+FIXATN  4/18/87     COLONOSCOPY  01/1/08    Polyp removed, bx.     COLONOSCOPY  01/12/10     COLONOSCOPY N/A 2/17/2015    Procedure: COLONOSCOPY;  Surgeon: Gato Quiles MD;  Location: [facility]     CT CORONARY ANGIOGRAM  04/04/2017    SHERLEY to PDA     HC COLONOSCOPY THRU STOMA, DIAGNOSTIC  2002     HC REDUCTION OF LARGE BREAST  2/89     VASCULAR SURGERY  2013    angiogram & left subclavical artery stent       SOCIAL HISTORY:  Social History     Socioeconomic History     Marital status:      Spouse name: Trenton     Number of children: 2     Years of education: 12     Highest education level: Not on file   Occupational History     Occupation: retired   Social Needs     Financial resource strain: Not on file     Food insecurity:     Worry: Not on file     Inability: Not on file     Transportation needs:     Medical: Not on file     Non-medical: Not on file   Tobacco Use     Smoking status: Former Smoker     Packs/day: 0.10     Years: 10.00     Pack years: 1.00     Types: Cigarettes     Last attempt to quit: 5/19/2003     Years since quitting: 15.9     Smokeless  tobacco: Never Used     Tobacco comment: off and on x 10 years 3-4 cigs: no smoker in the household   Substance and Sexual Activity     Alcohol use: Yes     Alcohol/week: 0.0 oz     Comment: a couple drinks per year     Drug use: No     Sexual activity: Not Currently     Partners: Male   Lifestyle     Physical activity:     Days per week: Not on file     Minutes per session: Not on file     Stress: Not on file   Relationships     Social connections:     Talks on phone: Not on file     Gets together: Not on file     Attends Orthodoxy service: Not on file     Active member of club or organization: Not on file     Attends meetings of clubs or organizations: Not on file     Relationship status: Not on file     Intimate partner violence:     Fear of current or ex partner: Not on file     Emotionally abused: Not on file     Physically abused: Not on file     Forced sexual activity: Not on file   Other Topics Concern      Service Not Asked     Blood Transfusions Not Asked     Caffeine Concern No     Occupational Exposure Not Asked     Hobby Hazards Not Asked     Sleep Concern Not Asked     Stress Concern Not Asked     Weight Concern Not Asked     Special Diet No     Comment: regular diet      Back Care Not Asked     Exercise No     Comment: shopping a lot so [employer]      Bike Helmet Not Asked     Seat Belt Not Asked     Self-Exams Not Asked   Social History Narrative     Not on file       FAMILY HISTORY:  Family History   Problem Relation Age of Onset     Alzheimer Disease Mother      Gastrointestinal Disease Mother      Cancer Father         throat and lungs, liver,     Heart Disease Father 57        CABG     Heart Disease Brother         suicidal     Pacemaker Brother      Heart Disease Sister      Hypertension Maternal Grandmother      Lipids Maternal Grandmother      Cancer Maternal Grandmother         stomach     Cancer Paternal Grandmother         stomach     Allergies Daughter      Allergies Son        MEDS:  "  Current Outpatient Medications on File Prior to Visit:  albuterol (2.5 MG/3ML) 0.083% nebulizer solution Take 1 vial (2.5 mg) by nebulization every 6 hours as needed for shortness of breath / dyspnea   alendronate (FOSAMAX) 70 MG tablet TAKE 1 TABLET EVERY 7 DAYS, TAKE 60 MINUTES BEFORE A.M. MEAL WITH 8 OUNCE WATER, REMAIN UPRIGHT FOR 30 MINUTES   aspirin 81 MG tablet Take 81 mg by mouth daily.   atorvastatin (LIPITOR) 40 MG tablet Take 1 tablet (40 mg) by mouth daily   biotin 2.5 mg/mL Take by mouth daily 2000mcg   bisoprolol (ZEBETA) 5 MG tablet Take 0.5 tablets (2.5 mg) by mouth daily   Boswellia-Glucosamine-Vit D (OSTEO BI-FLEX/5-LOXIN ADVANCED) TABS Take 1,500 mg by mouth 2 times daily    Calcium 8996-6256 MG-UNIT CHEW Take 1 tablet by mouth daily.   Cholecalciferol (VITAMIN D3 PO) Take 2,000 Units by mouth daily    fluticasone (FLONASE) 50 MCG/ACT nasal spray USE 1 TO 2 SPRAYS IN EACH NOSTRIL DAILY   fluticasone-salmeterol (ADVAIR) 250-50 MCG/DOSE diskus inhaler Inhale 1 puff into the lungs every 12 hours   furosemide (LASIX) 20 MG tablet Take 1 tablet (20 mg) by mouth daily   isosorbide mononitrate (IMDUR) 30 MG 24 hr tablet Take 1 tablet (30 mg) by mouth daily   MAGNESIUM ACETATE Take 250 mg by mouth daily    methimazole (TAPAZOLE) 5 MG tablet TAKE ONE-HALF (1/2) TABLET DAILY   montelukast (SINGULAIR) 10 MG tablet Take 1 tablet (10 mg) by mouth At Bedtime   nitroglycerin (NITROSTAT) 0.4 MG sublingual tablet One tablet under the tongue every 5 minutes if needed for chest pain. May repeat every 5 minutes for a maximum of 3 doses in 15 minutes\"   omega-3 fatty acids (FISH OIL DOUBLE STRENGTH) 1200 MG capsule Take 1 capsule by mouth 2 times daily    omeprazole (PRILOSEC) 20 MG DR capsule TAKE 1 CAPSULE DAILY   order for DME Equipment being ordered: Nebulizer. Uses: albuterol (2.5 MG/3ML) 0.083% nebulizer solution- Sig - Route: Take 1 vial (2.5 mg) by nebulization every 6 hours as needed for shortness of breath " "/ dyspnea.   prasugrel (EFFIENT) 10 MG TABS tablet Take 1 tablet (10 mg) by mouth daily Do not crush or break tablet.   PROAIR  (90 Base) MCG/ACT inhaler USE 2 INHALATIONS EVERY 6 HOURS AS NEEDED FOR SHORTNESS OF BREATH, DYSPNEA OR WHEEZING   spironolactone (ALDACTONE) 12.5 mg TABS half-tab Take 12.5 mg by mouth See Admin Instructions Monday, Wed, Friday    cycloSPORINE (RESTASIS) 0.05 % ophthalmic emulsion Place 1 drop into both eyes 2 times daily    [] docusate sodium (COLACE) 50 MG capsule Take 2 capsules (100 mg) by mouth 2 times daily as needed for constipation   [] HYDROcodone-acetaminophen (NORCO) 5-325 MG tablet Take 1 tablet by mouth every 6 hours as needed for severe pain     No current facility-administered medications on file prior to visit.     ALLERGIES:   Allergies   Allergen Reactions     Animal Dander      Dust Mites      Kiwi Itching     Itching and red all over     Pollen Extract      Pollen,dust, trees, grass, mold-hayfever symptoms     Seasonal Allergies        PHYSICAL EXAM:  Vitals: /70 (BP Location: Right arm, Patient Position: Sitting, Cuff Size: Adult Regular)   Pulse 80   Ht 1.549 m (5' 1\")   Wt 79.4 kg (175 lb)   SpO2 96%   BMI 33.07 kg/m    Constitutional:  cooperative, alert and oriented, well developed, well nourished, in no acute distress        Skin:  warm and dry to the touch, no apparent skin lesions or masses noted        Head:  normocephalic, no masses or lesions        Eyes:  pupils equal and round;sclera white;no xanthalasma;conjunctivae and lids unremarkable        ENT:  no pallor or cyanosis, dentition good        Neck:  JVP normal;no carotid bruit        Respiratory:  normal breath sounds, clear to auscultation, normal A-P diameter, normal symmetry, normal respiratory excursion, no use of accessory muscles        Cardiac: regular rhythm;normal S1 and S2       systolic murmur;grade 1          GI:      benign    Vascular:                         "                Extremities and Musculoskeletal:  normal muscle strength and tone;no deformities, clubbing, cyanosis, erythema observed        Neurological:  no gross motor deficits;affect appropriate            LABS/DATA:  I reviewed the following:  No new labs today      ASSESSMENT/PLAN:  1.  Coronary artery disease.   - Symptoms of dyspnea on exertion and chest pain with abnormal nuclear stress test.   Coronary angiogram 04/04/2017 - 80% left PDA status post drug-eluting stent.  There is a tiny nondominant right coronary artery with severe disease that was not intervened upon.   - Negative nuclear stress test 2/27/2018  - No chest pain  - Continue ASA, BB, statin, Imdur      2.  Hypertension.   - Controlled on Imdur 30 mg, bisoprolol 2.5 mg, spironolactone 12.5 mg MWF, Lasix 20 mg.      3.  Hyperlipidemia.   - 4/2017 switched from pravastatin to atorvastatin due to her known coronary artery disease.  6/2017 LDL 70.      4.  Diastolic dysfunction.   - On Lasix 20 mg daily, spironolactone 12.5 mg MWF, without e/o CHF.      5.  Left subclavian stenosis, status post stenting in 2013.   - Continue ASA, statin     6.  Venous insufficiency, bilateral.   - Had venous competency studies March 2017.   - Patient has more right-sided lower extremity edema symptoms but more venous reflux in the left leg.    - She is comfortable with continued medical management.    - Wearing compression stockings with improvement in symptoms     7.  Obstructive sleep apnea not on CPAP.      8.  Thyroid disease.   - Seeing Dr. Alvarez in May 2019      9.  SOB  - Negative nuc 2/27/18  - No change when bisoprolol was stopped  - Does have asthma   - Does have untreated KEN  - Will check her O2 sats and HR with exertion and let us know if abnormal   - No significant progression      Follow up: 1 year with Dr. Yepez, with labs      Alia Yoo PA-C

## 2019-04-17 NOTE — PATIENT INSTRUCTIONS
Use your portable pulse oximeter and keep track of oxygen saturations and heart rates with exertion.    Let us know if the oxygen saturations are < 92%, or if the heart rates are very elevated or very low.

## 2019-05-02 DIAGNOSIS — I77.1 SUBCLAVIAN ARTERY STENOSIS, LEFT (H): ICD-10-CM

## 2019-05-02 DIAGNOSIS — J45.50 SEVERE PERSISTENT ASTHMA WITHOUT COMPLICATION (H): ICD-10-CM

## 2019-05-02 DIAGNOSIS — S73.004S DISLOCATION OF RIGHT HIP, SEQUELA: ICD-10-CM

## 2019-05-02 DIAGNOSIS — R79.9 ABNORMAL FINDING OF BLOOD CHEMISTRY: ICD-10-CM

## 2019-05-02 DIAGNOSIS — I50.32 DIASTOLIC CHF, CHRONIC (H): ICD-10-CM

## 2019-05-02 DIAGNOSIS — I25.110 CORONARY ARTERY DISEASE INVOLVING NATIVE CORONARY ARTERY OF NATIVE HEART WITH UNSTABLE ANGINA PECTORIS (H): ICD-10-CM

## 2019-05-02 DIAGNOSIS — E78.5 HYPERLIPIDEMIA LDL GOAL <130: ICD-10-CM

## 2019-05-02 DIAGNOSIS — L60.8 NAIL DEFORMITY: ICD-10-CM

## 2019-05-02 DIAGNOSIS — R19.7 DIARRHEA, UNSPECIFIED TYPE: ICD-10-CM

## 2019-05-02 DIAGNOSIS — E05.90 SUBCLINICAL HYPERTHYROIDISM: ICD-10-CM

## 2019-05-02 DIAGNOSIS — I87.2 VENOUS (PERIPHERAL) INSUFFICIENCY: ICD-10-CM

## 2019-05-02 LAB
ERYTHROCYTE [DISTWIDTH] IN BLOOD BY AUTOMATED COUNT: 15.6 % (ref 10–15)
HCT VFR BLD AUTO: 39.8 % (ref 35–47)
HGB BLD-MCNC: 13 G/DL (ref 11.7–15.7)
MCH RBC QN AUTO: 29 PG (ref 26.5–33)
MCHC RBC AUTO-ENTMCNC: 32.7 G/DL (ref 31.5–36.5)
MCV RBC AUTO: 89 FL (ref 78–100)
PLATELET # BLD AUTO: 203 10E9/L (ref 150–450)
RBC # BLD AUTO: 4.48 10E12/L (ref 3.8–5.2)
T3FREE SERPL-MCNC: 2.5 PG/ML (ref 2.3–4.2)
WBC # BLD AUTO: 5.6 10E9/L (ref 4–11)

## 2019-05-02 PROCEDURE — 80076 HEPATIC FUNCTION PANEL: CPT | Performed by: PEDIATRICS

## 2019-05-02 PROCEDURE — 84481 FREE ASSAY (FT-3): CPT | Performed by: PEDIATRICS

## 2019-05-02 PROCEDURE — 85027 COMPLETE CBC AUTOMATED: CPT | Performed by: PEDIATRICS

## 2019-05-02 PROCEDURE — 84443 ASSAY THYROID STIM HORMONE: CPT | Mod: 59 | Performed by: PEDIATRICS

## 2019-05-02 PROCEDURE — 80061 LIPID PANEL: CPT | Performed by: PEDIATRICS

## 2019-05-02 PROCEDURE — 84439 ASSAY OF FREE THYROXINE: CPT | Performed by: PEDIATRICS

## 2019-05-02 PROCEDURE — 83540 ASSAY OF IRON: CPT | Performed by: PEDIATRICS

## 2019-05-02 PROCEDURE — 83550 IRON BINDING TEST: CPT | Performed by: PEDIATRICS

## 2019-05-02 PROCEDURE — 36415 COLL VENOUS BLD VENIPUNCTURE: CPT | Performed by: PEDIATRICS

## 2019-05-02 PROCEDURE — 82728 ASSAY OF FERRITIN: CPT | Performed by: PEDIATRICS

## 2019-05-02 PROCEDURE — 84443 ASSAY THYROID STIM HORMONE: CPT | Performed by: PEDIATRICS

## 2019-05-02 PROCEDURE — 80048 BASIC METABOLIC PNL TOTAL CA: CPT | Performed by: PEDIATRICS

## 2019-05-03 LAB
ALBUMIN SERPL-MCNC: 3.6 G/DL (ref 3.4–5)
ALP SERPL-CCNC: 63 U/L (ref 40–150)
ALT SERPL W P-5'-P-CCNC: 26 U/L (ref 0–50)
ANION GAP SERPL CALCULATED.3IONS-SCNC: 6 MMOL/L (ref 3–14)
AST SERPL W P-5'-P-CCNC: 20 U/L (ref 0–45)
BILIRUB DIRECT SERPL-MCNC: 0.1 MG/DL (ref 0–0.2)
BILIRUB SERPL-MCNC: 0.6 MG/DL (ref 0.2–1.3)
BUN SERPL-MCNC: 10 MG/DL (ref 7–30)
CALCIUM SERPL-MCNC: 9 MG/DL (ref 8.5–10.1)
CHLORIDE SERPL-SCNC: 109 MMOL/L (ref 94–109)
CHOLEST SERPL-MCNC: 183 MG/DL
CO2 SERPL-SCNC: 27 MMOL/L (ref 20–32)
CREAT SERPL-MCNC: 0.71 MG/DL (ref 0.52–1.04)
FERRITIN SERPL-MCNC: 16 NG/ML (ref 8–252)
GFR SERPL CREATININE-BSD FRML MDRD: 81 ML/MIN/{1.73_M2}
GLUCOSE SERPL-MCNC: 101 MG/DL (ref 70–99)
HDLC SERPL-MCNC: 85 MG/DL
IRON SATN MFR SERPL: 30 % (ref 15–46)
IRON SERPL-MCNC: 103 UG/DL (ref 35–180)
LDLC SERPL CALC-MCNC: 81 MG/DL
NONHDLC SERPL-MCNC: 98 MG/DL
POTASSIUM SERPL-SCNC: 3.9 MMOL/L (ref 3.4–5.3)
PROT SERPL-MCNC: 7.6 G/DL (ref 6.8–8.8)
SODIUM SERPL-SCNC: 142 MMOL/L (ref 133–144)
T4 FREE SERPL-MCNC: 0.95 NG/DL (ref 0.76–1.46)
TIBC SERPL-MCNC: 339 UG/DL (ref 240–430)
TRIGL SERPL-MCNC: 86 MG/DL
TSH SERPL DL<=0.005 MIU/L-ACNC: 2.26 MU/L (ref 0.4–4)
TSH SERPL DL<=0.005 MIU/L-ACNC: 2.26 MU/L (ref 0.4–4)

## 2019-05-09 ENCOUNTER — OFFICE VISIT (OUTPATIENT)
Dept: ENDOCRINOLOGY | Facility: CLINIC | Age: 79
End: 2019-05-09
Payer: MEDICARE

## 2019-05-09 VITALS
WEIGHT: 174.1 LBS | DIASTOLIC BLOOD PRESSURE: 90 MMHG | HEIGHT: 61 IN | OXYGEN SATURATION: 99 % | HEART RATE: 74 BPM | TEMPERATURE: 97.4 F | SYSTOLIC BLOOD PRESSURE: 140 MMHG | BODY MASS INDEX: 32.87 KG/M2

## 2019-05-09 DIAGNOSIS — E05.90 SUBCLINICAL HYPERTHYROIDISM: Primary | ICD-10-CM

## 2019-05-09 PROCEDURE — 99213 OFFICE O/P EST LOW 20 MIN: CPT | Performed by: INTERNAL MEDICINE

## 2019-05-09 RX ORDER — METHIMAZOLE 5 MG/1
2.5 TABLET ORAL DAILY
Qty: 30 TABLET | Refills: 0 | Status: CANCELLED | OUTPATIENT
Start: 2019-05-09

## 2019-05-09 ASSESSMENT — MIFFLIN-ST. JEOR: SCORE: 1202.09

## 2019-05-09 NOTE — PROGRESS NOTES
Name: Victoria Montalvo  Seen for follow up Hyperthyroidism.    HPI:  Victoria Montalvo is a 78 year old female who presents for the evaluation of Hyperthyroidism.  H/o CAD s/p stent placement. Last was 4/2017.  H/o osteoporosis-- on fosamax.     Neg TSI and normal thyroid uptake and scan.  24-hour uptake was calculated at 22% which is within the normal range.    On methimazole 2.5 mg/day. Since 11/2017. Before that she was on 5 mg/day  F/u Labs in normal range. (5/2019)  CBC and LFT normal.    Feeling ok.  No major complaints.    History of radiation exposure: NO  History of thyroid dysfunction: NO  Palpitations:  No.  Changes to hair or skin: No  Diarrhea/Constipation:No. Lactose intolerance.  Changes in vision:Yes: sometimes not clear  Dysphagia or Shortness of breath:Yes: sometimes difficulty with swallowing. Sometimes SOB.  Tremors:No  Changes in weight: no. Stable.  Wt Readings from Last 2 Encounters:   05/09/19 79 kg (174 lb 1.6 oz)   04/17/19 79.4 kg (175 lb)     Lithium/Amiodarone: No  URI: No  CT Scans:No    PMH/PSH:  Past Medical History:   Diagnosis Date     Abnormal Papanicolaou smear of cervix and cervical HPV     colposcopy 1/97     Arthritis      Chronic rhinitis      COPD (chronic obstructive pulmonary disease) (H)      Coronary artery disease     4/4/17 SHERLEY--PDA     Diastolic CHF, chronic (H) 2/22/2012     Dislocation of hip (H)     5/25/18 and 12/29/18 with successful reductions in ED     Gastro-oesophageal reflux disease      H/O heart artery stent 04/04/2017    SHERLEY to PDA     Hyperlipidemia LDL goal <130 10/31/2011     KEN (obstructive sleep apnea)     CPAP     Pain in right hip      Personal history of colonic polyps     colonoscopy 9/97, 2002 (due in 2007)     Pulmonary HTN (H)      Subclavian artery stenosis, left (H) 8/7/2013    S/p stent in 2013      Subclinical hyperthyroidism 10/17/2016     Unspecified asthma(493.90)     on preventative meds and has nebulizer     Past Surgical History:    Procedure Laterality Date     ARTHROPLASTY HIP Right 10/19/2015    Procedure: ARTHROPLASTY HIP;  Surgeon: Aron Torres MD;  Location: [facility]     C NONSPECIFIC PROCEDURE  2/89, 1990    liposuction of legs and stomach     C NONSPECIFIC PROCEDURE  1990    Ankle pins removed     C OPEN RX ANKLE DISLOCATN+FIXATN  4/18/87     COLONOSCOPY  01/1/08    Polyp removed, bx.     COLONOSCOPY  01/12/10     COLONOSCOPY N/A 2/17/2015    Procedure: COLONOSCOPY;  Surgeon: Gato Quiles MD;  Location: [facility]     CT CORONARY ANGIOGRAM  04/04/2017    SHERLEY to PDA     HC COLONOSCOPY THRU STOMA, DIAGNOSTIC  2002     HC REDUCTION OF LARGE BREAST  2/89     VASCULAR SURGERY  2013    angiogram & left subclavical artery stent     Family Hx:  Family History   Problem Relation Age of Onset     Alzheimer Disease Mother      Gastrointestinal Disease Mother      Cancer Father         throat and lungs, liver,     Heart Disease Father 57        CABG     Heart Disease Brother         suicidal     Pacemaker Brother      Heart Disease Sister      Hypertension Maternal Grandmother      Lipids Maternal Grandmother      Cancer Maternal Grandmother         stomach     Cancer Paternal Grandmother         stomach     Allergies Daughter      Allergies Son      Thyroid disease: No           Social Hx:  Social History     Socioeconomic History     Marital status:      Spouse name: Trenton     Number of children: 2     Years of education: 12     Highest education level: Not on file   Occupational History     Occupation: retired   Social Needs     Financial resource strain: Not on file     Food insecurity:     Worry: Not on file     Inability: Not on file     Transportation needs:     Medical: Not on file     Non-medical: Not on file   Tobacco Use     Smoking status: Former Smoker     Packs/day: 0.10     Years: 10.00     Pack years: 1.00     Types: Cigarettes     Last attempt to quit: 5/19/2003     Years since quitting: 15.9     Smokeless  tobacco: Never Used     Tobacco comment: off and on x 10 years 3-4 cigs: no smoker in the household   Substance and Sexual Activity     Alcohol use: Yes     Alcohol/week: 0.0 oz     Comment: a couple drinks per year     Drug use: No     Sexual activity: Not Currently     Partners: Male   Lifestyle     Physical activity:     Days per week: Not on file     Minutes per session: Not on file     Stress: Not on file   Relationships     Social connections:     Talks on phone: Not on file     Gets together: Not on file     Attends Mu-ism service: Not on file     Active member of club or organization: Not on file     Attends meetings of clubs or organizations: Not on file     Relationship status: Not on file     Intimate partner violence:     Fear of current or ex partner: Not on file     Emotionally abused: Not on file     Physically abused: Not on file     Forced sexual activity: Not on file   Other Topics Concern      Service Not Asked     Blood Transfusions Not Asked     Caffeine Concern No     Occupational Exposure Not Asked     Hobby Hazards Not Asked     Sleep Concern Not Asked     Stress Concern Not Asked     Weight Concern Not Asked     Special Diet No     Comment: regular diet      Back Care Not Asked     Exercise No     Comment: shopping a lot so You[employer]ke Helmet Not Asked     Seat Belt Not Asked     Self-Exams Not Asked   Social History Narrative     Not on file          MEDICATIONS:  has a current medication list which includes the following prescription(s): albuterol, alendronate, aspirin, atorvastatin, biotin, osteo bi-flex/5-loxin advanced, calcium, cholecalciferol, cyclosporine, fluticasone, fluticasone-salmeterol, furosemide, isosorbide mononitrate, magnesium acetate, methimazole, montelukast, nitroglycerin, omega-3 fatty acids, omeprazole, order for dme, prasugrel, proair hfa, and spironolactone.    ROS   ROS: 10 point ROS neg other than the symptoms noted above in the HPI.    Physical  "Exam   VS: /90 (BP Location: Left arm, Patient Position: Chair, Cuff Size: Adult Regular)   Pulse 74   Temp 97.4  F (36.3  C) (Oral)   Ht 1.549 m (5' 1\")   Wt 79 kg (174 lb 1.6 oz)   SpO2 99%   Breastfeeding? No   BMI 32.90 kg/m    GENERAL: AXOX3, NAD, well dressed, answering questions appropriately, appears stated age.  HEENT: No exopthalmous, no proptosis, EOMI, no lig lag, no retraction  NECK: Thyroid normal in size, non tender, no nodules were palpated.  CV: RRR  LUNGS: CTAB  ABDOMEN: +BS  NEUROLOGY: CN grossly intact, no tremors  PSYCH: normal affect and mood    LABS:  TFTs:  ENDO THYROID LABS-[facility] Latest Ref Rng & Units 5/2/2019 5/2/2019   TSH 0.40 - 4.00 mU/L 2.26 2.26   T4 FREE 0.76 - 1.46 ng/dL  0.95   FREE T3 2.3 - 4.2 pg/mL  2.5     ENDO THYROID LABS-[facility] Latest Ref Rng & Units 1/8/2018   TSH 0.40 - 4.00 mU/L 1.42   T4 FREE 0.76 - 1.46 ng/dL 0.87   FREE T3 2.3 - 4.2 pg/mL 2.3     ENDO THYROID LABS-[facility] Latest Ref Rng & Units 10/23/2017   TSH 0.40 - 4.00 mU/L 2.39   T4 FREE 0.76 - 1.46 ng/dL 0.89     ENDO THYROID LABS-[facility] Latest Ref Rng & Units 5/2/2017 10/17/2016   TSH 0.40 - 4.00 mU/L 2.28 <0.01 (L)   T4 FREE 0.76 - 1.46 ng/dL 0.89 1.27   FREE T3 2.3 - 4.2 pg/mL 2.8 3.4   THYROID STIM IMMUNOG   <1.0 . . .     ENDO THYROID LABS-[facility] Latest Ref Rng 10/5/2016 1/29/2016 9/18/2006   TSH 0.40 - 4.00 mU/L <0.01 (L) 0.02 (L) 0.55   T4 FREE 0.76 - 1.46 ng/dL 1.24 1.02        CBC:  WBC   Date Value Ref Range Status   05/02/2019 5.6 4.0 - 11.0 10e9/L Final     LFTs:  Liver Function Studies -   Recent Labs   Lab Test 05/02/19  0938   PROTTOTAL 7.6   ALBUMIN 3.6   BILITOTAL 0.6   ALKPHOS 63   AST 20   ALT 26       NUCLEAR MEDICINE THYROID UPTAKE AND SCAN 10/26/2016 10:32 AM      HISTORY: Thyrotoxicosis, unspecified without thyrotoxic crisis or  storm.     TECHNIQUE: Patient was given 275 uCi of I-123 orally followed by  24-hour uptake and scan.     FINDINGS: Thyroid gland is homogeneous in uptake but the " gland  overall appears enlarged. 24-hour uptake was calculated at 22% which  is within the normal range.     Normal range is 10-30% 24-hour uptake.         IMPRESSION: Enlarged thyroid gland with normal 24-hour uptake at 22%.       All pertinent notes, labs, and images personally reviewed by me.     A/P  Ms.June BALTA Montalvo is a 78 year old here for the evaluation of hyperthyroidism.    Subclinical hyperthyroidism:    Neg TSI and normal thyroid uptake and scan.  24-hour uptake was calculated at 22% which is within the normal range.  H/o CHF, CAD and osteopenia  On methimazole 2.5 mg/day since 11/2017-- before that she was on 5 mg/day.  F/u Labs in normal range 5/2019  CBC and LFT normal.  -- clinically looks euthyroid  Plan:  Discussed diagnosis, pathophysiology, management and treatment options of condition with pt.  I discussed that she is on small dose of methimazole and consideration for stopping methimazole and monitoring response  She has upcoming wedding and she will be traveling in the next 2 months.  She would like to consider this when she is back  -- plan to continue methimazole to 2.5 mg/day (on this dose since 11/14/2017)  She is at high risk with h/o CAD, osteoporosis.    Plan to repeat labs when she is back in July  Follow-up after that  At that time will consider stopping methimazole and monitoring response.    Discussed s/s of hypo and hyperthyroidism to watch for.    Discussed possible side effects of methimazole including liver dysfunction and agranulocytosis. Discussed to watch for s/s like jaundice, abdominal pain and skin rash. In case of prolonged unresolving fever, sore throat and infections, advise to seek medical care.    Call if:     Signs or symptoms of infection. These include a fever of 100.5 degrees or higher, chills, severe sore throat, ear or sinus pain, cough, increased sputum or change in color, painful urination, mouth sores.   Severe nausea or vomiting.   Joint pain or  swelling.   Not able to eat.   Unusual bruising or bleeding.   Dark urine or yellow skin or eyes.            Follow-up:  3-4 months    Annie Alvarez MD  Endocrinology  [facility]  CC: Deisi Liu       All questions were answered.  The patient indicates understanding of the above issues and agrees with the plan set forth. 15 min.

## 2019-05-09 NOTE — PATIENT INSTRUCTIONS
[facility]   Dr Alvarez, Endocrinology Department      [facility]   [address]  [place] [address]  Appointment [phone]  Fax: [phone]  [place]: Monday and Tuesday         [place] [address]  [place] [address]  Appointment [phone]  Fax: [phone]  [place]: Wednesday and Thursday            Plan is to continue methimazole 2.5 mg/day at this time  In July lets repeat labs  Please make a lab appointment for blood work and follow up clinic appointment in 1 week after that to discuss results.  Will discuss stopping medication at that time.

## 2019-05-09 NOTE — LETTER
5/9/2019         RE: Victoria Montalvo  [address] [place] [address]        Dear Colleague,    Thank you for referring your patient, Victoria Montalvo, to the [facility]. Please see a copy of my visit note below.    Name: Victoria Montalvo  Seen for follow up Hyperthyroidism.    HPI:  Victoria Montalvo is a 78 year old female who presents for the evaluation of Hyperthyroidism.  H/o CAD s/p stent placement. Last was 4/2017.  H/o osteoporosis-- on fosamax.     Neg TSI and normal thyroid uptake and scan.  24-hour uptake was calculated at 22% which is within the normal range.    On methimazole 2.5 mg/day. Since 11/2017. Before that she was on 5 mg/day  F/u Labs in normal range. (5/2019)  CBC and LFT normal.    Feeling ok.  No major complaints.    History of radiation exposure: NO  History of thyroid dysfunction: NO  Palpitations:  No.  Changes to hair or skin: No  Diarrhea/Constipation:No. Lactose intolerance.  Changes in vision:Yes: sometimes not clear  Dysphagia or Shortness of breath:Yes: sometimes difficulty with swallowing. Sometimes SOB.  Tremors:No  Changes in weight: no. Stable.  Wt Readings from Last 2 Encounters:   05/09/19 79 kg (174 lb 1.6 oz)   04/17/19 79.4 kg (175 lb)     Lithium/Amiodarone: No  URI: No  CT Scans:No    PMH/PSH:  Past Medical History:   Diagnosis Date     Abnormal Papanicolaou smear of cervix and cervical HPV     colposcopy 1/97     Arthritis      Chronic rhinitis      COPD (chronic obstructive pulmonary disease) (H)      Coronary artery disease     4/4/17 SHERLEY--PDA     Diastolic CHF, chronic (H) 2/22/2012     Dislocation of hip (H)     5/25/18 and 12/29/18 with successful reductions in ED     Gastro-oesophageal reflux disease      H/O heart artery stent 04/04/2017    SHERLEY to PDA     Hyperlipidemia LDL goal <130 10/31/2011     KEN (obstructive sleep apnea)     CPAP     Pain in right hip      Personal history of colonic polyps     colonoscopy 9/97, 2002 (due in 2007)      Pulmonary HTN (H)      Subclavian artery stenosis, left (H) 8/7/2013    S/p stent in 2013      Subclinical hyperthyroidism 10/17/2016     Unspecified asthma(493.90)     on preventative meds and has nebulizer     Past Surgical History:   Procedure Laterality Date     ARTHROPLASTY HIP Right 10/19/2015    Procedure: ARTHROPLASTY HIP;  Surgeon: Aron Torres MD;  Location: [facility] OR     C NONSPECIFIC PROCEDURE  2/89, 1990    liposuction of legs and stomach     C NONSPECIFIC PROCEDURE  1990    Ankle pins removed     C OPEN RX ANKLE DISLOCATN+FIXATN  4/18/87     COLONOSCOPY  01/1/08    Polyp removed, bx.     COLONOSCOPY  01/12/10     COLONOSCOPY N/A 2/17/2015    Procedure: COLONOSCOPY;  Surgeon: Gato Quiles MD;  Location: [facility]     CT CORONARY ANGIOGRAM  04/04/2017    SHERLEY to PDA     HC COLONOSCOPY THRU STOMA, DIAGNOSTIC  2002     HC REDUCTION OF LARGE BREAST  2/89     VASCULAR SURGERY  2013    angiogram & left subclavical artery stent     Family Hx:  Family History   Problem Relation Age of Onset     Alzheimer Disease Mother      Gastrointestinal Disease Mother      Cancer Father         throat and lungs, liver,     Heart Disease Father 57        CABG     Heart Disease Brother         suicidal     Pacemaker Brother      Heart Disease Sister      Hypertension Maternal Grandmother      Lipids Maternal Grandmother      Cancer Maternal Grandmother         stomach     Cancer Paternal Grandmother         stomach     Allergies Daughter      Allergies Son      Thyroid disease: No           Social Hx:  Social History     Socioeconomic History     Marital status:      Spouse name: Trenton     Number of children: 2     Years of education: 12     Highest education level: Not on file   Occupational History     Occupation: retired   Social Needs     Financial resource strain: Not on file     Food insecurity:     Worry: Not on file     Inability: Not on file     Transportation needs:     Medical: Not on file      Non-medical: Not on file   Tobacco Use     Smoking status: Former Smoker     Packs/day: 0.10     Years: 10.00     Pack years: 1.00     Types: Cigarettes     Last attempt to quit: 5/19/2003     Years since quitting: 15.9     Smokeless tobacco: Never Used     Tobacco comment: off and on x 10 years 3-4 cigs: no smoker in the household   Substance and Sexual Activity     Alcohol use: Yes     Alcohol/week: 0.0 oz     Comment: a couple drinks per year     Drug use: No     Sexual activity: Not Currently     Partners: Male   Lifestyle     Physical activity:     Days per week: Not on file     Minutes per session: Not on file     Stress: Not on file   Relationships     Social connections:     Talks on phone: Not on file     Gets together: Not on file     Attends Gnosticism service: Not on file     Active member of club or organization: Not on file     Attends meetings of clubs or organizations: Not on file     Relationship status: Not on file     Intimate partner violence:     Fear of current or ex partner: Not on file     Emotionally abused: Not on file     Physically abused: Not on file     Forced sexual activity: Not on file   Other Topics Concern      Service Not Asked     Blood Transfusions Not Asked     Caffeine Concern No     Occupational Exposure Not Asked     Hobby Hazards Not Asked     Sleep Concern Not Asked     Stress Concern Not Asked     Weight Concern Not Asked     Special Diet No     Comment: regular diet      Back Care Not Asked     Exercise No     Comment: shopping a lot so [employer] Helmet Not Asked     Seat Belt Not Asked     Self-Exams Not Asked   Social History Narrative     Not on file          MEDICATIONS:  has a current medication list which includes the following prescription(s): albuterol, alendronate, aspirin, atorvastatin, biotin, osteo bi-flex/5-loxin advanced, calcium, cholecalciferol, cyclosporine, fluticasone, fluticasone-salmeterol, furosemide, isosorbide mononitrate, magnesium  "acetate, methimazole, montelukast, nitroglycerin, omega-3 fatty acids, omeprazole, order for dme, prasugrel, proair hfa, and spironolactone.    ROS   ROS: 10 point ROS neg other than the symptoms noted above in the HPI.    Physical Exam   VS: /90 (BP Location: Left arm, Patient Position: Chair, Cuff Size: Adult Regular)   Pulse 74   Temp 97.4  F (36.3  C) (Oral)   Ht 1.549 m (5' 1\")   Wt 79 kg (174 lb 1.6 oz)   SpO2 99%   Breastfeeding? No   BMI 32.90 kg/m     GENERAL: AXOX3, NAD, well dressed, answering questions appropriately, appears stated age.  HEENT: No exopthalmous, no proptosis, EOMI, no lig lag, no retraction  NECK: Thyroid normal in size, non tender, no nodules were palpated.  CV: RRR  LUNGS: CTAB  ABDOMEN: +BS  NEUROLOGY: CN grossly intact, no tremors  PSYCH: normal affect and mood    LABS:  TFTs:  ENDO THYROID LABS-[facility] Latest Ref Rng & Units 5/2/2019 5/2/2019   TSH 0.40 - 4.00 mU/L 2.26 2.26   T4 FREE 0.76 - 1.46 ng/dL  0.95   FREE T3 2.3 - 4.2 pg/mL  2.5     ENDO THYROID LABS-[facility] Latest Ref Rng & Units 1/8/2018   TSH 0.40 - 4.00 mU/L 1.42   T4 FREE 0.76 - 1.46 ng/dL 0.87   FREE T3 2.3 - 4.2 pg/mL 2.3     ENDO THYROID LABS-[facility] Latest Ref Rng & Units 10/23/2017   TSH 0.40 - 4.00 mU/L 2.39   T4 FREE 0.76 - 1.46 ng/dL 0.89     ENDO THYROID LABS-[facility] Latest Ref Rng & Units 5/2/2017 10/17/2016   TSH 0.40 - 4.00 mU/L 2.28 <0.01 (L)   T4 FREE 0.76 - 1.46 ng/dL 0.89 1.27   FREE T3 2.3 - 4.2 pg/mL 2.8 3.4   THYROID STIM IMMUNOG   <1.0 . . .     ENDO THYROID LABS-[facility] Latest Ref Rng 10/5/2016 1/29/2016 9/18/2006   TSH 0.40 - 4.00 mU/L <0.01 (L) 0.02 (L) 0.55   T4 FREE 0.76 - 1.46 ng/dL 1.24 1.02        CBC:  WBC   Date Value Ref Range Status   05/02/2019 5.6 4.0 - 11.0 10e9/L Final     LFTs:  Liver Function Studies -   Recent Labs   Lab Test 05/02/19  0938   PROTTOTAL 7.6   ALBUMIN 3.6   BILITOTAL 0.6   ALKPHOS 63   AST 20   ALT 26       NUCLEAR MEDICINE THYROID UPTAKE AND SCAN 10/26/2016 10:32 AM "      HISTORY: Thyrotoxicosis, unspecified without thyrotoxic crisis or  storm.     TECHNIQUE: Patient was given 275 uCi of I-123 orally followed by  24-hour uptake and scan.     FINDINGS: Thyroid gland is homogeneous in uptake but the gland  overall appears enlarged. 24-hour uptake was calculated at 22% which  is within the normal range.     Normal range is 10-30% 24-hour uptake.         IMPRESSION: Enlarged thyroid gland with normal 24-hour uptake at 22%.       All pertinent notes, labs, and images personally reviewed by me.     A/P  Ms.June BALTA Montalvo is a 78 year old here for the evaluation of hyperthyroidism.    Subclinical hyperthyroidism:    Neg TSI and normal thyroid uptake and scan.  24-hour uptake was calculated at 22% which is within the normal range.  H/o CHF, CAD and osteopenia  On methimazole 2.5 mg/day since 11/2017-- before that she was on 5 mg/day.  F/u Labs in normal range 5/2019  CBC and LFT normal.  -- clinically looks euthyroid  Plan:  Discussed diagnosis, pathophysiology, management and treatment options of condition with pt.  I discussed that she is on small dose of methimazole and consideration for stopping methimazole and monitoring response  She has upcoming wedding and she will be traveling in the next 2 months.  She would like to consider this when she is back  -- plan to continue methimazole to 2.5 mg/day (on this dose since 11/14/2017)  She is at high risk with h/o CAD, osteoporosis.    Plan to repeat labs when she is back in July  Follow-up after that  At that time will consider stopping methimazole and monitoring response.    Discussed s/s of hypo and hyperthyroidism to watch for.    Discussed possible side effects of methimazole including liver dysfunction and agranulocytosis. Discussed to watch for s/s like jaundice, abdominal pain and skin rash. In case of prolonged unresolving fever, sore throat and infections, advise to seek medical care.    Call if:     Signs or symptoms of  infection. These include a fever of 100.5 degrees or higher, chills, severe sore throat, ear or sinus pain, cough, increased sputum or change in color, painful urination, mouth sores.   Severe nausea or vomiting.   Joint pain or swelling.   Not able to eat.   Unusual bruising or bleeding.   Dark urine or yellow skin or eyes.            Follow-up:  3-4 months    Annei Alvarez MD  Endocrinology  [facility] [facility]  CC: Deisi Liu       All questions were answered.  The patient indicates understanding of the above issues and agrees with the plan set forth.          Again, thank you for allowing me to participate in the care of your patient.        Sincerely,        Annie Alvarez MD

## 2019-05-09 NOTE — NURSING NOTE
ENDOCRINOLOGY INTAKE FORM    Patient Name:  Victoria Montalvo  :  1940    Is patient Diabetic?   No  Does patient have non-diabetic or other endocrine issues?  Yes: subclinical hyperthyroidism, hyperlipidemia    Vitals: There were no vitals taken for this visit.  BMI= There is no height or weight on file to calculate BMI.      Smoking and Alcohol use:  Social History     Tobacco Use     Smoking status: Former Smoker     Packs/day: 0.10     Years: 10.00     Pack years: 1.00     Types: Cigarettes     Last attempt to quit: 2003     Years since quitting: 15.9     Smokeless tobacco: Never Used     Tobacco comment: off and on x 10 years 3-4 cigs: no smoker in the household   Substance Use Topics     Alcohol use: Yes     Alcohol/week: 0.0 oz     Comment: a couple drinks per year     Drug use: No       Melissa Reyes [facility] Endocrinology  [place]

## 2019-05-15 NOTE — ADDENDUM NOTE
Addended by: SOSA LANCASTER on: 3/28/2018 09:39 AM     Modules accepted: Orders    
15-May-2019 17:32

## 2019-05-23 DIAGNOSIS — J45.40 MODERATE PERSISTENT ASTHMA, UNCOMPLICATED: ICD-10-CM

## 2019-05-23 RX ORDER — ALBUTEROL SULFATE 90 UG/1
AEROSOL, METERED RESPIRATORY (INHALATION)
Qty: 25.5 G | Refills: 0 | Status: SHIPPED | OUTPATIENT
Start: 2019-05-23 | End: 2020-09-30

## 2019-05-23 NOTE — TELEPHONE ENCOUNTER
"Requested Prescriptions   Pending Prescriptions Disp Refills     PROAIR  (90 Base) MCG/ACT inhaler [Pharmacy Med Name: PROAIR HFA INH 8.5GM W/COUNT 90MCG]    Last Written Prescription Date:  3/8/2019  Last Fill Quantity: 25.5 g,  # refills: 0   Last office visit: 1/9/2019 with prescribing provider:  Elsy Bah     Future Office Visit:   Next 5 appointments (look out 90 days)    Jul 25, 2019 11:00 AM CDT  Return Visit with Annie Alvarez MD  [facility] ([facility]) [address]  [facility] [address] [phone]          25.5 g 0     Sig: USE 2 INHALATIONS EVERY 6 HOURS AS NEEDED FOR SHORTNESS OF BREATH, DYSPNEA OR WHEEZING       Asthma Maintenance Inhalers - Anticholinergics Failed - 5/23/2019  2:21 AM        Failed - Asthma control assessment score within normal limits in last 6 months     Please review ACT score.     ACT Total Scores 8/4/2017 3/5/2018 1/9/2019   ACT TOTAL SCORE - - -   ASTHMA ER VISITS - - -   ASTHMA HOSPITALIZATIONS - - -   ACT TOTAL SCORE (Goal Greater than or Equal to 20) 14 17 13   In the past 12 months, how many times did you visit the emergency room for your asthma without being admitted to the hospital? 0 0 0   In the past 12 months, how many times were you hospitalized overnight because of your asthma? 0 0 0               Passed - Patient is age 12 years or older        Passed - Medication is active on med list        Passed - Recent (6 mo) or future (30 days) visit within the authorizing provider's specialty     Patient had office visit in the last 6 months or has a visit in the next 30 days with authorizing provider or within the authorizing provider's specialty.  See \"Patient Info\" tab in inbasket, or \"Choose Columns\" in Meds & Orders section of the refill encounter.          Routing refill request to provider for review/approval because:  Labs out of range:  ACT    Gabbi Venegas RN'          "

## 2019-06-13 ENCOUNTER — MYC MEDICAL ADVICE (OUTPATIENT)
Dept: CARDIOLOGY | Facility: CLINIC | Age: 79
End: 2019-06-13

## 2019-06-13 NOTE — TELEPHONE ENCOUNTER
[employer] message received. Patient's last stent was placed in 04/2017, but has nondominant RCA with severe disease. RN will send to Dr. Yepez for review if patient able to stop Effient (patient also on ASA 81mg).         Can I stop taking Prasugrel (Effient) for a while?  I am getting a lot of bruising and heavy bleeding when an open sore appears.  I would like to get the bruising to stop.  I went to an urgent care on 6/11/19 because suddenly my arm started bleeding and I didn't have a sore but a very small opening appeared and I went to ur to get it to stop.  I am going to go on a trip and I don't want to have to have this happen then.  If you want, I can start up again when I come back.          4/17/19 OV DOMINIK Ana    ASSESSMENT/PLAN:  1.  Coronary artery disease.   - Symptoms of dyspnea on exertion and chest pain with abnormal nuclear stress test.   Coronary angiogram 04/04/2017 - 80% left PDA status post drug-eluting stent.  There is a tiny nondominant right coronary artery with severe disease that was not intervened upon.   - Negative nuclear stress test 2/27/2018  - No chest pain  - Continue ASA, BB, statin, Imdur      2.  Hypertension.   - Controlled on Imdur 30 mg, bisoprolol 2.5 mg, spironolactone 12.5 mg MWF, Lasix 20 mg.      3.  Hyperlipidemia.   - 4/2017 switched from pravastatin to atorvastatin due to her known coronary artery disease.  6/2017 LDL 70.      4.  Diastolic dysfunction.   - On Lasix 20 mg daily, spironolactone 12.5 mg MWF, without e/o CHF.      5.  Left subclavian stenosis, status post stenting in 2013.   - Continue ASA, statin     6.  Venous insufficiency, bilateral.   - Had venous competency studies March 2017.   - Patient has more right-sided lower extremity edema symptoms but more venous reflux in the left leg.    - She is comfortable with continued medical management.    - Wearing compression stockings with improvement in symptoms     7.  Obstructive sleep apnea not on CPAP.      8.   Thyroid disease.   - Seeing Dr. Alvarez in May 2019      9.  SOB  - Negative nuc 2/27/18  - No change when bisoprolol was stopped  - Does have asthma   - Does have untreated KEN  - Will check her O2 sats and HR with exertion and let us know if abnormal   - No significant progression        Follow up: 1 year with Dr. Yepez, with labs

## 2019-06-17 DIAGNOSIS — E05.90 SUBCLINICAL HYPERTHYROIDISM: ICD-10-CM

## 2019-06-17 NOTE — TELEPHONE ENCOUNTER
"Requested Prescriptions   Pending Prescriptions Disp Refills     methimazole (TAPAZOLE) 5 MG tablet [Pharmacy Med Name: METHIMAZOLE TABS 5MG] 30 tablet 0     Sig: TAKE ONE-HALF (1/2) TABLET DAILY   Last Written Prescription Date:  04/18/19  Last Fill Quantity: 30,  # refills: 0   Last office visit: 5/9/2019 with prescribing provider:  yes   Future Office Visit:   Next 5 appointments (look out 90 days)    Jul 25, 2019 11:00 AM CDT  Return Visit with Annie Alvarez MD  [facility] ([facility]) [address] [facility] [address]  [facility] [address]  [phone]             Anti-Thyroid Agents Protocol Failed - 6/17/2019  2:01 AM        Failed - Refills for this medication group require provider approval        Passed - CBC is on file within the past 12 months     Recent Labs   Lab Test 05/02/19  0938   WBC 5.6   RBC 4.48   HGB 13.0   HCT 39.8                    Passed - Recent (12 mo) or future (30 days) visit within the authorizing provider's specialty     Patient had office visit in the last 12 months or has a visit in the next 30 days with authorizing provider or within the authorizing provider's specialty.  See \"Patient Info\" tab in inbasket, or \"Choose Columns\" in Meds & Orders section of the refill encounter.              Passed - Medication is active on the patient's med list        Passed - Patient is age 18 or older        Passed - Normal TSH in past 12 months     Recent Labs   Lab Test 05/02/19  0938   TSH 2.26  2.26              Passed - No active pregnancy on record        Passed - No positive pregnancy test in past 12 months          "

## 2019-06-17 NOTE — TELEPHONE ENCOUNTER
Justina Montalvo,    You may stop the Effient and just remain on the aspirin.    Best,  Yogi Yepez    Med list updated.

## 2019-06-18 RX ORDER — METHIMAZOLE 5 MG/1
TABLET ORAL
Qty: 30 TABLET | Refills: 0 | Status: SHIPPED | OUTPATIENT
Start: 2019-06-18 | End: 2019-07-25

## 2019-06-18 NOTE — TELEPHONE ENCOUNTER
Routing refill request to provider for review/approval because:  Must be approved by a provider    Next 5 appointments (look out 90 days)    Jul 25, 2019 11:00 AM CDT  Return Visit with Annie Alvarez MD  [facility] ([facility]) [address] [facility] [address]  [facility] [address] [phone]

## 2019-07-02 ENCOUNTER — MYC MEDICAL ADVICE (OUTPATIENT)
Dept: PEDIATRICS | Facility: CLINIC | Age: 79
End: 2019-07-02

## 2019-07-02 DIAGNOSIS — J45.50 UNCOMPLICATED SEVERE PERSISTENT ASTHMA (H): ICD-10-CM

## 2019-07-02 NOTE — TELEPHONE ENCOUNTER
Prescription approved per [facility] Refill Protocol.     Laura Keyes RN BSN   [facility]

## 2019-07-05 DIAGNOSIS — I50.32 DIASTOLIC CHF, CHRONIC (H): ICD-10-CM

## 2019-07-05 RX ORDER — FUROSEMIDE 20 MG
20 TABLET ORAL DAILY
Qty: 90 TABLET | Refills: 3 | Status: SHIPPED | OUTPATIENT
Start: 2019-07-05 | End: 2020-07-01

## 2019-07-05 NOTE — TELEPHONE ENCOUNTER
Medication Refilled: lasix   Last office visit: 19  Last Labs/EK19  Next office visit: 2020 orders in [facility]  Pharmacy sent to: nicholas Terrazas RN

## 2019-07-18 DIAGNOSIS — E05.90 SUBCLINICAL HYPERTHYROIDISM: ICD-10-CM

## 2019-07-18 LAB
T3FREE SERPL-MCNC: 2.8 PG/ML (ref 2.3–4.2)
WBC # BLD AUTO: 5 10E9/L (ref 4–11)

## 2019-07-18 PROCEDURE — 85048 AUTOMATED LEUKOCYTE COUNT: CPT | Performed by: INTERNAL MEDICINE

## 2019-07-18 PROCEDURE — 84443 ASSAY THYROID STIM HORMONE: CPT | Performed by: INTERNAL MEDICINE

## 2019-07-18 PROCEDURE — 80076 HEPATIC FUNCTION PANEL: CPT | Performed by: INTERNAL MEDICINE

## 2019-07-18 PROCEDURE — 36415 COLL VENOUS BLD VENIPUNCTURE: CPT | Performed by: INTERNAL MEDICINE

## 2019-07-18 PROCEDURE — 84481 FREE ASSAY (FT-3): CPT | Performed by: INTERNAL MEDICINE

## 2019-07-18 PROCEDURE — 84439 ASSAY OF FREE THYROXINE: CPT | Performed by: INTERNAL MEDICINE

## 2019-07-19 LAB
ALBUMIN SERPL-MCNC: 3.5 G/DL (ref 3.4–5)
ALP SERPL-CCNC: 57 U/L (ref 40–150)
ALT SERPL W P-5'-P-CCNC: 26 U/L (ref 0–50)
AST SERPL W P-5'-P-CCNC: 20 U/L (ref 0–45)
BILIRUB DIRECT SERPL-MCNC: 0.1 MG/DL (ref 0–0.2)
BILIRUB SERPL-MCNC: 0.6 MG/DL (ref 0.2–1.3)
PROT SERPL-MCNC: 7.2 G/DL (ref 6.8–8.8)
T4 FREE SERPL-MCNC: 0.99 NG/DL (ref 0.76–1.46)
TSH SERPL DL<=0.005 MIU/L-ACNC: 2.29 MU/L (ref 0.4–4)

## 2019-07-25 ENCOUNTER — OFFICE VISIT (OUTPATIENT)
Dept: ENDOCRINOLOGY | Facility: CLINIC | Age: 79
End: 2019-07-25
Payer: MEDICARE

## 2019-07-25 VITALS
BODY MASS INDEX: 32.13 KG/M2 | HEART RATE: 75 BPM | HEIGHT: 61 IN | DIASTOLIC BLOOD PRESSURE: 69 MMHG | TEMPERATURE: 98.1 F | RESPIRATION RATE: 20 BRPM | WEIGHT: 170.2 LBS | SYSTOLIC BLOOD PRESSURE: 132 MMHG | OXYGEN SATURATION: 97 %

## 2019-07-25 DIAGNOSIS — E05.90 SUBCLINICAL HYPERTHYROIDISM: Primary | ICD-10-CM

## 2019-07-25 PROCEDURE — 99214 OFFICE O/P EST MOD 30 MIN: CPT | Performed by: INTERNAL MEDICINE

## 2019-07-25 RX ORDER — METHIMAZOLE 5 MG/1
2.5 TABLET ORAL DAILY
Qty: 30 TABLET | Refills: 0 | Status: CANCELLED | OUTPATIENT
Start: 2019-07-25

## 2019-07-25 ASSESSMENT — MIFFLIN-ST. JEOR: SCORE: 1179.4

## 2019-07-25 NOTE — NURSING NOTE
ENDOCRINOLOGY INTAKE FORM    Patient Name:  Victoria Montalvo  :  1940    Is patient Diabetic?   No  Does patient have non-diabetic or other endocrine issues?  Yes: subclinical hyperthyroidism, hyperlipidemia    Vitals: There were no vitals taken for this visit.  BMI= There is no height or weight on file to calculate BMI.    Smoking and Alcohol use:  Social History     Tobacco Use     Smoking status: Former Smoker     Packs/day: 0.10     Years: 10.00     Pack years: 1.00     Types: Cigarettes     Last attempt to quit: 2003     Years since quittin.1     Smokeless tobacco: Never Used     Tobacco comment: off and on x 10 years 3-4 cigs: no smoker in the household   Substance Use Topics     Alcohol use: Yes     Alcohol/week: 0.0 oz     Comment: a couple drinks per year     Drug use: No       Melissa Reyes [facility] Endocrinology  [place]

## 2019-07-25 NOTE — PROGRESS NOTES
Name: Victoria Montalvo  Seen for follow up Hyperthyroidism.    HPI:  Victoria Montalvo is a 79 year old female who presents for the evaluation of Hyperthyroidism.  H/o CAD s/p stent placement. Last was 4/2017.  H/o osteoporosis-- on fosamax.     Neg TSI and normal thyroid uptake and scan.  24-hour uptake was calculated at 22% which is within the normal range.    On methimazole 2.5 mg/day. Since 11/2017. Before that she was on 5 mg/day  F/u Labs in normal range. (7/2019)  CBC and LFT normal.    Feeling ok.  No major complaints.    History of radiation exposure: NO  History of thyroid dysfunction: NO  Palpitations:  No.  Changes to hair or skin: No  Diarrhea/Constipation:No. Lactose intolerance.  Changes in vision:Yes: sometimes not clear  Dysphagia or Shortness of breath: No  Tremors:No  Changes in weight: + wt loss intentional.  Wt Readings from Last 2 Encounters:   07/25/19 77.2 kg (170 lb 3.2 oz)   05/09/19 79 kg (174 lb 1.6 oz)     Lithium/Amiodarone: No  URI: No  CT Scans:No    PMH/PSH:  Past Medical History:   Diagnosis Date     Abnormal Papanicolaou smear of cervix and cervical HPV     colposcopy 1/97     Arthritis      Chronic rhinitis      COPD (chronic obstructive pulmonary disease) (H)      Coronary artery disease     4/4/17 SHERLEY--PDA     Diastolic CHF, chronic (H) 2/22/2012     Dislocation of hip (H)     5/25/18 and 12/29/18 with successful reductions in ED     Gastro-oesophageal reflux disease      H/O heart artery stent 04/04/2017    SHERLEY to PDA     Hyperlipidemia LDL goal <130 10/31/2011     KEN (obstructive sleep apnea)     CPAP     Pain in right hip      Personal history of colonic polyps     colonoscopy 9/97, 2002 (due in 2007)     Pulmonary HTN (H)      Subclavian artery stenosis, left (H) 8/7/2013    S/p stent in 2013      Subclinical hyperthyroidism 10/17/2016     Unspecified asthma(493.90)     on preventative meds and has nebulizer     Past Surgical History:   Procedure Laterality Date     ARTHROPLASTY  HIP Right 10/19/2015    Procedure: ARTHROPLASTY HIP;  Surgeon: Aron Torres MD;  Location: [facility] OR     C NONSPECIFIC PROCEDURE  1990    liposuction of legs and stomach     C NONSPECIFIC PROCEDURE      Ankle pins removed     C OPEN RX ANKLE DISLOCATN+FIXATN  87     COLONOSCOPY  08    Polyp removed, bx.     COLONOSCOPY  01/12/10     COLONOSCOPY N/A 2015    Procedure: COLONOSCOPY;  Surgeon: Gato Quiles MD;  Location: [facility]     CT CORONARY ANGIOGRAM  2017    SHERLEY to PDA     HC COLONOSCOPY THRU STOMA, DIAGNOSTIC       HC REDUCTION OF LARGE BREAST       VASCULAR SURGERY      angiogram & left subclavical artery stent     Family Hx:  Family History   Problem Relation Age of Onset     Alzheimer Disease Mother      Gastrointestinal Disease Mother      Cancer Father         throat and lungs, liver,     Heart Disease Father 57        CABG     Heart Disease Brother         suicidal     Pacemaker Brother      Heart Disease Sister      Hypertension Maternal Grandmother      Lipids Maternal Grandmother      Cancer Maternal Grandmother         stomach     Cancer Paternal Grandmother         stomach     Allergies Daughter      Allergies Son      Thyroid disease: No           Social Hx:  Social History     Socioeconomic History     Marital status:      Spouse name: Trenton     Number of children: 2     Years of education: 12     Highest education level: Not on file   Occupational History     Occupation: retired   Social Needs     Financial resource strain: Not on file     Food insecurity:     Worry: Not on file     Inability: Not on file     Transportation needs:     Medical: Not on file     Non-medical: Not on file   Tobacco Use     Smoking status: Former Smoker     Packs/day: 0.10     Years: 10.00     Pack years: 1.00     Types: Cigarettes     Last attempt to quit: 2003     Years since quittin.1     Smokeless tobacco: Never Used     Tobacco comment: off  and on x 10 years 3-4 cigs: no smoker in the household   Substance and Sexual Activity     Alcohol use: Yes     Alcohol/week: 0.0 oz     Comment: a couple drinks per year     Drug use: No     Sexual activity: Not Currently     Partners: Male   Lifestyle     Physical activity:     Days per week: Not on file     Minutes per session: Not on file     Stress: Not on file   Relationships     Social connections:     Talks on phone: Not on file     Gets together: Not on file     Attends Temple service: Not on file     Active member of club or organization: Not on file     Attends meetings of clubs or organizations: Not on file     Relationship status: Not on file     Intimate partner violence:     Fear of current or ex partner: Not on file     Emotionally abused: Not on file     Physically abused: Not on file     Forced sexual activity: Not on file   Other Topics Concern      Service Not Asked     Blood Transfusions Not Asked     Caffeine Concern No     Occupational Exposure Not Asked     Hobby Hazards Not Asked     Sleep Concern Not Asked     Stress Concern Not Asked     Weight Concern Not Asked     Special Diet No     Comment: regular diet      Back Care Not Asked     Exercise No     Comment: shopping a lot so [employer] Helmet Not Asked     Seat Belt Not Asked     Self-Exams Not Asked   Social History Narrative     Not on file          MEDICATIONS:  has a current medication list which includes the following prescription(s): albuterol, alendronate, aspirin, atorvastatin, biotin, osteo bi-flex/5-loxin advanced, calcium, cholecalciferol, cyclosporine, fluticasone, fluticasone-salmeterol, furosemide, isosorbide mononitrate, magnesium acetate, methimazole, montelukast, nitroglycerin, omega-3 fatty acids, omeprazole, order for dme, proair hfa, and spironolactone.    ROS   ROS: 10 point ROS neg other than the symptoms noted above in the HPI.    Physical Exam   VS: /69 (BP Location: Right arm, Patient  "Position: Chair, Cuff Size: Adult Large)   Pulse 75   Temp 98.1  F (36.7  C) (Oral)   Resp 20   Ht 1.549 m (5' 1\")   Wt 77.2 kg (170 lb 3.2 oz)   LMP  (LMP Unknown)   SpO2 97%   Breastfeeding? No   BMI 32.16 kg/m    GENERAL: AXOX3, NAD, well dressed, answering questions appropriately, appears stated age.  NEUROLOGY: CN grossly intact, no tremors  Thyroid normal in size, non tender, no nodules were palpated.  MSK: grossly intact  Psych: normal mood      LABS:  TFTs:  ENDO THYROID LABS-[facility] Latest Ref Rng & Units 7/18/2019   TSH 0.40 - 4.00 mU/L 2.29   T4 FREE 0.76 - 1.46 ng/dL 0.99   FREE T3 2.3 - 4.2 pg/mL 2.8     ENDO THYROID LABS-[facility] Latest Ref Rng & Units 5/2/2019 5/2/2019   TSH 0.40 - 4.00 mU/L 2.26 2.26   T4 FREE 0.76 - 1.46 ng/dL  0.95   FREE T3 2.3 - 4.2 pg/mL  2.5     ENDO THYROID LABS-[facility] Latest Ref Rng & Units 1/8/2018   TSH 0.40 - 4.00 mU/L 1.42   T4 FREE 0.76 - 1.46 ng/dL 0.87   FREE T3 2.3 - 4.2 pg/mL 2.3     ENDO THYROID LABS-[facility] Latest Ref Rng & Units 10/23/2017   TSH 0.40 - 4.00 mU/L 2.39   T4 FREE 0.76 - 1.46 ng/dL 0.89     [facility] 5/2/2017 10/17/2016   TSH 0.40 - 4.00 mU/L 2.28 <0.01 (L)   T4 FREE 0.76 - 1.46 ng/dL 0.89 1.27   FREE T3 2.3 - 4.2 pg/mL 2.8 3.4   THYROID STIM IMMUNOG   <1.0 . . .     ENDO THYROID LABS-[facility] Latest Ref Rng 10/5/2016 1/29/2016 9/18/2006   TSH 0.40 - 4.00 mU/L <0.01 (L) 0.02 (L) 0.55   T4 FREE 0.76 - 1.46 ng/dL 1.24 1.02        CBC:  WBC   Date Value Ref Range Status   07/18/2019 5.0 4.0 - 11.0 10e9/L Final     LFTs:  Liver Function Studies -   Recent Labs   Lab Test 07/18/19  0852   PROTTOTAL 7.2   ALBUMIN 3.5   BILITOTAL 0.6   ALKPHOS 57   AST 20   ALT 26           NUCLEAR MEDICINE THYROID UPTAKE AND SCAN 10/26/2016 10:32 AM      HISTORY: Thyrotoxicosis, unspecified without thyrotoxic crisis or  storm.     TECHNIQUE: Patient was given 275 uCi of I-123 orally followed by  24-hour uptake and scan.     FINDINGS: Thyroid gland is " homogeneous in uptake but the gland  overall appears enlarged. 24-hour uptake was calculated at 22% which  is within the normal range.     Normal range is 10-30% 24-hour uptake.         IMPRESSION: Enlarged thyroid gland with normal 24-hour uptake at 22%.       All pertinent notes, labs, and images personally reviewed by me.     A/P  Ms.June BALTA Montalvo is a 79 year old here for the evaluation of hyperthyroidism.    Subclinical hyperthyroidism:    Neg TSI and normal thyroid uptake and scan.  24-hour uptake was calculated at 22% which is within the normal range.  H/o CHF, CAD and osteopenia  On methimazole 2.5 mg/day since 11/2017-- before that she was on 5 mg/day.  F/u Labs in normal range 5/2019, 7/2019.  CBC and LFT normal.  Clinically looks euthyroid  Plan:  Discussed diagnosis, pathophysiology, management and treatment options of condition with pt.  I discussed that she is on small dose of methimazole and consideration for stopping methimazole and monitoring response  She is at high risk with h/o CAD, osteoporosis.  Plan to stop methimazole and monitor response with labs in  4-5 weeks.    Discussed s/s of hypothyroidism and hyperthyroidism to watch for.  The patient indicates understanding of these issues and agrees with the plan.          Follow-up:  After above.    Annie Alvarez MD  Endocrinology  [facility]  CC: Deisi Liu       All questions were answered.  The patient indicates understanding of the above issues and agrees with the plan set forth.

## 2019-07-25 NOTE — PATIENT INSTRUCTIONS
[facility]   Dr Alvarez, Endocrinology Department      [facility]   [address]  [place] [address]  Appointment [phone]  Fax: [phone]  [place]: Monday and Tuesday         [place] [address] [facility]  [place] [address]  Appointment [phone]  Fax: [phone]  [place]: Wednesday and Thursday          STOP methimazole.  Labs in 4-5 weeks.  Please make a lab appointment for blood work and follow up clinic appointment in 1 week after that to discuss results.

## 2019-07-25 NOTE — LETTER
7/25/2019         RE: Victoria Montalvo  [address] [place] [address]        Dear Colleague,    Thank you for referring your patient, Victoria Montalvo, to the [facility]. Please see a copy of my visit note below.    Name: Victoria Montalvo  Seen for follow up Hyperthyroidism.    HPI:  Victoria Montalvo is a 79 year old female who presents for the evaluation of Hyperthyroidism.  H/o CAD s/p stent placement. Last was 4/2017.  H/o osteoporosis-- on fosamax.     Neg TSI and normal thyroid uptake and scan.  24-hour uptake was calculated at 22% which is within the normal range.    On methimazole 2.5 mg/day. Since 11/2017. Before that she was on 5 mg/day  F/u Labs in normal range. (7/2019)  CBC and LFT normal.    Feeling ok.  No major complaints.    History of radiation exposure: NO  History of thyroid dysfunction: NO  Palpitations:  No.  Changes to hair or skin: No  Diarrhea/Constipation:No. Lactose intolerance.  Changes in vision:Yes: sometimes not clear  Dysphagia or Shortness of breath: No  Tremors:No  Changes in weight: + wt loss intentional.  Wt Readings from Last 2 Encounters:   07/25/19 77.2 kg (170 lb 3.2 oz)   05/09/19 79 kg (174 lb 1.6 oz)     Lithium/Amiodarone: No  URI: No  CT Scans:No    PMH/PSH:  Past Medical History:   Diagnosis Date     Abnormal Papanicolaou smear of cervix and cervical HPV     colposcopy 1/97     Arthritis      Chronic rhinitis      COPD (chronic obstructive pulmonary disease) (H)      Coronary artery disease     4/4/17 SHERLEY--PDA     Diastolic CHF, chronic (H) 2/22/2012     Dislocation of hip (H)     5/25/18 and 12/29/18 with successful reductions in ED     Gastro-oesophageal reflux disease      H/O heart artery stent 04/04/2017    SHERLEY to PDA     Hyperlipidemia LDL goal <130 10/31/2011     KEN (obstructive sleep apnea)     CPAP     Pain in right hip      Personal history of colonic polyps     colonoscopy 9/97, 2002 (due in 2007)     Pulmonary HTN (H)      Subclavian  artery stenosis, left (H) 8/7/2013    S/p stent in 2013      Subclinical hyperthyroidism 10/17/2016     Unspecified asthma(493.90)     on preventative meds and has nebulizer     Past Surgical History:   Procedure Laterality Date     ARTHROPLASTY HIP Right 10/19/2015    Procedure: ARTHROPLASTY HIP;  Surgeon: Aron Torres MD;  Location: [facility] OR     C NONSPECIFIC PROCEDURE  2/89, 1990    liposuction of legs and stomach     C NONSPECIFIC PROCEDURE  1990    Ankle pins removed     C OPEN RX ANKLE DISLOCATN+FIXATN  4/18/87     COLONOSCOPY  01/1/08    Polyp removed, bx.     COLONOSCOPY  01/12/10     COLONOSCOPY N/A 2/17/2015    Procedure: COLONOSCOPY;  Surgeon: Gato Quiles MD;  Location: [facility]     CT CORONARY ANGIOGRAM  04/04/2017    SHERLEY to PDA     HC COLONOSCOPY THRU STOMA, DIAGNOSTIC  2002     HC REDUCTION OF LARGE BREAST  2/89     VASCULAR SURGERY  2013    angiogram & left subclavical artery stent     Family Hx:  Family History   Problem Relation Age of Onset     Alzheimer Disease Mother      Gastrointestinal Disease Mother      Cancer Father         throat and lungs, liver,     Heart Disease Father 57        CABG     Heart Disease Brother         suicidal     Pacemaker Brother      Heart Disease Sister      Hypertension Maternal Grandmother      Lipids Maternal Grandmother      Cancer Maternal Grandmother         stomach     Cancer Paternal Grandmother         stomach     Allergies Daughter      Allergies Son      Thyroid disease: No           Social Hx:  Social History     Socioeconomic History     Marital status:      Spouse name: Trenton     Number of children: 2     Years of education: 12     Highest education level: Not on file   Occupational History     Occupation: retired   Social Needs     Financial resource strain: Not on file     Food insecurity:     Worry: Not on file     Inability: Not on file     Transportation needs:     Medical: Not on file     Non-medical: Not on file   Tobacco  Use     Smoking status: Former Smoker     Packs/day: 0.10     Years: 10.00     Pack years: 1.00     Types: Cigarettes     Last attempt to quit: 2003     Years since quittin.1     Smokeless tobacco: Never Used     Tobacco comment: off and on x 10 years 3-4 cigs: no smoker in the household   Substance and Sexual Activity     Alcohol use: Yes     Alcohol/week: 0.0 oz     Comment: a couple drinks per year     Drug use: No     Sexual activity: Not Currently     Partners: Male   Lifestyle     Physical activity:     Days per week: Not on file     Minutes per session: Not on file     Stress: Not on file   Relationships     Social connections:     Talks on phone: Not on file     Gets together: Not on file     Attends [employer] service: Not on file     Active member of club or organization: Not on file     Attends meetings of clubs or organizations: Not on file     Relationship status: Not on file     Intimate partner violence:     Fear of current or ex partner: Not on file     Emotionally abused: Not on file     Physically abused: Not on file     Forced sexual activity: Not on file   Other Topics Concern      Service Not Asked     Blood Transfusions Not Asked     Caffeine Concern No     Occupational Exposure Not Asked     Hobby Hazards Not Asked     Sleep Concern Not Asked     Stress Concern Not Asked     Weight Concern Not Asked     Special Diet No     Comment: regular diet      Back Care Not Asked     Exercise No     Comment: shopping a lot so [employer] Helmet Not Asked     Seat Belt Not Asked     Self-Exams Not Asked   Social History Narrative     Not on file          MEDICATIONS:  has a current medication list which includes the following prescription(s): albuterol, alendronate, aspirin, atorvastatin, biotin, osteo bi-flex/5-loxin advanced, calcium, cholecalciferol, cyclosporine, fluticasone, fluticasone-salmeterol, furosemide, isosorbide mononitrate, magnesium acetate, methimazole, montelukast,  "nitroglycerin, omega-3 fatty acids, omeprazole, order for dme, proair hfa, and spironolactone.    ROS   ROS: 10 point ROS neg other than the symptoms noted above in the HPI.    Physical Exam   VS: /69 (BP Location: Right arm, Patient Position: Chair, Cuff Size: Adult Large)   Pulse 75   Temp 98.1  F (36.7  C) (Oral)   Resp 20   Ht 1.549 m (5' 1\")   Wt 77.2 kg (170 lb 3.2 oz)   LMP  (LMP Unknown)   SpO2 97%   Breastfeeding? No   BMI 32.16 kg/m     GENERAL: AXOX3, NAD, well dressed, answering questions appropriately, appears stated age.  NEUROLOGY: CN grossly intact, no tremors  Thyroid normal in size, non tender, no nodules were palpated.  MSK: grossly intact  Psych: normal mood      LABS:  TFTs:  ENDO THYROID LABS-[facility] Latest Ref Rng & Units 7/18/2019   TSH 0.40 - 4.00 mU/L 2.29   T4 FREE 0.76 - 1.46 ng/dL 0.99   FREE T3 2.3 - 4.2 pg/mL 2.8     ENDO THYROID LABS-[facility] Latest Ref Rng & Units 5/2/2019 5/2/2019   TSH 0.40 - 4.00 mU/L 2.26 2.26   T4 FREE 0.76 - 1.46 ng/dL  0.95   FREE T3 2.3 - 4.2 pg/mL  2.5     ENDO THYROID LABS-[facility] Latest Ref Rng & Units 1/8/2018   TSH 0.40 - 4.00 mU/L 1.42   T4 FREE 0.76 - 1.46 ng/dL 0.87   FREE T3 2.3 - 4.2 pg/mL 2.3     ENDO THYROID LABS-[facility] Latest Ref Rng & Units 10/23/2017   TSH 0.40 - 4.00 mU/L 2.39   T4 FREE 0.76 - 1.46 ng/dL 0.89     ENDO THYROID LABS-[facility] Latest Ref Rng & Units 5/2/2017 10/17/2016   TSH 0.40 - 4.00 mU/L 2.28 <0.01 (L)   T4 FREE 0.76 - 1.46 ng/dL 0.89 1.27   FREE T3 2.3 - 4.2 pg/mL 2.8 3.4   THYROID STIM IMMUNOG   <1.0 . . .     ENDO THYROID LABS-[facility] Latest Ref Rng 10/5/2016 1/29/2016 9/18/2006   TSH 0.40 - 4.00 mU/L <0.01 (L) 0.02 (L) 0.55   T4 FREE 0.76 - 1.46 ng/dL 1.24 1.02        CBC:  WBC   Date Value Ref Range Status   07/18/2019 5.0 4.0 - 11.0 10e9/L Final     LFTs:  Liver Function Studies -   Recent Labs   Lab Test 07/18/19  0852   PROTTOTAL 7.2   ALBUMIN 3.5   BILITOTAL 0.6   ALKPHOS 57   AST 20   ALT 26           NUCLEAR MEDICINE THYROID " UPTAKE AND SCAN 10/26/2016 10:32 AM      HISTORY: Thyrotoxicosis, unspecified without thyrotoxic crisis or  storm.     TECHNIQUE: Patient was given 275 uCi of I-123 orally followed by  24-hour uptake and scan.     FINDINGS: Thyroid gland is homogeneous in uptake but the gland  overall appears enlarged. 24-hour uptake was calculated at 22% which  is within the normal range.     Normal range is 10-30% 24-hour uptake.         IMPRESSION: Enlarged thyroid gland with normal 24-hour uptake at 22%.       All pertinent notes, labs, and images personally reviewed by me.     A/P  Ms.June BALTA Montalvo is a 79 year old here for the evaluation of hyperthyroidism.    Subclinical hyperthyroidism:    Neg TSI and normal thyroid uptake and scan.  24-hour uptake was calculated at 22% which is within the normal range.  H/o CHF, CAD and osteopenia  On methimazole 2.5 mg/day since 11/2017-- before that she was on 5 mg/day.  F/u Labs in normal range 5/2019, 7/2019.  CBC and LFT normal.  Clinically looks euthyroid  Plan:  Discussed diagnosis, pathophysiology, management and treatment options of condition with pt.  I discussed that she is on small dose of methimazole and consideration for stopping methimazole and monitoring response  She is at high risk with h/o CAD, osteoporosis.  Plan to stop methimazole and monitor response with labs in  4-5 weeks.    Discussed s/s of hypothyroidism and hyperthyroidism to watch for.  The patient indicates understanding of these issues and agrees with the plan.          Follow-up:  After above.    Annie Alvarez MD  Endocrinology  [facility]  CC: Deisi Liu       All questions were answered.  The patient indicates understanding of the above issues and agrees with the plan set forth.              Again, thank you for allowing me to participate in the care of your patient.        Sincerely,        Annie Alvarez MD

## 2019-08-18 DIAGNOSIS — M81.0 AGE-RELATED OSTEOPOROSIS WITHOUT CURRENT PATHOLOGICAL FRACTURE: ICD-10-CM

## 2019-08-19 RX ORDER — ALENDRONATE SODIUM 70 MG/1
TABLET ORAL
Qty: 12 TABLET | Refills: 3 | Status: SHIPPED | OUTPATIENT
Start: 2019-08-19 | End: 2020-07-17

## 2019-08-19 NOTE — TELEPHONE ENCOUNTER
"Requested Prescriptions   Pending Prescriptions Disp Refills     alendronate (FOSAMAX) 70 MG tablet [Pharmacy Med Name: ALENDRONATE SOD TABS 4'S 70MG]    Last Written Prescription Date:  9/17/2018  Last Fill Quantity: 12,  # refills: 3   Last office visit: 1/9/2019 with prescribing provider:  Elsy Bah     Future Office Visit:     12 tablet 4     Sig: TAKE 1 TABLET EVERY 7 DAYS, TAKE 60 MINUTES BEFORE A.M. MEAL WITH 8 OUNCE WATER, REMAIN UPRIGHT FOR 30 MINUTES       Bisphosphonates Failed - 8/18/2019  6:05 PM        Failed - Dexa on file within past 2 years     Please review last Dexa result.           Passed - Recent (12 mo) or future (30 days) visit within the authorizing provider's specialty     Patient had office visit in the last 12 months or has a visit in the next 30 days with authorizing provider or within the authorizing provider's specialty.  See \"Patient Info\" tab in inbasket, or \"Choose Columns\" in Meds & Orders section of the refill encounter.              Passed - Medication is active on med list        Passed - Patient is age 18 or older        Passed - Normal serum creatinine on file within past 12 months     Recent Labs   Lab Test 05/02/19  0938   CR 0.71               "

## 2019-08-19 NOTE — TELEPHONE ENCOUNTER
alendronate (FOSAMAX) 70 MG tablet [Pharmacy Med Name: ALENDRONATE SOD TABS 4'S 70MG]  Routing refill request to provider for review/approval because:  Labs not current:  [facility]  T'd up 1 month for provider review.    Gabbi Venegas RN

## 2019-08-28 ENCOUNTER — HOSPITAL ENCOUNTER (OUTPATIENT)
Dept: MAMMOGRAPHY | Facility: CLINIC | Age: 79
Discharge: HOME | End: 2019-08-28
Attending: PEDIATRICS | Admitting: PEDIATRICS
Payer: MEDICARE

## 2019-08-28 DIAGNOSIS — Z12.31 VISIT FOR SCREENING MAMMOGRAM: ICD-10-CM

## 2019-08-28 PROCEDURE — 77063 BREAST TOMOSYNTHESIS BI: CPT

## 2019-09-17 ENCOUNTER — TRANSFERRED RECORDS (OUTPATIENT)
Dept: HEALTH INFORMATION MANAGEMENT | Facility: CLINIC | Age: 79
End: 2019-09-17

## 2019-09-17 ENCOUNTER — OFFICE VISIT (OUTPATIENT)
Dept: PEDIATRICS | Facility: CLINIC | Age: 79
End: 2019-09-17
Payer: MEDICARE

## 2019-09-17 VITALS
WEIGHT: 173.4 LBS | SYSTOLIC BLOOD PRESSURE: 104 MMHG | HEART RATE: 85 BPM | HEIGHT: 61 IN | TEMPERATURE: 97.9 F | OXYGEN SATURATION: 96 % | BODY MASS INDEX: 32.74 KG/M2 | DIASTOLIC BLOOD PRESSURE: 72 MMHG

## 2019-09-17 DIAGNOSIS — Z01.818 PREOP GENERAL PHYSICAL EXAM: Primary | ICD-10-CM

## 2019-09-17 DIAGNOSIS — H25.9 AGE-RELATED CATARACT OF BOTH EYES, UNSPECIFIED AGE-RELATED CATARACT TYPE: ICD-10-CM

## 2019-09-17 DIAGNOSIS — J45.50 SEVERE PERSISTENT ASTHMA WITHOUT COMPLICATION (H): ICD-10-CM

## 2019-09-17 PROCEDURE — 99214 OFFICE O/P EST MOD 30 MIN: CPT | Performed by: FAMILY MEDICINE

## 2019-09-17 ASSESSMENT — MIFFLIN-ST. JEOR: SCORE: 1193.92

## 2019-09-17 NOTE — PROGRESS NOTES
[facility] [place]  [address]  [address]  [place] [address]  [phone]  Dept: [phone]    PRE-OP EVALUATION:  Today's date: 2019    Victoria Montalvo (: 1940) presents for pre-operative evaluation assessment as requested by Dr. Chaidez.  She requires evaluation and anesthesia risk assessment prior to undergoing surgery/procedure for treatment of Cataracts . Left first.    Proposed Surgery/ Procedure: Cataracts   Date of Surgery/ Procedure: 19  Time of Surgery/ Procedure: [facility]  Hospital/Surgical Facility: [place]  [facility]   Fax number for surgical facility:   Primary Physician: Elsy Bah  Type of Anesthesia Anticipated: General    Patient has a Health Care Directive or Living Will:  YES     1. YES - Do you have a history of heart attack, stroke, stent, bypass or surgery on an artery in the head, neck, heart or legs?  Stents - subclavian.  2. NO - Do you ever have any pain or discomfort in your chest?  3. NO - Do you have a history of  Heart Failure?  4. NO - Are you troubled by shortness of breath when: walking on the level, up a slight hill or at night?  5. NO - Do you currently have a cold, bronchitis or other respiratory infection?  6. NO - Do you have a cough, shortness of breath or wheezing?  7. NO - Do you sometimes get pains in the calves of your legs when you walk?  8. NO - Do you or anyone in your family have previous history of blood clots?  9. NO - Do you or does anyone in your family have a serious bleeding problem such as prolonged bleeding following surgeries or cuts?  10. NO - Have you ever had problems with anemia or been told to take iron pills?  11. NO - Have you had any abnormal blood loss such as black, tarry or bloody stools, or abnormal vaginal bleeding?  12. NO - Have you ever had a blood transfusion?  13. NO - Have you or any of your relatives ever had problems with anesthesia?  14. NO - Do you have sleep apnea, excessive snoring  or daytime drowsiness?  15. NO - Do you have any prosthetic heart valves?  16. YES - Do you have prosthetic joints? hip  17. NO - Is there any chance that you may be pregnant?      HPI:     HPI related to upcoming procedure:     She has developed diminished vision due to cataracts. No previous eye surgery. L eye first.      No h/o Anesthesia complications.    MEDICAL HISTORY:     Patient Active Problem List    Diagnosis Date Noted     Chest pain 02/26/2018     Priority: Medium     Subclinical hyperthyroidism 10/17/2016     Priority: Medium     Mild coronary artery disease 01/30/2016     Priority: Medium     On ASA       Health Care Home 10/28/2015     Priority: Medium              Hip osteoarthritis 10/19/2015     Priority: Medium     KEN (obstructive sleep apnea) 08/20/2015     Priority: Medium     Severe persistent asthma 08/25/2014     Priority: Medium     Subclavian artery stenosis, left (H) 08/07/2013     Priority: Medium     S/p stent in 2013       Advanced directives, counseling/discussion 04/02/2012     Priority: Medium     Advance Care Planning 8/29/2017: ACP Review of Chart / Resources Provided:  Reviewed chart for advance care plan.  Victoria Montalvo has no plan and Full code status on file. Advance Care Planning letter sent.   Added by Stacie Raymundo  Patient states has Advance Directive and will bring in a copy to clinic. 4/2/2012          Diastolic CHF, chronic (H) 02/22/2012     Priority: Medium     Hyperlipidemia LDL goal <130 10/31/2011     Priority: Medium     Osteopenia 12/21/2010     Priority: Medium     Esophageal reflux 09/03/2004     Priority: Medium     Female stress incontinence 09/03/2004     Priority: Medium     LUMBOSACRAL NEURITIS , numbness left thigh 09/03/2004     Priority: Medium     History of colonic polyps 06/19/2003     Priority: Medium     Problem list name updated by automated process. Provider to review       Chronic rhinitis 06/19/2003     Priority: Medium      Past Medical  History:   Diagnosis Date     Abnormal Papanicolaou smear of cervix and cervical HPV     colposcopy 1/97     Arthritis      Chronic rhinitis      COPD (chronic obstructive pulmonary disease) (H)      Coronary artery disease     4/4/17 SHERLEY--PDA     Diastolic CHF, chronic (H) 2/22/2012     Dislocation of hip (H)     5/25/18 and 12/29/18 with successful reductions in ED     Gastro-oesophageal reflux disease      H/O heart artery stent 04/04/2017    SHERLEY to PDA     Hyperlipidemia LDL goal <130 10/31/2011     KEN (obstructive sleep apnea)     CPAP     Pain in right hip      Personal history of colonic polyps     colonoscopy 9/97, 2002 (due in 2007)     Pulmonary HTN (H)      Subclavian artery stenosis, left (H) 8/7/2013    S/p stent in 2013      Subclinical hyperthyroidism 10/17/2016     Unspecified asthma(493.90)     on preventative meds and has nebulizer     Past Surgical History:   Procedure Laterality Date     ARTHROPLASTY HIP Right 10/19/2015    Procedure: ARTHROPLASTY HIP;  Surgeon: Aron Torres MD;  Location: [facility] OR     C NONSPECIFIC PROCEDURE  2/89, 1990    liposuction of legs and stomach     C NONSPECIFIC PROCEDURE  1990    Ankle pins removed     C OPEN RX ANKLE DISLOCATN+FIXATN  4/18/87     COLONOSCOPY  01/1/08    Polyp removed, bx.     COLONOSCOPY  01/12/10     COLONOSCOPY N/A 2/17/2015    Procedure: COLONOSCOPY;  Surgeon: Gato Quiles MD;  Location: [facility]     CT CORONARY ANGIOGRAM  04/04/2017    SHERLEY to PDA     HC COLONOSCOPY THRU STOMA, DIAGNOSTIC  2002     HC REDUCTION OF LARGE BREAST  2/89     VASCULAR SURGERY  2013    angiogram & left subclavical artery stent     Current Outpatient Medications   Medication Sig Dispense Refill     albuterol (2.5 MG/3ML) 0.083% nebulizer solution Take 1 vial (2.5 mg) by nebulization every 6 hours as needed for shortness of breath / dyspnea 1 Box 3     alendronate (FOSAMAX) 70 MG tablet TAKE 1 TABLET EVERY 7 DAYS, TAKE 60 MINUTES BEFORE A.M. MEAL WITH 8  "OUNCE WATER, REMAIN UPRIGHT FOR 30 MINUTES 12 tablet 3     aspirin 81 MG tablet Take 81 mg by mouth daily.       atorvastatin (LIPITOR) 40 MG tablet Take 1 tablet (40 mg) by mouth daily 90 tablet 3     biotin 2.5 mg/mL Take by mouth daily 2000mcg       Boswellia-Glucosamine-Vit D (OSTEO BI-FLEX/5-LOXIN ADVANCED) TABS Take 1,500 mg by mouth 2 times daily        Calcium 7109-0559 MG-UNIT CHEW Take 1 tablet by mouth daily.       Cholecalciferol (VITAMIN D3 PO) Take 2,000 Units by mouth daily        cycloSPORINE (RESTASIS) 0.05 % ophthalmic emulsion Place 1 drop into both eyes 2 times daily        fluticasone (FLONASE) 50 MCG/ACT nasal spray USE 1 TO 2 SPRAYS IN EACH NOSTRIL DAILY 48 g 3     fluticasone-salmeterol (ADVAIR) 250-50 MCG/DOSE inhaler Inhale 1 puff into the lungs every 12 hours 3 Inhaler 3     furosemide (LASIX) 20 MG tablet Take 1 tablet (20 mg) by mouth daily 90 tablet 3     isosorbide mononitrate (IMDUR) 30 MG 24 hr tablet Take 1 tablet (30 mg) by mouth daily 90 tablet 3     MAGNESIUM ACETATE Take 250 mg by mouth daily        montelukast (SINGULAIR) 10 MG tablet Take 1 tablet (10 mg) by mouth At Bedtime 90 tablet 3     nitroglycerin (NITROSTAT) 0.4 MG sublingual tablet One tablet under the tongue every 5 minutes if needed for chest pain. May repeat every 5 minutes for a maximum of 3 doses in 15 minutes\" 25 tablet 3     omega-3 fatty acids (FISH OIL DOUBLE STRENGTH) 1200 MG capsule Take 1 capsule by mouth 2 times daily        omeprazole (PRILOSEC) 20 MG DR capsule TAKE 1 CAPSULE DAILY 90 capsule 2     order for DME Equipment being ordered: Nebulizer. Uses: albuterol (2.5 MG/3ML) 0.083% nebulizer solution- Sig - Route: Take 1 vial (2.5 mg) by nebulization every 6 hours as needed for shortness of breath / dyspnea. 1 Units 0     PROAIR  (90 Base) MCG/ACT inhaler USE 2 INHALATIONS EVERY 6 HOURS AS NEEDED FOR SHORTNESS OF BREATH, DYSPNEA OR WHEEZING 25.5 g 0     spironolactone (ALDACTONE) 12.5 mg TABS " half-tab Take 12.5 mg by mouth See Admin Instructions Monday, Wed, Friday        OTC products: Aspirin    Allergies   Allergen Reactions     Animal Dander      Dust Mites      Kiwi Itching     Itching and red all over     Pollen Extract      Pollen,dust, trees, grass, mold-hayfever symptoms     Seasonal Allergies       Latex Allergy: NO    Social History     Tobacco Use     Smoking status: Former Smoker     Packs/day: 0.10     Years: 10.00     Pack years: 1.00     Types: Cigarettes     Last attempt to quit: 2003     Years since quittin.3     Smokeless tobacco: Never Used     Tobacco comment: off and on x 10 years 3-4 cigs: no smoker in the household   Substance Use Topics     Alcohol use: Yes     Alcohol/week: 0.0 oz     Comment: a couple drinks per year     History   Drug Use No       REVIEW OF SYSTEMS:   Constitutional, neuro, ENT, endocrine, pulmonary, cardiac, gastrointestinal, genitourinary, musculoskeletal, integument and psychiatric systems are negative, except as otherwise noted.    EXAM:   LMP  (LMP Unknown)     GENERAL APPEARANCE: healthy, alert and no distress     EYES: EOMI, PERRL     HENT:  mouth without ulcers or lesions     NECK: no adenopathy, no asymmetry, masses, or scars and thyroid normal to palpation     RESP: lungs clear to auscultation - no rales, rhonchi or wheezes     CV: regular rates and rhythm, no murmur, click or rub     ABDOMEN:  soft, nontender, no HSM or masses      MS: extremities normal- no gross deformities noted, trace edema     SKIN: no suspicious lesions or rashes     NEURO: Normal strength and tone     PSYCH: mentation appears normal. and affect normal/bright     LYMPHATICS: No cervical adenopathy    DIAGNOSTICS:   No labs or EKG required for low risk surgery (cataract, skin procedure, breast biopsy, etc)    Recent Labs   Lab Test 19  0938 10/01/18  1444 18  0757  17  1244   HGB 13.0 10.2* 11.9   < > 13.3    189 180   < > 194   INR  --  1.15*   --   --  1.04     --  142   < > 142   POTASSIUM 3.9  --  4.0   < > 3.9   CR 0.71  --  0.68   < > 0.76    < > = values in this interval not displayed.        IMPRESSION:   Diagnosis/reason for consult:      (Z01.818) Preop general physical exam  (primary encounter diagnosis)  Comment: satis  Plan:     (H25.9) Age-related cataract of both eyes, unspecified age-related cataract type  Comment:   Plan:     (J45.50) Severe persistent asthma without complication  Comment:   Plan:         The proposed surgical procedure is considered LOW risk.    REVISED CARDIAC RISK INDEX  The patient has the following serious cardiovascular risks for perioperative complications such as (MI, PE, VFib and 3  AV Block):  No serious cardiac risks  INTERPRETATION: 1 risks: Class II (low risk - 0.9% complication rate)    The patient has the following additional risks for perioperative complications:  No identified additional risks      ICD-10-CM    1. Preop general physical exam Z01.818    2. Age-related cataract of both eyes, unspecified age-related cataract type H25.9    3. Severe persistent asthma without complication J45.50        RECOMMENDATIONS:         --Patient is to take Advair, Imdur medications on the day of surgery     APPROVAL GIVEN to proceed with proposed procedure, without further diagnostic evaluation       Signed Electronically by: Feroz Baer MD    Copy of this evaluation report is provided to requesting physician.    [facility] Preop Guidelines    Revised Cardiac Risk Index

## 2019-10-03 ENCOUNTER — HEALTH MAINTENANCE LETTER (OUTPATIENT)
Age: 79
End: 2019-10-03

## 2019-10-18 ENCOUNTER — OFFICE VISIT (OUTPATIENT)
Dept: URGENT CARE | Facility: URGENT CARE | Age: 79
End: 2019-10-18
Payer: MEDICARE

## 2019-10-18 ENCOUNTER — ANCILLARY PROCEDURE (OUTPATIENT)
Dept: GENERAL RADIOLOGY | Facility: CLINIC | Age: 79
End: 2019-10-18
Attending: FAMILY MEDICINE
Payer: MEDICARE

## 2019-10-18 VITALS
HEART RATE: 85 BPM | OXYGEN SATURATION: 96 % | TEMPERATURE: 97.6 F | SYSTOLIC BLOOD PRESSURE: 128 MMHG | DIASTOLIC BLOOD PRESSURE: 64 MMHG

## 2019-10-18 DIAGNOSIS — S92.512A CLOSED DISPLACED FRACTURE OF PROXIMAL PHALANX OF LESSER TOE OF LEFT FOOT, INITIAL ENCOUNTER: Primary | ICD-10-CM

## 2019-10-18 DIAGNOSIS — S99.922A FOOT INJURY, LEFT, INITIAL ENCOUNTER: ICD-10-CM

## 2019-10-18 PROCEDURE — 73630 X-RAY EXAM OF FOOT: CPT | Mod: LT

## 2019-10-18 PROCEDURE — 99214 OFFICE O/P EST MOD 30 MIN: CPT | Performed by: FAMILY MEDICINE

## 2019-10-18 NOTE — PROGRESS NOTES
SUBJECTIVE:  Chief Complaint   Patient presents with     Fall     left foot - painful when she walks on it      Victoria BALTA Montalvo is a 79 year old female who presents with a chief complaint of left foot pain.  Symptoms began 2 day(s) ago, are moderate and sudden onset  Context:  Injury:Yes: 10/16 -10/17 (between midnight-1 am).  Injury happened while at home. How: accidentally hit a chair in the hallway.  Patient has been traveling, just arrived home yesterday.   Pain exacerbated by walking. Relieved by nothing. This is not the first time this type of injury has occurred to this patient.     Patient unsure if has a foreign body in bottom of left foot, has discomfort with walking.  This has been about 1 month.    Patient is going to have eye surgery, told that needed to make sure she did not have foot infection.    Past Medical History:   Diagnosis Date     Abnormal Papanicolaou smear of cervix and cervical HPV     colposcopy 1/97     Arthritis      Chronic rhinitis      COPD (chronic obstructive pulmonary disease) (H)      Coronary artery disease     4/4/17 SHERLEY--PDA     Diastolic CHF, chronic (H) 2/22/2012     Dislocation of hip (H)     5/25/18 and 12/29/18 with successful reductions in ED     Gastro-oesophageal reflux disease      H/O heart artery stent 04/04/2017    SHERLEY to PDA     Hyperlipidemia LDL goal <130 10/31/2011     KEN (obstructive sleep apnea)     CPAP     Pain in right hip      Personal history of colonic polyps     colonoscopy 9/97, 2002 (due in 2007)     Pulmonary HTN (H)      Subclavian artery stenosis, left (H) 8/7/2013    S/p stent in 2013      Subclinical hyperthyroidism 10/17/2016     Unspecified asthma(493.90)     on preventative meds and has nebulizer     Current Outpatient Medications   Medication Sig Dispense Refill     albuterol (2.5 MG/3ML) 0.083% nebulizer solution Take 1 vial (2.5 mg) by nebulization every 6 hours as needed for shortness of breath / dyspnea 1 Box 3     alendronate (FOSAMAX)  "70 MG tablet TAKE 1 TABLET EVERY 7 DAYS, TAKE 60 MINUTES BEFORE A.M. MEAL WITH 8 OUNCE WATER, REMAIN UPRIGHT FOR 30 MINUTES 12 tablet 3     aspirin 81 MG tablet Take 81 mg by mouth daily.       atorvastatin (LIPITOR) 40 MG tablet Take 1 tablet (40 mg) by mouth daily 90 tablet 3     biotin 2.5 mg/mL Take by mouth daily 2000mcg       Boswellia-Glucosamine-Vit D (OSTEO BI-FLEX/5-LOXIN ADVANCED) TABS Take 1,500 mg by mouth 2 times daily        Calcium 8669-4985 MG-UNIT CHEW Take 1 tablet by mouth daily.       Cholecalciferol (VITAMIN D3 PO) Take 2,000 Units by mouth daily        cycloSPORINE (RESTASIS) 0.05 % ophthalmic emulsion Place 1 drop into both eyes 2 times daily        fluticasone (FLONASE) 50 MCG/ACT nasal spray USE 1 TO 2 SPRAYS IN EACH NOSTRIL DAILY 48 g 3     fluticasone-salmeterol (ADVAIR) 250-50 MCG/DOSE inhaler Inhale 1 puff into the lungs every 12 hours 3 Inhaler 3     furosemide (LASIX) 20 MG tablet Take 1 tablet (20 mg) by mouth daily 90 tablet 3     isosorbide mononitrate (IMDUR) 30 MG 24 hr tablet Take 1 tablet (30 mg) by mouth daily 90 tablet 3     MAGNESIUM ACETATE Take 250 mg by mouth daily        montelukast (SINGULAIR) 10 MG tablet Take 1 tablet (10 mg) by mouth At Bedtime 90 tablet 3     nitroglycerin (NITROSTAT) 0.4 MG sublingual tablet One tablet under the tongue every 5 minutes if needed for chest pain. May repeat every 5 minutes for a maximum of 3 doses in 15 minutes\" 25 tablet 3     omega-3 fatty acids (FISH OIL DOUBLE STRENGTH) 1200 MG capsule Take 1 capsule by mouth 2 times daily        omeprazole (PRILOSEC) 20 MG DR capsule TAKE 1 CAPSULE DAILY 90 capsule 2     order for DME Equipment being ordered: Nebulizer. Uses: albuterol (2.5 MG/3ML) 0.083% nebulizer solution- Sig - Route: Take 1 vial (2.5 mg) by nebulization every 6 hours as needed for shortness of breath / dyspnea. 1 Units 0     PROAIR  (90 Base) MCG/ACT inhaler USE 2 INHALATIONS EVERY 6 HOURS AS NEEDED FOR SHORTNESS OF " BREATH, DYSPNEA OR WHEEZING 25.5 g 0     spironolactone (ALDACTONE) 12.5 mg TABS half-tab Take 12.5 mg by mouth See Admin Instructions Monday, Wed, Friday        Social History     Tobacco Use     Smoking status: Former Smoker     Packs/day: 0.10     Years: 10.00     Pack years: 1.00     Types: Cigarettes     Last attempt to quit: 2003     Years since quittin.4     Smokeless tobacco: Never Used     Tobacco comment: off and on x 10 years 3-4 cigs: no smoker in the household   Substance Use Topics     Alcohol use: Yes     Alcohol/week: 0.0 standard drinks     Comment: a couple drinks per year       ROS:  Review of systems negative except as stated above.    EXAM:   /64 (BP Location: Right arm, Cuff Size: Adult Regular)   Pulse 85   Temp 97.6  F (36.4  C) (Tympanic)   LMP  (LMP Unknown)   SpO2 96%   M/S Exam:left foot - 4th toe with swelling, tenderness to palpation and bruising at base, 5th toe with mild swelling.  Plantar aspect with callus, possible firmer small nodule  GENERAL APPEARANCE: healthy, alert and no distress  EXTREMITIES: peripheral pulses normal  PSYCH: alert, affect bright    X-RAY was done - left foot - fracture of 4th toe in proximal phalanx, no opaque foreign body, personally viewed by me    ASSESSMENT/PLAN:  (S96.911A) Closed displaced fracture of proximal phalanx of lesser toe of left foot, initial encounter  (primary encounter diagnosis)  Plan: christie tape, ice, rest    (S98.221Q) Foot injury, left, initial encounter  Comment: fracture toe  Plan: XR Foot Left G/E 3 Views            Reassurance given, reviewed symptomatic treatment with tylenol, rest, ice, elevation, christie tape.  Discussed that fracture will take up to 1 month for healing.  Reviewed that did not see metal foreign body on Xray and no concerns for infection, if area is still painful after her eye surgery is done, then follow up with Podiatry for further evaluation.    Follow up with podiatry in 2-4 weeks if no  improvement of symptoms.    Reynold Ojeda MD, MD  October 18, 2019 12:38 PM

## 2019-11-08 ENCOUNTER — OFFICE VISIT (OUTPATIENT)
Dept: PODIATRY | Facility: CLINIC | Age: 79
End: 2019-11-08
Payer: MEDICARE

## 2019-11-08 VITALS
DIASTOLIC BLOOD PRESSURE: 80 MMHG | HEIGHT: 61 IN | WEIGHT: 173 LBS | BODY MASS INDEX: 32.66 KG/M2 | SYSTOLIC BLOOD PRESSURE: 130 MMHG

## 2019-11-08 DIAGNOSIS — M79.672 LEFT FOOT PAIN: Primary | ICD-10-CM

## 2019-11-08 DIAGNOSIS — L84 CALLUS OF FOOT: ICD-10-CM

## 2019-11-08 PROCEDURE — 99203 OFFICE O/P NEW LOW 30 MIN: CPT | Performed by: PODIATRIST

## 2019-11-08 ASSESSMENT — MIFFLIN-ST. JEOR: SCORE: 1192.1

## 2019-11-08 NOTE — LETTER
11/8/2019         RE: Victoria Montalvo  [address] [place] [address]        Dear Colleague,    Thank you for referring your patient, Victoria Montalvo, to the [facility]. Please see a copy of my visit note below.    PATIENT HISTORY:   Victoria Montalvo is a 79 year old female who presents to clinic for pain to the bottom of the left foot. Has been going on for a few months. Denies injury. Sore with pressure. Shoes help and barefoot is worse. Pain can be 5/10 at its worst. Wondering what is causing the pain and what can be done for it.     Review of Systems:  Patient denies fever, chills, rash, wound, stiffness, numbness, weakness, heart burn, blood in stool, chest pain with activity, calf pain when walking, shortness of breath with activity, chronic cough, easy bleeding/bruising, swelling of ankles, excessive thirst, fatigue, depression, anxiety.  Patient admits to limping at times.     PAST MEDICAL HISTORY:   Past Medical History:   Diagnosis Date     Abnormal Papanicolaou smear of cervix and cervical HPV     colposcopy 1/97     Arthritis      Chronic rhinitis      COPD (chronic obstructive pulmonary disease) (H)      Coronary artery disease     4/4/17 SHERLEY--PDA     Diastolic CHF, chronic (H) 2/22/2012     Dislocation of hip (H)     5/25/18 and 12/29/18 with successful reductions in ED     Gastro-oesophageal reflux disease      H/O heart artery stent 04/04/2017    SHERLEY to PDA     Hyperlipidemia LDL goal <130 10/31/2011     KEN (obstructive sleep apnea)     CPAP     Pain in right hip      Personal history of colonic polyps     colonoscopy 9/97, 2002 (due in 2007)     Pulmonary HTN (H)      Subclavian artery stenosis, left (H) 8/7/2013    S/p stent in 2013      Subclinical hyperthyroidism 10/17/2016     Unspecified asthma(493.90)     on preventative meds and has nebulizer        PAST SURGICAL HISTORY:   Past Surgical History:   Procedure Laterality Date     ARTHROPLASTY HIP Right 10/19/2015     Procedure: ARTHROPLASTY HIP;  Surgeon: Aron Torres MD;  Location: [facility] OR     C NONSPECIFIC PROCEDURE  2/89, 1990    liposuction of legs and stomach     C NONSPECIFIC PROCEDURE  1990    Ankle pins removed     C OPEN RX ANKLE DISLOCATN+FIXATN  4/18/87     COLONOSCOPY  01/1/08    Polyp removed, bx.     COLONOSCOPY  01/12/10     COLONOSCOPY N/A 2/17/2015    Procedure: COLONOSCOPY;  Surgeon: Gato Quiles MD;  Location: [facility]     CT CORONARY ANGIOGRAM  04/04/2017    SHERLEY to PDA     HC COLONOSCOPY THRU STOMA, DIAGNOSTIC  2002     HC REDUCTION OF LARGE BREAST  2/89     VASCULAR SURGERY  2013    angiogram & left subclavical artery stent        MEDICATIONS:   Current Outpatient Medications:      albuterol (2.5 MG/3ML) 0.083% nebulizer solution, Take 1 vial (2.5 mg) by nebulization every 6 hours as needed for shortness of breath / dyspnea, Disp: 1 Box, Rfl: 3     alendronate (FOSAMAX) 70 MG tablet, TAKE 1 TABLET EVERY 7 DAYS, TAKE 60 MINUTES BEFORE A.M. MEAL WITH 8 OUNCE WATER, REMAIN UPRIGHT FOR 30 MINUTES, Disp: 12 tablet, Rfl: 3     aspirin 81 MG tablet, Take 81 mg by mouth daily., Disp: , Rfl:      atorvastatin (LIPITOR) 40 MG tablet, Take 1 tablet (40 mg) by mouth daily, Disp: 90 tablet, Rfl: 3     biotin 2.5 mg/mL, Take by mouth daily 2000mcg, Disp: , Rfl:      Boswellia-Glucosamine-Vit D (OSTEO BI-FLEX/5-LOXIN ADVANCED) TABS, Take 1,500 mg by mouth 2 times daily , Disp: , Rfl:      Calcium 5687-3642 MG-UNIT CHEW, Take 1 tablet by mouth daily., Disp: , Rfl:      Cholecalciferol (VITAMIN D3 PO), Take 2,000 Units by mouth daily , Disp: , Rfl:      cycloSPORINE (RESTASIS) 0.05 % ophthalmic emulsion, Place 1 drop into both eyes 2 times daily , Disp: , Rfl:      fluticasone (FLONASE) 50 MCG/ACT nasal spray, USE 1 TO 2 SPRAYS IN EACH NOSTRIL DAILY, Disp: 48 g, Rfl: 3     fluticasone-salmeterol (ADVAIR) 250-50 MCG/DOSE inhaler, Inhale 1 puff into the lungs every 12 hours, Disp: 3 Inhaler, Rfl: 3      "furosemide (LASIX) 20 MG tablet, Take 1 tablet (20 mg) by mouth daily, Disp: 90 tablet, Rfl: 3     isosorbide mononitrate (IMDUR) 30 MG 24 hr tablet, Take 1 tablet (30 mg) by mouth daily, Disp: 90 tablet, Rfl: 3     MAGNESIUM ACETATE, Take 250 mg by mouth daily , Disp: , Rfl:      montelukast (SINGULAIR) 10 MG tablet, Take 1 tablet (10 mg) by mouth At Bedtime, Disp: 90 tablet, Rfl: 3     nitroglycerin (NITROSTAT) 0.4 MG sublingual tablet, One tablet under the tongue every 5 minutes if needed for chest pain. May repeat every 5 minutes for a maximum of 3 doses in 15 minutes\", Disp: 25 tablet, Rfl: 3     omega-3 fatty acids (FISH OIL DOUBLE STRENGTH) 1200 MG capsule, Take 1 capsule by mouth 2 times daily , Disp: , Rfl:      omeprazole (PRILOSEC) 20 MG DR capsule, TAKE 1 CAPSULE DAILY, Disp: 90 capsule, Rfl: 2     order for DME, Equipment being ordered: Nebulizer. Uses: albuterol (2.5 MG/3ML) 0.083% nebulizer solution- Sig - Route: Take 1 vial (2.5 mg) by nebulization every 6 hours as needed for shortness of breath / dyspnea., Disp: 1 Units, Rfl: 0     PROAIR  (90 Base) MCG/ACT inhaler, USE 2 INHALATIONS EVERY 6 HOURS AS NEEDED FOR SHORTNESS OF BREATH, DYSPNEA OR WHEEZING, Disp: 25.5 g, Rfl: 0     spironolactone (ALDACTONE) 12.5 mg TABS half-tab, Take 12.5 mg by mouth See Admin Instructions Monday, Wed, Friday , Disp: , Rfl:      ALLERGIES:    Allergies   Allergen Reactions     Animal Dander      Dust Mites      Kiwi Itching     Itching and red all over     Pollen Extract      Pollen,dust, trees, grass, mold-hayfever symptoms     Seasonal Allergies         SOCIAL HISTORY:   Social History     Socioeconomic History     Marital status:      Spouse name: Trenton     Number of children: 2     Years of education: 12     Highest education level: Not on file   Occupational History     Occupation: retired   Social Needs     Financial resource strain: Not on file     Food insecurity:     Worry: Not on file     " Inability: Not on file     Transportation needs:     Medical: Not on file     Non-medical: Not on file   Tobacco Use     Smoking status: Former Smoker     Packs/day: 0.10     Years: 10.00     Pack years: 1.00     Types: Cigarettes     Last attempt to quit: 2003     Years since quittin.4     Smokeless tobacco: Never Used     Tobacco comment: off and on x 10 years 3-4 cigs: no smoker in the household   Substance and Sexual Activity     Alcohol use: Yes     Alcohol/week: 0.0 standard drinks     Comment: a couple drinks per year     Drug use: No     Sexual activity: Not Currently     Partners: Male   Lifestyle     Physical activity:     Days per week: Not on file     Minutes per session: Not on file     Stress: Not on file   Relationships     Social connections:     Talks on phone: Not on file     Gets together: Not on file     Attends Shinto service: Not on file     Active member of club or organization: Not on file     Attends meetings of clubs or organizations: Not on file     Relationship status: Not on file     Intimate partner violence:     Fear of current or ex partner: Not on file     Emotionally abused: Not on file     Physically abused: Not on file     Forced sexual activity: Not on file   Other Topics Concern      Service Not Asked     Blood Transfusions Not Asked     Caffeine Concern No     Occupational Exposure Not Asked     Hobby Hazards Not Asked     Sleep Concern Not Asked     Stress Concern Not Asked     Weight Concern Not Asked     Special Diet No     Comment: regular diet      Back Care Not Asked     Exercise No     Comment: shopping a lot so [employer]      Bike Helmet Not Asked     Seat Belt Not Asked     Self-Exams Not Asked   Social History Narrative     Not on file        FAMILY HISTORY:   Family History   Problem Relation Age of Onset     Alzheimer Disease Mother      Gastrointestinal Disease Mother      Cancer Father         throat and lungs, liver,     Heart Disease Father 57  "       CABG     Heart Disease Brother         suicidal     Pacemaker Brother      Heart Disease Sister      Hypertension Maternal Grandmother      Lipids Maternal Grandmother      Cancer Maternal Grandmother         stomach     Cancer Paternal Grandmother         stomach     Allergies Daughter      Allergies Son         EXAM:Vitals: /80   Ht 1.549 m (5' 1\")   Wt 78.5 kg (173 lb)   LMP  (LMP Unknown)   BMI 32.69 kg/m     BMI= Body mass index is 32.69 kg/m .    General appearance: Patient is alert and fully cooperative with history & exam.  No sign of distress is noted during the visit.     Psychiatric: Affect is pleasant & appropriate.  Patient appears motivated to improve health.     Respiratory: Breathing is regular & unlabored while sitting.     HEENT: Hearing is intact to spoken word.  Speech is clear.  No gross evidence of visual impairment that would impact ambulation.     Dermatologic: localized hyperkeratotic lesion to plantar left 5th metatarsal head. No open lesions or signs of infection.      Vascular: DP & PT pulses are intact & regular bilaterally.  No significant edema or varicosities noted.  CFT and skin temperature is normal to both lower extremities.     Neurologic: Lower extremity sensation is intact to light touch.  No evidence of weakness or contracture in the lower extremities.  No evidence of neuropathy.     Musculoskeletal: Patient is ambulatory without assistive device or brace.  No gross ankle deformity noted.  No foot or ankle joint effusion is noted      ASSESSMENT:    Left foot pain  Callus of foot       PLAN:  Reviewed patient's chart in epic. Discussed causes of keratomas.  They are due to areas of increase friction.  Hammertoes can create these as they put more pressure to the metatarsal head.  Discussed treatments such as using foot file, pumice stone, metatarsal pads, orthotics, and not walking barefoot.     We discussed the cost structure of callus care if they were to come " back and have it treated in the clinic if insurance does not cover it and explained that they would be billed. They were also provided information on places to get the callus treatment.     at this time, she will use a pumice stone and ureal lotion.     Millie Martin DPM, Podiatry/Foot and Ankle Surgery    Weight management plan: Patient was referred to their PCP to discuss a diet and exercise plan.    Recommended to Victoria Montalvo to follow up with Primary Care provider regarding elevated blood pressure.        Again, thank you for allowing me to participate in the care of your patient.        Sincerely,        Millie Martin DPM, Podiatry/Foot and Ankle Surgery

## 2019-11-08 NOTE — PATIENT INSTRUCTIONS
Thank you for choosing [facility]!    DR. [facility]S CLIN[facility] SCHEDULE  MONDAY AM - [place] TUESDAY - [place]   [address] [address] [place] [address] [place] [address]   [phone] / [phone] / [phone]       WEDNESDAY - [place] FRIDAY AM - [facility]   [address]   [place] [address] [place] [address]   [phone] / [phone]       FRIDAY PM - [place] SCHEDULE SURGERY: [phone]   [address] BILLING QUESTIONS: [phone]   [place] [address] AFTER HOURS: [phone]   [phone] / [phone] APPOINTMENTS: [phone]     Consumer Price Line (CPL) [phone]       CALLUS / CORNS / IPKs  When there is excessive friction or pressure on the skin, the body responds by making the skin thicker to protect the deeper structures from becoming exposed. While this works well to protect the deeper structures, the thickened skin can increase pressure and pain.    CALLUS: Flat, diffuse thickening are simple calluses and they are usually caused by friction. Often these are the result of rubbing on a shoe or going barefoot.    CORNS: Calluses with a central core between the toes are called corns. These result from prominent joints on adjacent toes rubbing together. Theses are a symptom of bone malalignment and will always recur unless the underlying bones are addressed surgically.    IPKs: Calluses with a central core on the ball of the foot are usually IPKs (intractable plantar keratosis). These are caused by excessive pressure from the metatarsals, the bones that make up the ball of the foot. Often one of these bones is too long or too prominent.  Again, these will always recur unless the underlying bone issue is addressed. There is no cure for these. They will either go away by themselves, recur, or more could develop.    ROUTINE MAINTENANCE  1. File them down  with a pumice stone or callus file a couple times a week.   2. An electric callus removing device. Amope Pedi Perfect Electronic Pedicure Foot File and Callus Remover can be a good option.   3. Lotion can be applied to soften the callus. A urea based cream such as Kersal or Vanicream or thicker cream with shea butter are good options.  4. Toe spacers or toe covers can be used for corns, gel pads can be used for other lesions on the bottom of the foot.   If there is a surgical pathology noted, such as a prominent bone, often this needs to be addressed surgically to minimize recurrence. However, sometimes the lesion simply migrates to another spot after surgery, so it is not a guaranteed cure.     There was also discussion of the cost structure of callus care if they were to come back and have it treated in the clinic. Explained that if insurance does not cover it, they would be billed. This charge could range from $100 - $160.  They were also provided information on places to get the callus treatment.                BODY WEIGHT AND YOUR FEET  The following information is included in the after visit summary for all patients. Body weight can be a sensitive issue to discuss in clinic, but we think the following information is very important. Although we focus on the feet and ankles, we do support the overall health of our patients.     Many things can cause foot and ankle problems. Foot structure, activity level, foot mechanics and injuries are common causes of pain. One very important issue that often goes unmentioned, is body weight. Extra weight can cause increased stress on muscles, ligaments, bones and tendons. Sometimes just a few extra pounds is all it takes to put one over her/his threshold. Without reducing that stress, it can be difficult to alleviate pain. As Foot & Ankle specialists, our job is addressing the lower extremity problem and possible causes. Regarding extra body weight, we encourage patients to  discuss diet and weight management plans with their primary care doctors. It is this team approach that gives you the best opportunity for pain relief and getting you back on your feet.      [facility] has a Comprehensive Weight Management Program. This program includes counseling, education, non-surgical and surgical approaches to weight loss. If you are interested in learning more either talk to you primary care provider or call [phone].

## 2019-11-08 NOTE — PROGRESS NOTES
PATIENT HISTORY:   Victoria Montalvo is a 79 year old female who presents to clinic for pain to the bottom of the left foot. Has been going on for a few months. Denies injury. Sore with pressure. Shoes help and barefoot is worse. Pain can be 5/10 at its worst. Wondering what is causing the pain and what can be done for it.     Review of Systems:  Patient denies fever, chills, rash, wound, stiffness, numbness, weakness, heart burn, blood in stool, chest pain with activity, calf pain when walking, shortness of breath with activity, chronic cough, easy bleeding/bruising, swelling of ankles, excessive thirst, fatigue, depression, anxiety.  Patient admits to limping at times.     PAST MEDICAL HISTORY:   Past Medical History:   Diagnosis Date     Abnormal Papanicolaou smear of cervix and cervical HPV     colposcopy 1/97     Arthritis      Chronic rhinitis      COPD (chronic obstructive pulmonary disease) (H)      Coronary artery disease     4/4/17 SHERLEY--PDA     Diastolic CHF, chronic (H) 2/22/2012     Dislocation of hip (H)     5/25/18 and 12/29/18 with successful reductions in ED     Gastro-oesophageal reflux disease      H/O heart artery stent 04/04/2017    SHERLEY to PDA     Hyperlipidemia LDL goal <130 10/31/2011     KEN (obstructive sleep apnea)     CPAP     Pain in right hip      Personal history of colonic polyps     colonoscopy 9/97, 2002 (due in 2007)     Pulmonary HTN (H)      Subclavian artery stenosis, left (H) 8/7/2013    S/p stent in 2013      Subclinical hyperthyroidism 10/17/2016     Unspecified asthma(493.90)     on preventative meds and has nebulizer        PAST SURGICAL HISTORY:   Past Surgical History:   Procedure Laterality Date     ARTHROPLASTY HIP Right 10/19/2015    Procedure: ARTHROPLASTY HIP;  Surgeon: Aron Torres MD;  Location: [facility]     C NONSPECIFIC PROCEDURE  2/89, 1990    liposuction of legs and stomach     C NONSPECIFIC PROCEDURE  1990    Ankle pins removed     C OPEN RX ANKLE  DISLOCATN+FIXATN  4/18/87     COLONOSCOPY  01/1/08    Polyp removed, bx.     COLONOSCOPY  01/12/10     COLONOSCOPY N/A 2/17/2015    Procedure: COLONOSCOPY;  Surgeon: Gato Quiles MD;  Location: [facility]     CT CORONARY ANGIOGRAM  04/04/2017    SHERLEY to PDA     HC COLONOSCOPY THRU STOMA, DIAGNOSTIC  2002     HC REDUCTION OF LARGE BREAST  2/89     VASCULAR SURGERY  2013    angiogram & left subclavical artery stent        MEDICATIONS:   Current Outpatient Medications:      albuterol (2.5 MG/3ML) 0.083% nebulizer solution, Take 1 vial (2.5 mg) by nebulization every 6 hours as needed for shortness of breath / dyspnea, Disp: 1 Box, Rfl: 3     alendronate (FOSAMAX) 70 MG tablet, TAKE 1 TABLET EVERY 7 DAYS, TAKE 60 MINUTES BEFORE A.M. MEAL WITH 8 OUNCE WATER, REMAIN UPRIGHT FOR 30 MINUTES, Disp: 12 tablet, Rfl: 3     aspirin 81 MG tablet, Take 81 mg by mouth daily., Disp: , Rfl:      atorvastatin (LIPITOR) 40 MG tablet, Take 1 tablet (40 mg) by mouth daily, Disp: 90 tablet, Rfl: 3     biotin 2.5 mg/mL, Take by mouth daily 2000mcg, Disp: , Rfl:      Boswellia-Glucosamine-Vit D (OSTEO BI-FLEX/5-LOXIN ADVANCED) TABS, Take 1,500 mg by mouth 2 times daily , Disp: , Rfl:      Calcium 0102-7585 MG-UNIT CHEW, Take 1 tablet by mouth daily., Disp: , Rfl:      Cholecalciferol (VITAMIN D3 PO), Take 2,000 Units by mouth daily , Disp: , Rfl:      cycloSPORINE (RESTASIS) 0.05 % ophthalmic emulsion, Place 1 drop into both eyes 2 times daily , Disp: , Rfl:      fluticasone (FLONASE) 50 MCG/ACT nasal spray, USE 1 TO 2 SPRAYS IN EACH NOSTRIL DAILY, Disp: 48 g, Rfl: 3     fluticasone-salmeterol (ADVAIR) 250-50 MCG/DOSE inhaler, Inhale 1 puff into the lungs every 12 hours, Disp: 3 Inhaler, Rfl: 3     furosemide (LASIX) 20 MG tablet, Take 1 tablet (20 mg) by mouth daily, Disp: 90 tablet, Rfl: 3     isosorbide mononitrate (IMDUR) 30 MG 24 hr tablet, Take 1 tablet (30 mg) by mouth daily, Disp: 90 tablet, Rfl: 3     MAGNESIUM ACETATE, Take 250  "mg by mouth daily , Disp: , Rfl:      montelukast (SINGULAIR) 10 MG tablet, Take 1 tablet (10 mg) by mouth At Bedtime, Disp: 90 tablet, Rfl: 3     nitroglycerin (NITROSTAT) 0.4 MG sublingual tablet, One tablet under the tongue every 5 minutes if needed for chest pain. May repeat every 5 minutes for a maximum of 3 doses in 15 minutes\", Disp: 25 tablet, Rfl: 3     omega-3 fatty acids (FISH OIL DOUBLE STRENGTH) 1200 MG capsule, Take 1 capsule by mouth 2 times daily , Disp: , Rfl:      omeprazole (PRILOSEC) 20 MG DR capsule, TAKE 1 CAPSULE DAILY, Disp: 90 capsule, Rfl: 2     order for DME, Equipment being ordered: Nebulizer. Uses: albuterol (2.5 MG/3ML) 0.083% nebulizer solution- Sig - Route: Take 1 vial (2.5 mg) by nebulization every 6 hours as needed for shortness of breath / dyspnea., Disp: 1 Units, Rfl: 0     PROAIR  (90 Base) MCG/ACT inhaler, USE 2 INHALATIONS EVERY 6 HOURS AS NEEDED FOR SHORTNESS OF BREATH, DYSPNEA OR WHEEZING, Disp: 25.5 g, Rfl: 0     spironolactone (ALDACTONE) 12.5 mg TABS half-tab, Take 12.5 mg by mouth See Admin Instructions Monday, Wed, Friday , Disp: , Rfl:      ALLERGIES:    Allergies   Allergen Reactions     Animal Dander      Dust Mites      Kiwi Itching     Itching and red all over     Pollen Extract      Pollen,dust, trees, grass, mold-hayfever symptoms     Seasonal Allergies         SOCIAL HISTORY:   Social History     Socioeconomic History     Marital status:      Spouse name: Trenton     Number of children: 2     Years of education: 12     Highest education level: Not on file   Occupational History     Occupation: retired   Social Needs     Financial resource strain: Not on file     Food insecurity:     Worry: Not on file     Inability: Not on file     Transportation needs:     Medical: Not on file     Non-medical: Not on file   Tobacco Use     Smoking status: Former Smoker     Packs/day: 0.10     Years: 10.00     Pack years: 1.00     Types: Cigarettes     Last attempt " to quit: 2003     Years since quittin.4     Smokeless tobacco: Never Used     Tobacco comment: off and on x 10 years 3-4 cigs: no smoker in the household   Substance and Sexual Activity     Alcohol use: Yes     Alcohol/week: 0.0 standard drinks     Comment: a couple drinks per year     Drug use: No     Sexual activity: Not Currently     Partners: Male   Lifestyle     Physical activity:     Days per week: Not on file     Minutes per session: Not on file     Stress: Not on file   Relationships     Social connections:     Talks on phone: Not on file     Gets together: Not on file     Attends [employer] service: Not on file     Active member of club or organization: Not on file     Attends meetings of clubs or organizations: Not on file     Relationship status: Not on file     Intimate partner violence:     Fear of current or ex partner: Not on file     Emotionally abused: Not on file     Physically abused: Not on file     Forced sexual activity: Not on file   Other Topics Concern      Service Not Asked     Blood Transfusions Not Asked     Caffeine Concern No     Occupational Exposure Not Asked     Hobby Hazards Not Asked     Sleep Concern Not Asked     Stress Concern Not Asked     Weight Concern Not Asked     Special Diet No     Comment: regular diet      Back Care Not Asked     Exercise No     Comment: shopping a lot so walikg      Bike Helmet Not Asked     Seat Belt Not Asked     Self-Exams Not Asked   Social History Narrative     Not on file        FAMILY HISTORY:   Family History   Problem Relation Age of Onset     Alzheimer Disease Mother      Gastrointestinal Disease Mother      Cancer Father         throat and lungs, liver,     Heart Disease Father 57        CABG     Heart Disease Brother         suicidal     Pacemaker Brother      Heart Disease Sister      Hypertension Maternal Grandmother      Lipids Maternal Grandmother      Cancer Maternal Grandmother         stomach     Cancer Paternal  "Grandmother         stomach     Allergies Daughter      Allergies Son         EXAM:Vitals: /80   Ht 1.549 m (5' 1\")   Wt 78.5 kg (173 lb)   LMP  (LMP Unknown)   BMI 32.69 kg/m    BMI= Body mass index is 32.69 kg/m .    General appearance: Patient is alert and fully cooperative with history & exam.  No sign of distress is noted during the visit.     Psychiatric: Affect is pleasant & appropriate.  Patient appears motivated to improve health.     Respiratory: Breathing is regular & unlabored while sitting.     HEENT: Hearing is intact to spoken word.  Speech is clear.  No gross evidence of visual impairment that would impact ambulation.     Dermatologic: localized hyperkeratotic lesion to plantar left 5th metatarsal head. No open lesions or signs of infection.      Vascular: DP & PT pulses are intact & regular bilaterally.  No significant edema or varicosities noted.  CFT and skin temperature is normal to both lower extremities.     Neurologic: Lower extremity sensation is intact to light touch.  No evidence of weakness or contracture in the lower extremities.  No evidence of neuropathy.     Musculoskeletal: Patient is ambulatory without assistive device or brace.  No gross ankle deformity noted.  No foot or ankle joint effusion is noted      ASSESSMENT:    Left foot pain  Callus of foot       PLAN:  Reviewed patient's chart in epic. Discussed causes of keratomas.  They are due to areas of increase friction.  Hammertoes can create these as they put more pressure to the metatarsal head.  Discussed treatments such as using foot file, pumice stone, metatarsal pads, orthotics, and not walking barefoot.     We discussed the cost structure of callus care if they were to come back and have it treated in the clinic if insurance does not cover it and explained that they would be billed. They were also provided information on places to get the callus treatment.     at this time, she will use a pumice stone and ureal " octavio.     Millie Martin DPM, Podiatry/Foot and Ankle Surgery    Weight management plan: Patient was referred to their PCP to discuss a diet and exercise plan.    Recommended to Victoria Montalvo to follow up with Primary Care provider regarding elevated blood pressure.

## 2019-12-11 ENCOUNTER — MYC MEDICAL ADVICE (OUTPATIENT)
Dept: PEDIATRICS | Facility: CLINIC | Age: 79
End: 2019-12-11

## 2019-12-11 DIAGNOSIS — K21.9 GASTROESOPHAGEAL REFLUX DISEASE WITHOUT ESOPHAGITIS: ICD-10-CM

## 2019-12-12 NOTE — TELEPHONE ENCOUNTER
"  Requested Prescriptions   Pending Prescriptions Disp Refills     omeprazole (PRILOSEC) 20 MG DR capsule 90 capsule 2     Sig: Take 1 capsule (20 mg) by mouth daily   Last Written Prescription Date:  3/26/2019  Last Fill Quantity: 90,  # refills: 2   Last office visit: 9/17/2019 with prescribing provider:     Future Office Visit:   Next 5 appointments (look out 90 days)    Jan 31, 2020  2:30 PM CST  Annual Wellness Visit with Elsy Bah MD, EA EXAM ROOM 35  [facility]) [address] [place] [address]  [address]  [facility] [id number] [phone]             PPI Protocol Passed - 12/12/2019  7:42 AM        Passed - Not on Clopidogrel (unless Pantoprazole ordered)        Passed - No diagnosis of osteoporosis on record        Passed - Recent (12 mo) or future (30 days) visit within the authorizing provider's specialty     Patient has had an office visit with the authorizing provider or a provider within the authorizing providers department within the previous 12 mos or has a future within next 30 days. See \"Patient Info\" tab in inbasket, or \"Choose Columns\" in Meds & Orders section of the refill encounter.              Passed - Medication is active on med list        Passed - Patient is age 18 or older        Passed - No active pregnacy on record        Passed - No positive pregnancy test in past 12 months      Prescription approved per [facility] Refill Protocol.  Gabbi Venegas RN      "

## 2019-12-16 ENCOUNTER — HEALTH MAINTENANCE LETTER (OUTPATIENT)
Age: 79
End: 2019-12-16

## 2020-03-16 DIAGNOSIS — R06.09 DOE (DYSPNEA ON EXERTION): ICD-10-CM

## 2020-03-16 DIAGNOSIS — I25.10 ASHD (ARTERIOSCLEROTIC HEART DISEASE): ICD-10-CM

## 2020-03-16 RX ORDER — ISOSORBIDE MONONITRATE 30 MG/1
30 TABLET, EXTENDED RELEASE ORAL DAILY
Qty: 90 TABLET | Refills: 1 | Status: SHIPPED | OUTPATIENT
Start: 2020-03-16 | End: 2020-04-29 | Stop reason: DRUGHIGH

## 2020-03-17 DIAGNOSIS — Z98.61 STATUS POST CORONARY ANGIOPLASTY: ICD-10-CM

## 2020-03-17 DIAGNOSIS — I25.110 CORONARY ARTERY DISEASE INVOLVING NATIVE CORONARY ARTERY OF NATIVE HEART WITH UNSTABLE ANGINA PECTORIS (H): ICD-10-CM

## 2020-03-17 RX ORDER — ATORVASTATIN CALCIUM 40 MG/1
40 TABLET, FILM COATED ORAL DAILY
Qty: 90 TABLET | Refills: 3 | Status: CANCELLED | OUTPATIENT
Start: 2020-03-17

## 2020-03-18 DIAGNOSIS — Z98.61 STATUS POST CORONARY ANGIOPLASTY: ICD-10-CM

## 2020-03-18 DIAGNOSIS — I25.110 CORONARY ARTERY DISEASE INVOLVING NATIVE CORONARY ARTERY OF NATIVE HEART WITH UNSTABLE ANGINA PECTORIS (H): ICD-10-CM

## 2020-03-18 RX ORDER — ATORVASTATIN CALCIUM 40 MG/1
40 TABLET, FILM COATED ORAL DAILY
Qty: 90 TABLET | Refills: 0 | Status: SHIPPED | OUTPATIENT
Start: 2020-03-18 | End: 2020-04-29

## 2020-04-17 ENCOUNTER — VIRTUAL VISIT (OUTPATIENT)
Dept: CARDIOLOGY | Facility: CLINIC | Age: 80
End: 2020-04-17
Payer: MEDICARE

## 2020-04-17 VITALS
HEART RATE: 84 BPM | DIASTOLIC BLOOD PRESSURE: 71 MMHG | HEIGHT: 61 IN | SYSTOLIC BLOOD PRESSURE: 108 MMHG | WEIGHT: 174 LBS | BODY MASS INDEX: 32.85 KG/M2

## 2020-04-17 DIAGNOSIS — I20.0 UNSTABLE ANGINA PECTORIS (H): Primary | ICD-10-CM

## 2020-04-17 PROCEDURE — 99441 ZZC PHYSICIAN TELEPHONE EVALUATION 5-10 MIN: CPT | Performed by: INTERNAL MEDICINE

## 2020-04-17 ASSESSMENT — MIFFLIN-ST. JEOR: SCORE: 1196.64

## 2020-04-17 NOTE — PROGRESS NOTES
"  Victoria Montalvo is a 79 year old female who is being evaluated via a billable telephone visit.      The patient has been notified of following:     \"This telephone visit will be conducted via a call between you and your physician/provider. We have found that certain health care needs can be provided without the need for a physical exam.  This service lets us provide the care you need with a short phone conversation.  If a prescription is necessary we can send it directly to your pharmacy.  If lab work is needed we can place an order for that and you can then stop by our lab to have the test done at a later time.    Telephone visits are billed at different rates depending on your insurance coverage. During this emergency period, for some insurers they may be billed the same as an in-person visit.  Please reach out to your insurance provider with any questions.    If during the course of the call the physician/provider feels a telephone visit is not appropriate, you will not be charged for this service.\"    Patient has given verbal consent for Telephone visit?  Yes    How would you like to obtain your AVS? Mail a copy    Patient reported vitals:  BP:108/71  Heart rate:84  Weight: 174    Review Of Systems  Skin: bruising  Eyes: glasses  Ears/Nose/Throat: negative  Respiratory: Dyspnea on exertion-   Cardiovascular: chest pain and lower extremity edema  Gastrointestinal: heartburn  Genitourinary: frequency  Musculoskeletal: back pain and arthritis  Neurologic:numbness left arm a week ago   Psychiatric: sleep disturbance  Hematologic/Lymphatic/Immunologic: allergies  Endocrine: thyroid disorder    MANUEL Aguilar      Cardiology Progress Note:  Physician location: Home office  Duration of phone visit: 5 minutes    ROS, PMH, PSurgHx, FamHx, SocHx, medication list reviewed in EMR.    Telephone Exam:  General: No audible distress.  Psych: Affect normal, no pressured speech or flights of fancy.  Respiratory: Unlabored. Normal " rate. No stridor or audible wheezing.          Interval History:     The patient is a very pleasant 79 year old whom I have been following for coronary artery disease status post PCI to the left PDA 4/4/2017.  She has noticed the onset of chest discomfort radiating to her left arm that is very similar to her previous angina over the past week.  She had some resting pain last night and took nitroglycerin which improved the discomfort.                      Assessment and Plan:         1. Chest pain, similar to her previous angina with history of PCI to the left PDA 2017    Symptoms seem to be increasing in frequency and severity.  Urged patient to go to the emergency department if additional escalation in her symptoms or intractable symptoms.    Plan cardiac catheterization early next week.      2. Patient with history of left subclavian PCI    When patient comes in for physical, bilateral blood pressure should be ascertained.      This note was transcribed using electronic voice recognition software and there may be typographical errors present.     Yogi Yepez MD

## 2020-04-20 ENCOUNTER — TELEPHONE (OUTPATIENT)
Dept: CARDIOLOGY | Facility: CLINIC | Age: 80
End: 2020-04-20

## 2020-04-20 DIAGNOSIS — I20.0 UNSTABLE ANGINA PECTORIS (H): Primary | ICD-10-CM

## 2020-04-20 RX ORDER — LIDOCAINE 40 MG/G
CREAM TOPICAL
Status: CANCELLED | OUTPATIENT
Start: 2020-04-20

## 2020-04-20 RX ORDER — ASPIRIN 81 MG/1
81 TABLET ORAL DAILY
Status: CANCELLED | OUTPATIENT
Start: 2020-04-20 | End: 2020-04-22

## 2020-04-20 RX ORDER — SODIUM CHLORIDE 9 MG/ML
INJECTION, SOLUTION INTRAVENOUS CONTINUOUS
Status: CANCELLED | OUTPATIENT
Start: 2020-04-20

## 2020-04-20 RX ORDER — POTASSIUM CHLORIDE 750 MG/1
20 TABLET, EXTENDED RELEASE ORAL
Status: CANCELLED | OUTPATIENT
Start: 2020-04-20

## 2020-04-20 NOTE — TELEPHONE ENCOUNTER
Called patient to review coronary angiogram/cath prep instructions.    -Patient is scheduled for a left heart cath/coronary angiogram at [facility], on 4/22/20.  Check in time is at 1000 and procedure time is at 1200.  - Advised patient not eat or drink after midnight on 4/21/20 .    - Advised patient to hold diuretic lasix and spironolactone the morning of the procedure, patient can take it after the procedure.   - Patient is not taking any diabetic medications or anticoagulant.   - Patient should continue their current dose of Aspirin with their other daily medications the morning of the procedure with small sips of water.   - Verified patient does not have an allergy to contrast dye.   - Verified patient has a responsible adult to drive them home from the hospital and stay with them for 24 hours after the procedure. Advised that her  will not be able to come into the hospital with her, will have to stay in the car and will be called with instructions for discharge.   - Confirmed follow-up telephone appointment scheduled 4/29/20 w/ Marleni Raza CNP at 0930 AM.   - Patient had no questions about their prep instructions.     Symptom Screening:  Do you have one of the following new symptoms:      Fever or reported chills?  No     A new cough (started within the past 14 days)? No    Shortness of breath (started within the past 14 days)?  No    Nausea, vomiting or diarrhea?  No    Within the past 3 weeks, have you been exposed to someone with a known positive illness below?      COVID - 19 (known or suspected)  no    Chicken pox?  no    Measles? no    Pertussis? No    Patient's appointment status: Patient will be seen in as planned for coronary angiogram 4/22/20.     ** Travel screen and wellness screen completed.   Orders for admission entered.   Frantz YBARRA  [facility]

## 2020-04-22 ENCOUNTER — HOSPITAL ENCOUNTER (OUTPATIENT)
Facility: CLINIC | Age: 80
Discharge: HOME | End: 2020-04-22
Admitting: INTERNAL MEDICINE
Payer: MEDICARE

## 2020-04-22 ENCOUNTER — SURGERY (OUTPATIENT)
Age: 80
End: 2020-04-22
Payer: MEDICARE

## 2020-04-22 VITALS
BODY MASS INDEX: 32.74 KG/M2 | RESPIRATION RATE: 16 BRPM | SYSTOLIC BLOOD PRESSURE: 157 MMHG | WEIGHT: 173.3 LBS | OXYGEN SATURATION: 97 % | HEART RATE: 64 BPM | TEMPERATURE: 96.1 F | DIASTOLIC BLOOD PRESSURE: 66 MMHG

## 2020-04-22 DIAGNOSIS — I20.0 UNSTABLE ANGINA PECTORIS (H): ICD-10-CM

## 2020-04-22 PROBLEM — Z98.890 STATUS POST CORONARY ANGIOGRAM: Status: ACTIVE | Noted: 2020-04-22

## 2020-04-22 LAB
ANION GAP SERPL CALCULATED.3IONS-SCNC: 3 MMOL/L (ref 3–14)
APTT PPP: 27 SEC (ref 22–37)
BUN SERPL-MCNC: 13 MG/DL (ref 7–30)
CALCIUM SERPL-MCNC: 8.6 MG/DL (ref 8.5–10.1)
CATH EF ESTIMATED: 60 %
CHLORIDE SERPL-SCNC: 108 MMOL/L (ref 94–109)
CO2 SERPL-SCNC: 29 MMOL/L (ref 20–32)
CREAT SERPL-MCNC: 0.68 MG/DL (ref 0.52–1.04)
ERYTHROCYTE [DISTWIDTH] IN BLOOD BY AUTOMATED COUNT: 13.7 % (ref 10–15)
GFR SERPL CREATININE-BSD FRML MDRD: 82 ML/MIN/{1.73_M2}
GLUCOSE SERPL-MCNC: 99 MG/DL (ref 70–99)
HCT VFR BLD AUTO: 42.9 % (ref 35–47)
HGB BLD-MCNC: 14 G/DL (ref 11.7–15.7)
INR PPP: 1 (ref 0.86–1.14)
INTERPRETATION ECG - MUSE: NORMAL
MCH RBC QN AUTO: 29.9 PG (ref 26.5–33)
MCHC RBC AUTO-ENTMCNC: 32.6 G/DL (ref 31.5–36.5)
MCV RBC AUTO: 92 FL (ref 78–100)
PLATELET # BLD AUTO: 191 10E9/L (ref 150–450)
POTASSIUM SERPL-SCNC: 3.9 MMOL/L (ref 3.4–5.3)
RBC # BLD AUTO: 4.69 10E12/L (ref 3.8–5.2)
SODIUM SERPL-SCNC: 140 MMOL/L (ref 133–144)
WBC # BLD AUTO: 5.6 10E9/L (ref 4–11)

## 2020-04-22 PROCEDURE — 93571 IV DOP VEL&/PRESS C FLO 1ST: CPT | Mod: 52

## 2020-04-22 PROCEDURE — 85610 PROTHROMBIN TIME: CPT | Performed by: INTERNAL MEDICINE

## 2020-04-22 PROCEDURE — 99152 MOD SED SAME PHYS/QHP 5/>YRS: CPT | Performed by: INTERNAL MEDICINE

## 2020-04-22 PROCEDURE — 25800030 ZZH RX IP 258 OP 636: Performed by: INTERNAL MEDICINE

## 2020-04-22 PROCEDURE — 93458 L HRT ARTERY/VENTRICLE ANGIO: CPT | Performed by: INTERNAL MEDICINE

## 2020-04-22 PROCEDURE — 25000128 H RX IP 250 OP 636: Performed by: INTERNAL MEDICINE

## 2020-04-22 PROCEDURE — 93458 L HRT ARTERY/VENTRICLE ANGIO: CPT | Mod: 26 | Performed by: INTERNAL MEDICINE

## 2020-04-22 PROCEDURE — 85027 COMPLETE CBC AUTOMATED: CPT | Performed by: INTERNAL MEDICINE

## 2020-04-22 PROCEDURE — 80048 BASIC METABOLIC PNL TOTAL CA: CPT | Performed by: INTERNAL MEDICINE

## 2020-04-22 PROCEDURE — 93005 ELECTROCARDIOGRAM TRACING: CPT

## 2020-04-22 PROCEDURE — 93571 IV DOP VEL&/PRESS C FLO 1ST: CPT | Mod: 26 | Performed by: INTERNAL MEDICINE

## 2020-04-22 PROCEDURE — 25000125 ZZHC RX 250: Performed by: INTERNAL MEDICINE

## 2020-04-22 PROCEDURE — 27210794 ZZH OR GENERAL SUPPLY STERILE: Performed by: INTERNAL MEDICINE

## 2020-04-22 PROCEDURE — 99153 MOD SED SAME PHYS/QHP EA: CPT | Performed by: INTERNAL MEDICINE

## 2020-04-22 PROCEDURE — 40000065 ZZH STATISTIC EKG NON-CHARGEABLE

## 2020-04-22 PROCEDURE — 93010 ELECTROCARDIOGRAM REPORT: CPT | Mod: 59 | Performed by: INTERNAL MEDICINE

## 2020-04-22 PROCEDURE — 99152 MOD SED SAME PHYS/QHP 5/>YRS: CPT | Mod: 59 | Performed by: INTERNAL MEDICINE

## 2020-04-22 PROCEDURE — 25000132 ZZH RX MED GY IP 250 OP 250 PS 637: Mod: GY | Performed by: INTERNAL MEDICINE

## 2020-04-22 PROCEDURE — C1769 GUIDE WIRE: HCPCS | Performed by: INTERNAL MEDICINE

## 2020-04-22 PROCEDURE — 85730 THROMBOPLASTIN TIME PARTIAL: CPT | Performed by: INTERNAL MEDICINE

## 2020-04-22 RX ORDER — NALOXONE HYDROCHLORIDE 0.4 MG/ML
.1-.4 INJECTION, SOLUTION INTRAMUSCULAR; INTRAVENOUS; SUBCUTANEOUS
Status: DISCONTINUED | OUTPATIENT
Start: 2020-04-22 | End: 2020-04-22 | Stop reason: HOSPADM

## 2020-04-22 RX ORDER — SODIUM CHLORIDE 9 MG/ML
INJECTION, SOLUTION INTRAVENOUS CONTINUOUS
Status: DISCONTINUED | OUTPATIENT
Start: 2020-04-22 | End: 2020-04-22 | Stop reason: HOSPADM

## 2020-04-22 RX ORDER — NITROGLYCERIN 5 MG/ML
VIAL (ML) INTRAVENOUS
Status: DISCONTINUED
Start: 2020-04-22 | End: 2020-04-22 | Stop reason: HOSPADM

## 2020-04-22 RX ORDER — ASPIRIN 81 MG/1
81 TABLET ORAL DAILY
Status: DISCONTINUED | OUTPATIENT
Start: 2020-04-22 | End: 2020-04-22 | Stop reason: HOSPADM

## 2020-04-22 RX ORDER — FENTANYL CITRATE 50 UG/ML
INJECTION, SOLUTION INTRAMUSCULAR; INTRAVENOUS
Status: DISCONTINUED
Start: 2020-04-22 | End: 2020-04-22 | Stop reason: HOSPADM

## 2020-04-22 RX ORDER — LIDOCAINE 40 MG/G
CREAM TOPICAL
Status: DISCONTINUED | OUTPATIENT
Start: 2020-04-22 | End: 2020-04-22 | Stop reason: HOSPADM

## 2020-04-22 RX ORDER — NITROGLYCERIN 5 MG/ML
VIAL (ML) INTRAVENOUS
Status: DISCONTINUED | OUTPATIENT
Start: 2020-04-22 | End: 2020-04-22 | Stop reason: HOSPADM

## 2020-04-22 RX ORDER — NALOXONE HYDROCHLORIDE 0.4 MG/ML
.2-.4 INJECTION, SOLUTION INTRAMUSCULAR; INTRAVENOUS; SUBCUTANEOUS
Status: DISCONTINUED | OUTPATIENT
Start: 2020-04-22 | End: 2020-04-22 | Stop reason: HOSPADM

## 2020-04-22 RX ORDER — ATORVASTATIN CALCIUM 80 MG/1
80 TABLET, FILM COATED ORAL DAILY
Qty: 90 TABLET | Refills: 3 | Status: SHIPPED | OUTPATIENT
Start: 2020-04-22 | End: 2020-10-16

## 2020-04-22 RX ORDER — ATROPINE SULFATE 0.1 MG/ML
0.5 INJECTION INTRAVENOUS EVERY 5 MIN PRN
Status: DISCONTINUED | OUTPATIENT
Start: 2020-04-22 | End: 2020-04-22 | Stop reason: HOSPADM

## 2020-04-22 RX ORDER — POTASSIUM CHLORIDE 1500 MG/1
20 TABLET, EXTENDED RELEASE ORAL
Status: DISCONTINUED | OUTPATIENT
Start: 2020-04-22 | End: 2020-04-22 | Stop reason: HOSPADM

## 2020-04-22 RX ORDER — FENTANYL CITRATE 50 UG/ML
25-50 INJECTION, SOLUTION INTRAMUSCULAR; INTRAVENOUS
Status: DISCONTINUED | OUTPATIENT
Start: 2020-04-22 | End: 2020-04-22 | Stop reason: HOSPADM

## 2020-04-22 RX ORDER — ACETAMINOPHEN 325 MG/1
650 TABLET ORAL EVERY 4 HOURS PRN
Status: DISCONTINUED | OUTPATIENT
Start: 2020-04-22 | End: 2020-04-22 | Stop reason: HOSPADM

## 2020-04-22 RX ORDER — ISOSORBIDE MONONITRATE 60 MG/1
60 TABLET, EXTENDED RELEASE ORAL EVERY MORNING
Qty: 90 TABLET | Refills: 3 | Status: SHIPPED | OUTPATIENT
Start: 2020-04-22 | End: 2021-08-05

## 2020-04-22 RX ORDER — IOPAMIDOL 755 MG/ML
INJECTION, SOLUTION INTRAVASCULAR
Status: DISCONTINUED | OUTPATIENT
Start: 2020-04-22 | End: 2020-04-22 | Stop reason: HOSPADM

## 2020-04-22 RX ORDER — LIDOCAINE HYDROCHLORIDE 10 MG/ML
INJECTION, SOLUTION EPIDURAL; INFILTRATION; INTRACAUDAL; PERINEURAL
Status: DISCONTINUED
Start: 2020-04-22 | End: 2020-04-22 | Stop reason: HOSPADM

## 2020-04-22 RX ORDER — FLUMAZENIL 0.1 MG/ML
0.2 INJECTION, SOLUTION INTRAVENOUS
Status: DISCONTINUED | OUTPATIENT
Start: 2020-04-22 | End: 2020-04-22 | Stop reason: HOSPADM

## 2020-04-22 RX ORDER — NITROGLYCERIN 0.4 MG/1
TABLET SUBLINGUAL
Qty: 25 TABLET | Refills: 3 | Status: SHIPPED | OUTPATIENT
Start: 2020-04-22 | End: 2021-08-05

## 2020-04-22 RX ORDER — FENTANYL CITRATE 50 UG/ML
INJECTION, SOLUTION INTRAMUSCULAR; INTRAVENOUS
Status: DISCONTINUED | OUTPATIENT
Start: 2020-04-22 | End: 2020-04-22 | Stop reason: HOSPADM

## 2020-04-22 RX ADMIN — MIDAZOLAM 1 MG: 1 INJECTION INTRAMUSCULAR; INTRAVENOUS at 12:40

## 2020-04-22 RX ADMIN — SODIUM CHLORIDE: 9 INJECTION, SOLUTION INTRAVENOUS at 10:15

## 2020-04-22 RX ADMIN — MIDAZOLAM 1 MG: 1 INJECTION INTRAMUSCULAR; INTRAVENOUS at 12:39

## 2020-04-22 RX ADMIN — LIDOCAINE HYDROCHLORIDE 10 ML: 10 INJECTION, SOLUTION EPIDURAL; INFILTRATION; INTRACAUDAL; PERINEURAL at 12:41

## 2020-04-22 RX ADMIN — FENTANYL CITRATE 25 MCG: 50 INJECTION, SOLUTION INTRAMUSCULAR; INTRAVENOUS at 12:38

## 2020-04-22 RX ADMIN — NITROGLYCERIN 200 MCG: 5 INJECTION, SOLUTION INTRAVENOUS at 12:46

## 2020-04-22 RX ADMIN — FENTANYL CITRATE 25 MCG: 50 INJECTION, SOLUTION INTRAMUSCULAR; INTRAVENOUS at 12:37

## 2020-04-22 RX ADMIN — NITROGLYCERIN 150 MCG: 5 INJECTION, SOLUTION INTRAVENOUS at 12:56

## 2020-04-22 RX ADMIN — ASPIRIN 81 MG: 81 TABLET, COATED ORAL at 10:15

## 2020-04-22 RX ADMIN — FENTANYL CITRATE 50 MCG: 50 INJECTION, SOLUTION INTRAMUSCULAR; INTRAVENOUS at 13:09

## 2020-04-22 RX ADMIN — IOPAMIDOL 135 ML: 755 INJECTION, SOLUTION INTRAVENOUS at 13:07

## 2020-04-22 NOTE — PRE-PROCEDURE
GENERAL PRE-PROCEDURE:   Procedure:  Heart cath poss intervention  Date/Time:  4/22/2020 12:01 PM    Verbal consent obtained?: Yes    Written consent obtained?: Yes    Risks and benefits: Risks, benefits and alternatives were discussed    Consent given by:  Patient  Patient states understanding of procedure being performed: Yes    Patient's understanding of procedure matches consent: Yes    Procedure consent matches procedure scheduled: Yes    Appropriately NPO:  Yes  ASA Class:  Class 3- Severe systemic disease, definite functional limitations  Lungs:  Lungs clear with good breath sounds bilaterally  Heart:  Systolic murmur  History & Physical reviewed:  History and physical reviewed and no updates needed  Statement of review:  I have reviewed the lab findings, diagnostic data, medications, and the plan for sedation

## 2020-04-22 NOTE — PROGRESS NOTES
Post coronary angiogram  Left groin site covered with band-aid is CDI, groin is soft, no hematoma present. VSS. Denies pain.  Patient instructed to lay flat until 1530.

## 2020-04-22 NOTE — PROGRESS NOTES
Reporting Nurse: mela  Receiving Nurse: Krista  Procedure: coronary angiogram  Sedation: 2 versed 100 fentanyl  Site Assessment: wnl  Comments: site looks good

## 2020-04-22 NOTE — PLAN OF CARE
Patient's After Visit Summary was reviewed with patient.   Patient verbalized understanding of After Visit Summary, recommended follow up and was given an opportunity to ask questions.   Discharge medications sent home with patient/family: No, prescriptions sent with patients  Discharged with spouse      OBSERVATION patient END time: 1746    Patient a&ox4, VSS, groin site soft- no hematoma present, pulses weak- unchanged, patient denies numbness and tingling, voiding without difficulty. Discharging to home with spouse.

## 2020-04-29 ENCOUNTER — VIRTUAL VISIT (OUTPATIENT)
Dept: CARDIOLOGY | Facility: CLINIC | Age: 80
End: 2020-04-29
Payer: MEDICARE

## 2020-04-29 DIAGNOSIS — I25.10 CORONARY ARTERY DISEASE INVOLVING NATIVE CORONARY ARTERY OF NATIVE HEART WITHOUT ANGINA PECTORIS: Primary | ICD-10-CM

## 2020-04-29 DIAGNOSIS — E78.5 HYPERLIPIDEMIA LDL GOAL <70: ICD-10-CM

## 2020-04-29 PROCEDURE — 99441 ZZC PHYSICIAN TELEPHONE EVALUATION 5-10 MIN: CPT | Performed by: NURSE PRACTITIONER

## 2020-04-29 RX ORDER — SPIRONOLACTONE 25 MG/1
12.5 TABLET ORAL DAILY
Qty: 45 TABLET | Refills: 3 | Status: SHIPPED | OUTPATIENT
Start: 2020-04-29 | End: 2021-03-24

## 2020-04-29 NOTE — PROGRESS NOTES
"CARDIOLOGY TELEPHONE VISIT    Victoria Montalvo is a 79 year old female who is being evaluated via a billable telephone visit.      The patient has been notified of following:     \"This telephone visit will be conducted via a call between you and your physician/provider. Given concern for spread of COVID 19 we are minimizing in person clinic visits when possible. We have found that certain health care needs can be provided without the need for a physical exam.  This service lets us provide the care you need with a short phone conversation.  If a prescription is necessary we can send it directly to your pharmacy.  If lab work is needed we can place an order for that and you can then stop by our lab to have the test done at a later time. If during the course of the call the physician/provider feels a telephone visit is not appropriate, you will not be charged for this service.\"     Patient has given verbal consent to telephone visit. Yes      I have reviewed and updated the patient's Past Medical History, Social History, Family History and Medication List.    MEDICATIONS:  Current Outpatient Medications   Medication Sig Dispense Refill     albuterol (2.5 MG/3ML) 0.083% nebulizer solution Take 1 vial (2.5 mg) by nebulization every 6 hours as needed for shortness of breath / dyspnea 1 Box 3     alendronate (FOSAMAX) 70 MG tablet TAKE 1 TABLET EVERY 7 DAYS, TAKE 60 MINUTES BEFORE A.M. MEAL WITH 8 OUNCE WATER, REMAIN UPRIGHT FOR 30 MINUTES 12 tablet 3     aspirin 81 MG tablet Take 81 mg by mouth daily.       atorvastatin (LIPITOR) 40 MG tablet Take 1 tablet (40 mg) by mouth daily 90 tablet 0     atorvastatin (LIPITOR) 80 MG tablet Take 1 tablet (80 mg) by mouth daily 90 tablet 3     biotin 2.5 mg/mL Take by mouth daily 2000mcg       Boswellia-Glucosamine-Vit D (OSTEO BI-FLEX/5-LOXIN ADVANCED) TABS Take 1,500 mg by mouth 2 times daily        Calcium 4762-3518 MG-UNIT CHEW Take 1 tablet by mouth daily.       Cholecalciferol " "(VITAMIN D3 PO) Take 2,000 Units by mouth daily        cycloSPORINE (RESTASIS) 0.05 % ophthalmic emulsion Place 1 drop into both eyes 2 times daily        fluticasone (FLONASE) 50 MCG/ACT nasal spray USE 1 TO 2 SPRAYS IN EACH NOSTRIL DAILY 48 g 3     fluticasone-salmeterol (ADVAIR) 250-50 MCG/DOSE inhaler Inhale 1 puff into the lungs every 12 hours 3 Inhaler 3     furosemide (LASIX) 20 MG tablet Take 1 tablet (20 mg) by mouth daily 90 tablet 3     isosorbide mononitrate (IMDUR) 30 MG 24 hr tablet Take 1 tablet (30 mg) by mouth daily 90 tablet 1     isosorbide mononitrate (IMDUR) 60 MG 24 hr tablet Take 1 tablet (60 mg) by mouth every morning Hold if Systolic Blood Pressure is less than 85. 90 tablet 3     MAGNESIUM ACETATE Take 250 mg by mouth daily        montelukast (SINGULAIR) 10 MG tablet Take 1 tablet (10 mg) by mouth At Bedtime (Patient not taking: Reported on 4/17/2020) 90 tablet 3     nitroGLYcerin (NITROSTAT) 0.4 MG sublingual tablet One tablet under the tongue every 5 minutes if needed for chest pain. May repeat every 5 minutes for a maximum of 3 doses in 15 minutes\" 25 tablet 3     nitroglycerin (NITROSTAT) 0.4 MG sublingual tablet One tablet under the tongue every 5 minutes if needed for chest pain. May repeat every 5 minutes for a maximum of 3 doses in 15 minutes\" 25 tablet 3     omega-3 fatty acids (FISH OIL DOUBLE STRENGTH) 1200 MG capsule Take 1 capsule by mouth 2 times daily        omeprazole (PRILOSEC) 20 MG DR capsule Take 1 capsule (20 mg) by mouth daily 90 capsule 2     order for DME Equipment being ordered: Nebulizer. Uses: albuterol (2.5 MG/3ML) 0.083% nebulizer solution- Sig - Route: Take 1 vial (2.5 mg) by nebulization every 6 hours as needed for shortness of breath / dyspnea. 1 Units 0     PROAIR  (90 Base) MCG/ACT inhaler USE 2 INHALATIONS EVERY 6 HOURS AS NEEDED FOR SHORTNESS OF BREATH, DYSPNEA OR WHEEZING 25.5 g 0     spironolactone (ALDACTONE) 12.5 mg TABS half-tab Take 12.5 mg " by mouth See Admin Instructions Monday, Wed, Friday          ALLERGIES  Animal dander; Dust mites; Kiwi; Pollen extract; and Seasonal allergies    Patient reported vitals:  BP: 138/87  Heart rate: 74  Weight: 171lb    Review Of Systems  Skin: negative  Eyes: negative  Ears/Nose/Throat: negative  Respiratory: No shortness of breath and No dyspnea on exertion  Cardiovascular: lower extremity edema  Gastrointestinal: negative  Genitourinary: negative  Musculoskeletal: negative  Neurologic: negative  Psychiatric: negative  Hematologic/Lymphatic/Immunologic: negative  Endocrine: negative  (ROS TAKEN BY Evelina Parnell CMA   PRIOR TO VISIT)    HPI:  Victoria Montalvo is a pleasant 79-year-old female with a past medical history significant for coronary artery disease status post PCI to the left PDA in 2017, subclavian stenosis with intervention in 2013, hypertension, pulmonary hypertension, hyperlipidemia, diastolic heart failure, venous insufficiency, obstructive sleep apnea on CPAP, and COPD.    On 4/17/2020 she had a virtual visit with Dr. Yogi Yepez.  She reported chest discomfort radiating to her left arm, similar to her previous angina in 2017.  She subsequently underwent a coronary angiogram that demonstrated a 50% narrowing after the stent at the distal circumflex/PDA followed by a negative IFR at 0.92.  She was given intracoronary nitroglycerin during the procedure and vessel notably became a larger diameter vessel, consistent with significant spasm.  The rest of the coronary tree demonstrated moderate disease with the exception of a 85% stenosis in a small diameter nondominant RCA.  LV gram showed an ejection fraction of 55%.  No intervention was performed.  Her atorvastatin was increased to 80 mg daily and Imdur was increased to 60 mg/day.    Today is a 1 week post angiogram follow-up via telephone visit.    She is doing well on a cardiac standpoint, denies chest pain, palpitations, lightheadedness, or presyncope.  She  continues with exertional shortness of breath which she attributes to her asthma, well managed on nebulizer and inhaler therapy.  She admits to a noticeable improvement in her chest discomfort after the increase in the isosorbide.  In review of her medications, she is noted to have been out of her Spironolactone over the last week    Blood pressure is well controlled   Last BMP showed a sodium of 140, potassium 3.9, BUN 13, creatinine 0.68, and GFR 82  Hemoglobin 14.0 and platelet 191  Last lipid panel showed a total cholesterol of 183, HDL 85, LDL 81, triglycerides 86, she is due for a fasting lipid panel in 8 weeks.    Activity level is unchanged  Eating and sleeping well.  However, she admits to not wearing her CPAP faithfully    Assessment/Plan:    1. Coronary artery disease-status post remote stent to the circumflex/PDA.  Recent angiogram demonstrated a 50% narrowing after the stent at the distal circumflex/PDA followed by a negative IFR at 0.92.  She was given intracoronary nitroglycerin during the procedure and vessel notably became a larger diameter vessel, consistent with significant spasm.  The rest of the coronary tree demonstrated moderate disease with the exception of a 85% stenosis in a small diameter nondominant RCA.  LV gram showed an ejection fraction of 55%.  Continue on higher dose of isosorbide, aspirin, Lasix, and statin.  I will renew her Spironolactone.  Of note, she is not on a beta-blocker due to her asthma.  Encourage exercise, weight loss, and heart healthy diet    2.   Hypertension -well-controlled on current medical therapy  3.   Hyperlipidemia -atorvastatin was increased to 80 mg at the time of the angiogram.  Recommend follow-up fasting lipid panel in 2 to 3 months.        Follow up plan: Follow-up in 6 months with DOMINIK. Fasting lipid panel in 3 months will call with results.    I have reviewed the note as documented above.  This accurately captures the substance of my conversation with  the patient.      Phone call contact time 10 minutes    NIMESH Marquis, CNP  Pager [phone]  4/29/2020

## 2020-04-29 NOTE — LETTER
4/29/2020      RE: Victoria Montalvo  [address] [place] [address]       Dear Colleague,    Thank you for the opportunity to participate in the care of your patient, Victoria Montalvo, at the [facility]. Please see a copy of my visit note below.    Victoria Montalvo is a pleasant 79-year-old female with a past medical history significant for coronary artery disease status post PCI to the left PDA in 2017, subclavian stenosis with intervention in 2013, hypertension, pulmonary hypertension, hyperlipidemia, diastolic heart failure, venous insufficiency, obstructive sleep apnea on CPAP, and COPD.    On 4/17/2020 she had a virtual visit with Dr. Yogi Yepez.  She reported chest discomfort radiating to her left arm, similar to her previous angina in 2017.  She subsequently underwent a coronary angiogram that demonstrated a 50% narrowing after the stent at the distal circumflex/PDA followed by a negative IFR at 0.92.  She was given intracoronary nitroglycerin during the procedure and vessel notably became a larger diameter vessel, consistent with significant spasm.  The rest of the coronary tree demonstrated moderate disease with the exception of a 85% stenosis in a small diameter nondominant RCA.  LV gram showed an ejection fraction of 55%.  No intervention was performed.  Her atorvastatin was increased to 80 mg daily and Imdur was increased to 60 mg/day.    Today is a 1 week post angiogram follow-up via telephone visit.    She is doing well on a cardiac standpoint, denies chest pain, palpitations, lightheadedness, or presyncope.  She continues with exertional shortness of breath which she attributes to her asthma, well managed on nebulizer and inhaler therapy.  She admits to a noticeable improvement in her chest discomfort after the increase in the isosorbide.  In review of her medications, she is noted to have been out of her  Spironolactone over the last week    Blood pressure is well controlled   Last BMP showed a sodium of 140, potassium 3.9, BUN 13, creatinine 0.68, and GFR 82  Hemoglobin 14.0 and platelet 191  Last lipid panel showed a total cholesterol of 183, HDL 85, LDL 81, triglycerides 86, she is due for a fasting lipid panel in 8 weeks.    Activity level is unchanged  Eating and sleeping well.  However, she admits to not wearing her CPAP faithfully    Assessment/Plan:    1. Coronary artery disease-status post remote stent to the circumflex/PDA.  Recent angiogram demonstrated a 50% narrowing after the stent at the distal circumflex/PDA followed by a negative IFR at 0.92.  She was given intracoronary nitroglycerin during the procedure and vessel notably became a larger diameter vessel, consistent with significant spasm.  The rest of the coronary tree demonstrated moderate disease with the exception of a 85% stenosis in a small diameter nondominant RCA.  LV gram showed an ejection fraction of 55%.  Continue on higher dose of isosorbide, aspirin, Lasix, and statin.  I will renew her Spironolactone.  Of note, she is not on a beta-blocker due to her asthma.  Encourage exercise, weight loss, and heart healthy diet    2.   Hypertension -well-controlled on current medical therapy  3.   Hyperlipidemia -atorvastatin was increased to 80 mg at the time of the angiogram.  Recommend follow-up fasting lipid panel in 2 to 3 months.      Follow up plan: Follow-up in 6 months with DOMINIK. Fasting lipid panel in 3 months will call with results.    I have reviewed the note as documented above.  This accurately captures the substance of my conversation with the patient.    Phone call contact time 10 minutes    NIMESH Marquis, CNP  Pager [phone]  4/29/2020     Please do not hesitate to contact me if you have any questions/concerns.     Sincerely,     NIMESH Marquis CNP

## 2020-04-29 NOTE — PATIENT INSTRUCTIONS
Thanks for participating in a telephone visit with the [facility] today.    Reviewed results of angiogram  Symptoms improved after the increase in isosorbide  Continue on current medical therapy  Blood pressures on the higher side of normal.  Will renew Spironolactone  Continue to monitor blood pressures daily with a goal of less than 140/90  Encourage exercise, weight loss, and heart healthy diet  Atorvastatin was increased to 80 mg daily at the time of the angiogram.  Follow-up fasting cholesterol blood test in 2 to 3 months    Follow up in 6 months with DOMINIK.    Please call my nurse at [phone] with any questions or concerns.    Scheduling phone number: [phone]  Reminder: Please bring in all current medications, over the counter supplements and vitamin bottles to your next appointment.

## 2020-07-01 DIAGNOSIS — I50.32 DIASTOLIC CHF, CHRONIC (H): ICD-10-CM

## 2020-07-01 RX ORDER — FUROSEMIDE 20 MG
20 TABLET ORAL DAILY
Qty: 90 TABLET | Refills: 3 | Status: SHIPPED | OUTPATIENT
Start: 2020-07-01 | End: 2021-07-12

## 2020-07-01 NOTE — TELEPHONE ENCOUNTER
Medication Refilled: lasix  Last office visit: 2020 with marcos Way CNP  Last Labs/EK20 BMP  Next office visit: 10/2020 orders in epic  Pharmacy sent to: nicholas Terrazas RN

## 2020-07-16 ASSESSMENT — ENCOUNTER SYMPTOMS
JOINT SWELLING: 0
SORE THROAT: 0
HEMATOCHEZIA: 0
NAUSEA: 0
FEVER: 0
ABDOMINAL PAIN: 0
PALPITATIONS: 0
DIARRHEA: 0
DIZZINESS: 0
FREQUENCY: 1
CHILLS: 0
BREAST MASS: 0
PARESTHESIAS: 0
SHORTNESS OF BREATH: 1
WEAKNESS: 0
NERVOUS/ANXIOUS: 0
HEMATURIA: 0
EYE PAIN: 0
DYSURIA: 0
CONSTIPATION: 0
HEARTBURN: 0
HEADACHES: 0
MYALGIAS: 1
COUGH: 0
ARTHRALGIAS: 0

## 2020-07-16 ASSESSMENT — ACTIVITIES OF DAILY LIVING (ADL): CURRENT_FUNCTION: NO ASSISTANCE NEEDED

## 2020-07-17 ENCOUNTER — OFFICE VISIT (OUTPATIENT)
Dept: PEDIATRICS | Facility: CLINIC | Age: 80
End: 2020-07-17
Payer: MEDICARE

## 2020-07-17 VITALS
HEART RATE: 85 BPM | SYSTOLIC BLOOD PRESSURE: 122 MMHG | OXYGEN SATURATION: 95 % | DIASTOLIC BLOOD PRESSURE: 74 MMHG | HEIGHT: 62 IN | BODY MASS INDEX: 31.49 KG/M2 | TEMPERATURE: 98.4 F | WEIGHT: 171.1 LBS | RESPIRATION RATE: 16 BRPM

## 2020-07-17 DIAGNOSIS — I25.10 MILD CORONARY ARTERY DISEASE: ICD-10-CM

## 2020-07-17 DIAGNOSIS — L65.9 HAIR LOSS: ICD-10-CM

## 2020-07-17 DIAGNOSIS — J45.50 UNCOMPLICATED SEVERE PERSISTENT ASTHMA (H): ICD-10-CM

## 2020-07-17 DIAGNOSIS — M81.0 AGE-RELATED OSTEOPOROSIS WITHOUT CURRENT PATHOLOGICAL FRACTURE: ICD-10-CM

## 2020-07-17 DIAGNOSIS — Z00.01 ENCOUNTER FOR GENERAL ADULT MEDICAL EXAMINATION WITH ABNORMAL FINDINGS: Primary | ICD-10-CM

## 2020-07-17 DIAGNOSIS — J45.40 MODERATE PERSISTENT ASTHMA, UNCOMPLICATED: ICD-10-CM

## 2020-07-17 LAB
ALBUMIN SERPL-MCNC: 3.4 G/DL (ref 3.4–5)
ALP SERPL-CCNC: 51 U/L (ref 40–150)
ALT SERPL W P-5'-P-CCNC: 23 U/L (ref 0–50)
ANION GAP SERPL CALCULATED.3IONS-SCNC: 8 MMOL/L (ref 3–14)
AST SERPL W P-5'-P-CCNC: 19 U/L (ref 0–45)
BILIRUB SERPL-MCNC: 0.6 MG/DL (ref 0.2–1.3)
BUN SERPL-MCNC: 17 MG/DL (ref 7–30)
CALCIUM SERPL-MCNC: 8.8 MG/DL (ref 8.5–10.1)
CHLORIDE SERPL-SCNC: 109 MMOL/L (ref 94–109)
CHOLEST SERPL-MCNC: 167 MG/DL
CO2 SERPL-SCNC: 24 MMOL/L (ref 20–32)
CREAT SERPL-MCNC: 0.7 MG/DL (ref 0.52–1.04)
FERRITIN SERPL-MCNC: 30 NG/ML (ref 8–252)
GFR SERPL CREATININE-BSD FRML MDRD: 81 ML/MIN/{1.73_M2}
GLUCOSE SERPL-MCNC: 105 MG/DL (ref 70–99)
HDLC SERPL-MCNC: 72 MG/DL
LDLC SERPL CALC-MCNC: 82 MG/DL
NONHDLC SERPL-MCNC: 95 MG/DL
POTASSIUM SERPL-SCNC: 3.7 MMOL/L (ref 3.4–5.3)
PROT SERPL-MCNC: 7.4 G/DL (ref 6.8–8.8)
SODIUM SERPL-SCNC: 141 MMOL/L (ref 133–144)
TRIGL SERPL-MCNC: 65 MG/DL
TSH SERPL DL<=0.005 MIU/L-ACNC: 0.84 MU/L (ref 0.4–4)

## 2020-07-17 PROCEDURE — 80053 COMPREHEN METABOLIC PANEL: CPT | Performed by: PEDIATRICS

## 2020-07-17 PROCEDURE — 99213 OFFICE O/P EST LOW 20 MIN: CPT | Mod: 25 | Performed by: PEDIATRICS

## 2020-07-17 PROCEDURE — G0439 PPPS, SUBSEQ VISIT: HCPCS | Performed by: PEDIATRICS

## 2020-07-17 PROCEDURE — 80061 LIPID PANEL: CPT | Performed by: PEDIATRICS

## 2020-07-17 PROCEDURE — 84443 ASSAY THYROID STIM HORMONE: CPT | Performed by: PEDIATRICS

## 2020-07-17 PROCEDURE — 36415 COLL VENOUS BLD VENIPUNCTURE: CPT | Performed by: PEDIATRICS

## 2020-07-17 PROCEDURE — 82728 ASSAY OF FERRITIN: CPT | Performed by: PEDIATRICS

## 2020-07-17 RX ORDER — ALENDRONATE SODIUM 70 MG/1
TABLET ORAL
Qty: 12 TABLET | Refills: 3 | Status: SHIPPED | OUTPATIENT
Start: 2020-07-17 | End: 2021-08-05

## 2020-07-17 RX ORDER — ALBUTEROL SULFATE 0.83 MG/ML
2.5 SOLUTION RESPIRATORY (INHALATION) EVERY 6 HOURS PRN
Qty: 1 BOX | Refills: 3 | Status: SHIPPED | OUTPATIENT
Start: 2020-07-17 | End: 2021-08-05

## 2020-07-17 RX ORDER — MONTELUKAST SODIUM 10 MG/1
1 TABLET ORAL AT BEDTIME
Qty: 90 TABLET | Refills: 3 | Status: SHIPPED | OUTPATIENT
Start: 2020-07-17 | End: 2021-08-05

## 2020-07-17 ASSESSMENT — ENCOUNTER SYMPTOMS
COUGH: 0
DYSURIA: 0
PARESTHESIAS: 0
WEAKNESS: 0
SORE THROAT: 0
MYALGIAS: 1
HEARTBURN: 0
NERVOUS/ANXIOUS: 0
ARTHRALGIAS: 0
HEADACHES: 0
FREQUENCY: 1
SHORTNESS OF BREATH: 1
CHILLS: 0
HEMATOCHEZIA: 0
CONSTIPATION: 0
NAUSEA: 0
BREAST MASS: 0
EYE PAIN: 0
ABDOMINAL PAIN: 0
JOINT SWELLING: 0
HEMATURIA: 0
DIZZINESS: 0
DIARRHEA: 0
FEVER: 0
PALPITATIONS: 0

## 2020-07-17 ASSESSMENT — MIFFLIN-ST. JEOR: SCORE: 1186.41

## 2020-07-17 ASSESSMENT — ACTIVITIES OF DAILY LIVING (ADL): CURRENT_FUNCTION: NO ASSISTANCE NEEDED

## 2020-07-17 NOTE — PROGRESS NOTES
"SUBJECTIVE:   Victoria Montalvo is a 80 year old female who presents for Preventive Visit.      Are you in the first 12 months of your Medicare coverage?  No    Healthy Habits:     In general, how would you rate your overall health?  Good    Frequency of exercise:  None    Do you usually eat at least 4 servings of fruit and vegetables a day, include whole grains    & fiber and avoid regularly eating high fat or \"junk\" foods?  Yes    Taking medications regularly:  No    Barriers to taking medications:  None    Ability to successfully perform activities of daily living:  No assistance needed    Home Safety:  No safety concerns identified    Hearing Impairment:  No hearing concerns    In the past 6 months, have you been bothered by leaking of urine?  No    In general, how would you rate your overall mental or emotional health?  Good      PHQ-2 Total Score: 0    Additional concerns today:  No    Do you feel safe in your environment? Yes    Have you ever done Advance Care Planning? (For example, a Health Directive, POLST, or a discussion with a medical provider or your loved ones about your wishes): Yes, patient states has an Advance Care Planning document and will bring a copy to the clinic.      Fall risk  Fallen 2 or more times in the past year?: No  Any fall with injury in the past year?: No    Cognitive Screening   1) Repeat 3 items (Leader, Season, Table)    2) Clock draw: ABNORMAL   3) 3 item recall: Recalls 3 objects  Results: NORMAL clock, 1-2 items recalled: COGNITIVE IMPAIRMENT LESS LIKELY    Mini-CogTM Copyright ANGELA Simental. Licensed by the author for use in [facility]; reprinted with permission (ankit@.[email or web address]). All rights reserved.      Do you have sleep apnea, excessive snoring or daytime drowsiness?: no    Reviewed and updated as needed this visit by clinical staff  Tobacco  Allergies         Reviewed and updated as needed this visit by Provider        Social History     Tobacco Use     Smoking " status: Former Smoker     Packs/day: 0.10     Years: 10.00     Pack years: 1.00     Types: Cigarettes     Last attempt to quit: 2003     Years since quittin.1     Smokeless tobacco: Never Used   Substance Use Topics     Alcohol use: Yes     Alcohol/week: 0.0 standard drinks     Comment: a couple drinks per year     If you drink alcohol do you typically have >3 drinks per day or >7 drinks per week? No    Alcohol Use 2020   Prescreen: >3 drinks/day or >7 drinks/week? No   Prescreen: >3 drinks/day or >7 drinks/week? -   No flowsheet data found.            Current providers sharing in care for this patient include:   Patient Care Team:  Elsy Bah MD as PCP - General (Internal Medicine)  Aron Torres MD as MD (Orthopedics)  Elsy Bah MD as Assigned PCP    The following health maintenance items are reviewed in Epic and correct as of today:  Health Maintenance   Topic Date Due     ANNUAL REVIEW OF HM ORDERS  1940     ZOSTER IMMUNIZATION (1 of 2) 06/15/1989     PNEUMOCOCCAL IMMUNIZATION 65+ LOW/MEDIUM RISK (2 of 2 - PCV13) 2011     DTAP/TDAP/TD IMMUNIZATION (2 - Td) 2017     ASTHMA ACTION PLAN  10/05/2017     MEDICARE ANNUAL WELLNESS VISIT  2018     FALL RISK ASSESSMENT  2018     ASTHMA CONTROL TEST  2019     PHQ-2  2020     COLORECTAL CANCER SCREENING  2020     LIPID  2020     ALT  2020     HF ACTION PLAN  2020     INFLUENZA VACCINE (1) 2020     BMP  10/22/2020     CBC  2021     ADVANCE CARE PLANNING  2022     DEXA  Completed     TSH W/FREE T4 REFLEX  Completed     IPV IMMUNIZATION  Aged Out     MENINGITIS IMMUNIZATION  Aged Out     HEPATITIS B IMMUNIZATION  Aged Out       Review of Systems   Constitutional: Negative for chills and fever.   HENT: Negative for congestion, ear pain, hearing loss and sore throat.    Eyes: Negative for pain and visual disturbance.   Respiratory: Positive for  "shortness of breath. Negative for cough.    Cardiovascular: Positive for peripheral edema. Negative for chest pain and palpitations.   Gastrointestinal: Negative for abdominal pain, constipation, diarrhea, heartburn, hematochezia and nausea.   Breasts:  Negative for tenderness, breast mass and discharge.   Genitourinary: Positive for frequency. Negative for dysuria, genital sores, hematuria, pelvic pain, urgency, vaginal bleeding and vaginal discharge.   Musculoskeletal: Positive for myalgias. Negative for arthralgias and joint swelling.   Skin: Negative for rash.   Neurological: Negative for dizziness, weakness, headaches and paresthesias.   Psychiatric/Behavioral: Negative for mood changes. The patient is not nervous/anxious.        OBJECTIVE:   /74 (BP Location: Right arm, Patient Position: Chair, Cuff Size: Adult Regular)   Pulse 85   Temp 98.4  F (36.9  C) (Tympanic)   Resp 16   Ht 1.562 m (5' 1.5\")   Wt 77.6 kg (171 lb 1.6 oz)   LMP  (LMP Unknown)   SpO2 95%   BMI 31.81 kg/m   Estimated body mass index is 31.81 kg/m  as calculated from the following:    Height as of this encounter: 1.562 m (5' 1.5\").    Weight as of this encounter: 77.6 kg (171 lb 1.6 oz).  Physical Exam  GENERAL APPEARANCE: healthy, alert and no distress  EYES: Eyes grossly normal to inspection, PERRL and conjunctivae and sclerae normal  HENT: ear canals and TM's normal, nose and mouth without ulcers or lesions, oropharynx clear and oral mucous membranes moist  NECK: no adenopathy, no asymmetry, masses, or scars and thyroid normal to palpation  RESP: lungs clear to auscultation - no rales, rhonchi or wheezes  CV: regular rate and rhythm, normal S1 S2, no S3 or S4, no murmur, click or rub, no peripheral edema and peripheral pulses strong  ABDOMEN: soft, nontender, no hepatosplenomegaly, no masses and bowel sounds normal  MS: no musculoskeletal defects are noted and gait is age appropriate without ataxia  SKIN: no suspicious " lesions or rashes  NEURO: Normal strength and tone, sensory exam grossly normal, mentation intact and speech normal  PSYCH: mentation appears normal and affect normal/bright    Diagnostic Test Results:  Labs reviewed in Epic    ASSESSMENT / PLAN:   1. Encounter for general adult medical examination with abnormal findings  Agree to stop mammo  Due for one more colonoscopy - waiting for reschedule due to COVID    Declines all vaccines     Regarding questions about travel - recommend patient wait to see how Coronavirus does this fall/winter and also be mindful of travel restrictions including quarantines.  They would like to go to [place] this fall and [place] in January.  I offered to write letter to airline to try and get reimbursement for tickets they have to use by the end of the year.  Patient thinks they will still go on trip.     2. Moderate persistent asthma, uncomplicated  Restart singulair  - albuterol (PROVENTIL) (2.5 MG/3ML) 0.083% neb solution; Take 1 vial (2.5 mg) by nebulization every 6 hours as needed for shortness of breath / dyspnea  Dispense: 1 Box; Refill: 3  - fluticasone-salmeterol (ADVAIR) 250-50 MCG/DOSE inhaler; Inhale 1 puff into the lungs every 12 hours  Dispense: 3 Inhaler; Refill: 3  - montelukast (SINGULAIR) 10 MG tablet; Take 1 tablet (10 mg) by mouth At Bedtime  Dispense: 90 tablet; Refill: 3    3. Age-related osteoporosis without current pathological fracture  Continue - continue DEXA in 2 more years and drug holiday then too  - alendronate (FOSAMAX) 70 MG tablet; TAKE 1 TABLET EVERY 7 DAYS, TAKE 60 MINUTES BEFORE A.M. MEAL WITH 8 OUNCE WATER, REMAIN UPRIGHT FOR 30 MINUTES  Dispense: 12 tablet; Refill: 3    4. Uncomplicated severe persistent asthma  stable  - fluticasone-salmeterol (ADVAIR) 250-50 MCG/DOSE inhaler; Inhale 1 puff into the lungs every 12 hours  Dispense: 3 Inhaler; Refill: 3    5. Mild coronary artery disease  Sees cardiology - recently increased atorvastatin, recheck  "lipids today  - Lipid panel reflex to direct LDL Fasting  - Comprehensive metabolic panel (BMP + Alb, Alk Phos, ALT, AST, Total. Bili, TP)    6. Hair loss  Will check thyroid/iron first.  Also stress likely cause.  Patient wondering about the atorvastatin - alopecia is listed as a side effect.  Would consider switch to rosuvastatin if labs normal and see if any improvement.  - TSH with free T4 reflex  - Ferritin  - OFFICE/OUTPT VISIT,EST,LEVL II    COUNSELING:  Reviewed preventive health counseling, as reflected in patient instructions    Estimated body mass index is 31.81 kg/m  as calculated from the following:    Height as of this encounter: 1.562 m (5' 1.5\").    Weight as of this encounter: 77.6 kg (171 lb 1.6 oz).         reports that she quit smoking about 17 years ago. Her smoking use included cigarettes. She has a 1.00 pack-year smoking history. She has never used smokeless tobacco.      Appropriate preventive services were discussed with this patient, including applicable screening as appropriate for cardiovascular disease, diabetes, osteopenia/osteoporosis, and glaucoma.  As appropriate for age/gender, discussed screening for colorectal cancer, prostate cancer, breast cancer, and cervical cancer. Checklist reviewing preventive services available has been given to the patient.    Reviewed patients plan of care and provided an AVS. The Basic Care Plan (routine screening as documented in Health Maintenance) for June meets the Care Plan requirement. This Care Plan has been established and reviewed with the Patient.    Counseling Resources:  ATP IV Guidelines  Pooled Cohorts Equation Calculator  Breast Cancer Risk Calculator  FRAX Risk Assessment  ICSI Preventive Guidelines  Dietary Guidelines for Americans, 2010  USDA's MyPlate  ASA Prophylaxis  Lung CA Screening    Elsy Bah MD  [facility]    Identified Health Risks:  "

## 2020-07-17 NOTE — PATIENT INSTRUCTIONS
Patient Education   Personalized Prevention Plan  You are due for the preventive services outlined below.  Your care team is available to assist you in scheduling these services.  If you have already completed any of these items, please share that information with your care team to update in your medical record.  Health Maintenance Due   Topic Date Due     ANNUAL REVIEW OF HM ORDERS  1940     Zoster (Shingles) Vaccine (1 of 2) 06/15/1989     Pneumococcal Vaccine (2 of 2 - PCV13) 12/08/2011     Diptheria Tetanus Pertussis (DTAP/TDAP/TD) Vaccine (2 - Td) 04/14/2017     Asthma Action Plan - yearly  10/05/2017     Annual Wellness Visit  08/04/2018     FALL RISK ASSESSMENT  08/04/2018     Asthma Control Test  07/09/2019     PHQ-2  01/01/2020     Colorectal Cancer Screening  02/17/2020     Cholesterol Lab  05/02/2020     Liver Monitoring Lab  07/18/2020     Heart Failure Action Plan  08/04/2020

## 2020-07-20 DIAGNOSIS — I25.10 MILD CORONARY ARTERY DISEASE: Primary | ICD-10-CM

## 2020-07-20 RX ORDER — ROSUVASTATIN CALCIUM 40 MG/1
40 TABLET, COATED ORAL DAILY
Qty: 90 TABLET | Refills: 3 | Status: SHIPPED | OUTPATIENT
Start: 2020-07-20 | End: 2021-06-08

## 2020-09-24 ENCOUNTER — VIRTUAL VISIT (OUTPATIENT)
Dept: FAMILY MEDICINE | Facility: OTHER | Age: 80
End: 2020-09-24

## 2020-09-24 ENCOUNTER — TELEPHONE (OUTPATIENT)
Dept: PEDIATRICS | Facility: CLINIC | Age: 80
End: 2020-09-24

## 2020-09-24 ENCOUNTER — HOSPITAL ENCOUNTER (EMERGENCY)
Facility: CLINIC | Age: 80
Discharge: HOME | End: 2020-09-24
Attending: EMERGENCY MEDICINE | Admitting: EMERGENCY MEDICINE
Payer: MEDICARE

## 2020-09-24 ENCOUNTER — APPOINTMENT (OUTPATIENT)
Dept: CT IMAGING | Facility: CLINIC | Age: 80
End: 2020-09-24
Attending: EMERGENCY MEDICINE
Payer: MEDICARE

## 2020-09-24 VITALS
TEMPERATURE: 98 F | HEART RATE: 81 BPM | HEIGHT: 62 IN | WEIGHT: 166 LBS | OXYGEN SATURATION: 99 % | DIASTOLIC BLOOD PRESSURE: 88 MMHG | SYSTOLIC BLOOD PRESSURE: 154 MMHG | BODY MASS INDEX: 30.55 KG/M2 | RESPIRATION RATE: 16 BRPM

## 2020-09-24 DIAGNOSIS — J12.82 PNEUMONIA DUE TO 2019 NOVEL CORONAVIRUS: ICD-10-CM

## 2020-09-24 DIAGNOSIS — U07.1 PNEUMONIA DUE TO 2019 NOVEL CORONAVIRUS: ICD-10-CM

## 2020-09-24 LAB
ANION GAP SERPL CALCULATED.3IONS-SCNC: 5 MMOL/L (ref 3–14)
BASOPHILS # BLD AUTO: 0 10E9/L (ref 0–0.2)
BASOPHILS NFR BLD AUTO: 0.4 %
BUN SERPL-MCNC: 11 MG/DL (ref 7–30)
CALCIUM SERPL-MCNC: 8.6 MG/DL (ref 8.5–10.1)
CHLORIDE SERPL-SCNC: 112 MMOL/L (ref 94–109)
CO2 SERPL-SCNC: 25 MMOL/L (ref 20–32)
CREAT SERPL-MCNC: 0.7 MG/DL (ref 0.52–1.04)
D DIMER PPP FEU-MCNC: 0.6 UG/ML FEU (ref 0–0.5)
DIFFERENTIAL METHOD BLD: NORMAL
EOSINOPHIL # BLD AUTO: 0.1 10E9/L (ref 0–0.7)
EOSINOPHIL NFR BLD AUTO: 2.4 %
ERYTHROCYTE [DISTWIDTH] IN BLOOD BY AUTOMATED COUNT: 13.5 % (ref 10–15)
GFR SERPL CREATININE-BSD FRML MDRD: 81 ML/MIN/{1.73_M2}
GLUCOSE SERPL-MCNC: 117 MG/DL (ref 70–99)
HCT VFR BLD AUTO: 37.6 % (ref 35–47)
HGB BLD-MCNC: 12.1 G/DL (ref 11.7–15.7)
IMM GRANULOCYTES # BLD: 0 10E9/L (ref 0–0.4)
IMM GRANULOCYTES NFR BLD: 0.2 %
LYMPHOCYTES # BLD AUTO: 1.1 10E9/L (ref 0.8–5.3)
LYMPHOCYTES NFR BLD AUTO: 20.9 %
MCH RBC QN AUTO: 29.2 PG (ref 26.5–33)
MCHC RBC AUTO-ENTMCNC: 32.2 G/DL (ref 31.5–36.5)
MCV RBC AUTO: 91 FL (ref 78–100)
MONOCYTES # BLD AUTO: 0.3 10E9/L (ref 0–1.3)
MONOCYTES NFR BLD AUTO: 5.7 %
NEUTROPHILS # BLD AUTO: 3.8 10E9/L (ref 1.6–8.3)
NEUTROPHILS NFR BLD AUTO: 70.4 %
NRBC # BLD AUTO: 0 10*3/UL
NRBC BLD AUTO-RTO: 0 /100
PLATELET # BLD AUTO: 159 10E9/L (ref 150–450)
POTASSIUM SERPL-SCNC: 3.6 MMOL/L (ref 3.4–5.3)
RBC # BLD AUTO: 4.15 10E12/L (ref 3.8–5.2)
SODIUM SERPL-SCNC: 142 MMOL/L (ref 133–144)
TROPONIN I SERPL-MCNC: <0.015 UG/L (ref 0–0.04)
WBC # BLD AUTO: 5.4 10E9/L (ref 4–11)

## 2020-09-24 PROCEDURE — 93005 ELECTROCARDIOGRAM TRACING: CPT

## 2020-09-24 PROCEDURE — A9270 NON-COVERED ITEM OR SERVICE: HCPCS | Mod: GY | Performed by: EMERGENCY MEDICINE

## 2020-09-24 PROCEDURE — 84484 ASSAY OF TROPONIN QUANT: CPT | Performed by: EMERGENCY MEDICINE

## 2020-09-24 PROCEDURE — 85025 COMPLETE CBC W/AUTO DIFF WBC: CPT | Performed by: EMERGENCY MEDICINE

## 2020-09-24 PROCEDURE — 99285 EMERGENCY DEPT VISIT HI MDM: CPT | Mod: 25

## 2020-09-24 PROCEDURE — 85379 FIBRIN DEGRADATION QUANT: CPT | Performed by: EMERGENCY MEDICINE

## 2020-09-24 PROCEDURE — 25000132 ZZH RX MED GY IP 250 OP 250 PS 637: Mod: GY | Performed by: EMERGENCY MEDICINE

## 2020-09-24 PROCEDURE — 80048 BASIC METABOLIC PNL TOTAL CA: CPT | Performed by: EMERGENCY MEDICINE

## 2020-09-24 PROCEDURE — 25000128 H RX IP 250 OP 636: Performed by: EMERGENCY MEDICINE

## 2020-09-24 PROCEDURE — 71275 CT ANGIOGRAPHY CHEST: CPT

## 2020-09-24 RX ORDER — DOXYCYCLINE 100 MG/1
100 CAPSULE ORAL 2 TIMES DAILY
Qty: 14 CAPSULE | Refills: 0 | Status: SHIPPED | OUTPATIENT
Start: 2020-09-24 | End: 2020-10-16

## 2020-09-24 RX ORDER — IOPAMIDOL 755 MG/ML
500 INJECTION, SOLUTION INTRAVASCULAR ONCE
Status: COMPLETED | OUTPATIENT
Start: 2020-09-24 | End: 2020-09-24

## 2020-09-24 RX ORDER — ALBUTEROL SULFATE 90 UG/1
6 AEROSOL, METERED RESPIRATORY (INHALATION) ONCE
Status: COMPLETED | OUTPATIENT
Start: 2020-09-24 | End: 2020-09-24

## 2020-09-24 RX ADMIN — IOPAMIDOL 59 ML: 755 INJECTION, SOLUTION INTRAVENOUS at 16:50

## 2020-09-24 RX ADMIN — ALBUTEROL SULFATE 6 PUFF: 90 AEROSOL, METERED RESPIRATORY (INHALATION) at 14:32

## 2020-09-24 ASSESSMENT — MIFFLIN-ST. JEOR: SCORE: 1163.28

## 2020-09-24 ASSESSMENT — ENCOUNTER SYMPTOMS
SHORTNESS OF BREATH: 1
COUGH: 1
WHEEZING: 1
FATIGUE: 1

## 2020-09-24 NOTE — ED AVS SNAPSHOT
[facility] Emergency Department  [address]  [facility] [address]  Phone:  [phone]  Fax:  [phone]                                    June V Selvin   MRN: [id number]    Department:  [facility] Emergency Department   Date of Visit:  9/24/2020           After Visit Summary Signature Page    I have received my discharge instructions, and my questions have been answered. I have discussed any challenges I see with this plan with the nurse or doctor.    ..........................................................................................................................................  Patient/Patient Representative Signature      ..........................................................................................................................................  Patient Representative Print Name and Relationship to Patient    ..................................................               ................................................  Date                                   Time    ..........................................................................................................................................  Reviewed by Signature/Title    ...................................................              ..............................................  Date                                               Time          22EPIC Rev 08/18

## 2020-09-24 NOTE — PROGRESS NOTES
"Date: 2020 10:42:25  Clinician: Elsa Plunkett  Clinician NPI: [id number]  Patient: Victoria Montalvo  Patient : 1940  [place] [address] [id number]  Patient Phone: [phone]  Visit Protocol: URI  Patient Summary:  Victoria is a 80 year old ( : 1940 ) female who initiated a [facility] Visit for COVID-19 (Coronavirus) evaluation and screening. When asked the question \"Please sign me up to receive news, health information and promotions. \", Victoria responded \"No\".    Victoria states her symptoms started suddenly 2-3 weeks ago. After her symptoms started, they improved and then got worse again.   Her symptoms consist of anosmia, wheezing, and ageusia. She is experiencing mild difficulty breathing with activities but can speak normally in full sentences.   Symptom details   Wheezing: Victoria has been diagnosed with asthma. Additional wheezing details as reported by the patient (free text): It is the same as when I have asthma related problems and the problems eventually go away after nebulizing      Victoria denies having cough, nasal congestion, headache, ear pain, vomiting, rhinitis, nausea, enlarged lymph nodes, facial pain or pressure, fever, myalgias, chills, malaise, sore throat, teeth pain, and diarrhea. She also denies taking antibiotic medication in the past month and having recent facial or sinus surgery in the past 60 days.    Pertinent COVID-19 (Coronavirus) information  In the past 14 days, Victoria has not worked in a congregate living setting.   She does not work or volunteer as healthcare worker or a  and does not work or volunteer in a healthcare facility.   Victoria also has not lived in a congregate living setting in the past 14 days. She does not live with a healthcare worker.   Victoria has not had a close contact with a laboratory-confirmed COVID-19 patient within 14 days of symptom onset.   Since 2019, Victoria and has had upper respiratory infection (URI) or " influenza-like illness. has been diagnosed with lab-confirmed COVID-19 test      Date of her positive COVID-19 test: 09/20/2020     Date(s) of previous URI or influenza-like illness (free-text): 09/09/2020 - 09/22/2020     Symptoms Victoria experienced during previous URI or influenza-like illness as reported by the patient (free-text): Headache, Diarrhea, dizziness, Coughing, extremely tired, Very Dehydrated, hard breathing, wheezing, Weak, no energy, loss of taste, loss of appetite        Pertinent medical history  Victoria does not get yeast infections when she takes antibiotics.   Victoria does not need a return to work/school note.   Weight: 167 lbs   Victoria does not smoke or use smokeless tobacco.   Weight: 167 lbs    MEDICATIONS: aspirin-caffeine oral, albuterol sulfate inhalation, Fosamax oral, biotin-chromium oral, Oyster Shell Calcium-Vitamin D3 oral, Omega-3 Fish Oil oral, Flonase Allergy Relief nasal, Lasix oral, isosorbide mononitrate oral, Singulair oral, Nitro-Time oral, omeprazole oral, rosuvastatin oral, spironolactone-hydrochlorothiazide oral, Osteo Bi-Flex Triple Strength oral, Hair-Skin-Nails (PABA) oral, ALLERGIES: NKDA  Clinician Response:  Dear Victoria,  I am sorry you are not feeling well. To determine the most appropriate care for you, I would like you to be seen in person to further discuss your health history and symptoms.  You will not be charged for this [facility] Visit. Thank you for trusting us with your care.  COVID-19 (Coronavirus) General Information  Because there is currently no vaccine to prevent infection, the best way to protect yourself is to avoid being exposed to this virus. Common symptoms of COVID-19 include but are not limited to fever, cough, and shortness of breath. These symptoms appear 2-14 days after you are exposed to the virus that causes COVID-19. Click here for more information from the CDC on how to protect yourself.  If you are sick with COVID-19 or suspect you are infected with  the virus that causes COVID-19, follow the steps here from the CDC to help prevent the disease from spreading to people in your home and community.  Click here for general information from the CDC on testing.  If you develop any of these emergency warning signs for COVID-19, get medical attention immediately:     Trouble breathing    Persistent pain or pressure in the chest    New confusion or inability to arouse    Bluish lips or face      Call your doctor or clinic before going in. Call 911 if you have a medical emergency and notify the  you have or think you may have COVID-19.  For more detailed and up to date information on COVID-19 (Coronavirus), please visit the CDC website.   Diagnosis: Refer for additional evaluation  Diagnosis ICD: R69  Additional Clinician Notes:  I recommend in-person evaluation to check your oxygen status and your breathing. Because of length of time your symptoms have been concern for possible pneumonia as well. Please visit your nearest urgent care today preferably within 4-6 hours&nbsp;  Please go to the Emergency Room with severe or worsening symptoms&nbsp;

## 2020-09-24 NOTE — TELEPHONE ENCOUNTER
Placed call back to patients daughter, Avani (C2C on file)    Per Avani, patient was tested at [facility] drive up for COVID with a self swab 3 days ago. Per Avani, patient was notified today that she is positive for COVID and Avani is wondering what to do now.    Of note, patient submitted [facility] visit today and was advised by [facility] to be evaluated in person to check oxygen status, breathing and to make sure doesn't have possible pneumonia.    Will route to Dr. Bah to review. [facility] recommended in person visit today to assess patient's breathing. Pt was notified today that she is positive for COVID. Should patient still come in and be placed in red room? OK to do phone visit to assess? Please advise.    Radha QUINN RN

## 2020-09-24 NOTE — TELEPHONE ENCOUNTER
FYI - Status Update    Who is Calling: family member, Daughter Avani (CTC on file)     Update: June tested positive for COVID, Avani wants advice on what to do next?     Does caller want a call back: Yes    Okay to leave a detailed message?:  Yes at Cell number on file:    Telephone Information:   Mobile [phone]       Ashlee Johnston on 9/24/2020 at 12:26 PM

## 2020-09-24 NOTE — TELEPHONE ENCOUNTER
I would recommend EMERGENCY DEPARTMENT - given her age and possible pneumonia with COVID.     (if refusing to do this, needs URGENT CARE today)     Elsy Bah MD  Internal Medicine/Pediatrics  [facility]

## 2020-09-24 NOTE — ED PROVIDER NOTES
History     Chief Complaint:  Shortness of breath     HPI   Victoria Montalvo is an 80-year-old female presenting to the ED for evaluation of shortness of breath.  PMH significant for CAD.  Patient was tested on 9/20, which returned positive.  She contacted primary care provider who suggested she come to the ED for further evaluation to rule out pneumonia.  Patient reports she has been feeling ill since around September 9 of this year.  Labor Day, she was attending a family picnic, where it was cold and drizzly.  Patient has noted symptoms of fatigue.  She does acknowledge shortness of breath, and intermittent cough which has been somewhat productive of sputum.  Since it first began, she feels as though her cough is overall improved compared with where it was a few weeks ago.  1 week ago, the patient did also note intermittent left-sided chest discomfort.  She experienced this a few days in the week previous, though has not had any recurrent pain since then.  She notes this felt similar to her typical angina.  Last coronary angiogram was in April of this year, where she was found to have coronary vasospasm, though has previously had multiple stents placed.  She was using her home nitroglycerin tablets which improved her pain.  Her symptoms lasted approximately 1 hour, and were not associated with exertion.  She has not experienced any chest pain for the past 1 week.  Given her symptom complex, she did obtain testing for COVID on 9/20, which returned positive.  She was notified of these test results today.  She does acknowledge everything tastes like salt.  She does feel short of breath, and heard wheezing yesterday evening though not today.  She had tried using her inhaler.  She denies any previous history of pulmonary embolism or DVT.  She notes chronic lower extremity edema bilaterally which is unchanged.  She contacted her PCP earlier today, who recommended she present to the ED for further  "evaluation.    Allergies:  Animal Dander  Dust Mites  Kiwi  Pollen Extract  Seasonal Allergies    Medications:    Albuterol  Fosamax  Aspirin 81 mg  Lipitor  Biotin  Boswellia-Glucosamine  Flonase  Advair  Lasix  Imdur  Singulair  Nitrostat  Prilosec  Proair  Crestor  Aldactone    Past Medical History:    Abnormal papanicolaou smear  Arthritis  Chronic rhinitis  COPD  Coronary artery disease  Chronic diastolic heart failure  Hip dislocation  GERD  Hyperlipidemia  Obstructive sleep apnea  Colonic polyps  Hypertension  Subclavian artery stenosis  Subclinical hyperthyroidism  Asthma  Unstable angina pectoris     Past Surgical History:    Hip arthroplasty  Liposuction  Ankle pins removed  Right ankle dislocation repair  Colonoscopy x3  Coronary angiogram x2  Instantaneous wave-free ratio  Left ventriculogram  Colonoscopy through stoma  Breast reduction  Vascular surgery    Family History:    Mother: alzheimer disease, gastrointestinal disease  Father: throat cancer, lung cancer, liver cancer, CABG  Brother: suicidal, pacemaker  Sister: heart disease     Social History:  Smoking Status: Former  Smokeless Tobacco: Never  Alcohol Use: Yes  Drug Use: No  Marital Status:        Review of Systems   Constitutional: Positive for fatigue.   Respiratory: Positive for cough, shortness of breath and wheezing.    All other systems reviewed and are negative.      Physical Exam   Vitals:  Patient Vitals for the past 24 hrs:   BP Temp Temp src Pulse Resp SpO2 Height Weight   09/24/20 1600 (!) 154/88 -- -- 81 -- 99 % -- --   09/24/20 1500 131/71 -- -- 78 -- 98 % -- --   09/24/20 1415 134/75 -- -- -- -- 98 % -- --   09/24/20 1354 (!) 170/87 98  F (36.7  C) Oral 84 16 96 % 1.562 m (5' 1.5\") 75.3 kg (166 lb)       Physical Exam  General:              Well-nourished              Speaking in full sentences   Resting comfortably on gurney  Eyes:              Conjunctiva without injection or scleral icterus  ENT:              Moist " mucous membranes              Nares patent              Pinnae normal  Neck:              Full ROM              No stiffness appreciated  Resp:              Lungs CTAB              No crackles, wheezing or audible rubs              Good air movement  CV:                    Normal rate, regular rhythm              S1 and S2 present              No murmur, gallop or rub  GI:              BS present              Abdomen soft without distention              Non-tender to light and deep palpation              No guarding or rebound tenderness  Skin:              Warm, dry, well perfused              No rashes or open wounds on exposed skin  MSK:              Moves all extremities              No focal deformities              Trace lower extremity edema bilaterally  Neuro:              Alert              Answers questions appropriately              Moves all extremities equally              Gait stable  Psych:              Normal affect, normal mood    Emergency Department Course   ECG:  Indication: shortness of breath   Completed at 1451.  Read at 1456.   Rate 78 bpm. IN interval 150. QRS duration 76. QT/QTc 376/428. P-R-T axes 58 -28 8.  Sinus rhythm    Imaging:  Radiographic findings were communicated with the patient who voiced understanding of the findings.    CT Chest Pulmonary Embolism W Contrast  1.  No pulmonary emboli identified.  2.  Patchy bilateral peripheral groundglass opacities within lungs very consistent with COVID 19 related pneumonia.  Commonly reported imaging features of (COVID-19) pneumonia are present. Influenza pneumonia and organizing pneumonia as can be seen in the setting of drug toxicity and connective tissue disease can cause a similar imaging pattern.  Reading per radiology.    Laboratory:  CBC: WNL. (WBC 5.4, HGB 12.1, )   BMP: Chloride 112 (H), Glucose 117 (H) o/w  AWNL (Creatinine 0.70)    D Dimer (Collected 1419): 0.6 (H)  Troponin (Collected 1419): <0.015    Interventions:  1432  albuterol 6 puffs Inhalation    Emergency Department Course:  Past medical records, nursing notes, and vitals reviewed.    1404 I performed an exam of the patient as documented above.     EKG obtained in the ED, see results above.   IV was inserted and blood was drawn for laboratory testing, results above.  The patient was sent for a chest CT while in the emergency department, results above.     1846 I rechecked the patient and discussed the results of her workup thus far. I discussed her results and my clinical impression at this time.    Findings and plan explained to the Patient. Patient discharged home with instructions regarding supportive care, medications, and reasons to return. The importance of close follow-up was reviewed. The patient was prescribed Vibramycin.    I personally reviewed the laboratory and imaging results with the Patient and answered all related questions prior to discharge    Impression & Plan      Covid-19  Victoria Montalvo was evaluated during a global COVID-19 pandemic, which necessitated consideration that the patient might be at risk for infection with the SARS-CoV-2 virus that causes COVID-19.   Applicable protocols for evaluation were followed during the patient's care.   COVID-19 was considered as part of the patient's evaluation. The plan for testing is:  a test was obtained at a previous visit and reviewed & considered today.     Medical Decision Making:  Victoria Montalvo is a very pleasant 80-year-old female with a PMH significant for COPD, not on home O2, CAD s/p stent placement, presenting to the ED for evaluation of shortness of breath and a positive COVID test.  VS on presentation reveal elevated BP, although otherwise are unremarkable.  Overall patient's symptomatology is most consistent with COVID/COVID pneumonia.  Work-up included advanced imaging.  In light of elevated d-dimer, CT chest performed, which demonstrates bilateral groundglass opacities most consistent with COVID  related pneumonia.  Influenza pneumonia and organizing pneumonia can also be seen in a pattern similar to this with known connective tissue/drug toxicity patterns.  Fortunately, no signs of pulmonary embolism are noted.  Remainder of imaging study was unremarkable, including no evidence of thoracic aortic dissection.  Patient is noted to have heavy atherosclerotic plaque, which she is well aware of.  In light of her COPD, a inhaler treatment was provided from the ED.  She otherwise does not exhibit signs of significant COPD exacerbation in the absence of prolonged expiratory phase, tachypnea, and she remains without hypoxia.  She has not required supplemental O2, or NIPPV during her emergency department course.  On reassessment, results of the above studies were discussed with the patient.  In light of her underlying COPD as well as above imaging findings, do feel a course of oral doxycycline would be reasonable for 7 days.  We also discussed her description of intermittent anginal pain over 1 week ago.  She has not experienced any recurrence of this during the past week.  Her EKG today demonstrates sinus rhythm without findings of acute ischemia or acute changes compared with prior EKG from April of this year.  Her troponin returned undetectable.  I discussed the importance of following up with her cardiologist to discuss these symptoms further, though again given the absence of any symptoms in the past week, do not feel hospitalization is emergently required.  Patient to is very much wishing for discharge from the ED, and does not wish to stay in the hospital any longer.  With reasonable clinical certainty I feel she can safely be discharged home.  She will monitor her symptoms closely and return to the ED with worsened shortness of breath, cough, chest pain, or any other concerns.  Appropriate precautions provided on discharge.      Diagnosis:    ICD-10-CM    1. Pneumonia due to 2019 novel coronavirus  U07.1      J12.89        Disposition:  discharged to home    Discharge Medications:  New Prescriptions    DOXYCYCLINE HYCLATE (VIBRAMYCIN) 100 MG CAPSULE    Take 1 capsule (100 mg) by mouth 2 times daily     ISaulo, am serving as a scribe on 9/24/2020 at 2:05 PM to personally document services performed by Miguel Wright MD based on my observations and the provider's statements to me.    Saulo Lott  9/24/2020   [facility] EMERGENCY DEPARTMENT       Miguel Wright MD  09/25/20 1158

## 2020-09-24 NOTE — TELEPHONE ENCOUNTER
Patient called requesting more info on what to do.    I gave below message from Dr. Bah. Reviewed recommendations very thoroughly with patient; patient verbalized understanding and agreement with plan. Patient plans to present to the ED for evaluation as recommended by Dr. Bah. Patient had no further questions/concerns.    MITCH JACOBSEN MA on 9/24/2020 at 1:14 PM

## 2020-09-24 NOTE — TELEPHONE ENCOUNTER
Placed call to patient and gave Dr. Bah recommendation to be seen in ED given positive for COVID, concerns for pneumonia from [facility] and patient's age.     I called [facility] ER and they advised for patient to wear a mask and let them know right away that she is COVID positive and to explain she was sent by PCP and to bring copy of COVID results if patient has them. The will then room her right away.    Advised patient of the above directions from ED. Patient verbalizes understanding and will leave for ED now.  will drive her.    Radha QUINN RN

## 2020-09-24 NOTE — DISCHARGE INSTRUCTIONS
Please monitor your breathing very closely.    Begin the antibiotic as discussed.    Follow-up with your primary care provider in 2 to 3 days for recheck.    Please call your cardiologist tomorrow to discuss the symptoms you experienced a week ago.    Return to the ED with worsened shortness of breath, development of any recurrent pain, fevers, or any other concerns.    Discharge Instructions  COVID-19    COVID-19 is the disease caused by a new coronavirus. The virus spreads from person to person primarily by droplets when an infected person coughs or sneezes and the droplet either lands on another person or that other person touches a surface with the droplet on it. Diagnosis of COVID-19 can be made with a test but many times the test is not immediately available or not necessary. There is no specific treatment or medicine for the disease.    You may have been diagnosed with COVID, may be being tested for COVID and have a pending test result, or may have been exposed to COVID.    Symptoms of COVID-19  Many people have no symptoms or mild symptoms.  Symptoms may usually appear 4 to 5 days (up to 14 days) after contact with another ill person. Some people will get severe symptoms and pneumonia. Usual symptoms are:     ? Fever  ? Cough  ? Trouble breathing  ? Less common symptoms are: Headache, body aches, sore throat,      sneezing, diarrhea, loss of taste or smell.    Stay home  Most people will recover from COVID illness with mild symptoms.  Isolation by staying home is the best method to prevent the spread of the illness. Do not go to work or school. Have a friend or relative do your shopping. Do not use public transportation (bus, train) or ridesharing ([employer], Uber).    If you have symptoms of COVID, you should stay home for at least 10 days, and for 24 hours with no fever and improvement of symptoms--whichever is longer. (Your fever should be gone for 24 hours without using fever-reducing medicine)    For  example, if you have a fever and cough for 6 days, you need to stay home 4 more days with no fever for a total of 10 days. Or, if you have a fever and cough for 10 days, you need to stay home one more day with no fever for a total of 11 days.    If you have an exposure to COVID (you spent 15 minutes or more within six feet of somebody who has COVID), you should stay home for 14 days.    If you are being tested for COVID and your test is pending, you should stay home until you know your test result.    How should I protect myself and others?    Separate yourself from other people in your home.?As much as possible, you should stay in one room and away from other people in your home. Also, use a separate bathroom, if possible. Avoid handling pets or other animals while sick.     Wear a facemask if you need to be around other people and cover your mouth and nose with a tissue when you cough or sneeze.     Avoid sharing personal household items. You should not share dishes, drinking glasses, forks/knives/spoons, towels, or bedding with other people in your home. After using these items, they should be washed with soap and water. Clean parts of your home that are touched often (doorknobs, faucets, countertops, etc.) daily.     Wash your hands often with soap and water for at least 20 seconds or use an alcohol-based hand  containing at least 60% alcohol.     Avoid touching your face.    Treat your symptoms. You can take Acetaminophen (Tylenol) to treat body aches and fever as needed for comfort. Ibuprofen (Advil or Motrin) can be used as well if you still have symptoms after taking Tylenol. Drink fluids. Rest.    Watch for worsening symptoms such as shortness of breath/difficulty breathing or very severe weakness.    Employers/workplaces are being asked by the Centers for Disease Control (CDC) to not request notes/documentation for you to return to work or prove that you were ill. You may choose to show your  employer this paperwork. Also, repeat testing should not be required to return to work.    Return to the Emergency Department if:    If you are developing worsening breathing, shortness of breath, or feel worse you should seek medical attention.  If you are uncertain, contact your health care provider/clinic. If you need emergency medical attention, call 911 and tell them you have been ill.

## 2020-09-24 NOTE — ED NOTES
Patient ambulated in room approximately 30 meters on RA. Resting SpO2 = 99%, ambulating SpO2 = 95%. The patient presented with increased SOB upon ambulation. MD notified.

## 2020-09-24 NOTE — ED TRIAGE NOTES
9/20 Pt tested, got results today for (+) COVID positive. Referred here by PCP to r/o pneumonia. Intermintent cough, SOB. Diarrhea last week. No vomiting. ABC intact.

## 2020-09-25 LAB — INTERPRETATION ECG - MUSE: NORMAL

## 2020-09-29 DIAGNOSIS — J45.40 MODERATE PERSISTENT ASTHMA, UNCOMPLICATED: ICD-10-CM

## 2020-09-29 DIAGNOSIS — K21.9 GASTROESOPHAGEAL REFLUX DISEASE WITHOUT ESOPHAGITIS: ICD-10-CM

## 2020-09-29 NOTE — TELEPHONE ENCOUNTER
Routing refill request to provider for review/approval because:  Labs not current:  ACT    ACT Total Scores 8/4/2017 3/5/2018 1/9/2019   ACT TOTAL SCORE - - -   ASTHMA ER VISITS - - -   ASTHMA HOSPITALIZATIONS - - -   ACT TOTAL SCORE (Goal Greater than or Equal to 20) 14 17 13   In the past 12 months, how many times did you visit the emergency room for your asthma without being admitted to the hospital? 0 0 0   In the past 12 months, how many times were you hospitalized overnight because of your asthma? 0 0 0     Mary RAMOS, RN, BSN

## 2020-09-29 NOTE — TELEPHONE ENCOUNTER
Prescription approved per [facility] Refill Protocol.  Omeprazole    Mary RAMOS RN, BSN

## 2020-09-30 RX ORDER — ALBUTEROL SULFATE 90 UG/1
AEROSOL, METERED RESPIRATORY (INHALATION)
Qty: 25.5 G | Refills: 6 | Status: SHIPPED | OUTPATIENT
Start: 2020-09-30 | End: 2021-08-05

## 2020-10-01 ENCOUNTER — MYC MEDICAL ADVICE (OUTPATIENT)
Dept: PEDIATRICS | Facility: CLINIC | Age: 80
End: 2020-10-01

## 2020-10-13 PROBLEM — I10 HYPERTENSION: Status: ACTIVE | Noted: 2017-09-22

## 2020-10-16 ENCOUNTER — OFFICE VISIT (OUTPATIENT)
Dept: CARDIOLOGY | Facility: CLINIC | Age: 80
End: 2020-10-16
Payer: MEDICARE

## 2020-10-16 ENCOUNTER — OFFICE VISIT (OUTPATIENT)
Dept: PEDIATRICS | Facility: CLINIC | Age: 80
End: 2020-10-16
Payer: MEDICARE

## 2020-10-16 VITALS
HEART RATE: 72 BPM | SYSTOLIC BLOOD PRESSURE: 152 MMHG | BODY MASS INDEX: 32.19 KG/M2 | DIASTOLIC BLOOD PRESSURE: 76 MMHG | WEIGHT: 170.5 LBS | HEIGHT: 61 IN

## 2020-10-16 VITALS
RESPIRATION RATE: 16 BRPM | DIASTOLIC BLOOD PRESSURE: 80 MMHG | SYSTOLIC BLOOD PRESSURE: 160 MMHG | OXYGEN SATURATION: 96 % | BODY MASS INDEX: 32.5 KG/M2 | TEMPERATURE: 98.2 F | WEIGHT: 172 LBS | HEART RATE: 98 BPM

## 2020-10-16 DIAGNOSIS — I50.32 DIASTOLIC CHF, CHRONIC (H): ICD-10-CM

## 2020-10-16 DIAGNOSIS — I20.1 VASOSPASTIC ANGINA (H): Primary | ICD-10-CM

## 2020-10-16 DIAGNOSIS — Z01.818 PRE-OPERATIVE EXAMINATION: Primary | ICD-10-CM

## 2020-10-16 DIAGNOSIS — H02.403 PTOSIS OF BOTH UPPER EYELIDS: ICD-10-CM

## 2020-10-16 PROCEDURE — 99215 OFFICE O/P EST HI 40 MIN: CPT | Performed by: INTERNAL MEDICINE

## 2020-10-16 PROCEDURE — 99214 OFFICE O/P EST MOD 30 MIN: CPT | Performed by: FAMILY MEDICINE

## 2020-10-16 RX ORDER — AMLODIPINE BESYLATE 2.5 MG/1
2.5 TABLET ORAL EVERY EVENING
Qty: 90 TABLET | Refills: 3 | Status: SHIPPED | OUTPATIENT
Start: 2020-10-16 | End: 2021-07-12

## 2020-10-16 ASSESSMENT — MIFFLIN-ST. JEOR: SCORE: 1175.76

## 2020-10-16 NOTE — PROGRESS NOTES
Cardiology Progress Note          Assessment and Plan:     1. Vasospastic angina, stable    Patient had an exacerbation in the setting of stress and coronavirus infection that she caught from her neighbor.  Her symptoms were in the evening when her Imdur was running out.    Will do amlodipine 2.5 mg in the evenings to supplement.       2. Hypertension, suboptimal control    Added amlodipine as above.  May increase her spironolactone if blood pressure still not well controlled.      3. Coronary artery disease    Continue statin      4. Severe saphenous vein reflux bilaterally    Continue compression and diuretics unless symptoms are intractable.    Follow-up with virtual visit in 6 months    This note was transcribed using electronic voice recognition software and there may be typographical errors present.                Interval History:   The patient is a very pleasant 80 year old whom I have been following for coronary artery disease and vasospasm last cardiac catheterization with no intervenable obstructive disease in April 2020.    Her chest discomfort was pretty well controlled on Imdur 60 mg daily.  However, in September 2020, she was having some evening chest discomfort in the setting of a lot of stress and sought further medical attention.  Troponin was undetectable but she was coronavirus positive.  She recovered without much complication.  Her chest discomfort has also subsequently improved back to her baseline.    She has noticed higher blood pressures in general in the 150s.    She has history of subclavian stenosis status post PCI.  Her arm strength is symmetric on both sides.    She has severe venous reflux bilateral lower extremities but it does not bother her significantly and the edema is controlled with medications.                     Review of Systems:   Review of Systems:  Skin:  Positive for     Eyes:  Positive for glasses  ENT:  Negative    Respiratory:  Positive for dyspnea on exertion;sleep  "apnea  Cardiovascular:    Positive for;chest pain;edema  Gastroenterology: Negative    Genitourinary:  Positive for urinary frequency;nocturia  Musculoskeletal:  Positive for back pain;arthritis  Neurologic:  Positive for numbness or tingling of hands  Psychiatric:  Positive for sleep disturbances  Heme/Lymph/Imm:  Positive for easy bruising  Endocrine:  Positive for thyroid disorder              Physical Exam:     Vitals: BP (!) 152/76 (BP Location: Right arm, Patient Position: Sitting, Cuff Size: Adult Regular)   Pulse 72   Ht 1.549 m (5' 1\")   Wt 77.3 kg (170 lb 8 oz)   LMP  (LMP Unknown)   BMI 32.22 kg/m    Constitutional:  cooperative, alert and oriented, well developed, well nourished, in no acute distress        Skin:  warm and dry to the touch, no apparent skin lesions or masses noted        Head:  normocephalic, no masses or lesions   hair loss, wearing a wig    Eyes:  pupils equal and round;sclera white;no xanthalasma;conjunctivae and lids unremarkable        ENT:  no pallor or cyanosis        Neck:  JVP normal        Chest:  normal breath sounds, clear to auscultation, normal A-P diameter, normal symmetry, normal respiratory excursion, no use of accessory muscles        Cardiac: regular rhythm;normal S1 and S2       systolic murmur;grade 1          Abdomen:  BS normoactive   benign      Extremities and Back:  normal muscle strength and tone;no deformities, clubbing, cyanosis, erythema observed        Neurological:  no gross motor deficits;affect appropriate                 Medications:     Current Outpatient Medications   Medication Sig Dispense Refill     albuterol (PROVENTIL) (2.5 MG/3ML) 0.083% neb solution Take 1 vial (2.5 mg) by nebulization every 6 hours as needed for shortness of breath / dyspnea 1 Box 3     alendronate (FOSAMAX) 70 MG tablet TAKE 1 TABLET EVERY 7 DAYS, TAKE 60 MINUTES BEFORE A.M. MEAL WITH 8 OUNCE WATER, REMAIN UPRIGHT FOR 30 MINUTES 12 tablet 3     amLODIPine (NORVASC) 2.5 " "MG tablet Take 1 tablet (2.5 mg) by mouth every evening 90 tablet 3     aspirin 81 MG tablet Take 81 mg by mouth daily.       biotin 2.5 mg/mL Take by mouth daily 2000mcg       Boswellia-Glucosamine-Vit D (OSTEO BI-FLEX/5-LOXIN ADVANCED) TABS Take 1,500 mg by mouth 2 times daily        Calcium 7694-5573 MG-UNIT CHEW Take 1 tablet by mouth daily.       Cholecalciferol (VITAMIN D3 PO) Take 2,000 Units by mouth daily        fluticasone (FLONASE) 50 MCG/ACT nasal spray USE 1 TO 2 SPRAYS IN EACH NOSTRIL DAILY 48 g 11     fluticasone-salmeterol (ADVAIR) 250-50 MCG/DOSE inhaler Inhale 1 puff into the lungs every 12 hours 3 Inhaler 3     furosemide (LASIX) 20 MG tablet Take 1 tablet (20 mg) by mouth daily 90 tablet 3     isosorbide mononitrate (IMDUR) 60 MG 24 hr tablet Take 1 tablet (60 mg) by mouth every morning Hold if Systolic Blood Pressure is less than 85. 90 tablet 3     montelukast (SINGULAIR) 10 MG tablet Take 1 tablet (10 mg) by mouth At Bedtime 90 tablet 3     Multiple Vitamins-Minerals (HAIR SKIN NAILS PO) Take 120 mg by mouth daily       nitroGLYcerin (NITROSTAT) 0.4 MG sublingual tablet One tablet under the tongue every 5 minutes if needed for chest pain. May repeat every 5 minutes for a maximum of 3 doses in 15 minutes\" 25 tablet 3     omega-3 fatty acids (FISH OIL DOUBLE STRENGTH) 1200 MG capsule Take 1 capsule by mouth 2 times daily        omeprazole (PRILOSEC) 20 MG DR capsule TAKE 1 CAPSULE DAILY 90 capsule 2     order for DME Equipment being ordered: Nebulizer. Uses: albuterol (2.5 MG/3ML) 0.083% nebulizer solution- Sig - Route: Take 1 vial (2.5 mg) by nebulization every 6 hours as needed for shortness of breath / dyspnea. 1 Units 0     PROAIR  (90 Base) MCG/ACT inhaler USE 2 INHALATIONS EVERY 6 HOURS AS NEEDED FOR SHORTNESS OF BREATH, DYSPNEA OR WHEEZING 25.5 g 6     Pyridoxine HCl (VITAMIN B6 PO) Take by mouth daily       rosuvastatin (CRESTOR) 40 MG tablet Take 1 tablet (40 mg) by mouth daily " 90 tablet 3     spironolactone (ALDACTONE) 25 MG tablet Take 0.5 tablets (12.5 mg) by mouth daily 45 tablet 3                Data:   All laboratory data reviewed  No results found for this or any previous visit (from the past 24 hour(s)).    All laboratory data reviewed  Lab Results   Component Value Date    CHOL 167 07/17/2020     Lab Results   Component Value Date    HDL 72 07/17/2020     Lab Results   Component Value Date    LDL 82 07/17/2020     Lab Results   Component Value Date    TRIG 65 07/17/2020     Lab Results   Component Value Date    CHOLHDLRATIO 2.5 08/20/2014     TSH   Date Value Ref Range Status   07/17/2020 0.84 0.40 - 4.00 mU/L Final     Last Basic Metabolic Panel:  Lab Results   Component Value Date     09/24/2020      Lab Results   Component Value Date    POTASSIUM 3.6 09/24/2020     Lab Results   Component Value Date    CHLORIDE 112 09/24/2020     Lab Results   Component Value Date    PHILLIP 8.6 09/24/2020     Lab Results   Component Value Date    CO2 25 09/24/2020     Lab Results   Component Value Date    BUN 11 09/24/2020     Lab Results   Component Value Date    CR 0.70 09/24/2020     Lab Results   Component Value Date     09/24/2020     Lab Results   Component Value Date    WBC 5.4 09/24/2020     Lab Results   Component Value Date    RBC 4.15 09/24/2020     Lab Results   Component Value Date    HGB 12.1 09/24/2020     Lab Results   Component Value Date    HCT 37.6 09/24/2020     Lab Results   Component Value Date    MCV 91 09/24/2020     Lab Results   Component Value Date    MCH 29.2 09/24/2020     Lab Results   Component Value Date    MCHC 32.2 09/24/2020     Lab Results   Component Value Date    RDW 13.5 09/24/2020     Lab Results   Component Value Date     09/24/2020

## 2020-10-16 NOTE — PROGRESS NOTES
[facility]  [address]  [address]  [place] [address]  Phone: [phone]  Fax: [phone]  Primary Provider: Elsy Bah      PREOPERATIVE EVALUATION:  Today's date: 10/16/2020    Victoria Montalvo is a 80 year old female who presents for a preoperative evaluation.    Surgical Information:  Surgery/Procedure: Eyelid surgery   Surgery Location: [facility], [place].  Surgeon: Char   Surgery Date: 10/26  Time of Surgery: Undetermined   Where patient plans to recover: At home with family  Fax number for surgical facility:     Type of Anesthesia Anticipated: Local with MAC    Subjective     HPI related to upcoming procedure:     She has ptosis of both upper lids impairing vision.      She has a h/o CAD and diastolic CHF but is currently asymptomatic.    Recovered uneventfully from Corona Virus.      Preop Questions 10/4/2015   1. Have you ever had a heart attack or stroke? No   3. Do you have chest pain with activity? YES -    4. Do you have a history of  heart failure? YES -    5. Do you currently have a cold, bronchitis or symptoms of other infection? Recovered from Covid   6. Do you have a cough, shortness of breath, or wheezing? No   7. Do you or anyone in your family have previous history of blood clots? No   8. Do you or does anyone in your family have a serious bleeding problem such as prolonged bleeding following surgeries or cuts? No   9. Have you ever had problems with anemia or been told to take iron pills? No   10. Have you had any abnormal blood loss such as black, tarry or bloody stools, or abnormal vaginal bleeding? No   11. Have you ever had a blood transfusion? No   13. Have you or any of your relatives ever had problems with anesthesia? No   14. Do you have sleep apnea, excessive snoring or daytime drowsiness? YES - KEN, has CPAP   15. Do you have any artifical heart valves or other implanted medical devices like a pacemaker, defibrillator,  or continuous glucose monitor? No   16. Do you have artificial joints? No   18. Is there any chance that you may be pregnant? No     Health Care Directive:  Patient does have a Health Care Directive or Living Will:     [id number]}    Status of Chronic Conditions:      Review of Systems  Constitutional, neuro, ENT, endocrine, pulmonary, cardiac, gastrointestinal, genitourinary, musculoskeletal, integument and psychiatric systems are negative, except as otherwise noted.    Patient Active Problem List    Diagnosis Date Noted     Status post coronary angiogram 04/22/2020     Priority: Medium     Unstable angina pectoris (H) 04/17/2020     Priority: Medium     Added automatically from request for surgery [id number]       Chest pain 02/26/2018     Priority: Medium     Hypertension 09/22/2017     Priority: Medium     Subclinical hyperthyroidism 10/17/2016     Priority: Medium     Mild coronary artery disease 01/30/2016     Priority: Medium     On ASA       Health Care Home 10/28/2015     Priority: Medium              Hip osteoarthritis 10/19/2015     Priority: Medium     KEN (obstructive sleep apnea) 08/20/2015     Priority: Medium     Severe persistent asthma 08/25/2014     Priority: Medium     Subclavian artery stenosis, left (H) 08/07/2013     Priority: Medium     S/p stent in 2013       Advanced directives, counseling/discussion 04/02/2012     Priority: Medium     Advance Care Planning 8/29/2017: ACP Review of Chart / Resources Provided:  Reviewed chart for advance care plan.  Victoria Montalvo has no plan and Full code status on file. Advance Care Planning letter sent.   Added by Stacie Raymundo  Patient states has Advance Directive and will bring in a copy to clinic. 4/2/2012          Diastolic CHF, chronic (H) 02/22/2012     Priority: Medium     Hyperlipidemia LDL goal <130 10/31/2011     Priority: Medium     Osteopenia 12/21/2010     Priority: Medium     Esophageal reflux 09/03/2004     Priority: Medium     Female stress  incontinence 09/03/2004     Priority: Medium     LUMBOSACRAL NEURITIS , numbness left thigh 09/03/2004     Priority: Medium     History of colonic polyps 06/19/2003     Priority: Medium     Problem list name updated by automated process. Provider to review       Chronic rhinitis 06/19/2003     Priority: Medium      Past Medical History:   Diagnosis Date     Abnormal Papanicolaou smear of cervix and cervical HPV     colposcopy 1/97     Arthritis      Chronic rhinitis      COPD (chronic obstructive pulmonary disease) (H)      Coronary artery disease     4/4/17 SHERLEY--PDA     Diastolic CHF, chronic (H) 2/22/2012     Dislocation of hip (H)     5/25/18 and 12/29/18 with successful reductions in ED     Gastro-oesophageal reflux disease      H/O heart artery stent 04/04/2017    SHERLEY to PDA     Hyperlipidemia LDL goal <130 10/31/2011     KEN (obstructive sleep apnea)     CPAP     Pain in right hip      Personal history of colonic polyps     colonoscopy 9/97, 2002 (due in 2007)     Pulmonary HTN (H)      Subclavian artery stenosis, left (H) 8/7/2013    S/p stent in 2013      Subclinical hyperthyroidism 10/17/2016     Unspecified asthma(493.90)     on preventative meds and has nebulizer     Past Surgical History:   Procedure Laterality Date     ARTHROPLASTY HIP Right 10/19/2015    Procedure: ARTHROPLASTY HIP;  Surgeon: Aron Torres MD;  Location:  OR     C OPEN RX ANKLE DISLOCATN+FIXATN  4/18/87     COLONOSCOPY  01/1/08    Polyp removed, bx.     COLONOSCOPY  01/12/10     COLONOSCOPY N/A 2/17/2015    Procedure: COLONOSCOPY;  Surgeon: Gato Quiles MD;  Location:  GI     CT CORONARY ANGIOGRAM  04/04/2017    SHERLEY to PDA     CV CORONARY ANGIOGRAM N/A 4/22/2020    Procedure: Coronary Angiogram;  Surgeon: Handy Denise MD;  Location: [facility] CARDIAC CATH LAB     CV INSTANTANEOUS WAVE-FREE RATIO N/A 4/22/2020    Procedure: Instantaneous Wave-Free Ratio;  Surgeon: Handy Denise MD;  Location: [facility]  CARDIAC CATH LAB     CV LEFT VENTRICULOGRAM N/A 4/22/2020    Procedure: Left Ventriculogram;  Surgeon: Handy Denise MD;  Location:  HEART CARDIAC CATH LAB     HC COLONOSCOPY THRU STOMA, DIAGNOSTIC  2002     HC REDUCTION OF LARGE BREAST  2/89     VASCULAR SURGERY  2013    angiogram & left subclavical artery stent     [facility] NONSPECIFIC PROCEDURE  2/89, 1990    liposuction of legs and stomach     [facility] NONSPECIFIC PROCEDURE  1990    Ankle pins removed     Current Outpatient Medications   Medication Sig Dispense Refill     albuterol (PROVENTIL) (2.5 MG/3ML) 0.083% neb solution Take 1 vial (2.5 mg) by nebulization every 6 hours as needed for shortness of breath / dyspnea 1 Box 3     alendronate (FOSAMAX) 70 MG tablet TAKE 1 TABLET EVERY 7 DAYS, TAKE 60 MINUTES BEFORE A.M. MEAL WITH 8 OUNCE WATER, REMAIN UPRIGHT FOR 30 MINUTES 12 tablet 3     amLODIPine (NORVASC) 2.5 MG tablet Take 1 tablet (2.5 mg) by mouth every evening 90 tablet 3     aspirin 81 MG tablet Take 81 mg by mouth daily.       biotin 2.5 mg/mL Take by mouth daily 2000mcg       Boswellia-Glucosamine-Vit D (OSTEO BI-FLEX/5-LOXIN ADVANCED) TABS Take 1,500 mg by mouth 2 times daily        Calcium 9543-9828 MG-UNIT CHEW Take 1 tablet by mouth daily.       Cholecalciferol (VITAMIN D3 PO) Take 2,000 Units by mouth daily        fluticasone (FLONASE) 50 MCG/ACT nasal spray USE 1 TO 2 SPRAYS IN EACH NOSTRIL DAILY 48 g 11     fluticasone-salmeterol (ADVAIR) 250-50 MCG/DOSE inhaler Inhale 1 puff into the lungs every 12 hours 3 Inhaler 3     furosemide (LASIX) 20 MG tablet Take 1 tablet (20 mg) by mouth daily 90 tablet 3     isosorbide mononitrate (IMDUR) 60 MG 24 hr tablet Take 1 tablet (60 mg) by mouth every morning Hold if Systolic Blood Pressure is less than 85. 90 tablet 3     montelukast (SINGULAIR) 10 MG tablet Take 1 tablet (10 mg) by mouth At Bedtime 90 tablet 3     Multiple Vitamins-Minerals (HAIR SKIN NAILS PO) Take 120 mg by mouth daily        "nitroGLYcerin (NITROSTAT) 0.4 MG sublingual tablet One tablet under the tongue every 5 minutes if needed for chest pain. May repeat every 5 minutes for a maximum of 3 doses in 15 minutes\" 25 tablet 3     omega-3 fatty acids (FISH OIL DOUBLE STRENGTH) 1200 MG capsule Take 1 capsule by mouth 2 times daily        omeprazole (PRILOSEC) 20 MG DR capsule TAKE 1 CAPSULE DAILY 90 capsule 2     order for DME Equipment being ordered: Nebulizer. Uses: albuterol (2.5 MG/3ML) 0.083% nebulizer solution- Sig - Route: Take 1 vial (2.5 mg) by nebulization every 6 hours as needed for shortness of breath / dyspnea. 1 Units 0     PROAIR  (90 Base) MCG/ACT inhaler USE 2 INHALATIONS EVERY 6 HOURS AS NEEDED FOR SHORTNESS OF BREATH, DYSPNEA OR WHEEZING 25.5 g 6     Pyridoxine HCl (VITAMIN B6 PO) Take by mouth daily       rosuvastatin (CRESTOR) 40 MG tablet Take 1 tablet (40 mg) by mouth daily 90 tablet 3     spironolactone (ALDACTONE) 25 MG tablet Take 0.5 tablets (12.5 mg) by mouth daily 45 tablet 3       Allergies   Allergen Reactions     Animal Dander      Dust Mites      Kiwi Itching     Itching and red all over     Pollen Extract      Pollen,dust, trees, grass, mold-hayfever symptoms     Seasonal Allergies         Social History     Tobacco Use     Smoking status: Former Smoker     Packs/day: 0.10     Years: 10.00     Pack years: 1.00     Types: Cigarettes     Quit date: 2003     Years since quittin.4     Smokeless tobacco: Never Used   Substance Use Topics     Alcohol use: Yes     Alcohol/week: 0.0 standard drinks     Comment: a couple drinks per year       History   Drug Use No         Objective     LMP  (LMP Unknown)     Physical Exam    GENERAL APPEARANCE: healthy, alert and no distress     EYES: ptosis of lids, PERRL, no redenss     HENT:  mouth without ulcers or lesions     NECK: no adenopathy, no asymmetry, masses, or scars and thyroid normal to palpation     RESP: lungs clear to auscultation -      CV: regular " rates and rhythm,  no murmur, click or rub     ABDOMEN:  soft, nontender, no HSM or masses      MS: extremities normal- no gross deformities noted, no edema     SKIN: no suspicious lesions or rashes     NEURO: Normal strength and tone, gait normal.     PSYCH: mentation appears normal. and affect normal/bright     LYMPHATICS: No cervical adenopathy    Recent Labs   Lab Test 09/24/20  1419 07/17/20  1054 04/22/20  1000   HGB 12.1  --  14.0     --  191   INR  --   --  1.00    141 140   POTASSIUM 3.6 3.7 3.9   CR 0.70 0.70 0.68        Diagnostics:  No labs were ordered during this visit.   No EKG required for low risk surgery (cataract, skin procedure, breast biopsy, etc).    Revised Cardiac Risk Index (RCRI):  The patient has the following serious cardiovascular risks for perioperative complications:   - No serious cardiac risks = 0 points     RCRI Interpretation: 1 point: Class II (low risk - 0.9% complication rate)           Assessment & Plan   The proposed surgical procedure is considered LOW risk.    1. Pre-operative examination      2. Ptosis of both upper eyelids      (I50.32) Diastolic CHF, chronic (H)  Comment:   Currently doesn't appear to be in failure and not having chest pains.  Plan: proceed.        Risks and Recommendations:  The patient has the following additional risks and recommendations for perioperative complications:   - No identified additional risk factors other than previously addressed      Medication Instructions:    Take Isosorbide and Omeprazole on morning of procedure.      RECOMMENDATION:  APPROVAL GIVEN to proceed with proposed procedure, without further diagnostic evaluation.    Signed Electronically by: Feroz Baer MD    Copy of this evaluation report is provided to requesting physician.    [facility] Guidelines    Revised Cardiac Risk Index

## 2020-10-16 NOTE — LETTER
10/16/2020    Elsy Bah MD  [address] [place] [address]    RE: Victoria Montalvo       Dear Colleague,    I had the pleasure of seeing Victoria Montalvo in the [facility].    Cardiology Progress Note          Assessment and Plan:     1. Vasospastic angina, stable    Patient had an exacerbation in the setting of stress and coronavirus infection that she caught from her neighbor.  Her symptoms were in the evening when her Imdur was running out.    Will do amlodipine 2.5 mg in the evenings to supplement.       2. Hypertension, suboptimal control    Added amlodipine as above.  May increase her spironolactone if blood pressure still not well controlled.      3. Coronary artery disease    Continue statin      4. Severe saphenous vein reflux bilaterally    Continue compression and diuretics unless symptoms are intractable.    Follow-up with virtual visit in 6 months    This note was transcribed using electronic voice recognition software and there may be typographical errors present.                Interval History:   The patient is a very pleasant 80 year old whom I have been following for coronary artery disease and vasospasm last cardiac catheterization with no intervenable obstructive disease in April 2020.    Her chest discomfort was pretty well controlled on Imdur 60 mg daily.  However, in September 2020, she was having some evening chest discomfort in the setting of a lot of stress and sought further medical attention.  Troponin was undetectable but she was coronavirus positive.  She recovered without much complication.  Her chest discomfort has also subsequently improved back to her baseline.    She has noticed higher blood pressures in general in the 150s.    She has history of subclavian stenosis status post PCI.  Her arm strength is symmetric on both sides.    She has severe venous reflux bilateral lower extremities but it does not bother her significantly and  "the edema is controlled with medications.                     Review of Systems:   Review of Systems:  Skin:  Positive for     Eyes:  Positive for glasses  ENT:  Negative    Respiratory:  Positive for dyspnea on exertion;sleep apnea  Cardiovascular:    Positive for;chest pain;edema  Gastroenterology: Negative    Genitourinary:  Positive for urinary frequency;nocturia  Musculoskeletal:  Positive for back pain;arthritis  Neurologic:  Positive for numbness or tingling of hands  Psychiatric:  Positive for sleep disturbances  Heme/Lymph/Imm:  Positive for easy bruising  Endocrine:  Positive for thyroid disorder              Physical Exam:     Vitals: BP (!) 152/76 (BP Location: Right arm, Patient Position: Sitting, Cuff Size: Adult Regular)   Pulse 72   Ht 1.549 m (5' 1\")   Wt 77.3 kg (170 lb 8 oz)   LMP  (LMP Unknown)   BMI 32.22 kg/m    Constitutional:  cooperative, alert and oriented, well developed, well nourished, in no acute distress        Skin:  warm and dry to the touch, no apparent skin lesions or masses noted        Head:  normocephalic, no masses or lesions   hair loss, wearing a wig    Eyes:  pupils equal and round;sclera white;no xanthalasma;conjunctivae and lids unremarkable        ENT:  no pallor or cyanosis        Neck:  JVP normal        Chest:  normal breath sounds, clear to auscultation, normal A-P diameter, normal symmetry, normal respiratory excursion, no use of accessory muscles        Cardiac: regular rhythm;normal S1 and S2       systolic murmur;grade 1          Abdomen:  BS normoactive   benign      Extremities and Back:  normal muscle strength and tone;no deformities, clubbing, cyanosis, erythema observed        Neurological:  no gross motor deficits;affect appropriate                 Medications:     Current Outpatient Medications   Medication Sig Dispense Refill     albuterol (PROVENTIL) (2.5 MG/3ML) 0.083% neb solution Take 1 vial (2.5 mg) by nebulization every 6 hours as needed for " "shortness of breath / dyspnea 1 Box 3     alendronate (FOSAMAX) 70 MG tablet TAKE 1 TABLET EVERY 7 DAYS, TAKE 60 MINUTES BEFORE A.M. MEAL WITH 8 OUNCE WATER, REMAIN UPRIGHT FOR 30 MINUTES 12 tablet 3     amLODIPine (NORVASC) 2.5 MG tablet Take 1 tablet (2.5 mg) by mouth every evening 90 tablet 3     aspirin 81 MG tablet Take 81 mg by mouth daily.       biotin 2.5 mg/mL Take by mouth daily 2000mcg       Boswellia-Glucosamine-Vit D (OSTEO BI-FLEX/5-LOXIN ADVANCED) TABS Take 1,500 mg by mouth 2 times daily        Calcium 0684-1083 MG-UNIT CHEW Take 1 tablet by mouth daily.       Cholecalciferol (VITAMIN D3 PO) Take 2,000 Units by mouth daily        fluticasone (FLONASE) 50 MCG/ACT nasal spray USE 1 TO 2 SPRAYS IN EACH NOSTRIL DAILY 48 g 11     fluticasone-salmeterol (ADVAIR) 250-50 MCG/DOSE inhaler Inhale 1 puff into the lungs every 12 hours 3 Inhaler 3     furosemide (LASIX) 20 MG tablet Take 1 tablet (20 mg) by mouth daily 90 tablet 3     isosorbide mononitrate (IMDUR) 60 MG 24 hr tablet Take 1 tablet (60 mg) by mouth every morning Hold if Systolic Blood Pressure is less than 85. 90 tablet 3     montelukast (SINGULAIR) 10 MG tablet Take 1 tablet (10 mg) by mouth At Bedtime 90 tablet 3     Multiple Vitamins-Minerals (HAIR SKIN NAILS PO) Take 120 mg by mouth daily       nitroGLYcerin (NITROSTAT) 0.4 MG sublingual tablet One tablet under the tongue every 5 minutes if needed for chest pain. May repeat every 5 minutes for a maximum of 3 doses in 15 minutes\" 25 tablet 3     omega-3 fatty acids (FISH OIL DOUBLE STRENGTH) 1200 MG capsule Take 1 capsule by mouth 2 times daily        omeprazole (PRILOSEC) 20 MG DR capsule TAKE 1 CAPSULE DAILY 90 capsule 2     order for DME Equipment being ordered: Nebulizer. Uses: albuterol (2.5 MG/3ML) 0.083% nebulizer solution- Sig - Route: Take 1 vial (2.5 mg) by nebulization every 6 hours as needed for shortness of breath / dyspnea. 1 Units 0     PROAIR  (90 Base) MCG/ACT inhaler " USE 2 INHALATIONS EVERY 6 HOURS AS NEEDED FOR SHORTNESS OF BREATH, DYSPNEA OR WHEEZING 25.5 g 6     Pyridoxine HCl (VITAMIN B6 PO) Take by mouth daily       rosuvastatin (CRESTOR) 40 MG tablet Take 1 tablet (40 mg) by mouth daily 90 tablet 3     spironolactone (ALDACTONE) 25 MG tablet Take 0.5 tablets (12.5 mg) by mouth daily 45 tablet 3                Data:   All laboratory data reviewed  No results found for this or any previous visit (from the past 24 hour(s)).    All laboratory data reviewed  Lab Results   Component Value Date    CHOL 167 07/17/2020     Lab Results   Component Value Date    HDL 72 07/17/2020     Lab Results   Component Value Date    LDL 82 07/17/2020     Lab Results   Component Value Date    TRIG 65 07/17/2020     Lab Results   Component Value Date    CHOLHDLRATIO 2.5 08/20/2014     TSH   Date Value Ref Range Status   07/17/2020 0.84 0.40 - 4.00 mU/L Final     Last Basic Metabolic Panel:  Lab Results   Component Value Date     09/24/2020      Lab Results   Component Value Date    POTASSIUM 3.6 09/24/2020     Lab Results   Component Value Date    CHLORIDE 112 09/24/2020     Lab Results   Component Value Date    PHILLIP 8.6 09/24/2020     Lab Results   Component Value Date    CO2 25 09/24/2020     Lab Results   Component Value Date    BUN 11 09/24/2020     Lab Results   Component Value Date    CR 0.70 09/24/2020     Lab Results   Component Value Date     09/24/2020     Lab Results   Component Value Date    WBC 5.4 09/24/2020     Lab Results   Component Value Date    RBC 4.15 09/24/2020     Lab Results   Component Value Date    HGB 12.1 09/24/2020     Lab Results   Component Value Date    HCT 37.6 09/24/2020     Lab Results   Component Value Date    MCV 91 09/24/2020     Lab Results   Component Value Date    MCH 29.2 09/24/2020     Lab Results   Component Value Date    MCHC 32.2 09/24/2020     Lab Results   Component Value Date    RDW 13.5 09/24/2020     Lab Results   Component Value Date      09/24/2020       Thank you for allowing me to participate in the care of your patient.    Sincerely,     Yogi Yepez MD     [facility]

## 2020-10-16 NOTE — PROGRESS NOTES
"SUBJECTIVE:   Victoria Montalvo is a 80 year old female who presents for Preventive Visit.    {Split Bill scripting  The purpose of this visit is to discuss your medical history and prevent health problems before you are sick. You may be responsible for a co-pay, coinsurance, or deductible if your visit today includes services such as checking on a sore throat, having an x-ray or lab test, or treating and evaluating a new or existing condition :237865}  Patient has been advised of split billing requirements and indicates understanding: {Yes and No:954758}   Are you in the first 12 months of your Medicare coverage?  { :981494::\"No\"}    HPI  Do you feel safe in your environment? { :009347}    Have you ever done Advance Care Planning? (For example, a Health Directive, POLST, or a discussion with a medical provider or your loved ones about your wishes): { :928424}    {Hearing Test Done (Optional):811030}  Fall risk  { :085087}  {If any of the above assessments are answered yes, consider ordering appropriate referrals (Optional):144110::\"click delete button to remove this line now\"}  Cognitive Screening { :913568}    {Do you have sleep apnea, excessive snoring or daytime drowsiness? (Optional):999484}    Reviewed and updated as needed this visit by clinical staff                 Reviewed and updated as needed this visit by Provider                Social History     Tobacco Use     Smoking status: Former Smoker     Packs/day: 0.10     Years: 10.00     Pack years: 1.00     Types: Cigarettes     Quit date: 2003     Years since quittin.4     Smokeless tobacco: Never Used   Substance Use Topics     Alcohol use: Yes     Alcohol/week: 0.0 standard drinks     Comment: a couple drinks per year     {Rooming Staff- Complete this question if Prescreen response is not shown below for today's visit. If you drink alcohol do you typically have >3 drinks per day or >7 drinks per week? (Optional):924943}    Alcohol Use 2020 "   Prescreen: >3 drinks/day or >7 drinks/week? -   Prescreen: >3 drinks/day or >7 drinks/week? { AUDIT responses all:TXT,73194}   {add AUDIT responses (Optional) (A score of 7 for adult men is an indication of hazardous drinking; a score of 8 or more is an indication of an alcohol use disorder.  A score of 7 or more for adult women is an indication of hazardous drinking or an alchohol use disorder):904985}    {Outside tests to abstract? :861180}    {additional problems to add (Optional):870192}    Current providers sharing in care for this patient include: {Rooming staff:  Please update Care Team in Rooming Activity, refresh this note and then delete this statement}  Patient Care Team:  Elsy Bah MD as PCP - General (Internal Medicine)  Aron Torres MD as MD (Orthopedics)  Elsy Bah MD as Assigned PCP    The following health maintenance items are reviewed in Epic and correct as of today:  Health Maintenance   Topic Date Due     ZOSTER IMMUNIZATION (1 of 2) 06/15/1989     DTAP/TDAP/TD IMMUNIZATION (2 - Td) 04/14/2017     ASTHMA ACTION PLAN  10/05/2017     ASTHMA CONTROL TEST  07/09/2019     COLORECTAL CANCER SCREENING  02/17/2020     HF ACTION PLAN  08/04/2020     INFLUENZA VACCINE (1) 09/01/2020     BMP  03/24/2021     MEDICARE ANNUAL WELLNESS VISIT  07/17/2021     ALT  07/17/2021     LIPID  07/17/2021     ANNUAL REVIEW OF HM ORDERS  07/17/2021     FALL RISK ASSESSMENT  07/17/2021     CBC  09/24/2021     ADVANCE CARE PLANNING  07/17/2025     DEXA  Completed     TSH W/FREE T4 REFLEX  Completed     PHQ-2  Completed     Pneumococcal Vaccine: 65+ Years  Completed     Pneumococcal Vaccine: Pediatrics (0 to 5 Years) and At-Risk Patients (6 to 64 Years)  Aged Out     IPV IMMUNIZATION  Aged Out     MENINGITIS IMMUNIZATION  Aged Out     HEPATITIS B IMMUNIZATION  Aged Out     {Chronicprobdata (optional):105792}  {Decision Support (Optional):085767}    Review of Systems  {ROS COMP  "(Optional):511298}    OBJECTIVE:   LMP  (LMP Unknown)  Estimated body mass index is 32.22 kg/m  as calculated from the following:    Height as of an earlier encounter on 10/16/20: 1.549 m (5' 1\").    Weight as of an earlier encounter on 10/16/20: 77.3 kg (170 lb 8 oz).  Physical Exam  {Exam (Optional) :813706}    {Diagnostic Test Results (Optional):367834::\"Diagnostic Test Results:\",\"Labs reviewed in Epic\"}    ASSESSMENT / PLAN:   {Dia Picklist:735477}    Patient has been advised of split billing requirements and indicates understanding: {YES / NO:506825::\"Yes\"}  COUNSELING:  {Medicare Counselin}    Estimated body mass index is 32.22 kg/m  as calculated from the following:    Height as of an earlier encounter on 10/16/20: 1.549 m (5' 1\").    Weight as of an earlier encounter on 10/16/20: 77.3 kg (170 lb 8 oz).    {Weight Management Plan (ACO) Complete if BMI is abnormal-  Ages 18-64  BMI >24.9.  Age 65+ with BMI <23 or >30 (Optional):154366}    She reports that she quit smoking about 17 years ago. Her smoking use included cigarettes. She has a 1.00 pack-year smoking history. She has never used smokeless tobacco.      Appropriate preventive services were discussed with this patient, including applicable screening as appropriate for cardiovascular disease, diabetes, osteopenia/osteoporosis, and glaucoma.  As appropriate for age/gender, discussed screening for colorectal cancer, prostate cancer, breast cancer, and cervical cancer. Checklist reviewing preventive services available has been given to the patient.    Reviewed patients plan of care and provided an AVS. The {CarePlan:484594} for  meets the Care Plan requirement. This Care Plan has been established and reviewed with the {PATIENT, FAMILY MEMBER, CAREGIVER:841608}.    Counseling Resources:  ATP IV Guidelines  Pooled Cohorts Equation Calculator  Breast Cancer Risk Calculator  Breast Cancer: Medication to Reduce Risk  FRAX Risk Assessment  ICSI " Preventive Guidelines  Dietary Guidelines for Americans, 2010  USDA's MyPlate  ASA Prophylaxis  Lung CA Screening    Feroz Baer MD  [facility]    Identified Health Risks:

## 2020-10-16 NOTE — PATIENT INSTRUCTIONS
Patient Education   Personalized Prevention Plan  You are due for the preventive services outlined below.  Your care team is available to assist you in scheduling these services.  If you have already completed any of these items, please share that information with your care team to update in your medical record.  Health Maintenance Due   Topic Date Due     Zoster (Shingles) Vaccine (1 of 2) 06/15/1989     Diptheria Tetanus Pertussis (DTAP/TDAP/TD) Vaccine (2 - Td) 04/14/2017     Asthma Action Plan - yearly  10/05/2017     Asthma Control Test  07/09/2019     Colorectal Cancer Screening  02/17/2020     Heart Failure Action Plan  08/04/2020     Flu Vaccine (1) 09/01/2020

## 2021-01-15 ENCOUNTER — HEALTH MAINTENANCE LETTER (OUTPATIENT)
Age: 81
End: 2021-01-15

## 2021-01-29 ENCOUNTER — IMMUNIZATION (OUTPATIENT)
Dept: NURSING | Facility: CLINIC | Age: 81
End: 2021-01-29
Payer: COMMERCIAL

## 2021-01-29 PROCEDURE — 91300 PR COVID VAC PFIZER DIL RECON 30 MCG/0.3 ML IM: CPT

## 2021-01-29 PROCEDURE — 0001A PR COVID VAC PFIZER DIL RECON 30 MCG/0.3 ML IM: CPT

## 2021-02-19 ENCOUNTER — IMMUNIZATION (OUTPATIENT)
Dept: NURSING | Facility: CLINIC | Age: 81
End: 2021-02-19
Attending: PEDIATRICS
Payer: COMMERCIAL

## 2021-02-19 PROCEDURE — 91300 PR COVID VAC PFIZER DIL RECON 30 MCG/0.3 ML IM: CPT

## 2021-02-19 PROCEDURE — 0002A PR COVID VAC PFIZER DIL RECON 30 MCG/0.3 ML IM: CPT

## 2021-02-27 NOTE — PATIENT INSTRUCTIONS
Try taking the SPIRONOLACTONE on Monday, Wednesday and Friday to see if we can avoid some of the dehydration.  If your legs swell up too much or if the blood pressure goes up too much, then go back to daily dose.  Please try the compression stockings again.  We will do an ultrasound of the legs to see if leaky veins are contributing.   Never

## 2021-03-24 DIAGNOSIS — I25.10 CORONARY ARTERY DISEASE INVOLVING NATIVE CORONARY ARTERY OF NATIVE HEART WITHOUT ANGINA PECTORIS: ICD-10-CM

## 2021-03-24 RX ORDER — SPIRONOLACTONE 25 MG/1
12.5 TABLET ORAL DAILY
Qty: 45 TABLET | Refills: 1 | Status: SHIPPED | OUTPATIENT
Start: 2021-03-24 | End: 2021-07-12

## 2021-04-01 NOTE — TELEPHONE ENCOUNTER
"Requested Prescriptions   Pending Prescriptions Disp Refills     alendronate (FOSAMAX) 70 MG tablet [Pharmacy Med Name: ALENDRONATE SOD TABS 4'S 70MG]    Last Written Prescription Date:  10/30/2017  Last Fill Quantity: 12,  # refills: 2   Last office visit: 3/5/2018 with prescribing provider:  Deisi Liu     Future Office Visit:     12 tablet 2     Sig: TAKE 1 TABLET EVERY 7 DAYS, TAKE 60 MINUTES BEFORE A.M. MEAL WITH 8 OUNCE WATER, REMAIN UPRIGHT FOR 30 MINUTES    Bisphosphonates Passed    6/22/2018  2:25 AM       Passed - Recent (12 mo) or future (30 days) visit within the authorizing provider's specialty    Patient had office visit in the last 12 months or has a visit in the next 30 days with authorizing provider or within the authorizing provider's specialty.  See \"Patient Info\" tab in inbasket, or \"Choose Columns\" in Meds & Orders section of the refill encounter.           Passed - Dexa on file within past 2 years    Please review last Dexa result.          Passed - Patient is age 18 or older       Passed - Normal serum creatinine on file within past 12 months    Recent Labs   Lab Test  02/27/18   0757   CR  0.68               " no

## 2021-04-28 ENCOUNTER — TELEPHONE (OUTPATIENT)
Dept: PEDIATRICS | Facility: CLINIC | Age: 81
End: 2021-04-28

## 2021-04-28 DIAGNOSIS — J45.50 SEVERE PERSISTENT ASTHMA WITHOUT COMPLICATION (H): Primary | ICD-10-CM

## 2021-04-28 NOTE — TELEPHONE ENCOUNTER
Patient Quality Outreach      Summary:    Patient has the following on her problem list/HM:     Asthma review       ACT Total Scores 1/9/2019   ACT TOTAL SCORE -   ASTHMA ER VISITS -   ASTHMA HOSPITALIZATIONS -   ACT TOTAL SCORE (Goal Greater than or Equal to 20) 13   In the past 12 months, how many times did you visit the emergency room for your asthma without being admitted to the hospital? 0   In the past 12 months, how many times were you hospitalized overnight because of your asthma? 0          Patient is due/failing the following:   ACT needed and AAP    Type of outreach:    [facility] message.    Questions for provider review:    None                                                                                                                                     MITCH JACOBSEN MA on 4/28/2021 at 3:47 PM

## 2021-04-28 NOTE — LETTER
My Asthma Action Plan    Name: Victoria Montalvo   YOB: 1940  Date: 4/28/2021   My doctor: Elsy Bah MD   My clinic: [facility]        My Control Medicine: Fluticasone propionate + salmeterol (Advair Diskus or Wixela Inhub) -  250/50 mcg 1 puff twice daily  Montelukast (Singulair) -  10 mg 1 tab daily  My Rescue Medicine: Albuterol nebulizer solution 1 neb as needed every 6 hours for shortness of breath or wheezing  Albuterol (Proair/Ventolin/Proventil HFA) 2-4 puffs EVERY 4 HOURS as needed. Use a spacer if recommended by your provider.   My Asthma Severity:   Moderate Persistent  Know your asthma triggers:   dust mites  change of seasons, animals, stress            GREEN ZONE   Good Control    I feel good    No cough or wheeze    Can work, sleep and play without asthma symptoms       Take your asthma control medicine every day.     1. If exercise triggers your asthma, take your rescue medication    15 minutes before exercise or sports, and    During exercise if you have asthma symptoms  2. Spacer to use with inhaler: If you have a spacer, make sure to use it with your inhaler             YELLOW ZONE Getting Worse  I have ANY of these:    I do not feel good    Cough or wheeze    Chest feels tight    Wake up at night   1. Keep taking your Green Zone medications  2. Start taking your rescue medicine:    every 20 minutes for up to 1 hour. Then every 4 hours for 24-48 hours.  3. If you stay in the Yellow Zone for more than 12-24 hours, contact your doctor.  4. If you do not return to the Green Zone in 12-24 hours or you get worse, start taking your oral steroid medicine if prescribed by your provider.           RED ZONE Medical Alert - Get Help  I have ANY of these:    I feel awful    Medicine is not helping    Breathing getting harder    Trouble walking or talking    Nose opens wide to breathe       1. Take your rescue medicine NOW  2. If your provider has prescribed an oral  steroid medicine, start taking it NOW  3. Call your doctor NOW  4. If you are still in the Red Zone after 20 minutes and you have not reached your doctor:    Take your rescue medicine again and    Call 911 or go to the emergency room right away    See your regular doctor within 2 weeks of an Emergency Room or Urgent Care visit for follow-up treatment.          Annual Reminders:  Meet with Asthma Educator,  Flu Shot in the Fall, consider Pneumonia Vaccination for patients with asthma (aged 19 and older).    Pharmacy:    [facility]  [facility] - [address]    Electronically signed by Elsy Bah MD   Date: 04/28/21                      Asthma Triggers  How To Control Things That Make Your Asthma Worse    Triggers are things that make your asthma worse.  Look at the list below to help you find your triggers and what you can do about them.  You can help prevent asthma flare-ups by staying away from your triggers.      Trigger                                                          What you can do   Cigarette Smoke  Tobacco smoke can make asthma worse. Do not allow smoking in your home, car or around you.  Be sure no one smokes at a child s day care or school.  If you smoke, ask your health care provider for ways to help you quit.  Ask family members to quit too.  Ask your health care provider for a referral to Quit Plan to help you quit smoking, or call 5-876-142-PLAN.     Colds, Flu, Bronchitis  These are common triggers of asthma. Wash your hands often.  Don t touch your eyes, nose or mouth.  Get a flu shot every year.     Dust Mites  These are tiny bugs that live in cloth or carpet. They are too small to see. Wash sheets and blankets in hot water every week.   Encase pillows and mattress in dust mite proof covers.  Avoid having carpet if you can. If you have carpet, vacuum weekly.   Use a dust mask and HEPA vacuum.   Pollen and Outdoor Mold  Some  people are allergic to trees, grass, or weed pollen, or molds. Try to keep your windows closed.  Limit time out doors when pollen count is high.   Ask you health care provider about taking medicine during allergy season.     Animal Dander  Some people are allergic to skin flakes, urine or saliva from pets with fur or feathers. Keep pets with fur or feathers out of your home.    If you can t keep the pet outdoors, then keep the pet out of your bedroom.  Keep the bedroom door closed.  Keep pets off cloth furniture and away from stuffed toys.     Mice, Rats, and Cockroaches   Some people are allergic to the waste from these pests.   Cover food and garbage.  Clean up spills and food crumbs.  Store grease in the refrigerator.   Keep food out of the bedroom.   Indoor Mold  This can be a trigger if your home has high moisture. Fix leaking faucets, pipes, or other sources of water.   Clean moldy surfaces.  Dehumidify basement if it is damp and smelly.   Smoke, Strong Odors, and Sprays  These can reduce air quality. Stay away from strong odors and sprays, such as perfume, powder, hair spray, paints, smoke incense, paint, cleaning products, candles and new carpet.   Exercise or Sports  Some people with asthma have this trigger. Be active!  Ask your doctor about taking medicine before sports or exercise to prevent symptoms.    Warm up for 5-10 minutes before and after sports or exercise.     Other Triggers of Asthma  Cold air:  Cover your nose and mouth with a scarf.  Sometimes laughing or crying can be a trigger.  Some medicines and food can trigger asthma.

## 2021-04-28 NOTE — TELEPHONE ENCOUNTER
We received fax from [employer] regarding patient's request for cpap supplies. I contacted [employer] to clarify if they are able to upload from patient's machine.  that I spoke to ( [phone]) stated that this fax was sent to clinic in error and to disregard fax.    MITCH JACOBSEN MA on 4/28/2021 at 4:03 PM

## 2021-06-08 ENCOUNTER — TELEPHONE (OUTPATIENT)
Dept: PEDIATRICS | Facility: CLINIC | Age: 81
End: 2021-06-08

## 2021-06-08 DIAGNOSIS — I10 BENIGN ESSENTIAL HYPERTENSION: ICD-10-CM

## 2021-06-08 DIAGNOSIS — E05.90 SUBCLINICAL HYPERTHYROIDISM: ICD-10-CM

## 2021-06-08 DIAGNOSIS — E78.5 HYPERLIPIDEMIA LDL GOAL <130: Primary | ICD-10-CM

## 2021-06-08 DIAGNOSIS — I10 HYPERTENSION: ICD-10-CM

## 2021-06-08 DIAGNOSIS — I25.10 MILD CORONARY ARTERY DISEASE: ICD-10-CM

## 2021-06-29 ENCOUNTER — OFFICE VISIT (OUTPATIENT)
Dept: CARDIOLOGY | Facility: CLINIC | Age: 81
End: 2021-06-29
Attending: INTERNAL MEDICINE
Payer: COMMERCIAL

## 2021-06-29 VITALS
HEART RATE: 89 BPM | DIASTOLIC BLOOD PRESSURE: 76 MMHG | HEIGHT: 61 IN | WEIGHT: 178 LBS | SYSTOLIC BLOOD PRESSURE: 134 MMHG | BODY MASS INDEX: 33.61 KG/M2 | OXYGEN SATURATION: 94 %

## 2021-06-29 DIAGNOSIS — Z11.59 ENCOUNTER FOR SCREENING FOR OTHER VIRAL DISEASES: ICD-10-CM

## 2021-06-29 DIAGNOSIS — R07.9 EXERTIONAL CHEST PAIN: Primary | ICD-10-CM

## 2021-06-29 DIAGNOSIS — R93.1 ABNORMAL FINDINGS ON DIAGNOSTIC IMAGING OF HEART AND CORONARY CIRCULATION: ICD-10-CM

## 2021-06-29 DIAGNOSIS — I20.1 VASOSPASTIC ANGINA (H): ICD-10-CM

## 2021-06-29 LAB
LABORATORY COMMENT REPORT: NORMAL
SARS-COV-2 RNA RESP QL NAA+PROBE: NEGATIVE
SARS-COV-2 RNA RESP QL NAA+PROBE: NORMAL
SPECIMEN SOURCE: NORMAL
SPECIMEN SOURCE: NORMAL

## 2021-06-29 PROCEDURE — U0003 INFECTIOUS AGENT DETECTION BY NUCLEIC ACID (DNA OR RNA); SEVERE ACUTE RESPIRATORY SYNDROME CORONAVIRUS 2 (SARS-COV-2) (CORONAVIRUS DISEASE [COVID-19]), AMPLIFIED PROBE TECHNIQUE, MAKING USE OF HIGH THROUGHPUT TECHNOLOGIES AS DESCRIBED BY CMS-2020-01-R: HCPCS | Performed by: INTERNAL MEDICINE

## 2021-06-29 PROCEDURE — 99214 OFFICE O/P EST MOD 30 MIN: CPT | Performed by: INTERNAL MEDICINE

## 2021-06-29 PROCEDURE — U0005 INFEC AGEN DETEC AMPLI PROBE: HCPCS | Performed by: INTERNAL MEDICINE

## 2021-06-29 ASSESSMENT — MIFFLIN-ST. JEOR: SCORE: 1204.78

## 2021-06-29 NOTE — PROGRESS NOTES
"Cardiology Progress Note          Assessment and Plan:       1. Increased angina, concern for obstructive coronary artery disease.    Patient had documented spasm on cardiac catheterization 4/22/2020.  She states that her symptoms are escalating despite good medical regimen and is now more exertional.    Recommended cardiac catheterization and will try to get that done this week.  If she has recurrent chest discomfort not relieved by nitroglycerin she should present to the emergency room without delay.    I did offer her admission to the hospital today, but she declined stating that she had to take care of her  and her dog that had a tick.    30 minutes was spent with the patient, precharting and reviewing tests as well as post visit charting all done today..    This note was transcribed using electronic voice recognition software and there may be typographical errors present.                    Interval History:     The patient is a very pleasant 81 year old whom I have followed for coronary artery disease and vasospasm.  She states that she is getting more exertional dyspnea with left arm discomfort that is more intense than her typical vasospasm.  She feels some palpitations during this.                     Review of Systems:     Review of Systems:  Skin:  not assessed     Eyes:  not assessed    ENT:  not assessed    Respiratory:    shortness of breath;dyspnea on exertion;dyspnea at rest;cough;wheezing;sleep apnea  Cardiovascular:    chest pain;Positive for;edema;fatigue;dizziness;lightheadedness  Gastroenterology: Positive for heartburn  Genitourinary:  Positive for urinary frequency  Musculoskeletal:  Negative    Neurologic:  Positive for numbness or tingling of hands  Psychiatric:  not assessed    Heme/Lymph/Imm:       Endocrine:                   Physical Exam:     Vitals: /76 (BP Location: Right arm, Patient Position: Sitting, Cuff Size: Adult Regular)   Pulse 89   Ht 1.549 m (5' 1\")   Wt " 80.7 kg (178 lb)   LMP  (LMP Unknown)   SpO2 94%   BMI 33.63 kg/m    Constitutional:  cooperative, alert and oriented, well developed, well nourished, in no acute distress        Skin:  warm and dry to the touch, no apparent skin lesions or masses noted        Head:  normocephalic, no masses or lesions        Eyes:  pupils equal and round;sclera white;no xanthalasma;conjunctivae and lids unremarkable        Chest:  normal symmetry        Cardiac: regular rhythm;normal S1 and S2       systolic murmur;grade 1          Extremities and Back:  normal muscle strength and tone;no deformities, clubbing, cyanosis, erythema observed        Neurological:  no gross motor deficits;affect appropriate                 Medications:     Current Outpatient Medications   Medication Sig Dispense Refill     albuterol (PROVENTIL) (2.5 MG/3ML) 0.083% neb solution Take 1 vial (2.5 mg) by nebulization every 6 hours as needed for shortness of breath / dyspnea 1 Box 3     alendronate (FOSAMAX) 70 MG tablet TAKE 1 TABLET EVERY 7 DAYS, TAKE 60 MINUTES BEFORE A.M. MEAL WITH 8 OUNCE WATER, REMAIN UPRIGHT FOR 30 MINUTES 12 tablet 3     amLODIPine (NORVASC) 2.5 MG tablet Take 1 tablet (2.5 mg) by mouth every evening 90 tablet 3     aspirin 81 MG tablet Take 81 mg by mouth daily.       biotin 2.5 mg/mL Take by mouth daily 2000mcg       Boswellia-Glucosamine-Vit D (OSTEO BI-FLEX/5-LOXIN ADVANCED) TABS Take 1,500 mg by mouth 2 times daily        Calcium 0096-9482 MG-UNIT CHEW Take 1 tablet by mouth daily.       Cholecalciferol (VITAMIN D3 PO) Take 2,000 Units by mouth daily        fluticasone (FLONASE) 50 MCG/ACT nasal spray USE 1 TO 2 SPRAYS IN EACH NOSTRIL DAILY 48 g 11     fluticasone-salmeterol (ADVAIR) 250-50 MCG/DOSE inhaler Inhale 1 puff into the lungs every 12 hours 3 Inhaler 3     isosorbide mononitrate (IMDUR) 60 MG 24 hr tablet Take 1 tablet (60 mg) by mouth every morning Hold if Systolic Blood Pressure is less than 85. 90 tablet 3      "montelukast (SINGULAIR) 10 MG tablet Take 1 tablet (10 mg) by mouth At Bedtime 90 tablet 3     Multiple Vitamins-Minerals (HAIR SKIN NAILS PO) Take 120 mg by mouth daily       nitroGLYcerin (NITROSTAT) 0.4 MG sublingual tablet One tablet under the tongue every 5 minutes if needed for chest pain. May repeat every 5 minutes for a maximum of 3 doses in 15 minutes\" 25 tablet 3     omega-3 fatty acids (FISH OIL DOUBLE STRENGTH) 1200 MG capsule Take 1 capsule by mouth 2 times daily        omeprazole (PRILOSEC) 20 MG DR capsule TAKE 1 CAPSULE DAILY 90 capsule 2     PROAIR  (90 Base) MCG/ACT inhaler USE 2 INHALATIONS EVERY 6 HOURS AS NEEDED FOR SHORTNESS OF BREATH, DYSPNEA OR WHEEZING 25.5 g 6     Pyridoxine HCl (VITAMIN B6 PO) Take by mouth daily       rosuvastatin (CRESTOR) 40 MG tablet TAKE 1 TABLET DAILY 90 tablet 0     furosemide (LASIX) 20 MG tablet Take 1 tablet (20 mg) by mouth daily (Patient not taking: Reported on 6/29/2021) 90 tablet 3     order for DME Equipment being ordered: Nebulizer. Uses: albuterol (2.5 MG/3ML) 0.083% nebulizer solution- Sig - Route: Take 1 vial (2.5 mg) by nebulization every 6 hours as needed for shortness of breath / dyspnea. 1 Units 0     spironolactone (ALDACTONE) 25 MG tablet Take 0.5 tablets (12.5 mg) by mouth daily (Patient not taking: Reported on 6/29/2021) 45 tablet 1                Data:   All laboratory data reviewed  No results found for this or any previous visit (from the past 24 hour(s)).    All laboratory data reviewed  Lab Results   Component Value Date    CHOL 167 07/17/2020     Lab Results   Component Value Date    HDL 72 07/17/2020     Lab Results   Component Value Date    LDL 82 07/17/2020     Lab Results   Component Value Date    TRIG 65 07/17/2020     Lab Results   Component Value Date    CHOLHDLRATIO 2.5 08/20/2014     TSH   Date Value Ref Range Status   07/17/2020 0.84 0.40 - 4.00 mU/L Final     Last Basic Metabolic Panel:  Lab Results   Component Value Date "     09/24/2020      Lab Results   Component Value Date    POTASSIUM 3.6 09/24/2020     Lab Results   Component Value Date    CHLORIDE 112 09/24/2020     Lab Results   Component Value Date    PHILLIP 8.6 09/24/2020     Lab Results   Component Value Date    CO2 25 09/24/2020     Lab Results   Component Value Date    BUN 11 09/24/2020     Lab Results   Component Value Date    CR 0.70 09/24/2020     Lab Results   Component Value Date     09/24/2020     Lab Results   Component Value Date    WBC 5.4 09/24/2020     Lab Results   Component Value Date    RBC 4.15 09/24/2020     Lab Results   Component Value Date    HGB 12.1 09/24/2020     Lab Results   Component Value Date    HCT 37.6 09/24/2020     Lab Results   Component Value Date    MCV 91 09/24/2020     Lab Results   Component Value Date    MCH 29.2 09/24/2020     Lab Results   Component Value Date    MCHC 32.2 09/24/2020     Lab Results   Component Value Date    RDW 13.5 09/24/2020     Lab Results   Component Value Date     09/24/2020

## 2021-06-29 NOTE — LETTER
6/29/2021    Elsy Bah MD  [address] [place] [address]    RE: Victoria Montalvo       Dear Colleague,    I had the pleasure of seeing Victoria Montalvo in the [facility].    Cardiology Progress Note          Assessment and Plan:       1. Increased angina, concern for obstructive coronary artery disease.    Patient had documented spasm on cardiac catheterization 4/22/2020.  She states that her symptoms are escalating despite good medical regimen and is now more exertional.    Recommended cardiac catheterization and will try to get that done this week.  If she has recurrent chest discomfort not relieved by nitroglycerin she should present to the emergency room without delay.    I did offer her admission to the hospital today, but she declined stating that she had to take care of her  and her dog that had a tick.    30 minutes was spent with the patient, precharting and reviewing tests as well as post visit charting all done today..    This note was transcribed using electronic voice recognition software and there may be typographical errors present.                    Interval History:     The patient is a very pleasant 81 year old whom I have followed for coronary artery disease and vasospasm.  She states that she is getting more exertional dyspnea with left arm discomfort that is more intense than her typical vasospasm.  She feels some palpitations during this.                     Review of Systems:     Review of Systems:  Skin:  not assessed     Eyes:  not assessed    ENT:  not assessed    Respiratory:    shortness of breath;dyspnea on exertion;dyspnea at rest;cough;wheezing;sleep apnea  Cardiovascular:    chest pain;Positive for;edema;fatigue;dizziness;lightheadedness  Gastroenterology: Positive for heartburn  Genitourinary:  Positive for urinary frequency  Musculoskeletal:  Negative    Neurologic:  Positive for numbness or tingling  "of hands  Psychiatric:  not assessed    Heme/Lymph/Imm:       Endocrine:                   Physical Exam:     Vitals: /76 (BP Location: Right arm, Patient Position: Sitting, Cuff Size: Adult Regular)   Pulse 89   Ht 1.549 m (5' 1\")   Wt 80.7 kg (178 lb)   LMP  (LMP Unknown)   SpO2 94%   BMI 33.63 kg/m    Constitutional:  cooperative, alert and oriented, well developed, well nourished, in no acute distress        Skin:  warm and dry to the touch, no apparent skin lesions or masses noted        Head:  normocephalic, no masses or lesions        Eyes:  pupils equal and round;sclera white;no xanthalasma;conjunctivae and lids unremarkable        Chest:  normal symmetry        Cardiac: regular rhythm;normal S1 and S2       systolic murmur;grade 1          Extremities and Back:  normal muscle strength and tone;no deformities, clubbing, cyanosis, erythema observed        Neurological:  no gross motor deficits;affect appropriate                 Medications:     Current Outpatient Medications   Medication Sig Dispense Refill     albuterol (PROVENTIL) (2.5 MG/3ML) 0.083% neb solution Take 1 vial (2.5 mg) by nebulization every 6 hours as needed for shortness of breath / dyspnea 1 Box 3     alendronate (FOSAMAX) 70 MG tablet TAKE 1 TABLET EVERY 7 DAYS, TAKE 60 MINUTES BEFORE A.M. MEAL WITH 8 OUNCE WATER, REMAIN UPRIGHT FOR 30 MINUTES 12 tablet 3     amLODIPine (NORVASC) 2.5 MG tablet Take 1 tablet (2.5 mg) by mouth every evening 90 tablet 3     aspirin 81 MG tablet Take 81 mg by mouth daily.       biotin 2.5 mg/mL Take by mouth daily 2000mcg       Boswellia-Glucosamine-Vit D (OSTEO BI-FLEX/5-LOXIN ADVANCED) TABS Take 1,500 mg by mouth 2 times daily        Calcium 7183-5787 MG-UNIT CHEW Take 1 tablet by mouth daily.       Cholecalciferol (VITAMIN D3 PO) Take 2,000 Units by mouth daily        fluticasone (FLONASE) 50 MCG/ACT nasal spray USE 1 TO 2 SPRAYS IN EACH NOSTRIL DAILY 48 g 11     fluticasone-salmeterol (ADVAIR) " "250-50 MCG/DOSE inhaler Inhale 1 puff into the lungs every 12 hours 3 Inhaler 3     isosorbide mononitrate (IMDUR) 60 MG 24 hr tablet Take 1 tablet (60 mg) by mouth every morning Hold if Systolic Blood Pressure is less than 85. 90 tablet 3     montelukast (SINGULAIR) 10 MG tablet Take 1 tablet (10 mg) by mouth At Bedtime 90 tablet 3     Multiple Vitamins-Minerals (HAIR SKIN NAILS PO) Take 120 mg by mouth daily       nitroGLYcerin (NITROSTAT) 0.4 MG sublingual tablet One tablet under the tongue every 5 minutes if needed for chest pain. May repeat every 5 minutes for a maximum of 3 doses in 15 minutes\" 25 tablet 3     omega-3 fatty acids (FISH OIL DOUBLE STRENGTH) 1200 MG capsule Take 1 capsule by mouth 2 times daily        omeprazole (PRILOSEC) 20 MG DR capsule TAKE 1 CAPSULE DAILY 90 capsule 2     PROAIR  (90 Base) MCG/ACT inhaler USE 2 INHALATIONS EVERY 6 HOURS AS NEEDED FOR SHORTNESS OF BREATH, DYSPNEA OR WHEEZING 25.5 g 6     Pyridoxine HCl (VITAMIN B6 PO) Take by mouth daily       rosuvastatin (CRESTOR) 40 MG tablet TAKE 1 TABLET DAILY 90 tablet 0     furosemide (LASIX) 20 MG tablet Take 1 tablet (20 mg) by mouth daily (Patient not taking: Reported on 6/29/2021) 90 tablet 3     order for DME Equipment being ordered: Nebulizer. Uses: albuterol (2.5 MG/3ML) 0.083% nebulizer solution- Sig - Route: Take 1 vial (2.5 mg) by nebulization every 6 hours as needed for shortness of breath / dyspnea. 1 Units 0     spironolactone (ALDACTONE) 25 MG tablet Take 0.5 tablets (12.5 mg) by mouth daily (Patient not taking: Reported on 6/29/2021) 45 tablet 1                Data:   All laboratory data reviewed  No results found for this or any previous visit (from the past 24 hour(s)).    All laboratory data reviewed  Lab Results   Component Value Date    CHOL 167 07/17/2020     Lab Results   Component Value Date    HDL 72 07/17/2020     Lab Results   Component Value Date    LDL 82 07/17/2020     Lab Results   Component Value " Date    TRIG 65 07/17/2020     Lab Results   Component Value Date    CHOLHDLRATIO 2.5 08/20/2014     TSH   Date Value Ref Range Status   07/17/2020 0.84 0.40 - 4.00 mU/L Final     Last Basic Metabolic Panel:  Lab Results   Component Value Date     09/24/2020      Lab Results   Component Value Date    POTASSIUM 3.6 09/24/2020     Lab Results   Component Value Date    CHLORIDE 112 09/24/2020     Lab Results   Component Value Date    PHILLIP 8.6 09/24/2020     Lab Results   Component Value Date    CO2 25 09/24/2020     Lab Results   Component Value Date    BUN 11 09/24/2020     Lab Results   Component Value Date    CR 0.70 09/24/2020     Lab Results   Component Value Date     09/24/2020     Lab Results   Component Value Date    WBC 5.4 09/24/2020     Lab Results   Component Value Date    RBC 4.15 09/24/2020     Lab Results   Component Value Date    HGB 12.1 09/24/2020     Lab Results   Component Value Date    HCT 37.6 09/24/2020     Lab Results   Component Value Date    MCV 91 09/24/2020     Lab Results   Component Value Date    MCH 29.2 09/24/2020     Lab Results   Component Value Date    MCHC 32.2 09/24/2020     Lab Results   Component Value Date    RDW 13.5 09/24/2020     Lab Results   Component Value Date     09/24/2020                   Thank you for allowing me to participate in the care of your patient.      Sincerely,     Yogi Yepez MD     [facility]  cc:   Yogi Yepez MD  [address]  [place]  [place] [address]

## 2021-06-30 DIAGNOSIS — R07.9 EXERTIONAL CHEST PAIN: Primary | ICD-10-CM

## 2021-06-30 RX ORDER — ASPIRIN 81 MG/1
81 TABLET ORAL DAILY
Status: CANCELLED | OUTPATIENT
Start: 2021-06-30 | End: 2021-07-02

## 2021-06-30 RX ORDER — LIDOCAINE 40 MG/G
CREAM TOPICAL
Status: CANCELLED | OUTPATIENT
Start: 2021-06-30

## 2021-06-30 RX ORDER — POTASSIUM CHLORIDE 1500 MG/1
20 TABLET, EXTENDED RELEASE ORAL
Status: CANCELLED | OUTPATIENT
Start: 2021-06-30

## 2021-06-30 RX ORDER — SODIUM CHLORIDE 9 MG/ML
INJECTION, SOLUTION INTRAVENOUS CONTINUOUS
Status: CANCELLED | OUTPATIENT
Start: 2021-06-30

## 2021-06-30 NOTE — PROGRESS NOTES
Contacted patient to review pre-cath procedures: patient is scheduled for coronary angiogram (left)  on 7/1/21. Patient's arrival time is 8:00AM and procedure time is 10:00AM. Patient is aware to be NPO except for medications. Patient will hold the medication furosemide and spironolactone. Patient has arranged for transportation and 24 hour f/u care. Patient has no known contract dye allergy. Patient is not diabetic. Patient is not taking anti-coagulation medication. Patient's renal function is WNL for procedure. Patient will continue daily aspirin dose 81mg. Order for procedure entered.

## 2021-07-01 ENCOUNTER — HOSPITAL ENCOUNTER (OUTPATIENT)
Facility: CLINIC | Age: 81
Discharge: HOME | End: 2021-07-01
Admitting: INTERNAL MEDICINE
Payer: COMMERCIAL

## 2021-07-01 VITALS
OXYGEN SATURATION: 98 % | DIASTOLIC BLOOD PRESSURE: 85 MMHG | HEIGHT: 61 IN | SYSTOLIC BLOOD PRESSURE: 105 MMHG | RESPIRATION RATE: 16 BRPM | HEART RATE: 72 BPM | BODY MASS INDEX: 33.12 KG/M2 | TEMPERATURE: 98.2 F | WEIGHT: 175.4 LBS

## 2021-07-01 DIAGNOSIS — R07.9 EXERTIONAL CHEST PAIN: ICD-10-CM

## 2021-07-01 DIAGNOSIS — R93.1 ABNORMAL FINDINGS ON DIAGNOSTIC IMAGING OF HEART AND CORONARY CIRCULATION: ICD-10-CM

## 2021-07-01 DIAGNOSIS — I20.1 VASOSPASTIC ANGINA (H): ICD-10-CM

## 2021-07-01 LAB
ANION GAP SERPL CALCULATED.3IONS-SCNC: 4 MMOL/L (ref 3–14)
APTT PPP: 28 SEC (ref 22–37)
BUN SERPL-MCNC: 11 MG/DL (ref 7–30)
CALCIUM SERPL-MCNC: 8.3 MG/DL (ref 8.5–10.1)
CHLORIDE SERPL-SCNC: 111 MMOL/L (ref 94–109)
CO2 SERPL-SCNC: 26 MMOL/L (ref 20–32)
CREAT SERPL-MCNC: 0.76 MG/DL (ref 0.52–1.04)
ERYTHROCYTE [DISTWIDTH] IN BLOOD BY AUTOMATED COUNT: 13.9 % (ref 10–15)
GFR SERPL CREATININE-BSD FRML MDRD: 73 ML/MIN/{1.73_M2}
GLUCOSE SERPL-MCNC: 86 MG/DL (ref 70–99)
HCT VFR BLD AUTO: 41.1 % (ref 35–47)
HGB BLD-MCNC: 13.3 G/DL (ref 11.7–15.7)
INR PPP: 1.13 (ref 0.86–1.14)
MCH RBC QN AUTO: 28.7 PG (ref 26.5–33)
MCHC RBC AUTO-ENTMCNC: 32.4 G/DL (ref 31.5–36.5)
MCV RBC AUTO: 89 FL (ref 78–100)
PLATELET # BLD AUTO: 191 10E9/L (ref 150–450)
POTASSIUM SERPL-SCNC: 3.6 MMOL/L (ref 3.4–5.3)
RBC # BLD AUTO: 4.63 10E12/L (ref 3.8–5.2)
SODIUM SERPL-SCNC: 141 MMOL/L (ref 133–144)
WBC # BLD AUTO: 5.4 10E9/L (ref 4–11)

## 2021-07-01 PROCEDURE — 36415 COLL VENOUS BLD VENIPUNCTURE: CPT

## 2021-07-01 PROCEDURE — 85610 PROTHROMBIN TIME: CPT | Performed by: INTERNAL MEDICINE

## 2021-07-01 PROCEDURE — 85730 THROMBOPLASTIN TIME PARTIAL: CPT | Performed by: INTERNAL MEDICINE

## 2021-07-01 PROCEDURE — 250N000009 HC RX 250: Performed by: INTERNAL MEDICINE

## 2021-07-01 PROCEDURE — 999N000184 HC STATISTIC TELEMETRY

## 2021-07-01 PROCEDURE — 93005 ELECTROCARDIOGRAM TRACING: CPT

## 2021-07-01 PROCEDURE — 250N000011 HC RX IP 250 OP 636: Performed by: INTERNAL MEDICINE

## 2021-07-01 PROCEDURE — 99152 MOD SED SAME PHYS/QHP 5/>YRS: CPT | Performed by: INTERNAL MEDICINE

## 2021-07-01 PROCEDURE — 999N000071 HC STATISTIC HEART CATH LAB OR EP LAB

## 2021-07-01 PROCEDURE — 258N000003 HC RX IP 258 OP 636: Performed by: INTERNAL MEDICINE

## 2021-07-01 PROCEDURE — 250N000013 HC RX MED GY IP 250 OP 250 PS 637: Performed by: INTERNAL MEDICINE

## 2021-07-01 PROCEDURE — 93454 CORONARY ARTERY ANGIO S&I: CPT | Performed by: INTERNAL MEDICINE

## 2021-07-01 PROCEDURE — 80048 BASIC METABOLIC PNL TOTAL CA: CPT | Performed by: INTERNAL MEDICINE

## 2021-07-01 PROCEDURE — 99153 MOD SED SAME PHYS/QHP EA: CPT | Performed by: INTERNAL MEDICINE

## 2021-07-01 PROCEDURE — 272N000001 HC OR GENERAL SUPPLY STERILE: Performed by: INTERNAL MEDICINE

## 2021-07-01 PROCEDURE — 85027 COMPLETE CBC AUTOMATED: CPT | Performed by: INTERNAL MEDICINE

## 2021-07-01 PROCEDURE — 999N000054 HC STATISTIC EKG NON-CHARGEABLE

## 2021-07-01 RX ORDER — NALOXONE HYDROCHLORIDE 0.4 MG/ML
0.2 INJECTION, SOLUTION INTRAMUSCULAR; INTRAVENOUS; SUBCUTANEOUS
Status: DISCONTINUED | OUTPATIENT
Start: 2021-07-01 | End: 2021-07-01 | Stop reason: HOSPADM

## 2021-07-01 RX ORDER — LIDOCAINE 40 MG/G
CREAM TOPICAL
Status: DISCONTINUED | OUTPATIENT
Start: 2021-07-01 | End: 2021-07-01 | Stop reason: HOSPADM

## 2021-07-01 RX ORDER — FLUMAZENIL 0.1 MG/ML
0.2 INJECTION, SOLUTION INTRAVENOUS
Status: DISCONTINUED | OUTPATIENT
Start: 2021-07-01 | End: 2021-07-01 | Stop reason: HOSPADM

## 2021-07-01 RX ORDER — NALOXONE HYDROCHLORIDE 0.4 MG/ML
0.4 INJECTION, SOLUTION INTRAMUSCULAR; INTRAVENOUS; SUBCUTANEOUS
Status: DISCONTINUED | OUTPATIENT
Start: 2021-07-01 | End: 2021-07-01 | Stop reason: HOSPADM

## 2021-07-01 RX ORDER — SODIUM CHLORIDE 9 MG/ML
INJECTION, SOLUTION INTRAVENOUS CONTINUOUS
Status: DISCONTINUED | OUTPATIENT
Start: 2021-07-01 | End: 2021-07-01 | Stop reason: HOSPADM

## 2021-07-01 RX ORDER — ATROPINE SULFATE 0.1 MG/ML
0.5 INJECTION INTRAVENOUS
Status: DISCONTINUED | OUTPATIENT
Start: 2021-07-01 | End: 2021-07-01 | Stop reason: HOSPADM

## 2021-07-01 RX ORDER — FENTANYL CITRATE 50 UG/ML
25-50 INJECTION, SOLUTION INTRAMUSCULAR; INTRAVENOUS
Status: ACTIVE | OUTPATIENT
Start: 2021-07-01 | End: 2021-07-01

## 2021-07-01 RX ORDER — POTASSIUM CHLORIDE 1500 MG/1
20 TABLET, EXTENDED RELEASE ORAL
Status: COMPLETED | OUTPATIENT
Start: 2021-07-01 | End: 2021-07-01

## 2021-07-01 RX ORDER — FENTANYL CITRATE 50 UG/ML
INJECTION, SOLUTION INTRAMUSCULAR; INTRAVENOUS
Status: DISCONTINUED | OUTPATIENT
Start: 2021-07-01 | End: 2021-07-01 | Stop reason: HOSPADM

## 2021-07-01 RX ORDER — IOPAMIDOL 755 MG/ML
INJECTION, SOLUTION INTRAVASCULAR
Status: DISCONTINUED | OUTPATIENT
Start: 2021-07-01 | End: 2021-07-01 | Stop reason: HOSPADM

## 2021-07-01 RX ORDER — ASPIRIN 81 MG/1
81 TABLET ORAL DAILY
Status: DISCONTINUED | OUTPATIENT
Start: 2021-07-01 | End: 2021-07-01 | Stop reason: HOSPADM

## 2021-07-01 RX ORDER — NITROGLYCERIN 5 MG/ML
VIAL (ML) INTRAVENOUS
Status: DISCONTINUED | OUTPATIENT
Start: 2021-07-01 | End: 2021-07-01 | Stop reason: HOSPADM

## 2021-07-01 RX ORDER — ACETAMINOPHEN 325 MG/1
650 TABLET ORAL EVERY 4 HOURS PRN
Status: DISCONTINUED | OUTPATIENT
Start: 2021-07-01 | End: 2021-07-01 | Stop reason: HOSPADM

## 2021-07-01 RX ADMIN — POTASSIUM CHLORIDE 20 MEQ: 1500 TABLET, EXTENDED RELEASE ORAL at 09:49

## 2021-07-01 RX ADMIN — SODIUM CHLORIDE: 9 INJECTION, SOLUTION INTRAVENOUS at 09:09

## 2021-07-01 ASSESSMENT — MIFFLIN-ST. JEOR: SCORE: 1192.99

## 2021-07-01 NOTE — PROGRESS NOTES
Care Suites Admission Nursing Note    Patient Information  Name: Victoria Montalvo  Age: 81 year old  Reason for admission: angiogram  Care Suites arrival time: 0800    Visitor Information  Name: Avani  Informed of visitor restrictions: Yes  1 visitor allowed per patient   Visitor must screen negative for COVID symptoms   Visitor must wear a mask  Waiting rooms closed to visitors    Patient Admission/Assessment   Pre-procedure assessment complete: Yes  If abnormal assessment/labs, provider notified: No  NPO: Yes  Medications held per instructions/orders: Yes  Consent: obtained  If applicable, pregnancy test status: declined  Patient oriented to room: Yes  Education/questions answered: Yes  Plan/other: Review labs, obtain consent and proceed with scheduled treatment or intervention      Discharge Planning  Discharge name/phone number: Avani - dtr - [phone]  Overnight post sedation caregiver: Avani Stone dtr - [phone]  Discharge location: [place]    Gato Sosa RN         PATIENT/VISITOR WELLNESS SCREENING    Step 1 Patient Screening    1. In the last month, have you been in contact with someone who was confirmed or suspected to have Coronavirus/COVID-19? No    2. Do you have the following symptoms?  Fever/Chills? No   Cough? No   Shortness of breath? No   New loss of taste or smell? No  Sore throat? No  Muscle or body aches? No  Headaches? No  Fatigue? No  Vomiting or diarrhea? No    Step 2 Visitor Screening    1. Name of Visitor (1 visitor per patient): Avani    2. In the last month, have you been in contact with someone who was confirmed or suspected to have Coronavirus/COVID-19? No    3. Do you have the following symptoms?  Fever/Chills? No   Cough? No   Shortness of breath? No   Skin rash? No   Loss of taste or smell? No  Sore throat? No  Runny or stuffy nose? No  Muscle or body aches? No  Headaches? No  Fatigue? No  Vomiting or diarrhea? No    If the visitor has positive symptoms, notify  supervisor/manger  Per policy, the visitor will need to leave the facility     Step 3 Refer to logic grid below for actions    NO SYMPTOM(S)    ACTIONS:  1. Standard rooming process  2. Provider to assess per normal protocol  3. Implement precautions as needed and per guidelines     POSITIVE SYMPTOM(S)  If positive for ANY of the following symptoms: fever, cough, shortness of breath, rash    ACTION:  1. Continue to have the patient wear a mask   2. Room patient as soon as possible  3. Don appropriate PPE when entering room  4. Provider evaluation

## 2021-07-01 NOTE — DISCHARGE SUMMARY
Care Suites Discharge Nursing Note    Patient Information  Name: Victoria Montalvo  Age: 81 year old    Discharge Education:  Discharge instructions reviewed: Yes  Additional education/resources provided: no  Patient/patient representative verbalizes understanding: Yes  Patient discharging on new medications: No  Medication education completed: N/A    Discharge Plans:   Discharge location: home  Discharge ride contacted: Yes  Approximate discharge time: 1420    Discharge Criteria:  Discharge criteria met and vital signs stable: Yes    Patient Belongs:  Patient belongings returned to patient: Yes    Gato Sosa RN

## 2021-07-01 NOTE — Clinical Note
Great!  Script sent in, recommend touching base in the next 3-5 weeks please with appointment   Hands in ice bag spasm test

## 2021-07-01 NOTE — DISCHARGE INSTRUCTIONS
Cardiac Angiogram Discharge Instructions - Femoral/Groin    After you go home:      Have an adult stay with you until tomorrow.    Drink extra fluids for 2 days.    You may resume your normal diet.    No smoking       For 24 hours - due to the sedation you received:    Relax and take it easy.    Do NOT make any important or legal decisions.    Do NOT drive or operate machines at home or at work.    Do NOT drink alcohol.    Care of Groin Puncture Site:      For the first 24 hrs - check the puncture site every 1-2 hours while awake.    For 2 days, when you cough, sneeze, laugh or move your bowels, hold your hand over the puncture site and press firmly.    Remove the bandaid after 24 hours. If there is minor oozing, apply another bandaid and remove it after 12 hours.    It is normal to have a small bruise or pea size lump at the site.    You may shower tomorrow. Do NOT take a bath, or use a hot tub or pool for at least 3 days. Do NOT scrub the site. Do not use lotion or powder near the puncture site.    Activity:            For 2 days:    No stooping or squatting    Do NOT do any heavy activity such as exercise, lifting, or straining.     No housework, yard work or any activity that make you sweat    Do NOT lift more than 10 pounds    Bleeding:      If you start bleeding from the site in your groin, lie down flat and press firmly on/above the site for 10 minutes.     Once bleeding stops, lay flat for 2 hours.     Call [facility] as soon as you can.       Call 911 right away if you have heavy bleeding or bleeding that does not stop.      Medicines:      If you are taking an antiplatelet medication such as Plavix, Brilinta or Effient, do not stop taking it until you talk to your cardiologist.      If you are on Metformin (Glucophage), do not restart it until you have blood tests (within 2 to 3 days after discharge).  After you have your blood drawn, you may restart the Metformin.     Take your medications, including  blood thinners, unless your provider tells you not to.      If you take Coumadin (Warfarin), have your INR checked by your provider in  3-5 days. Call your clinic to schedule this.    If you have stopped any medicines, check with your provider about when to restart them.    Follow Up Appointments:      Follow up with [facility] Nurse Practitioner at [facility] of patient preference in 7-10 days.    Call the clinic if:      You have increased pain or a large or growing hard lump around the site.    The site is red, swollen, hot or tender.    Blood or fluid is draining from the site.    You have chills or a fever greater than 101 F (38 C).    Your leg feels numb, cool or changes color.    You have hives, a rash or unusual itching.    New pain in the back or belly that you cannot control with Tylenol.    Any questions or concerns.          [facility] [place]:    [phone] [facility] (7 days a week)

## 2021-07-01 NOTE — PRE-PROCEDURE
GENERAL PRE-PROCEDURE:   Procedure:  Heart catheterization poss intervention  Date/Time:  7/1/2021 10:45 AM    Written consent obtained?: Yes    Risks and benefits: Risks, benefits and alternatives were discussed    Consent given by:  Patient  Patient states understanding of procedure being performed: Yes    Patient's understanding of procedure matches consent: Yes    Procedure consent matches procedure scheduled: Yes    Appropriately NPO:  Yes  ASA Class:  Class 3- Severe systemic disease, definite functional limitations  Mallampati  :  Grade 2- soft palate, base of uvula, tonsillar pillars, and portion of posterior pharyngeal wall visible  Lungs:  Lungs clear with good breath sounds bilaterally  Heart:  Normal heart sounds and rate  History & Physical reviewed:  History and physical reviewed and no updates needed  Statement of review:  I have reviewed the lab findings, diagnostic data, medications, and the plan for sedation  discussed risk benefit of cath possible intervention possible spasm drug study.  All questions answered she wishes to proceed

## 2021-07-05 LAB — INTERPRETATION ECG - MUSE: NORMAL

## 2021-07-12 ENCOUNTER — OFFICE VISIT (OUTPATIENT)
Dept: CARDIOLOGY | Facility: CLINIC | Age: 81
End: 2021-07-12
Payer: COMMERCIAL

## 2021-07-12 VITALS
DIASTOLIC BLOOD PRESSURE: 76 MMHG | HEART RATE: 76 BPM | WEIGHT: 177.4 LBS | BODY MASS INDEX: 33.49 KG/M2 | HEIGHT: 61 IN | SYSTOLIC BLOOD PRESSURE: 124 MMHG

## 2021-07-12 DIAGNOSIS — E78.5 HYPERLIPIDEMIA LDL GOAL <130: ICD-10-CM

## 2021-07-12 DIAGNOSIS — I20.1 VASOSPASTIC ANGINA (H): Primary | ICD-10-CM

## 2021-07-12 DIAGNOSIS — I87.2 VENOUS REFLUX: ICD-10-CM

## 2021-07-12 DIAGNOSIS — I10 ESSENTIAL HYPERTENSION: ICD-10-CM

## 2021-07-12 PROCEDURE — 99214 OFFICE O/P EST MOD 30 MIN: CPT | Performed by: NURSE PRACTITIONER

## 2021-07-12 RX ORDER — AMLODIPINE BESYLATE 5 MG/1
5 TABLET ORAL EVERY EVENING
Qty: 90 TABLET | Refills: 3 | Status: SHIPPED | OUTPATIENT
Start: 2021-07-12 | End: 2022-11-09

## 2021-07-12 RX ORDER — FUROSEMIDE 20 MG
20 TABLET ORAL PRN
Qty: 90 TABLET | Refills: 3
Start: 2021-07-12 | End: 2021-11-05

## 2021-07-12 ASSESSMENT — MIFFLIN-ST. JEOR: SCORE: 1202.06

## 2021-07-12 NOTE — PROGRESS NOTES
Cardiology Clinic Progress Note  Victoria Montalvo MRN# [id number]   YOB: 1940 Age: 81 year old     Reason For Visit:  Post angiogram follow up   Primary Cardiologist:   Dr. Yepez          History of Presenting Illness:      Victoria Montalvo is a pleasant 81 year old patient who carries a past medical history significant for coronary artery disease status post PCI to the left PDA in 2017, subclavian stenosis with intervention in 2013, hypertension, pulmonary hypertension, hyperlipidemia, diastolic heart failure, venous insufficiency, obstructive sleep apnea on CPAP, and COPD.    She was last seen on 6/29/2021 for evaluation of exertional chest pain.  She underwent a coronary angiogram that showed severe spasm of proximal nondominant RCA worsening to 80%, relieved with IC nitroglycerin.  Previously placed circumflex stent was widely patent.  No intervention was completed.  She remains on isosorbide and amlodipine.  She returns to the office today for post angiogram follow-up.     She is feeling well on a cardiac standpoint, denies chest pain, shortness of breath, PND, orthopnea, presyncope, syncope, edema, heart racing, or palpitations.     Blood pressure is well controlled at 124/76.  In review of her medications, she elected to stop daily furosemide and spironolactone due to frequent urination.    Last BMP showed a sodium of 141, potassium 3.6, BUN 11, creatinine 0.76, GFR 73  Hemoglobin 13.3, WBC 5.4, and platelet 191  Last lipid panel showed a total cholesterol of 167, HDL 72, LDL 82, triglycerides 65  BMI 33.52    Activity level is unchanged  Remains compliant with all medications.                   Assessment and Plan:       1.  Coronary artery disease -status post PCI to the left PDA in 2017.  Recent coronary angiogram showed severe spasm of the proximal nondominant RCA worsening to 80%, relieved with intracoronary nitroglycerin.  I will uptitrate her amlodipine to 5 mg daily and continue on current dose  of isosorbide, aspirin, and statin.  Encourage exercise, weight loss, and heart healthy diet.    2.  Hypertension -well controlled.  3.  Hyperlipidemia -on high potency statin.  4.  Severe saphenous vein reflux bilaterally - Encourage compression stocking. PRN lasix. ( see HPI)                Thank you for allowing me to participate in this delightful patient's care. I have recommended she follow up in 6 months with DOMINIK       Marleni Raza, NIMESH CNP         Review of Systems:     Review of Systems:  Skin:  Negative     Eyes:  Positive for glasses  ENT:  Positive for sinus trouble  Respiratory:  Positive for dyspnea on exertion;wheezing;sleep apnea  Cardiovascular:    Positive for;edema;fatigue  Gastroenterology: Positive for heartburn  Genitourinary:  Positive for urinary frequency  Musculoskeletal:  Positive for arthritis;joint pain;back pain  Neurologic:  Positive for numbness or tingling of hands;numbness or tingling of feet  Psychiatric:  Negative    Heme/Lymph/Imm:  Positive for easy bruising  Endocrine:  Negative                Physical Exam:     GEN:  In general, this is a overweight femlae in no acute distress.  HEENT:  Pupils equal, round. Sclerae nonicteric. Clear oropharynx. Mucous membranes moist.  NECK: Supple, no masses appreciated. Trachea midline. No JVD   C/V:  Regular rate and rhythm, No murmur, rub or gallop. No S3 or RV heave.   RESP: Respirations are unlabored. No use of accessory muscles. Clear to auscultation bilaterally without wheezing, rales, or rhonchi.  GI: Abdomen soft, nontender, nondistended. No HSM appreciated.   EXTREM: trace bilateral  LE edema. No cyanosis or clubbing.  NEURO: Alert and oriented, cooperative. No obvious focal deficits.   PSYCH: Normal affect.  SKIN: Warm and dry. No rashes or petechiae appreciated.          Past Medical History:     Past Medical History:   Diagnosis Date     Abnormal Papanicolaou smear of cervix and cervical HPV     colposcopy 1/97     Arthritis       Chronic rhinitis      COPD (chronic obstructive pulmonary disease) (H)      Coronary artery disease     4/4/17 SHERLEY--PDA     Diastolic CHF, chronic (H) 2/22/2012     Dislocation of hip (H)     5/25/18 and 12/29/18 with successful reductions in ED     Gastro-oesophageal reflux disease      H/O heart artery stent 04/04/2017    SHERLEY to PDA     Hyperlipidemia LDL goal <130 10/31/2011     KEN (obstructive sleep apnea)     CPAP     Pain in right hip      Personal history of colonic polyps     colonoscopy 9/97, 2002 (due in 2007)     Pulmonary HTN (H)      Subclavian artery stenosis, left (H) 8/7/2013    S/p stent in 2013      Subclinical hyperthyroidism 10/17/2016     Unspecified asthma(493.90)     on preventative meds and has nebulizer              Past Surgical History:     Past Surgical History:   Procedure Laterality Date     ARTHROPLASTY HIP Right 10/19/2015    Procedure: ARTHROPLASTY HIP;  Surgeon: Aron Torres MD;  Location: [facility] OR     C OPEN RX ANKLE DISLOCATN+FIXATN  4/18/87     COLONOSCOPY  01/1/08    Polyp removed, bx.     COLONOSCOPY  01/12/10     COLONOSCOPY N/A 2/17/2015    Procedure: COLONOSCOPY;  Surgeon: Gato Quiles MD;  Location:  GI     CT CORONARY ANGIOGRAM  04/04/2017    SHERLEY to PDA     CV CORONARY ANGIOGRAM N/A 4/22/2020    Procedure: Coronary Angiogram;  Surgeon: Handy Denise MD;  Location: [facility] CARDIAC CATH LAB     CV CORONARY ANGIOGRAM N/A 7/1/2021    Procedure: Coronary Angiogram;  Surgeon: Handy Denise MD;  Location: [facility] CARDIAC CATH LAB     CV INSTANTANEOUS WAVE-FREE RATIO N/A 4/22/2020    Procedure: Instantaneous Wave-Free Ratio;  Surgeon: Handy Denise MD;  Location: [facility] CARDIAC CATH LAB     CV LEFT VENTRICULOGRAM N/A 4/22/2020    Procedure: Left Ventriculogram;  Surgeon: Handy Denise MD;  Location:  HEART CARDIAC CATH LAB     HC REDUCTION OF LARGE BREAST  2/89     VASCULAR SURGERY  2013    angiogram & left subclavical artery  stent     ZZ NONSPECIFIC PROCEDURE  2/89, 1990    liposuction of legs and stomach     ZZC NONSPECIFIC PROCEDURE  1990    Ankle pins removed     ZZ COLONOSCOPY THRU STOMA, DIAGNOSTIC  2002              Allergies:   Animal dander, Dust mites, Kiwi, Pollen extract, and Seasonal allergies       Data:   All laboratory data reviewed:    LAST CHOLESTEROL:  Lab Results   Component Value Date    CHOL 167 07/17/2020     Lab Results   Component Value Date    HDL 72 07/17/2020     Lab Results   Component Value Date    LDL 82 07/17/2020     Lab Results   Component Value Date    TRIG 65 07/17/2020     Lab Results   Component Value Date    CHOLHDLRATIO 2.5 08/20/2014       LAST BMP:  Lab Results   Component Value Date     07/01/2021      Lab Results   Component Value Date    POTASSIUM 3.6 07/01/2021     Lab Results   Component Value Date    CHLORIDE 111 07/01/2021     Lab Results   Component Value Date    PHILLIP 8.3 07/01/2021     Lab Results   Component Value Date    CO2 26 07/01/2021     Lab Results   Component Value Date    BUN 11 07/01/2021     Lab Results   Component Value Date    CR 0.76 07/01/2021     Lab Results   Component Value Date    GLC 86 07/01/2021       LAST CBC:  Lab Results   Component Value Date    WBC 5.4 07/01/2021     Lab Results   Component Value Date    RBC 4.63 07/01/2021     Lab Results   Component Value Date    HGB 13.3 07/01/2021     Lab Results   Component Value Date    HCT 41.1 07/01/2021     Lab Results   Component Value Date    MCV 89 07/01/2021     Lab Results   Component Value Date    MCH 28.7 07/01/2021     Lab Results   Component Value Date    MCHC 32.4 07/01/2021     Lab Results   Component Value Date    RDW 13.9 07/01/2021     Lab Results   Component Value Date     07/01/2021

## 2021-07-12 NOTE — LETTER
7/12/2021    Elsy Bah MD  [address] [place] [address]    RE: Victoria Montalvo       Dear Colleague,    I had the pleasure of seeing Victoria Montalvo in the [facility].    Cardiology Clinic Progress Note  Victoria Montalvo MRN# [id number]   YOB: 1940 Age: 81 year old     Reason For Visit:  Post angiogram follow up   Primary Cardiologist:   Dr. Yepez          History of Presenting Illness:      Victoria Montalvo is a pleasant 81 year old patient who carries a past medical history significant for coronary artery disease status post PCI to the left PDA in 2017, subclavian stenosis with intervention in 2013, hypertension, pulmonary hypertension, hyperlipidemia, diastolic heart failure, venous insufficiency, obstructive sleep apnea on CPAP, and COPD.    She was last seen on 6/29/2021 for evaluation of exertional chest pain.  She underwent a coronary angiogram that showed severe spasm of proximal nondominant RCA worsening to 80%, relieved with IC nitroglycerin.  Previously placed circumflex stent was widely patent.  No intervention was completed.  She remains on isosorbide and amlodipine.  She returns to the office today for post angiogram follow-up.     She is feeling well on a cardiac standpoint, denies chest pain, shortness of breath, PND, orthopnea, presyncope, syncope, edema, heart racing, or palpitations.     Blood pressure is well controlled at 124/76.  In review of her medications, she elected to stop daily furosemide and spironolactone due to frequent urination.    Last BMP showed a sodium of 141, potassium 3.6, BUN 11, creatinine 0.76, GFR 73  Hemoglobin 13.3, WBC 5.4, and platelet 191  Last lipid panel showed a total cholesterol of 167, HDL 72, LDL 82, triglycerides 65  BMI 33.52    Activity level is unchanged  Remains compliant with all medications.                   Assessment and Plan:       1.  Coronary artery disease -status  post PCI to the left PDA in 2017.  Recent coronary angiogram showed severe spasm of the proximal nondominant RCA worsening to 80%, relieved with intracoronary nitroglycerin.  I will uptitrate her amlodipine to 5 mg daily and continue on current dose of isosorbide, aspirin, and statin.  Encourage exercise, weight loss, and heart healthy diet.    2.  Hypertension -well controlled.  3.  Hyperlipidemia -on high potency statin.  4.  Severe saphenous vein reflux bilaterally - Encourage compression stocking. PRN lasix. ( see HPI)                Thank you for allowing me to participate in this delightful patient's care. I have recommended she follow up in 6 months with NIMESH Cross CNP         Review of Systems:     Review of Systems:  Skin:  Negative     Eyes:  Positive for glasses  ENT:  Positive for sinus trouble  Respiratory:  Positive for dyspnea on exertion;wheezing;sleep apnea  Cardiovascular:    Positive for;edema;fatigue  Gastroenterology: Positive for heartburn  Genitourinary:  Positive for urinary frequency  Musculoskeletal:  Positive for arthritis;joint pain;back pain  Neurologic:  Positive for numbness or tingling of hands;numbness or tingling of feet  Psychiatric:  Negative    Heme/Lymph/Imm:  Positive for easy bruising  Endocrine:  Negative                Physical Exam:     GEN:  In general, this is a overweight femlae in no acute distress.  HEENT:  Pupils equal, round. Sclerae nonicteric. Clear oropharynx. Mucous membranes moist.  NECK: Supple, no masses appreciated. Trachea midline. No JVD   C/V:  Regular rate and rhythm, No murmur, rub or gallop. No S3 or RV heave.   RESP: Respirations are unlabored. No use of accessory muscles. Clear to auscultation bilaterally without wheezing, rales, or rhonchi.  GI: Abdomen soft, nontender, nondistended. No HSM appreciated.   EXTREM: trace bilateral  LE edema. No cyanosis or clubbing.  NEURO: Alert and oriented, cooperative. No obvious focal  deficits.   PSYCH: Normal affect.  SKIN: Warm and dry. No rashes or petechiae appreciated.          Past Medical History:     Past Medical History:   Diagnosis Date     Abnormal Papanicolaou smear of cervix and cervical HPV     colposcopy 1/97     Arthritis      Chronic rhinitis      COPD (chronic obstructive pulmonary disease) (H)      Coronary artery disease     4/4/17 SHERLEY--PDA     Diastolic CHF, chronic (H) 2/22/2012     Dislocation of hip (H)     5/25/18 and 12/29/18 with successful reductions in ED     Gastro-oesophageal reflux disease      H/O heart artery stent 04/04/2017    SHERLEY to PDA     Hyperlipidemia LDL goal <130 10/31/2011     KEN (obstructive sleep apnea)     CPAP     Pain in right hip      Personal history of colonic polyps     colonoscopy 9/97, 2002 (due in 2007)     Pulmonary HTN (H)      Subclavian artery stenosis, left (H) 8/7/2013    S/p stent in 2013      Subclinical hyperthyroidism 10/17/2016     Unspecified asthma(493.90)     on preventative meds and has nebulizer              Past Surgical History:     Past Surgical History:   Procedure Laterality Date     ARTHROPLASTY HIP Right 10/19/2015    Procedure: ARTHROPLASTY HIP;  Surgeon: Aron Torres MD;  Location: [facility] OR     C OPEN RX ANKLE DISLOCATN+FIXATN  4/18/87     COLONOSCOPY  01/1/08    Polyp removed, bx.     COLONOSCOPY  01/12/10     COLONOSCOPY N/A 2/17/2015    Procedure: COLONOSCOPY;  Surgeon: Gato Quiles MD;  Location:  GI     CT CORONARY ANGIOGRAM  04/04/2017    SHERLEY to PDA     CV CORONARY ANGIOGRAM N/A 4/22/2020    Procedure: Coronary Angiogram;  Surgeon: Handy Denise MD;  Location: [facility] CARDIAC CATH LAB     CV CORONARY ANGIOGRAM N/A 7/1/2021    Procedure: Coronary Angiogram;  Surgeon: Handy Denise MD;  Location: [facility] CARDIAC CATH LAB     CV INSTANTANEOUS WAVE-FREE RATIO N/A 4/22/2020    Procedure: Instantaneous Wave-Free Ratio;  Surgeon: Handy Denise MD;  Location: [facility] CARDIAC CATH  LAB     CV LEFT VENTRICULOGRAM N/A 4/22/2020    Procedure: Left Ventriculogram;  Surgeon: Handy Denise MD;  Location:  HEART CARDIAC CATH LAB      REDUCTION OF LARGE BREAST  2/89     VASCULAR SURGERY  2013    angiogram & left subclavical artery stent     [facility] NONSPECIFIC PROCEDURE  2/89, 1990    liposuction of legs and stomach     Z NONSPECIFIC PROCEDURE  1990    Ankle pins removed     ZZ COLONOSCOPY THRU STOMA, DIAGNOSTIC  2002              Allergies:   Animal dander, Dust mites, Kiwi, Pollen extract, and Seasonal allergies       Data:   All laboratory data reviewed:    LAST CHOLESTEROL:  Lab Results   Component Value Date    CHOL 167 07/17/2020     Lab Results   Component Value Date    HDL 72 07/17/2020     Lab Results   Component Value Date    LDL 82 07/17/2020     Lab Results   Component Value Date    TRIG 65 07/17/2020     Lab Results   Component Value Date    CHOLHDLRATIO 2.5 08/20/2014       LAST BMP:  Lab Results   Component Value Date     07/01/2021      Lab Results   Component Value Date    POTASSIUM 3.6 07/01/2021     Lab Results   Component Value Date    CHLORIDE 111 07/01/2021     Lab Results   Component Value Date    PHILLIP 8.3 07/01/2021     Lab Results   Component Value Date    CO2 26 07/01/2021     Lab Results   Component Value Date    BUN 11 07/01/2021     Lab Results   Component Value Date    CR 0.76 07/01/2021     Lab Results   Component Value Date    GLC 86 07/01/2021       LAST CBC:  Lab Results   Component Value Date    WBC 5.4 07/01/2021     Lab Results   Component Value Date    RBC 4.63 07/01/2021     Lab Results   Component Value Date    HGB 13.3 07/01/2021     Lab Results   Component Value Date    HCT 41.1 07/01/2021     Lab Results   Component Value Date    MCV 89 07/01/2021     Lab Results   Component Value Date    MCH 28.7 07/01/2021     Lab Results   Component Value Date    MCHC 32.4 07/01/2021     Lab Results   Component Value Date    RDW 13.9 07/01/2021     Lab  Results   Component Value Date     07/01/2021               Thank you for allowing me to participate in the care of your patient.      Sincerely,     NIMESH [facility]  cc:   Yogi Yepez MD  [address]  [place] [id number]

## 2021-07-12 NOTE — PATIENT INSTRUCTIONS
Thanks for participating in a office visit with the [facility] clinic today.    Reviewed results of recent angiogram   Increase amlodipine to 5 mg daily to help with vasospasm  Continue on all other medications  Encourage exercise, weight loss, and strict heart healthy diet  Should you have recurrent leg pain, notify our office     Follow up in 6 month with DOMINIK    Please call my nurse at [phone] with any questions or concerns.    Scheduling phone number: [phone]  Reminder: Please bring in all current medications, over the counter supplements and vitamin bottles to your next appointment.

## 2021-07-22 ENCOUNTER — LAB (OUTPATIENT)
Dept: LAB | Facility: CLINIC | Age: 81
End: 2021-07-22
Payer: COMMERCIAL

## 2021-07-22 DIAGNOSIS — E05.90 SUBCLINICAL HYPERTHYROIDISM: ICD-10-CM

## 2021-07-22 DIAGNOSIS — I25.10 MILD CORONARY ARTERY DISEASE: ICD-10-CM

## 2021-07-22 DIAGNOSIS — I10 BENIGN ESSENTIAL HYPERTENSION: ICD-10-CM

## 2021-07-22 DIAGNOSIS — E78.5 HYPERLIPIDEMIA LDL GOAL <130: ICD-10-CM

## 2021-07-22 LAB
ALBUMIN SERPL-MCNC: 3.4 G/DL (ref 3.4–5)
ALP SERPL-CCNC: 45 U/L (ref 40–150)
ALT SERPL W P-5'-P-CCNC: 26 U/L (ref 0–50)
ANION GAP SERPL CALCULATED.3IONS-SCNC: <1 MMOL/L (ref 3–14)
AST SERPL W P-5'-P-CCNC: 18 U/L (ref 0–45)
BILIRUB SERPL-MCNC: 0.4 MG/DL (ref 0.2–1.3)
BUN SERPL-MCNC: 20 MG/DL (ref 7–30)
CALCIUM SERPL-MCNC: 8.6 MG/DL (ref 8.5–10.1)
CHLORIDE BLD-SCNC: 111 MMOL/L (ref 94–109)
CHOLEST SERPL-MCNC: 157 MG/DL
CO2 SERPL-SCNC: 33 MMOL/L (ref 20–32)
CREAT SERPL-MCNC: 0.73 MG/DL (ref 0.52–1.04)
FASTING STATUS PATIENT QL REPORTED: YES
GFR SERPL CREATININE-BSD FRML MDRD: 77 ML/MIN/1.73M2
GLUCOSE BLD-MCNC: 90 MG/DL (ref 70–99)
HDLC SERPL-MCNC: 88 MG/DL
LDLC SERPL CALC-MCNC: 59 MG/DL
NONHDLC SERPL-MCNC: 69 MG/DL
POTASSIUM BLD-SCNC: 3.5 MMOL/L (ref 3.4–5.3)
PROT SERPL-MCNC: 7 G/DL (ref 6.8–8.8)
SODIUM SERPL-SCNC: 141 MMOL/L (ref 133–144)
TRIGL SERPL-MCNC: 52 MG/DL
TSH SERPL DL<=0.005 MIU/L-ACNC: 1.27 MU/L (ref 0.4–4)

## 2021-07-22 PROCEDURE — 84443 ASSAY THYROID STIM HORMONE: CPT

## 2021-07-22 PROCEDURE — 80053 COMPREHEN METABOLIC PANEL: CPT

## 2021-07-22 PROCEDURE — 36415 COLL VENOUS BLD VENIPUNCTURE: CPT

## 2021-07-22 PROCEDURE — 80061 LIPID PANEL: CPT

## 2021-07-29 ASSESSMENT — ENCOUNTER SYMPTOMS
CHILLS: 0
NAUSEA: 0
ARTHRALGIAS: 0
DYSURIA: 0
PALPITATIONS: 0
BREAST MASS: 0
HEARTBURN: 1
SHORTNESS OF BREATH: 0
JOINT SWELLING: 0
HEADACHES: 0
NERVOUS/ANXIOUS: 0
SORE THROAT: 0
WEAKNESS: 0
HEMATURIA: 0
DIARRHEA: 0
HEMATOCHEZIA: 0
CONSTIPATION: 0
FEVER: 0
COUGH: 0
MYALGIAS: 0
FREQUENCY: 1
EYE PAIN: 0
DIZZINESS: 0
ABDOMINAL PAIN: 0
PARESTHESIAS: 0

## 2021-07-29 ASSESSMENT — ACTIVITIES OF DAILY LIVING (ADL): CURRENT_FUNCTION: NO ASSISTANCE NEEDED

## 2021-08-05 ENCOUNTER — OFFICE VISIT (OUTPATIENT)
Dept: PEDIATRICS | Facility: CLINIC | Age: 81
End: 2021-08-05
Payer: COMMERCIAL

## 2021-08-05 VITALS
HEIGHT: 61 IN | WEIGHT: 175 LBS | HEART RATE: 78 BPM | DIASTOLIC BLOOD PRESSURE: 60 MMHG | BODY MASS INDEX: 33.04 KG/M2 | TEMPERATURE: 97.9 F | SYSTOLIC BLOOD PRESSURE: 110 MMHG | RESPIRATION RATE: 16 BRPM | OXYGEN SATURATION: 97 %

## 2021-08-05 DIAGNOSIS — I25.10 MILD CORONARY ARTERY DISEASE: ICD-10-CM

## 2021-08-05 DIAGNOSIS — J45.40 MODERATE PERSISTENT ASTHMA, UNCOMPLICATED: ICD-10-CM

## 2021-08-05 DIAGNOSIS — M81.0 AGE-RELATED OSTEOPOROSIS WITHOUT CURRENT PATHOLOGICAL FRACTURE: ICD-10-CM

## 2021-08-05 DIAGNOSIS — K21.9 GASTROESOPHAGEAL REFLUX DISEASE WITHOUT ESOPHAGITIS: ICD-10-CM

## 2021-08-05 DIAGNOSIS — Z00.01 ENCOUNTER FOR GENERAL ADULT MEDICAL EXAMINATION WITH ABNORMAL FINDINGS: Primary | ICD-10-CM

## 2021-08-05 DIAGNOSIS — J31.0 CHRONIC RHINITIS: ICD-10-CM

## 2021-08-05 DIAGNOSIS — M85.80 OSTEOPENIA, UNSPECIFIED LOCATION: ICD-10-CM

## 2021-08-05 DIAGNOSIS — M19.90 ARTHRITIS: ICD-10-CM

## 2021-08-05 DIAGNOSIS — I77.1 SUBCLAVIAN ARTERY STENOSIS, LEFT (H): ICD-10-CM

## 2021-08-05 DIAGNOSIS — I20.0 UNSTABLE ANGINA PECTORIS (H): ICD-10-CM

## 2021-08-05 DIAGNOSIS — J45.50 UNCOMPLICATED SEVERE PERSISTENT ASTHMA (H): ICD-10-CM

## 2021-08-05 DIAGNOSIS — I50.32 DIASTOLIC CHF, CHRONIC (H): ICD-10-CM

## 2021-08-05 DIAGNOSIS — Z12.11 SCREEN FOR COLON CANCER: ICD-10-CM

## 2021-08-05 PROCEDURE — 99214 OFFICE O/P EST MOD 30 MIN: CPT | Mod: 25 | Performed by: PEDIATRICS

## 2021-08-05 PROCEDURE — 99397 PER PM REEVAL EST PAT 65+ YR: CPT | Performed by: PEDIATRICS

## 2021-08-05 RX ORDER — NITROGLYCERIN 0.4 MG/1
TABLET SUBLINGUAL
Qty: 25 TABLET | Refills: 3 | Status: SHIPPED | OUTPATIENT
Start: 2021-08-05

## 2021-08-05 RX ORDER — ROSUVASTATIN CALCIUM 40 MG/1
40 TABLET, COATED ORAL DAILY
Qty: 90 TABLET | Refills: 3 | Status: SHIPPED | OUTPATIENT
Start: 2021-08-05 | End: 2022-11-09

## 2021-08-05 RX ORDER — MONTELUKAST SODIUM 10 MG/1
1 TABLET ORAL AT BEDTIME
Qty: 90 TABLET | Refills: 3 | Status: SHIPPED | OUTPATIENT
Start: 2021-08-05 | End: 2022-11-09

## 2021-08-05 RX ORDER — ALBUTEROL SULFATE 0.83 MG/ML
2.5 SOLUTION RESPIRATORY (INHALATION) EVERY 6 HOURS PRN
Qty: 3 ML | Refills: 11 | Status: SHIPPED | OUTPATIENT
Start: 2021-08-05 | End: 2022-11-25

## 2021-08-05 RX ORDER — FLUTICASONE PROPIONATE 50 MCG
SPRAY, SUSPENSION (ML) NASAL
Qty: 48 G | Refills: 11 | Status: SHIPPED | OUTPATIENT
Start: 2021-08-05 | End: 2022-11-25

## 2021-08-05 RX ORDER — ALBUTEROL SULFATE 90 UG/1
AEROSOL, METERED RESPIRATORY (INHALATION)
Qty: 25.5 G | Refills: 6 | Status: SHIPPED | OUTPATIENT
Start: 2021-08-05 | End: 2022-08-18

## 2021-08-05 RX ORDER — ISOSORBIDE MONONITRATE 60 MG/1
60 TABLET, EXTENDED RELEASE ORAL EVERY MORNING
Qty: 90 TABLET | Refills: 3 | Status: SHIPPED | OUTPATIENT
Start: 2021-08-05 | End: 2022-11-09

## 2021-08-05 RX ORDER — ALENDRONATE SODIUM 70 MG/1
TABLET ORAL
Qty: 12 TABLET | Refills: 3 | Status: SHIPPED | OUTPATIENT
Start: 2021-08-05 | End: 2022-11-25

## 2021-08-05 ASSESSMENT — ENCOUNTER SYMPTOMS
HEMATOCHEZIA: 0
CONSTIPATION: 0
BREAST MASS: 0
HEARTBURN: 1
HEADACHES: 0
FEVER: 0
ARTHRALGIAS: 0
PARESTHESIAS: 0
ABDOMINAL PAIN: 0
NERVOUS/ANXIOUS: 0
NAUSEA: 0
JOINT SWELLING: 0
FREQUENCY: 1
SORE THROAT: 0
MYALGIAS: 0
CHILLS: 0
EYE PAIN: 0
WEAKNESS: 0
SHORTNESS OF BREATH: 0
DYSURIA: 0
COUGH: 0
PALPITATIONS: 0
HEMATURIA: 0
DIARRHEA: 0
DIZZINESS: 0

## 2021-08-05 ASSESSMENT — ACTIVITIES OF DAILY LIVING (ADL): CURRENT_FUNCTION: NO ASSISTANCE NEEDED

## 2021-08-05 ASSESSMENT — MIFFLIN-ST. JEOR: SCORE: 1191.17

## 2021-08-05 NOTE — PATIENT INSTRUCTIONS
1. Plan for one more year of the Fosamax and then repeat bone density next year  2. You will be called to schedule colonoscopy  3. Try voltaren gel to hands for arthritis  4. Other refills sent      Patient Education   Personalized Prevention Plan  You are due for the preventive services outlined below.  Your care team is available to assist you in scheduling these services.  If you have already completed any of these items, please share that information with your care team to update in your medical record.  Health Maintenance Due   Topic Date Due     Zoster (Shingles) Vaccine (1 of 2) Never done     Diptheria Tetanus Pertussis (DTAP/TDAP/TD) Vaccine (2 - Td or Tdap) 04/14/2017     Asthma Control Test  07/09/2019     Colorectal Cancer Screening  02/17/2020     Heart Failure Action Plan  08/04/2020     ANNUAL REVIEW OF HM ORDERS  07/17/2021     FALL RISK ASSESSMENT  07/17/2021     Annual Wellness Visit  07/17/2021

## 2021-08-05 NOTE — PROGRESS NOTES
"SUBJECTIVE:   Victoria Montalvo is a 81 year old female who presents for Preventive Visit.      Patient has been advised of split billing requirements and indicates understanding: Yes   Are you in the first 12 months of your Medicare coverage?  No    Healthy Habits:     In general, how would you rate your overall health?  Good    Frequency of exercise:  1 day/week    Duration of exercise:  N/A    Do you usually eat at least 4 servings of fruit and vegetables a day, include whole grains    & fiber and avoid regularly eating high fat or \"junk\" foods?  Yes    Taking medications regularly:  Yes    Medication side effects:  None    Ability to successfully perform activities of daily living:  No assistance needed    Home Safety:  No safety concerns identified    Hearing Impairment:  No hearing concerns    In the past 6 months, have you been bothered by leaking of urine? Yes    In general, how would you rate your overall mental or emotional health?  Excellent      PHQ-2 Total Score: 0    Additional concerns today:  No    Preparing for dental implant - recent bone graft    Do you feel safe in your environment? Yes    Have you ever done Advance Care Planning? (For example, a Health Directive, POLST, or a discussion with a medical provider or your loved ones about your wishes): No, advance care planning information given to patient to review.  Patient plans to discuss their wishes with loved ones or provider.         Fall risk  Fallen 2 or more times in the past year?: No  Any fall with injury in the past year?: No    Cognitive Screening   1) Repeat 3 items (Leader, Season, Table)    2) Clock draw: NORMAL  3) 3 item recall: Recalls 1 object   Results: NORMAL clock, 1-2 items recalled: COGNITIVE IMPAIRMENT LESS LIKELY    Mini-CogTM Copyright ANGELA Simental. Licensed by the author for use in [facility]; reprinted with permission (ankit@.[facility]). All rights reserved.      Do you have sleep apnea, excessive snoring or daytime " drowsiness?: yes, sleep apnea     Reviewed and updated as needed this visit by clinical staff  Tobacco  Allergies  Meds  Problems  Med Hx  Surg Hx  Fam Hx  Soc Hx          Reviewed and updated as needed this visit by Provider    Meds  Problems            Social History     Tobacco Use     Smoking status: Former Smoker     Packs/day: 0.10     Years: 10.00     Pack years: 1.00     Types: Cigarettes     Quit date: 2003     Years since quittin.2     Smokeless tobacco: Never Used   Substance Use Topics     Alcohol use: Yes     Alcohol/week: 0.0 standard drinks     Comment: a couple drinks per year         Alcohol Use 2021   Prescreen: >3 drinks/day or >7 drinks/week? No   Prescreen: >3 drinks/day or >7 drinks/week? -           Hyperlipidemia Follow-Up      Are you regularly taking any medication or supplement to lower your cholesterol?   Yes- Rosuvastatin     Are you having muscle aches or other side effects that you think could be caused by your cholesterol lowering medication?  Yes- muscle aches     Hypertension Follow-up      Do you check your blood pressure regularly outside of the clinic? No     Are you following a low salt diet? Yes    Are your blood pressures ever more than 140 on the top number (systolic) OR more   than 90 on the bottom number (diastolic), for example 140/90? No      Current providers sharing in care for this patient include:   Patient Care Team:  Elsy Bah MD as PCP - General (Internal Medicine)  Aron Torres MD as MD (Orthopedics)  Elsy Bah MD as Assigned PCP  Yogi Yepez MD as Assigned Heart and Vascular Provider    The following health maintenance items are reviewed in Epic and correct as of today:  Health Maintenance Due   Topic Date Due     ZOSTER IMMUNIZATION (1 of 2) Never done     DTAP/TDAP/TD IMMUNIZATION (2 - Td or Tdap) 2017     ASTHMA CONTROL TEST  2019     COLORECTAL CANCER SCREENING  2020     HF  "ACTION PLAN  08/04/2020     FALL RISK ASSESSMENT  07/17/2021     BP Readings from Last 3 Encounters:   08/05/21 110/60   07/12/21 124/76   07/01/21 105/85    Wt Readings from Last 3 Encounters:   08/05/21 79.4 kg (175 lb)   07/12/21 80.5 kg (177 lb 6.4 oz)   07/01/21 79.6 kg (175 lb 6.4 oz)                      Mammogram Screening - Mammography discussed and declined  Pertinent mammograms are reviewed under the imaging tab.    Review of Systems   Constitutional: Negative for chills and fever.   HENT: Negative for congestion, ear pain, hearing loss and sore throat.    Eyes: Negative for pain and visual disturbance.   Respiratory: Negative for cough and shortness of breath.    Cardiovascular: Positive for peripheral edema. Negative for chest pain and palpitations.   Gastrointestinal: Positive for heartburn. Negative for abdominal pain, constipation, diarrhea, hematochezia and nausea.   Breasts:  Negative for tenderness, breast mass and discharge.   Genitourinary: Positive for frequency and urgency. Negative for dysuria, genital sores, hematuria, pelvic pain, vaginal bleeding and vaginal discharge.   Musculoskeletal: Negative for arthralgias, joint swelling and myalgias.   Skin: Negative for rash.   Neurological: Negative for dizziness, weakness, headaches and paresthesias.   Psychiatric/Behavioral: Negative for mood changes. The patient is not nervous/anxious.          OBJECTIVE:   /60 (BP Location: Right arm, Patient Position: Sitting, Cuff Size: Adult Regular)   Pulse 78   Temp 97.9  F (36.6  C) (Tympanic)   Resp 16   Ht 1.549 m (5' 1\")   Wt 79.4 kg (175 lb)   LMP  (LMP Unknown)   SpO2 97%   BMI 33.07 kg/m   Estimated body mass index is 33.07 kg/m  as calculated from the following:    Height as of this encounter: 1.549 m (5' 1\").    Weight as of this encounter: 79.4 kg (175 lb).  Physical Exam  GENERAL APPEARANCE: healthy, alert and no distress  EYES: Eyes grossly normal to inspection, PERRL and " conjunctivae and sclerae normal  HENT: ear canals and TM's normal, nose and mouth without ulcers or lesions, oropharynx clear and oral mucous membranes moist  NECK: no adenopathy, no asymmetry, masses, or scars and thyroid normal to palpation  RESP: lungs clear to auscultation - no rales, rhonchi or wheezes  CV: regular rate and rhythm, normal S1 S2, no S3 or S4, no murmur, click or rub, no peripheral edema and peripheral pulses strong  ABDOMEN: soft, nontender, no hepatosplenomegaly, no masses and bowel sounds normal  MS: no musculoskeletal defects are noted and gait is age appropriate without ataxia  SKIN: no suspicious lesions or rashes  NEURO: Normal strength and tone, sensory exam grossly normal, mentation intact and speech normal  PSYCH: mentation appears normal and affect normal/bright    Diagnostic Test Results:  Labs reviewed in Epic    ASSESSMENT / PLAN:   1. Encounter for general adult medical examination with abnormal findings  Declines shingles vaccine and tdap - will get td if she gets injured.    2. Screen for colon cancer  Was supposed to be last one before COVID - has been delayed, will do now  - Adult Gastro Ref - Procedure Only; Future    3. Age-related osteoporosis without current pathological fracture  Plan for one more year of fosamax and then DEXA next year and drug holiday  - alendronate (FOSAMAX) 70 MG tablet; TAKE 1 TABLET EVERY 7 DAYS, TAKE 60 MINUTES BEFORE A.M. MEAL WITH 8 OUNCE WATER, REMAIN UPRIGHT FOR 30 MINUTES  Dispense: 12 tablet; Refill: 3    4. Moderate persistent asthma, uncomplicated  Stable, well controlled, continue current medications.   - albuterol (PROVENTIL) (2.5 MG/3ML) 0.083% neb solution; Take 1 vial (2.5 mg) by nebulization every 6 hours as needed for shortness of breath / dyspnea  Dispense: 3 mL; Refill: 11  - fluticasone-salmeterol (ADVAIR) 250-50 MCG/DOSE inhaler; Inhale 1 puff into the lungs every 12 hours  Dispense: 3 each; Refill: 3  - montelukast (SINGULAIR) 10  "MG tablet; Take 1 tablet (10 mg) by mouth At Bedtime  Dispense: 90 tablet; Refill: 3  - albuterol (PROAIR HFA) 108 (90 Base) MCG/ACT inhaler; USE 2 INHALATIONS EVERY 6 HOURS AS NEEDED FOR SHORTNESS OF BREATH, DYSPNEA OR WHEEZING  Dispense: 25.5 g; Refill: 6    5. Chronic rhinitis  Stable, refill  - fluticasone (FLONASE) 50 MCG/ACT nasal spray; USE 1 TO 2 SPRAYS IN EACH NOSTRIL DAILY  Dispense: 48 g; Refill: 11    6. Uncomplicated severe persistent asthma  Stable, refill  - fluticasone-salmeterol (ADVAIR) 250-50 MCG/DOSE inhaler; Inhale 1 puff into the lungs every 12 hours  Dispense: 3 each; Refill: 3    7. Unstable angina pectoris (H)  Per cardiology, stable  - isosorbide mononitrate (IMDUR) 60 MG 24 hr tablet; Take 1 tablet (60 mg) by mouth every morning Hold if Systolic Blood Pressure is less than 85.  Dispense: 90 tablet; Refill: 3  - nitroGLYcerin (NITROSTAT) 0.4 MG sublingual tablet; One tablet under the tongue every 5 minutes if needed for chest pain. May repeat every 5 minutes for a maximum of 3 doses in 15 minutes\"  Dispense: 25 tablet; Refill: 3    8. Gastroesophageal reflux disease without esophagitis  Stable, refill  - omeprazole (PRILOSEC) 20 MG DR capsule; Take 1 capsule (20 mg) by mouth daily  Dispense: 90 capsule; Refill: 3    9. Mild coronary artery disease  Stable, refill  - rosuvastatin (CRESTOR) 40 MG tablet; Take 1 tablet (40 mg) by mouth daily  Dispense: 90 tablet; Refill: 3    10. Osteopenia, unspecified location  See above    11. Arthritis  Intermittent flares of hand joints - trial voltaren gel  - diclofenac (VOLTAREN) 1 % topical gel; Apply 2 g topically 4 times daily  Dispense: 100 g; Refill: 11    12. Diastolic CHF, chronic (H)  Per cardiology    13. Subclavian artery stenosis, left (H)  Per cardiology      Patient has been advised of split billing requirements and indicates understanding: Yes  COUNSELING:  Reviewed preventive health counseling, as reflected in patient " "instructions    Estimated body mass index is 33.07 kg/m  as calculated from the following:    Height as of this encounter: 1.549 m (5' 1\").    Weight as of this encounter: 79.4 kg (175 lb).        She reports that she quit smoking about 18 years ago. Her smoking use included cigarettes. She has a 1.00 pack-year smoking history. She has never used smokeless tobacco.      Appropriate preventive services were discussed with this patient, including applicable screening as appropriate for cardiovascular disease, diabetes, osteopenia/osteoporosis, and glaucoma.  As appropriate for age/gender, discussed screening for colorectal cancer, prostate cancer, breast cancer, and cervical cancer. Checklist reviewing preventive services available has been given to the patient.    Reviewed patients plan of care and provided an AVS. The Basic Care Plan (routine screening as documented in Health Maintenance) for June meets the Care Plan requirement. This Care Plan has been established and reviewed with the Patient.    Counseling Resources:  ATP IV Guidelines  Pooled Cohorts Equation Calculator  Breast Cancer Risk Calculator  Breast Cancer: Medication to Reduce Risk  FRAX Risk Assessment  ICSI Preventive Guidelines  Dietary Guidelines for Americans, 2010  USDA's MyPlate  ASA Prophylaxis  Lung CA Screening    Elsy Bah MD  [facility]    Identified Health Risks:  "

## 2021-08-06 ASSESSMENT — ASTHMA QUESTIONNAIRES: ACT_TOTALSCORE: 19

## 2021-08-10 ENCOUNTER — HOSPITAL ENCOUNTER (OUTPATIENT)
Facility: CLINIC | Age: 81
End: 2021-08-10
Attending: INTERNAL MEDICINE | Admitting: INTERNAL MEDICINE
Payer: COMMERCIAL

## 2021-08-17 DIAGNOSIS — Z11.59 ENCOUNTER FOR SCREENING FOR OTHER VIRAL DISEASES: ICD-10-CM

## 2021-08-24 ENCOUNTER — MYC MEDICAL ADVICE (OUTPATIENT)
Dept: PEDIATRICS | Facility: CLINIC | Age: 81
End: 2021-08-24

## 2021-09-05 ENCOUNTER — HEALTH MAINTENANCE LETTER (OUTPATIENT)
Age: 81
End: 2021-09-05

## 2021-11-05 DIAGNOSIS — I87.2 VENOUS REFLUX: Primary | ICD-10-CM

## 2021-11-05 RX ORDER — FUROSEMIDE 20 MG
TABLET ORAL
Qty: 60 TABLET | Refills: 0 | Status: SHIPPED | OUTPATIENT
Start: 2021-11-05 | End: 2022-11-21

## 2022-04-08 ENCOUNTER — HOSPITAL ENCOUNTER (OUTPATIENT)
Facility: CLINIC | Age: 82
Setting detail: OBSERVATION
Discharge: HOME | End: 2022-04-09
Attending: EMERGENCY MEDICINE | Admitting: INTERNAL MEDICINE
Payer: COMMERCIAL

## 2022-04-08 ENCOUNTER — APPOINTMENT (OUTPATIENT)
Dept: GENERAL RADIOLOGY | Facility: CLINIC | Age: 82
End: 2022-04-08
Attending: EMERGENCY MEDICINE
Payer: COMMERCIAL

## 2022-04-08 DIAGNOSIS — R07.9 CHEST PAIN, UNSPECIFIED TYPE: ICD-10-CM

## 2022-04-08 LAB
ANION GAP SERPL CALCULATED.3IONS-SCNC: 5 MMOL/L (ref 3–14)
ATRIAL RATE - MUSE: 73 BPM
BASOPHILS # BLD AUTO: 0 10E3/UL (ref 0–0.2)
BASOPHILS NFR BLD AUTO: 0 %
BUN SERPL-MCNC: 18 MG/DL (ref 7–30)
CALCIUM SERPL-MCNC: 8.5 MG/DL (ref 8.5–10.1)
CHLORIDE BLD-SCNC: 108 MMOL/L (ref 94–109)
CO2 SERPL-SCNC: 28 MMOL/L (ref 20–32)
CREAT SERPL-MCNC: 0.72 MG/DL (ref 0.52–1.04)
DIASTOLIC BLOOD PRESSURE - MUSE: NORMAL MMHG
EOSINOPHIL # BLD AUTO: 0.1 10E3/UL (ref 0–0.7)
EOSINOPHIL NFR BLD AUTO: 1 %
ERYTHROCYTE [DISTWIDTH] IN BLOOD BY AUTOMATED COUNT: 14.1 % (ref 10–15)
GFR SERPL CREATININE-BSD FRML MDRD: 83 ML/MIN/1.73M2
GLUCOSE BLD-MCNC: 83 MG/DL (ref 70–99)
HCT VFR BLD AUTO: 38.6 % (ref 35–47)
HGB BLD-MCNC: 12.3 G/DL (ref 11.7–15.7)
IMM GRANULOCYTES # BLD: 0 10E3/UL
IMM GRANULOCYTES NFR BLD: 0 %
INTERPRETATION ECG - MUSE: NORMAL
LYMPHOCYTES # BLD AUTO: 1.3 10E3/UL (ref 0.8–5.3)
LYMPHOCYTES NFR BLD AUTO: 17 %
MCH RBC QN AUTO: 29.1 PG (ref 26.5–33)
MCHC RBC AUTO-ENTMCNC: 31.9 G/DL (ref 31.5–36.5)
MCV RBC AUTO: 91 FL (ref 78–100)
MONOCYTES # BLD AUTO: 0.4 10E3/UL (ref 0–1.3)
MONOCYTES NFR BLD AUTO: 5 %
NEUTROPHILS # BLD AUTO: 5.6 10E3/UL (ref 1.6–8.3)
NEUTROPHILS NFR BLD AUTO: 77 %
NRBC # BLD AUTO: 0 10E3/UL
NRBC BLD AUTO-RTO: 0 /100
P AXIS - MUSE: 60 DEGREES
PLATELET # BLD AUTO: 191 10E3/UL (ref 150–450)
POTASSIUM BLD-SCNC: 4.1 MMOL/L (ref 3.4–5.3)
PR INTERVAL - MUSE: 154 MS
QRS DURATION - MUSE: 72 MS
QT - MUSE: 398 MS
QTC - MUSE: 438 MS
R AXIS - MUSE: -24 DEGREES
RBC # BLD AUTO: 4.23 10E6/UL (ref 3.8–5.2)
SARS-COV-2 RNA RESP QL NAA+PROBE: NEGATIVE
SODIUM SERPL-SCNC: 141 MMOL/L (ref 133–144)
SYSTOLIC BLOOD PRESSURE - MUSE: NORMAL MMHG
T AXIS - MUSE: 10 DEGREES
TROPONIN I SERPL HS-MCNC: 4 NG/L
TROPONIN I SERPL HS-MCNC: 5 NG/L
TROPONIN I SERPL HS-MCNC: 6 NG/L
VENTRICULAR RATE- MUSE: 73 BPM
WBC # BLD AUTO: 7.4 10E3/UL (ref 4–11)

## 2022-04-08 PROCEDURE — G0378 HOSPITAL OBSERVATION PER HR: HCPCS

## 2022-04-08 PROCEDURE — 84484 ASSAY OF TROPONIN QUANT: CPT | Performed by: INTERNAL MEDICINE

## 2022-04-08 PROCEDURE — 250N000013 HC RX MED GY IP 250 OP 250 PS 637: Performed by: INTERNAL MEDICINE

## 2022-04-08 PROCEDURE — 71046 X-RAY EXAM CHEST 2 VIEWS: CPT

## 2022-04-08 PROCEDURE — 99220 PR INITIAL OBSERVATION CARE,LEVEL III: CPT | Performed by: INTERNAL MEDICINE

## 2022-04-08 PROCEDURE — 84484 ASSAY OF TROPONIN QUANT: CPT | Performed by: STUDENT IN AN ORGANIZED HEALTH CARE EDUCATION/TRAINING PROGRAM

## 2022-04-08 PROCEDURE — 36415 COLL VENOUS BLD VENIPUNCTURE: CPT | Performed by: EMERGENCY MEDICINE

## 2022-04-08 PROCEDURE — 82310 ASSAY OF CALCIUM: CPT | Performed by: EMERGENCY MEDICINE

## 2022-04-08 PROCEDURE — U0003 INFECTIOUS AGENT DETECTION BY NUCLEIC ACID (DNA OR RNA); SEVERE ACUTE RESPIRATORY SYNDROME CORONAVIRUS 2 (SARS-COV-2) (CORONAVIRUS DISEASE [COVID-19]), AMPLIFIED PROBE TECHNIQUE, MAKING USE OF HIGH THROUGHPUT TECHNOLOGIES AS DESCRIBED BY CMS-2020-01-R: HCPCS | Performed by: STUDENT IN AN ORGANIZED HEALTH CARE EDUCATION/TRAINING PROGRAM

## 2022-04-08 PROCEDURE — 85025 COMPLETE CBC W/AUTO DIFF WBC: CPT | Performed by: EMERGENCY MEDICINE

## 2022-04-08 PROCEDURE — 99285 EMERGENCY DEPT VISIT HI MDM: CPT | Mod: 25

## 2022-04-08 PROCEDURE — C9803 HOPD COVID-19 SPEC COLLECT: HCPCS

## 2022-04-08 PROCEDURE — 93005 ELECTROCARDIOGRAM TRACING: CPT

## 2022-04-08 PROCEDURE — 250N000013 HC RX MED GY IP 250 OP 250 PS 637: Performed by: STUDENT IN AN ORGANIZED HEALTH CARE EDUCATION/TRAINING PROGRAM

## 2022-04-08 PROCEDURE — 36415 COLL VENOUS BLD VENIPUNCTURE: CPT | Performed by: INTERNAL MEDICINE

## 2022-04-08 RX ORDER — AMLODIPINE BESYLATE 5 MG/1
5 TABLET ORAL EVERY EVENING
Status: DISCONTINUED | OUTPATIENT
Start: 2022-04-08 | End: 2022-04-09 | Stop reason: HOSPADM

## 2022-04-08 RX ORDER — ALBUTEROL SULFATE 0.83 MG/ML
2.5 SOLUTION RESPIRATORY (INHALATION) EVERY 6 HOURS PRN
Status: DISCONTINUED | OUTPATIENT
Start: 2022-04-08 | End: 2022-04-09 | Stop reason: HOSPADM

## 2022-04-08 RX ORDER — MONTELUKAST SODIUM 10 MG/1
10 TABLET ORAL AT BEDTIME
Status: DISCONTINUED | OUTPATIENT
Start: 2022-04-08 | End: 2022-04-09 | Stop reason: HOSPADM

## 2022-04-08 RX ORDER — ISOSORBIDE MONONITRATE 60 MG/1
60 TABLET, EXTENDED RELEASE ORAL EVERY MORNING
Status: DISCONTINUED | OUTPATIENT
Start: 2022-04-09 | End: 2022-04-09

## 2022-04-08 RX ORDER — ASPIRIN 81 MG/1
81 TABLET ORAL DAILY
Status: DISCONTINUED | OUTPATIENT
Start: 2022-04-09 | End: 2022-04-08

## 2022-04-08 RX ORDER — ROSUVASTATIN CALCIUM 20 MG/1
40 TABLET, COATED ORAL DAILY
Status: DISCONTINUED | OUTPATIENT
Start: 2022-04-09 | End: 2022-04-09 | Stop reason: HOSPADM

## 2022-04-08 RX ORDER — MAGNESIUM HYDROXIDE/ALUMINUM HYDROXICE/SIMETHICONE 120; 1200; 1200 MG/30ML; MG/30ML; MG/30ML
30 SUSPENSION ORAL EVERY 4 HOURS PRN
Status: DISCONTINUED | OUTPATIENT
Start: 2022-04-08 | End: 2022-04-09 | Stop reason: HOSPADM

## 2022-04-08 RX ORDER — SPIRONOLACTONE 25 MG/1
12.5 TABLET ORAL DAILY PRN
COMMUNITY
End: 2022-11-25

## 2022-04-08 RX ORDER — ASPIRIN 81 MG/1
81 TABLET ORAL DAILY
Status: DISCONTINUED | OUTPATIENT
Start: 2022-04-09 | End: 2022-04-09 | Stop reason: HOSPADM

## 2022-04-08 RX ORDER — NITROGLYCERIN 0.4 MG/1
0.4 TABLET SUBLINGUAL EVERY 5 MIN PRN
Status: DISCONTINUED | OUTPATIENT
Start: 2022-04-08 | End: 2022-04-09 | Stop reason: HOSPADM

## 2022-04-08 RX ORDER — ASPIRIN 81 MG/1
81 TABLET, CHEWABLE ORAL ONCE
Status: COMPLETED | OUTPATIENT
Start: 2022-04-08 | End: 2022-04-08

## 2022-04-08 RX ADMIN — AMLODIPINE BESYLATE 5 MG: 5 TABLET ORAL at 19:48

## 2022-04-08 RX ADMIN — ASPIRIN 81 MG CHEWABLE TABLET 81 MG: 81 TABLET CHEWABLE at 14:48

## 2022-04-08 RX ADMIN — MONTELUKAST 10 MG: 10 TABLET, FILM COATED ORAL at 21:17

## 2022-04-08 RX ADMIN — ASPIRIN 81 MG CHEWABLE TABLET 81 MG: 81 TABLET CHEWABLE at 18:40

## 2022-04-08 NOTE — PLAN OF CARE
PRIMARY DIAGNOSIS: CHEST PAIN  OUTPATIENT/OBSERVATION GOALS TO BE MET BEFORE DISCHARGE:  1. Ruled out acute coronary syndrome (negative or stable Troponin):  Troponin @1814- 5  2. Pain Status: Pain free.  3. Appropriate provocative testing performed: Yes  - Stress Test Procedure: TBD  - Interpretation of cardiac rhythm per telemetry tech: Tele box ordered- coming    4. Cleared by Consultants (if applicable):No  5. Return to near baseline physical activity: Yes  Discharge Planner Nurse   Safe discharge environment identified: No  Barriers to discharge: Yes       Entered by: Angeles Resendez 04/08/2022 6:52 PM  A/O x4. VSS. IV saline locked. Independent. Reg diet- no caffeine. Denies chest pain. Will continue to monitor.    Please review provider order for any additional goals.   Nurse to notify provider when observation goals have been met and patient is ready for discharge.

## 2022-04-08 NOTE — PHARMACY-ADMISSION MEDICATION HISTORY
Admission medication history interview status for this patient is complete. See [facility] admission navigator for allergy information, prior to admission medications and immunization status.     Medication history interview done, indicate source(s): Patient  Medication history resources (including written lists, pill bottles, clinic record):[place], Care Everywhere  Pharmacy: [facility]    Changes made to PTA medication list:  Added: spironolactone PRN  Changed: none  Reported as Not Taking: none  Removed: none    Actions taken by pharmacist (provider contacted, etc):sticky note to provider     Additional medication history information:  - Pt reported stopped taking amlodipine for a few days because she heard that the med should be stopped after taking it for so long.  - Pt reported taking spironolactone PRN with furosemide for leg swelling. Spironolactone 25 mg was last dispensed on 6/8/21 for 90d supply.     Medication reconciliation/reorder completed by provider prior to medication history?  N    Prior to Admission medications    Medication Sig Last Dose Taking? Auth Provider   albuterol (PROAIR HFA) 108 (90 Base) MCG/ACT inhaler USE 2 INHALATIONS EVERY 6 HOURS AS NEEDED FOR SHORTNESS OF BREATH, DYSPNEA OR WHEEZING  at PRN Yes Elsy Bah MD   albuterol (PROVENTIL) (2.5 MG/3ML) 0.083% neb solution Take 1 vial (2.5 mg) by nebulization every 6 hours as needed for shortness of breath / dyspnea  at PRN Yes Elsy Bah MD   alendronate (FOSAMAX) 70 MG tablet TAKE 1 TABLET EVERY 7 DAYS, TAKE 60 MINUTES BEFORE A.M. MEAL WITH 8 OUNCE WATER, REMAIN UPRIGHT FOR 30 MINUTES 4/2/2022 Yes Elsy Bah MD   amLODIPine (NORVASC) 5 MG tablet Take 1 tablet (5 mg) by mouth every evening Past Week at Unknown time Yes Marleni Raza APRN CNP   aspirin 81 MG tablet Take 81 mg by mouth daily. 4/7/2022 at Unknown time Yes Reported, Patient   Boswellia-Glucosamine-Vit D (OSTEO BI-FLEX/5-LOXIN ADVANCED)  "TABS Take 1,500 mg by mouth 2 times daily  4/7/2022 at Unknown time Yes Reported, Patient   Calcium 6020-1214 MG-UNIT CHEW Take 1 tablet by mouth daily. 4/7/2022 at Unknown time Yes Reported, Patient   Cholecalciferol (VITAMIN D3 PO) Take 4,000 Units by mouth daily  4/7/2022 at Unknown time Yes Reported, Patient   fluticasone-salmeterol (ADVAIR) 250-50 MCG/DOSE inhaler Inhale 1 puff into the lungs every 12 hours 4/7/2022 at Unknown time Yes Elsy Bah MD   furosemide (LASIX) 20 MG tablet Take 1 tablet (20 mg) by mouth daily as needed for leg swelling  at PRN Yes Marleni Raza APRN CNP   isosorbide mononitrate (IMDUR) 60 MG 24 hr tablet Take 1 tablet (60 mg) by mouth every morning Hold if Systolic Blood Pressure is less than 85. 4/7/2022 at Unknown time Yes Elsy Bah MD   montelukast (SINGULAIR) 10 MG tablet Take 1 tablet (10 mg) by mouth At Bedtime 4/7/2022 at Unknown time Yes Elsy Bah MD   nitroGLYcerin (NITROSTAT) 0.4 MG sublingual tablet One tablet under the tongue every 5 minutes if needed for chest pain. May repeat every 5 minutes for a maximum of 3 doses in 15 minutes\" 4/8/2022 at Unknown time Yes Elsy Bah MD   omega-3 fatty acids (FISH OIL DOUBLE STRENGTH) 1200 MG capsule Take 1 capsule by mouth 2 times daily  4/7/2022 at Unknown time Yes Reported, Patient   omeprazole (PRILOSEC) 20 MG DR capsule Take 1 capsule (20 mg) by mouth daily 4/7/2022 at Unknown time Yes Elsy Bah MD   Pyridoxine HCl (VITAMIN B6 PO) Take 1 tablet by mouth daily  4/7/2022 at Unknown time Yes Reported, Patient   rosuvastatin (CRESTOR) 40 MG tablet Take 1 tablet (40 mg) by mouth daily 4/7/2022 at Unknown time Yes Elsy Bah MD   spironolactone (ALDACTONE) 25 MG tablet Take 12.5 mg by mouth daily as needed  at PRN Yes Unknown, Entered By History   diclofenac (VOLTAREN) 1 % topical gel Apply 2 g topically 4 times daily   Elsy Bah MD   fluticasone (FLONASE) 50 " MCG/ACT nasal spray USE 1 TO 2 SPRAYS IN EACH NOSTRIL DAILY   Elsy Bah MD

## 2022-04-08 NOTE — ED PROVIDER NOTES
History     Chief Complaint:  Chest Pain    HPI   June V Slevin is a 81 year old female with a past medical history significant for coronary artery disease s/p PCI to the left PDA in 2017, subclavian stenosis with intervention in 2013, hypertension, pulmonary hypertension, hyperlipidemia, diastolic heart failure, venous insufficiency, obstructive sleep apnea on CPAP, and COPD. She presents today with left sided chest pain, radiating to her left arm. First episode of pain occurred around 6:45AM this morning, upon waking, at which time pain was relieved with nitroglycerin x1. Pain recurred while on route to the ED, and was relieved again with 2nd dose of nitroglycerine. Denies continued chest pain at time of initial ED evaluation. Patient had a recent coronary angiogram in 06/2021 that showed severe spasm of the proximal nondominant RCA worsening to 80%, relieved with intracoronary nitroglycerin.     Review of Systems  Review Of Systems  Skin: negative  Eyes: negative  Ears/Nose/Throat: negative  Respiratory: No shortness of breath, dyspnea on exertion, cough, or hemoptysis  Cardiovascular: chest pain  Gastrointestinal: negative  Genitourinary: negative  Musculoskeletal: negative  Neurologic: negative  Psychiatric: negative  Hematologic/Lymphatic/Immunologic: negative  Endocrine: negative    Allergies:  Animal Dander  Dust Mites  Kiwi  Pollen Extract  Seasonal Allergies    Medications:      albuterol (PROAIR HFA) 108 (90 Base) MCG/ACT inhaler  albuterol (PROVENTIL) (2.5 MG/3ML) 0.083% neb solution  alendronate (FOSAMAX) 70 MG tablet  amLODIPine (NORVASC) 5 MG tablet  aspirin 81 MG tablet  Boswellia-Glucosamine-Vit D (OSTEO BI-FLEX/5-LOXIN ADVANCED) TABS  Calcium 3131-4392 MG-UNIT CHEW  Cholecalciferol (VITAMIN D3 PO)  fluticasone-salmeterol (ADVAIR) 250-50 MCG/DOSE inhaler  furosemide (LASIX) 20 MG tablet  isosorbide mononitrate (IMDUR) 60 MG 24 hr tablet  montelukast (SINGULAIR) 10 MG tablet  nitroGLYcerin  (NITROSTAT) 0.4 MG sublingual tablet  omega-3 fatty acids (FISH OIL DOUBLE STRENGTH) 1200 MG capsule  omeprazole (PRILOSEC) 20 MG DR capsule  Pyridoxine HCl (VITAMIN B6 PO)  rosuvastatin (CRESTOR) 40 MG tablet  spironolactone (ALDACTONE) 25 MG tablet  diclofenac (VOLTAREN) 1 % topical gel  fluticasone (FLONASE) 50 MCG/ACT nasal spray        Past Medical History:      Past Medical History:   Diagnosis Date     Abnormal Papanicolaou smear of cervix and cervical HPV      Arthritis      Chronic rhinitis      COPD (chronic obstructive pulmonary disease) (H)      Coronary artery disease      Diastolic CHF, chronic (H) 2/22/2012     Dislocation of hip (H)      Gastro-oesophageal reflux disease      H/O heart artery stent 04/04/2017     Hyperlipidemia LDL goal <130 10/31/2011     KEN (obstructive sleep apnea)      Pain in right hip      Personal history of colonic polyps      Pulmonary HTN (H)      Subclavian artery stenosis, left (H) 8/7/2013     Subclinical hyperthyroidism 10/17/2016     Unspecified asthma(493.90)      Patient Active Problem List    Diagnosis Date Noted     Venous reflux 07/12/2021     Priority: Medium     Vasospastic angina (H) 06/29/2021     Priority: Medium     Added automatically from request for surgery [id number]       Exertional chest pain 06/29/2021     Priority: Medium     Added automatically from request for surgery [id number]       Abnormal findings on diagnostic imaging of heart and coronary circulation 06/29/2021     Priority: Medium     Added automatically from request for surgery [id number]       Status post coronary angiogram 04/22/2020     Priority: Medium     Unstable angina pectoris (H) 04/17/2020     Priority: Medium     Added automatically from request for surgery [id number]       Chest pain 02/26/2018     Priority: Medium     Hypertension 09/22/2017     Priority: Medium     Subclinical hyperthyroidism 10/17/2016     Priority: Medium     Mild coronary artery disease 01/30/2016     Priority:  Medium     On ASA       Health Care Home 10/28/2015     Priority: Medium              Hip osteoarthritis 10/19/2015     Priority: Medium     KEN (obstructive sleep apnea) 08/20/2015     Priority: Medium     Severe persistent asthma 08/25/2014     Priority: Medium     Subclavian artery stenosis, left (H) 08/07/2013     Priority: Medium     S/p stent in 2013       Advanced directives, counseling/discussion 04/02/2012     Priority: Medium     Advance Care Planning 8/29/2017: ACP Review of Chart / Resources Provided:  Reviewed chart for advance care plan.  Victoria Montalvo has no plan and Full code status on file. Advance Care Planning letter sent.   Added by Stacie Raymundo  Patient states has Advance Directive and will bring in a copy to clinic. 4/2/2012          Diastolic CHF, chronic (H) 02/22/2012     Priority: Medium     Hyperlipidemia LDL goal <130 10/31/2011     Priority: Medium     Osteopenia 12/21/2010     Priority: Medium     Esophageal reflux 09/03/2004     Priority: Medium     Female stress incontinence 09/03/2004     Priority: Medium     LUMBOSACRAL NEURITIS , numbness left thigh 09/03/2004     Priority: Medium     History of colonic polyps 06/19/2003     Priority: Medium     Problem list name updated by automated process. Provider to review       Chronic rhinitis 06/19/2003     Priority: Medium        Past Surgical History:      Past Surgical History:   Procedure Laterality Date     ARTHROPLASTY HIP Right 10/19/2015    Procedure: ARTHROPLASTY HIP;  Surgeon: Aron Torres MD;  Location:  OR     COLONOSCOPY  01/1/08    Polyp removed, bx.     COLONOSCOPY  01/12/10     COLONOSCOPY N/A 2/17/2015    Procedure: COLONOSCOPY;  Surgeon: Gato Quiles MD;  Location:  GI     CT CORONARY ANGIOGRAM  04/04/2017    SHERLEY to PDA     CV CORONARY ANGIOGRAM N/A 4/22/2020    Procedure: Coronary Angiogram;  Surgeon: Handy Denise MD;  Location:  HE[facility]ATH [facility]     CV CORONARY ANGIOGRAM N/A  7/1/2021    Procedure: Coronary Angiogram;  Surgeon: Handy Denise MD;  Location:  [facility]ATH [facility]     CV INSTANTANEOUS WAVE-FREE RATIO N/A 4/22/2020    Procedure: Instantaneous Wave-Free Ratio;  Surgeon: Handy Denise MD;  Location:  HE[facility]ATH [facility]     CV LEFT VENTRICULOGRAM N/A 4/22/2020    Procedure: Left Ventriculogram;  Surgeon: Handy Denise MD;  Location:  HEART CARDIAC CATH LAB     HC REDUCTION OF LARGE BREAST  2/89     VASCULAR SURGERY  2013    angiogram & left subclavical artery stent     Z NONSPECIFIC PROCEDURE  2/89, 1990    liposuction of legs and stomach     ZZC NONSPECIFIC PROCEDURE  1990    Ankle pins removed     Z OPEN RX ANKLE DISLOCATN+FIXATN  4/18/87     ZZ COLONOSCOPY THRU STOMA, DIAGNOSTIC  2002       Family History:      Family History   Problem Relation Age of Onset     Alzheimer Disease Mother      Gastrointestinal Disease Mother      Cancer Father         throat and lungs, liver,     Heart Disease Father 57        CABG     Heart Disease Brother         suicidal     Pacemaker Brother      Heart Disease Sister      Hypertension Maternal Grandmother      Lipids Maternal Grandmother      Cancer Maternal Grandmother         stomach     Cancer Paternal Grandmother         stomach     Allergies Daughter      Allergies Son        Social History:  Smoking Status: Former  Smokeless Tobacco: Never  Alcohol Use: Yes  Drug Use: No  Marital Status:        Physical Exam     Patient Vitals for the past 24 hrs:   BP Temp Temp src Pulse Resp SpO2 Weight   04/08/22 1445 (!) 150/91 -- -- 78 20 97 % --   04/08/22 1430 (!) 164/86 -- -- 75 15 97 % --   04/08/22 1415 (!) 152/78 -- -- 74 16 97 % --   04/08/22 1322 137/86 98.3  F (36.8  C) Oral 89 16 98 % 79.4 kg (175 lb)       Physical Exam  Vitals reviewed.   Constitutional:       Appearance: Normal appearance.   Eyes:      Conjunctiva/sclera: Conjunctivae normal.   Cardiovascular:      Rate and Rhythm: Normal rate  and regular rhythm.      Heart sounds: Normal heart sounds.   Pulmonary:      Effort: Pulmonary effort is normal.      Breath sounds: Normal breath sounds.   Musculoskeletal:         General: No swelling.   Skin:     General: Skin is warm and dry.   Neurological:      General: No focal deficit present.      Mental Status: She is alert.   Psychiatric:         Mood and Affect: Mood normal.         Behavior: Behavior normal.         Thought Content: Thought content normal.         Judgment: Judgment normal.       Emergency Department Course   ECG:   ECG taken at 1405, ECG read at 1409  normal sinus rhythm  normal as compared to prior, dated 4/22/2020.  Rate 73 bpm. VA interval 154 ms. QRS duration 72 ms. QT/QTc 398/438 ms.     Imaging:  XR Chest 2 Views   Final Result   IMPRESSION: There are no acute infiltrates. The cardiac silhouette is   not enlarged. Pulmonary vasculature is unremarkable.      LEONARD PALACIO MD            SYSTEM ID:  [id number]          Laboratory:  Labs Ordered and Resulted from Time of ED Arrival to Time of ED Departure   TROPONIN I - Normal       Result Value    Troponin I High Sensitivity 4     BASIC METABOLIC PANEL - Normal    Sodium 141      Potassium 4.1      Chloride 108      Carbon Dioxide (CO2) 28      Anion Gap 5      Urea Nitrogen 18      Creatinine 0.72      Calcium 8.5      Glucose 83      GFR Estimate 83     CBC WITH PLATELETS AND DIFFERENTIAL    WBC Count 7.4      RBC Count 4.23      Hemoglobin 12.3      Hematocrit 38.6      MCV 91      MCH 29.1      MCHC 31.9      RDW 14.1      Platelet Count 191      % Neutrophils 77      % Lymphocytes 17      % Monocytes 5      % Eosinophils 1      % Basophils 0      % Immature Granulocytes 0      NRBCs per 100 WBC 0      Absolute Neutrophils 5.6      Absolute Lymphocytes 1.3      Absolute Monocytes 0.4      Absolute Eosinophils 0.1      Absolute Basophils 0.0      Absolute Immature Granulocytes 0.0      Absolute NRBCs 0.0     COVID-19 VIRUS  (CORONAVIRUS) BY PCR       Procedures:  None     Emergency Department Course:    Reviewed:    I reviewed nursing notes, vitals and past history    Assessments:  1330 I obtained history and examined the patient as noted above.   1500 I rechecked the patient and explained findings.     Consults:   None     Interventions:    Medications   aspirin (ASA) chewable tablet 81 mg (81 mg Oral Given 4/8/22 0499)       Disposition:  The patient was admitted to the hospital under observation status.     Impression & Plan      Medical Decision Making:    Patient with extensive cardiac history, presenting with chest pain that started at 7AM this morning, relieved with sublingual nitroglycerine initially. Pain then returned when patient was on route to the ED, and relieved again with second dose of nitroglycerine. EKG was normal and initial troponin in the ED was negative. However, given patient's cardiac history (HEART score 5) and short interval between onset of most recent episode of chest pain and initial troponin, it would be reasonable to admit the patient to observation and monitor repeat troponin. Patient denied any shortness of breath, and has no recent travel or prolonged periods of immobilization that would increase her risk for PE. Chest xray showed no signs of pneumonia/pulmonary edema. She denies any abdominal/back pain, and is hemodynamically stable  therefore no concern for aortic dissection.     Diagnosis:    ICD-10-CM    1. Chest pain, unspecified type  R07.9        Discharge Medications:  New Prescriptions    No medications on file        Marcy Thompson MD  Resident  04/08/22 [id number]

## 2022-04-08 NOTE — PLAN OF CARE
ROOM # 208-2    Living Situation (if not independent, order SW consult): home  : delmi     Activity level at baseline: independent  Activity level on admit: SBA    Who will be transporting you at discharge: TBD    Patient registered to observation; given Patient Bill of Rights; given the opportunity to ask questions about observation status and their plan of care.  Patient has been oriented to the observation room, bathroom and call light is in place.    Discussed discharge goals and expectations with patient/family.

## 2022-04-08 NOTE — ED TRIAGE NOTES
Pt c/o chest pain since waking this morning.  Has taken x 2 nitroglycerin at home.  One when waking and the other about 1300.   Now pain free

## 2022-04-08 NOTE — H&P
[facility]    History and Physical - Hospitalist Service       Date of Admission:  4/8/2022    Assessment & Plan      Victoria Montalvo is an 81 year old female with history of coronary artery disease, PDA stent in 2017, coronary angiogram on 7/1/2021 showing 80% proximal RCA lesion possibly due to vasospasm, diastolic congestive heart failure, hypertension, pulmonary hypertension, dyslipidemia, COPD, asthma, left subclavian vein stenosis treated with procedure, obstructive sleep apnea for which he uses CPAP with sleep, GERD, and allergic rhinitis.  She presented to the [facility] emergency department on 4/8/2022 for evaluation of left-sided chest pain.  This was first noted at 6:45 this morning when she woke up.  She frequently has episodes of chest pain for which she takes nitroglycerin.  Sometimes these episodes occur several times a month and sometimes less frequently.  This episode was more severe.  It was nonradiating.  There was no associated nausea, vomiting, or shortness of breath.  It did resolve with nitroglycerin, however.  She came to the emergency department and had another episode that also resolved with nitroglycerin.  She denied fevers, chills, abdominal pain, nausea, vomiting, and dysuria.  Emergency department evaluation showed stable vital signs.  Labs including basic metabolic panel and CBC were unremarkable.  Troponin was 4.  Chest x-ray showed no acute process.  ECG showed normal sinus rhythm with rate of 73 but no ischemia.  I was asked to admit Victoria to observation with chest pain    Problem list:    Acute on chronic recurrent chest pain  History of coronary artery disease with PDA stent in 2017  History of coronary artery vasospasm  Coronary angiogram on 7/1/2021 with 80% proximal RCA stenosis, possibly due to vasospasm  -This episode of chest pain was worse than her normal episodes  -Unclear if this is worsening angina or another episode of coronary artery  vasospasm  -Admit to observation and monitor on telemetry  -Trend troponins  -Request cardiology consultation to see what further evaluation would be recommended, or possibly titration of medications for vasospasm    Coronary artery disease  Diastolic congestive heart failure without acute exacerbation  Hypertension  Dyslipidemia  Pulmonary hypertension  -Resume prior to admission amlodipine, Imdur, Crestor, and as needed nitroglycerin    Obstructive sleep apnea  -CPAP with home settings with slight    GERD  -Resume prior to admission Prilosec    COVID-19 PCR testing was negative         Diet: Combination Diet Regular Diet Adult; No Caffeine Diet    DVT Prophylaxis: Low Risk/Ambulatory with no VTE prophylaxis indicated  Andrew Catheter: Not present  Central Lines: None  Cardiac Monitoring: ACTIVE order. Indication: Chest pain/ ACS rule out (24 hours)  Code Status: Full Code      Clinically Significant Risk Factors Present on Admission                # Platelet Defect: home medication list includes an antiplatelet medication       Disposition Plan   Expected Discharge: possibly tomorrow  Anticipated discharge location: home        The patient's care was discussed with the Patient and Dr. Wright.    Jp Dumont MD  Hospitalist Service  [facility]  Securely message with the Vocera Web Console (learn more here)  Text page via [employer] Paging/Directory         ______________________________________________________________________    Chief Complaint   Chest pain    History is obtained from the patient, Dr. Wright, and the electronic medical record    History of Present Illness   Victoria Montalvo is an 81 year old female with history of coronary artery disease, PDA stent in 2017, coronary angiogram on 7/1/2021 showing 80% proximal RCA lesion possibly due to vasospasm, diastolic congestive heart failure, hypertension, pulmonary hypertension, dyslipidemia, COPD, asthma, left subclavian vein stenosis  treated with procedure, obstructive sleep apnea for which he uses CPAP with sleep, GERD, and allergic rhinitis.  She presented to the [facility] emergency department on 4/8/2022 for evaluation of left-sided chest pain.  This was first noted at 6:45 this morning when she woke up.  She frequently has episodes of chest pain for which she takes nitroglycerin.  Sometimes these episodes occur several times a month and sometimes less frequently.  This episode was more severe.  It was nonradiating.  There was no associated nausea, vomiting, or shortness of breath.  It did resolve with nitroglycerin, however.  She came to the emergency department and had another episode that also resolved with nitroglycerin.  She denied fevers, chills, abdominal pain, nausea, vomiting, and dysuria.  Emergency department evaluation showed stable vital signs.  Labs including basic metabolic panel and CBC were unremarkable.  Troponin was 4.  Chest x-ray showed no acute process.  ECG showed normal sinus rhythm with rate of 73 but no ischemia.  I was asked to admit Victoria to observation with chest pain    Review of Systems    The 10 point Review of Systems is negative other than noted in the HPI or here.     Past Medical History    I have reviewed this patient's medical history and updated it with pertinent information if needed.   Past Medical History:   Diagnosis Date     Abnormal Papanicolaou smear of cervix and cervical HPV     colposcopy 1/97     Arthritis      Chronic rhinitis      COPD (chronic obstructive pulmonary disease) (H)      Coronary artery disease     4/4/17 SHERLEY--PDA     Diastolic CHF, chronic (H) 2/22/2012     Dislocation of hip (H)     5/25/18 and 12/29/18 with successful reductions in ED     Gastro-oesophageal reflux disease      H/O heart artery stent 04/04/2017    SHERLEY to PDA     Hyperlipidemia LDL goal <130 10/31/2011     KEN (obstructive sleep apnea)     CPAP     Pain in right hip      Personal history of  colonic polyps     colonoscopy 9/97, 2002 (due in 2007)     Pulmonary HTN (H)      Subclavian artery stenosis, left (H) 8/7/2013    S/p stent in 2013      Subclinical hyperthyroidism 10/17/2016     Unspecified asthma(493.90)     on preventative meds and has nebulizer       Past Surgical History   I have reviewed this patient's surgical history and updated it with pertinent information if needed.  Past Surgical History:   Procedure Laterality Date     ARTHROPLASTY HIP Right 10/19/2015    Procedure: ARTHROPLASTY HIP;  Surgeon: Aron Torres MD;  Location: [facility] OR     COLONOSCOPY  01/1/08    Polyp removed, bx.     COLONOSCOPY  01/12/10     COLONOSCOPY N/A 2/17/2015    Procedure: COLONOSCOPY;  Surgeon: Gato Quiles MD;  Location:  GI     CT CORONARY ANGIOGRAM  04/04/2017    SHERLEY to PDA     CV CORONARY ANGIOGRAM N/A 4/22/2020    Procedure: Coronary Angiogram;  Surgeon: Handy Denise MD;  Location:  HEART CARDIAC CATH LAB     CV CORONARY ANGIOGRAM N/A 7/1/2021    Procedure: Coronary Angiogram;  Surgeon: Handy Denise MD;  Location: [facility] CARDIAC CATH LAB     CV INSTANTANEOUS WAVE-FREE RATIO N/A 4/22/2020    Procedure: Instantaneous Wave-Free Ratio;  Surgeon: Handy Denise MD;  Location: [facility] CARDIAC CATH LAB     CV LEFT VENTRICULOGRAM N/A 4/22/2020    Procedure: Left Ventriculogram;  Surgeon: Handy Denise MD;  Location:  HEART CARDIAC CATH LAB     HC REDUCTION OF LARGE BREAST  2/89     VASCULAR SURGERY  2013    angiogram & left subclavical artery stent     Z NONSPECIFIC PROCEDURE  2/89, 1990    liposuction of legs and stomach     Z NONSPECIFIC PROCEDURE  1990    Ankle pins removed     Z OPEN RX ANKLE DISLOCATN+FIXATN  4/18/87     ZZ COLONOSCOPY THRU STOMA, DIAGNOSTIC  2002       Social History   I have reviewed this patient's social history and updated it with pertinent information if needed.  Social History     Tobacco Use     Smoking status: Former Smoker      Packs/day: 0.10     Years: 10.00     Pack years: 1.00     Types: Cigarettes     Quit date: 2003     Years since quittin.9     Smokeless tobacco: Never Used   Substance Use Topics     Alcohol use: Yes     Alcohol/week: 0.0 standard drinks     Comment: a couple drinks per year     Drug use: No       Family History   I have reviewed this patient's family history and updated it with pertinent information if needed.  Family History   Problem Relation Age of Onset     Alzheimer Disease Mother      Gastrointestinal Disease Mother      Cancer Father         throat and lungs, liver,     Heart Disease Father 57        CABG     Heart Disease Brother         suicidal     Pacemaker Brother      Heart Disease Sister      Hypertension Maternal Grandmother      Lipids Maternal Grandmother      Cancer Maternal Grandmother         stomach     Cancer Paternal Grandmother         stomach     Allergies Daughter      Allergies Son        Prior to Admission Medications   Prior to Admission Medications   Prescriptions Last Dose Informant Patient Reported? Taking?   Boswellia-Glucosamine-Vit D (OSTEO BI-FLEX/5-LOXIN ADVANCED) TABS 2022 at Unknown time  Yes Yes   Sig: Take 1,500 mg by mouth 2 times daily    Calcium 9331-1315 MG-UNIT CHEW 2022 at Unknown time  Yes Yes   Sig: Take 1 tablet by mouth daily.   Cholecalciferol (VITAMIN D3 PO) 2022 at Unknown time Self Yes Yes   Sig: Take 4,000 Units by mouth daily    Pyridoxine HCl (VITAMIN B6 PO) 2022 at Unknown time  Yes Yes   Sig: Take 1 tablet by mouth daily    albuterol (PROAIR HFA) 108 (90 Base) MCG/ACT inhaler  at PRN  No Yes   Sig: USE 2 INHALATIONS EVERY 6 HOURS AS NEEDED FOR SHORTNESS OF BREATH, DYSPNEA OR WHEEZING   albuterol (PROVENTIL) (2.5 MG/3ML) 0.083% neb solution  at PRN  No Yes   Sig: Take 1 vial (2.5 mg) by nebulization every 6 hours as needed for shortness of breath / dyspnea   alendronate (FOSAMAX) 70 MG tablet 2022  No Yes   Sig: TAKE 1 TABLET  "EVERY 7 DAYS, TAKE 60 MINUTES BEFORE A.M. MEAL WITH 8 OUNCE WATER, REMAIN UPRIGHT FOR 30 MINUTES   amLODIPine (NORVASC) 5 MG tablet Past Week at Unknown time  No Yes   Sig: Take 1 tablet (5 mg) by mouth every evening   aspirin 81 MG tablet 4/7/2022 at Unknown time  Yes Yes   Sig: Take 81 mg by mouth daily.   diclofenac (VOLTAREN) 1 % topical gel   No No   Sig: Apply 2 g topically 4 times daily   fluticasone (FLONASE) 50 MCG/ACT nasal spray   No No   Sig: USE 1 TO 2 SPRAYS IN EACH NOSTRIL DAILY   fluticasone-salmeterol (ADVAIR) 250-50 MCG/DOSE inhaler 4/7/2022 at Unknown time  No Yes   Sig: Inhale 1 puff into the lungs every 12 hours   furosemide (LASIX) 20 MG tablet  at PRN  No Yes   Sig: Take 1 tablet (20 mg) by mouth daily as needed for leg swelling   isosorbide mononitrate (IMDUR) 60 MG 24 hr tablet 4/7/2022 at Unknown time  No Yes   Sig: Take 1 tablet (60 mg) by mouth every morning Hold if Systolic Blood Pressure is less than 85.   montelukast (SINGULAIR) 10 MG tablet 4/7/2022 at Unknown time  No Yes   Sig: Take 1 tablet (10 mg) by mouth At Bedtime   nitroGLYcerin (NITROSTAT) 0.4 MG sublingual tablet 4/8/2022 at Unknown time  No Yes   Sig: One tablet under the tongue every 5 minutes if needed for chest pain. May repeat every 5 minutes for a maximum of 3 doses in 15 minutes\"   omega-3 fatty acids (FISH OIL DOUBLE STRENGTH) 1200 MG capsule 4/7/2022 at Unknown time  Yes Yes   Sig: Take 1 capsule by mouth 2 times daily    omeprazole (PRILOSEC) 20 MG DR capsule 4/7/2022 at Unknown time  No Yes   Sig: Take 1 capsule (20 mg) by mouth daily   rosuvastatin (CRESTOR) 40 MG tablet 4/7/2022 at Unknown time  No Yes   Sig: Take 1 tablet (40 mg) by mouth daily   spironolactone (ALDACTONE) 25 MG tablet  at PRN  Yes Yes   Sig: Take 12.5 mg by mouth daily as needed      Facility-Administered Medications: None     Allergies   Allergies   Allergen Reactions     Animal Dander      Dust Mites      Kiwi Itching     Itching and red all " over     Pollen Extract      Pollen,dust, trees, grass, mold-hayfever symptoms     Seasonal Allergies        Physical Exam   Vital Signs: Temp: 98.3  F (36.8  C) Temp src: Oral BP: (!) 143/79 Pulse: 73   Resp: 17 SpO2: 97 %      Weight: 175 lbs 0 oz    GENERAL: Pleasant and cooperative. No acute distress.  EYES: Pupils equal and round. No scleral erythema or icterus.  ENT: External ears are normal without deformity. Posterior oropharynx is without erythem, swelling, or exudate.  NECK: Supple. No masses or swelling. No tenderness. Thyroid is normal without mass or tenderness.  CHEST: Clear to auscultation. Normal breath sounds. No retractions.   CV: Regular rate and rhythm. No JVD. Pulses normal.  ABDOMEN: Bowel sounds present. No tenderness. No masses or hernia.  EXTREMETIES: No clubbing, cyanosis, or ischemia.  SKIN: Warm and dry to touch. No wounds or rashes.  NEUROLOGIC: Strength and sensation are normal. Deep tendon reflexes are normal. Cranial nerves are normal.        Data   Data reviewed today: I reviewed all medications, new labs and imaging results over the last 24 hours. I personally reviewed the EKG tracing showing Normal sinus rhythm with rate of 75.  No ischemic changes..    Recent Labs   Lab 04/08/22  1412   WBC 7.4   HGB 12.3   MCV 91         POTASSIUM 4.1   CHLORIDE 108   CO2 28   BUN 18   CR 0.72   ANIONGAP 5   PHILLIP 8.5   GLC 83     Recent Results (from the past 24 hour(s))   XR Chest 2 Views    Narrative    CHEST TWO VIEWS April 8, 2022 2:39 PM     HISTORY: Chest pain.    COMPARISON: February 26, 2018.       Impression    IMPRESSION: There are no acute infiltrates. The cardiac silhouette is  not enlarged. Pulmonary vasculature is unremarkable.    LEONARD PALACIO MD         SYSTEM ID:  [id number]

## 2022-04-08 NOTE — ED NOTES
[facility] [place]  ED Nurse Handoff Report    Victoria Montalvo is a 81 year old female   ED Chief complaint: Chest Pain  . ED Diagnosis:   Final diagnoses:   Chest pain, unspecified type     Allergies:   Allergies   Allergen Reactions     Animal Dander      Dust Mites      Kiwi Itching     Itching and red all over     Pollen Extract      Pollen,dust, trees, grass, mold-hayfever symptoms     Seasonal Allergies        Code Status: Full Code  Activity level - Baseline/Home:  Independent. Activity Level - Current:   Stand by Assist. Lift room needed: No. Bariatric: No   Needed: No   Isolation: No. Infection: Not Applicable.     Vital Signs:   Vitals:    04/08/22 1322 04/08/22 1415 04/08/22 1430 04/08/22 1445   BP: 137/86 (!) 152/78 (!) 164/86 (!) 150/91   Pulse: 89 74 75 78   Resp: 16 16 15 20   Temp: 98.3  F (36.8  C)      TempSrc: Oral      SpO2: 98% 97% 97% 97%   Weight: 79.4 kg (175 lb)          Cardiac Rhythm:  ,   Cardiac  Cardiac Rhythm: Normal sinus rhythm  Pain level:    Patient confused: No. Patient Falls Risk: Yes.   Elimination Status: Has voided   Patient Report - Initial Complaint: chest pain. Focused Assessment: 81 year old female with a past medical history significant for coronary artery disease s/p PCI to the left PDA in 2017, subclavian stenosis with intervention in 2013, hypertension, pulmonary hypertension, hyperlipidemia, diastolic heart failure, venous insufficiency, obstructive sleep apnea on CPAP, and COPD. She presents today with left sided chest pain, radiating to her left arm. First episode of pain occurred around 6:45AM this morning, upon waking, at which time pain was relieved with nitroglycerin x1. Pain recurred while on route to the ED, and was relieved again with 2nd dose of nitroglycerine. Patient had a recent coronary angiogram in 06/2021 that showed severe spasm of the proximal nondominant RCA worsening to 80%, relieved with intracoronary nitroglycerin.   Tests  Performed: imaging, labs. Abnormal Results: Abnormal Labs Resulted from Time of ED Arrival to Time of ED Departure - No abnormal labs to display     Treatments provided: see mar  Family Comments: NA  OBS brochure/video discussed/provided to patient:  Yes  ED Medications:   Medications   aspirin (ASA) chewable tablet 81 mg (81 mg Oral Given 4/8/22 8795)     Drips infusing:  No  For the majority of the shift, the patient's behavior Green. Interventions performed were NA.    Sepsis treatment initiated: No     Patient tested for COVID 19 prior to admission: YES - pending    ED Nurse Name/Phone Number: Ashley Lewis RN,   4:07 PM    RECEIVING UNIT ED HANDOFF REVIEW    Above ED Nurse Handoff Report was reviewed: Yes  Reviewed by: Angeles Resendez RN on April 8, 2022 at 5:04 PM

## 2022-04-09 ENCOUNTER — APPOINTMENT (OUTPATIENT)
Dept: CARDIOLOGY | Facility: CLINIC | Age: 82
End: 2022-04-09
Attending: INTERNAL MEDICINE
Payer: COMMERCIAL

## 2022-04-09 VITALS
BODY MASS INDEX: 33.07 KG/M2 | DIASTOLIC BLOOD PRESSURE: 57 MMHG | SYSTOLIC BLOOD PRESSURE: 91 MMHG | RESPIRATION RATE: 18 BRPM | HEART RATE: 97 BPM | OXYGEN SATURATION: 96 % | WEIGHT: 175 LBS | TEMPERATURE: 98.4 F

## 2022-04-09 PROCEDURE — 93321 DOPPLER ECHO F-UP/LMTD STD: CPT | Mod: 26 | Performed by: INTERNAL MEDICINE

## 2022-04-09 PROCEDURE — 93350 STRESS TTE ONLY: CPT | Mod: 26 | Performed by: INTERNAL MEDICINE

## 2022-04-09 PROCEDURE — 250N000013 HC RX MED GY IP 250 OP 250 PS 637: Performed by: INTERNAL MEDICINE

## 2022-04-09 PROCEDURE — 93016 CV STRESS TEST SUPVJ ONLY: CPT | Performed by: INTERNAL MEDICINE

## 2022-04-09 PROCEDURE — 93325 DOPPLER ECHO COLOR FLOW MAPG: CPT | Mod: 26 | Performed by: INTERNAL MEDICINE

## 2022-04-09 PROCEDURE — 99217 PR OBSERVATION CARE DISCHARGE: CPT | Performed by: NURSE PRACTITIONER

## 2022-04-09 PROCEDURE — G0378 HOSPITAL OBSERVATION PER HR: HCPCS

## 2022-04-09 PROCEDURE — 93018 CV STRESS TEST I&R ONLY: CPT | Performed by: INTERNAL MEDICINE

## 2022-04-09 PROCEDURE — 255N000002 HC RX 255 OP 636: Performed by: INTERNAL MEDICINE

## 2022-04-09 PROCEDURE — 93325 DOPPLER ECHO COLOR FLOW MAPG: CPT | Mod: TC

## 2022-04-09 PROCEDURE — 99214 OFFICE O/P EST MOD 30 MIN: CPT | Mod: 25 | Performed by: INTERNAL MEDICINE

## 2022-04-09 PROCEDURE — 258N000003 HC RX IP 258 OP 636: Performed by: INTERNAL MEDICINE

## 2022-04-09 RX ORDER — ISOSORBIDE MONONITRATE 60 MG/1
60 TABLET, EXTENDED RELEASE ORAL EVERY MORNING
Status: DISCONTINUED | OUTPATIENT
Start: 2022-04-10 | End: 2022-04-09 | Stop reason: HOSPADM

## 2022-04-09 RX ORDER — ISOSORBIDE MONONITRATE 30 MG/1
30 TABLET, EXTENDED RELEASE ORAL ONCE
Status: COMPLETED | OUTPATIENT
Start: 2022-04-09 | End: 2022-04-09

## 2022-04-09 RX ADMIN — OMEPRAZOLE 20 MG: 20 CAPSULE, DELAYED RELEASE ORAL at 07:51

## 2022-04-09 RX ADMIN — ISOSORBIDE MONONITRATE 60 MG: 60 TABLET, EXTENDED RELEASE ORAL at 07:51

## 2022-04-09 RX ADMIN — SODIUM CHLORIDE 500 ML: 9 INJECTION, SOLUTION INTRAVENOUS at 11:01

## 2022-04-09 RX ADMIN — ISOSORBIDE MONONITRATE 30 MG: 30 TABLET, EXTENDED RELEASE ORAL at 10:01

## 2022-04-09 RX ADMIN — HUMAN ALBUMIN MICROSPHERES AND PERFLUTREN 4 ML: 10; .22 INJECTION, SOLUTION INTRAVENOUS at 12:31

## 2022-04-09 RX ADMIN — ROSUVASTATIN CALCIUM 40 MG: 20 TABLET, FILM COATED ORAL at 07:51

## 2022-04-09 RX ADMIN — ASPIRIN 81 MG: 81 TABLET, COATED ORAL at 07:51

## 2022-04-09 NOTE — PROGRESS NOTES
Cardiology consult dictated.  81-year-old patient with a known history of coronary artery disease and prior stenting of the distal circumflex [sometimes called the left PDA] who also carries a diagnosis of coronary artery spasm though this diagnosis is somewhat tentative given the results of the cold pressor spasm test in July 2021.  Patient has noticed that she is getting left upper chest discomfort with increasing frequency over the last month.  This occurs at rest and does not occur with exertion and is relieved by sublingual nitroglycerin.  The patient did have tenderness in the left upper chest region when she had the chest discomfort indicating that there is a possibility that this discomfort is musculoskeletal.  Patient had an episode lasting approximately 15 minutes yesterday with no rise in the highly sensitive troponin.  EKG was nonischemic.  Patient is pain-free now.  We will perform a stress echocardiogram but also would increase the dose of the isosorbide mononitrate from 60 to 90 mg.

## 2022-04-09 NOTE — DISCHARGE SUMMARY
[facility]  Hospitalist Discharge Summary      Date of Admission:  4/8/2022  Date of Discharge:  4/9/2022  Discharging Provider: NIMESH Wayne CNP  Discharge Service: Hospitalist Service    Discharge Diagnoses   #Acute on chronic recurrent chest pain  #History of coronary artery disease with PDA stent in 2017  #History of coronary artery spasm  #History of HFpEF --no evidence of active exacerbation  #Pulmonary hypertension --stable  #Hypertension  #Dyslipidemia  #Obstructive sleep apnea  #GERD    Follow-ups Needed After Discharge   1.  Outpatient Isabella stress test pending.    Discharge Disposition   Discharged to home  Condition at discharge: Stable      Hospital Course              Victoria Montalvo is an 81 year old female with history of coronary artery disease, PDA stent in 2017, coronary angiogram on 7/1/2021 showing 80% proximal RCA lesion possibly due to vasospasm, diastolic congestive heart failure, hypertension, pulmonary hypertension, dyslipidemia, COPD, asthma, left subclavian vein stenosis treated with procedure, obstructive sleep apnea for which he uses CPAP with sleep, GERD, and allergic rhinitis.  She presented to the [facility] emergency department on 4/8/2022 for evaluation of left-sided chest pain.  This was first noted at 6:45 this morning when she woke up.  She frequently has episodes of chest pain for which she takes nitroglycerin.  Sometimes these episodes occur several times a month and sometimes less frequently.  This episode was more severe.  It was nonradiating.  There was no associated nausea, vomiting, or shortness of breath.  It did resolve with nitroglycerin, however.  She came to the emergency department and had another episode that also resolved with nitroglycerin.  She denied fevers, chills, abdominal pain, nausea, vomiting, and dysuria.  Emergency department evaluation showed stable vital signs.  Labs including basic metabolic panel and CBC  were unremarkable.  Troponin was 4.  Chest x-ray showed no acute process.  ECG showed normal sinus rhythm with rate of 73 but no ischemia.      Problem list:     Acute on chronic recurrent chest pain  History of coronary artery disease with PDA stent in 2017  History of coronary artery vasospasm  Coronary angiogram on 7/1/2021 with 80% proximal RCA stenosis, possibly due to vasospasm  Today she denies any chest pain.  She is overall is asymptomatic.  She  remained vitally stable.  No acute EKG changes. Troponin (high sensitivity) 4-6 (most recent).     - Attempted to increase Imdur to 90 mg daily but did have a hypotensive event post admission.  Received 500 mL NS with appropriate response.  VS improved with IV fluid hydration. Denies CP or any new complaints. Wants to go home.   -Stress echocardiogram was equivocal.  The apical 2 chamber view was very difficult to review.  The distal portion of the inferior wall and the apex was not well visualized.  The stress EKG was entirely normal she had no inducible symptoms of chest pain.  Well order outpatient Lexiscan.  She was discussed with cardiology.  Appreciate their input.      Coronary artery disease  Diastolic congestive heart failure without acute exacerbation  Hypertension  Dyslipidemia  Pulmonary hypertension  -Resume prior to admission amlodipine,Crestor, and as needed nitroglycerin     Obstructive sleep apnea  -Continue PTA CPAP.      GERD  -Resume prior to admission Prilosec        Consultations This Hospital Stay   CARDIOLOGY IP CONSULT    Code Status   Full Code    Time Spent on this Encounter   I, NIMESH Wayne CNP, personally saw the patient today and spent greater than 30 minutes discharging this patient.       NIMESH Wayne CNP  [facility] OBSERVATION DEPT  [facility] [address] [place] [address]  Phone: [phone]  ______________________________________________________________________    Physical Exam   Vital  Signs: Temp: 98.4  F (36.9  C) Temp src: Oral BP: 107/67 Pulse: 94   Resp: 16 SpO2: 95 % O2 Device: None (Room air)    Weight: 175 lbs 0 oz  See daily progress note for discharge physical exam.    Primary Care Physician   Elsy Bah    Discharge Orders      Primary Care - Care Coordination Referral      Follow-Up with Cardiology DOMINIK      Follow-Up with Cardiology      When to contact your care team    Call 911 if you have any of the following: worsening chest pain, shortness of breath.     Discharge Instructions    1.  Stay hydrated.  2.  Follow-up with outpatient nuclear medicine stress test.  3.  Follow-up with cardiology once stress test is completed.  4.  Follow-up with your primary care provider in the next 1-2 weeks.     NM Lexiscan stress test     NM Lexiscan stress test (nuc card)       Significant Results and Procedures   Most Recent 3 CBC's:Recent Labs   Lab Test 04/08/22  1412 07/01/21  0845 09/24/20  1419   WBC 7.4 5.4 5.4   HGB 12.3 13.3 12.1   MCV 91 89 91    191 159     Most Recent 3 BMP's:Recent Labs   Lab Test 04/08/22  1412 07/22/21  0848 07/01/21  0845    141 141   POTASSIUM 4.1 3.5 3.6   CHLORIDE 108 111* 111*   CO2 28 33* 26   BUN 18 20 11   CR 0.72 0.73 0.76   ANIONGAP 5 <1* 4   PHILLIP 8.5 8.6 8.3*   GLC 83 90 86   ,   High-sensitivity troponin: X3 remains in the 4-6 range (normal).  COVID 4/8/22 Negative.    Results for orders placed or performed during the hospital encounter of 04/08/22   XR Chest 2 Views    Narrative    CHEST TWO VIEWS April 8, 2022 2:39 PM     HISTORY: Chest pain.    COMPARISON: February 26, 2018.       Impression    IMPRESSION: There are no acute infiltrates. The cardiac silhouette is  not enlarged. Pulmonary vasculature is unremarkable.    LEONARD PALACIO MD         SYSTEM ID:  [id number]   Echo Stress Echocardiogram    Narrative    [id number]  [id number]  [id number]  [id number]^DANI^ALEIDA^[facility]  Echocardiography Laboratory  [address]  [place] [address]     Name: ALVIN PHILLIPS  MRN: [id number]  : 1940  Study Date: 2022 10:44 AM  Age: 81 yrs  Gender: Female  Patient Location: [facility]  Reason For Study: Chest Pain  History: Chest Pain  Ordering Physician: ALEIDA LOUISE  Referring Physician: Elsy Bah  Performed By: Shayla Holliday RDCS     BSA: 1.8 m2  Height: 61 in  Weight: 175 lb  HR: 75  BP: 84/50 mmHg  ______________________________________________________________________________  Procedure  Stress Echo Complete. Contrast Optison.  ______________________________________________________________________________  Interpretation Summary  The patient exhibited no chest pain during exercise.  The apical two chamber view with exercise was off axis. The distal inferior  wall and apex were not ideally seen. The apex clearly augments in the four  chamber and the inferior wall clearly augments in the parasternal short axis  view.  Given the normal stress EKG and the absence of chest pain with target heart  rate the likelihood is that ischemia is not present. A nuclear stress test  could be performed to exclude ischemia definitively. The study was technically  difficult. This was a normal stress EKG with no evidence of stress-induced  ischemia.  ______________________________________________________________________________  Stress  The patient exercised 7:41.  RPP [id number].  This study was stopped as the patient achieved an adequate exercise effort for  a diagnostic study.  There was a normal BP response to exercise.  The patient exhibited no chest pain during exercise.  Target Heart Rate was achieved.  This was a normal stress EKG with no evidence of stress-induced ischemia.  A treadmill exercise test according to the Modified Hoang protocol was  performed.  The EKG portion of this stress test was negative for inducible ischemia (see  echo results below).  No arrhythmia noted.  Suboptimal image quality  precludes assessment of wall motion abnormalities  with stress.  Left ventricular cavity size decreases with exercise.  Global LV systolic function augments with exercise.  The visual ejection fraction is >70%.     Baseline  Resting ECG is normal.  The patient is in normal sinus rhythm.  Low voltage.  The visual ejection fraction is estimated at 60-65%.  No regional wall motion abnormalities noted.     Stress Results         Protocol:  MODIFIED MAZIN        Maximum Predicted HR:   138 bpm         Target HR: 117 bpm               % Maximum Predicted HR: 86 %                       Stage  DurationHeart Rate  BP     Comment                            (mm:ss)   (bpm)                   Stage 0   3:00      102   120/54                  Stage 1/2  3:00      110   124/58                   Stage 1   1:41      118   130/62FAC: Average                  RecoveryR  5:00      92     92/60            Stress Duration:   7:41 mm:ss *        Recovery Time: 5:00 mm:ss         Maximum Stress HR: 118 bpm *           METS:          4     Right Ventricle  The right ventricle is normal in size and function.     Mitral Valve  There is trace mitral regurgitation.     Tricuspid Valve  There is mild (1+) tricuspid regurgitation. The right ventricular systolic  pressure is approximated at 17.3 mmHg plus the right atrial pressure.     Aortic Valve  The aortic valve is not well visualized. No aortic regurgitation is present.  No hemodynamically significant valvular aortic stenosis.     Pulmonic Valve  There is mild (1+) pulmonic valvular regurgitation.     ______________________________________________________________________________  Doppler Measurements & Calculations  TR max singh: 208.0 cm/sec  TR max P.3 mmHg     ______________________________________________________________________________  Report approved by: Yandel Lawrence 2022 12:22 PM               Discharge Medications   Current Discharge Medication List      CONTINUE  these medications which have NOT CHANGED    Details   albuterol (PROAIR HFA) 108 (90 Base) MCG/ACT inhaler USE 2 INHALATIONS EVERY 6 HOURS AS NEEDED FOR SHORTNESS OF BREATH, DYSPNEA OR WHEEZING  Qty: 25.5 g, Refills: 6    Comments: Pharmacy may dispense brand covered by insurance (Proair, or proventil or ventolin or generic albuterol inhaler)  Associated Diagnoses: Moderate persistent asthma, uncomplicated      albuterol (PROVENTIL) (2.5 MG/3ML) 0.083% neb solution Take 1 vial (2.5 mg) by nebulization every 6 hours as needed for shortness of breath / dyspnea  Qty: 3 mL, Refills: 11    Associated Diagnoses: Moderate persistent asthma, uncomplicated      alendronate (FOSAMAX) 70 MG tablet TAKE 1 TABLET EVERY 7 DAYS, TAKE 60 MINUTES BEFORE A.M. MEAL WITH 8 OUNCE WATER, REMAIN UPRIGHT FOR 30 MINUTES  Qty: 12 tablet, Refills: 3    Associated Diagnoses: Age-related osteoporosis without current pathological fracture      amLODIPine (NORVASC) 5 MG tablet Take 1 tablet (5 mg) by mouth every evening  Qty: 90 tablet, Refills: 3    Associated Diagnoses: Vasospastic angina (H)      aspirin 81 MG tablet Take 81 mg by mouth daily.      Boswellia-Glucosamine-Vit D (OSTEO BI-FLEX/5-LOXIN ADVANCED) TABS Take 1,500 mg by mouth 2 times daily       Calcium 6968-6303 MG-UNIT CHEW Take 1 tablet by mouth daily.      Cholecalciferol (VITAMIN D3 PO) Take 4,000 Units by mouth daily       fluticasone-salmeterol (ADVAIR) 250-50 MCG/DOSE inhaler Inhale 1 puff into the lungs every 12 hours  Qty: 3 each, Refills: 3    Associated Diagnoses: Moderate persistent asthma, uncomplicated; Uncomplicated severe persistent asthma      furosemide (LASIX) 20 MG tablet Take 1 tablet (20 mg) by mouth daily as needed for leg swelling  Qty: 60 tablet, Refills: 0    Associated Diagnoses: Venous reflux      isosorbide mononitrate (IMDUR) 60 MG 24 hr tablet Take 1 tablet (60 mg) by mouth every morning Hold if Systolic Blood Pressure is less than 85.  Qty: 90 tablet,  "Refills: 3    Associated Diagnoses: Unstable angina pectoris (H)      montelukast (SINGULAIR) 10 MG tablet Take 1 tablet (10 mg) by mouth At Bedtime  Qty: 90 tablet, Refills: 3    Associated Diagnoses: Moderate persistent asthma, uncomplicated      nitroGLYcerin (NITROSTAT) 0.4 MG sublingual tablet One tablet under the tongue every 5 minutes if needed for chest pain. May repeat every 5 minutes for a maximum of 3 doses in 15 minutes\"  Qty: 25 tablet, Refills: 3    Associated Diagnoses: Unstable angina pectoris (H)      omega-3 fatty acids (FISH OIL DOUBLE STRENGTH) 1200 MG capsule Take 1 capsule by mouth 2 times daily       omeprazole (PRILOSEC) 20 MG DR capsule Take 1 capsule (20 mg) by mouth daily  Qty: 90 capsule, Refills: 3    Associated Diagnoses: Gastroesophageal reflux disease without esophagitis      Pyridoxine HCl (VITAMIN B6 PO) Take 1 tablet by mouth daily       rosuvastatin (CRESTOR) 40 MG tablet Take 1 tablet (40 mg) by mouth daily  Qty: 90 tablet, Refills: 3    Associated Diagnoses: Mild coronary artery disease      spironolactone (ALDACTONE) 25 MG tablet Take 12.5 mg by mouth daily as needed      diclofenac (VOLTAREN) 1 % topical gel Apply 2 g topically 4 times daily  Qty: 100 g, Refills: 11    Associated Diagnoses: Arthritis      fluticasone (FLONASE) 50 MCG/ACT nasal spray USE 1 TO 2 SPRAYS IN EACH NOSTRIL DAILY  Qty: 48 g, Refills: 11    Associated Diagnoses: Chronic rhinitis           Allergies   Allergies   Allergen Reactions     Animal Dander      Dust Mites      Kiwi Itching     Itching and red all over     Pollen Extract      Pollen,dust, trees, grass, mold-hayfever symptoms     Seasonal Allergies      "

## 2022-04-09 NOTE — CONSULTS
Consult Date: 04/09/2022    REFERRING PHYSICIAN:  Jp Dumont MD.    INDICATION FOR CARDIAC CONSULTATION:  Chest discomfort.    Dear Doctor:    It is my pleasure to see your patient, Victoria Montalvo, who is a patient normally followed by my partner, Dr. Yogi Yepez.  This patient carries a diagnosis of coronary artery spasm.  Her most recent coronary angiogram was performed 9 months ago, which showed that the previously placed stent in the distal circumflex was widely patent with a mild 40% stenosis in the proximal circumflex and a 20% mid stenosis.  The left anterior descending artery with 35% proximal stenosis.  There is a very small nondominant right coronary artery, which has 80% disease present.  The patient did have a cold pressure test for spasm.  The left posterior descending artery symptom appeared to have nonspecific diffuse narrowing.  There was also nonspecific narrowing in the left coronary artery system.  It is a nonspecific result.    The patient developed chest discomfort last night.  The chest discomfort was in the left upper chest region.  Interestingly, the chest was tender to the touch when she had the chest discomfort.  She did take sublingual nitroglycerin, which did relieve the pain quite rapidly.  She has noticed over the past month that she is using nitroglycerin more frequently.  She does not have exertional chest discomfort, however.  Her troponins were entirely normal with the first troponin being 4, second troponin 5, third troponin 6.    Her 12-lead electrocardiogram on admission showed the patient was in sinus rhythm and there was nonspecific RSR prime pattern in III and aVF, but overall a nonischemic pattern and no change from 07/2021.    PAST MEDICAL HISTORY:    1.  Coronary artery disease, as described above, with stenting of the distal circumflex.  2.  Putative diagnosis of coronary artery spasm.  3.  Chronic rhinitis.  4.  COPD.  5.  Diastolic congestive heart failure.  6.   Gastroesophageal reflux disease.  7.  Subclavian artery stenosis on the left side with a stent placed in 2013.  8.  Asthma.  9.  Colonic polyps.  10.  Obstructive sleep apnea.  11.  Subclinical hyperparathyroidism.  12.  Hip pain.    PAST SURGICAL HISTORY:    1.  Arthroplasty to the right hip.  2.  Colonoscopy and polyp removal in the past.  3.  Breast reduction in 1989.   4.  Subclavian artery stent in 2013.  5.  Liposuction of legs and stomach in 1990 and 1989.  6.  Dislocation and open reduction, internal fixation of the right ankle in 1987.    FAMILY HISTORY:  Mother had Alzheimer's disease and gastrointestinal disease.  Father had throat cancer and lung and liver cancer.  Father had coronary artery bypass grafting.  Brother has heart disease.  Brother with a pacemaker.  Sister had heart disease.  Maternal grandmother had hypertension, cancer of the stomach and hyperlipidemia.  Paternal grandmother had stomach cancer.    SOCIAL HISTORY:  Former smoker and stopped in 2003 with a 10-year pack history.  Drinks alcohol very occasionally, maybe a couple of drinks a year.    ALLERGIES:  ANIMAL DANDER, KIWI, POLLEN EXTRACT, SEASONAL ALLERGIES.    MEDICATIONS:  Amlodipine 5 mg per day, aspirin 81 mg per day, isosorbide mononitrate 60 mg per day, Singulair 10 mg orally at bedtime, omeprazole 20 mg per day, rosuvastatin 40 mg per day.    REVIEW OF SYSTEMS:    CONSTITUTIONAL:  Negative.  EYES:  Negative.  ENT:  Negative.  CARDIOVASCULAR:  As above.  RESPIRATORY:  Nil.  GASTROINTESTINAL:  Nil.  GENITOURINARY:  Nil.  MUSCULOSKELETAL:  Has arthralgias.  NEUROLOGICAL:  Nil.  PSYCHIATRIC:  Nil.  ENDOCRINE:  Nil.  HEMATOLOGIC LYMPHATIC:  Nil.  ALLERGY IMMUNOLOGY:  As above.    PHYSICAL EXAMINATION:    GENERAL:  She is a very pleasant patient, in no apparent distress.  VITAL SIGNS:  Her blood pressure is mildly raised at 150/82, her pulse is 68 beats per minute and regular in rhythm and volume consistent with sinus rhythm.  Her  temperature is 36.7 degree Fahrenheit, respirations are 18, O2 sats are 96%.  HEAD, EYES, NECK, NOSE AND THROAT:  Unremarkable.  She has normal facial symmetry.  Pupils are equal.  Dentition is good.  Jugular venous pulses not raised.  Carotids are normal with no bruits.  [place] beat is not displaced.  Heart sounds 1 and 2 normal.  She has a 1/6 systolic ejection murmur heard in aortic area consistent with aortic sclerosis.  CHEST:  Clear to percussion and auscultation with no added sounds.  ABDOMEN:  Unremarkable with no hepatosplenomegaly, no masses or bruits.  EXTREMITIES:  Femoral pulses are 3+, popliteals are 2+, dorsalis pedis and posterior tibials are 1+.  She has got trace pedal edema.  NEUROLOGIC:  She moves all 4 limbs appropriately with no obvious sensory motor loss and she is fully orientated to time, place and person with a very pleasant affect.    LABORATORY INVESTIGATIONS:  Sodium is 141, potassium is 4.1, BUN is 18, creatinine is 0.73, GFR 83, calcium 8.5.  Troponins as described above and normal.  Last lipid profile was 9 months ago, showing an LDL of 59, HDL of 88 and triglycerides of 52 with a total cholesterol of 157, which shows that it is well within secondary prevention guidelines.  White cell count 7.4, hemoglobin 12.3, platelet count is 191,000.  SARS-CoV-2 PCR test is negative.    IMPRESSION:  Somewhat atypical chest discomfort.  However, she is having increased frequency of the discomfort which occurs at rest, with some relief with sublingual nitroglycerin.  No rise in troponin.  EKG normal.    Coronary angiogram performed only 9 months ago and showing no significant stenosis in the major epicardial vessels.  The very small nondominant right coronary artery, which is a vessel that we do not usually intervene upon, has 80% disease.  This is unlikely to be causing her symptoms.    PLAN:  We will perform a stress echocardiogram today to see if there are any large areas of ischemia that would  indicate that she has developed a new stenosis in the left dominant system.  I will increase the dose of her isosorbide mononitrate from 60 mg to 90 mg.  We will await the results of the stress echocardiogram.    It is my pleasure to be involved the care of this nice patient.    Tony Holliday MD, [facility]        D: 2022   T: 2022   MT: [id number]    Name:     ALVIN PHILLIPS  MRN:      [id number]        Account:      [id number]   :      1940           Consult Date: 2022     Document: [id number]    cc:  Jp Dumont MD

## 2022-04-09 NOTE — PROGRESS NOTES
Stress echocardiogram was equivocal.  The apical 2 chamber view was very difficult.  The distal portion of the inferior wall and to the apex was not well visualized.  Even the resting apical 2 chamber view was somewhat foreshortened.  However all other walls augmented appropriately in the inferior wall augments appropriately on the parasternal short axis view.  Most importantly, the stress EKG was entirely normal and the patient had no inducible angina pectoris.  We did try to increase her isosorbide mononitrate to 90 mg/day by adding in a extra 30 mg.  However this dropped her blood pressure.  We did bolus her with fluid and her blood pressure is back to normal now.  Given the lack of chest discomfort on exertion the normal stress EKG and given that the vast majority of the stress echo which was visible was normal I feel comfortable letting the patient home.  She should have a Lexiscan performed next week and follow-up with Dr. Yepez's team.  We will arrange for that.  We will sign off.

## 2022-04-09 NOTE — PLAN OF CARE
Patient is alert and oriented x4. BP elevated this morning, but cardiac medications given. Lung sounds clear in all lobes and patient is on RA. Denies SOB. Active bowel sounds in all 4 quadrants with LBM yesterday. Patient denies any pain, urgency, when voiding. SL. Comb regular diet/no caffeine. Independent in room. Denies chest pain. Trops have been 6, 5, 4. Tele in place and patient is SR HR 71.   Patient refused inhalers stating she will take them when she gets home.     Plan: Cards consult.     BP (!) 150/82 (BP Location: Left arm)   Pulse 68   Temp 98  F (36.7  C) (Oral)   Resp 18   Wt 79.4 kg (175 lb)   LMP  (LMP Unknown)   SpO2 96%   BMI 33.07 kg/m

## 2022-04-09 NOTE — PROGRESS NOTES
Care Management Discharge Note    Discharge Date: 04/09/2022     Discharge Disposition:  Home    Handoff Referral Completed: Yes    Additional Information:  Pt identified as a Service Bundle #2. No needs or assessment needed at this time. Please consult CM/SW  if discharge needs should arise.    SAUL Castellon

## 2022-04-09 NOTE — PROVIDER NOTIFICATION
Patient had 30mg of Imdur (one time dose) ordered and BP was 98/67. SHANT Raya, still stated to give it to patient tho BP was 98/67. Will continue to monitor.     BP 98/67 (BP Location: Left arm)   Pulse 86   Temp 98.4  F (36.9  C) (Oral)   Resp 16   Wt 79.4 kg (175 lb)   LMP  (LMP Unknown)   SpO2 95%   BMI 33.07 kg/m

## 2022-04-09 NOTE — PLAN OF CARE
Problem: Fall Injury Risk  Goal: Absence of Fall and Fall-Related Injury  Intervention: Promote Injury-Free Environment  Recent Flowsheet Documentation  Taken 4/8/2022 2000 by Fernie Ballesteros RN  Safety Promotion/Fall Prevention:    clutter free environment maintained    nonskid shoes/slippers when out of bed    fall prevention program maintained     Problem: Chest Pain  Goal: Resolution of Chest Pain Symptoms  Outcome: Ongoing, Progressing     Problem: Plan of Care - These are the overarching goals to be used throughout the patient stay.    Goal: Optimal Comfort and Wellbeing  Outcome: Ongoing, Progressing   Goal Outcome Evaluation:  Pt is A & O x 4. She denied chest pain this shift. She remains on RA, clear lungs sound and denied SOB. She is independent in room. IV access is patent and saline locked. Will continue to monitor

## 2022-04-09 NOTE — PROGRESS NOTES
[facility]    Medicine Progress Note - Hospitalist Service    Date of Admission:  4/8/2022    Assessment & Plan            Victoria Montalvo is an 81 year old female with history of coronary artery disease, PDA stent in 2017, coronary angiogram on 7/1/2021 showing 80% proximal RCA lesion possibly due to vasospasm, diastolic congestive heart failure, hypertension, pulmonary hypertension, dyslipidemia, COPD, asthma, left subclavian vein stenosis treated with procedure, obstructive sleep apnea for which he uses CPAP with sleep, GERD, and allergic rhinitis.  She presented to the [facility] emergency department on 4/8/2022 for evaluation of left-sided chest pain.  This was first noted at 6:45 this morning when she woke up.  She frequently has episodes of chest pain for which she takes nitroglycerin.  Sometimes these episodes occur several times a month and sometimes less frequently.  This episode was more severe.  It was nonradiating.  There was no associated nausea, vomiting, or shortness of breath.  It did resolve with nitroglycerin, however.  She came to the emergency department and had another episode that also resolved with nitroglycerin.  She denied fevers, chills, abdominal pain, nausea, vomiting, and dysuria.  Emergency department evaluation showed stable vital signs.  Labs including basic metabolic panel and CBC were unremarkable.  Troponin was 4.  Chest x-ray showed no acute process.  ECG showed normal sinus rhythm with rate of 73 but no ischemia.      Problem list:     Acute on chronic recurrent chest pain  History of coronary artery disease with PDA stent in 2017  History of coronary artery vasospasm  Coronary angiogram on 7/1/2021 with 80% proximal RCA stenosis, possibly due to vasospasm  Today she denies any chest pain.  She is overall is asymptomatic.  She  remained vitally stable.  No acute EKG changes. Troponin (high sensitivity) 4-6 (most recent).     -Increased Imdur  to 90 mg daily  -Stress echocardiogram  - Cards consult.  Appreciate their input.     Coronary artery disease  Diastolic congestive heart failure without acute exacerbation  Hypertension  Dyslipidemia  Pulmonary hypertension  -Resume prior to admission amlodipine,Crestor, and as needed nitroglycerin     Obstructive sleep apnea  -Continue PTA CPAP.      GERD  -Resume prior to admission Prilosec     COVID-19 PCR testing was negative              Diet: Combination Diet Regular Diet Adult; No Caffeine Diet    DVT Prophylaxis: Low Risk/Ambulatory with no VTE prophylaxis indicated  Andrew Catheter: Not present  Central Lines: None  Cardiac Monitoring: ACTIVE order. Indication: Chest pain/ ACS rule out (24 hours)  Code Status: Full Code       The patient's care was discussed with the Bedside Nurse, Patient and Cards Consultant.    NIMESH [facility]  Hospitalist Service  [facility]  Securely message with the Vocera Web Console (learn more here)  Text page via [employer] Paging/Directory       Addendum:  10:43 AM   During stress echo her systolic blood pressure dropped to 84.  This was after her Imdur was given.  premedication administration systolic blood pressure was 98.  Consideration was given is to hold or not hold Imdur.  However given her her symptoms yesterday the decision was made to go ahead and administer the Imdur with close monitoring.  Prior blood pressure was 150/82.  Past 24 hours systolic blood pressures have run between .    We will give small fluid bolus and monitor.  We will add hold parameters to Imdur.  ___________________________________________________________________    Interval History   Ms. Montalvo was seen and examined. VSS.  Denies any acute chest pain at present.  States she feels much better.     Data reviewed today: I reviewed all medications, new labs and imaging results over the last 24 hours. I personally reviewed EKG is nonischemic.  There is a nonspecific  RSR prime pattern in 3 and aVF.  Troponins have been stable.    Physical Exam   Vital Signs: Temp: 98.4  F (36.9  C) Temp src: Oral BP: 98/67 Pulse: 86   Resp: 16 SpO2: 95 % O2 Device: None (Room air)    Weight: 175 lbs 0 oz                              General: Vital signs stable.  This is a very pleasant 81-year-old female in no acute distress.  HEENT: Normocephalic/atraumatic.  Pupils are round reactive light and accommodation.  EOMI.  Oropharynx is clear.  Cor: Regular rate and rhythm.  She does have a 1/6 systolic murmur.   Chest: Bilateral breath sounds clear to auscultation.  No adventitious breath sounds are appreciated.  Abdomen: Soft nontender nondistended normoactive bowel sounds no appreciable pedal splenomegaly.  Extremities: Moves all extremities.  Peripheral pulses are present.  Trace pedal edema.  Neurologic: Cranial nerves are grossly intact.  No focal deficits.  Psychiatric: Calm and appropriate.    Data   Recent Labs   Lab 04/08/22  1412   WBC 7.4   HGB 12.3   MCV 91         POTASSIUM 4.1   CHLORIDE 108   CO2 28   BUN 18   CR 0.72   ANIONGAP 5   PHILLIP 8.5   GLC 83     Recent Results (from the past 24 hour(s))   XR Chest 2 Views    Narrative    CHEST TWO VIEWS April 8, 2022 2:39 PM     HISTORY: Chest pain.    COMPARISON: February 26, 2018.       Impression    IMPRESSION: There are no acute infiltrates. The cardiac silhouette is  not enlarged. Pulmonary vasculature is unremarkable.    LEONARD PALACIO MD         SYSTEM ID:  [id number]       Additional record review: Most recent lipid profile was 9 months ago showing an LDL of 59, HDL of 88, and triglycerides of 52.  Total cholesterol 157.    Coronary angiogram approximately 9 months ago shows no significant stenosis in the major epicardial vessels.  She was noted to have 80% stenosis of small nondominant right coronary artery.  Per discussion with cardiology this is an area that is not typically intervened upon.  And most likely this is not  causing her symptoms.    Medications       amLODIPine  5 mg Oral QPM     aspirin  81 mg Oral Daily     fluticasone-vilanterol  1 puff Inhalation Daily     [START ON 4/10/2022] isosorbide mononitrate  90 mg Oral QAM     montelukast  10 mg Oral At Bedtime     omeprazole  20 mg Oral Daily     rosuvastatin  40 mg Oral Daily

## 2022-04-09 NOTE — PROVIDER NOTIFICATION
Cardio pulm called regarding patients BP. BP was 84/50 when patient got down there. They had patient sit and BP dropped to 66/38 and patient symptomatic. Had patient lay back down. 500 ml bolus of NS obtained from Dr. Carlos Holliday. Fluids infusing at this time and last BP was 94/52. Will monitor closely.

## 2022-04-09 NOTE — PLAN OF CARE
Patient's After Visit Summary was reviewed with patient.   Patient verbalized understanding of After Visit Summary, recommended follow up and was given an opportunity to ask questions.   Discharge medications sent home with patient/family: NO   Discharged with transport tech with all belongings. Patient medically cleared to discharge. Friend will transport her home. BP WNL and denied chest pain.     /67 (BP Location: Left arm)   Pulse 94   Temp 98.4  F (36.9  C) (Oral)   Resp 16   Wt 79.4 kg (175 lb)   LMP  (LMP Unknown)   SpO2 95%   BMI 33.07 kg/m    OBSERVATION patient END time: 1316

## 2022-04-11 ENCOUNTER — TELEPHONE (OUTPATIENT)
Dept: CARE COORDINATION | Facility: CLINIC | Age: 82
End: 2022-04-11

## 2022-04-11 ENCOUNTER — PATIENT OUTREACH (OUTPATIENT)
Dept: NURSING | Facility: CLINIC | Age: 82
End: 2022-04-11
Payer: OTHER GOVERNMENT

## 2022-04-11 NOTE — LETTER
[facility] CARE COORDINATION  [facility]  April 11, 2022 June BALTA Montalvo  [id number]  [place] [address]      Dear June,    I am a clinic community health worker who works with Elsy Bah MD at the [facility]. I wanted to thank you for spending the time to talk with me.  Below is a description of clinic care coordination and how I can further assist you.      The clinic care coordination team is made up of a registered nurse,  and community health worker who understand the health care system. The goal of clinic care coordination is to help you manage your health and improve access to the health care system in the most efficient manner. The team can assist you in meeting your health care goals by providing education, coordinating services, strengthening the communication among your providers and supporting you with any resource needs.    Please feel free to contact me at [phone] with any questions or concerns. We are focused on providing you with the highest-quality healthcare experience possible and that all starts with you.     Sincerely,     BEE Correia, B.S. Public Health  Clinic Care Coordination  [facility]:  [place] and [place]  [phone]  [email or web address]

## 2022-04-11 NOTE — TELEPHONE ENCOUNTER
Care team,     Patient was recently IP for chest pain, CHW and pt called internal scheduling line together to schedule hospital f/u however there were not any available appointments and patient declined seeing an alternative provider.     Are there any openings? She will be on vacation 4/28-5/6. Thank you!    BEE Correia, B.S. [facility] Care Coordination  [facility]:  [place], [place] and [place]  [phone]  [email or web address]

## 2022-04-11 NOTE — PROGRESS NOTES
Clinic Care Coordination Contact  [facility]: Post-Discharge Note  SITUATION                                                      Admission:    Admission Date: 04/08/22   Reason for Admission: Chest pain  Discharge:   Discharge Date: 04/09/22  Discharge Diagnosis: Acute on chronic recurrent chest pain    BACKGROUND                                                      Per hospital discharge summary and inpatient provider notes:  Victoria Montalvo is an 81 year old female with history of coronary artery disease, PDA stent in 2017, coronary angiogram on 7/1/2021 showing 80% proximal RCA lesion possibly due to vasospasm, diastolic congestive heart failure, hypertension, pulmonary hypertension, dyslipidemia, COPD, asthma, left subclavian vein stenosis treated with procedure, obstructive sleep apnea for which he uses CPAP with sleep, GERD, and allergic rhinitis.  She presented to the [facility] emergency department on 4/8/2022 for evaluation of left-sided chest pain.     ASSESSMENT      Enrollment  Primary Care Care Coordination Status: Declined    Discharge Assessment  How are you doing now that you are home?: Pt states she is doing well  How are your symptoms? (Red Flag symptoms escalate to triage hotline per guidelines): Improved  Do you feel your condition is stable enough to be safe at home until your provider visit?: Yes  Does the patient have their discharge instructions? : Yes  Does the patient have questions regarding their discharge instructions? : No  Were you started on any new medications or were there changes to any of your previous medications? : No  Does the patient have all of their medications?: Yes  Do you have questions regarding any of your medications? : No  Do you have all of your needed medical supplies or equipment (DME)?  (i.e. oxygen tank, CPAP, cane, etc.): Yes  Discharge follow-up appointment scheduled within 14 calendar days? : No  Is patient agreeable to assistance with  scheduling? : Yes    Care Management   Community Health Worker Initial Outreach    Chart Review:     CHW introduced self and role with CC  Patient states that she is doing well since being home, her BP was 137 this morning which was better.   She has no outstanding questions or concerns at this time, she will call cardiology her self at #[phone].  CHW called the internal scheduling line together, PCP does not have anything available for f/u in 1-2 weeks, will send telephone encounter to care team to assist.   Patient will be going to [place] 4/28-5/6.   CHW inquired if patient is in need of any additional support or resources however patient declines.  CHW provided contact information and encouraged patient to reach out with any future needs or concerns, patient agrees.    Patient accepts CC: No, patient declines outstanding needs for CC at this time. Patient will be sent Care Coordination introduction letter for future reference.     PLAN                                                      Outpatient Plan:  Follow-up with outpatient nuclear medicine stress test.  Follow-up with cardiology once stress test is completed.  Follow-up with your primary care provider in the next 1-2 weeks.    Future Appointments   Date Time Provider Department Center   6/10/2022 10:15 AM Yogi Yepez MD [facility]         For any urgent concerns, please contact our 24 hour nurse triage line: [phone] ([phone])       BEE Correia, B.S. [facility] Care Coordination  [facility] Clin[facility]:  [place] and [place]  [phone]  [email or web address]

## 2022-04-15 ENCOUNTER — HOSPITAL ENCOUNTER (EMERGENCY)
Facility: CLINIC | Age: 82
Discharge: HOME | End: 2022-04-15
Attending: EMERGENCY MEDICINE | Admitting: EMERGENCY MEDICINE
Payer: COMMERCIAL

## 2022-04-15 ENCOUNTER — OFFICE VISIT (OUTPATIENT)
Dept: PEDIATRICS | Facility: CLINIC | Age: 82
End: 2022-04-15
Payer: COMMERCIAL

## 2022-04-15 ENCOUNTER — APPOINTMENT (OUTPATIENT)
Dept: GENERAL RADIOLOGY | Facility: CLINIC | Age: 82
End: 2022-04-15
Attending: EMERGENCY MEDICINE
Payer: COMMERCIAL

## 2022-04-15 VITALS
BODY MASS INDEX: 33.25 KG/M2 | RESPIRATION RATE: 16 BRPM | SYSTOLIC BLOOD PRESSURE: 136 MMHG | HEART RATE: 76 BPM | DIASTOLIC BLOOD PRESSURE: 70 MMHG | OXYGEN SATURATION: 97 % | TEMPERATURE: 97.5 F | WEIGHT: 176 LBS

## 2022-04-15 VITALS
SYSTOLIC BLOOD PRESSURE: 104 MMHG | OXYGEN SATURATION: 98 % | DIASTOLIC BLOOD PRESSURE: 78 MMHG | TEMPERATURE: 98.6 F | RESPIRATION RATE: 16 BRPM | HEART RATE: 79 BPM

## 2022-04-15 DIAGNOSIS — Z12.11 SCREEN FOR COLON CANCER: Primary | ICD-10-CM

## 2022-04-15 DIAGNOSIS — M25.551 HIP PAIN, RIGHT: ICD-10-CM

## 2022-04-15 DIAGNOSIS — I20.1 VASOSPASTIC ANGINA (H): ICD-10-CM

## 2022-04-15 DIAGNOSIS — K21.00 GASTROESOPHAGEAL REFLUX DISEASE WITH ESOPHAGITIS WITHOUT HEMORRHAGE: ICD-10-CM

## 2022-04-15 PROCEDURE — 99495 TRANSJ CARE MGMT MOD F2F 14D: CPT | Performed by: INTERNAL MEDICINE

## 2022-04-15 PROCEDURE — 73502 X-RAY EXAM HIP UNI 2-3 VIEWS: CPT

## 2022-04-15 PROCEDURE — 99285 EMERGENCY DEPT VISIT HI MDM: CPT | Mod: 25

## 2022-04-15 PROCEDURE — 96374 THER/PROPH/DIAG INJ IV PUSH: CPT

## 2022-04-15 PROCEDURE — 96375 TX/PRO/DX INJ NEW DRUG ADDON: CPT

## 2022-04-15 PROCEDURE — 250N000011 HC RX IP 250 OP 636: Performed by: EMERGENCY MEDICINE

## 2022-04-15 RX ORDER — LANSOPRAZOLE 30 MG/1
30 CAPSULE, DELAYED RELEASE ORAL DAILY
Qty: 90 CAPSULE | Refills: 1 | Status: SHIPPED | OUTPATIENT
Start: 2022-04-15 | End: 2022-11-25

## 2022-04-15 RX ORDER — ONDANSETRON 2 MG/ML
4 INJECTION INTRAMUSCULAR; INTRAVENOUS ONCE
Status: COMPLETED | OUTPATIENT
Start: 2022-04-15 | End: 2022-04-15

## 2022-04-15 RX ORDER — MORPHINE SULFATE 2 MG/ML
4 INJECTION, SOLUTION INTRAMUSCULAR; INTRAVENOUS ONCE
Status: COMPLETED | OUTPATIENT
Start: 2022-04-15 | End: 2022-04-15

## 2022-04-15 RX ADMIN — ONDANSETRON 4 MG: 2 INJECTION INTRAMUSCULAR; INTRAVENOUS at 04:27

## 2022-04-15 RX ADMIN — MORPHINE SULFATE 4 MG: 2 INJECTION, SOLUTION INTRAMUSCULAR; INTRAVENOUS at 04:27

## 2022-04-15 ASSESSMENT — ENCOUNTER SYMPTOMS: ARTHRALGIAS: 1

## 2022-04-15 ASSESSMENT — ASTHMA QUESTIONNAIRES: ACT_TOTALSCORE: 23

## 2022-04-15 NOTE — ED NOTES
Ambulated without assistance. Able to bear weight. Still c/o right hip pain; though patient did stated that it feels better when she stand and walk.

## 2022-04-15 NOTE — ED TRIAGE NOTES
80 y/o female arrived via EMS from home c/o R hip pain since last PM.  Pt has hx of R hip replacement.  Pt c/o R hip instability with changes in position and worsening pain with movement.  Distal CMS intact.  EMS state pt was able to ambulate with assist of 2 at home and had partial weight-bearing ambulation.  Pt is A&Ox4 and VSS.

## 2022-04-15 NOTE — PROGRESS NOTES
"  Assessment & Plan     coronary artery disease/ chest pain    Having a follow up lexiscan to ensure no ischemia; her stress echocardiogram was not fully accurate, and her stress electrocardiogram did not show any ischemic changes.    For now, no changes in her cardiac meds. as needed NTG for intermittent vasospastic symptoms.       Gastroesophageal reflux disease with esophagitis without hemorrhage  Prevacid works better for her swallowing symptoms.  Changed.   - LANsoprazole (PREVACID) 30 MG DR capsule; Take 1 capsule (30 mg) by mouth daily             BMI:   Estimated body mass index is 33.25 kg/m  as calculated from the following:    Height as of 8/5/21: 1.549 m (5' 1\").    Weight as of this encounter: 79.8 kg (176 lb).   Weight management plan: Patient was referred to their PCP to discuss a diet and exercise plan.    See Patient Instructions    No follow-ups on file.    Cheko Reyes MD  [facility] SEAN Kessler is a 81 year old who presents for the following health issues     History of Present Illness       Vascular Disease:  She presents for follow up of vascular disease.  She takes daily aspirin.    She eats 2-3 servings of fruits and vegetables daily.She consumes 0 sweetened beverage(s) daily.She exercises with enough effort to increase her heart rate 9 or less minutes per day.  She exercises with enough effort to increase her heart rate 3 or less days per week. She is missing 1 dose(s) of medications per week.  She is not taking prescribed medications regularly due to remembering to take.       Post Discharge Outreach 4/11/2022   Admission Date 4/8/2022   Reason for Admission Chest pain   Discharge Date 4/9/2022   Discharge Diagnosis Acute on chronic recurrent chest pain   How are you doing now that you are home? Pt states she is doing well   How are your symptoms? (Red Flag symptoms escalate to triage hotline per guidelines) Improved   Do you feel your condition is stable " "enough to be safe at home until your provider visit? Yes   Does the patient have their discharge instructions?  Yes   Does the patient have questions regarding their discharge instructions?  No   Were you started on any new medications or were there changes to any of your previous medications?  No   Does the patient have all of their medications? Yes   Do you have questions regarding any of your medications?  No   Do you have all of your needed medical supplies or equipment (DME)?  (i.e. oxygen tank, CPAP, cane, etc.) Yes   Discharge follow-up appointment scheduled within 14 calendar days?  No     Hospital Follow-up Visit:    Hospital/Nursing Home/IP Rehab Facility: [facility]  Date of Admission: 4/8/2022  Date of Discharge: 4/9/2022  Reason(s) for Admission: Chest pain      Was your hospitalization related to COVID-19? No   Problems taking medications regularly:  None  Medication changes since discharge: None  Problems adhering to non-medication therapy:  None    Summary of hospitalization:  [facility] discharge summary reviewed  Diagnostic Tests/Treatments reviewed.  Follow up needed: none  Other Healthcare Providers Involved in Patient s Care:         None  Update since discharge: improved.       Post Discharge Medication Reconciliation: discharge medications reconciled, continue medications without change.  Plan of care communicated with patient              Has chest pain on and off; once monthly or more, usually resolves with NTG.  This time lasted longer and was more severe; brought emergency room.  Troponins were within normal limits.  Had stress echocardiogram, then set up a lexiscan.     Does have some swallowing problems at times; feels \"like it gets caught.\"  Feels like gastroesophageal reflux disease makes it worse.     Hospital Course      Victoria Montalvo is an 81 year old female with history of coronary artery disease, PDA stent in 2017, coronary angiogram on " 7/1/2021 showing 80% proximal RCA lesion possibly due to vasospasm, diastolic congestive heart failure, hypertension, pulmonary hypertension, dyslipidemia, COPD, asthma, left subclavian vein stenosis treated with procedure, obstructive sleep apnea for which he uses CPAP with sleep, GERD, and allergic rhinitis.  She presented to the [facility] emergency department on 4/8/2022 for evaluation of left-sided chest pain.  This was first noted at 6:45 this morning when she woke up.  She frequently has episodes of chest pain for which she takes nitroglycerin.  Sometimes these episodes occur several times a month and sometimes less frequently.  This episode was more severe.  It was nonradiating.  There was no associated nausea, vomiting, or shortness of breath.  It did resolve with nitroglycerin, however.  She came to the emergency department and had another episode that also resolved with nitroglycerin.  She denied fevers, chills, abdominal pain, nausea, vomiting, and dysuria.  Emergency department evaluation showed stable vital signs.  Labs including basic metabolic panel and CBC were unremarkable.  Troponin was 4.  Chest x-ray showed no acute process.  ECG showed normal sinus rhythm with rate of 73 but no ischemia.      Problem list:     Acute on chronic recurrent chest pain  History of coronary artery disease with PDA stent in 2017  History of coronary artery vasospasm  Coronary angiogram on 7/1/2021 with 80% proximal RCA stenosis, possibly due to vasospasm  Today she denies any chest pain.  She is overall is asymptomatic.  She  remained vitally stable.  No acute EKG changes. Troponin (high sensitivity) 4-6 (most recent).      - Attempted to increase Imdur to 90 mg daily but did have a hypotensive event post admission.  Received 500 mL NS with appropriate response.  VS improved with IV fluid hydration. Denies CP or any new complaints. Wants to go home.   -Stress echocardiogram was equivocal.  The apical 2  chamber view was very difficult to review.  The distal portion of the inferior wall and the apex was not well visualized.  The stress EKG was entirely normal she had no inducible symptoms of chest pain.  Well order outpatient Lexiscan.  She was discussed with cardiology.  Appreciate their input.        Coronary artery disease  Diastolic congestive heart failure without acute exacerbation  Hypertension  Dyslipidemia  Pulmonary hypertension  -Resume prior to admission amlodipine,Crestor, and as needed nitroglycerin     Obstructive sleep apnea  -Continue PTA CPAP.      GERD  -Resume prior to admission Prilosec    Review of Systems   CONSTITUTIONAL: NEGATIVE for fever, chills, change in weight  INTEGUMENTARY/SKIN: NEGATIVE for worrisome rashes, moles or lesions  EYES: NEGATIVE for vision changes or irritation  ENT/MOUTH: NEGATIVE for ear, mouth and throat problems  RESP: NEGATIVE for significant cough or SOB  BREAST: NEGATIVE for masses, tenderness or discharge  CV: NEGATIVE for chest pain, palpitations or peripheral edema  GI: NEGATIVE for nausea, abdominal pain, heartburn, or change in bowel habits  : NEGATIVE for frequency, dysuria, or hematuria  MUSCULOSKELETAL: NEGATIVE for significant arthralgias or myalgia  NEURO: NEGATIVE for weakness, dizziness or paresthesias  ENDOCRINE: NEGATIVE for temperature intolerance, skin/hair changes  HEME: NEGATIVE for bleeding problems  PSYCHIATRIC: NEGATIVE for changes in mood or affect      Objective    /70 (BP Location: Right arm, Patient Position: Chair, Cuff Size: Adult Regular)   Pulse 76   Temp 97.5  F (36.4  C) (Tympanic)   Resp 16   Wt 79.8 kg (176 lb)   LMP  (LMP Unknown)   SpO2 97%   BMI 33.25 kg/m    Body mass index is 33.25 kg/m .  Physical Exam   GENERAL: healthy, alert and no distress  EYES: Eyes grossly normal to inspection, PERRL and conjunctivae and sclerae normal  HENT: ear canals and TM's normal, nose and mouth without ulcers or lesions  NECK: no  adenopathy, no asymmetry, masses, or scars and thyroid normal to palpation  RESP: lungs clear to auscultation - no rales, rhonchi or wheezes  CV: regular rate and rhythm, normal S1 S2, no S3 or S4, no murmur, click or rub, no peripheral edema and peripheral pulses strong  ABDOMEN: soft, nontender, no hepatosplenomegaly, no masses and bowel sounds normal  MS: no gross musculoskeletal defects noted, no edema  SKIN: no suspicious lesions or rashes  NEURO: Normal strength and tone, mentation intact and speech normal  PSYCH: mentation appears normal, affect normal/bright

## 2022-04-15 NOTE — ED PROVIDER NOTES
History     Chief Complaint:  Hip Pain     The history is provided by the patient.      Victoria Montalvo is a 81 year old female with history of hip dislocation, COPD, CAD, CHF, hyperlipidemia, and hypertension who presents with right hip pain. The patient reports that began experiencing right hip pain at some point this evening. She thought it was back pain initially and used a heating pad, went to sleep, and awoke in the middle of the night unable to walk. She has dislocated her right hip 3 times and has normally required sedation to reinsert the hip.    Review of Systems   Musculoskeletal: Positive for arthralgias (R hip).   All other systems reviewed and are negative.    Allergies:  Animal Dander  Dust Mites  Kiwi  Pollen Extract    Medications:  Fosamax  Norvasc  Aspirin 81 mg  Lasix  Imdur  Singulair  Nitrostat  Prilosec  Crestor  Aldactone  Lipitor  Tapazole  Effient  Zebeta    Past Medical History:     Arthritis   Asthma   COPD   CAD  Diastolic CHF, chronic   GERD  Hyperlipidemia   KEN (obstructive sleep apnea)   Colonic polyps   Hypertension  Subclavian artery stenosis, left   Hyperthyroidism   Lumbosacral neuritis, left thigh  Osteopenia    Past Surgical History:    Hip arthroplasty, right x3  Colonoscopy x3  Coronary angiogram x3  CV instantaneous free-wave ratio  Left ventriculogram  Liposuction of legs and stomach  Mammoplasty reduction  ORIF, left ankle  Removal of ankle hardware  Angiogram and left subclavian artery stent     Family History:    Mother: Alzheimer's disease, unspecified gastrointestinal disease  Father: throat cancer, lung cancer, liver cancer, heart disease  Brother: heart disease, suicidal, pacemaker  Sister: heart disease    Social History:  The patient presents to the ED alone via EMS.    Physical Exam     Patient Vitals for the past 24 hrs:   BP Temp Temp src Pulse Resp SpO2   04/15/22 0415 127/62 -- -- 79 -- 99 %   04/15/22 0400 116/51 -- -- 79 -- 97 %   04/15/22 0345 129/84 98.6   F (37  C) Oral 83 16 100 %     Physical Exam  Nursing note and vitals reviewed.  Constitutional: Cooperative.   Cardiovascular: Normal rate, regular rhythm and normal heart sounds.  No murmur. 2+ right DP pulse.   Pulmonary/Chest: Effort normal and breath sounds normal. No respiratory distress.   Abdominal: Soft. Normal appearance. There is no tenderness.   Musculoskeletal: Normal range of motion of LE's. Pain with range of motion to the right hip  Neurological: Alert. Strength and sensation in LEs normal.   Skin: Skin is warm and dry. No rash noted.   Psychiatric: Normal mood and affect.     Emergency Department Course     Imaging:  XR Pelvis w Hip Right 1 View   Final Result   IMPRESSION: Right PRASANTH. No periprosthetic fracture. No dislocation of components. Moderate degenerative changes left hip. Minimal degenerative changes lumbar spine and SI joints. 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      Report per radiology    Reviewed:  I reviewed nursing notes, vitals, past medical history and Care Everywhere    Assessments:  0350 I obtained history and examined the patient as noted above.   0500 I rechecked the patient and explained the results of the X-ray.    Interventions:  0427 Morphine 4 mg IV  0427 Zofran 4 mg IV    Disposition:  The patient was discharged to home.     Impression & Plan     Medical Decision Making:  Ms. Montalvo is an 81 year old female with an artificial right hip and history of prosthetic dislocation who presents with pain. Exam was reassuring with no foreshortening of the extremity. X-ray shows a well-seated arthroplasty. She's been able to ambulate since that time. No fevers or concern for an infectious ideology such as septic arthritis. Exact cause of her pain tonight is unclear but does not appear to represent an emergent cause. Follow up with orthopedics.     Diagnosis:    ICD-10-CM    1. Hip pain, right  M25.551        Scribe Disclosure:  Mannie YOUNG, am serving as a scribe at 3:46 AM on  4/15/2022 to document services personally performed by Jordon Bustamante MD based on my observations and the provider's statements to me.          Jordon Bustamante MD  04/15/22 0596

## 2022-04-19 ENCOUNTER — HOSPITAL ENCOUNTER (OUTPATIENT)
Dept: CARDIOLOGY | Facility: CLINIC | Age: 82
Discharge: HOME | End: 2022-04-19
Attending: INTERNAL MEDICINE
Payer: COMMERCIAL

## 2022-04-19 ENCOUNTER — HOSPITAL ENCOUNTER (OUTPATIENT)
Dept: NUCLEAR MEDICINE | Facility: CLINIC | Age: 82
Setting detail: NUCLEAR MEDICINE
Discharge: HOME | End: 2022-04-19
Attending: INTERNAL MEDICINE
Payer: COMMERCIAL

## 2022-04-19 DIAGNOSIS — R07.9 CHEST PAIN, UNSPECIFIED TYPE: ICD-10-CM

## 2022-04-19 LAB
STRESS ECHO BASELINE DIASTOLIC HE: 77
STRESS ECHO BASELINE HR: 73 BPM
STRESS ECHO BASELINE SYSTOLIC BP: 143
STRESS ECHO TARGET HR: 138

## 2022-04-19 PROCEDURE — 250N000011 HC RX IP 250 OP 636

## 2022-04-19 PROCEDURE — 93016 CV STRESS TEST SUPVJ ONLY: CPT | Performed by: INTERNAL MEDICINE

## 2022-04-19 PROCEDURE — 78452 HT MUSCLE IMAGE SPECT MULT: CPT

## 2022-04-19 PROCEDURE — 343N000001 HC RX 343: Performed by: INTERNAL MEDICINE

## 2022-04-19 PROCEDURE — 78452 HT MUSCLE IMAGE SPECT MULT: CPT | Mod: 26 | Performed by: INTERNAL MEDICINE

## 2022-04-19 PROCEDURE — A9502 TC99M TETROFOSMIN: HCPCS | Performed by: INTERNAL MEDICINE

## 2022-04-19 PROCEDURE — 93018 CV STRESS TEST I&R ONLY: CPT | Performed by: INTERNAL MEDICINE

## 2022-04-19 PROCEDURE — 93017 CV STRESS TEST TRACING ONLY: CPT

## 2022-04-19 RX ORDER — AMINOPHYLLINE 25 MG/ML
50-100 INJECTION, SOLUTION INTRAVENOUS
Status: DISCONTINUED | OUTPATIENT
Start: 2022-04-19 | End: 2022-04-20 | Stop reason: HOSPADM

## 2022-04-19 RX ORDER — ACYCLOVIR 200 MG/1
0-1 CAPSULE ORAL
Status: DISCONTINUED | OUTPATIENT
Start: 2022-04-19 | End: 2022-04-20 | Stop reason: HOSPADM

## 2022-04-19 RX ORDER — REGADENOSON 0.08 MG/ML
INJECTION, SOLUTION INTRAVENOUS
Status: COMPLETED
Start: 2022-04-19 | End: 2022-04-19

## 2022-04-19 RX ORDER — REGADENOSON 0.08 MG/ML
0.4 INJECTION, SOLUTION INTRAVENOUS ONCE
Status: COMPLETED | OUTPATIENT
Start: 2022-04-19 | End: 2022-04-19

## 2022-04-19 RX ORDER — CAFFEINE CITRATE 20 MG/ML
60 SOLUTION INTRAVENOUS
Status: DISCONTINUED | OUTPATIENT
Start: 2022-04-19 | End: 2022-04-20 | Stop reason: HOSPADM

## 2022-04-19 RX ORDER — ALBUTEROL SULFATE 90 UG/1
2 AEROSOL, METERED RESPIRATORY (INHALATION) EVERY 5 MIN PRN
Status: DISCONTINUED | OUTPATIENT
Start: 2022-04-19 | End: 2022-04-20 | Stop reason: HOSPADM

## 2022-04-19 RX ADMIN — REGADENOSON 0.4 MG: 0.08 INJECTION, SOLUTION INTRAVENOUS at 10:28

## 2022-04-19 RX ADMIN — TETROFOSMIN 11 MCI.: 1.38 INJECTION, POWDER, LYOPHILIZED, FOR SOLUTION INTRAVENOUS at 09:00

## 2022-04-19 RX ADMIN — TETROFOSMIN 30.5 MCI.: 1.38 INJECTION, POWDER, LYOPHILIZED, FOR SOLUTION INTRAVENOUS at 10:32

## 2022-06-10 ENCOUNTER — OFFICE VISIT (OUTPATIENT)
Dept: CARDIOLOGY | Facility: CLINIC | Age: 82
End: 2022-06-10
Payer: COMMERCIAL

## 2022-06-10 VITALS
SYSTOLIC BLOOD PRESSURE: 184 MMHG | BODY MASS INDEX: 32.62 KG/M2 | WEIGHT: 172.8 LBS | HEIGHT: 61 IN | DIASTOLIC BLOOD PRESSURE: 90 MMHG | OXYGEN SATURATION: 98 % | HEART RATE: 75 BPM

## 2022-06-10 DIAGNOSIS — R07.9 CHEST PAIN, UNSPECIFIED TYPE: ICD-10-CM

## 2022-06-10 PROCEDURE — 99214 OFFICE O/P EST MOD 30 MIN: CPT | Performed by: INTERNAL MEDICINE

## 2022-06-10 NOTE — PATIENT INSTRUCTIONS
Your blood pressure was elevated at your appointment today.  Elevated blood pressure can increase your risk of a heart attack, stroke and heart failure.  For this reason, we feel it is important to monitor your blood pressure closely.  If you have access to a home blood pressure monitor or are able to check your blood pressure at a local pharmacy in the next week we would like you to do so and call our clinic with those readings. Please call [phone] ([facility]) and leave a message with your name, date of birth, blood pressure reading, date and location it was completed. If your blood pressure remains elevated your care team will be notified.  We appreciate being a part of your healthcare team and look forward to hearing from you soon.

## 2022-06-10 NOTE — PROGRESS NOTES
"Cardiology Progress Note          Assessment and Plan:       1. Dyspnea on exertion, likely related to medication noncompliance and hypertension rather than coronary artery disease with negative nuclear stress test April 2022    Resume diuretics.  Notify me if dyspnea does not improve.    Follow-up in 1 year      30 minutes was spent with the patient, precharting and reviewing tests as well as post visit charting all done today..    This note was transcribed using electronic voice recognition software and there may be typographical errors present.                    Interval History:     The patient is a very pleasant 81 year old whom I have been following for coronary artery disease and spasm.  Her , Evgeny is a patient of mine and he has been hospitalized recently but is doing much better.    Her blood pressure is elevated today but she has not been taking her diuretics due to visiting Evgeny in the hospital frequently.  She has dyspnea on exertion and negative stress test in April 2022.                     Review of Systems:     Review of Systems:  Skin:  Negative     Eyes:  Positive for glasses  ENT:  Positive for sinus trouble  Respiratory:  Positive for dyspnea on exertion;wheezing;sleep apnea  Cardiovascular:    Positive for;edema;fatigue;chest pain  Gastroenterology: Positive for heartburn  Genitourinary:  Positive for urinary frequency  Musculoskeletal:  Positive for arthritis;joint pain;back pain  Neurologic:  Positive for numbness or tingling of hands;numbness or tingling of feet  Psychiatric:  Positive for excessive stress  Heme/Lymph/Imm:  Positive for easy bruising  Endocrine:  Negative                Physical Exam:     Vitals: BP (!) 184/90 (BP Location: Right arm, Patient Position: Sitting, Cuff Size: Adult Regular)   Pulse 75   Ht 1.549 m (5' 1\")   Wt 78.4 kg (172 lb 12.8 oz)   LMP  (LMP Unknown)   SpO2 98%   BMI 32.65 kg/m    Constitutional:  cooperative, alert and oriented, well " developed, well nourished, in no acute distress        Skin:  warm and dry to the touch, no apparent skin lesions or masses noted        Head:  normocephalic, no masses or lesions        Eyes:  pupils equal and round;sclera white;no xanthalasma;conjunctivae and lids unremarkable        Chest:  normal symmetry        Cardiac: regular rhythm;normal S1 and S2       systolic murmur;grade 1          Extremities and Back:  normal muscle strength and tone;no deformities, clubbing, cyanosis, erythema observed        Neurological:  no gross motor deficits;affect appropriate                 Medications:     Current Outpatient Medications   Medication Sig Dispense Refill     albuterol (PROAIR HFA) 108 (90 Base) MCG/ACT inhaler USE 2 INHALATIONS EVERY 6 HOURS AS NEEDED FOR SHORTNESS OF BREATH, DYSPNEA OR WHEEZING 25.5 g 6     albuterol (PROVENTIL) (2.5 MG/3ML) 0.083% neb solution Take 1 vial (2.5 mg) by nebulization every 6 hours as needed for shortness of breath / dyspnea 3 mL 11     alendronate (FOSAMAX) 70 MG tablet TAKE 1 TABLET EVERY 7 DAYS, TAKE 60 MINUTES BEFORE A.M. MEAL WITH 8 OUNCE WATER, REMAIN UPRIGHT FOR 30 MINUTES 12 tablet 3     amLODIPine (NORVASC) 5 MG tablet Take 1 tablet (5 mg) by mouth every evening 90 tablet 3     aspirin 81 MG tablet Take 81 mg by mouth daily.       Boswellia-Glucosamine-Vit D (OSTEO BI-FLEX/5-LOXIN ADVANCED) TABS Take 1,500 mg by mouth 2 times daily        Calcium 8177-9341 MG-UNIT CHEW Take 1 tablet by mouth daily.       Cholecalciferol (VITAMIN D3 PO) Take 4,000 Units by mouth daily        diclofenac (VOLTAREN) 1 % topical gel Apply 2 g topically 4 times daily 100 g 11     fluticasone (FLONASE) 50 MCG/ACT nasal spray USE 1 TO 2 SPRAYS IN EACH NOSTRIL DAILY 48 g 11     fluticasone-salmeterol (ADVAIR) 250-50 MCG/DOSE inhaler Inhale 1 puff into the lungs every 12 hours 3 each 3     furosemide (LASIX) 20 MG tablet Take 1 tablet (20 mg) by mouth daily as needed for leg swelling 60 tablet 0  "    isosorbide mononitrate (IMDUR) 60 MG 24 hr tablet Take 1 tablet (60 mg) by mouth every morning Hold if Systolic Blood Pressure is less than 85. 90 tablet 3     LANsoprazole (PREVACID) 30 MG DR capsule Take 1 capsule (30 mg) by mouth daily 90 capsule 1     montelukast (SINGULAIR) 10 MG tablet Take 1 tablet (10 mg) by mouth At Bedtime 90 tablet 3     nitroGLYcerin (NITROSTAT) 0.4 MG sublingual tablet One tablet under the tongue every 5 minutes if needed for chest pain. May repeat every 5 minutes for a maximum of 3 doses in 15 minutes\" 25 tablet 3     omega-3 fatty acids 1200 MG capsule Take 1 capsule by mouth 2 times daily        Pyridoxine HCl (VITAMIN B6 PO) Take 1 tablet by mouth daily        rosuvastatin (CRESTOR) 40 MG tablet Take 1 tablet (40 mg) by mouth daily 90 tablet 3     spironolactone (ALDACTONE) 25 MG tablet Take 12.5 mg by mouth daily as needed                  Data:   All laboratory data reviewed  No results found for this or any previous visit (from the past 24 hour(s)).    All laboratory data reviewed  Lab Results   Component Value Date    CHOL 157 07/22/2021    CHOL 167 07/17/2020     Lab Results   Component Value Date    HDL 88 07/22/2021    HDL 72 07/17/2020     Lab Results   Component Value Date    LDL 59 07/22/2021    LDL 82 07/17/2020     Lab Results   Component Value Date    TRIG 52 07/22/2021    TRIG 65 07/17/2020     Lab Results   Component Value Date    CHOLHDLRATIO 2.5 08/20/2014     TSH   Date Value Ref Range Status   07/22/2021 1.27 0.40 - 4.00 mU/L Final   07/17/2020 0.84 0.40 - 4.00 mU/L Final     Last Basic Metabolic Panel:  Lab Results   Component Value Date     04/08/2022     07/01/2021      Lab Results   Component Value Date    POTASSIUM 4.1 04/08/2022    POTASSIUM 3.6 07/01/2021     Lab Results   Component Value Date    CHLORIDE 108 04/08/2022    CHLORIDE 111 07/01/2021     Lab Results   Component Value Date    PHILLIP 8.5 04/08/2022    PHILLIP 8.3 07/01/2021     Lab " Results   Component Value Date    CO2 28 04/08/2022    CO2 26 07/01/2021     Lab Results   Component Value Date    BUN 18 04/08/2022    BUN 11 07/01/2021     Lab Results   Component Value Date    CR 0.72 04/08/2022    CR 0.76 07/01/2021     Lab Results   Component Value Date    GLC 83 04/08/2022    GLC 86 07/01/2021     Lab Results   Component Value Date    WBC 7.4 04/08/2022    WBC 5.4 07/01/2021     Lab Results   Component Value Date    RBC 4.23 04/08/2022    RBC 4.63 07/01/2021     Lab Results   Component Value Date    HGB 12.3 04/08/2022    HGB 13.3 07/01/2021     Lab Results   Component Value Date    HCT 38.6 04/08/2022    HCT 41.1 07/01/2021     Lab Results   Component Value Date    MCV 91 04/08/2022    MCV 89 07/01/2021     Lab Results   Component Value Date    MCH 29.1 04/08/2022    MCH 28.7 07/01/2021     Lab Results   Component Value Date    MCHC 31.9 04/08/2022    MCHC 32.4 07/01/2021     Lab Results   Component Value Date    RDW 14.1 04/08/2022    RDW 13.9 07/01/2021     Lab Results   Component Value Date     04/08/2022     07/01/2021

## 2022-06-10 NOTE — LETTER
6/10/2022    Elsy Bah MD  [address] [place] [address]    RE: Victoria BALTA Selvin       Dear Colleague,     I had the pleasure of seeing Victoria Davilaen in the [facility].  Cardiology Progress Note          Assessment and Plan:       1. Dyspnea on exertion, likely related to medication noncompliance and hypertension rather than coronary artery disease with negative nuclear stress test April 2022    Resume diuretics.  Notify me if dyspnea does not improve.    Follow-up in 1 year      30 minutes was spent with the patient, precharting and reviewing tests as well as post visit charting all done today..    This note was transcribed using electronic voice recognition software and there may be typographical errors present.                    Interval History:     The patient is a very pleasant 81 year old whom I have been following for coronary artery disease and spasm.  Her , Evgeny is a patient of mine and he has been hospitalized recently but is doing much better.    Her blood pressure is elevated today but she has not been taking her diuretics due to visiting Evgeny in the hospital frequently.  She has dyspnea on exertion and negative stress test in April 2022.                     Review of Systems:     Review of Systems:  Skin:  Negative     Eyes:  Positive for glasses  ENT:  Positive for sinus trouble  Respiratory:  Positive for dyspnea on exertion;wheezing;sleep apnea  Cardiovascular:    Positive for;edema;fatigue;chest pain  Gastroenterology: Positive for heartburn  Genitourinary:  Positive for urinary frequency  Musculoskeletal:  Positive for arthritis;joint pain;back pain  Neurologic:  Positive for numbness or tingling of hands;numbness or tingling of feet  Psychiatric:  Positive for excessive stress  Heme/Lymph/Imm:  Positive for easy bruising  Endocrine:  Negative                Physical Exam:     Vitals: BP (!) 184/90 (BP Location: Right arm, Patient Position: Sitting,  "Cuff Size: Adult Regular)   Pulse 75   Ht 1.549 m (5' 1\")   Wt 78.4 kg (172 lb 12.8 oz)   LMP  (LMP Unknown)   SpO2 98%   BMI 32.65 kg/m    Constitutional:  cooperative, alert and oriented, well developed, well nourished, in no acute distress        Skin:  warm and dry to the touch, no apparent skin lesions or masses noted        Head:  normocephalic, no masses or lesions        Eyes:  pupils equal and round;sclera white;no xanthalasma;conjunctivae and lids unremarkable        Chest:  normal symmetry        Cardiac: regular rhythm;normal S1 and S2       systolic murmur;grade 1          Extremities and Back:  normal muscle strength and tone;no deformities, clubbing, cyanosis, erythema observed        Neurological:  no gross motor deficits;affect appropriate                 Medications:     Current Outpatient Medications   Medication Sig Dispense Refill     albuterol (PROAIR HFA) 108 (90 Base) MCG/ACT inhaler USE 2 INHALATIONS EVERY 6 HOURS AS NEEDED FOR SHORTNESS OF BREATH, DYSPNEA OR WHEEZING 25.5 g 6     albuterol (PROVENTIL) (2.5 MG/3ML) 0.083% neb solution Take 1 vial (2.5 mg) by nebulization every 6 hours as needed for shortness of breath / dyspnea 3 mL 11     alendronate (FOSAMAX) 70 MG tablet TAKE 1 TABLET EVERY 7 DAYS, TAKE 60 MINUTES BEFORE A.M. MEAL WITH 8 OUNCE WATER, REMAIN UPRIGHT FOR 30 MINUTES 12 tablet 3     amLODIPine (NORVASC) 5 MG tablet Take 1 tablet (5 mg) by mouth every evening 90 tablet 3     aspirin 81 MG tablet Take 81 mg by mouth daily.       Boswellia-Glucosamine-Vit D (OSTEO BI-FLEX/5-LOXIN ADVANCED) TABS Take 1,500 mg by mouth 2 times daily        Calcium 5317-0953 MG-UNIT CHEW Take 1 tablet by mouth daily.       Cholecalciferol (VITAMIN D3 PO) Take 4,000 Units by mouth daily        diclofenac (VOLTAREN) 1 % topical gel Apply 2 g topically 4 times daily 100 g 11     fluticasone (FLONASE) 50 MCG/ACT nasal spray USE 1 TO 2 SPRAYS IN EACH NOSTRIL DAILY 48 g 11     " "fluticasone-salmeterol (ADVAIR) 250-50 MCG/DOSE inhaler Inhale 1 puff into the lungs every 12 hours 3 each 3     furosemide (LASIX) 20 MG tablet Take 1 tablet (20 mg) by mouth daily as needed for leg swelling 60 tablet 0     isosorbide mononitrate (IMDUR) 60 MG 24 hr tablet Take 1 tablet (60 mg) by mouth every morning Hold if Systolic Blood Pressure is less than 85. 90 tablet 3     LANsoprazole (PREVACID) 30 MG DR capsule Take 1 capsule (30 mg) by mouth daily 90 capsule 1     montelukast (SINGULAIR) 10 MG tablet Take 1 tablet (10 mg) by mouth At Bedtime 90 tablet 3     nitroGLYcerin (NITROSTAT) 0.4 MG sublingual tablet One tablet under the tongue every 5 minutes if needed for chest pain. May repeat every 5 minutes for a maximum of 3 doses in 15 minutes\" 25 tablet 3     omega-3 fatty acids 1200 MG capsule Take 1 capsule by mouth 2 times daily        Pyridoxine HCl (VITAMIN B6 PO) Take 1 tablet by mouth daily        rosuvastatin (CRESTOR) 40 MG tablet Take 1 tablet (40 mg) by mouth daily 90 tablet 3     spironolactone (ALDACTONE) 25 MG tablet Take 12.5 mg by mouth daily as needed                  Data:   All laboratory data reviewed  No results found for this or any previous visit (from the past 24 hour(s)).    All laboratory data reviewed  Lab Results   Component Value Date    CHOL 157 07/22/2021    CHOL 167 07/17/2020     Lab Results   Component Value Date    HDL 88 07/22/2021    HDL 72 07/17/2020     Lab Results   Component Value Date    LDL 59 07/22/2021    LDL 82 07/17/2020     Lab Results   Component Value Date    TRIG 52 07/22/2021    TRIG 65 07/17/2020     Lab Results   Component Value Date    CHOLHDLRATIO 2.5 08/20/2014     TSH   Date Value Ref Range Status   07/22/2021 1.27 0.40 - 4.00 mU/L Final   07/17/2020 0.84 0.40 - 4.00 mU/L Final     Last Basic Metabolic Panel:  Lab Results   Component Value Date     04/08/2022     07/01/2021      Lab Results   Component Value Date    POTASSIUM 4.1 " 04/08/2022    POTASSIUM 3.6 07/01/2021     Lab Results   Component Value Date    CHLORIDE 108 04/08/2022    CHLORIDE 111 07/01/2021     Lab Results   Component Value Date    PHILLIP 8.5 04/08/2022    PHILLIP 8.3 07/01/2021     Lab Results   Component Value Date    CO2 28 04/08/2022    CO2 26 07/01/2021     Lab Results   Component Value Date    BUN 18 04/08/2022    BUN 11 07/01/2021     Lab Results   Component Value Date    CR 0.72 04/08/2022    CR 0.76 07/01/2021     Lab Results   Component Value Date    GLC 83 04/08/2022    GLC 86 07/01/2021     Lab Results   Component Value Date    WBC 7.4 04/08/2022    WBC 5.4 07/01/2021     Lab Results   Component Value Date    RBC 4.23 04/08/2022    RBC 4.63 07/01/2021     Lab Results   Component Value Date    HGB 12.3 04/08/2022    HGB 13.3 07/01/2021     Lab Results   Component Value Date    HCT 38.6 04/08/2022    HCT 41.1 07/01/2021     Lab Results   Component Value Date    MCV 91 04/08/2022    MCV 89 07/01/2021     Lab Results   Component Value Date    MCH 29.1 04/08/2022    MCH 28.7 07/01/2021     Lab Results   Component Value Date    MCHC 31.9 04/08/2022    MCHC 32.4 07/01/2021     Lab Results   Component Value Date    RDW 14.1 04/08/2022    RDW 13.9 07/01/2021     Lab Results   Component Value Date     04/08/2022     07/01/2021       Thank you for allowing me to participate in the care of your patient.      Sincerely,     Yogi Yepez MD     [facility]  cc:   Yogi Yepez MD  [address]  [place] [address]

## 2022-08-15 DIAGNOSIS — J45.40 MODERATE PERSISTENT ASTHMA, UNCOMPLICATED: ICD-10-CM

## 2022-08-18 RX ORDER — ALBUTEROL SULFATE 90 UG/1
AEROSOL, METERED RESPIRATORY (INHALATION)
Qty: 25.5 G | Refills: 0 | Status: SHIPPED | OUTPATIENT
Start: 2022-08-18 | End: 2022-11-25

## 2022-09-17 ENCOUNTER — APPOINTMENT (OUTPATIENT)
Dept: GENERAL RADIOLOGY | Facility: CLINIC | Age: 82
End: 2022-09-17
Attending: EMERGENCY MEDICINE
Payer: COMMERCIAL

## 2022-09-17 ENCOUNTER — HOSPITAL ENCOUNTER (EMERGENCY)
Facility: CLINIC | Age: 82
Discharge: HOME | End: 2022-09-17
Attending: EMERGENCY MEDICINE | Admitting: EMERGENCY MEDICINE
Payer: COMMERCIAL

## 2022-09-17 VITALS
WEIGHT: 170.86 LBS | TEMPERATURE: 97 F | HEART RATE: 63 BPM | RESPIRATION RATE: 18 BRPM | OXYGEN SATURATION: 96 % | DIASTOLIC BLOOD PRESSURE: 79 MMHG | SYSTOLIC BLOOD PRESSURE: 138 MMHG | BODY MASS INDEX: 32.28 KG/M2

## 2022-09-17 DIAGNOSIS — J02.0 STREPTOCOCCAL PHARYNGITIS: ICD-10-CM

## 2022-09-17 DIAGNOSIS — R07.9 CHEST PAIN, UNSPECIFIED TYPE: ICD-10-CM

## 2022-09-17 LAB
ANION GAP SERPL CALCULATED.3IONS-SCNC: 7 MMOL/L (ref 7–15)
BASOPHILS # BLD AUTO: 0 10E3/UL (ref 0–0.2)
BASOPHILS NFR BLD AUTO: 0 %
BUN SERPL-MCNC: 10.4 MG/DL (ref 8–23)
CALCIUM SERPL-MCNC: 8.4 MG/DL (ref 8.8–10.2)
CHLORIDE SERPL-SCNC: 107 MMOL/L (ref 98–107)
CREAT SERPL-MCNC: 0.69 MG/DL (ref 0.51–0.95)
DEPRECATED HCO3 PLAS-SCNC: 27 MMOL/L (ref 22–29)
DEPRECATED S PYO AG THROAT QL EIA: POSITIVE
EOSINOPHIL # BLD AUTO: 0.2 10E3/UL (ref 0–0.7)
EOSINOPHIL NFR BLD AUTO: 3 %
ERYTHROCYTE [DISTWIDTH] IN BLOOD BY AUTOMATED COUNT: 14.2 % (ref 10–15)
GFR SERPL CREATININE-BSD FRML MDRD: 86 ML/MIN/1.73M2
GLUCOSE SERPL-MCNC: 100 MG/DL (ref 70–99)
HCT VFR BLD AUTO: 37.5 % (ref 35–47)
HGB BLD-MCNC: 11.9 G/DL (ref 11.7–15.7)
IMM GRANULOCYTES # BLD: 0 10E3/UL
IMM GRANULOCYTES NFR BLD: 0 %
LYMPHOCYTES # BLD AUTO: 1.1 10E3/UL (ref 0.8–5.3)
LYMPHOCYTES NFR BLD AUTO: 20 %
MCH RBC QN AUTO: 28.9 PG (ref 26.5–33)
MCHC RBC AUTO-ENTMCNC: 31.7 G/DL (ref 31.5–36.5)
MCV RBC AUTO: 91 FL (ref 78–100)
MONOCYTES # BLD AUTO: 0.3 10E3/UL (ref 0–1.3)
MONOCYTES NFR BLD AUTO: 6 %
NEUTROPHILS # BLD AUTO: 3.8 10E3/UL (ref 1.6–8.3)
NEUTROPHILS NFR BLD AUTO: 71 %
NRBC # BLD AUTO: 0 10E3/UL
NRBC BLD AUTO-RTO: 0 /100
PLATELET # BLD AUTO: 166 10E3/UL (ref 150–450)
POTASSIUM SERPL-SCNC: 3.9 MMOL/L (ref 3.4–5.3)
RBC # BLD AUTO: 4.12 10E6/UL (ref 3.8–5.2)
SODIUM SERPL-SCNC: 141 MMOL/L (ref 136–145)
TROPONIN T SERPL HS-MCNC: 8 NG/L
TROPONIN T SERPL HS-MCNC: 8 NG/L
WBC # BLD AUTO: 5.4 10E3/UL (ref 4–11)

## 2022-09-17 PROCEDURE — 80048 BASIC METABOLIC PNL TOTAL CA: CPT | Performed by: EMERGENCY MEDICINE

## 2022-09-17 PROCEDURE — 99285 EMERGENCY DEPT VISIT HI MDM: CPT | Mod: 25

## 2022-09-17 PROCEDURE — 93005 ELECTROCARDIOGRAM TRACING: CPT

## 2022-09-17 PROCEDURE — 71046 X-RAY EXAM CHEST 2 VIEWS: CPT

## 2022-09-17 PROCEDURE — 250N000013 HC RX MED GY IP 250 OP 250 PS 637: Performed by: EMERGENCY MEDICINE

## 2022-09-17 PROCEDURE — 85014 HEMATOCRIT: CPT | Performed by: EMERGENCY MEDICINE

## 2022-09-17 PROCEDURE — 84484 ASSAY OF TROPONIN QUANT: CPT | Performed by: EMERGENCY MEDICINE

## 2022-09-17 PROCEDURE — 36415 COLL VENOUS BLD VENIPUNCTURE: CPT | Performed by: EMERGENCY MEDICINE

## 2022-09-17 PROCEDURE — 87880 STREP A ASSAY W/OPTIC: CPT | Performed by: EMERGENCY MEDICINE

## 2022-09-17 RX ORDER — ASPIRIN 81 MG/1
324 TABLET, CHEWABLE ORAL ONCE
Status: COMPLETED | OUTPATIENT
Start: 2022-09-17 | End: 2022-09-17

## 2022-09-17 RX ORDER — NITROGLYCERIN 0.4 MG/1
0.4 TABLET SUBLINGUAL EVERY 5 MIN PRN
Status: DISCONTINUED | OUTPATIENT
Start: 2022-09-17 | End: 2022-09-17 | Stop reason: HOSPADM

## 2022-09-17 RX ORDER — AMOXICILLIN 500 MG/1
500 CAPSULE ORAL 2 TIMES DAILY
Qty: 20 CAPSULE | Refills: 0 | Status: SHIPPED | OUTPATIENT
Start: 2022-09-17 | End: 2022-09-27

## 2022-09-17 RX ADMIN — ASPIRIN 81 MG CHEWABLE TABLET 324 MG: 81 TABLET CHEWABLE at 09:33

## 2022-09-17 ASSESSMENT — ENCOUNTER SYMPTOMS
SHORTNESS OF BREATH: 1
VOMITING: 0
COUGH: 1
DIAPHORESIS: 1
SORE THROAT: 1
FEVER: 0
NAUSEA: 0

## 2022-09-17 ASSESSMENT — ACTIVITIES OF DAILY LIVING (ADL)
ADLS_ACUITY_SCORE: 35
ADLS_ACUITY_SCORE: 35

## 2022-09-17 NOTE — ED TRIAGE NOTES
Sore throat x3 days. Increased chest pressure woke pt up this morning. Extensive cardiac h/o. No pain, c/o SOB.

## 2022-09-17 NOTE — DISCHARGE INSTRUCTIONS
What do you do next:   Continue your home medications unless we have specifically changed them  The antibiotics I prescribed as directed.  Follow up as indicated below    When do you return: If you have uncontrolled fevers, intractable vomiting, worsening chest pain, severe shortness of breath, or any other symptoms that concern you, please return to the ED for reevaluation.    Thank you for allowing us to care for you today.

## 2022-09-17 NOTE — ED PROVIDER NOTES
History   Chief Complaint:  Chest Pain    The history is provided by the patient.      Victoria Montalvo is a 82 year old female with history of hypertension, hyperlipidemia, chronic diastolic congestive heart failure, and mild CAD who presents with chest pressure. The patient complains of four days of a sore throat, which is constant and not just with swallowing. She also notes some shortness of breath with exertion like climbing stairs, a slight cough, and some brief episodes of diaphoresis during this time. She comes to the ED because when she woke up at 0530 this morning, she noticed what she describes as chest heaviness, which has not worsened to improved in the time since. Prior to coming to the ED, her at-home temperature was 99. Her sore throat also still persists. She denies any nausea, vomiting, new or worsening leg swelling, or known sick contacts. She had a negative at-home Covid test yesterday.     Review of Systems   Constitutional: Positive for diaphoresis. Negative for fever.   HENT: Positive for sore throat.    Respiratory: Positive for cough and shortness of breath.    Cardiovascular: Positive for chest pain (pressure). Negative for leg swelling (not new or worsening ).   Gastrointestinal: Negative for nausea and vomiting.   All other systems reviewed and are negative.    Allergies:  No medication allergies   Animal Dander  Dust Mites  Kiwi  Bee Pollen  Pollen Extracts     Medications:  Albuterol  Fosamax  Norvasc  Aspirin 81 mg   Flonase  Advair  Lasix  Imdur  Prevacid  Singulair  Nitrostat  Crestor  Aldactone     Past Medical History:     GERD  Lumbosacral neuritis  Osteopenia  hyperlipidemia  Diastolic CHF, chronic  Subclavian artery stenosis, left   Asthma  [facility]  Hip osteoarthritis  Subclinical hyperthyroidism   Venous reflux  Hypertension  Mild CAD      Past Surgical History:    Arthroplasty hip, right   Colonoscopy x3  Coronary angiogram x3  Instantaneous wave-free ratio  Left  ventriculogram  Liposuction of legs and stomach  Mammoplasty reduction  ORIF ankle  Left subclavical artery stent     Family History:    Mother: alzheimer's disease, gastrointestinal disease  Father: lung cancer, throat cancer, liver cancer, CABG, hyperlipidemia  Brother: unspecified cancer, depression  Sister: heart disease     Social History:  PCP: Elsy Bah   Presents to the ED with male .     Physical Exam     Patient Vitals for the past 24 hrs:   BP Temp Temp src Pulse Resp SpO2 Weight   09/17/22 1130 138/79 -- -- 63 -- -- --   09/17/22 1100 138/75 -- -- 63 -- -- --   09/17/22 1030 (!) 146/83 -- -- 62 -- 96 % --   09/17/22 1015 -- -- -- -- -- 96 % --   09/17/22 1000 (!) 147/78 -- -- 63 -- 96 % --   09/17/22 0945 -- -- -- -- -- 96 % --   09/17/22 0930 138/78 -- -- 61 -- 96 % --   09/17/22 0915 -- -- -- -- -- 96 % --   09/17/22 0756 -- -- -- -- -- -- 77.5 kg (170 lb 13.7 oz)   09/17/22 0755 (!) 156/77 97  F (36.1  C) Temporal 70 18 99 % --       Physical Exam  Constitutional: Vital signs reviewed as above.   Head: No external signs of trauma. No lesions noted.  Eyes: PEERL, EOMI B/L, no pain or limitation of superior gaze  ENT:              Ears: Normal B/L TM and external canals              Nose: Noncongested, no exudates. No rhinorrhea. No FB noted              Mouth/Throat:                           Mucous membranes are moist and normal.                           No Oropharyngeal exudate. No oropharyngeal erythema noted.                          No tonsilar swelling noted.                           No uvular deviation noted.                          No swelling noted on the floor of the mouth  Neck: FROM. Neck is supple  Cardiovascular: Normal rate, regular rhythm. .  No murmur heard. Equal B/L peripheral pulses.  Pulmonary/Chest: Effort normal and breath sounds normal. No respiratory distress. Patient has no wheezes. Patient has no rales.   Gastrointestinal: Soft. There is no tenderness.    Musculoskeletal/Extremities: No edema noted. Normal tone.  Neurological: Patient is alert and oriented to person, place, and time.   Skin: Skin is warm and dry. There is no diaphoresis noted.   Psychiatric: The patient appears calm.    Emergency Department Course   ECG  ECG taken at 0800, ECG read at 0823  Normal sinus rhythm.    No significant change as compared to prior, dated 4/8/22.  Rate 69 bpm. NY interval 136 ms. QRS duration 80 ms. QT/QTc 408/437 ms. P-R-T axes 35 -18 17.     Imaging:  XR Chest 2 Views   Final Result   IMPRESSION: Lungs are clear. No pleural effusion. Heart size and pulmonary vascularity within normal limits. Stent in the proximal left subclavian artery. Calcified tortuous thoracic aorta. Small to moderate-sized esophageal hiatal hernia. No significant    change.           Report per radiology    Laboratory:  Labs Ordered and Resulted from Time of ED Arrival to Time of ED Departure   BASIC METABOLIC PANEL - Abnormal       Result Value    Sodium 141      Potassium 3.9      Chloride 107      Carbon Dioxide (CO2) 27      Anion Gap 7      Urea Nitrogen 10.4      Creatinine 0.69      Calcium 8.4 (*)     Glucose 100 (*)     GFR Estimate 86     STREPTOCOCCUS A RAPID SCREEN W REFELX TO PCR - Abnormal    Group A Strep antigen Positive (*)    TROPONIN T, HIGH SENSITIVITY - Normal    Troponin T, High Sensitivity 8     TROPONIN T, HIGH SENSITIVITY - Normal    Troponin T, High Sensitivity 8     CBC WITH PLATELETS AND DIFFERENTIAL    WBC Count 5.4      RBC Count 4.12      Hemoglobin 11.9      Hematocrit 37.5      MCV 91      MCH 28.9      MCHC 31.7      RDW 14.2      Platelet Count 166      % Neutrophils 71      % Lymphocytes 20      % Monocytes 6      % Eosinophils 3      % Basophils 0      % Immature Granulocytes 0      NRBCs per 100 WBC 0      Absolute Neutrophils 3.8      Absolute Lymphocytes 1.1      Absolute Monocytes 0.3      Absolute Eosinophils 0.2      Absolute Basophils 0.0      Absolute  Immature Granulocytes 0.0      Absolute NRBCs 0.0       Emergency Department Course:       Reviewed:  I reviewed nursing notes, vitals, past medical history and Care Everywhere    Assessments/Consults:  ED Course as of 09/17/22 1229   Sat Sep 17, 2022   0813 I obtained history and examined the patient as noted above    1034 I rechecked the patient and explained findings    1137 I rechecked the patient and explained findings      Interventions:  0933 Aspirin 324 mg PO     Disposition:  The patient was discharged to home.     Impression & Plan     Medical Decision Making:     Victoria Montalvo is a 82 year old female presented to the Emergency Department with a complaint of chest heaviness and a sore throat. Fortunately the workup in the ED has been unremarkable and at this time I am not concerned for ACS. The EKG shows NSR without change from previous. The troponin is negative x2. Based on the [facility] high sensitivity troponin pathway, this should rule the patient out for myocardial injury     I considered other possible causes of chest pain including PE (low risk Well's Criteria), infection (Paco's angina, etc), pneumothorax, aortic dissection, and even more benign causes such as reflux and esophageal motility issues. The physical exam, laboratory, and radiological findings listed above make life threatening conditions less likely.     Patient was found to have strep pharyngitis.  At this time, I think she can be discharged with a prescription for antibiotics and she should follow-up in the outpatient setting.  I have encouraged close outpatient follow up.      Anticipatory guidance given prior to discharge.    Diagnosis:    ICD-10-CM    1. Streptococcal pharyngitis  J02.0    2. Chest pain, unspecified type  R07.9        Discharge Medications:  Discharge Medication List as of 9/17/2022 12:16 PM      START taking these medications    Details   amoxicillin (AMOXIL) 500 MG capsule Take 1 capsule (500 mg) by  mouth 2 times daily for 10 days, Disp-20 capsule, R-0, E-Prescribe             Scribe Disclosure:  I, Brent Keys, am serving as a scribe at 8:13 AM on 9/17/2022 to document services personally performed by Nelson Ko DO based on my observations and the provider's statements to me.          Nelson Ko DO  09/17/22 [id number]

## 2022-09-19 LAB
ATRIAL RATE - MUSE: 69 BPM
DIASTOLIC BLOOD PRESSURE - MUSE: NORMAL MMHG
INTERPRETATION ECG - MUSE: NORMAL
P AXIS - MUSE: 35 DEGREES
PR INTERVAL - MUSE: 136 MS
QRS DURATION - MUSE: 80 MS
QT - MUSE: 408 MS
QTC - MUSE: 437 MS
R AXIS - MUSE: -18 DEGREES
SYSTOLIC BLOOD PRESSURE - MUSE: NORMAL MMHG
T AXIS - MUSE: 17 DEGREES
VENTRICULAR RATE- MUSE: 69 BPM

## 2022-10-23 ENCOUNTER — HEALTH MAINTENANCE LETTER (OUTPATIENT)
Age: 82
End: 2022-10-23

## 2022-11-08 DIAGNOSIS — J45.40 MODERATE PERSISTENT ASTHMA, UNCOMPLICATED: ICD-10-CM

## 2022-11-08 DIAGNOSIS — I20.0 UNSTABLE ANGINA PECTORIS (H): ICD-10-CM

## 2022-11-08 DIAGNOSIS — I25.10 MILD CORONARY ARTERY DISEASE: ICD-10-CM

## 2022-11-09 DIAGNOSIS — I20.1 VASOSPASTIC ANGINA (H): ICD-10-CM

## 2022-11-09 RX ORDER — ISOSORBIDE MONONITRATE 60 MG/1
TABLET, EXTENDED RELEASE ORAL
Qty: 90 TABLET | Refills: 0 | Status: SHIPPED | OUTPATIENT
Start: 2022-11-09 | End: 2022-11-25

## 2022-11-09 RX ORDER — MONTELUKAST SODIUM 10 MG/1
TABLET ORAL
Qty: 90 TABLET | Refills: 0 | Status: SHIPPED | OUTPATIENT
Start: 2022-11-09 | End: 2022-11-25

## 2022-11-09 RX ORDER — AMLODIPINE BESYLATE 5 MG/1
5 TABLET ORAL EVERY EVENING
Qty: 90 TABLET | Refills: 1 | Status: SHIPPED | OUTPATIENT
Start: 2022-11-09 | End: 2022-11-25

## 2022-11-09 RX ORDER — ROSUVASTATIN CALCIUM 40 MG/1
TABLET, COATED ORAL
Qty: 90 TABLET | Refills: 0 | Status: SHIPPED | OUTPATIENT
Start: 2022-11-09 | End: 2022-11-25

## 2022-11-09 NOTE — TELEPHONE ENCOUNTER
Routing refill request to provider for review/approval because:  Labs not current:  ACT and Lipid    Appointment with PCP on 11/25/22 at 2:00pm    Sonya BLACKWELL RN, BSN

## 2022-11-19 ASSESSMENT — ASTHMA QUESTIONNAIRES
QUESTION_5 LAST FOUR WEEKS HOW WOULD YOU RATE YOUR ASTHMA CONTROL: WELL CONTROLLED
ACT_TOTALSCORE: 20
QUESTION_1 LAST FOUR WEEKS HOW MUCH OF THE TIME DID YOUR ASTHMA KEEP YOU FROM GETTING AS MUCH DONE AT WORK, SCHOOL OR AT HOME: NONE OF THE TIME
QUESTION_4 LAST FOUR WEEKS HOW OFTEN HAVE YOU USED YOUR RESCUE INHALER OR NEBULIZER MEDICATION (SUCH AS ALBUTEROL): TWO OR THREE TIMES PER WEEK
QUESTION_3 LAST FOUR WEEKS HOW OFTEN DID YOUR ASTHMA SYMPTOMS (WHEEZING, COUGHING, SHORTNESS OF BREATH, CHEST TIGHTNESS OR PAIN) WAKE YOU UP AT NIGHT OR EARLIER THAN USUAL IN THE MORNING: NOT AT ALL
QUESTION_2 LAST FOUR WEEKS HOW OFTEN HAVE YOU HAD SHORTNESS OF BREATH: THREE TO SIX TIMES A WEEK
ACT_TOTALSCORE: 20

## 2022-11-21 DIAGNOSIS — I87.2 VENOUS REFLUX: ICD-10-CM

## 2022-11-21 RX ORDER — FUROSEMIDE 20 MG
TABLET ORAL
Qty: 60 TABLET | Refills: 3 | Status: ON HOLD | OUTPATIENT
Start: 2022-11-21 | End: 2023-06-08

## 2022-11-21 NOTE — TELEPHONE ENCOUNTER
Received refill request for:  [facility] Card[facility] Refill Guideline reviewed.  Medication meets criteria for refill.    Chela Vera RN, BSN  11/21/22 at 1:53 PM

## 2022-11-25 ENCOUNTER — OFFICE VISIT (OUTPATIENT)
Dept: PEDIATRICS | Facility: CLINIC | Age: 82
End: 2022-11-25
Payer: COMMERCIAL

## 2022-11-25 VITALS
RESPIRATION RATE: 16 BRPM | WEIGHT: 171 LBS | DIASTOLIC BLOOD PRESSURE: 60 MMHG | HEIGHT: 61 IN | BODY MASS INDEX: 32.28 KG/M2 | OXYGEN SATURATION: 96 % | SYSTOLIC BLOOD PRESSURE: 108 MMHG | TEMPERATURE: 97.8 F | HEART RATE: 83 BPM

## 2022-11-25 DIAGNOSIS — J31.0 CHRONIC RHINITIS: ICD-10-CM

## 2022-11-25 DIAGNOSIS — K21.00 GASTROESOPHAGEAL REFLUX DISEASE WITH ESOPHAGITIS WITHOUT HEMORRHAGE: ICD-10-CM

## 2022-11-25 DIAGNOSIS — M81.0 AGE-RELATED OSTEOPOROSIS WITHOUT CURRENT PATHOLOGICAL FRACTURE: ICD-10-CM

## 2022-11-25 DIAGNOSIS — I50.32 DIASTOLIC CHF, CHRONIC (H): ICD-10-CM

## 2022-11-25 DIAGNOSIS — I20.0 UNSTABLE ANGINA PECTORIS (H): ICD-10-CM

## 2022-11-25 DIAGNOSIS — I20.1 VASOSPASTIC ANGINA (H): ICD-10-CM

## 2022-11-25 DIAGNOSIS — J45.40 MODERATE PERSISTENT ASTHMA, UNCOMPLICATED: ICD-10-CM

## 2022-11-25 DIAGNOSIS — I25.10 MILD CORONARY ARTERY DISEASE: ICD-10-CM

## 2022-11-25 DIAGNOSIS — I77.1 SUBCLAVIAN ARTERY STENOSIS, LEFT (H): ICD-10-CM

## 2022-11-25 DIAGNOSIS — Z63.6 CAREGIVER BURDEN: ICD-10-CM

## 2022-11-25 DIAGNOSIS — E78.5 HYPERLIPIDEMIA LDL GOAL <130: ICD-10-CM

## 2022-11-25 DIAGNOSIS — Z00.01 ENCOUNTER FOR GENERAL ADULT MEDICAL EXAMINATION WITH ABNORMAL FINDINGS: Primary | ICD-10-CM

## 2022-11-25 DIAGNOSIS — Z13.220 SCREENING FOR HYPERLIPIDEMIA: ICD-10-CM

## 2022-11-25 LAB
ALT SERPL W P-5'-P-CCNC: 19 U/L (ref 10–35)
CHOLEST SERPL-MCNC: 155 MG/DL
HDLC SERPL-MCNC: 78 MG/DL
LDLC SERPL CALC-MCNC: 60 MG/DL
NONHDLC SERPL-MCNC: 77 MG/DL
TRIGL SERPL-MCNC: 84 MG/DL

## 2022-11-25 PROCEDURE — 99214 OFFICE O/P EST MOD 30 MIN: CPT | Mod: 25 | Performed by: PEDIATRICS

## 2022-11-25 PROCEDURE — 84460 ALANINE AMINO (ALT) (SGPT): CPT | Performed by: PEDIATRICS

## 2022-11-25 PROCEDURE — G0439 PPPS, SUBSEQ VISIT: HCPCS | Performed by: PEDIATRICS

## 2022-11-25 PROCEDURE — 80061 LIPID PANEL: CPT | Performed by: PEDIATRICS

## 2022-11-25 PROCEDURE — 36415 COLL VENOUS BLD VENIPUNCTURE: CPT | Performed by: PEDIATRICS

## 2022-11-25 RX ORDER — ROSUVASTATIN CALCIUM 40 MG/1
40 TABLET, COATED ORAL DAILY
Qty: 90 TABLET | Refills: 3 | Status: SHIPPED | OUTPATIENT
Start: 2022-11-25 | End: 2023-11-30

## 2022-11-25 RX ORDER — FLUTICASONE PROPIONATE 50 MCG
SPRAY, SUSPENSION (ML) NASAL
Qty: 48 G | Refills: 11 | Status: SHIPPED | OUTPATIENT
Start: 2022-11-25 | End: 2024-05-20

## 2022-11-25 RX ORDER — MONTELUKAST SODIUM 10 MG/1
10 TABLET ORAL
COMMUNITY
Start: 2021-08-05 | End: 2022-11-25

## 2022-11-25 RX ORDER — FLUTICASONE PROPIONATE AND SALMETEROL 250; 50 UG/1; UG/1
2 POWDER RESPIRATORY (INHALATION) 2 TIMES DAILY
Qty: 60 EACH | Refills: 11 | Status: ON HOLD | OUTPATIENT
Start: 2022-11-25 | End: 2023-06-08

## 2022-11-25 RX ORDER — FLUTICASONE PROPIONATE AND SALMETEROL 250; 50 UG/1; UG/1
1 POWDER RESPIRATORY (INHALATION)
COMMUNITY
Start: 2021-08-05 | End: 2022-11-25

## 2022-11-25 RX ORDER — ALBUTEROL SULFATE 90 UG/1
AEROSOL, METERED RESPIRATORY (INHALATION)
Qty: 25.5 G | Refills: 0 | Status: SHIPPED | OUTPATIENT
Start: 2022-11-25 | End: 2023-03-20

## 2022-11-25 RX ORDER — ISOSORBIDE MONONITRATE 60 MG/1
TABLET, EXTENDED RELEASE ORAL
Qty: 90 TABLET | Refills: 3 | Status: SHIPPED | OUTPATIENT
Start: 2022-11-25 | End: 2023-11-30

## 2022-11-25 RX ORDER — ALBUTEROL SULFATE 0.83 MG/ML
2.5 SOLUTION RESPIRATORY (INHALATION) EVERY 6 HOURS PRN
Qty: 3 ML | Refills: 11 | Status: SHIPPED | OUTPATIENT
Start: 2022-11-25 | End: 2023-11-30

## 2022-11-25 RX ORDER — SPIRONOLACTONE 25 MG/1
12.5 TABLET ORAL DAILY PRN
Qty: 90 TABLET | Refills: 3 | Status: SHIPPED | OUTPATIENT
Start: 2022-11-25 | End: 2023-07-13

## 2022-11-25 RX ORDER — MONTELUKAST SODIUM 10 MG/1
1 TABLET ORAL AT BEDTIME
Qty: 90 TABLET | Refills: 3 | Status: SHIPPED | OUTPATIENT
Start: 2022-11-25 | End: 2023-11-30

## 2022-11-25 RX ORDER — AMLODIPINE BESYLATE 5 MG/1
5 TABLET ORAL EVERY EVENING
Qty: 90 TABLET | Refills: 3 | Status: SHIPPED | OUTPATIENT
Start: 2022-11-25 | End: 2023-11-30

## 2022-11-25 RX ORDER — LACTOBACILLUS ACIDOPHILUS/PECT 30 MG-20MG
1 TABLET ORAL EVERY MORNING
Status: ON HOLD | COMMUNITY
Start: 2022-11-25 | End: 2024-01-13

## 2022-11-25 RX ORDER — ALENDRONATE SODIUM 70 MG/1
TABLET ORAL
Qty: 12 TABLET | Refills: 3 | Status: SHIPPED | OUTPATIENT
Start: 2022-11-25 | End: 2022-11-25

## 2022-11-25 RX ORDER — LANSOPRAZOLE 30 MG/1
30 CAPSULE, DELAYED RELEASE ORAL DAILY
Qty: 90 CAPSULE | Refills: 3 | Status: SHIPPED | OUTPATIENT
Start: 2022-11-25 | End: 2023-07-13

## 2022-11-25 SDOH — SOCIAL STABILITY - SOCIAL INSECURITY: DEPENDENT RELATIVE NEEDING CARE AT HOME: Z63.6

## 2022-11-25 ASSESSMENT — PAIN SCALES - GENERAL: PAINLEVEL: NO PAIN (0)

## 2022-11-25 NOTE — PROGRESS NOTES
2022    From the office of:   Remberto Sotomayor CNP   [facility] [address] [address]  Phone: [phone]  Fax: [phone]    In regards to Matt Mercado, :  1948. I had the pleasure of seeing your new patient for her Comprehensive Assessment - her visit with you is upcoming on 2022 at 2:30 / We did call your office to verify location and time. - I am not certain she is on Metformin yet medication reconciliation had Metformin 750 mg BID on the list- she did say one of her prescriptions had to be picked up . Her point of care Hbg A1C was elevated at 8.0 - I did educate her on dietary changes, exercise and medication adherence. Also per Cardiology notes her Statin was going to be switched to Atorvastatin 20 mg yet she states she did not receive that increase dose. She currently takes Simvastatin 10 mg  I gave her a iFOBT kit to complete. Thank you for your time. Jennifer MURRAY     Comprehensive Disease Management Note    Primary Care Physician  Arturo Negrete MD  Office Phone #: [phone]  Fax Phone #: [phone]    Chief Complaint   Patient presents with   â¢ Care Gap - Chronic Disease     Chronic Disease Management        HPI  Ami Ormond is a 75year old female seen for comprehensive chronic disease management related to 1. Type 2 diabetes mellitus with hyperglycemia, without long-term current use of insulin (CMS/HCC)  2. Vitamin D deficiency  3. Tortuous aorta (CMS/HCC)  4. Mixed hyperlipidemia  5. Gastro-esophageal reflux disease without esophagitis  6. Fatty liver  7. Hypertensive kidney disease with stage 3a chronic kidney disease (CMS/HCC)  8. Arthritis  9. Healthcare maintenance  10.  Dyspnea on exertion  History Obtained By History Obtained by: Patient and Chart Review    Patient's medications, allergies, past medical, surgical, social, and family histories were reviewed and updated as SUBJECTIVE:   Victoria is a 82 year old who presents for Preventive Visit.    Patient has been advised of split billing requirements and indicates understanding: Yes  Are you in the first 12 months of your Medicare coverage?  No    HPI    Have you ever done Advance Care Planning? (For example, a Health Directive, POLST, or a discussion with a medical provider or your loved ones about your wishes): Yes, patient states has an Advance Care Planning document and will bring a copy to the clinic.       Fall risk  Fallen 2 or more times in the past year?: No  Any fall with injury in the past year?: No    Cognitive Screening   1) Repeat 3 items (Leader, Season, Table)  2) Clock draw:   3) 3 item recall: Recalls 3 objects  Results: 3 items recalled: COGNITIVE IMPAIRMENT LESS LIKELY    Mini-CogTM Copyright S Kamille. Licensed by the author for use in [facility] [employer]; reprinted with permission (ankit@[facility]). All rights reserved.      Do you have sleep apnea, excessive snoring or daytime drowsiness?: yes    Reviewed and updated as needed this visit by clinical staff   Tobacco  Allergies  Meds              Reviewed and updated as needed this visit by Provider                 Social History     Tobacco Use     Smoking status: Former     Packs/day: 1.00     Years: 30.00     Pack years: 30.00     Types: Cigarettes     Start date: 7/15/1973     Quit date: 1993     Years since quittin.5     Smokeless tobacco: Never   Substance Use Topics     Alcohol use: Yes     Comment: Not very often         Alcohol Use 2021   Prescreen: >3 drinks/day or >7 drinks/week? No   Prescreen: >3 drinks/day or >7 drinks/week? -               Current providers sharing in care for this patient include:   Patient Care Team:  Elsy Bah MD as PCP - General (Internal Medicine)  Aron Torres MD as MD (Orthopedics)  Elsy Bah MD as Assigned PCP  Yogi Yepez MD as Assigned Heart and Vascular  "Provider    The following health maintenance items are reviewed in Epic and correct as of today:  Health Maintenance   Topic Date Due     ZOSTER IMMUNIZATION (1 of 2) Never done     Pneumococcal Vaccine: 65+ Years (2 - PCV) 12/08/2011     DTAP/TDAP/TD IMMUNIZATION (2 - Td or Tdap) 04/14/2017     COLORECTAL CANCER SCREENING  02/17/2020     HF ACTION PLAN  08/04/2020     ASTHMA ACTION PLAN  04/28/2022     COVID-19 Vaccine (5 - Booster for [employer] series) 05/30/2022     ALT  07/22/2022     LIPID  07/22/2022     MEDICARE ANNUAL WELLNESS VISIT  08/05/2022     ANNUAL REVIEW OF HM ORDERS  08/05/2022     INFLUENZA VACCINE (1) 09/01/2022     BMP  03/17/2023     ASTHMA CONTROL TEST  05/25/2023     CBC  09/17/2023     FALL RISK ASSESSMENT  11/25/2023     ADVANCE CARE PLANNING  04/15/2027     DEXA  08/09/2032     TSH W/FREE T4 REFLEX  Completed     PHQ-2 (once per calendar year)  Completed     IPV IMMUNIZATION  Aged Out     MENINGITIS IMMUNIZATION  Aged Out           Mammogram Screening - Patient over age 75, has elected to discontinue screenings.  Pertinent mammograms are reviewed under the imaging tab.    Review of Systems      OBJECTIVE:   /60   Pulse 83   Temp 97.8  F (36.6  C)   Resp 16   Ht 1.549 m (5' 1\")   Wt 77.6 kg (171 lb)   LMP  (LMP Unknown)   SpO2 96%   BMI 32.31 kg/m   Estimated body mass index is 32.31 kg/m  as calculated from the following:    Height as of this encounter: 1.549 m (5' 1\").    Weight as of this encounter: 77.6 kg (171 lb).  Physical Exam  GENERAL APPEARANCE: healthy, alert and no distress  EYES: Eyes grossly normal to inspection, PERRL and conjunctivae and sclerae normal  NECK: no adenopathy, no asymmetry, masses, or scars and thyroid normal to palpation  RESP: lungs clear to auscultation - no rales, rhonchi or wheezes  CV: regular rate and rhythm, normal S1 S2, no S3 or S4, no murmur, click or rub, no peripheral edema and peripheral pulses strong  ABDOMEN: soft, nontender, no " appropriate    Review of Systems   Constitutional: Negative. Negative for activity change, appetite change, chills, diaphoresis, fatigue and fever. HENT: Negative. Negative for hearing loss. Eyes: Negative. Respiratory: Negative. Negative for cough, choking, chest tightness and shortness of breath. Cardiovascular: Negative. Gastrointestinal: Negative. Negative for abdominal distention, constipation, diarrhea and nausea. Endocrine: Negative. Genitourinary: Negative. Musculoskeletal: Negative. Negative for arthralgias, back pain and neck pain. Skin: Negative. Negative for rash. Allergic/Immunologic: Negative. Neurological: Positive for numbness (left foot when laying down ). Hematological: Negative. Psychiatric/Behavioral: Negative. Current Outpatient Medications   Medication Sig Dispense Refill   â¢ atenolol-chlorthalidone (TENORETIC) 50-25 MG per tablet Take 1 tablet by mouth daily. â¢ simvastatin (ZOCOR) 10 MG tablet Take 1 tablet by mouth daily. â¢ amLODIPine (NORVASC) 5 MG tablet Take 1 tablet by mouth daily. â¢ metFORMIN (GLUCOPHAGE-XR) 750 MG 24 hr tablet Take 1 tablet by mouth 2 times daily (before meals). â¢ aspirin 81 MG chewable tablet Chew 81 mg by mouth daily. â¢ famotidine (PEPCID) 40 MG tablet Take 40 mg by mouth 2 times daily. â¢ glipiZIDE (GLUCOTROL ER) 10 MG 24 hr tablet Take 10 mg by mouth daily. â¢ Ascorbic Acid (vitamin C) 1000 MG tablet Take 1,000 mg by mouth daily. â¢ Vitamin D, Cholecalciferol, 10 mcg (400 units) tablet Take 10 mcg by mouth daily. â¢ Cyanocobalamin (Vitamin B 12) 500 MCG Tab Take by mouth daily. No current facility-administered medications for this visit.        ALLERGIES:   Allergen Reactions   â¢ Ace Inhibitors Cough     cough   â¢ Iodine HIVES     Local from a skin application(Betadine?)       Past Medical History:   Diagnosis Date   â¢ Anorectal disorder 5/6/2022       Past Surgical History: "  Procedure Laterality Date   â¢ Breast fibroadenoma surgery Left    â¢ Exploratory of abdomen      1970       Social History     Substance and Sexual Activity   Drug Use Not on file       Family History   Problem Relation Age of Onset   â¢ Diabetes Mother    â¢ Stroke Mother        Visit Vitals  /72   Pulse 68   Temp 98.4 Â°F (36.9 Â°C)   Resp 16   Ht 5' 3"" (1.6 m)   Wt 83.5 kg (184 lb)   SpO2 98%   BMI 32.59 kg/mÂ²          No results found for this visit on 05/19/22. Social Determinants of Health  Social Determinants of Health     Intimate Partner Violence: Not At Risk   â¢ Social Determinants: Intimate Partner Violence Past Fear: No   â¢ Social Determinants: Intimate Partner Violence Current Fear: No   Social Connections: Socially Integrated   â¢ Social Determinants: Social Connections: 3 to 5 times a week   Alcohol Use: Not At Risk   â¢ Social Determinants: Total Audit-C Score: 0   Tobacco Use: Medium Risk   â¢ Smoking Tobacco Use: Former Smoker   â¢ Smokeless Tobacco Use: Never Used   Financial Resource Strain: Low Risk    â¢ Social Determinants: Financial Resource Strain: None   Stress: Low Risk    â¢ Social Determinants: Stress: Not at all   Physical Activity: Unknown   â¢ Days of Exercise per Week: Not on file   â¢ Minutes of Exercise per Session: 10 min   Food Insecurity: No Food Insecurity   â¢ Social Determinants: Food Insecurity: Never   Transportation Needs: No Transportation Needs   â¢ Lack of Transportation (Medical): No   â¢ Lack of Transportation (Non-Medical): No   Housing : Low Risk    â¢ Social Determinants: Housing (Overall Score [place]) :  2   Depression: Not at risk   â¢ PHQ-2 Score: 0         Functional Assessment   Independent bathing, Independent continence , Independent dressing  , Independent feeding , Independent toileting  and Independent transferring bed/chair  laundry with Independent, handles financials with Independent, housekeeping with minimal assistance, meal prep with Independent, making " hepatosplenomegaly, no masses and bowel sounds normal  MS: no musculoskeletal defects are noted and gait is age appropriate without ataxia  SKIN: no suspicious lesions or rashes  NEURO: Normal strength and tone, sensory exam grossly normal, mentation intact and speech normal  PSYCH: mentation appears normal and affect normal/bright    Diagnostic Test Results:  Labs reviewed in Epic    ASSESSMENT / PLAN:   (Z00.01) Encounter for general adult medical examination with abnormal findings  (primary encounter diagnosis)  Comment: declines all vaccines   Plan:     (E78.5) Hyperlipidemia LDL goal <130  Comment: due for labs  Plan: ALT        Continue statine    (Z13.220) Screening for hyperlipidemia  Comment: as above  Plan: Lipid panel reflex to direct LDL Non-fasting            (I20.1) Vasospastic angina (H)  Comment: stable  Plan: Lipid panel reflex to direct LDL Non-fasting,         amLODIPine (NORVASC) 5 MG tablet            (J45.40) Moderate persistent asthma, uncomplicated  Comment: stable, lungs clear today.  Plan: albuterol (PROAIR HFA) 108 (90 Base) MCG/ACT         inhaler, albuterol (PROVENTIL) (2.5 MG/3ML)         0.083% neb solution, fluticasone-salmeterol         (ADVAIR) 250-50 MCG/ACT inhaler, montelukast         (SINGULAIR) 10 MG tablet            (M81.0) Age-related osteoporosis without current pathological fracture  Comment:   Plan: alendronate (FOSAMAX) 70 MG tablet            (I25.10) Mild coronary artery disease  Comment: stable, due for labs as above.   Plan: rosuvastatin (CRESTOR) 40 MG tablet            (I50.32) Diastolic CHF, chronic (H)  Comment: stable  Plan: BETA BLOCKER NOT PRESCRIBED (INTENTIONAL),         spironolactone (ALDACTONE) 25 MG tablet            (I77.1) Subclavian artery stenosis, left (H)  Comment: stable  Plan:     (J31.0) Chronic rhinitis  Comment: stable  Plan: fluticasone (FLONASE) 50 MCG/ACT nasal spray            (I20.0) Unstable angina pectoris (H)  Comment: Added  "automatically from request for surgery [id number]  Plan: isosorbide mononitrate (IMDUR) 60 MG 24 hr         tablet        stable    (K21.00) Gastroesophageal reflux disease with esophagitis without hemorrhage  Comment: stable  Plan: LANsoprazole (PREVACID) 30 MG DR capsule            (Z63.6) Caregiver burden  Comment: patient is full time caretaker for  who has many medical conditions and appointments - she is wondering how to get help  Plan: Primary Care - Care Coordination Referral              Patient has been advised of split billing requirements and indicates understanding: Yes  MED REC REQUIRED  Post Medication Reconciliation Status:     COUNSELING:  Reviewed preventive health counseling, as reflected in patient instructions      BMI:   Estimated body mass index is 32.31 kg/m  as calculated from the following:    Height as of this encounter: 1.549 m (5' 1\").    Weight as of this encounter: 77.6 kg (171 lb).         She reports that she quit smoking about 29 years ago. Her smoking use included cigarettes. She started smoking about 49 years ago. She has a 30.00 pack-year smoking history. She has never used smokeless tobacco.      Appropriate preventive services were discussed with this patient, including applicable screening as appropriate for cardiovascular disease, diabetes, osteopenia/osteoporosis, and glaucoma.  As appropriate for age/gender, discussed screening for colorectal cancer, prostate cancer, breast cancer, and cervical cancer. Checklist reviewing preventive services available has been given to the patient.    Reviewed patients plan of care and provided an AVS. The Basic Care Plan (routine screening as documented in Health Maintenance) for June meets the Care Plan requirement. This Care Plan has been established and reviewed with the Patient.      Elsy Bah MD  [facility]    Identified Health Risks:  " bed with Independent, managing medications with Independent, shopping with minimal assistance, telephone with Independent and transportation with maximum assistance    Depression Screening  Recent PHQ 2/9 Score    PHQ 2:  Date Adult PHQ 2 Score Adult PHQ 2 Interpretation   5/19/2022 0 No further screening needed       PHQ 9:       Advanced Illness Screen  Reviewed: Yes  Advanced Illness Screen  Has the patient been diagnosed with an advanced illness during the measurement year or year prior that met one or more of the following criteria?: No  Has the patient had two outpatient visits, observation visit, ED visit or non-acute inpatient encounter on different dates of service with an advanced illness diagnosis?: No  Has the patient had one acute inpatient encounter with an advanced illness diagnosis?: No  Has the patient been dispensed dementia medication (donepezil, galantamine, rivastigmine, memantine)?: No      Frailty Screen  Reviewed: Yes  Frailty Scale  Have you felt fatigued? Most or all of the time over the past month?: No  Do you have difficulty climbing a flight of stairs?: No  Do you have difficulty walking one block?: No  Do you have any of these illnesses: hypertension, diabetes, cancer (other than a minor skin cancer), chronic lung disease, heart attack, congestive heart failure, angina, asthma, arthritis, stroke, and kidney disease?: Yes  Have you lost more than 5 percent of your weight in the past year?: No  A score of 3 or more is considered frail: 1        Hospice/Palliative Screen  Has the patient been enrolled in Hospice or Palliative Medicine during measurement year?  No    Health Maintenance  Health Maintenance Summary     Medicare Advantage- Medicare Wellness Visit (Yearly - January to December)  Overdue - never done    Diabetes Eye Exam (Yearly)  Postponed until 6/30/2022    DTaP/Tdap/Td Vaccine (2 - Td or Tdap)  Postponed until 6/30/2022    Hepatitis C Screening (Once)  Ordered on 5/6/2022 Shingles Vaccine (2 of 3)  Postponed until 6/30/2022    Colorectal Cancer Screen- (Colonoscopy - Every 10 Years)  Ordered on 5/6/2022    COVID-19 Vaccine (4 - Booster for [email or web address] series)  Postponed until 6/30/2022    Pneumococcal Vaccine 65+ (2 - PPSV23 or PCV20)  Postponed until 6/30/2022    Diabetes A1C (Every 3 Months)  Next due on 8/19/2022    DM/CKD Microalbumin (Yearly)  Ordered on 12/20/2021    DM/CKD GFR (Yearly)  Ordered on 12/20/2021    Breast Cancer Screening (Every 2 Years)  Next due on 4/28/2023    Diabetes Foot Exam (Yearly)  Next due on 5/19/2023    Depression Screening (Yearly)  Next due on 5/19/2023    Osteoporosis Screening   Completed    Influenza Vaccine   Completed    Hepatitis B Vaccine   Aged Out    Meningococcal Vaccine   Aged Out    HPV Vaccine   Aged Out          Influenza/Pneumococcal Vaccine  Recommended: Yes        Breast Cancer Screening   Recommended: Yes            Colorectal Cancer Screening   Recommended: Yes        Comprehensive Diabetes Care (CDC) (HgbA1c/Dilated Eye Exam/Nephropathy(Urine Mircoalbumin)  Recommended: Yes          Osteoporosis Management in Women with Fractures (OMW)   Recommended: No          Statin Therapy for Patients with CVD  Recommended: No        Statin Therapy For Persons with Diabetes  Recommended: Yes          Rheumatoid Arthritis (RA)  Recommended: No        BP Control  Recommended: Yes        Advance Care Planning       Advance Care Planning     PCP:  Abel Cummings MD  Discussion Leader:  Nadira MURRAY   Participants: Linda Akbar  And Nadira MURRAY   Location:  Patient home     Advance Care Planning Discussion    Patient and/or Substitute Decision-Maker agree to Advance Care Planning discussion. Does the patient have the capacity to make healthcare decisions: Yes, the patient is able to receive, process, and then give feedback to information regarding medical discussions.       Are the current Advance Directive documents consistent with the Patient's current wishes: N/A      Summary of Discussion: Patient states her son helps her and he would be the one to make decisions for her if she was unable to make them. She will discuss with her son and speak to PCP about paper work. Education provided. Does the patient have a state-issued Ambulatory Code Status ([place]-POLST [place]-DNR) order: No  After discussion, the patient has decided on Full Resuscitation     Discussion Start Time: 1330  Discussion End Time: [place], CNP               PHYSICAL ASSESSMENT  Physical Exam  Vitals reviewed. Constitutional:       General: She is not in acute distress. Appearance: She is obese. She is not ill-appearing, toxic-appearing or diaphoretic. HENT:      Head: Normocephalic and atraumatic. Right Ear: External ear normal.      Left Ear: External ear normal.      Nose: Nose normal. No congestion. Mouth/Throat:      Mouth: Mucous membranes are moist.      Pharynx: No posterior oropharyngeal erythema. Neck: Normal range of motion. Eyes:      General: No scleral icterus. Conjunctiva/sclera: Conjunctivae normal.   Cardiovascular:      Rate and Rhythm: Normal rate and regular rhythm. Pulses: Normal pulses. Heart sounds: Normal heart sounds. No murmur heard. No friction rub. Pulmonary:      Effort: Pulmonary effort is normal.      Breath sounds: Normal breath sounds. No rhonchi. Abdominal:      General: Abdomen is flat. There is no distension. Palpations: Abdomen is soft. There is no mass. Musculoskeletal:         General: Normal range of motion. Skin:     General: Skin is warm and dry. Capillary Refill: Capillary refill takes less than 2 seconds. Neurological:      General: No focal deficit present. Mental Status: She is alert and oriented to person, place, and time. Motor: No weakness.    Psychiatric:         Mood and Affect: Mood normal.         Behavior: Behavior normal. Thought Content: Thought content normal.         Judgment: Judgment normal.         ASSESSMENT/PLAN  Assessment   Healthcare maintenance  iFOBT kit provided with instructions and requisition   Patient will follow up with Eye Specialist Dr. Cortney Steve for yearly Eye exam due to DM   Patient will obtain Hepatitis C Screening with next lab draw with PCP   Patient is aware she is due for Vaccines : Pneumonia DTaP booster, Shingles   She will discuss risk benefit to 4th COVID 19 booster with PCP  Continue to exercise slowly increasing time on stationary bike or walking     Dyspnea on exertion  No current symptoms   Exercising 10-15 min per day on bike   Follows Cardiology Dr Kirby     Mixed hyperlipidemia  On Simvastatin 10 mg ( Per Dr Kirby  progress note was supposed to be on Atorvastatin 20 mg daily   Continue Statin, will follow with new PCP Dr. Dick Resendez ( per insurance ) and have Lipids checked   Low cholesterol diet and exercise       Tortuous aorta (CMS/HCC)  Statin and ASA   Monitor     Type 2 diabetes mellitus with hyperglycemia, without long-term current use of insulin (CMS/HCC)  Monitor: The patient's diabetes is worsening. Evaluation: POC Hbg A1c 8.0  Assessment/Treatment:  Continue current treatment/monitoring regimen. Continue current treatment regimen. Reminded to bring in blood sugar diary at next visit. Dietary recommendations for ADA diet. Regular aerobic exercise. Discussed ways to avoid symptomatic hypoglycemia. Discussed sick day management. Discussed foot care.   Reminded to get yearly retinal exam.  Ophthalmology referral.  Condition will be reassessed per progress note    Vitamin D deficiency  Monitor     Gastro-esophageal reflux disease without esophagitis  On Pepcid   Stable   Denies worsening symptoms   Diet discussed     Fatty liver  Stable   Monitor   Low fat diet     Hypertensive kidney disease with stage 3 chronic kidney disease (CMS/HCC)  Monitor: The patient's CKD is stable. Evaluation: No diagnostic tests required today. Assessment/Treatment:  Continue current treatment/monitoring regimen. Continue current treatment regimen. Weight loss. Monitor daily weight. Regular aerobic exercise. Continue Atenolol/Chlorathiadone &  Amlodipine   Condition will be reassessed per specialist managing the condition and PCP    Arthritis  Exercise and stretching   Tylenol PRN       Pain: Comfort/Function Pain Goal 0    Depression Plan: Denies depression at this time     BMI: Patient is overweight. 30 minutes of physical activity a day, See patient education in AVS, Caloric restriction and Low carbohydrate diet               Controlling HTN: Is the BP identified as controlled, the systolic and diastolic BP must be lower than 140/90 mm HG? Yes      Osteoporosis Management in Women with Fractures: Patient age 72-80 who suffered a fracture and had either a bone mineral density test (BMD) test or prescription for a drug to treat osteoporosis in the six months after the fracture? No    Statin Therapy for Patients with CVD: Male patient age 18-72 or female patient age 37-78 who was identified as having atherosclerotic cardiovascular disease (ASCVD) during the measurement year was dispensed at least one high- or moderateâintensity statin medication during the measurement year? No    Stain Plan for DM: Did the patient age 37-78 who had filled at least two diabetes medications (oral hypoglycemic or insulin) also have a statin filled (any intensity) during the measurement year? Yes    Rheumatoid Arthritis (RA): Patient age >22 dispensed at least ONE DMARD RX within the measurement year? No     Breast Cancer Screening ASSESSMENT/PLAN  Patient age 52â71 had a one or more mammograms between October 1 two years prior to the measurement year and December 31 of the measurement year to screen for breast cancer?   Yes    COLORECTAL SCREENING ASSESSMENT/PLAN   Patient age 52-65 had appropriate screening for colorectal cancer? Yes     Nephropathy Screening  Patient age 19-72 with diabetes (type1 and type 2) had nephropathy screening or monitoring test during the measurement year or evidence of nephropathy during the measurement year? Yes    COMPREHENSIVE DIABETES HgbA1C ASSESSMENT/PLAN  Patient age 23â76 had a HgbA1c during the measurement year or the year prior <9%? Yes    Dilated Retinal Exam:  Patient age 19-72 had screening or monitoring for diabetic retinal disease during the measurement year or year prior OR had a negative retinal or dilated eye exam ânegative for retinopathyâ by an optometrist or ophthalmologist in the year prior to the measurement year WITH appropriate documentation in medical record? Yes    Total combined time for today's Face to Face encounter was 60 minutes, with > 50% of that time spent counseling and coordinating care regarding the above.      Shira Mace, CNP

## 2022-11-25 NOTE — PATIENT INSTRUCTIONS
Patient Education   Personalized Prevention Plan  You are due for the preventive services outlined below.  Your care team is available to assist you in scheduling these services.  If you have already completed any of these items, please share that information with your care team to update in your medical record.  Health Maintenance Due   Topic Date Due     Zoster (Shingles) Vaccine (1 of 2) Never done     Pneumococcal Vaccine (2 - PCV) 12/08/2011     Diptheria Tetanus Pertussis (DTAP/TDAP/TD) Vaccine (2 - Td or Tdap) 04/14/2017     Colorectal Cancer Screening  02/17/2020     Heart Failure Action Plan  08/04/2020     Asthma Action Plan - yearly  04/28/2022     COVID-19 Vaccine (5 - Booster for Pfizer series) 05/30/2022     Liver Monitoring Lab  07/22/2022     Cholesterol Lab  07/22/2022     ANNUAL REVIEW OF HM ORDERS  08/05/2022     Flu Vaccine (1) 09/01/2022     Preventive Health Recommendations    See your health care provider every year to    Review health changes.     Discuss preventive care.      Review your medicines if your doctor has prescribed any.    You no longer need a yearly Pap test unless you've had an abnormal Pap test in the past 10 years. If you have vaginal symptoms, such as bleeding or discharge, be sure to talk with your provider about a Pap test.    Every 1 to 2 years, have a mammogram.  If you are over 69, talk with your health care provider about whether or not you want to continue having screening mammograms.    Every 10 years, have a colonoscopy. Or, have a yearly FIT test (stool test). These exams will check for colon cancer.     Have a cholesterol test every 5 years, or more often if your doctor advises it.     Have a diabetes test (fasting glucose) every three years. If you are at risk for diabetes, you should have this test more often.     At age 65, have a bone density scan (DEXA) to check for osteoporosis (brittle bone disease).    Shots:    Get a flu shot each year.    Get a tetanus  shot every 10 years.    Talk to your doctor about your pneumonia vaccines. There are now two you should receive - Pneumovax (PPSV 23) and Prevnar (PCV 13).    Talk to your pharmacist about the shingles vaccine.    Talk to your doctor about the hepatitis B vaccine.    Nutrition:     Eat at least 5 servings of fruits and vegetables each day.    Eat whole-grain bread, whole-wheat pasta and brown rice instead of white grains and rice.    Get adequate Calcium and Vitamin D.     Lifestyle    Exercise at least 150 minutes a week (30 minutes a day, 5 days a week). This will help you control your weight and prevent disease.    Limit alcohol to one drink per day.    No smoking.     Wear sunscreen to prevent skin cancer.     See your dentist twice a year for an exam and cleaning.    See your eye doctor every 1 to 2 years to screen for conditions such as glaucoma, macular degeneration and cataracts.    Personalized Prevention Plan  You are due for the preventive services outlined below.  Your care team is available to assist you in scheduling these services.  If you have already completed any of these items, please share that information with your care team to update in your medical record.  Health Maintenance   Topic Date Due     ZOSTER IMMUNIZATION (1 of 2) Never done     Pneumococcal Vaccine: 65+ Years (2 - PCV) 12/08/2011     DTAP/TDAP/TD IMMUNIZATION (2 - Td or Tdap) 04/14/2017     COLORECTAL CANCER SCREENING  02/17/2020     HF ACTION PLAN  08/04/2020     ASTHMA ACTION PLAN  04/28/2022     COVID-19 Vaccine (5 - Booster for Pfizer series) 05/30/2022     ALT  07/22/2022     LIPID  07/22/2022     ANNUAL REVIEW OF HM ORDERS  08/05/2022     INFLUENZA VACCINE (1) 09/01/2022     BMP  03/17/2023     ASTHMA CONTROL TEST  05/25/2023     CBC  09/17/2023     MEDICARE ANNUAL WELLNESS VISIT  11/25/2023     FALL RISK ASSESSMENT  11/25/2023     ADVANCE CARE PLANNING  11/25/2027     DEXA  08/09/2032     TSH W/FREE T4 REFLEX  Completed      PHQ-2 (once per calendar year)  Completed     IPV IMMUNIZATION  Aged Out     MENINGITIS IMMUNIZATION  Aged Out

## 2022-11-28 ENCOUNTER — PATIENT OUTREACH (OUTPATIENT)
Dept: CARE COORDINATION | Facility: CLINIC | Age: 82
End: 2022-11-28

## 2022-11-28 NOTE — PROGRESS NOTES
Clinic Care Coordination Contact  Community Health Worker Initial Outreach    CHW Initial Information Gathering:  Referral Source: PCP-   Evgeny is enrolled, Patient needs help caring for her  (who has same PCP) Looking for help to get him to app.  Preferred Hospital: [facility]  [phone]  Preferred Urgent Care: [facility] [place], [phone]  Current living arrangement:: I live in a private home with spouse  Type of residence:: Private home - stairs  Community Resources: None  No PCP office visit in Past Year: No  Transportation means:: Regular car  CHW Additional Questions  If ED/Hospital discharge, follow-up appointment scheduled as recommended?: N/A  Medication changes made following ED/Hospital discharge?: N/A    CHW introduced self and role with CC  Patient is familiar with CC team as her  Evgeny is enrolled and she is typically on the calls with him.   She expresses feeling like she can't get anything done around the house because of taking him to appointments. Trying to clean out and declutter the house. Pt is in a wheelchair, they have not looked into transportation options. CHW explained [employer] and that Evgeny would more than likely be accepted- fees are usually under $5-$10 per ride. She would be open to applying for this.   Wondering what insurance covers, they are changing to [employer]vantage.  Pt and spouse are traveling on 12/14-12/28.   Open to discussing further with [facility].  CHW will mail [employer] application.     Patient accepts CC: Yes. Patient scheduled for assessment with [facility] on 11/29 at 10:00am. Patient noted desire to discuss caregiver support.     BEE Correia, B.S. [facility]  Clinic Care Coordination  [facility] Clinics:  [place] and [place]  [phone]  [email or web address]

## 2022-11-28 NOTE — LETTER
[facility] Care Coordination  [address] [place] [address]      June V Selvin  [address] [place] [address]       November 28, 2022      Hello,    Thank you for speaking with me. I have attached the [employer] application for Evgeny to this letter.     Please let me know if you have any questions or concerns!     BEE Correia, B.S. [facility]  Clinic Care Coordination  [facility] Clinics:  [place], [place] and [place]  Phone: [phone]

## 2022-11-29 ENCOUNTER — PATIENT OUTREACH (OUTPATIENT)
Dept: NURSING | Facility: CLINIC | Age: 82
End: 2022-11-29
Payer: OTHER GOVERNMENT

## 2022-11-29 ASSESSMENT — ACTIVITIES OF DAILY LIVING (ADL): DEPENDENT_IADLS:: INDEPENDENT

## 2022-11-29 NOTE — PROGRESS NOTES
Clinic Care Coordination Contact    Clinic Care Coordination Contact  OUTREACH    Referral Information:  Referral Source: PCP    Primary Diagnosis: Psychosocial    Chief Complaint   Patient presents with     Clinic Care Coordination - Initial     Transportation resources        Universal Utilization:   Clinic Utilization  No PCP office visit in Past Year: No  Utilization    Hospital Admissions  1             ED Visits  3             No Show Count (past year)  0                Current as of: 11/29/2022  8:51 AM              Clinical Concerns:  Current Medical Concerns: Patient denied any clinical concerns at this time but shares she is looking for Caregiver supports. Pt explains she is needing her spouse to establish with alternate transportation options as she shares his frequent appointments have become burdensome.     Pt also shares she does the cooking, cleaning, and planning and is at times feeling very overwhelmed. SWCC and pt reviewed resources for housekeeping and housework as well as  and organization. Pt states she would like to prioritize establishing with transportation for spouse and then would like to revisit resources for more support in those areas.     Current Behavioral Concerns: none noted.       Education Provided to patient: Resources for Transportation, , organization.       Health Maintenance Reviewed:   Health Maintenance Due   Topic Date Due     ZOSTER IMMUNIZATION (1 of 2) Never done     Pneumococcal Vaccine: 65+ Years (2 - PCV) 12/08/2011     DTAP/TDAP/TD IMMUNIZATION (2 - Td or Tdap) 04/14/2017     COLORECTAL CANCER SCREENING  02/17/2020     HF ACTION PLAN  08/04/2020     ASTHMA ACTION PLAN  04/28/2022     INFLUENZA VACCINE (1) 09/01/2022        Clinical Pathway: None    Medication Management:  Medication review status: Medications not reviewed on this call. Care Team reviewed medications 11/25/22.     Functional Status:  Dependent ADLs:: Independent  Dependent IADLs::  Independent  Bed or wheelchair confined:: No  Mobility Status: Independent  Fallen 2 or more times in the past year?: No  Any fall with injury in the past year?: No    Living Situation:  Current living arrangement:: I live in a private home with spouse  Type of residence:: Private home - stairs    Lifestyle & Psychosocial Needs:    Social Determinants of Health     Tobacco Use: Medium Risk     Smoking Tobacco Use: Former     Smokeless Tobacco Use: Never     Passive Exposure: Not on file   Alcohol Use: Not on file   Financial Resource Strain: Not on file   Food Insecurity: Not on file   Transportation Needs: Not on file   Physical Activity: Not on file   Stress: Not on file   Social Connections: Not on file   Intimate Partner Violence: Not on file   Depression: Not at risk     PHQ-2 Score: 0   Housing Stability: Not on file        Transportation means:: Regular car     Care Coordinator has reviewed patient's Social Determinants of Health (SDoH) on this date. Upon review, changes were not  made.        Resources and Interventions:  Current Resources:      Community Resources: None  Supplies Currently Used at Home: None     Advance Care Plan/Directive  Advanced Care Plans/Directives on file:: No       Care Plan:  Care Plan: Caregiver Support     Problem: lacking caregiving supports     Goal: Establish with Caregiving supports over the next 6 months     Start Date: 11/29/2022 Expected End Date: 5/1/2023    Note:     Goal Statement: I will continue to take steps to establish with in home or caregiver supports over the next 6 month(s).  Barriers: Primary caregiver for spouse. Children occasionally but not consistently available.    Strengths: Motivated to find supports for spouse or resources to minimize strain/stress  Patient expressed understanding of goal: yes    Action steps to achieve this goal:  1. I will continue to lean on informal supports of: family  2. I will review resources and supports sent to me via [employer]  and consider establishing with one or more which interest me.  3. I will contact my care team with questions, concerns, support needs.   4. I will use the clinic as a resource and I understand I can contact my clinic with 24/7 after hours services available.   5. I will continue to outreach to care coordination as needed for additional resources or supports.     6. I will continue to review options for caregiving support in my area like homemaking, organization, meals, transportation or support groups.                        Patient/Caregiver understanding: Pt reports understanding and denies any additional questions or concerns at this times. DARIELA CC engaged in AID[employer] communication during encounter.      Outreach Frequency: monthly      Plan: Care Coordination team will continue to outreach to pt monthly as well as work with pt spouse who is also enrolled in care coordination. [employer] message sent to share resources for transportation for pt/spouse to review.     Anthony Castelan, [facility]  Clinic Care Coordinator  [facility]  [facility] [place]  [phone]  [email or web address]

## 2022-11-29 NOTE — LETTER
[facility]  Patient Centered Plan of Care  About Me:        Patient Name:  Victoria Montalvo    YOB: 1940  Age:         82 year old   Feliciano MRN:    [id number] Telephone Information:  Home Phone [phone]   Mobile [phone]       Address:  [address] [place] [address] Email address:  [email or web address]      Emergency Contact(s)    Name Relationship Lgl Grd Work Phone Home Phone Mobile Phone   1. TOBI GAR Daughter   [phone]   2. JANAY MONTALVO Spouse No  [phone]   3. SAQIB MONTALVO Son   [phone]   4. DANYELLEDAWSONJER AWAD Grandchild    [phone]   5. SHARITA ARORA Grandchild    [phone]           Primary language:  English     needed? No   [facility]:  [phone] op. 1  Other communication barriers:None    Preferred Method of Communication:  Mail  Current living arrangement: I live in a private home with spouse    Mobility Status/ Medical Equipment: Independent      Health Maintenance  Health Maintenance Reviewed:   Health Maintenance Due   Topic Date Due     ZOSTER IMMUNIZATION (1 of 2) Never done     Pneumococcal Vaccine: 65+ Years (2 - PCV) 12/08/2011     DTAP/TDAP/TD IMMUNIZATION (2 - Td or Tdap) 04/14/2017     COLORECTAL CANCER SCREENING  02/17/2020     HF ACTION PLAN  08/04/2020     ASTHMA ACTION PLAN  04/28/2022     INFLUENZA VACCINE (1) 09/01/2022         My Access Plan  Medical Emergency 911   Primary Clinic Line [facility] - [phone]   24 Hour Appointment Line [phone] or  [phone]) (toll-free)   24 Hour Nurse Line [phone] (toll-free)   Preferred Urgent Care [facility], [phone]     Preferred Hospital [facility]  [phone]     Preferred Pharmacy  - MAIL ORDER     Behavioral Health Crisis Line The National Suicide Prevention Lifeline at 1-945.286.9300 or  Text/Call 988             My Care Team Members  Patient Care Team       Relationship Specialty Notifications Start End    Elsy Bah MD PCP - General Internal Medicine  12/29/18     Phone: [phone] Fax: [phone] [address] [place] [address]    Aron Torres MD MD Orthopedics  10/28/15     Phone: [phone] Fax: [phone]         [facility] ORTHOPEDICS  [address]   [facility] [address]    Elsy Bah MD Assigned PCP   10/7/18     Phone: [phone] Fax: [phone] [address] [place] [address]    Yogi Yepez MD Assigned Heart and Vascular Provider   6/18/22     Phone: [phone] Fax: [phone]         [address] [facility] [address]    Anthony Castelan LSW Lead Care Coordinator Primary Care - CC Admissions 11/28/22     Phone: [phone]         Cyndi Doe MA Community Health Worker Primary Care - CC Admissions 11/29/22     Phone: [phone]                 My Care Plans  Self Management and Treatment Plan  Care Plan  Care Plan: Caregiver Support     Problem: lacking caregiving supports     Goal: Establish with Caregiving supports over the next 6 months     Start Date: 11/29/2022 Expected End Date: 5/1/2023    Note:     Goal Statement: I will continue to take steps to establish with in home or caregiver supports over the next 6 month(s).  Barriers: Primary caregiver for spouse. Children occasionally but not consistently available.    Strengths: Motivated to find supports for spouse or resources to minimize strain/stress  Patient expressed understanding of goal: yes    Action steps to achieve this goal:  1. I will continue to lean on informal supports of: family  2. I will review resources and supports sent to me via [employer] and consider establishing with one or more which interest me.  3. I will contact my care team with questions, concerns, support needs.   4. I will use the  clinic as a resource and I understand I can contact my clinic with 24/7 after hours services available.   5. I will continue to outreach to care coordination as needed for additional resources or supports.     6. I will continue to review options for caregiving support in my area like homemaking, organization, meals, transportation or support groups.                         Action Plans on File:   Asthma           Heart Failure         My Medical and Care Information  Problem List   Patient Active Problem List   Diagnosis     History of colonic polyps     Chronic rhinitis     Esophageal reflux     Female stress incontinence     LUMBOSACRAL NEURITIS , numbness left thigh     Osteopenia     Hyperlipidemia LDL goal <130     Diastolic CHF, chronic (H)     Subclavian artery stenosis, left (H)     Severe persistent asthma     KEN (obstructive sleep apnea)     Hip osteoarthritis     Mild coronary artery disease     Subclinical hyperthyroidism     Chest pain     Unstable angina pectoris (H)     Status post coronary angiogram     Hypertension     Vasospastic angina (H)     Exertional chest pain     Abnormal findings on diagnostic imaging of heart and coronary circulation     Venous reflux      Current Medications and Allergies:       Allergies   Allergen Reactions     Animal Dander      Dust Mites      Kiwi Itching     Itching and red all over     Pollen Extract      Pollen,dust, trees, grass, mold-hayfever symptoms     Current Outpatient Medications   Medication     albuterol (PROAIR HFA) 108 (90 Base) MCG/ACT inhaler     albuterol (PROVENTIL) (2.5 MG/3ML) 0.083% neb solution     amLODIPine (NORVASC) 5 MG tablet     aspirin 81 MG tablet     BETA BLOCKER NOT PRESCRIBED (INTENTIONAL)     Boswellia-Glucosamine-Vit D (OSTEO BI-FLEX/5-LOXIN ADVANCED) TABS     Calcium 8205-9625 MG-UNIT CHEW     Cholecalciferol (VITAMIN D3 PO)     diclofenac (VOLTAREN) 1 % topical gel     fluticasone (FLONASE) 50 MCG/ACT nasal spray      fluticasone-salmeterol (ADVAIR) 250-50 MCG/ACT inhaler     fluticasone-salmeterol (ADVAIR) 250-50 MCG/DOSE inhaler     furosemide (LASIX) 20 MG tablet     isosorbide mononitrate (IMDUR) 60 MG 24 hr tablet     LANsoprazole (PREVACID) 30 MG DR capsule     montelukast (SINGULAIR) 10 MG tablet     nitroGLYcerin (NITROSTAT) 0.4 MG sublingual tablet     omega-3 fatty acids 1200 MG capsule     Pyridoxine HCl (VITAMIN B6) 250 MG TABS     rosuvastatin (CRESTOR) 40 MG tablet     spironolactone (ALDACTONE) 25 MG tablet     No current facility-administered medications for this visit.         Care Coordination Start Date: 11/25/2022   Frequency of Care Coordination: monthly     Form Last Updated: 11/29/2022

## 2022-11-29 NOTE — LETTER
[facility] CARE COORDINATION  [address] [place] [address]    November 29, 2022 June BALTA Montalvo  [address] [place] [address]      Dear June,      I am a clinic care coordinator who works with Elsy Bah MD with the [facility]. I wanted to thank you for spending the time to talk with me.  Below is a description of clinic care coordination and how I can further assist you.       The clinic care coordination team is made up of a registered nurse, , financial resource worker and community health worker who understand the health care system. The goal of clinic care coordination is to help you manage your health and improve access to the health care system. Our team works alongside your provider to assist you in determining your health and social needs. We can help you obtain health care and community resources, providing you with necessary information and education. We can work with you through any barriers and develop a care plan that helps coordinate and strengthen the communication between you and your care team.    Please feel free to contact me with any questions or concerns regarding care coordination and what we can offer.      We are focused on providing you with the highest-quality healthcare experience possible.    Sincerely,     Anthony Castelan [facility]  Clinic Care Coordinator  [facility]  [facility]  [facility] [place]  [phone]  [email or web address]      Enclosed: I have enclosed a copy of the Patient Centered Plan of Care. This has helpful information and goals that we have talked about. Please keep this in an easy to access place to use as needed.

## 2022-12-28 ENCOUNTER — PATIENT OUTREACH (OUTPATIENT)
Dept: CARE COORDINATION | Facility: CLINIC | Age: 82
End: 2022-12-28

## 2022-12-28 NOTE — PROGRESS NOTES
Clinic Care Coordination Contact  Community Health Worker Follow Up    Care Gaps:     Health Maintenance Due   Topic Date Due     ZOSTER IMMUNIZATION (1 of 2) Never done     Pneumococcal Vaccine: 65+ Years (2 - PCV) 12/08/2011     DTAP/TDAP/TD IMMUNIZATION (2 - Td or Tdap) 04/14/2017     COLORECTAL CANCER SCREENING  02/17/2020     HF ACTION PLAN  08/04/2020     ASTHMA ACTION PLAN  04/28/2022     INFLUENZA VACCINE (1) 09/01/2022       Care Plan:   Care Plan: Caregiver Support     Problem: lacking caregiving supports     Goal: Establish with Caregiving supports over the next 6 months     Start Date: 11/29/2022 Expected End Date: 5/1/2023    This Visit's Progress: 10%    Note:     Goal Statement: I will continue to take steps to establish with in home or caregiver supports over the next 6 month(s).  Barriers: Primary caregiver for spouse. Children occasionally but not consistently available.    Strengths: Motivated to find supports for spouse or resources to minimize strain/stress  Patient expressed understanding of goal: yes    Action steps to achieve this goal:  1. I will continue to lean on informal supports of: family  2. I will review resources and supports sent to me via [employer] and consider establishing with one or more which interest me.  3. I will contact my care team with questions, concerns, support needs.   4. I will use the clinic as a resource and I understand I can contact my clinic with 24/7 after hours services available.   5. I will continue to outreach to care coordination as needed for additional resources or supports.     6. I will continue to review options for caregiving support in my area like homemaking, organization, meals, transportation or support groups.                        Intervention and Education during outreach: Patient states that they have company over, they weren't able to go to [place] due to her husbands hospitalization. On 1/1/23 they will have new insurance, [employer]. They are going to get more home care for Evgeny. Pt has [employer] form filled out and is going to send it after the first. Pt feels well supported at this time.    CHW Plan: Next outreach in one month.     Cyndi Doe, BEE, B.S. [facility]  Clinic Care Coordination  [facility]:  [place], [place] and [place]  [phone]  [email or web address]

## 2023-01-13 ENCOUNTER — E-VISIT (OUTPATIENT)
Dept: PEDIATRICS | Facility: CLINIC | Age: 83
End: 2023-01-13
Payer: COMMERCIAL

## 2023-01-13 DIAGNOSIS — R13.10 DYSPHAGIA, UNSPECIFIED TYPE: Primary | ICD-10-CM

## 2023-01-13 PROCEDURE — 99421 OL DIG E/M SVC 5-10 MIN: CPT | Performed by: PEDIATRICS

## 2023-01-13 NOTE — PATIENT INSTRUCTIONS
Hi June,    Thanks for reaching out.    I have ordered the same video swallow study that I did for Evgeny.  Once we see the results of this we can figure out next steps.    If you have not heard from the scheduling office within 2 business days, please call [phone] for [facility] [place], [phone] for [place] and [phone] for [facility].      Elsy Bah MD  Internal Medicine/Pediatrics  [facility] [place]

## 2023-01-27 ENCOUNTER — PATIENT OUTREACH (OUTPATIENT)
Dept: CARE COORDINATION | Facility: CLINIC | Age: 83
End: 2023-01-27
Payer: COMMERCIAL

## 2023-01-27 ASSESSMENT — ACTIVITIES OF DAILY LIVING (ADL): DEPENDENT_IADLS:: INDEPENDENT

## 2023-01-27 NOTE — PROGRESS NOTES
Clinic Care Coordination Contact  Care Coordination Clinician Chart Review    Situation: Patient chart reviewed by Care Coordinator.       Background: Care Coordination Program started: 11/25/2022. Initial assessment completed and patient-centered care plan(s) were developed with participation from patient. Lead CC handed patient off to CHW for continued outreaches.       Assessment: Per chart review, patient outreach completed by CC CHW on 12/28/22.  Patient is actively working to accomplish goal(s). Patient's goal(s) appropriate and relevant at this time. Patient is not due for updated Plan of Care.  Assessments will be completed annually or as needed/with change of patient status.      Care Plan: Caregiver Support     Problem: lacking caregiving supports     Goal: Establish with Caregiving supports over the next 6 months     Start Date: 11/29/2022 Expected End Date: 5/1/2023    This Visit's Progress: 10%    Note:     Goal Statement: I will continue to take steps to establish with in home or caregiver supports over the next 6 month(s).  Barriers: Primary caregiver for spouse. Children occasionally but not consistently available.    Strengths: Motivated to find supports for spouse or resources to minimize strain/stress  Patient expressed understanding of goal: yes    Action steps to achieve this goal:  1. I will continue to lean on informal supports of: family  2. I will review resources and supports sent to me via [employer] and consider establishing with one or more which interest me.  3. I will contact my care team with questions, concerns, support needs.   4. I will use the clinic as a resource and I understand I can contact my clinic with 24/7 after hours services available.   5. I will continue to outreach to care coordination as needed for additional resources or supports.     6. I will continue to review options for caregiving support in my area like homemaking, organization, meals, transportation or support  groups.                           Plan/Recommendations: The patient will continue working with Care Coordination to achieve goal(s) as above. CHW will continue outreaches at minimum every 30 days and will involve Lead CC as needed or if patient is ready to move to Maintenance. Lead CC will continue to monitor CHW outreaches and patient's progress to goal(s) every 6 weeks.     Plan of Care updated and sent to patient: Leydi Castelan [facility]  Clinic Care Coordinator  [facility]  [phone]  [email or web address]

## 2023-01-31 ENCOUNTER — PATIENT OUTREACH (OUTPATIENT)
Dept: CARE COORDINATION | Facility: CLINIC | Age: 83
End: 2023-01-31
Payer: COMMERCIAL

## 2023-01-31 NOTE — PROGRESS NOTES
Clinic Care Coordination Contact  Community Health Worker Follow Up    Care Gaps:     Health Maintenance Due   Topic Date Due     ZOSTER IMMUNIZATION (1 of 2) Never done     Pneumococcal Vaccine: 65+ Years (2 - PCV) 12/08/2011     DTAP/TDAP/TD IMMUNIZATION (2 - Td or Tdap) 04/14/2017     COLORECTAL CANCER SCREENING  02/17/2020     HF ACTION PLAN  08/04/2020     ASTHMA ACTION PLAN  04/28/2022     TSH W/FREE T4 REFLEX  07/22/2022     INFLUENZA VACCINE (1) 09/01/2022     PHQ-2 (once per calendar year)  01/01/2023       Care Plan:   Care Plan: Caregiver Support     Problem: lacking caregiving supports     Goal: Establish with Caregiving supports over the next 6 months     Start Date: 11/29/2022 Expected End Date: 5/1/2023    This Visit's Progress: 20% Recent Progress: 10%    Note:     Goal Statement: I will continue to take steps to establish with in home or caregiver supports over the next 6 month(s).  Barriers: Primary caregiver for spouse. Children occasionally but not consistently available.    Strengths: Motivated to find supports for spouse or resources to minimize strain/stress  Patient expressed understanding of goal: yes    Action steps to achieve this goal:  1. I will continue to lean on informal supports of: family  2. I will review resources and supports sent to me via [employer] and consider establishing with one or more which interest me.  3. I will contact my care team with questions, concerns, support needs.   4. I will use the clinic as a resource and I understand I can contact my clinic with 24/7 after hours services available.   5. I will continue to outreach to care coordination as needed for additional resources or supports.     6. I will continue to review options for caregiving support in my area like homemaking, organization, meals, transportation or support groups.                        Intervention and Education during outreach: SW was there from home care, given [employer] paperwork too.  Will continue to work on completing this.    Has questions/concerns about husbands meds, documented in his chart.     CHW Plan: Next outreach in one month.    Cyndi Doe, BEE, B.S. [facility]  Clinic Care Coordination  [facility]:  [place], [place] and [place]  [phone]  [email or web address]

## 2023-02-16 ENCOUNTER — HOSPITAL ENCOUNTER (OUTPATIENT)
Dept: GENERAL RADIOLOGY | Facility: CLINIC | Age: 83
Discharge: HOME | End: 2023-02-16
Attending: PEDIATRICS
Payer: COMMERCIAL

## 2023-02-16 ENCOUNTER — HOSPITAL ENCOUNTER (OUTPATIENT)
Dept: SPEECH THERAPY | Facility: CLINIC | Age: 83
Discharge: HOME | End: 2023-02-16
Attending: PEDIATRICS
Payer: COMMERCIAL

## 2023-02-16 DIAGNOSIS — R13.10 DYSPHAGIA, UNSPECIFIED TYPE: ICD-10-CM

## 2023-02-16 DIAGNOSIS — K22.9 ESOPHAGEAL PROBLEM: Primary | ICD-10-CM

## 2023-02-16 PROCEDURE — 92610 EVALUATE SWALLOWING FUNCTION: CPT | Mod: GN

## 2023-02-16 PROCEDURE — 74230 X-RAY XM SWLNG FUNCJ C+: CPT

## 2023-02-16 PROCEDURE — 92611 MOTION FLUOROSCOPY/SWALLOW: CPT | Mod: GN

## 2023-02-16 RX ORDER — BARIUM SULFATE 400 MG/ML
SUSPENSION ORAL ONCE
Status: DISCONTINUED | OUTPATIENT
Start: 2023-02-16 | End: 2023-02-16

## 2023-02-16 NOTE — PROGRESS NOTES
02/16/23 1340   General Information   Type Of Visit Initial   Start Of Care Date 02/15/23   Referring Physician Elsy Bah MD   Orders Evaluate And Treat   Medical Diagnosis dysphagia   Onset Of Illness/injury Or Date Of Surgery 02/16/23   Pertinent History of Current Problem/OT: Additional Occupational Profile Info Past Medical History:         Arthritis                Asthma                COPD     CAD    Diastolic CHF  chronic                GERD    Hyperlipidemia                KEN (obstructive sleep apnea)              Colonic polyps     Hypertension    Subclavian artery stenosis, left               Hyperthyroidism              Lumbosacral neuritis   left thigh Osteopenia     This patient was referred to see SLP due to concern for difficulty swallowing and regurgitation.   Respiratory Status Room air   Patient Role/employment History Retired   Living Environment [place]   General Observations alert, pleasant, cooperative   Patient/family Goals The patient is wondering if there is anything that can be done to help her chronic swallowing difficulty.   General Information Comments The patient reports she has had difficulty with solid food, especially meat, getting stuck (pointing lower down to approx the level of the LES). She states that it gets stuck and does not clear on it's own, must be regurgitated.  She denies any difficulty with liquids.   Abuse Screen (yes response referral indicated)   Feels Unsafe at Home or Work/School no   Feels Threatened by Someone no   Does Anyone Try to Keep You From Having Contact with Others or Doing Things Outside Your Home? no   Physical Signs of Abuse Present no   Patient needs abuse support services and resources No   Clinical Swallow Evaluation   Oral Musculature generally intact   Structural Abnormalities none present   Dentition present and adequate   Mucosal Quality good   Mandibular Strength and Mobility intact   Oral Labial Strength and Mobility WFL    Lingual Strength and Mobility WFL   Velar Elevation intact   Buccal Strength and Mobility intact   Laryngeal Function Cough;Throat clear;Swallow;Voicing initiated;Dry swallow palpated   Oral Musculature Comments intact - good strength, coordination and symmetry   Additional Documentation Yes   Additional evaluation(s) completed today Yes;Recommended   Swallow Eval   Feeding Assistance no assistance needed   Clinical Swallow Eval: Thin Liquid Texture Trial   Mode of Presentation, Thin Liquids self-fed  (water bottle)   Volume of Liquid or Food Presented 8 oz thin H20   Oral Phase of Swallow WFL   Pharyngeal Phase of Swallow intact   Diagnostic Statement WNL with thin - no overt signs or symptoms of aspiration   Clinical Swallow Eval: Regular (Solid)   Mode of Presentation self-fed   Volume Presented 2 douglas crackers   Oral Phase WFL   Oral Residue   (none)   Pharyngeal Phase intact   Diagnostic Statement WNL with regular - no overt signs or symptoms of aspiration   VFSS Evaluation   VFSS Additional Documentation Yes   VFSS Eval: Radiology   Radiologist Dr. Hatfield   Views Taken left lateral;A/P   Physical Location of Procedure [facility]   VFSS Eval: Thin Liquid Texture Trial   Mode of Presentation, Thin Liquid cup;straw;self-fed   Order of Presentation 1,2,3,4,7,8 (7-8 in AP view)   Preparatory Phase WFL   Oral Phase, Thin Liquid WFL   Pharyngeal Phase, Thin Liquid WFL   Rosenbek's Penetration Aspiration Scale: Thin Liquid Trial Results 1 - no aspiration, contrast does not enter airway   Diagnostic Statement no penetration or aspiration with thin liquid by cup or straw via single sip or large, consecutive swallows. *cervical osteophyte noted, not impacting swallow safety at this time   VFSS Eval: Regular Texture Trial (Solid)   Mode of Presentation self-fed   Order of Presentation 5,6   Preparatory Phase WFL   Oral Phase WFL   Pharyngeal Phase WFL   Rosenbek's Penetration Aspiration Scale  1 - no aspiration, contrast does not enter airway   Diagnostic Statement no penetration or aspiration, no pharyngeal residue. Of note, no further trials were served as swallow was intact and pt was receiving phone calls and needed to go home to an ill family member.  Cervical osteophyte noted, not impacting swallow safety   Esophageal Phase of Swallow   Patient reports or presents with symptoms of esophageal dysphagia Yes   Esophageal sweep performed during today s vidofluoroscopic exam  Please refer to radiologist's report for details   Esophageal comments recommend esophagram; GI consult   Swallow Eval: Clinical Impressions   Skilled Criteria for Therapy Intervention No problems identified which require skilled intervention   Functional Assessment Scale (FAS) 7   Dysphagia Outcome Severity Scale (KAREN) Level 7 - KAREN   Treatment Diagnosis intact oropharyngeal swallowing ability   Diet texture recommendations Regular diet;Thin liquids (level 0)   Recommended Feeding/Eating Techniques alternate between small bites and sips of food/liquid;maintain upright posture during/after eating for 30 mins;other (see comments)  (self soft foods, alternate bites/sips to assist with esophageal clearance)   Demonstrates Need for Referral to Another Service   (GI referral)   Anticipated Discharge Disposition home   Risks and Benefits of Treatment have been explained. Yes   Patient, family and/or staff in agreement with Plan of Care Yes   Clinical Impression Comments SLP consult completed per MD order. The patient was referred to SLP due to complaint of difficulty swallowing, particularly solid textures (meat especially). She points to approx the level of the LES to describe where it gets stuck, then states she needs to regurgitate it or it will not clear. She states this has happened for many years, but she is just now wondering if something can done to help it.      Oral musculature is intact, with good strength, coordination and  symmetry. Voice is strong/clear, volitional swallow and cough strong as well. She consumed thin H20 by water bottle and ate douglas crackers. Oral control good with prompt AP transit and timely laryngeal elevation; no direct signs/symptoms of aspiration. She denied globus sensation with the douglas cracker. It was appropriate to proceed to a videoswallow study for objective assessment.     Under fluoroscopy she demonstrated intact oropharyngeal swallow function. AP transit good, pharyngeal swallow triggered in a timely manner. With thin liquid and regular solid there was no penetration, aspiration or significant pharyngeal residue. An osteophyte along the cervical spine was noted, which did not impact oropharyngeal swallow function (defer to radiologist note for detail). Defer to radiologist report for comment on esophageal sweep and possible dysmotility when in AP view.     From an oropharyngeal swallowing perspective recommend a regular texture diet and thin liquids. The patient may wish to continue self selecting softer foods and avoiding meats due to apparent esophageal dysphagia. No further SLP intervention is required at this time. Recommend GI consult and an esophagram as the patient's deficits appear esophageal in nature.   Total Session Time   SLP Eval: oral/pharyngeal swallow function, clinical minutes (43861) 15   SLP Eval: VideoFluoroscopic Swallow function Minutes (39097) 20   Total Evaluation Time 35

## 2023-02-18 ENCOUNTER — OFFICE VISIT (OUTPATIENT)
Dept: URGENT CARE | Facility: URGENT CARE | Age: 83
End: 2023-02-18
Payer: COMMERCIAL

## 2023-02-18 ENCOUNTER — ANCILLARY PROCEDURE (OUTPATIENT)
Dept: GENERAL RADIOLOGY | Facility: CLINIC | Age: 83
End: 2023-02-18
Attending: FAMILY MEDICINE
Payer: COMMERCIAL

## 2023-02-18 VITALS
DIASTOLIC BLOOD PRESSURE: 70 MMHG | SYSTOLIC BLOOD PRESSURE: 144 MMHG | BODY MASS INDEX: 32.12 KG/M2 | WEIGHT: 170 LBS | OXYGEN SATURATION: 96 % | HEART RATE: 78 BPM | TEMPERATURE: 97.6 F

## 2023-02-18 DIAGNOSIS — J22 LOWER RESPIRATORY TRACT INFECTION: ICD-10-CM

## 2023-02-18 DIAGNOSIS — R05.2 SUBACUTE COUGH: Primary | ICD-10-CM

## 2023-02-18 DIAGNOSIS — J45.51 SEVERE PERSISTENT ASTHMA WITH ACUTE EXACERBATION (H): ICD-10-CM

## 2023-02-18 PROCEDURE — 99214 OFFICE O/P EST MOD 30 MIN: CPT | Performed by: FAMILY MEDICINE

## 2023-02-18 PROCEDURE — 71046 X-RAY EXAM CHEST 2 VIEWS: CPT | Mod: TC | Performed by: RADIOLOGY

## 2023-02-18 RX ORDER — BENZONATATE 200 MG/1
200 CAPSULE ORAL 3 TIMES DAILY PRN
Qty: 30 CAPSULE | Refills: 0 | Status: SHIPPED | OUTPATIENT
Start: 2023-02-18 | End: 2023-04-18

## 2023-02-18 RX ORDER — PREDNISONE 20 MG/1
40 TABLET ORAL DAILY
Qty: 10 TABLET | Refills: 0 | Status: SHIPPED | OUTPATIENT
Start: 2023-02-18 | End: 2023-02-23

## 2023-02-18 NOTE — PATIENT INSTRUCTIONS
Take full course of antibiotic for bronchitis  Continue with inhalers and nebulizer treatment  Take full course of prednisone burst  Okay to take tessalon perles to help with cough       DC home wtih no skilled PT needs, assist from family as needed, SOM [place] aware.

## 2023-02-18 NOTE — PROGRESS NOTES
SUBJECTIVE:   Victoria Montalvo is a 82 year old female presenting with a chief complaint of cough, SOB, wheezing.  No fever.  Onset of symptoms was 3 week(s) ago.  Course of illness is worsening.    Severity moderate  Current and Associated symptoms: cough, SOB, wheezing  Treatment measures tried include Tylenol/Ibuprofen, Inhaler and Nebulizer.  Predisposing factors include HX of asthma, COPD.    Initially thought had cold symptoms but has gotten worse.  Developed more SOB and wheezing.  Has been using nebulizer and inhaler but not improving.     has been sick and she has been busy trying to take care of him    Past Medical History:   Diagnosis Date     Abnormal Papanicolaou smear of cervix and cervical HPV     colposcopy 1/97     Arthritis      Asthma     on preventative meds and has nebulizer     Chronic rhinitis      COPD (chronic obstructive pulmonary disease)      Coronary artery disease     4/4/17 SHERLEY--PDA     Diastolic CHF, chronic 02/22/2012     Gastro-oesophageal reflux disease      Hyperlipidemia 10/31/2011     KEN (obstructive sleep apnea)     CPAP     Personal history of colonic polyps     colonoscopy 9/97, 2002 (due in 2007)     Pulmonary HTN      Seasonal allergies      Subclavian artery stenosis, left 08/07/2013    S/p stent in 2013      Subclinical hyperthyroidism 10/17/2016     Uncomplicated asthma 1995     Current Outpatient Medications   Medication Sig Dispense Refill     albuterol (PROAIR HFA) 108 (90 Base) MCG/ACT inhaler USE 2 INHALATIONS EVERY 6 HOURS AS NEEDED FOR SHORTNESS OF BREATH, DYSPNEA, OR WHEEZING 25.5 g 0     albuterol (PROVENTIL) (2.5 MG/3ML) 0.083% neb solution Take 1 vial (2.5 mg) by nebulization every 6 hours as needed for shortness of breath / dyspnea 3 mL 11     amLODIPine (NORVASC) 5 MG tablet Take 1 tablet (5 mg) by mouth every evening 90 tablet 3     amoxicillin-clavulanate (AUGMENTIN) 875-125 MG tablet Take 1 tablet by mouth 2 times daily for 10 days 20 tablet 0      "aspirin 81 MG tablet Take 81 mg by mouth daily.       benzonatate (TESSALON) 200 MG capsule Take 1 capsule (200 mg) by mouth 3 times daily as needed for cough 30 capsule 0     BETA BLOCKER NOT PRESCRIBED (INTENTIONAL) Please choose reason not prescribed from choices below.       Boswellia-Glucosamine-Vit D (OSTEO BI-FLEX/5-LOXIN ADVANCED) TABS Take 1,500 mg by mouth 2 times daily        Calcium 9727-5298 MG-UNIT CHEW Take 1 tablet by mouth daily.       Cholecalciferol (VITAMIN D3 PO) Take 4,000 Units by mouth daily        diclofenac (VOLTAREN) 1 % topical gel Apply 2 g topically 4 times daily 100 g 11     fluticasone (FLONASE) 50 MCG/ACT nasal spray USE 1 TO 2 SPRAYS IN EACH NOSTRIL DAILY 48 g 11     fluticasone-salmeterol (ADVAIR) 250-50 MCG/ACT inhaler Inhale 2 puffs into the lungs 2 times daily 60 each 11     fluticasone-salmeterol (ADVAIR) 250-50 MCG/DOSE inhaler Inhale 1 puff into the lungs every 12 hours 3 each 3     furosemide (LASIX) 20 MG tablet Take 1 tablet (20 mg) by mouth daily as needed for leg swelling 60 tablet 3     isosorbide mononitrate (IMDUR) 60 MG 24 hr tablet TAKE 1 TABLET EVERY MORNING HOLD IF SYSTOLIC BLOOD PRESSURE IS LESS THAN 85 90 tablet 3     LANsoprazole (PREVACID) 30 MG DR capsule Take 1 capsule (30 mg) by mouth daily 90 capsule 3     montelukast (SINGULAIR) 10 MG tablet Take 1 tablet (10 mg) by mouth At Bedtime 90 tablet 3     nitroGLYcerin (NITROSTAT) 0.4 MG sublingual tablet One tablet under the tongue every 5 minutes if needed for chest pain. May repeat every 5 minutes for a maximum of 3 doses in 15 minutes\" 25 tablet 3     omega-3 fatty acids 1200 MG capsule Take 1 capsule by mouth 2 times daily        predniSONE (DELTASONE) 20 MG tablet Take 2 tablets (40 mg) by mouth daily for 5 days 10 tablet 0     Pyridoxine HCl (VITAMIN B6) 250 MG TABS Take by mouth daily       rosuvastatin (CRESTOR) 40 MG tablet Take 1 tablet (40 mg) by mouth daily 90 tablet 3     spironolactone (ALDACTONE) " 25 MG tablet Take 0.5 tablets (12.5 mg) by mouth daily as needed (swelling) 90 tablet 3     Social History     Tobacco Use     Smoking status: Former     Packs/day: 1.00     Years: 30.00     Pack years: 30.00     Types: Cigarettes     Start date: 7/15/1973     Quit date: 1993     Years since quittin.7     Smokeless tobacco: Never   Substance Use Topics     Alcohol use: Yes     Comment: Not very often       ROS:  Review of systems negative except as stated above.    OBJECTIVE:  BP (!) 144/70   Pulse 78   Temp 97.6  F (36.4  C)   Wt 77.1 kg (170 lb)   LMP  (LMP Unknown)   SpO2 96%   BMI 32.12 kg/m    GENERAL APPEARANCE: healthy, alert and no distress  EYES: EOMI,  PERRL, conjunctiva clear  RESP: lungs with diffuse wheezes, coarse, no crackles  CV: regular rates and rhythm  PSYCH: mentation appears normal and affect normal/bright    CXR - no acute infiltrate, no pleural effusion, no pneumothorax personally viewed by me      ASSESSMENT/PLAN:  (R05.2) Subacute cough  (primary encounter diagnosis)  Comment: 3 weeks  Plan: XR Chest 2 Views, benzonatate (TESSALON) 200 MG        capsule            (J45.51) Severe persistent asthma with acute exacerbation  Plan: predniSONE (DELTASONE) 20 MG tablet        Continue with inhalers, nebulizers      (J22) Lower respiratory tract infection  Plan: amoxicillin-clavulanate (AUGMENTIN) 875-125 MG         tablet            Reassurance given, patient is not in acute respiratory distress and discussed that due to prolong symptoms and worsening symptoms, will empirically treat for bacterial etiology for lower respiratory tract infection with RX Augmentin.  RX tessalon perles given for cough.  Encourage to continue with inhalers and nebulizer for asthma, RX prednisone burst given for asthma flare treatment.  Will follow up on formal Xray report and notify if any abnormalities.    Follow up with primary provider if no improvement of symptoms in 1-2 weeks    Reynold Ojeda  MD  February 18, 2023 1:58 PM

## 2023-02-28 ENCOUNTER — PATIENT OUTREACH (OUTPATIENT)
Dept: CARE COORDINATION | Facility: CLINIC | Age: 83
End: 2023-02-28
Payer: COMMERCIAL

## 2023-02-28 NOTE — LETTER
[facility] CARE COORDINATION  [address] [place] [address]    March 8, 2023        Victoria Phillips  [address] [place] [address]          Dear June, Attached is an updated Patient Centered Plan of Care for your continued enrollment in Care Coordination. Please let us know if you have additional questions, concerns, or goals that we can assist with.    Sincerely,    Anthony Castelan, [facility]  Clinic Care Coordinator  [facility]  [phone]  [email or web address]  Patient Centered Plan of Care  About Me:        Patient Name:  Victoria Phillips    YOB: 1940  Age:         82 year old   Feliciano MRN:    [id number] Telephone Information:  Home Phone [phone]   Mobile [phone]       Address:  [address] [address] Email address:  [email or web address]      Emergency Contact(s)    Name Relationship Lgl Grd Work Phone Home Phone Mobile Phone   1. TOBI GAR Daughter   [phone]   2. JANAY PHILLIPS Spouse No  [phone]   3. SAQIB PHLILIPS Son   [phone]   4. HELDERJER AWAD Grandchild    [phone]   5. SHARITA ARORA Grandchild    [phone]           Primary language:  English     needed? No   New Site Language Services:  [phone] op. 1  Other communication barriers:None    Preferred Method of Communication:  Mail  Current living arrangement: I live in a private home with spouse    Mobility Status/ Medical Equipment: Independent        Health Maintenance  Health Maintenance Reviewed:   Health Maintenance Due   Topic Date Due     ZOSTER IMMUNIZATION (1 of 2) Never done     Pneumococcal Vaccine: 65+ Years (2 - PCV) 12/08/2011     DTAP/TDAP/TD IMMUNIZATION (2 - Td or Tdap) 04/14/2017      COLORECTAL CANCER SCREENING  02/17/2020     HF ACTION PLAN  08/04/2020     ASTHMA ACTION PLAN  04/28/2022     TSH W/FREE T4 REFLEX  07/22/2022     INFLUENZA VACCINE (1) 09/01/2022     PHQ-2 (once per calendar year)  01/01/2023     BMP  03/17/2023         My Access Plan  Medical Emergency 911   Primary Clinic Line [facility] - [phone]   24 Hour Appointment Line [phone] or  [phone]) (toll-free)   24 Hour Nurse Line [phone] (toll-free)   Preferred Urgent Care [facility], [phone]     Preferred Hospital [facility]  [phone]     Preferred Pharmacy  - MAIL ORDER     Behavioral Health Crisis Line The National Suicide Prevention Lifeline at 1-134.210.3690 or Text/Call 928             My Care Team Members  Patient Care Team       Relationship Specialty Notifications Start End    Elsy Bah MD PCP - General Internal Medicine  12/29/18     Phone: [phone] Fax: [phone]         [facility] [place] [address]    Aron Torres MD MD Orthopedics  10/28/15     Phone: [phone] Fax: [phone]         [facility]  [address]   [facility] [address]    Elsy Bah MD Assigned PCP   10/7/18     Phone: [phone] Fax: [phone]         [address] [place] [address] [address] [place] [address]    Yogi Yepez MD Assigned Heart and Vascular Provider   6/18/22     Phone: [phone] Fax: [phone] [address] [facility] [address]    Anthony Castelan LSW Lead Care Coordinator Primary Care - CC Admissions 11/28/22     Phone: [phone]         Cyndi Doe MA Community Health Worker Primary Care - CC Admissions 11/29/22     Phone: [phone]                 My Care Plans  Self Management and Treatment Plan  Care Plan  Care Plan: Caregiver Support     Problem: lacking caregiving supports     Goal: Establish with  Caregiving supports over the next 6 months     Start Date: 11/29/2022 Expected End Date: 5/1/2023    This Visit's Progress: 20% Recent Progress: 10%    Note:     Goal Statement: I will continue to take steps to establish with in home or caregiver supports over the next 6 month(s).  Barriers: Primary caregiver for spouse. Children occasionally but not consistently available.    Strengths: Motivated to find supports for spouse or resources to minimize strain/stress  Patient expressed understanding of goal: yes    Action steps to achieve this goal:  1. I will continue to lean on informal supports of: family  2. I will review resources and supports sent to me via [employer] and consider establishing with one or more which interest me.  3. I will contact my care team with questions, concerns, support needs.   4. I will use the clinic as a resource and I understand I can contact my clinic with 24/7 after hours services available.   5. I will continue to outreach to care coordination as needed for additional resources or supports.     6. I will continue to review options for caregiving support in my area like homemaking, organization, meals, transportation or support groups.                             My Medical and Care Information  Problem List   Patient Active Problem List   Diagnosis     History of colonic polyps     Chronic rhinitis     Esophageal reflux     Female stress incontinence     LUMBOSACRAL NEURITIS , numbness left thigh     Osteopenia     Hyperlipidemia LDL goal <130     Diastolic CHF, chronic (H)     Subclavian artery stenosis, left (H)     Severe persistent asthma     KEN (obstructive sleep apnea)     Hip osteoarthritis     Mild coronary artery disease     Subclinical hyperthyroidism     Chest pain     Unstable angina pectoris (H)     Status post coronary angiogram     Hypertension     Vasospastic angina (H)     Exertional chest pain     Abnormal findings on diagnostic imaging of heart and coronary  circulation     Venous reflux      Current Medications and Allergies:      Allergies   Allergen Reactions     Animal Dander      Dust Mites      Kiwi Itching     Itching and red all over     Pollen Extract      Pollen,dust, trees, grass, mold-hayfever symptoms     Current Outpatient Medications   Medication     albuterol (PROAIR HFA) 108 (90 Base) MCG/ACT inhaler     albuterol (PROVENTIL) (2.5 MG/3ML) 0.083% neb solution     amLODIPine (NORVASC) 5 MG tablet     aspirin 81 MG tablet     benzonatate (TESSALON) 200 MG capsule     BETA BLOCKER NOT PRESCRIBED (INTENTIONAL)     Boswellia-Glucosamine-Vit D (OSTEO BI-FLEX/5-LOXIN ADVANCED) TABS     Calcium 5174-9724 MG-UNIT CHEW     Cholecalciferol (VITAMIN D3 PO)     diclofenac (VOLTAREN) 1 % topical gel     fluticasone (FLONASE) 50 MCG/ACT nasal spray     fluticasone-salmeterol (ADVAIR) 250-50 MCG/ACT inhaler     fluticasone-salmeterol (ADVAIR) 250-50 MCG/DOSE inhaler     furosemide (LASIX) 20 MG tablet     isosorbide mononitrate (IMDUR) 60 MG 24 hr tablet     LANsoprazole (PREVACID) 30 MG DR capsule     montelukast (SINGULAIR) 10 MG tablet     nitroGLYcerin (NITROSTAT) 0.4 MG sublingual tablet     omega-3 fatty acids 1200 MG capsule     Pyridoxine HCl (VITAMIN B6) 250 MG TABS     rosuvastatin (CRESTOR) 40 MG tablet     spironolactone (ALDACTONE) 25 MG tablet     No current facility-administered medications for this visit.         Care Coordination Start Date: 11/25/2022   Frequency of Care Coordination: monthly     Form Last Updated: 03/08/2023

## 2023-03-08 ASSESSMENT — ACTIVITIES OF DAILY LIVING (ADL): DEPENDENT_IADLS:: INDEPENDENT

## 2023-03-08 NOTE — PROGRESS NOTES
Clinic Care Coordination Contact  Follow Up Progress Note - Late Entry    Enrollment Date: 11/25/2022     Assessment: [facility] outreached to pt on 2/28/23 to check on status of Hospice Referral  Following last hospitalization. June indicates she cannot talk long but states she has played phone tab with Hospice. [facility] verified she had the contact to connect with Hospice of which June states she does at home somewhere. June indicated they were currently at [facility] f/u.      Continues with [facility] and therapies for spouse     Denied any current needs at this time      Care Gaps:    Health Maintenance Due   Topic Date Due     ZOSTER IMMUNIZATION (1 of 2) Never done     Pneumococcal Vaccine: 65+ Years (2 - PCV) 12/08/2011     DTAP/TDAP/TD IMMUNIZATION (2 - Td or Tdap) 04/14/2017     COLORECTAL CANCER SCREENING  02/17/2020     HF ACTION PLAN  08/04/2020     ASTHMA ACTION PLAN  04/28/2022     TSH W/FREE T4 REFLEX  07/22/2022     INFLUENZA VACCINE (1) 09/01/2022     PHQ-2 (once per calendar year)  01/01/2023     BMP  03/17/2023       Care Plan: Caregiver Support     Problem: lacking caregiving supports     Goal: Establish with Caregiving supports over the next 6 months     Start Date: 11/29/2022 Expected End Date: 5/1/2023    This Visit's Progress: 20% Recent Progress: 10%    Note:     Goal Statement: I will continue to take steps to establish with in home or caregiver supports over the next 6 month(s).  Barriers: Primary caregiver for spouse. Children occasionally but not consistently available.    Strengths: Motivated to find supports for spouse or resources to minimize strain/stress  Patient expressed understanding of goal: yes    Action steps to achieve this goal:  1. I will continue to lean on informal supports of: family  2. I will review resources and supports sent to me via [employer] and consider establishing with one or more which interest me.  3. I will contact my care team with questions, concerns,  support needs.   4. I will use the clinic as a resource and I understand I can contact my clinic with 24/7 after hours services available.   5. I will continue to outreach to care coordination as needed for additional resources or supports.     6. I will continue to review options for caregiving support in my area like homemaking, organization, meals, transportation or support groups.                        Patient is actively working to accomplish goal and patient's goal remains appropriate and relevant at this time.     Intervention/Education provided during outreach: Provided Hospice number via [employer] in spouse chart.      Complex Care Plan:  Patient is due for Complex Care Plan to be updated.  Care Plan updated and mailed to patient: Yes, via [employer]    Annual Assessment due before next Outreach: No  Scheduled: No    Plan: Pt to follow up with Hospice re: Spouse care.  Care Coordinator will follow up in 4 weeks.     PARAMJIT Burroughs  Clinic Care Coordinator  [facility]  [phone]  [email or web address]

## 2023-03-17 DIAGNOSIS — J45.40 MODERATE PERSISTENT ASTHMA, UNCOMPLICATED: ICD-10-CM

## 2023-03-20 RX ORDER — ALBUTEROL SULFATE 90 UG/1
AEROSOL, METERED RESPIRATORY (INHALATION)
Qty: 25.5 G | Refills: 1 | Status: SHIPPED | OUTPATIENT
Start: 2023-03-20 | End: 2023-07-06

## 2023-03-20 RX ORDER — ALBUTEROL SULFATE 90 UG/1
AEROSOL, METERED RESPIRATORY (INHALATION)
Qty: 25.5 G | Refills: 3 | OUTPATIENT
Start: 2023-03-20

## 2023-03-20 NOTE — TELEPHONE ENCOUNTER
Duplicate request.   The original prescription was reordered on 3/20/2023 by Yecenia Way RN.   Yecenia Way RN

## 2023-03-22 DIAGNOSIS — J45.40 MODERATE PERSISTENT ASTHMA, UNCOMPLICATED: ICD-10-CM

## 2023-03-22 NOTE — TELEPHONE ENCOUNTER
Pending Prescriptions:                       Disp   Refills    albuterol (PROVENTIL) (2.5 MG/3ML) 0.083%*3 mL   11           Sig: Take 1 vial (2.5 mg) by nebulization every 6           hours as needed for shortness of breath

## 2023-03-24 RX ORDER — ALBUTEROL SULFATE 0.83 MG/ML
2.5 SOLUTION RESPIRATORY (INHALATION) EVERY 6 HOURS PRN
Qty: 3 ML | Refills: 11 | OUTPATIENT
Start: 2023-03-24

## 2023-03-29 ENCOUNTER — PATIENT OUTREACH (OUTPATIENT)
Dept: CARE COORDINATION | Facility: CLINIC | Age: 83
End: 2023-03-29
Payer: COMMERCIAL

## 2023-03-29 NOTE — PROGRESS NOTES
Clinic Care Coordination Contact  Community Health Worker Follow Up    Care Gaps:     Health Maintenance Due   Topic Date Due     ZOSTER IMMUNIZATION (1 of 2) Never done     DTAP/TDAP/TD IMMUNIZATION (1 - Tdap) 04/15/2007     Pneumococcal Vaccine: 65+ Years (2 - PCV) 12/08/2011     COLORECTAL CANCER SCREENING  02/17/2020     HF ACTION PLAN  08/04/2020     ASTHMA ACTION PLAN  04/28/2022     TSH W/FREE T4 REFLEX  07/22/2022     INFLUENZA VACCINE (1) 09/01/2022     PHQ-2 (once per calendar year)  01/01/2023     BMP  03/17/2023       Care Plan:   Care Plan: Caregiver Support     Problem: lacking caregiving supports     Goal: Establish with Caregiving supports over the next 6 months     Start Date: 11/29/2022 Expected End Date: 5/1/2023    This Visit's Progress: 30% Recent Progress: 20%    Note:     Goal Statement: I will continue to take steps to establish with in home or caregiver supports over the next 6 month(s).  Barriers: Primary caregiver for spouse. Children occasionally but not consistently available.    Strengths: Motivated to find supports for spouse or resources to minimize strain/stress  Patient expressed understanding of goal: yes    Action steps to achieve this goal:  1. I will continue to lean on informal supports of: family  2. I will review resources and supports sent to me via [employer] and consider establishing with one or more which interest me.  3. I will contact my care team with questions, concerns, support needs.   4. I will use the clinic as a resource and I understand I can contact my clinic with 24/7 after hours services available.   5. I will continue to outreach to care coordination as needed for additional resources or supports.     6. I will continue to review options for caregiving support in my area like homemaking, organization, meals, transportation or support groups.                        Intervention and Education during outreach: Patients spouse is at a TCU currently, she will be  bringing him to his upcoming cardiology appointment on 4/5. She is worried about fluid retention, etc. He will be at the TCU for 30 days, family is arguing about what to do after that. Pt knows she cannot care for him at home and is letting the family help figure this out.   As of now, she feels her needs are met and has no outstanding questions or concerns but will call if anything comes up.    CHW Plan: Next outreach in one month.     BEE Correia, B.S. [facility]  Clinic Care Coordination  [facility] Clinics:  [place], [place] and [place]  [phone]  [email or web address]

## 2023-04-18 ENCOUNTER — TELEPHONE (OUTPATIENT)
Dept: PEDIATRICS | Facility: CLINIC | Age: 83
End: 2023-04-18

## 2023-04-18 ENCOUNTER — OFFICE VISIT (OUTPATIENT)
Dept: PEDIATRICS | Facility: CLINIC | Age: 83
End: 2023-04-18
Payer: COMMERCIAL

## 2023-04-18 ENCOUNTER — ANCILLARY PROCEDURE (OUTPATIENT)
Dept: GENERAL RADIOLOGY | Facility: CLINIC | Age: 83
End: 2023-04-18
Attending: PHYSICIAN ASSISTANT
Payer: COMMERCIAL

## 2023-04-18 VITALS
TEMPERATURE: 97 F | HEART RATE: 79 BPM | BODY MASS INDEX: 31.04 KG/M2 | HEIGHT: 61 IN | RESPIRATION RATE: 20 BRPM | DIASTOLIC BLOOD PRESSURE: 72 MMHG | SYSTOLIC BLOOD PRESSURE: 102 MMHG | WEIGHT: 164.4 LBS | OXYGEN SATURATION: 98 %

## 2023-04-18 DIAGNOSIS — M54.42 ACUTE LEFT-SIDED LOW BACK PAIN WITH LEFT-SIDED SCIATICA: Primary | ICD-10-CM

## 2023-04-18 DIAGNOSIS — M54.42 ACUTE LEFT-SIDED LOW BACK PAIN WITH LEFT-SIDED SCIATICA: ICD-10-CM

## 2023-04-18 PROCEDURE — 99214 OFFICE O/P EST MOD 30 MIN: CPT | Performed by: PHYSICIAN ASSISTANT

## 2023-04-18 PROCEDURE — 72100 X-RAY EXAM L-S SPINE 2/3 VWS: CPT | Mod: TC | Performed by: RADIOLOGY

## 2023-04-18 RX ORDER — TRAMADOL HYDROCHLORIDE 50 MG/1
50 TABLET ORAL EVERY 8 HOURS PRN
Qty: 10 TABLET | Refills: 0 | Status: SHIPPED | OUTPATIENT
Start: 2023-04-18 | End: 2023-04-21

## 2023-04-18 ASSESSMENT — PATIENT HEALTH QUESTIONNAIRE - PHQ9
SUM OF ALL RESPONSES TO PHQ QUESTIONS 1-9: 7
SUM OF ALL RESPONSES TO PHQ QUESTIONS 1-9: 7
10. IF YOU CHECKED OFF ANY PROBLEMS, HOW DIFFICULT HAVE THESE PROBLEMS MADE IT FOR YOU TO DO YOUR WORK, TAKE CARE OF THINGS AT HOME, OR GET ALONG WITH OTHER PEOPLE: SOMEWHAT DIFFICULT

## 2023-04-18 ASSESSMENT — PAIN SCALES - GENERAL: PAINLEVEL: MILD PAIN (2)

## 2023-04-18 NOTE — PROGRESS NOTES
"  Assessment & Plan     Acute left-sided low back pain with left-sided sciatica  Given duration and severity of symptoms, patient referred to SPINE. Begin tramadol for pain relief.  - XR Lumbar Spine 2/3 Views; Future  - Spine  Referral; Future  - traMADol (ULTRAM) 50 MG tablet; Take 1 tablet (50 mg) by mouth every 8 hours as needed for severe pain    JAIME Soria [facility] SEAN Kessler is a 82 year old, presenting for the following health issues:  Leg Pain      HPI   Pain History:  When did you first notice your pain? One year and worsened about about 3-4 weeks ago   Have you seen anyone else for your pain? No  How has your pain affected your ability to work? Not applicable  Where in your body do you have pain? Back Pain  Onset/Duration: started 3-4 weeks ago  Description:   Location of pain: gluteus left   Character of pain: burning sensation   Pain radiation: radiates into the left leg  New numbness or weakness in legs, not attributed to pain: YES  Intensity: Currently 1/10, At its worst 8-9/10  Progression of Symptoms: worsening  History:   Specific cause: was doing physical work, pushing  on wheelchair prior to him passing.   Pain interferes with job: N/A  History of back problems: no prior back problems  Any previous MRI or X-rays: None  Sees a specialist for back pain: No  Alleviating factors:   Improved by: ibuprofen   Precipitating factors:  Worsened by: [place], walking long period   Therapies tried and outcome: ibuprofen and occasionally tramadol.     Review of Systems   Constitutional, HEENT, cardiovascular, pulmonary, gi and gu systems are negative, except as otherwise noted.      Objective    /72   Pulse 79   Temp 97  F (36.1  C) (Temporal)   Resp 20   Ht 1.549 m (5' 1\")   Wt 74.6 kg (164 lb 6.4 oz)   LMP  (LMP Unknown)   SpO2 98%   BMI 31.06 kg/m    Body mass index is 31.06 kg/m .  Physical Exam   GENERAL: healthy, alert " and no distress  BACK: tender to palpation in the left gluteus. Very limited ROM. Full strength of LE.    Results for orders placed or performed in visit on 04/18/23   XR Lumbar Spine 2/3 Views     Status: None    Narrative    LUMBAR SPINE TWO TO THREE VIEWS April 18, 2023 1:41 PM     HISTORY: Acute left-sided low back pain with left-sided sciatica.    COMPARISON: None.      Impression    IMPRESSION: Five lumbar type vertebrae. Mild degenerative  retrolisthesis of L1 upon L2 and L2 upon L3. Mild degenerative  anterolisthesis of L3 upon L4 and L4 upon L5. Mild left convex  curvature of the lumbar spine centered at L3. Alignment otherwise  normal. Vertebral body heights normal. No fractures. Diffuse  degenerative endplate spurring and facet arthropathy throughout the  lumbar spine.    ANNEL NIEVES MD         SYSTEM ID:  [id number]

## 2023-04-18 NOTE — TELEPHONE ENCOUNTER
Reason for Call:  Appointment Request    Patient requesting this type of appt:  leg problems pain    Requested provider: Elsy Bah    Reason patient unable to be scheduled: Not with their preferred provider    When does patient want to be seen/preferred time: Same day    Comments: Would like to get an appt asap     Could we send this information to you in [facility] or would you prefer to receive a phone call?:   Patient would prefer a phone call   Okay to leave a detailed message?: Yes at Home number on file [phone] (home)    Call taken on 4/18/2023 at 9:52 AM by Fern Pandya

## 2023-04-18 NOTE — TELEPHONE ENCOUNTER
Pt scheduled with same day provider.    Ashlee Soriano, EMT at 10:13 AM on April 18, 2023  [facility] Guide  [phone]

## 2023-04-28 ENCOUNTER — PATIENT OUTREACH (OUTPATIENT)
Dept: CARE COORDINATION | Facility: CLINIC | Age: 83
End: 2023-04-28
Payer: COMMERCIAL

## 2023-05-03 ENCOUNTER — PATIENT OUTREACH (OUTPATIENT)
Dept: CARE COORDINATION | Facility: CLINIC | Age: 83
End: 2023-05-03
Payer: COMMERCIAL

## 2023-05-12 ENCOUNTER — PATIENT OUTREACH (OUTPATIENT)
Dept: CARE COORDINATION | Facility: CLINIC | Age: 83
End: 2023-05-12
Payer: COMMERCIAL

## 2023-05-12 ASSESSMENT — ACTIVITIES OF DAILY LIVING (ADL): DEPENDENT_IADLS:: INDEPENDENT

## 2023-05-12 NOTE — PROGRESS NOTES
Clinic Care Coordination Contact  Follow Up Progress Note     Background: Pt enrolled in Care Coordination to assist with navigating caregiver supports. Pt spouse recently passed away.     Assessment: [facility] outreached to pt on this date to assess for current needs and offer condolences. Pt states she has been doing ok and has strong support from family. Pt has been working to get affairs arranged and feels like she has the supports for this. Family is helping pt clean up and clean out the house. Pt asked if there were any places that take used or unopened medical equipment/supplies. [facility] reviewed [employer] for gently used medical equipment. Pt states she will explore this option.     Pt denied any needs for MH or grief and adds she has support from family. Pt is also following up with Ortho to address long term use/strain form lifting/cargiving.     Provided pt with Care Coordination contacts but pt denied any needs for ongoing outreaches.       Plan: Care Coordination Program closed. Pt to continue to outreach to care team, clinic or Care Coordination as needed. No further outreaches will be made at this time unless a new referral is made or a change in the pt's status occurs. Patient was provided with this writer's contact information and encouraged to call with any questions or concerns.     Anthony Castelan, hospitals  Clinic Care Coordinator  [facility]  [phone]  [email or web address]

## 2023-06-02 ENCOUNTER — TELEPHONE (OUTPATIENT)
Dept: CARDIOLOGY | Facility: CLINIC | Age: 83
End: 2023-06-02
Payer: COMMERCIAL

## 2023-06-02 NOTE — TELEPHONE ENCOUNTER
[facility]    Phone Message    May a detailed message be left on voicemail: no     Reason for Call: Other: Per [employer] appt scheduled: My legs are swelling a lot and sometimes I have a bit of chest pain which is much like the times I needed stents installed.      Please review.  Pt is scheduled July 13    Action Taken: Other: cardio    Travel Screening: Not Applicable

## 2023-06-07 ENCOUNTER — HOSPITAL ENCOUNTER (OUTPATIENT)
Facility: CLINIC | Age: 83
Setting detail: OBSERVATION
Discharge: HOME | End: 2023-06-08
Attending: EMERGENCY MEDICINE | Admitting: HOSPITALIST
Payer: COMMERCIAL

## 2023-06-07 ENCOUNTER — APPOINTMENT (OUTPATIENT)
Dept: GENERAL RADIOLOGY | Facility: CLINIC | Age: 83
End: 2023-06-07
Attending: EMERGENCY MEDICINE
Payer: COMMERCIAL

## 2023-06-07 DIAGNOSIS — Z86.79 HISTORY OF CAD (CORONARY ARTERY DISEASE): ICD-10-CM

## 2023-06-07 DIAGNOSIS — J45.40 MODERATE PERSISTENT ASTHMA, UNCOMPLICATED: ICD-10-CM

## 2023-06-07 DIAGNOSIS — I87.2 VENOUS REFLUX: ICD-10-CM

## 2023-06-07 DIAGNOSIS — R07.9 CHEST PAIN, UNSPECIFIED TYPE: ICD-10-CM

## 2023-06-07 LAB
ANION GAP SERPL CALCULATED.3IONS-SCNC: 9 MMOL/L (ref 7–15)
BASOPHILS # BLD AUTO: 0 10E3/UL (ref 0–0.2)
BASOPHILS NFR BLD AUTO: 0 %
BUN SERPL-MCNC: 14.8 MG/DL (ref 8–23)
CALCIUM SERPL-MCNC: 9.3 MG/DL (ref 8.8–10.2)
CHLORIDE SERPL-SCNC: 106 MMOL/L (ref 98–107)
CREAT SERPL-MCNC: 0.8 MG/DL (ref 0.51–0.95)
DEPRECATED HCO3 PLAS-SCNC: 28 MMOL/L (ref 22–29)
EOSINOPHIL # BLD AUTO: 0.1 10E3/UL (ref 0–0.7)
EOSINOPHIL NFR BLD AUTO: 2 %
ERYTHROCYTE [DISTWIDTH] IN BLOOD BY AUTOMATED COUNT: 13.7 % (ref 10–15)
GFR SERPL CREATININE-BSD FRML MDRD: 73 ML/MIN/1.73M2
GLUCOSE BLDC GLUCOMTR-MCNC: 100 MG/DL (ref 70–99)
GLUCOSE SERPL-MCNC: 68 MG/DL (ref 70–99)
HCT VFR BLD AUTO: 39.9 % (ref 35–47)
HGB BLD-MCNC: 13.1 G/DL (ref 11.7–15.7)
HOLD SPECIMEN: NORMAL
HOLD SPECIMEN: NORMAL
IMM GRANULOCYTES # BLD: 0 10E3/UL
IMM GRANULOCYTES NFR BLD: 1 %
LYMPHOCYTES # BLD AUTO: 1.2 10E3/UL (ref 0.8–5.3)
LYMPHOCYTES NFR BLD AUTO: 15 %
MCH RBC QN AUTO: 29.7 PG (ref 26.5–33)
MCHC RBC AUTO-ENTMCNC: 32.8 G/DL (ref 31.5–36.5)
MCV RBC AUTO: 91 FL (ref 78–100)
MONOCYTES # BLD AUTO: 0.5 10E3/UL (ref 0–1.3)
MONOCYTES NFR BLD AUTO: 6 %
NEUTROPHILS # BLD AUTO: 6.2 10E3/UL (ref 1.6–8.3)
NEUTROPHILS NFR BLD AUTO: 76 %
NRBC # BLD AUTO: 0 10E3/UL
NRBC BLD AUTO-RTO: 0 /100
PLATELET # BLD AUTO: 207 10E3/UL (ref 150–450)
POTASSIUM SERPL-SCNC: 3.8 MMOL/L (ref 3.4–5.3)
RBC # BLD AUTO: 4.41 10E6/UL (ref 3.8–5.2)
SODIUM SERPL-SCNC: 143 MMOL/L (ref 136–145)
TROPONIN T SERPL HS-MCNC: 8 NG/L
TROPONIN T SERPL HS-MCNC: 9 NG/L
WBC # BLD AUTO: 8 10E3/UL (ref 4–11)

## 2023-06-07 PROCEDURE — 78452 HT MUSCLE IMAGE SPECT MULT: CPT

## 2023-06-07 PROCEDURE — 250N000013 HC RX MED GY IP 250 OP 250 PS 637: Performed by: INTERNAL MEDICINE

## 2023-06-07 PROCEDURE — 71046 X-RAY EXAM CHEST 2 VIEWS: CPT

## 2023-06-07 PROCEDURE — 82962 GLUCOSE BLOOD TEST: CPT

## 2023-06-07 PROCEDURE — 84484 ASSAY OF TROPONIN QUANT: CPT | Performed by: EMERGENCY MEDICINE

## 2023-06-07 PROCEDURE — 36415 COLL VENOUS BLD VENIPUNCTURE: CPT | Performed by: EMERGENCY MEDICINE

## 2023-06-07 PROCEDURE — 250N000013 HC RX MED GY IP 250 OP 250 PS 637: Performed by: EMERGENCY MEDICINE

## 2023-06-07 PROCEDURE — G0378 HOSPITAL OBSERVATION PER HR: HCPCS

## 2023-06-07 PROCEDURE — 85014 HEMATOCRIT: CPT | Performed by: EMERGENCY MEDICINE

## 2023-06-07 PROCEDURE — 99285 EMERGENCY DEPT VISIT HI MDM: CPT | Mod: 25

## 2023-06-07 PROCEDURE — 80048 BASIC METABOLIC PNL TOTAL CA: CPT | Performed by: EMERGENCY MEDICINE

## 2023-06-07 PROCEDURE — 250N000013 HC RX MED GY IP 250 OP 250 PS 637: Performed by: NURSE PRACTITIONER

## 2023-06-07 PROCEDURE — 93005 ELECTROCARDIOGRAM TRACING: CPT

## 2023-06-07 PROCEDURE — 85025 COMPLETE CBC W/AUTO DIFF WBC: CPT | Performed by: EMERGENCY MEDICINE

## 2023-06-07 PROCEDURE — 99222 1ST HOSP IP/OBS MODERATE 55: CPT | Mod: AI | Performed by: NURSE PRACTITIONER

## 2023-06-07 RX ORDER — PANTOPRAZOLE SODIUM 40 MG/1
40 TABLET, DELAYED RELEASE ORAL DAILY
Status: DISCONTINUED | OUTPATIENT
Start: 2023-06-08 | End: 2023-06-08 | Stop reason: HOSPADM

## 2023-06-07 RX ORDER — NALOXONE HYDROCHLORIDE 0.4 MG/ML
0.2 INJECTION, SOLUTION INTRAMUSCULAR; INTRAVENOUS; SUBCUTANEOUS
Status: DISCONTINUED | OUTPATIENT
Start: 2023-06-07 | End: 2023-06-08 | Stop reason: HOSPADM

## 2023-06-07 RX ORDER — ALBUTEROL SULFATE 0.83 MG/ML
2.5 SOLUTION RESPIRATORY (INHALATION) EVERY 6 HOURS PRN
Status: DISCONTINUED | OUTPATIENT
Start: 2023-06-07 | End: 2023-06-08 | Stop reason: HOSPADM

## 2023-06-07 RX ORDER — ROSUVASTATIN CALCIUM 20 MG/1
40 TABLET, COATED ORAL DAILY
Status: DISCONTINUED | OUTPATIENT
Start: 2023-06-07 | End: 2023-06-08 | Stop reason: HOSPADM

## 2023-06-07 RX ORDER — ASPIRIN 81 MG/1
81 TABLET ORAL DAILY
Status: DISCONTINUED | OUTPATIENT
Start: 2023-06-08 | End: 2023-06-08 | Stop reason: HOSPADM

## 2023-06-07 RX ORDER — ACETAMINOPHEN 650 MG/1
650 SUPPOSITORY RECTAL EVERY 6 HOURS PRN
Status: DISCONTINUED | OUTPATIENT
Start: 2023-06-07 | End: 2023-06-08 | Stop reason: HOSPADM

## 2023-06-07 RX ORDER — ACETAMINOPHEN 325 MG/1
650 TABLET ORAL EVERY 6 HOURS PRN
Status: DISCONTINUED | OUTPATIENT
Start: 2023-06-07 | End: 2023-06-08 | Stop reason: HOSPADM

## 2023-06-07 RX ORDER — ASPIRIN 81 MG/1
243 TABLET, CHEWABLE ORAL ONCE
Status: COMPLETED | OUTPATIENT
Start: 2023-06-07 | End: 2023-06-07

## 2023-06-07 RX ORDER — FLUTICASONE FUROATE AND VILANTEROL 100; 25 UG/1; UG/1
1 POWDER RESPIRATORY (INHALATION) DAILY
Status: DISCONTINUED | OUTPATIENT
Start: 2023-06-07 | End: 2023-06-08 | Stop reason: HOSPADM

## 2023-06-07 RX ORDER — NALOXONE HYDROCHLORIDE 0.4 MG/ML
0.4 INJECTION, SOLUTION INTRAMUSCULAR; INTRAVENOUS; SUBCUTANEOUS
Status: DISCONTINUED | OUTPATIENT
Start: 2023-06-07 | End: 2023-06-08 | Stop reason: HOSPADM

## 2023-06-07 RX ORDER — MAGNESIUM HYDROXIDE/ALUMINUM HYDROXICE/SIMETHICONE 120; 1200; 1200 MG/30ML; MG/30ML; MG/30ML
30 SUSPENSION ORAL EVERY 4 HOURS PRN
Status: DISCONTINUED | OUTPATIENT
Start: 2023-06-07 | End: 2023-06-08 | Stop reason: HOSPADM

## 2023-06-07 RX ORDER — FUROSEMIDE 20 MG
20 TABLET ORAL DAILY
Status: DISCONTINUED | OUTPATIENT
Start: 2023-06-07 | End: 2023-06-08 | Stop reason: HOSPADM

## 2023-06-07 RX ORDER — HYDROMORPHONE HCL IN WATER/PF 6 MG/30 ML
0.2 PATIENT CONTROLLED ANALGESIA SYRINGE INTRAVENOUS
Status: DISCONTINUED | OUTPATIENT
Start: 2023-06-07 | End: 2023-06-08 | Stop reason: HOSPADM

## 2023-06-07 RX ORDER — ISOSORBIDE MONONITRATE 60 MG/1
60 TABLET, EXTENDED RELEASE ORAL DAILY
Status: DISCONTINUED | OUTPATIENT
Start: 2023-06-08 | End: 2023-06-08 | Stop reason: HOSPADM

## 2023-06-07 RX ORDER — SPIRONOLACTONE 25 MG
12.5 TABLET ORAL DAILY PRN
Status: DISCONTINUED | OUTPATIENT
Start: 2023-06-08 | End: 2023-06-08 | Stop reason: HOSPADM

## 2023-06-07 RX ORDER — MONTELUKAST SODIUM 10 MG/1
10 TABLET ORAL AT BEDTIME
Status: DISCONTINUED | OUTPATIENT
Start: 2023-06-07 | End: 2023-06-08 | Stop reason: HOSPADM

## 2023-06-07 RX ORDER — NITROGLYCERIN 0.4 MG/1
0.4 TABLET SUBLINGUAL EVERY 5 MIN PRN
Status: DISCONTINUED | OUTPATIENT
Start: 2023-06-07 | End: 2023-06-08 | Stop reason: HOSPADM

## 2023-06-07 RX ADMIN — MONTELUKAST 10 MG: 10 TABLET, FILM COATED ORAL at 22:16

## 2023-06-07 RX ADMIN — ROSUVASTATIN CALCIUM 40 MG: 20 TABLET, FILM COATED ORAL at 19:58

## 2023-06-07 RX ADMIN — ASPIRIN 81 MG 243 MG: 81 TABLET ORAL at 12:10

## 2023-06-07 RX ADMIN — Medication 1 MG: at 23:22

## 2023-06-07 ASSESSMENT — ACTIVITIES OF DAILY LIVING (ADL)
ADLS_ACUITY_SCORE: 20
ADLS_ACUITY_SCORE: 20
ADLS_ACUITY_SCORE: 35
ADLS_ACUITY_SCORE: 20
ADLS_ACUITY_SCORE: 35
ADLS_ACUITY_SCORE: 20

## 2023-06-07 NOTE — ED NOTES
"Rapid Assessment Note    History:   Victoria Montalvo is a 82 year old female with a history of heart disease status post stents who presents with chest tightness that started this morning and last about 1 hour.  She notes she has been having for quite some time intermittent \"jabs\" of pain that last a second go away.  Today she experienced 3 of those in a row and then an hour of chest tightness which is what concerned her.  She notes she might of felt a little lightheaded during the episode but denies sweating, nausea, or shortness of breath.  She currently is asymptomatic.  She has been using nitroglycerin with any episodes of chest pain but is not sure if they are helping.  She notes she was at rest laying down on the couch when this episode occurred.  She denies any exertional symptoms.  She will she has noticed increased swelling in her legs and difficulty with sensation bilaterally which she thinks is due to neuropathies.  She has not been able to get into see her cardiologist.    Exam:   General:  Alert, interactive  Cardiovascular:  Well perfused  Lungs:  No respiratory distress, no accessory muscle use  Neuro:  Moving all 4 extremities  Skin:  Warm, dry  Psych:  Normal affect      Plan of Care:   I evaluated the patient and developed an initial plan of care. I discussed this plan and explained that I, or one of my partners, would be returning to complete the evaluation.       Due to hospital and departmental capacity constraints and prolonged wait times, this patient was evaluated in non-traditional circumstances such as in triage/waiting room, a hallway, etc. I explained the option to wait for a traditional treatment space and apologized for the inconvenience. Given the circumstances, every attempt was made to provide for the patient's comfort and privacy and to perform the most thorough evaluation possible.    6/7/2023  [employer]    Portions of this medical record were " completed by a scribe. UPON MY REVIEW AND AUTHENTICATION BY ELECTRONIC SIGNATURE, this confirms (a) I performed the applicable clinical services, and (b) the record is accurate.      Elizabeth Woods MD  06/07/23 [id number]

## 2023-06-07 NOTE — ED TRIAGE NOTES
Pt arrives to the ED due to left sided chest pain that began around 0900. Pt describes pain as a heaviness. H/o stents and CHF. States she has some SOB at baseline but does feel more lightheaded than normal. Pt states she has been having more frequent episodes of chest pain and has been using her nitroglycerin at home with some relief. Cant get in to see PCP.

## 2023-06-07 NOTE — ED NOTES
"[facility]  ED Nurse Handoff Report    ED Chief complaint: Chest Pain  . ED Diagnosis:   Final diagnoses:   Chest pain, unspecified type   History of CAD (coronary artery disease)       Allergies:   Allergies   Allergen Reactions     Animal Dander      Dust Mites      Kiwi Itching     Itching and red all over     Pollen Extract      Pollen,dust, trees, grass, mold-hayfever symptoms       Code Status: Full Code    Activity level - Baseline/Home:  independent.  Activity Level - Current:   independent.   Lift room needed: No.   Bariatric: No   Needed: No   Isolation: No.   Infection: Not Applicable.     Respiratory status: Room air    Vital Signs (within 30 minutes):   Vitals:    06/07/23 1137 06/07/23 1300 06/07/23 1315   BP: 115/69 98/52 93/55   Pulse: 91 77 75   Resp: 24 16 22   Temp: 98.1  F (36.7  C)     TempSrc: Oral     SpO2: 96% 95% 97%   Weight: 74 kg (163 lb 2.3 oz)     Height: 1.549 m (5' 1\")         Cardiac Rhythm:  ,      Pain level:    Patient confused: No.   Patient Falls Risk: nonskid shoes/slippers when out of bed.   Elimination Status: Has voided     Patient Report - Initial Complaint: chest pain.   Focused Assessment: cp, hx of cad     Abnormal Results:   Labs Ordered and Resulted from Time of ED Arrival to Time of ED Departure   BASIC METABOLIC PANEL - Abnormal       Result Value    Sodium 143      Potassium 3.8      Chloride 106      Carbon Dioxide (CO2) 28      Anion Gap 9      Urea Nitrogen 14.8      Creatinine 0.80      Calcium 9.3      Glucose 68 (*)     GFR Estimate 73     TROPONIN T, HIGH SENSITIVITY - Normal    Troponin T, High Sensitivity 9     CBC WITH PLATELETS AND DIFFERENTIAL    WBC Count 8.0      RBC Count 4.41      Hemoglobin 13.1      Hematocrit 39.9      MCV 91      MCH 29.7      MCHC 32.8      RDW 13.7      Platelet Count 207      % Neutrophils 76      % Lymphocytes 15      % Monocytes 6      % Eosinophils 2      % Basophils 0      % Immature " Granulocytes 1      NRBCs per 100 WBC 0      Absolute Neutrophils 6.2      Absolute Lymphocytes 1.2      Absolute Monocytes 0.5      Absolute Eosinophils 0.1      Absolute Basophils 0.0      Absolute Immature Granulocytes 0.0      Absolute NRBCs 0.0     TROPONIN T, HIGH SENSITIVITY        Chest XR,  PA & LAT   Preliminary Result   IMPRESSION: Similar small hiatal hernia. There are no acute   infiltrates. The cardiac silhouette is not enlarged. Pulmonary   vasculature is unremarkable.           Treatments provided:   Family Comments:   OBS brochure/video discussed/provided to patient:  Yes  ED Medications:   Medications   aspirin (ASA) chewable tablet 243 mg (243 mg Oral $Given 6/7/23 1210)       Drips infusing:  No  For the majority of the shift this patient was Green.   Interventions performed were   .    Sepsis treatment initiated: No    Cares/treatment/interventions/medications to be completed following ED care:     ED Nurse Name: Ashley Mejia RN  2:03 PM  RECEIVING UNIT ED HANDOFF REVIEW    Above ED Nurse Handoff Report was reviewed: Yes  Reviewed by: ARLEY BROWN RN on June 7, 2023 at 2:40 PM

## 2023-06-07 NOTE — PLAN OF CARE
ROOM # 208-2    Living Situation (if not independent, order SW consult): home alone   Facility name:  : Ruben (son)    Activity level at baseline: Independent   Activity level on admit: Independent     Who will be transporting you at discharge: son    Patient registered to observation; given Patient Bill of Rights; given the opportunity to ask questions about observation status and their plan of care.  Patient has been oriented to the observation room, bathroom and call light is in place.    Discussed discharge goals and expectations with patient/family.

## 2023-06-07 NOTE — ED PROVIDER NOTES
History     Chief Complaint:  Chest Pain       The history is provided by the patient.      iVctoria Montalvo is a 82 year old female with a history of CAD, CHF, hypertension, and hyperlipidemia who presents with chest pain. The patient reports that she developed chest pain, chest discomfort, and arm heaviness this morning while sitting down at her computer, around 0900. She states that her chest pains are brief in duration, but notes that she has been having intermittent chest pains for about three months. She describes it as a stabbing pain, noting that it feels similar to her prior chest pains, where she had to get stents placed afterwards. She adds that she is concerned about her leg swelling, which she notes can cause her legs to feel numb. She also reports that she has had left leg and back pain since April 1st, which she believes is due to taking care of her late .  She states the pain starts from the low back and radiates down the leg and that she has been told she has sciatica.  She adds that his passing has caused her increased stress the past few months. She states that she has been using Salonpas patches for her leg pain and typically wears compression socks. She states that she has shortness of breath at a baseline due to her heart failure, but has felt more short of breath recently with exertion. She takes her weight daily, noting that she has lost about 20 lbs and currently hovers around 159-160 lbs. She denies any nausea and is not on a blood thinner. She reports that she has 4-5 stents.     Independent Historian:   None - Patient Only    Review of External Notes:   Cardiac cath 2021 reviewed where the patient had RCA spasm.  Stress echo from 2022 that did not show any inducible ischemia, but a nuclear stress recommended for further evaluation.    Medications:    Albuterol  Amlodipine  Aspirin 81 mg  Advair  Isosorbide  "mononitrate  Lansoprazole  Montelukast  Nitroglycerin  Rosuvastatin  Spironolactone    Past Medical History:    Arthritis   Asthma   Chronic rhinitis   COPD   Coronary artery disease   Diastolic CHF, chronic   GERD   Hyperlipidemia   KEN    Colonic polyps   Pulmonary hypertension    Seasonal allergies   Subclavian artery stenosis, left   Subclinical hyperthyroidism   Venous reflux   Subclavian artery stenosis  Hyperlipidemia    Past Surgical History:    Colon polyp removal  Hip arthroplasty  Coronary angiogram x 3  Instantaneous wave-free ratio  Left ventriculogram  Liposuction, legs and stomach  Mammoplasty reduction  Ankle fixation and ankle hardware removal  Subclavial artery stent placement    Physical Exam     Patient Vitals for the past 24 hrs:   BP Temp Temp src Pulse Resp SpO2 Height Weight   06/07/23 1315 93/55 -- -- 75 22 97 % -- --   06/07/23 1300 98/52 -- -- 77 16 95 % -- --   06/07/23 1137 115/69 98.1  F (36.7  C) Oral 91 24 96 % 1.549 m (5' 1\") 74 kg (163 lb 2.3 oz)        Physical Exam  General: Laying on the ED bed, no distress  HEENT: Normocephalic, atraumatic  Cardiac: Radial pulses 2+, regular rate and rhythm  Pulm: Breathing comfortably, no accessory muscle usage, no conversational dyspnea, and lungs clear bilaterally  GI: Abdomen soft, nontender, no rigidity or guarding  MSK: No deformities, 1+ pitting edema BLE, no calf tenderness BLE  Skin: Warm and dry  Neuro: Moves all extremities  Psych: Normal mood and affect    Emergency Department Course     ECG  ECG taken at 1151  Normal sinus rhythm  Possible left atrial enlargement  Left axis deviation  Abnormal ECG   No significant changes as compared to prior, dated 9/17/23.  Rate 76 bpm. OR interval 140 ms. QRS duration 64 ms. QT/QTc 376/423 ms. P-R-T axes 67 -42 29.       Imaging:  Chest XR,  PA & LAT   Preliminary Result   IMPRESSION: Similar small hiatal hernia. There are no acute   infiltrates. The cardiac silhouette is not enlarged. Pulmonary "   vasculature is unremarkable.          Report per radiology    Laboratory:  Labs Ordered and Resulted from Time of ED Arrival to Time of ED Departure   BASIC METABOLIC PANEL - Abnormal       Result Value    Sodium 143      Potassium 3.8      Chloride 106      Carbon Dioxide (CO2) 28      Anion Gap 9      Urea Nitrogen 14.8      Creatinine 0.80      Calcium 9.3      Glucose 68 (*)     GFR Estimate 73     TROPONIN T, HIGH SENSITIVITY - Normal    Troponin T, High Sensitivity 9     CBC WITH PLATELETS AND DIFFERENTIAL    WBC Count 8.0      RBC Count 4.41      Hemoglobin 13.1      Hematocrit 39.9      MCV 91      MCH 29.7      MCHC 32.8      RDW 13.7      Platelet Count 207      % Neutrophils 76      % Lymphocytes 15      % Monocytes 6      % Eosinophils 2      % Basophils 0      % Immature Granulocytes 1      NRBCs per 100 WBC 0      Absolute Neutrophils 6.2      Absolute Lymphocytes 1.2      Absolute Monocytes 0.5      Absolute Eosinophils 0.1      Absolute Basophils 0.0      Absolute Immature Granulocytes 0.0      Absolute NRBCs 0.0     TROPONIN T, HIGH SENSITIVITY        Emergency Department Course & Assessments:     Interventions:  Medications   aspirin (ASA) chewable tablet 243 mg (243 mg Oral $Given 6/7/23 1210)      Independent Interpretation (X-rays, CTs, rhythm strip):  Chest x-ray on my review is clear without lobar infiltrate, pneumothorax, large pleural effusion, or significant edema.     Consultations/Discussion of Management or Tests:  1347 I spoke with Halie Jeffery NP, who accepts for Dr. Salazar.       Assessments:  ED Course as of 06/07/23 1403   Wed Jun 07, 2023   1310 I obtained history and examined the patient as noted above.      Social Determinants of Health affecting care:   None    Disposition:  The patient was admitted to the hospital under the care of Dr. Salazar.     Impression & Plan    CMS Diagnoses: None      HEART Score  Background  Calculates the overall risk of adverse event in patient's  presenting with chest pain.  Based on 5 criteria (each assigned 0-2 points) including suspiciousness of history, EKG, age, risk factors and troponin.    Data  82 year old female  has History of colonic polyps; Chronic rhinitis; Esophageal reflux; Female stress incontinence; LUMBOSACRAL NEURITIS , numbness left thigh; Osteopenia; Hyperlipidemia LDL goal <130; Diastolic CHF, chronic (H); Subclavian artery stenosis, left (H); Severe persistent asthma; KEN (obstructive sleep apnea); Hip osteoarthritis; Mild coronary artery disease; Subclinical hyperthyroidism; Chest pain; Unstable angina pectoris (H); Status post coronary angiogram; Hypertension; Vasospastic angina (H); Exertional chest pain; Abnormal findings on diagnostic imaging of heart and coronary circulation; Venous reflux; Chest pain, unspecified type; and History of CAD (coronary artery disease) on their problem list.   reports that she quit smoking about 30 years ago. Her smoking use included cigarettes. She started smoking about 49 years ago. She has a 30.00 pack-year smoking history. She has never used smokeless tobacco.  family history includes Allergies in her daughter and son; Alzheimer Disease in her mother; Asthma in her daughter and son; Cancer in her father, maternal grandmother, and paternal grandmother; Depression in her brother; Gastrointestinal Disease in her mother; Heart Disease in her brother and sister; Heart Disease (age of onset: 57) in her father; Hyperlipidemia in her father; Hypertension in her maternal grandmother; Lipids in her maternal grandmother; Other Cancer in her brother, father, maternal grandmother, and paternal grandmother; Pacemaker in her brother.  Recent Labs   Lab 06/07/23  1144   CTROPT 9     Criteria   0-2 points for each of 5 items (maximum of 10 points):  Score 1- History moderately suspicious for coronary syndrome  Score 0- EKG Normal  Score 2- Age 65 years or older  Score 2- Three or more risk factors for or history  of atherosclerotic disease  Score 0- Within normal limits for troponin levels  Interpretation  Risk of adverse outcome  Heart Score: 5  Total Score 4-6- Adverse Outcome Risk 20.3% - Supports admission with standard rule-out management -serial troponins and stress testing            Medical Decision Makin-year-old female presents with chest pain as above in the context of known CAD.  The pain furthermore feels like prior episodes of cardiac chest pain.  EKG is nonischemic and first troponin is negative.  Her heart score is 5 putting her at moderate risk.  No unilateral calf swelling or calf pain, symptoms for weeks to months, overall low suspicion for PE at this time.  Also considered the possibility of stress-induced cardiomyopathy given the recent passing of her .  Plan is for admission to the hospitalist service for further care.    Critical Care time:  was 0 minutes for this patient excluding procedures.    Diagnosis:    ICD-10-CM    1. Chest pain, unspecified type  R07.9       2. History of CAD (coronary artery disease)  Z86.79              Scribe Disclosure:  I, ROSA STODDARD, am serving as a scribe at 12:48 PM on 2023 to document services personally performed by Kenneth Sifuentes MD based on my observations and the provider's statements to me.      2023   Kenneth Sifuentes MD King, Colin, MD  [phone]

## 2023-06-07 NOTE — H&P
"[facility]    History and Physical - Hospitalist Service       Date of Admission:  6/7/2023    Assessment & Plan      Victoria Montalvo is a 82 year old female with CAD h/o stenting in 2017, diastolic CHF, unstable angina, vasospastic angina, asthma, and GERD.  She presented to the ED on 6/7/2023 with c/o chest pain she has been experiencing over the last 1-2 months.      Patient reports she has been experiencing \"jabbing\" pain pointing to her upper left chest area that lasts about 10 seconds then it results.  The frequency varies, she can experience this pain several times in one day, then can go several days without it.  It is not associated with activity.  Does not experience dyspnea or diaphoresis with this pain.  Sometimes the pain radiates to her left arm.  States the pain is similar to when she needed stents in the past.    Patient does report increased LE edema over the last several weeks.  She usually wears compression stockings but not today as it is getting hard to take them on and off. She reports dyspnea with exertion such was walking up stairs, improves with rest.      #Chest pain   #CAD with h/o stenting 2017  #unstable angina  #h/o vasospastic angina  -cardiac cath 2021- patient had RCA spasm  -stress echo 2022 did not show inducible ischemia, nuclear stress trest recommended for further evaluation  -troponin neg x 2 in ED  -placed on tele  -Lexiscan stress test tomorrow  -continue PTA rosuvastatin, isosorbide, asa  -nitro PRN    #diastolic CHF  -endorses increased edema and dyspnea on exertion.  -recent stress over the last 6 months with caring for his  and then his death in April 2023  -7/2017 Echocardiogram EF 55-60%  -continue PTA spironolactone, lasix  - recheck echocardiogram    #Left LE pain and numbness  -states she has pain from low back radiating down her leg that started in April, she was told she has sciatic.  -reports overall this pain has been " "improving  -monitor for increased pain or decreased mobility    #asthma  -no c/o of wheezing   -albuterol nebs as needed, PTA Spiriva, changed to Breo Ellipta while here  -continue PTA monelukast    #GERD   - denies heart burn or GI symptoms  -continue PTA protonix    Diet: NPO for Medical/Clinical Reasons Except for: Meds  Combination Diet No Caffeine Diet, Low Saturated Fat Diet    DVT Prophylaxis: Ambulate every shift  Andrew Catheter: Not present  Lines: None     Cardiac Monitoring: ACTIVE order. Indication: Chest pain/ ACS rule out (24 hours)  Code Status: Full Code      Clinically Significant Risk Factors Present on Admission                # Drug Induced Platelet Defect: home medication list includes an antiplatelet medication   # Hypertension: Noted on problem list      # Obesity: Estimated body mass index is 30.97 kg/m  as calculated from the following:    Height as of this encounter: 1.575 m (5' 2\").    Weight as of this encounter: 76.8 kg (169 lb 5 oz).            Disposition Plan      Expected Discharge Date: 06/08/2023                The patient's care was discussed with the Bedside Nurse, Patient and Patient's Family.    Halie Jeffery CNP  Hospitalist Service  [facility]  Securely message with [employer] (more info)  Text page via [facility] Paging/Directory     ______________________________________________________________________    Chief Complaint   Chest pain    History is obtained from the patient    History of Present Illness   Victoria Montalvo is a 82 year old female with CAD h/o stenting in 2017, diastolic CHF, unstable angina, vasospastic angina, asthma, and GERD.  She presented to the ED on 6/7/2023 with c/o chest pain she has been experiencing over the last 1-2 months.      Patient reports she has been experiencing \"jabbing\" pain pointing to her upper left chest area that lasts about 10 seconds then it resolves.  The frequency varies, she can experience this pain several times in one " day, then can go several days without it.  It is not associated with activity.  Does not experience dyspnea or diaphoresis with this pain.  Sometimes the pain radiates to her left arm.  States the pain is similar to when she needed stents in the past.    Patient does report increased LE edema over the last several weeks.  She usually wears compression stockings but not today as it is getting hard to take them on and off. She reports dyspnea with exertion such was walking up stairs, improves with rest.          Past Medical History    Past Medical History:   Diagnosis Date     Abnormal Papanicolaou smear of cervix and cervical HPV     colposcopy 1/97     Arthritis      Asthma     on preventative meds and has nebulizer     Chronic rhinitis      COPD (chronic obstructive pulmonary disease)      Coronary artery disease     4/4/17 SHERLEY--PDA     Diastolic CHF, chronic 02/22/2012     Gastro-oesophageal reflux disease      Hyperlipidemia 10/31/2011     KEN (obstructive sleep apnea)     CPAP     Personal history of colonic polyps     colonoscopy 9/97, 2002 (due in 2007)     Pulmonary HTN      Seasonal allergies      Subclavian artery stenosis, left 08/07/2013    S/p stent in 2013      Subclinical hyperthyroidism 10/17/2016     Uncomplicated asthma 1995       Past Surgical History   Past Surgical History:   Procedure Laterality Date     ARTHROPLASTY HIP Right 10/19/2015    Procedure: ARTHROPLASTY HIP;  Surgeon: Aron Torres MD;  Location:  OR     COLONOSCOPY  01/01/2008    Polyp removed, bx.     COLONOSCOPY  01/12/2010     COLONOSCOPY N/A 02/17/2015    Procedure: COLONOSCOPY;  Surgeon: Gato Quiles MD;  Location:  GI     CT CORONARY ANGIOGRAM  04/04/2017    SHERLEY to PDA     CV CORONARY ANGIOGRAM N/A 04/22/2020    Procedure: Coronary Angiogram;  Surgeon: Handy Denise MD;  Location:  HEART CARDIAC CATH LAB     CV CORONARY ANGIOGRAM N/A 07/01/2021    Procedure: Coronary Angiogram;  Surgeon: Camelia  Handy KAY MD;  Location: [facility] CARD[facility]AC CATH LAB     CV INSTANTANEOUS WAVE-FREE RATIO N/A 04/22/2020    Procedure: Instantaneous Wave-Free Ratio;  Surgeon: Handy Denise MD;  Location: [facility] CARDIAC CATH LAB     CV LEFT VENTRICULOGRAM N/A 04/22/2020    Procedure: Left Ventriculogram;  Surgeon: Handy Denise MD;  Location: [facility] CARDIAC CATH LAB     Liposuction of legs and stomach  1990     MAMMOPLASTY REDUCTION  02/1989     OPEN REDUCTION INTERNAL FIXATION ANKLE  04/1987     Removal of ankle hardware NOS  1990     SOFT TISSUE SURGERY  1989 & 1990    Liposuction     VASCULAR SURGERY  2013    angiogram & left subclavical artery stent       Prior to Admission Medications   Prior to Admission Medications   Prescriptions Last Dose Informant Patient Reported? Taking?   BETA BLOCKER NOT PRESCRIBED (INTENTIONAL)   No No   Sig: Please choose reason not prescribed from choices below.   Boswellia-Glucosamine-Vit D (OSTEO BI-FLEX/5-LOXIN ADVANCED) TABS 6/6/2023  Yes Yes   Sig: Take 1,500 mg by mouth 2 times daily    Calcium 9684-7179 MG-UNIT CHEW 6/6/2023  Yes Yes   Sig: Take 1 tablet by mouth daily.   Cholecalciferol (VITAMIN D3 PO) 6/6/2023 Self Yes Yes   Sig: Take 4,000 Units by mouth daily    LANsoprazole (PREVACID) 30 MG DR capsule 6/7/2023  No Yes   Sig: Take 1 capsule (30 mg) by mouth daily   Pyridoxine HCl (VITAMIN B6) 250 MG TABS 6/6/2023  Yes Yes   Sig: Take by mouth daily   albuterol (PROAIR HFA/PROVENTIL HFA/VENTOLIN HFA) 108 (90 Base) MCG/ACT inhaler   No Yes   Sig: USE 2 INHALATIONS EVERY 6 HOURS AS NEEDED FOR SHORTNESS OF BREATH, DYSPNEA, OR WHEEZING   albuterol (PROVENTIL) (2.5 MG/3ML) 0.083% neb solution   No Yes   Sig: Take 1 vial (2.5 mg) by nebulization every 6 hours as needed for shortness of breath / dyspnea   amLODIPine (NORVASC) 5 MG tablet 6/6/2023  No Yes   Sig: Take 1 tablet (5 mg) by mouth every evening   aspirin 81 MG tablet 6/7/2023  Yes Yes   Sig: Take 81 mg by mouth daily.  "  diclofenac (VOLTAREN) 1 % topical gel   No Yes   Sig: Apply 2 g topically 4 times daily   Patient taking differently: Apply 2 g topically 4 times daily as needed   fluticasone (FLONASE) 50 MCG/ACT nasal spray 6/6/2023  No Yes   Sig: USE 1 TO 2 SPRAYS IN EACH NOSTRIL DAILY   fluticasone-salmeterol (ADVAIR) 250-50 MCG/ACT inhaler 6/6/2023  No Yes   Sig: Inhale 2 puffs into the lungs 2 times daily   furosemide (LASIX) 20 MG tablet 6/7/2023  No Yes   Sig: Take 1 tablet (20 mg) by mouth daily as needed for leg swelling   Patient taking differently: Take 20 mg by mouth daily   isosorbide mononitrate (IMDUR) 60 MG 24 hr tablet 6/7/2023  No Yes   Sig: TAKE 1 TABLET EVERY MORNING HOLD IF SYSTOLIC BLOOD PRESSURE IS LESS THAN 85   montelukast (SINGULAIR) 10 MG tablet 6/6/2023  No Yes   Sig: Take 1 tablet (10 mg) by mouth At Bedtime   nitroGLYcerin (NITROSTAT) 0.4 MG sublingual tablet   No No   Sig: One tablet under the tongue every 5 minutes if needed for chest pain. May repeat every 5 minutes for a maximum of 3 doses in 15 minutes\"   omega-3 fatty acids 1200 MG capsule 6/6/2023  Yes Yes   Sig: Take 1 capsule by mouth 2 times daily    rosuvastatin (CRESTOR) 40 MG tablet 6/6/2023  No Yes   Sig: Take 1 tablet (40 mg) by mouth daily   spironolactone (ALDACTONE) 25 MG tablet 6/7/2023  No Yes   Sig: Take 0.5 tablets (12.5 mg) by mouth daily as needed (swelling)      Facility-Administered Medications: None           Physical Exam   Vital Signs: Temp: 97.9  F (36.6  C) Temp src: Oral BP: 116/62 Pulse: 73   Resp: 20 SpO2: 95 % O2 Device: None (Room air)   Weight: 169 lbs 5.01 oz    General: Laying on the ED bed, no distress, watching TV  HEENT: Normocephalic, atraumatic  Cardiac: regular rate and rhythm, 1+ pitting edema, no calf tenderness BLE  Pulm: normal respiratory effort at rest and with conversation, no cough, lungs clear bilaterally  GI: Abdomen soft, nontender, no rigidity or guarding  MSK: No deformities,   Skin: Warm and " dry  Neuro: Moves all extremities  Psych: Normal mood and affect    Medical Decision Making       55 MINUTES SPENT BY ME on the date of service doing chart review, history, exam, documentation & further activities per the note.      Data     I have personally reviewed the following data over the past 24 hrs:    8.0  \   13.1   / 207     143 106 14.8 /  68 (L)   3.8 28 0.80 \       Trop: 8 BNP: N/A       Imaging results reviewed over the past 24 hrs:   Recent Results (from the past 24 hour(s))   Chest XR,  PA & LAT    Narrative    CHEST TWO VIEWS  6/7/2023 12:37 PM     HISTORY: Chest pain.    COMPARISON: February 18, 2023       Impression    IMPRESSION: Similar small hiatal hernia. There are no acute  infiltrates. The cardiac silhouette is not enlarged. Pulmonary  vasculature is unremarkable.     LEONARD PALACIO MD         SYSTEM ID:  [id number]

## 2023-06-07 NOTE — PLAN OF CARE
PRIMARY DIAGNOSIS: CHEST PAIN  OUTPATIENT/OBSERVATION GOALS TO BE MET BEFORE DISCHARGE:  1. Ruled out acute coronary syndrome (negative or stable Troponin):  Yes  2. Pain Status: Pain free.  3. Appropriate provocative testing performed: No  - Stress Test Procedure: Lesiscan tomorrow AM  - Interpretation of cardiac rhythm per telemetry tech: SR/HR 70s    4. Cleared by Consultants (if applicable):N/A  5. Return to near baseline physical activity: Yes  Discharge Planner Nurse   Safe discharge environment identified: Yes  Barriers to discharge: Yes       Entered by: ARLEY BROWN RN 06/07/2023    Vital signs:  Temp: 97.9  F (36.6  C) Temp src: Oral BP: 116/62 Pulse: 73   Resp: 20 SpO2: 95 % O2 Device: None (Room air) Alert and oriented x4. Denies chest pain, SOB, nausea, vomiting. On tele running SR. On low fat, low caffeine diet. NPO 3 hrs prior to Lexiscan tomorrow morning. Plan for Echocardiogram and stress Lexiscan in the morning. PIV saline locked. Up independently. Will continue to monitor.     Please review provider order for any additional goals.   Nurse to notify provider when observation goals have been met and patient is ready for discharge.

## 2023-06-07 NOTE — PHARMACY-ADMISSION MEDICATION HISTORY
Pharmacist Admission Medication History    Admission medication history is complete. The information provided in this note is only as accurate as the sources available at the time of the update.    Medication reconciliation/reorder completed by provider prior to medication history? No    Information Source(s): Patient via in-person    Pertinent Information: -    Changes made to PTA medication list:    Added: None    Deleted: None    Changed: None    Medication Affordability:  Not including over the counter (OTC) medications, was there a time in the past 3 months when you did not take your medications as prescribed because of cost?: No    Allergies reviewed with patient and updates made in EHR: yes    Medication History Completed By: Aga Jordan RPH 6/7/2023 2:48 PM    Prior to Admission medications    Medication Sig Last Dose Taking? Auth Provider Long Term End Date   albuterol (PROAIR HFA/PROVENTIL HFA/VENTOLIN HFA) 108 (90 Base) MCG/ACT inhaler USE 2 INHALATIONS EVERY 6 HOURS AS NEEDED FOR SHORTNESS OF BREATH, DYSPNEA, OR WHEEZING  Yes Elsy Bah MD Yes    albuterol (PROVENTIL) (2.5 MG/3ML) 0.083% neb solution Take 1 vial (2.5 mg) by nebulization every 6 hours as needed for shortness of breath / dyspnea  Yes Elsy Bah MD Yes    amLODIPine (NORVASC) 5 MG tablet Take 1 tablet (5 mg) by mouth every evening 6/6/2023 Yes Elsy Bah MD Yes    aspirin 81 MG tablet Take 81 mg by mouth daily. 6/7/2023 Yes Reported, Patient     Boswellia-Glucosamine-Vit D (OSTEO BI-FLEX/5-LOXIN ADVANCED) TABS Take 1,500 mg by mouth 2 times daily  6/6/2023 Yes Reported, Patient     Calcium 1252-5143 MG-UNIT CHEW Take 1 tablet by mouth daily. 6/6/2023 Yes Reported, Patient     Cholecalciferol (VITAMIN D3 PO) Take 4,000 Units by mouth daily  6/6/2023 Yes Reported, Patient     diclofenac (VOLTAREN) 1 % topical gel Apply 2 g topically 4 times daily  Patient taking differently: Apply 2 g topically 4 times  "daily as needed  Yes Elsy Bah MD     fluticasone (FLONASE) 50 MCG/ACT nasal spray USE 1 TO 2 SPRAYS IN EACH NOSTRIL DAILY 6/6/2023 Yes Elsy Bah MD     fluticasone-salmeterol (ADVAIR) 250-50 MCG/ACT inhaler Inhale 2 puffs into the lungs 2 times daily 6/6/2023 Yes Elsy Bah MD Yes    furosemide (LASIX) 20 MG tablet Take 1 tablet (20 mg) by mouth daily as needed for leg swelling  Patient taking differently: Take 20 mg by mouth daily 6/7/2023 Yes Marleni Raza APRN CNP Yes    isosorbide mononitrate (IMDUR) 60 MG 24 hr tablet TAKE 1 TABLET EVERY MORNING HOLD IF SYSTOLIC BLOOD PRESSURE IS LESS THAN 85 6/7/2023 Yes Elsy Bah MD Yes    LANsoprazole (PREVACID) 30 MG DR capsule Take 1 capsule (30 mg) by mouth daily 6/7/2023 Yes Elsy Bah MD     montelukast (SINGULAIR) 10 MG tablet Take 1 tablet (10 mg) by mouth At Bedtime 6/6/2023 Yes Elsy Bah MD Yes    omega-3 fatty acids 1200 MG capsule Take 1 capsule by mouth 2 times daily  6/6/2023 Yes Reported, Patient     Pyridoxine HCl (VITAMIN B6) 250 MG TABS Take by mouth daily 6/6/2023 Yes Elsy Bah MD     rosuvastatin (CRESTOR) 40 MG tablet Take 1 tablet (40 mg) by mouth daily 6/6/2023 Yes Elsy Bah MD Yes    spironolactone (ALDACTONE) 25 MG tablet Take 0.5 tablets (12.5 mg) by mouth daily as needed (swelling) 6/7/2023 Yes Elsy Bah MD Yes    BETA BLOCKER NOT PRESCRIBED (INTENTIONAL) Please choose reason not prescribed from choices below.   Elsy Bah MD Yes    nitroGLYcerin (NITROSTAT) 0.4 MG sublingual tablet One tablet under the tongue every 5 minutes if needed for chest pain. May repeat every 5 minutes for a maximum of 3 doses in 15 minutes\"   Elsy Bah MD Yes        "

## 2023-06-08 ENCOUNTER — APPOINTMENT (OUTPATIENT)
Dept: CARDIOLOGY | Facility: CLINIC | Age: 83
End: 2023-06-08
Attending: NURSE PRACTITIONER
Payer: COMMERCIAL

## 2023-06-08 ENCOUNTER — APPOINTMENT (OUTPATIENT)
Dept: NUCLEAR MEDICINE | Facility: CLINIC | Age: 83
End: 2023-06-08
Attending: NURSE PRACTITIONER
Payer: COMMERCIAL

## 2023-06-08 VITALS
HEIGHT: 62 IN | TEMPERATURE: 97.7 F | SYSTOLIC BLOOD PRESSURE: 152 MMHG | BODY MASS INDEX: 31.16 KG/M2 | DIASTOLIC BLOOD PRESSURE: 81 MMHG | WEIGHT: 169.31 LBS | HEART RATE: 77 BPM | RESPIRATION RATE: 16 BRPM | OXYGEN SATURATION: 97 %

## 2023-06-08 LAB
ATRIAL RATE - MUSE: 76 BPM
CV BLOOD PRESSURE: 75 MMHG
CV STRESS MAX HR HE: 93
DIASTOLIC BLOOD PRESSURE - MUSE: NORMAL MMHG
INTERPRETATION ECG - MUSE: NORMAL
LVEF ECHO: NORMAL
P AXIS - MUSE: 67 DEGREES
PR INTERVAL - MUSE: 140 MS
QRS DURATION - MUSE: 64 MS
QT - MUSE: 376 MS
QTC - MUSE: 423 MS
R AXIS - MUSE: -42 DEGREES
RATE PRESSURE PRODUCT: NORMAL
STRESS ECHO BASELINE DIASTOLIC HE: 79
STRESS ECHO BASELINE HR: 75 BPM
STRESS ECHO BASELINE SYSTOLIC BP: 136
STRESS ECHO CALCULATED PERCENT HR: 68 %
STRESS ECHO LAST STRESS DIASTOLIC BP: 60
STRESS ECHO LAST STRESS SYSTOLIC BP: 122
STRESS ECHO TARGET HR: 137
SYSTOLIC BLOOD PRESSURE - MUSE: NORMAL MMHG
T AXIS - MUSE: 29 DEGREES
VENTRICULAR RATE- MUSE: 76 BPM

## 2023-06-08 PROCEDURE — A9502 TC99M TETROFOSMIN: HCPCS | Performed by: HOSPITALIST

## 2023-06-08 PROCEDURE — 93018 CV STRESS TEST I&R ONLY: CPT | Performed by: INTERNAL MEDICINE

## 2023-06-08 PROCEDURE — 250N000011 HC RX IP 250 OP 636

## 2023-06-08 PROCEDURE — 78452 HT MUSCLE IMAGE SPECT MULT: CPT | Mod: 26 | Performed by: INTERNAL MEDICINE

## 2023-06-08 PROCEDURE — G0378 HOSPITAL OBSERVATION PER HR: HCPCS

## 2023-06-08 PROCEDURE — 255N000002 HC RX 255 OP 636: Performed by: HOSPITALIST

## 2023-06-08 PROCEDURE — 343N000001 HC RX 343: Performed by: HOSPITALIST

## 2023-06-08 PROCEDURE — 99238 HOSP IP/OBS DSCHRG MGMT 30/<: CPT | Performed by: NURSE PRACTITIONER

## 2023-06-08 PROCEDURE — 93306 TTE W/DOPPLER COMPLETE: CPT | Mod: 26 | Performed by: INTERNAL MEDICINE

## 2023-06-08 PROCEDURE — 93016 CV STRESS TEST SUPVJ ONLY: CPT | Performed by: INTERNAL MEDICINE

## 2023-06-08 PROCEDURE — 999N000208 ECHOCARDIOGRAM COMPLETE

## 2023-06-08 RX ORDER — AMINOPHYLLINE 25 MG/ML
50-100 INJECTION, SOLUTION INTRAVENOUS
Status: DISCONTINUED | OUTPATIENT
Start: 2023-06-08 | End: 2023-06-08 | Stop reason: HOSPADM

## 2023-06-08 RX ORDER — REGADENOSON 0.08 MG/ML
0.4 INJECTION, SOLUTION INTRAVENOUS ONCE
Status: DISCONTINUED | OUTPATIENT
Start: 2023-06-08 | End: 2023-06-08 | Stop reason: HOSPADM

## 2023-06-08 RX ORDER — FLUTICASONE PROPIONATE AND SALMETEROL 250; 50 UG/1; UG/1
1 POWDER RESPIRATORY (INHALATION) 2 TIMES DAILY
Qty: 60 EACH | Refills: 11 | Status: SHIPPED | OUTPATIENT
Start: 2023-06-08 | End: 2024-08-19

## 2023-06-08 RX ORDER — CAFFEINE CITRATE 20 MG/ML
60 SOLUTION INTRAVENOUS
Status: DISCONTINUED | OUTPATIENT
Start: 2023-06-08 | End: 2023-06-08 | Stop reason: HOSPADM

## 2023-06-08 RX ORDER — REGADENOSON 0.08 MG/ML
INJECTION, SOLUTION INTRAVENOUS
Status: COMPLETED
Start: 2023-06-08 | End: 2023-06-08

## 2023-06-08 RX ORDER — FUROSEMIDE 20 MG
20 TABLET ORAL EVERY MORNING
COMMUNITY
Start: 2023-06-08 | End: 2023-12-04

## 2023-06-08 RX ORDER — ALBUTEROL SULFATE 90 UG/1
2 AEROSOL, METERED RESPIRATORY (INHALATION) EVERY 5 MIN PRN
Status: DISCONTINUED | OUTPATIENT
Start: 2023-06-08 | End: 2023-06-08 | Stop reason: HOSPADM

## 2023-06-08 RX ORDER — ACYCLOVIR 200 MG/1
0-1 CAPSULE ORAL
Status: DISCONTINUED | OUTPATIENT
Start: 2023-06-08 | End: 2023-06-08 | Stop reason: HOSPADM

## 2023-06-08 RX ADMIN — HUMAN ALBUMIN MICROSPHERES AND PERFLUTREN 3 ML: 10; .22 INJECTION, SOLUTION INTRAVENOUS at 08:44

## 2023-06-08 RX ADMIN — TETROFOSMIN 32 MILLICURIE: 1.38 INJECTION, POWDER, LYOPHILIZED, FOR SOLUTION INTRAVENOUS at 09:35

## 2023-06-08 RX ADMIN — TETROFOSMIN 11 MILLICURIE: 1.38 INJECTION, POWDER, LYOPHILIZED, FOR SOLUTION INTRAVENOUS at 07:50

## 2023-06-08 RX ADMIN — REGADENOSON 0.4 MG: 0.08 INJECTION, SOLUTION INTRAVENOUS at 09:36

## 2023-06-08 ASSESSMENT — ACTIVITIES OF DAILY LIVING (ADL)
ADLS_ACUITY_SCORE: 20

## 2023-06-08 NOTE — PLAN OF CARE
"PRIMARY DIAGNOSIS: CHEST PAIN  OUTPATIENT/OBSERVATION GOALS TO BE MET BEFORE DISCHARGE:  1. Ruled out acute coronary syndrome (negative or stable Troponin):  Yes  2. Pain Status: Pain free.  3. Appropriate provocative testing performed: No  - Stress Test Procedure: Lexiscan scheduled in the morning   - Interpretation of cardiac rhythm per telemetry tech: SR 60s    4. Cleared by Consultants (if applicable):No  5. Return to near baseline physical activity: Yes  Discharge Planner Nurse   Safe discharge environment identified: Yes  Barriers to discharge: Yes   A & O X 4. Lung sound clear bilaterally to ausculation . Denies chest pain, abdominal pain, lightheadedness , nausea, vomit, or diarrhea. Bowel sound present all quadrants and active. Peripheral IV saline lock. NPO status. Lexiscan and ECHO  scheduled this morning . Patient on cardiac monitoring , cardiac rhythm SR 60 per telemetry tech.  Independent in the room.  Prn melatonin given for sleep.Afebrile. Cardiology following.  Will continue to provide supportive cares.  /62 (BP Location: Left arm)   Pulse 70   Temp 97.8  F (36.6  C) (Oral)   Resp 18   Ht 1.575 m (5' 2\")   Wt 76.8 kg (169 lb 5 oz)   LMP  (LMP Unknown)   SpO2 96%   BMI 30.97 kg/m         Entered by: Ponce Antony RN 06/08/2023 3:30 AM     Please review provider order for any additional goals.   Nurse to notify provider when observation goals have been met and patient is ready for discharge.    "

## 2023-06-08 NOTE — PROGRESS NOTES
Care Management Discharge Note    Discharge Date: 06/08/2023       Additional Information:  Patient has a [facility] PCP. No anticipated SW needs at this time. Please consult care management if needs arise.     SAUL Wells, LGDARIELA  Emergency Room   Please contact the SW on the floor in which the patient is staying for any questions or concerns

## 2023-06-08 NOTE — DISCHARGE SUMMARY
"[facility]  Hospitalist Discharge Summary      Date of Admission:  6/7/2023  Date of Discharge:  6/8/2023  Discharging Provider: Halie Jeffery CNP  Discharge Service: Hospitalist Service    Discharge Diagnoses   Chest pain    Clinically Significant Risk Factors     # Obesity: Estimated body mass index is 30.97 kg/m  as calculated from the following:    Height as of this encounter: 1.575 m (5' 2\").    Weight as of this encounter: 76.8 kg (169 lb 5 oz).       Follow-ups Needed After Discharge   Follow-up Appointments     Follow-up and recommended labs and tests       Follow up with primary care provider, Elsy Bah, within 2-4   weeks, for hospital follow- up. No follow up labs or test are needed.             Unresulted Labs Ordered in the Past 30 Days of this Admission     No orders found for last 31 day(s).      These results will be followed up by NA    Discharge Disposition   Discharged to home  Condition at discharge: Stable    Hospital Course      Victoria Montalvo is a 82 year old female with CAD h/o stenting in 2017, diastolic CHF, unstable angina, vasospastic angina, asthma, and GERD.  She presented to the ED on 6/7/2023 with c/o chest pain she has been experiencing over the last 1-2 months.      Patient reports she has been experiencing \"jabbing\" pain pointing to her upper left chest area that lasts about 10 seconds then it results.  The frequency varies, she can experience this pain several times in one day, then can go several days without it.  It is not associated with activity.  Does not experience dyspnea or diaphoresis with this pain.  Sometimes the pain radiates to her left arm.  States the pain is similar to when she needed stents in the past.    Patient does reported increased LE edema over the last several weeks.  She usually wears compression stockings but not today as it is getting hard to take them on and off. She reports dyspnea with exertion such was walking up stairs, " improves with rest.      #Chest pain   #CAD with h/o stenting 2017  #unstable angina  #h/o vasospastic angina  -denies significant chest pain since the ED, feeling good and would like to discharge   -cardiac cath 2021- patient had RCA spasm  -stress echo 2022 did not show inducible ischemia, nuclear stress trest recommended for further evaluation  -troponin neg x 2 in ED  -monitored on tele overnight and WNL  -Lexiscan stress test is negative for inducible myocardial ischemia or infarction  -discharge on PTA rosuvastatin, isosorbide, asa, nitro PRN      #diastolic CHF  -endorses increased edema and dyspnea on exertion.  -recent stress over the last 6 months with caring for his  and then his death in April 2023  -echocardiogram- left ventricle visual ejection fraction is 60-65%.Mid lateral wall mild  hypokinesia.The right ventricular systolic function is normal.Aortic valve sclerosis notedThe inferior vena cava was normal in size with preserved respiratoryvariability.  -continue PTA spironolactone, lasix      #Left LE pain and numbness  -states she has pain from low back radiating down her leg that started in April, she was told she has sciatic.  -reports overall this pain has been improving  -monitor for increased pain or decreased mobility    #asthma  -no c/o of wheezing   -continue PTA inhalers and montelukast    #GERD  -denies symptoms  -continue PTA PPI    Consultations This Hospital Stay   None    Code Status   Full Code    Time Spent on this Encounter   I, Halie Jeffery CNP, personally saw the patient today and spent less than or equal to 30 minutes discharging this patient.       Halei Jeffery CNP  [facility] OBSERVATION DEPT  [address] [place] [address]  Phone: [phone]  ______________________________________________________________________    Physical Exam   Vital Signs: Temp: 97.7  F (36.5  C) Temp src: Oral BP: (!) 152/81 Pulse: 77   Resp: 16 SpO2: 97 % O2 Device: None  (Room air) Oxygen Delivery: 5 LPM  Weight: 169 lbs 5.01 oz  Constitutional: awake, alert, cooperative, no apparent distress, and appears stated age  Respiratory: No increased work of breathing, good air exchange, clear to auscultation bilaterally, no crackles or wheezing  Cardiovascular: Normal apical impulse, regular rate and rhythm, normal S1 and S2, no S3 or S4, and no murmur noted  GI:  normal bowel sounds, soft, non-distended, non-tender, no masses palpated, no hepatosplenomegally  Skin: normal skin color, texture, turgor  Musculoskeletal: There is no redness, warmth, or swelling of the joints.  Full range of motion noted.  Motor strength is 5 out of 5 all extremities bilaterally.  Tone is normal.  Neuropsychiatric: General: normal, calm and normal eye contact  Affect: normal       Primary Care Physician   Elsy Bah    Discharge Orders      Primary Care - Care Coordination Referral      Reason for your hospital stay    You were admitted to the observation unit for chest pain. Your heart was monitored overnight with no abnormal findings. Your cardiac enzymes were negative for heart attack. You had a stress test that was negative and echocardiogram shows your heart is functioning at it's baseline. Other possibilities for chest pain include reflux, stress, and musculoskeletal strain. We recommend you follow up with your primary doctor if you continue to have pain.     Follow-up and recommended labs and tests     Follow up with primary care provider, Elsy Bah, within 2-4 weeks, for hospital follow- up. No follow up labs or test are needed.     Activity    Your activity upon discharge: activity as tolerated     Diet    Follow this diet upon discharge: Regular       Significant Results and Procedures   Most Recent 3 CBC's:Recent Labs   Lab Test 06/07/23  1144 09/17/22  0914 04/08/22  1412   WBC 8.0 5.4 7.4   HGB 13.1 11.9 12.3   MCV 91 91 91    166 191     Most Recent 3 BMP's:Recent Labs    Lab Test 23  2136 23  1144 22  0914 22  1412   NA  --  143 141 141   POTASSIUM  --  3.8 3.9 4.1   CHLORIDE  --  106 107 108   CO2  --  28 27 28   BUN  --  14.8 10.4 18   CR  --  0.80 0.69 0.72   ANIONGAP  --  9 7 5   PHILLIP  --  9.3 8.4* 8.5   * 68* 100* 83   ,   Results for orders placed or performed during the hospital encounter of 23   Chest XR,  PA & LAT    Narrative    CHEST TWO VIEWS  2023 12:37 PM     HISTORY: Chest pain.    COMPARISON: 2023       Impression    IMPRESSION: Similar small hiatal hernia. There are no acute  infiltrates. The cardiac silhouette is not enlarged. Pulmonary  vasculature is unremarkable.     LEONARD PALACIO MD         SYSTEM ID:  [id number]   Echocardiogram Complete     Value    LVEF  60-65%    Narrative    [id number]  [id number]  [id number]  [id number]^[email or web address]^RITESH     [facility]  Echocardiography Laboratory  [address] [place] [address]     Name: ALVIN PHILLIPS  MRN: [id number]  : 1940  Study Date: 2023 08:09 AM  Age: 82 yrs  Gender: Female  Patient Location: [facility]  Reason For Study: Chest Pain  Ordering Physician: RITESH BARTHOLOMEW  Performed By: Martha Gamboa     BSA: 1.8 m2  Height: 62 in  Weight: 169 lb  BP: 116/62 mmHg  ______________________________________________________________________________  Procedure  Complete Portable Echo Adult. Optison (NDC #1105-5522) given intravenously.  ______________________________________________________________________________  Interpretation Summary     The left ventricle visual ejection fraction is 60-65%.Mid lateral wall mild  hypokinesia.  The right ventricular systolic function is normal.  Aortic valve sclerosis noted  The inferior vena cava was normal in size with preserved respiratory  variability.     On direct comparison to resting echo images dated 2022 mid lateral wall  hypokinesia appears  new.  ______________________________________________________________________________  Left Ventricle  The left ventricle is normal in size. There is normal left ventricular wall  thickness. Diastolic Doppler findings (E/E' ratio and/or other parameters)  suggest left ventricular filling pressures are indeterminate. The visual  ejection fraction is 60-65%. Mid lateral wall mild hypokinesia.     Right Ventricle  The right ventricle is normal size. The right ventricular systolic function is  normal.     Atria  Normal left atrial size. Right atrial size is normal. There is no color  Doppler evidence of an atrial shunt.     Mitral Valve  There is trace mitral regurgitation.     Tricuspid Valve  There is trace tricuspid regurgitation. Right ventricular systolic pressure  could not be approximated due to inadequate tricuspid regurgitation.     Aortic Valve  Aortic valve sclerosis noted. No aortic regurgitation is present. No aortic  stenosis is present.     Pulmonic Valve  The pulmonic valve is not well visualized. There is no pulmonic valvular  stenosis.     Vessels  The aortic root is normal size. Normal size ascending aorta. The inferior vena  cava was normal in size with preserved respiratory variability.     Pericardium  There is no pericardial effusion.     Rhythm  Sinus rhythm was noted.  ______________________________________________________________________________  MMode/2D Measurements & Calculations  IVSd: 0.71 cm  LVIDd: 4.6 cm  LVIDs: 2.4 cm  LVPWd: 0.80 cm  FS: 47.3 %  LV mass(C)d: 111.2 grams  LV mass(C)dI: 62.5 grams/m2  Ao root diam: 2.9 cm  LVOT diam: 2.0 cm  LVOT area: 3.2 cm2  LA Volume (BP): 43.8 ml  LA Volume Index (BP): 24.6 ml/m2  RWT: 0.34     TAPSE: 3.1 cm     Doppler Measurements & Calculations  MV E max singh: 60.1 cm/sec  MV A max singh: 93.6 cm/sec  MV E/A: 0.64  MV dec slope: 280.0 cm/sec2  MV dec time: 0.21 sec  Ao V2 max: 187.9 cm/sec  Ao max P.0 mmHg  Ao V2 mean: 137.6 cm/sec  Ao mean PG:  8.2 mmHg  Ao V2 VTI: 43.6 cm  JUANJO(I,D): 2.2 cm2  JUANJO(V,D): 2.1 cm2  LV V1 max P.0 mmHg  LV V1 max: 122.5 cm/sec  LV V1 VTI: 29.9 cm  SV(LVOT): 96.0 ml  SI(LVOT): 53.9 ml/m2  PA acc time: 0.13 sec  AV Scott Ratio (DI): 0.65  JUANJO Index (cm2/m2): 1.2  E/E' av.1  Lateral E/e': 8.1  Medial E/e': 10.2     ______________________________________________________________________________  Report approved by: Yandel Sewell 2023 11:12 AM         NM Lexiscan stress test (nuc card)     Value    Target     Baseline Systolic     Baseline Diastolic BP 79    Last Stress Systolic     Last Stress Diastolic BP 60    Baseline HR 75    Max HR  93    Max Predicted HR  68    Rate Pressure Product 11,346.0    BP 75    Narrative       The nuclear stress test is negative for inducible myocardial ischemia   or infarction.     Left ventricular function is hyperdynamic.     The left ventricular ejection fraction at rest is 75%.     A prior study was conducted on 2022. Compared to prior study no   significant changes.           Discharge Medications   Current Discharge Medication List      CONTINUE these medications which have CHANGED    Details   fluticasone-salmeterol (ADVAIR) 250-50 MCG/ACT inhaler Inhale 1 puff into the lungs 2 times daily  Qty: 60 each, Refills: 11    Associated Diagnoses: Moderate persistent asthma, uncomplicated      furosemide (LASIX) 20 MG tablet Take 1 tablet (20 mg) by mouth daily    Associated Diagnoses: Venous reflux         CONTINUE these medications which have NOT CHANGED    Details   albuterol (PROAIR HFA/PROVENTIL HFA/VENTOLIN HFA) 108 (90 Base) MCG/ACT inhaler USE 2 INHALATIONS EVERY 6 HOURS AS NEEDED FOR SHORTNESS OF BREATH, DYSPNEA, OR WHEEZING  Qty: 25.5 g, Refills: 1    Associated Diagnoses: Moderate persistent asthma, uncomplicated      albuterol (PROVENTIL) (2.5 MG/3ML) 0.083% neb solution Take 1 vial (2.5 mg) by nebulization every 6 hours as needed for shortness of  breath / dyspnea  Qty: 3 mL, Refills: 11    Associated Diagnoses: Moderate persistent asthma, uncomplicated      amLODIPine (NORVASC) 5 MG tablet Take 1 tablet (5 mg) by mouth every evening  Qty: 90 tablet, Refills: 3    Associated Diagnoses: Vasospastic angina (H)      aspirin 81 MG tablet Take 81 mg by mouth daily.      Boswellia-Glucosamine-Vit D (OSTEO BI-FLEX/5-LOXIN ADVANCED) TABS Take 1,500 mg by mouth 2 times daily       Calcium 5017-8463 MG-UNIT CHEW Take 1 tablet by mouth daily.      Cholecalciferol (VITAMIN D3 PO) Take 4,000 Units by mouth daily       diclofenac (VOLTAREN) 1 % topical gel Apply 2 g topically 4 times daily  Qty: 100 g, Refills: 11    Associated Diagnoses: Arthritis      fluticasone (FLONASE) 50 MCG/ACT nasal spray USE 1 TO 2 SPRAYS IN EACH NOSTRIL DAILY  Qty: 48 g, Refills: 11    Associated Diagnoses: Chronic rhinitis      isosorbide mononitrate (IMDUR) 60 MG 24 hr tablet TAKE 1 TABLET EVERY MORNING HOLD IF SYSTOLIC BLOOD PRESSURE IS LESS THAN 85  Qty: 90 tablet, Refills: 3    Associated Diagnoses: Unstable angina pectoris (H)      LANsoprazole (PREVACID) 30 MG DR capsule Take 1 capsule (30 mg) by mouth daily  Qty: 90 capsule, Refills: 3    Associated Diagnoses: Gastroesophageal reflux disease with esophagitis without hemorrhage      montelukast (SINGULAIR) 10 MG tablet Take 1 tablet (10 mg) by mouth At Bedtime  Qty: 90 tablet, Refills: 3    Associated Diagnoses: Moderate persistent asthma, uncomplicated      omega-3 fatty acids 1200 MG capsule Take 1 capsule by mouth 2 times daily       Pyridoxine HCl (VITAMIN B6) 250 MG TABS Take by mouth daily      rosuvastatin (CRESTOR) 40 MG tablet Take 1 tablet (40 mg) by mouth daily  Qty: 90 tablet, Refills: 3    Associated Diagnoses: Mild coronary artery disease      spironolactone (ALDACTONE) 25 MG tablet Take 0.5 tablets (12.5 mg) by mouth daily as needed (swelling)  Qty: 90 tablet, Refills: 3    Associated Diagnoses: Diastolic CHF, chronic (H)  "     BETA BLOCKER NOT PRESCRIBED (INTENTIONAL) Please choose reason not prescribed from choices below.    Associated Diagnoses: Diastolic CHF, chronic (H)      nitroGLYcerin (NITROSTAT) 0.4 MG sublingual tablet One tablet under the tongue every 5 minutes if needed for chest pain. May repeat every 5 minutes for a maximum of 3 doses in 15 minutes\"  Qty: 25 tablet, Refills: 3    Associated Diagnoses: Unstable angina pectoris (H)           Allergies   Allergies   Allergen Reactions     Animal Dander      Dust Mites      Kiwi Itching     Itching and red all over     Pollen Extract      Pollen,dust, trees, grass, mold-hayfever symptoms     "

## 2023-06-08 NOTE — PLAN OF CARE
PRIMARY DIAGNOSIS: CHEST PAIN  OUTPATIENT/OBSERVATION GOALS TO BE MET BEFORE DISCHARGE:  1. Ruled out acute coronary syndrome (negative or stable Troponin):  Yes  2. Pain Status: Pain free.  3. Appropriate provocative testing performed: No  - Stress Test Procedure: Lexiscan scheduled in the morning   - Interpretation of cardiac rhythm per telemetry tech: SR 60s    4. Cleared by Consultants (if applicable):No  5. Return to near baseline physical activity: Yes  Discharge Planner Nurse   Safe discharge environment identified: Yes  Barriers to discharge: Yes       Entered by: Ponce Antony RN 06/07/2023 9:00 PM     Please review provider order for any additional goals.   Nurse to notify provider when observation goals have been met and patient is ready for discharge.

## 2023-06-09 ENCOUNTER — PATIENT OUTREACH (OUTPATIENT)
Dept: CARE COORDINATION | Facility: CLINIC | Age: 83
End: 2023-06-09
Payer: COMMERCIAL

## 2023-06-09 NOTE — PROGRESS NOTES
Clinic Care Coordination Contact  Care Coordination Clinician Chart Review    Situation: Patient chart reviewed by care coordinator.    Background: Clinic Care Coordination Referral received from inpatient care team for transition handoff communication following hospital admission.    Assessment: Upon chart review, patient is not a candidate for Primary Care Clinic Care Coordination enrollment due to reason stated below:  Primary Care Care Coordination recently graduated pt Pt no longer requested Care Coordianation outreaches. Pt was provided with SWCC and CHW contacts. IP Hand off referral and notes indicate no new needs or identified concerns.     Plan/Recommendations: Clinic Care Coordination Referral/order cancelled. RN/SW CC will perform no further monitoring/outreaches at this time and will remain available as needed. If new needs arise, a new Care Coordination Referral may be placed.    PARAMJIT Burroughs  Clinic Care Coordinator  [facility]  [phone]  [email or web address]

## 2023-06-20 ENCOUNTER — OFFICE VISIT (OUTPATIENT)
Dept: ORTHOPEDICS | Facility: CLINIC | Age: 83
End: 2023-06-20
Payer: COMMERCIAL

## 2023-06-20 VITALS
OXYGEN SATURATION: 96 % | HEIGHT: 62 IN | SYSTOLIC BLOOD PRESSURE: 129 MMHG | BODY MASS INDEX: 30.36 KG/M2 | HEART RATE: 80 BPM | DIASTOLIC BLOOD PRESSURE: 70 MMHG | WEIGHT: 165 LBS

## 2023-06-20 DIAGNOSIS — M54.42 ACUTE LEFT-SIDED LOW BACK PAIN WITH LEFT-SIDED SCIATICA: ICD-10-CM

## 2023-06-20 DIAGNOSIS — M41.9 SCOLIOSIS OF THORACOLUMBAR SPINE, UNSPECIFIED SCOLIOSIS TYPE: ICD-10-CM

## 2023-06-20 DIAGNOSIS — M51.379 DDD (DEGENERATIVE DISC DISEASE), LUMBOSACRAL: ICD-10-CM

## 2023-06-20 DIAGNOSIS — Q76.49 BERTOLOTTI'S SYNDROME: ICD-10-CM

## 2023-06-20 DIAGNOSIS — M48.07 LUMBOSACRAL SPINAL STENOSIS: Primary | ICD-10-CM

## 2023-06-20 DIAGNOSIS — M47.817 FACET ARTHROPATHY, LUMBOSACRAL: ICD-10-CM

## 2023-06-20 PROCEDURE — 99204 OFFICE O/P NEW MOD 45 MIN: CPT | Performed by: PHYSICAL MEDICINE & REHABILITATION

## 2023-06-20 ASSESSMENT — PAIN SCALES - GENERAL: PAINLEVEL: NO PAIN (0)

## 2023-06-20 NOTE — PATIENT INSTRUCTIONS
Please schedule a follow-up appointment as needed.  You can schedule this at the , or you can call [phone].

## 2023-06-20 NOTE — PROGRESS NOTES
"PHYSICAL MEDICINE & REHABILITATION / MEDICAL SPINE        Date:  Jun 20, 2023    Name:  Victoria Montalvo  YOB: 1940  MRN:  [id number]          REASON FOR CONSULTATION:  Acute left-sided low back pain with left-sided sciatica.        REFERRING PROVIDER:  ELHAM Soria        CHART REVIEW:  Reviewed 04/18/2023 note from ELHAM Soria (Internal medicine).  Ms. Victoria Montalvo had back pain for 1 year that worsened 3 to 4 weeks ago.  Pain was in the left gluteus.  Pain was described as burning.  Pain radiated to the left leg.  Pain had been worsening.  Ms. Victoria Montalvo's pain began after she was pushing her  in a wheelchair.  She denied any prior history of back problems.  X-rays of the lumbar spine ordered.  Tramadol was prescribed.  Referral to medical spine was placed.        HISTORY OF PRESENT ILLNESS:  Ms. Victoria Montalvo is an 83-year-old female.  Ms. Montalvo states that she was providing a lot of care for her .  She had to do a lot of lifting of her .  In February or March 2023, she developed left low back/buttock pain.  Pain radiated into left posterior thigh and left posterior leg stopping at the level of the ankle.  These symptoms have mostly resolved.  Currently, there is only intermittent pain.  Pain currently is rated as 0/10.  Pain at its worst was rated as 10/10.  Pain now is described as a \"warm sensation.\"  Pain is worse with lifting and standing.  Pain has really been improving.    When Ms. Victoria Eduardos left low back/buttock pain with radiation into the left lower extremity was more intense, it would keep her awake and wake her, but this pain is no longer affecting her sleep.  Ms. Victoria Montalvo is no longer taking any pain medications.  Pain is not changed by coughing or sneezing.  Driving and pothole does not change her pain.  Ms. Victoria Montalvo has not tried acupuncture, chiropractor treatments, injections, massage, physical therapy.    MsRafita" Victoria Montalvo notes some generalized weakness.  She denies any give way episodes of her lower extremities.  She denies any tripping, stumbling, falling.  She is not using any assistive devices for ambulation.  Ms. Victoria Montalvo sometimes notes numbness, tingling in her left foot and left.  She denies any other numbness, tingling, pins-and-needles.  Ms. Montalvo denies any saddle anesthesia, bowel incontinence, bladder incontinence.  She does admit that her bilateral lower extremities become weak and tired with walking.  She notes improvement in these symptoms with leaning forward on a shopping cart.    Ms. Victoria Montalvo has had a right total hip arthroplasty.  She previously had a left ankle fracture that was surgically repaired.  Ms. Victoria Montalvo denies any other injuries or surgeries of her low back, pelvis, hips, thighs, knees, legs, ankles, feet, toes.    Ms. Victoria Montalvo states that her maternal grandfather had gout.  Ms. Victoria Montalvo denies any other personal or family history of autoimmune diseases, rheumatologic diseases, gout, pseudogout.  Ms. Victoria Montalvo denies any personal or family history of neurologic diseases.  Ms. Victoria Montalvo states that her mother had some inflammatory bowel disease.  Ms. Montalvo denies any other personal or family history of inflammatory bowel diseases.        REVIEW OF SYSTEMS:  As detailed in the history of present illness        ALLERGIES:  Allergies   Allergen Reactions     Animal Dander      Dust Mites      Kiwi Itching     Itching and red all over     Pollen Extract      Pollen,dust, trees, grass, mold-hayfever symptoms         MEDICATIONS:  Current Outpatient Medications   Medication Sig Dispense Refill     albuterol (PROAIR HFA/PROVENTIL HFA/VENTOLIN HFA) 108 (90 Base) MCG/ACT inhaler USE 2 INHALATIONS EVERY 6 HOURS AS NEEDED FOR SHORTNESS OF BREATH, DYSPNEA, OR WHEEZING 25.5 g 1     albuterol (PROVENTIL) (2.5 MG/3ML) 0.083% neb solution Take 1 vial (2.5 mg) by  "nebulization every 6 hours as needed for shortness of breath / dyspnea 3 mL 11     amLODIPine (NORVASC) 5 MG tablet Take 1 tablet (5 mg) by mouth every evening 90 tablet 3     aspirin 81 MG tablet Take 81 mg by mouth daily.       BETA BLOCKER NOT PRESCRIBED (INTENTIONAL) Please choose reason not prescribed from choices below.       Boswellia-Glucosamine-Vit D (OSTEO BI-FLEX/5-LOXIN ADVANCED) TABS Take 1,500 mg by mouth 2 times daily        Calcium 1697-5428 MG-UNIT CHEW Take 1 tablet by mouth daily.       Cholecalciferol (VITAMIN D3 PO) Take 4,000 Units by mouth daily        diclofenac (VOLTAREN) 1 % topical gel Apply 2 g topically 4 times daily (Patient taking differently: Apply 2 g topically 4 times daily as needed) 100 g 11     fluticasone (FLONASE) 50 MCG/ACT nasal spray USE 1 TO 2 SPRAYS IN EACH NOSTRIL DAILY 48 g 11     fluticasone-salmeterol (ADVAIR) 250-50 MCG/ACT inhaler Inhale 1 puff into the lungs 2 times daily 60 each 11     furosemide (LASIX) 20 MG tablet Take 1 tablet (20 mg) by mouth daily       isosorbide mononitrate (IMDUR) 60 MG 24 hr tablet TAKE 1 TABLET EVERY MORNING HOLD IF SYSTOLIC BLOOD PRESSURE IS LESS THAN 85 90 tablet 3     LANsoprazole (PREVACID) 30 MG DR capsule Take 1 capsule (30 mg) by mouth daily 90 capsule 3     montelukast (SINGULAIR) 10 MG tablet Take 1 tablet (10 mg) by mouth At Bedtime 90 tablet 3     nitroGLYcerin (NITROSTAT) 0.4 MG sublingual tablet One tablet under the tongue every 5 minutes if needed for chest pain. May repeat every 5 minutes for a maximum of 3 doses in 15 minutes\" 25 tablet 3     omega-3 fatty acids 1200 MG capsule Take 1 capsule by mouth 2 times daily        Pyridoxine HCl (VITAMIN B6) 250 MG TABS Take by mouth daily       rosuvastatin (CRESTOR) 40 MG tablet Take 1 tablet (40 mg) by mouth daily 90 tablet 3     spironolactone (ALDACTONE) 25 MG tablet Take 0.5 tablets (12.5 mg) by mouth daily as needed (swelling) 90 tablet 3         PAST MEDICAL HISTORY:  Past " Medical History:   Diagnosis Date     Abnormal Papanicolaou smear of cervix and cervical HPV     colposcopy 1/97     Arthritis      Asthma     on preventative meds and has nebulizer     Chronic rhinitis      COPD (chronic obstructive pulmonary disease)      Coronary artery disease     4/4/17 SHERLEY--PDA     Diastolic CHF, chronic 02/22/2012     Gastro-oesophageal reflux disease      Hyperlipidemia 10/31/2011     KEN (obstructive sleep apnea)     CPAP     Personal history of colonic polyps     colonoscopy 9/97, 2002 (due in 2007)     Pulmonary HTN      Seasonal allergies      Subclavian artery stenosis, left 08/07/2013    S/p stent in 2013      Subclinical hyperthyroidism 10/17/2016     Uncomplicated asthma 1995         PAST SURGICAL HISTORY:  Past Surgical History:   Procedure Laterality Date     ARTHROPLASTY HIP Right 10/19/2015    Procedure: ARTHROPLASTY HIP;  Surgeon: Aron Torres MD;  Location:  OR     COLONOSCOPY  01/01/2008    Polyp removed, bx.     COLONOSCOPY  01/12/2010     COLONOSCOPY N/A 02/17/2015    Procedure: COLONOSCOPY;  Surgeon: Gato Quiles MD;  Location:  GI     CT CORONARY ANGIOGRAM  04/04/2017    SHERLEY to PDA     CV CORONARY ANGIOGRAM N/A 04/22/2020    Procedure: Coronary Angiogram;  Surgeon: Handy Denise MD;  Location: [facility] CARDIAC CATH LAB     CV CORONARY ANGIOGRAM N/A 07/01/2021    Procedure: Coronary Angiogram;  Surgeon: Handy Denise MD;  Location: [facility] CARDIAC CATH LAB     CV INSTANTANEOUS WAVE-FREE RATIO N/A 04/22/2020    Procedure: Instantaneous Wave-Free Ratio;  Surgeon: Handy Denise MD;  Location: [facility] CARDIAC CATH LAB     CV LEFT VENTRICULOGRAM N/A 04/22/2020    Procedure: Left Ventriculogram;  Surgeon: Handy Denise MD;  Location:  HEART CARDIAC CATH LAB     Liposuction of legs and stomach  1990     MAMMOPLASTY REDUCTION  02/1989     OPEN REDUCTION INTERNAL FIXATION ANKLE  04/1987     Removal of ankle hardware NOS  1990     SOFT  TISSUE SURGERY   &     Liposuction     VASCULAR SURGERY  2013    angiogram & left subclavical artery stent         FAMILY HISTORY:  Family History   Problem Relation Age of Onset     Alzheimer Disease Mother      Gastrointestinal Disease Mother      Cancer Father         throat and lungs, liver,     Heart Disease Father 57        CABG     Hyperlipidemia Father      Other Cancer Father         Throat, Lung and Liver     Hypertension Maternal Grandmother      Lipids Maternal Grandmother      Cancer Maternal Grandmother         stomach     Other Cancer Maternal Grandmother         Stomach Cancer     Cancer Paternal Grandmother         stomach     Other Cancer Paternal Grandmother         Stomach Cancer     Heart Disease Brother         suicidal     Pacemaker Brother      Other Cancer Brother         Prostrate     Depression Brother      Heart Disease Sister      Allergies Son      Asthma Son      Allergies Daughter      Asthma Daughter          SOCIAL HISTORY:  Social History     Socioeconomic History     Marital status:      Spouse name: Trenton     Number of children: 2     Years of education: 12     Highest education level: Not on file   Occupational History     Occupation: retired   Tobacco Use     Smoking status: Former     Packs/day: 1.00     Years: 30.00     Pack years: 30.00     Types: Cigarettes     Start date: 7/15/1973     Quit date: 1993     Years since quittin.1     Smokeless tobacco: Never   Substance and Sexual Activity     Alcohol use: Yes     Comment: Not very often     Drug use: No     Sexual activity: Not Currently     Partners: Male   Other Topics Concern      Service Not Asked     Blood Transfusions Not Asked     Caffeine Concern No     Occupational Exposure Not Asked     Hobby Hazards Not Asked     Sleep Concern Not Asked     Stress Concern Not Asked     Weight Concern Not Asked     Special Diet No     Comment: regular diet      Back Care Not Asked     Exercise No  "    Comment: shopping a lot so [place]      Bike Helmet Not Asked     Seat Belt Not Asked     Self-Exams Not Asked     Parent/sibling w/ CABG, MI or angioplasty before 65F 55M? Yes     Comment: Brother and Sister and Father   Social History Narrative     Not on file     Social Determinants of Health     Financial Resource Strain: Not on file   Food Insecurity: Not on file   Transportation Needs: Not on file   Physical Activity: Not on file   Stress: Not on file   Social Connections: Not on file   Intimate Partner Violence: Not on file   Housing Stability: Not on file         PHYSICAL EXAMINATION:  Vitals:    06/20/23 1359   BP: 129/70   Pulse: 80   SpO2: 96%   Weight: 74.8 kg (165 lb)   Height: 1.562 m (5' 1.5\")       GENERAL:  No acute distress.  Pleasant and cooperative.   PSYCH:  Normal mood and affect.  HEAD:  Normocephalic.  SPEECH:  No dysarthria.  EYES:  No scleral icterus.  Wearing bifocal glasses.  EARS:  Hearing is intact to spoken voice.  NOSE:  Midline, symmetric, no rhinorrhea.  LUNGS:  No respiratory distress.  No increased work of breathing.  VASCULAR/PULSES:  Dorsalis pedis:  Right 2+.  Left 2+.  LOWER EXTREMITIES: No clubbing or cyanosis bilaterally.  1+ pitting edema at the ankles bilaterally.    BALANCE AND GAIT: Patient stands and ambulates with mild forward trunk lean.  She is able to toe walk and heel walk without difficulty.  She has slight difficulty tandem walking.  Double leg squat is performed normally.    INSPECTION:  There is no erythema, ecchymosis, deformity, asymmetry, or abnormality of the low back.    LUMBOPELVIC PALPATION:  Lumbar Spinous Processes:  not tender.  Lumbar Paraspinals:  Right not tender.  Left not tender.  Posterior Superior Iliac Spine:  Right not tender.  Left not tender.  Iliac Crest:  Right not tender.  Left not tender.  Sacroiliac Joint:  Right not tender.  Left not tender.  Hip External Rotators:  Right not tender.  Left not tender.  Ischial Tuberosity:  Right " not tender.  Left not tender.  Greater Trochanter:  Right mild tenderness.  Left mild tenderness.  Coccyx:  not tender.    THORACOLUMBAR RANGE OF MOTION:  Forward flexion (85 ): Mildly to moderately reduced range of motion, does not cause pain, does not cause radiating pain.  Extension (30 ): Moderately to severely reduced range of motion, does not cause pain, does not cause radiating pain.  Lateral bending right (30 ): Moderately reduced range of motion, does not cause pain, does not cause radiating pain.  Lateral bending left (30 ):  Moderately reduced range of motion, does not cause pain, does not cause radiating pain.  Twisting right with extension:  Moderately to severely reduced range of motion, does not cause pain, does not cause radiating pain.  Twisting left with extension:  Moderately to severely reduced range of motion, does not cause pain, does not cause radiating pain.    SACROILIAC RANGE OF MOTION:  Standing flexion:  Right -.  Left -.  Seated flexion:  Right -.  Left -.  One-leg stork (Gillet):  Right -.  Left -.  Sacroiliac joint dysfunction:  Right -.  Left -.    HIP RANGE OF MOTION:  Flexion (110 ):  Right 105 .  Left 105 .  Extension (30 ):  Right 20 .  Left 20 .  External rotation (45 ):  Right 40 .  Left 40 .  Internal rotation (35 ):  Right 30 .  Left 30 .    KNEE RANGE OF MOTION:  Extension (0 ):  Right 0 .  Left 0 .  Flexion (135 ):  Right 130 .  Left 130 .    STRENGTH:  Hip flexion:  Right 5/5.  Left 5/5.  Hip abduction:  Right 5/5.  Left 5/5.  Hip external rotation:  Right 5/5.  Left 5/5.  Hip extension:  Right 5/5.  Left 5/5.  Knee extension:  Right 5/5.  Left 5/5.  Knee flexion:  Right 5/5.  Left 5/5.  Ankle dorsiflexion:  Right 5/5.  Left 5/5.  Great toe dorsiflexion:  Right 5/5.  Left 5/5.  Ankle plantar flexion:  Right 5/5.  Left 5/5.    SENSATION:  Intact to light touch along right L2, L3, L4, L5, S2.  Diminished to light touch along right S1.  Intact to light touch along left L2, L3,  L4, L5, S1, S2.    REFLEXES:  Patellar (L2-L4):  Right 1+.  Left 1+.  Medial hamstrings (L5-S1):  Right 1+.  Left 1+.  Achilles (S1-S2):  Right 1+.  Left 1+.  Babinski:  Right downgoing.  Left downgoing.  Forced ankle dorsiflexion (clonus):  Right 0 beats.  Left 0 beats.    LUMBOPELVIC SPECIAL TESTS:  Gapping / Distraction:  Right -.  Left -.  Log roll:  Right -.  Left -.  Straight-leg raise (Lasegue):  Right -.  Left -.  Crossed-straight leg raise:  Right -.  Left -.  Resisted straight leg raise:  Right -.  Left -.  FABERE (Neri):  Right -.  Left -.  FADIR:  Right -.  Left -.  Hip scour:  Right -.  Left -.  Lateral sacral compression:  Right -.  Left -.  Clamshell:  Right -.  Left -.  Femoral nerve stretch:  Right -.  Left -.  Crossed femoral nerve stretch:  Right -.  Left -.  Ely s:  Right +.  Left +.  Yeoman s:  Right -.  Left -.  Midline sacral thrust:  Right -.  Left -.  Noble compression:  Right -.  Left -.        IMAGING:  LUMBAR SPINE TWO TO THREE VIEWS April 18, 2023 1:41 PM      HISTORY: Acute left-sided low back pain with left-sided sciatica.     COMPARISON: None.    IMPRESSION: Five lumbar type vertebrae. Mild degenerative retrolisthesis of L1 upon L2 and L2 upon L3. Mild degenerative anterolisthesis of L3 upon L4 and L4 upon L5. Mild left convex curvature of the lumbar spine centered at L3. Alignment otherwise normal. Vertebral body heights normal. No fractures. Diffuse degenerative endplate spurring and facet arthropathy throughout the lumbar spine.        ASSESSMENT/PLAN:  Ms. Vcitoria Montalvo is an 83-year-old female.  She has lumbosacral spinal stenosis.  History is most consistent with resolving left lumbosacral radiculopathy.  Ms. 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  Discussed diagnoses, pathophysiologies, and treatment  options with Ms. Victoria Montalvo.  Discussed the options of doing nothing/living with it, physical therapy, chiropractic care, oral medications such as gabapentin and pregabalin, lumbosacral epidural corticosteroid injection, referral to neurosurgery.  Ms. Victoria Montalvo is feeling better at this time.  She does not want to pursue any treatment options.  She would like to follow-up in this clinic as needed.        Aron Pro MD

## 2023-06-20 NOTE — LETTER
"    6/20/2023         RE: Victoria Montalvo  [address] [place] [address]        Dear Colleague,    Thank you for referring your patient, Victoria Montalvo, to the [facility]. Please see a copy of my visit note below.    PHYSICAL MEDICINE & REHABILITATION / MEDICAL SPINE        Date:  Jun 20, 2023    Name:  Victoria Montalvo  YOB: 1940  MRN:  [id number]          REASON FOR CONSULTATION:  Acute left-sided low back pain with left-sided sciatica.        REFERRING PROVIDER:  ELHAM Soria        CHART REVIEW:  Reviewed 04/18/2023 note from ELHAM Soria (Internal medicine).  Ms. Victoria Montalvo had back pain for 1 year that worsened 3 to 4 weeks ago.  Pain was in the left gluteus.  Pain was described as burning.  Pain radiated to the left leg.  Pain had been worsening.  Ms. Victoria Eduardos pain began after she was pushing her  in a wheelchair.  She denied any prior history of back problems.  X-rays of the lumbar spine ordered.  Tramadol was prescribed.  Referral to medical spine was placed.        HISTORY OF PRESENT ILLNESS:  Ms. Victoria Montalvo is an 83-year-old female.  Ms. Montalvo states that she was providing a lot of care for her .  She had to do a lot of lifting of her .  In February or March 2023, she developed left low back/buttock pain.  Pain radiated into left posterior thigh and left posterior leg stopping at the level of the ankle.  These symptoms have mostly resolved.  Currently, there is only intermittent pain.  Pain currently is rated as 0/10.  Pain at its worst was rated as 10/10.  Pain now is described as a \"warm sensation.\"  Pain is worse with lifting and standing.  Pain has really been improving.    When Ms. Victoria Montalvo's left low back/buttock pain with radiation into the left lower extremity was more intense, it would keep her awake and wake her, but this pain is no longer affecting her sleep.  Ms. Victoria Montalvo " is no longer taking any pain medications.  Pain is not changed by coughing or sneezing.  Driving and pothole does not change her pain.  Ms. Victoria Montalvo has not tried acupuncture, chiropractor treatments, injections, massage, physical therapy.    Ms. Victoria Montalvo notes some generalized weakness.  She denies any give way episodes of her lower extremities.  She denies any tripping, stumbling, falling.  She is not using any assistive devices for ambulation.  Ms. Victoria Montalvo sometimes notes numbness, tingling in her left foot and left.  She denies any other numbness, tingling, pins-and-needles.  Ms. Montalvo denies any saddle anesthesia, bowel incontinence, bladder incontinence.  She does admit that her bilateral lower extremities become weak and tired with walking.  She notes improvement in these symptoms with leaning forward on a shopping cart.    Ms. Victoria Montalvo has had a right total hip arthroplasty.  She previously had a left ankle fracture that was surgically repaired.  Ms. Victoria Montalvo denies any other injuries or surgeries of her low back, pelvis, hips, thighs, knees, legs, ankles, feet, toes.    Ms. Victoria Montalvo states that her maternal grandfather had gout.  Ms. Victoria Montalvo denies any other personal or family history of autoimmune diseases, rheumatologic diseases, gout, pseudogout.  Ms. Victoria Montalvo denies any personal or family history of neurologic diseases.  Ms. Victoria Montalvo states that her mother had some inflammatory bowel disease.  Ms. Montalvo denies any other personal or family history of inflammatory bowel diseases.        REVIEW OF SYSTEMS:  As detailed in the history of present illness        ALLERGIES:  Allergies   Allergen Reactions    Animal Dander     Dust Mites     Kiwi Itching     Itching and red all over    Pollen Extract      Pollen,dust, trees, grass, mold-hayfever symptoms         MEDICATIONS:  Current Outpatient Medications   Medication Sig Dispense Refill    albuterol (PROAIR  "HFA/PROVENTIL HFA/VENTOLIN HFA) 108 (90 Base) MCG/ACT inhaler USE 2 INHALATIONS EVERY 6 HOURS AS NEEDED FOR SHORTNESS OF BREATH, DYSPNEA, OR WHEEZING 25.5 g 1    albuterol (PROVENTIL) (2.5 MG/3ML) 0.083% neb solution Take 1 vial (2.5 mg) by nebulization every 6 hours as needed for shortness of breath / dyspnea 3 mL 11    amLODIPine (NORVASC) 5 MG tablet Take 1 tablet (5 mg) by mouth every evening 90 tablet 3    aspirin 81 MG tablet Take 81 mg by mouth daily.      BETA BLOCKER NOT PRESCRIBED (INTENTIONAL) Please choose reason not prescribed from choices below.      Boswellia-Glucosamine-Vit D (OSTEO BI-FLEX/5-LOXIN ADVANCED) TABS Take 1,500 mg by mouth 2 times daily       Calcium 1254-8844 MG-UNIT CHEW Take 1 tablet by mouth daily.      Cholecalciferol (VITAMIN D3 PO) Take 4,000 Units by mouth daily       diclofenac (VOLTAREN) 1 % topical gel Apply 2 g topically 4 times daily (Patient taking differently: Apply 2 g topically 4 times daily as needed) 100 g 11    fluticasone (FLONASE) 50 MCG/ACT nasal spray USE 1 TO 2 SPRAYS IN EACH NOSTRIL DAILY 48 g 11    fluticasone-salmeterol (ADVAIR) 250-50 MCG/ACT inhaler Inhale 1 puff into the lungs 2 times daily 60 each 11    furosemide (LASIX) 20 MG tablet Take 1 tablet (20 mg) by mouth daily      isosorbide mononitrate (IMDUR) 60 MG 24 hr tablet TAKE 1 TABLET EVERY MORNING HOLD IF SYSTOLIC BLOOD PRESSURE IS LESS THAN 85 90 tablet 3    LANsoprazole (PREVACID) 30 MG DR capsule Take 1 capsule (30 mg) by mouth daily 90 capsule 3    montelukast (SINGULAIR) 10 MG tablet Take 1 tablet (10 mg) by mouth At Bedtime 90 tablet 3    nitroGLYcerin (NITROSTAT) 0.4 MG sublingual tablet One tablet under the tongue every 5 minutes if needed for chest pain. May repeat every 5 minutes for a maximum of 3 doses in 15 minutes\" 25 tablet 3    omega-3 fatty acids 1200 MG capsule Take 1 capsule by mouth 2 times daily       Pyridoxine HCl (VITAMIN B6) 250 MG TABS Take by mouth daily      rosuvastatin " (CRESTOR) 40 MG tablet Take 1 tablet (40 mg) by mouth daily 90 tablet 3    spironolactone (ALDACTONE) 25 MG tablet Take 0.5 tablets (12.5 mg) by mouth daily as needed (swelling) 90 tablet 3         PAST MEDICAL HISTORY:  Past Medical History:   Diagnosis Date    Abnormal Papanicolaou smear of cervix and cervical HPV     colposcopy 1/97    Arthritis     Asthma     on preventative meds and has nebulizer    Chronic rhinitis     COPD (chronic obstructive pulmonary disease)     Coronary artery disease     4/4/17 SHERLEY--PDA    Diastolic CHF, chronic 02/22/2012    Gastro-oesophageal reflux disease     Hyperlipidemia 10/31/2011    KEN (obstructive sleep apnea)     CPAP    Personal history of colonic polyps     colonoscopy 9/97, 2002 (due in 2007)    Pulmonary HTN     Seasonal allergies     Subclavian artery stenosis, left 08/07/2013    S/p stent in 2013     Subclinical hyperthyroidism 10/17/2016    Uncomplicated asthma 1995         PAST SURGICAL HISTORY:  Past Surgical History:   Procedure Laterality Date    ARTHROPLASTY HIP Right 10/19/2015    Procedure: ARTHROPLASTY HIP;  Surgeon: Aron Torres MD;  Location:  OR    COLONOSCOPY  01/01/2008    Polyp removed, bx.    COLONOSCOPY  01/12/2010    COLONOSCOPY N/A 02/17/2015    Procedure: COLONOSCOPY;  Surgeon: Gato Quiles MD;  Location:  GI    CT CORONARY ANGIOGRAM  04/04/2017    SHERLEY to PDA    CV CORONARY ANGIOGRAM N/A 04/22/2020    Procedure: Coronary Angiogram;  Surgeon: Handy Denise MD;  Location: [facility] CARDIAC CATH LAB    CV CORONARY ANGIOGRAM N/A 07/01/2021    Procedure: Coronary Angiogram;  Surgeon: Handy Denise MD;  Location: [facility] CARDIAC CATH LAB    CV INSTANTANEOUS WAVE-FREE RATIO N/A 04/22/2020    Procedure: Instantaneous Wave-Free Ratio;  Surgeon: Handy Denise MD;  Location: [facility] CARDIAC CATH LAB    CV LEFT VENTRICULOGRAM N/A 04/22/2020    Procedure: Left Ventriculogram;  Surgeon: Handy Denise MD;  Location:   HEART CARDIAC CATH LAB    Liposuction of legs and stomach  1990    MAMMOPLASTY REDUCTION  1989    OPEN REDUCTION INTERNAL FIXATION ANKLE  1987    Removal of ankle hardware NOS      SOFT TISSUE SURGERY   &     Liposuction    VASCULAR SURGERY  2013    angiogram & left subclavical artery stent         FAMILY HISTORY:  Family History   Problem Relation Age of Onset    Alzheimer Disease Mother     Gastrointestinal Disease Mother     Cancer Father         throat and lungs, liver,    Heart Disease Father 57        CABG    Hyperlipidemia Father     Other Cancer Father         Throat, Lung and Liver    Hypertension Maternal Grandmother     Lipids Maternal Grandmother     Cancer Maternal Grandmother         stomach    Other Cancer Maternal Grandmother         Stomach Cancer    Cancer Paternal Grandmother         stomach    Other Cancer Paternal Grandmother         Stomach Cancer    Heart Disease Brother         suicidal    Pacemaker Brother     Other Cancer Brother         Prostrate    Depression Brother     Heart Disease Sister     Allergies Son     Asthma Son     Allergies Daughter     Asthma Daughter          SOCIAL HISTORY:  Social History     Socioeconomic History    Marital status:      Spouse name: Trenton    Number of children: 2    Years of education: 12    Highest education level: Not on file   Occupational History    Occupation: retired   Tobacco Use    Smoking status: Former     Packs/day: 1.00     Years: 30.00     Pack years: 30.00     Types: Cigarettes     Start date: 7/15/1973     Quit date: 1993     Years since quittin.1    Smokeless tobacco: Never   Substance and Sexual Activity    Alcohol use: Yes     Comment: Not very often    Drug use: No    Sexual activity: Not Currently     Partners: Male   Other Topics Concern     Service Not Asked    Blood Transfusions Not Asked    Caffeine Concern No    Occupational Exposure Not Asked    Hobby Hazards Not Asked    Sleep  "Concern Not Asked    Stress Concern Not Asked    Weight Concern Not Asked    Special Diet No     Comment: regular diet     Back Care Not Asked    Exercise No     Comment: shopping a lot so [employer]     Bike Helmet Not Asked    Seat Belt Not Asked    Self-Exams Not Asked    Parent/sibling w/ CABG, MI or angioplasty before 65F 55M? Yes     Comment: Brother and Sister and Father   Social History Narrative    Not on file     Social Determinants of Health     Financial Resource Strain: Not on file   Food Insecurity: Not on file   Transportation Needs: Not on file   Physical Activity: Not on file   Stress: Not on file   Social Connections: Not on file   Intimate Partner Violence: Not on file   Housing Stability: Not on file         PHYSICAL EXAMINATION:  Vitals:    06/20/23 1359   BP: 129/70   Pulse: 80   SpO2: 96%   Weight: 74.8 kg (165 lb)   Height: 1.562 m (5' 1.5\")       GENERAL:  No acute distress.  Pleasant and cooperative.   PSYCH:  Normal mood and affect.  HEAD:  Normocephalic.  SPEECH:  No dysarthria.  EYES:  No scleral icterus.  Wearing bifocal glasses.  EARS:  Hearing is intact to spoken voice.  NOSE:  Midline, symmetric, no rhinorrhea.  LUNGS:  No respiratory distress.  No increased work of breathing.  VASCULAR/PULSES:  Dorsalis pedis:  Right 2+.  Left 2+.  LOWER EXTREMITIES: No clubbing or cyanosis bilaterally.  1+ pitting edema at the ankles bilaterally.    BALANCE AND GAIT: Patient stands and ambulates with mild forward trunk lean.  She is able to toe walk and heel walk without difficulty.  She has slight difficulty tandem walking.  Double leg squat is performed normally.    INSPECTION:  There is no erythema, ecchymosis, deformity, asymmetry, or abnormality of the low back.    LUMBOPELVIC PALPATION:  Lumbar Spinous Processes:  not tender.  Lumbar Paraspinals:  Right not tender.  Left not tender.  Posterior Superior Iliac Spine:  Right not tender.  Left not tender.  Iliac Crest:  Right not tender.  Left not " tender.  Sacroiliac Joint:  Right not tender.  Left not tender.  Hip External Rotators:  Right not tender.  Left not tender.  Ischial Tuberosity:  Right not tender.  Left not tender.  Greater Trochanter:  Right mild tenderness.  Left mild tenderness.  Coccyx:  not tender.    THORACOLUMBAR RANGE OF MOTION:  Forward flexion (85 ): Mildly to moderately reduced range of motion, does not cause pain, does not cause radiating pain.  Extension (30 ): Moderately to severely reduced range of motion, does not cause pain, does not cause radiating pain.  Lateral bending right (30 ): Moderately reduced range of motion, does not cause pain, does not cause radiating pain.  Lateral bending left (30 ):  Moderately reduced range of motion, does not cause pain, does not cause radiating pain.  Twisting right with extension:  Moderately to severely reduced range of motion, does not cause pain, does not cause radiating pain.  Twisting left with extension:  Moderately to severely reduced range of motion, does not cause pain, does not cause radiating pain.    SACROILIAC RANGE OF MOTION:  Standing flexion:  Right -.  Left -.  Seated flexion:  Right -.  Left -.  One-leg stork (Gillet):  Right -.  Left -.  Sacroiliac joint dysfunction:  Right -.  Left -.    HIP RANGE OF MOTION:  Flexion (110 ):  Right 105 .  Left 105 .  Extension (30 ):  Right 20 .  Left 20 .  External rotation (45 ):  Right 40 .  Left 40 .  Internal rotation (35 ):  Right 30 .  Left 30 .    KNEE RANGE OF MOTION:  Extension (0 ):  Right 0 .  Left 0 .  Flexion (135 ):  Right 130 .  Left 130 .    STRENGTH:  Hip flexion:  Right 5/5.  Left 5/5.  Hip abduction:  Right 5/5.  Left 5/5.  Hip external rotation:  Right 5/5.  Left 5/5.  Hip extension:  Right 5/5.  Left 5/5.  Knee extension:  Right 5/5.  Left 5/5.  Knee flexion:  Right 5/5.  Left 5/5.  Ankle dorsiflexion:  Right 5/5.  Left 5/5.  Great toe dorsiflexion:  Right 5/5.  Left 5/5.  Ankle plantar flexion:  Right 5/5.  Left  5/5.    SENSATION:  Intact to light touch along right L2, L3, L4, L5, S2.  Diminished to light touch along right S1.  Intact to light touch along left L2, L3, L4, L5, S1, S2.    REFLEXES:  Patellar (L2-L4):  Right 1+.  Left 1+.  Medial hamstrings (L5-S1):  Right 1+.  Left 1+.  Achilles (S1-S2):  Right 1+.  Left 1+.  Babinski:  Right downgoing.  Left downgoing.  Forced ankle dorsiflexion (clonus):  Right 0 beats.  Left 0 beats.    LUMBOPELVIC SPECIAL TESTS:  Gapping / Distraction:  Right -.  Left -.  Log roll:  Right -.  Left -.  Straight-leg raise (Lasegue):  Right -.  Left -.  Crossed-straight leg raise:  Right -.  Left -.  Resisted straight leg raise:  Right -.  Left -.  FABERE (Neri):  Right -.  Left -.  FADIR:  Right -.  Left -.  Hip scour:  Right -.  Left -.  Lateral sacral compression:  Right -.  Left -.  Clamshell:  Right -.  Left -.  Femoral nerve stretch:  Right -.  Left -.  Crossed femoral nerve stretch:  Right -.  Left -.  Ely s:  Right +.  Left +.  Yeoman s:  Right -.  Left -.  Midline sacral thrust:  Right -.  Left -.  Noble compression:  Right -.  Left -.        IMAGING:  LUMBAR SPINE TWO TO THREE VIEWS April 18, 2023 1:41 PM      HISTORY: Acute left-sided low back pain with left-sided sciatica.     COMPARISON: None.    IMPRESSION: Five lumbar type vertebrae. Mild degenerative retrolisthesis of L1 upon L2 and L2 upon L3. Mild degenerative anterolisthesis of L3 upon L4 and L4 upon L5. Mild left convex curvature of the lumbar spine centered at L3. Alignment otherwise normal. Vertebral body heights normal. No fractures. Diffuse degenerative endplate spurring and facet arthropathy throughout the lumbar spine.        ASSESSMENT/PLAN:  Ms. Victoria Montalvo is an 83-year-old female.  She has lumbosacral spinal stenosis.  History is most consistent with resolving left lumbosacral radiculopathy.  Ms. 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  Discussed diagnoses, pathophysiologies, and treatment options with Ms. Victoria Montalvo.  Discussed the options of doing nothing/living with it, physical therapy, chiropractic care, oral medications such as gabapentin and pregabalin, lumbosacral epidural corticosteroid injection, referral to neurosurgery.  Ms. Victoria Montalvo is feeling better at this time.  She does not want to pursue any treatment options.  She would like to follow-up in this clinic as needed.      Aron Pro MD

## 2023-06-22 ENCOUNTER — APPOINTMENT (OUTPATIENT)
Dept: CT IMAGING | Facility: CLINIC | Age: 83
End: 2023-06-22
Attending: EMERGENCY MEDICINE
Payer: COMMERCIAL

## 2023-06-22 ENCOUNTER — HOSPITAL ENCOUNTER (EMERGENCY)
Facility: CLINIC | Age: 83
Discharge: HOME | End: 2023-06-23
Attending: EMERGENCY MEDICINE | Admitting: EMERGENCY MEDICINE
Payer: COMMERCIAL

## 2023-06-22 DIAGNOSIS — T18.108A FOREIGN BODY IN ESOPHAGUS, INITIAL ENCOUNTER: ICD-10-CM

## 2023-06-22 DIAGNOSIS — K44.9 HIATAL HERNIA: ICD-10-CM

## 2023-06-22 LAB
ALBUMIN SERPL BCG-MCNC: 4.4 G/DL (ref 3.5–5.2)
ALP SERPL-CCNC: 60 U/L (ref 35–104)
ALT SERPL W P-5'-P-CCNC: 19 U/L (ref 0–50)
ANION GAP SERPL CALCULATED.3IONS-SCNC: 13 MMOL/L (ref 7–15)
AST SERPL W P-5'-P-CCNC: 26 U/L (ref 0–45)
BASOPHILS # BLD AUTO: 0 10E3/UL (ref 0–0.2)
BASOPHILS NFR BLD AUTO: 0 %
BILIRUB SERPL-MCNC: 0.4 MG/DL
BUN SERPL-MCNC: 13.2 MG/DL (ref 8–23)
CALCIUM SERPL-MCNC: 9.5 MG/DL (ref 8.8–10.2)
CHLORIDE SERPL-SCNC: 102 MMOL/L (ref 98–107)
CREAT SERPL-MCNC: 0.72 MG/DL (ref 0.51–0.95)
DEPRECATED HCO3 PLAS-SCNC: 26 MMOL/L (ref 22–29)
EOSINOPHIL # BLD AUTO: 0 10E3/UL (ref 0–0.7)
EOSINOPHIL NFR BLD AUTO: 0 %
ERYTHROCYTE [DISTWIDTH] IN BLOOD BY AUTOMATED COUNT: 13.9 % (ref 10–15)
GFR SERPL CREATININE-BSD FRML MDRD: 82 ML/MIN/1.73M2
GLUCOSE SERPL-MCNC: 116 MG/DL (ref 70–99)
HCT VFR BLD AUTO: 39.8 % (ref 35–47)
HGB BLD-MCNC: 13.3 G/DL (ref 11.7–15.7)
HOLD SPECIMEN: NORMAL
HOLD SPECIMEN: NORMAL
IMM GRANULOCYTES # BLD: 0 10E3/UL
IMM GRANULOCYTES NFR BLD: 0 %
LIPASE SERPL-CCNC: 19 U/L (ref 13–60)
LYMPHOCYTES # BLD AUTO: 1.5 10E3/UL (ref 0.8–5.3)
LYMPHOCYTES NFR BLD AUTO: 19 %
MCH RBC QN AUTO: 30 PG (ref 26.5–33)
MCHC RBC AUTO-ENTMCNC: 33.4 G/DL (ref 31.5–36.5)
MCV RBC AUTO: 90 FL (ref 78–100)
MONOCYTES # BLD AUTO: 0.4 10E3/UL (ref 0–1.3)
MONOCYTES NFR BLD AUTO: 5 %
NEUTROPHILS # BLD AUTO: 5.9 10E3/UL (ref 1.6–8.3)
NEUTROPHILS NFR BLD AUTO: 76 %
NRBC # BLD AUTO: 0 10E3/UL
NRBC BLD AUTO-RTO: 0 /100
PLATELET # BLD AUTO: 222 10E3/UL (ref 150–450)
POTASSIUM SERPL-SCNC: 3.8 MMOL/L (ref 3.4–5.3)
PROT SERPL-MCNC: 7.5 G/DL (ref 6.4–8.3)
RBC # BLD AUTO: 4.43 10E6/UL (ref 3.8–5.2)
SODIUM SERPL-SCNC: 141 MMOL/L (ref 136–145)
TROPONIN T SERPL HS-MCNC: 8 NG/L
WBC # BLD AUTO: 7.8 10E3/UL (ref 4–11)

## 2023-06-22 PROCEDURE — 80053 COMPREHEN METABOLIC PANEL: CPT | Performed by: EMERGENCY MEDICINE

## 2023-06-22 PROCEDURE — 83690 ASSAY OF LIPASE: CPT | Performed by: EMERGENCY MEDICINE

## 2023-06-22 PROCEDURE — 250N000013 HC RX MED GY IP 250 OP 250 PS 637: Performed by: EMERGENCY MEDICINE

## 2023-06-22 PROCEDURE — 96374 THER/PROPH/DIAG INJ IV PUSH: CPT | Mod: 59

## 2023-06-22 PROCEDURE — 99285 EMERGENCY DEPT VISIT HI MDM: CPT | Mod: 25

## 2023-06-22 PROCEDURE — 250N000011 HC RX IP 250 OP 636: Performed by: EMERGENCY MEDICINE

## 2023-06-22 PROCEDURE — 84484 ASSAY OF TROPONIN QUANT: CPT | Performed by: EMERGENCY MEDICINE

## 2023-06-22 PROCEDURE — 74177 CT ABD & PELVIS W/CONTRAST: CPT

## 2023-06-22 PROCEDURE — 36415 COLL VENOUS BLD VENIPUNCTURE: CPT | Performed by: EMERGENCY MEDICINE

## 2023-06-22 PROCEDURE — 85025 COMPLETE CBC W/AUTO DIFF WBC: CPT | Performed by: EMERGENCY MEDICINE

## 2023-06-22 RX ORDER — ONDANSETRON 2 MG/ML
4 INJECTION INTRAMUSCULAR; INTRAVENOUS
Status: DISCONTINUED | OUTPATIENT
Start: 2023-06-22 | End: 2023-06-23 | Stop reason: HOSPADM

## 2023-06-22 RX ORDER — IOPAMIDOL 755 MG/ML
500 INJECTION, SOLUTION INTRAVASCULAR ONCE
Status: COMPLETED | OUTPATIENT
Start: 2023-06-22 | End: 2023-06-22

## 2023-06-22 RX ORDER — ONDANSETRON 4 MG/1
4 TABLET, ORALLY DISINTEGRATING ORAL ONCE
Status: COMPLETED | OUTPATIENT
Start: 2023-06-22 | End: 2023-06-22

## 2023-06-22 RX ADMIN — ANTACID/ANTIFLATULENT 4 G: 380; 550; 10; 10 GRANULE, EFFERVESCENT ORAL at 23:20

## 2023-06-22 RX ADMIN — GLUCAGON 1 MG: 1 INJECTION, POWDER, LYOPHILIZED, FOR SOLUTION INTRAMUSCULAR; INTRAVENOUS at 23:18

## 2023-06-22 RX ADMIN — IOPAMIDOL 83 ML: 755 INJECTION, SOLUTION INTRAVENOUS at 21:05

## 2023-06-22 RX ADMIN — ONDANSETRON 4 MG: 4 TABLET, ORALLY DISINTEGRATING ORAL at 19:48

## 2023-06-22 ASSESSMENT — ACTIVITIES OF DAILY LIVING (ADL)
ADLS_ACUITY_SCORE: 35

## 2023-06-22 NOTE — ED TRIAGE NOTES
Pt arrives ambulatory with granddaughter for sudden onset severe vomiting and inability to keep anything down since lunch today. Pt has history of swallowing issues and has appointment in September. Pt visibly uncomfortable in triage and using restroom to dry heave.

## 2023-06-23 VITALS
DIASTOLIC BLOOD PRESSURE: 83 MMHG | HEART RATE: 91 BPM | OXYGEN SATURATION: 97 % | RESPIRATION RATE: 18 BRPM | SYSTOLIC BLOOD PRESSURE: 129 MMHG

## 2023-06-23 NOTE — ED PROVIDER NOTES
History     Chief Complaint:  Intractable vomiting       HPI   Victoria BALTA Montalvo is a 83 year old female who presents with intractable vomiting.  She has a history of CAD status post stents with negative Lexiscan earlier this month.  She developed intractable vomiting since lunch today associated with intermittent epigastric and chest pain during the episodes of vomiting only.  Since onset, she has not been able to keep anything down and continues to dry heave and retch despite not ingesting anything in the last few hours.  She denies any esophageal foreign body sensation but has had some difficulty swallowing in the past.  She has a GI follow-up appointment at the [facility] in the next few months.      Review of External Notes: I reviewed her June 7, 2023 hospitalization    ROS:  Review of Systems    Allergies:  Animal Dander  Dust Mites  Kiwi  Pollen Extract     Medications:    albuterol (PROAIR HFA/PROVENTIL HFA/VENTOLIN HFA) 108 (90 Base) MCG/ACT inhaler  albuterol (PROVENTIL) (2.5 MG/3ML) 0.083% neb solution  amLODIPine (NORVASC) 5 MG tablet  aspirin 81 MG tablet  BETA BLOCKER NOT PRESCRIBED (INTENTIONAL)  Boswellia-Glucosamine-Vit D (OSTEO BI-FLEX/5-LOXIN ADVANCED) TABS  Calcium 7086-1707 MG-UNIT CHEW  Cholecalciferol (VITAMIN D3 PO)  diclofenac (VOLTAREN) 1 % topical gel  fluticasone (FLONASE) 50 MCG/ACT nasal spray  fluticasone-salmeterol (ADVAIR) 250-50 MCG/ACT inhaler  furosemide (LASIX) 20 MG tablet  isosorbide mononitrate (IMDUR) 60 MG 24 hr tablet  LANsoprazole (PREVACID) 30 MG DR capsule  montelukast (SINGULAIR) 10 MG tablet  nitroGLYcerin (NITROSTAT) 0.4 MG sublingual tablet  omega-3 fatty acids 1200 MG capsule  Pyridoxine HCl (VITAMIN B6) 250 MG TABS  rosuvastatin (CRESTOR) 40 MG tablet  spironolactone (ALDACTONE) 25 MG tablet        Past History:    Past Medical History:   Diagnosis Date     Abnormal Papanicolaou smear of cervix and cervical HPV      Arthritis      Asthma      Chronic rhinitis       COPD (chronic obstructive pulmonary disease)      Coronary artery disease      Diastolic CHF, chronic 02/22/2012     Gastro-oesophageal reflux disease      Hyperlipidemia 10/31/2011     KEN (obstructive sleep apnea)      Personal history of colonic polyps      Pulmonary HTN      Seasonal allergies      Subclavian artery stenosis, left 08/07/2013     Subclinical hyperthyroidism 10/17/2016     Uncomplicated asthma 1995         Physical Exam     Patient Vitals for the past 24 hrs:   BP Temp Temp src Pulse Resp SpO2   05/12/23 0247 113/73 97.8  F (36.6  C) Temporal 95 20 98 %        Physical Exam  Constitutional: Alert, attentive  HENT:    Nose: Nose normal.    Mouth/Throat: Oropharynx is clear, mucous membranes are moist   Eyes: EOM are normal.   CV: regular rate and rhythm; no murmurs, rubs or gallups  Chest: Effort normal and breath sounds normal.   GI:  There is no tenderness. No distension. Normal bowel sounds  MSK: Normal range of motion.   Neurological: Alert, attentive  Skin: Skin is warm and dry.      Emergency Department Course       Results for orders placed or performed during the hospital encounter of 06/22/23   CT Chest/Abdomen/Pelvis w Contrast     Status: None    Narrative    EXAM: CT CHEST/ABDOMEN/PELVIS W CONTRAST  LOCATION: [facility]  DATE: 6/22/2023    INDICATION: chest and epigastric pain, intractible vomiting    COMPARISON: None.    TECHNIQUE: CT scan of the chest, abdomen, and pelvis was performed following injection of IV contrast. Multiplanar reformats were obtained. Dose reduction techniques were used. CONTRAST: 83 mL Isovue 370    FINDINGS:   LUNGS AND PLEURA: No pulmonary mass, consolidation, or suspicious pulmonary nodule. No pleural effusion or pneumothorax.    MEDIASTINUM/AXILLAE: No abnormally enlarged lymph nodes. Great vessels normal in caliber. There is a patent stent in the proximal left subclavian artery. No abnormally enlarged lymph nodes. Moderate sized  hiatal hernia. There is marked fluid distention   of the esophagus.  No esophageal wall thickening. Possible impacted ingested material measuring approximately 3 cm, best seen on coronal 56.    CORONARY ARTERY CALCIFICATION: Moderate.    HEPATOBILIARY: Normal.    PANCREAS: Normal.    SPLEEN: Normal.    ADRENAL GLANDS: Normal.    KIDNEYS/BLADDER: Normal.    BOWEL: Severe distal colonic diverticulosis. No evidence for acute diverticulitis. No free air, free fluid, or inflammatory change. Normal caliber appendix.    LYMPH NODES: Normal.    VASCULATURE: Diffuse atherosclerotic calcification of the abdominal aorta, but no aneurysm.    PELVIC ORGANS: Normal.    MUSCULOSKELETAL: Prior right hip replacement. Osteopenia. Severe degenerative change in the lower lumbar spine. Severe degenerative disc disease in the lower thoracic spine.      Impression    IMPRESSION:  1.  Moderate hiatal hernia with marked fluid distention of the esophagus. Possible obstruction secondary to impacted ingested material, distal esophageal stricture also considered. Consider follow-up GI consultation.  2.  Severe distal colonic diverticulosis.   Comprehensive metabolic panel     Status: Abnormal   Result Value Ref Range    Sodium 141 136 - 145 mmol/L    Potassium 3.8 3.4 - 5.3 mmol/L    Chloride 102 98 - 107 mmol/L    Carbon Dioxide (CO2) 26 22 - 29 mmol/L    Anion Gap 13 7 - 15 mmol/L    Urea Nitrogen 13.2 8.0 - 23.0 mg/dL    Creatinine 0.72 0.51 - 0.95 mg/dL    Calcium 9.5 8.8 - 10.2 mg/dL    Glucose 116 (H) 70 - 99 mg/dL    Alkaline Phosphatase 60 35 - 104 U/L    AST 26 0 - 45 U/L    ALT 19 0 - 50 U/L    Protein Total 7.5 6.4 - 8.3 g/dL    Albumin 4.4 3.5 - 5.2 g/dL    Bilirubin Total 0.4 <=1.2 mg/dL    GFR Estimate 82 >60 mL/min/1.73m2   Lipase     Status: Normal   Result Value Ref Range    Lipase 19 13 - 60 U/L   Troponin T, High Sensitivity (now)     Status: Normal   Result Value Ref Range    Troponin T, High Sensitivity 8 <=14 ng/L    [facility] Draw     Status: None    Narrative    The following orders were created for panel order [facility] Draw.  Procedure                               Abnormality         Status                     ---------                               -----------         ------                     Extra Blue Top Tube[[id number]]                              Final result               Extra Red Top Tube[[id number]]                               Final result                 Please view results for these tests on the individual orders.   CBC with platelets and differential     Status: None   Result Value Ref Range    WBC Count 7.8 4.0 - 11.0 10e3/uL    RBC Count 4.43 3.80 - 5.20 10e6/uL    Hemoglobin 13.3 11.7 - 15.7 g/dL    Hematocrit 39.8 35.0 - 47.0 %    MCV 90 78 - 100 fL    MCH 30.0 26.5 - 33.0 pg    MCHC 33.4 31.5 - 36.5 g/dL    RDW 13.9 10.0 - 15.0 %    Platelet Count 222 150 - 450 10e3/uL    % Neutrophils 76 %    % Lymphocytes 19 %    % Monocytes 5 %    % Eosinophils 0 %    % Basophils 0 %    % Immature Granulocytes 0 %    NRBCs per 100 WBC 0 <1 /100    Absolute Neutrophils 5.9 1.6 - 8.3 10e3/uL    Absolute Lymphocytes 1.5 0.8 - 5.3 10e3/uL    Absolute Monocytes 0.4 0.0 - 1.3 10e3/uL    Absolute Eosinophils 0.0 0.0 - 0.7 10e3/uL    Absolute Basophils 0.0 0.0 - 0.2 10e3/uL    Absolute Immature Granulocytes 0.0 <=0.4 10e3/uL    Absolute NRBCs 0.0 10e3/uL   Extra Blue Top Tube     Status: None   Result Value Ref Range    Hold Specimen JIC    Extra Red Top Tube     Status: None   Result Value Ref Range    Hold Specimen JIC    CBC + differential     Status: None    Narrative    The following orders were created for panel order CBC + differential.  Procedure                               Abnormality         Status                     ---------                               -----------         ------                     CBC with platelets and d...[[id number]]                      Final result                 Please view results for  these tests on the individual orders.       Emergency Department Course & Assessments:             Interventions:  Medications   ondansetron (ZOFRAN) injection 4 mg (has no administration in time range)   ondansetron (ZOFRAN ODT) ODT tab 4 mg (4 mg Oral $Given 6/22/23 1948)   sodium chloride (PF) 0.9% PF flush 100 mL (61 mLs Intravenous $Given 6/22/23 2105)   iopamidol (ISOVUE-370) solution 500 mL (83 mLs Intravenous $Given 6/22/23 2105)   glucagon injection 1 mg (1 mg Intravenous $Given 6/22/23 2318)   sod bicarbonate-citric acid-simethicone (EZ GAS) 2.21-1.53-0.04 g packet 4 g (4 g Oral $Given 6/22/23 2320)         Independent Interpretation (X-rays, CTs, rhythm strip):  CT chest abdomen pelvis shows a significantly dilated esophagus       Disposition:  The patient was discharged to home.     Impression & Plan      Medical Decision Making:  This is a 83-year-old female presents for evaluation of intractable vomiting.  Given epigastric and chest pain as well as advanced age, CT imaging was performed.  Of note, this suggests either stenosis or esophageal food impaction leading to her symptoms.  After hours of symptoms, her negative troponin essentially rules out AMI.  There is no significant abnormality with respect to hematologic or metabolic parameters.  CT chest abdomen pelvis shows no evidence of esophageal rupture.  Hiatal hernia, stenosis/stricture or food impaction is noted.  The patient has complete resolution of symptoms after supportive cares for possible food impaction, and on final recheck is tolerating liquids without difficulty.  After medications, she threw up a tuna sandwich from yesterday evening which may have been the culprit.  Discussed the unclear nature of her symptoms, that this could represent esophageal stricture/stenosis, eosinophilic esophagitis, other combination of pathologies.  As she is asymptomatic and tolerating liquids, she would like to be discharged, and believe this is  reasonable.  Plan extended GI follow-up for further diagnostic evaluation and continue on home omeprazole.  Clear liquid transitioning to soft diet as able.  Return precautions for recurrent pain or vomiting, or any other concerns.        Diagnosis:  Visit Diagnosis, Associated Orders, and Comments     ICD-10-CM    1. Foreign body in esophagus, initial encounter  T18.108A       2. Hiatal hernia  K44.9               Discharge Medications:  New Prescriptions    No medications on file           Jordon Edwards MD  06/23/23 0141

## 2023-06-23 NOTE — ED NOTES
Bed: [facility]  Expected date:   Expected time:   Means of arrival:   Comments:  3 ED nurses @ 2300, 2 ED RNS @0100, 1 Boarding RN tonight

## 2023-06-23 NOTE — ED NOTES
PIT/Triage Evaluation    Patient with hx of CAD s/p stents, negative lexiscan this month, who presented with intractible vomiting since lunch, associated with intermittent epigastric and chest pain during episodes of vomiting. No sick contact, back pain, fever, UTI symptoms or other concerns.    Exam is notable for:    No data found.    +Epigastric tenderness    Appropriate interventions for symptom management were initiated if applicable.  Appropriate diagnostic tests were initiated if indicated.    Important information for subsequent clinician:  EKG, troponin  CT CAP  labs    I briefly evaluated the patient and developed an initial plan of care. I discussed this plan and explained that this brief interaction does not constitute a full evaluation. Patient/family understands that they should wait to be fully evaluated and discuss any test results with another clinician prior to leaving the hospital.       Jordon Edwards MD  06/22/23 2707

## 2023-07-05 DIAGNOSIS — J45.40 MODERATE PERSISTENT ASTHMA, UNCOMPLICATED: ICD-10-CM

## 2023-07-06 RX ORDER — ALBUTEROL SULFATE 90 UG/1
AEROSOL, METERED RESPIRATORY (INHALATION)
Qty: 25.5 G | Refills: 0 | Status: SHIPPED | OUTPATIENT
Start: 2023-07-06 | End: 2023-10-05

## 2023-07-06 NOTE — TELEPHONE ENCOUNTER
Pt has an appt in 1 week  Prescription approved per [facility] Refill Protocol.  Girish David RN on 7/6/2023 at 10:45 AM

## 2023-07-12 ASSESSMENT — ASTHMA QUESTIONNAIRES: ACT_TOTALSCORE: 23

## 2023-07-13 ENCOUNTER — OFFICE VISIT (OUTPATIENT)
Dept: CARDIOLOGY | Facility: CLINIC | Age: 83
End: 2023-07-13
Payer: COMMERCIAL

## 2023-07-13 VITALS
HEART RATE: 83 BPM | WEIGHT: 164 LBS | DIASTOLIC BLOOD PRESSURE: 60 MMHG | OXYGEN SATURATION: 94 % | HEIGHT: 61 IN | SYSTOLIC BLOOD PRESSURE: 118 MMHG | BODY MASS INDEX: 30.96 KG/M2

## 2023-07-13 DIAGNOSIS — I50.32 DIASTOLIC CHF, CHRONIC (H): ICD-10-CM

## 2023-07-13 DIAGNOSIS — I25.10 CORONARY ARTERY DISEASE INVOLVING NATIVE CORONARY ARTERY OF NATIVE HEART WITHOUT ANGINA PECTORIS: Primary | ICD-10-CM

## 2023-07-13 PROCEDURE — 99214 OFFICE O/P EST MOD 30 MIN: CPT | Performed by: NURSE PRACTITIONER

## 2023-07-13 RX ORDER — SPIRONOLACTONE 25 MG/1
12.5 TABLET ORAL EVERY MORNING
Qty: 90 TABLET | Refills: 3 | COMMUNITY
Start: 2023-07-13 | End: 2024-05-08

## 2023-07-13 NOTE — PATIENT INSTRUCTIONS
Thanks for participating in a office visit with the [facility] clinic today.    Doing well on a cardiac standpoint - Recent stress test - negative.   Stable echocardiogram   Blood pressure well controlled.   Continue current medical therapy.   Follow up in 1 year       Please call my nurse at  [phone] with any questions or concerns.    Scheduling phone number: [phone]  Reminder: Please bring in all current medications, over the counter supplements and vitamin bottles to your next appointment.

## 2023-07-13 NOTE — LETTER
7/13/2023    Elsy Bah MD  [address] [place] [address]    RE: Victoria Montalvo       Dear Colleague,     I had the pleasure of seeing Victoria Montalvo in the [facility].  Cardiology Clinic Progress Note  Victoria Montalvo MRN# [id number]   YOB: 1940 Age: 83 year old     Reason For Visit:  Annual Follow up   Primary Cardiologist:   Dr. Yepez           History of Presenting Illness:      Victoria Montalvo is a pleasant 83 year old patient who carries a past medical history significant for coronary artery disease status post PCI to the left PDA in 2017, recurrent chest pain in 2021 followed by coronary angiogram showing severe spasm of the proximal RCA relieved with IC nitroglycerin.  Stent was widely patent.  Subclavian stenosis with intervention in 2013, hypertension, pulmonary hypertension, hyperlipidemia, diastolic heart failure, venous insufficiency, obstructive sleep apnea intolerant to CPAP, and COPD.    She returns to the office today for annual follow up.  Victoria has had a rough few months after the recent passing of her  in April.  She was seen in the emergency room in early June with chest pain.  Her cardiac work-up consisted of negative troponin, echocardiogram that showed a normal ejection fraction estimated at 60 to 65%, mild mid lateral wall hypokinesis, normal RV function.  A nuclear stress test was negative for inducible myocardial ischemia or infarction.  LV was noted to be hyperdynamic at 75%. Medications remained unchanged.     She returned to the ER on 6/22/2023 with intractable vomiting, epigastric and chest pain.  Per ER work-up consisted of a CT that showed either stenosis or esophageal food impaction leading to her symptoms.  She was ruled out for ACS.  She has been recommended for follow-up with GI for possible esophageal dilation.    Today, she is feeling well on a cardiac standpoint, denies chest pain, shortness of breath, palpitations, PND,  orthopnea, presyncope, syncope, and chronic stable lower extremity edema.  She admits to being under a great deal of personal stress following her 's death and arguments with her daughter. She finds support from her neighbors.      Blood pressure is well controlled at 118/60  BMP and CBC were unremarkable  Lipid panel showed a total cholesterol of 155, HDL 78, LDL 60, triglycerides 84  BMI 30.99, 164 pounds    Remains compliant with all medications.                   Assessment and Plan:       1.  Coronary artery disease -remote PCI to the LPDA in 2017, severe coronary spasm to the proximal RCA per angiogram in 2021, Stent was widely patent.  Recent nuclear stress test was negative for inducible myocardial ischemia and infarction.  Continue current medical therapy.  Encourage exercise, weight loss and heart healthy diet.    2.  Diastolic heart failure - echocardiogram showed a normal ejection fraction estimated at 60 to 65%, mild mid lateral wall hypokinesis, normal RV function.  Euvolemic upon exam.  Weight stable.  Continue furosemide, isosorbide, and spironolactone.     3.  Obstructive sleep apnea -intolerant to CPAP  4.  COPD              Thank you for allowing me to participate in this delightful patient's care. I have recommended she follow up in 1 year with Dr. Yepez or sooner if needed.       Marleni Raza, APRN CNP         Review of Systems:     Review of Systems:  Skin:        Eyes:       ENT:       Respiratory:  Positive for sleep apnea  Cardiovascular:    Positive for;edema  Gastroenterology:      Genitourinary:       Musculoskeletal:       Neurologic:       Psychiatric:       Heme/Lymph/Imm:       Endocrine:                   Physical Exam:     GEN:  In general, this is a overweight female in no acute distress.  HEENT:  Pupils equal, round. Sclerae nonicteric. Clear oropharynx. Mucous membranes moist.  NECK:  No JVD   C/V:  Regular rate and rhythm, no murmur, rub or gallop. No S3 or RV heave.    RESP: Respirations are unlabored. No use of accessory muscles. Clear to auscultation bilaterally without wheezing, rales, or rhonchi.  GI: Abdomen soft, nontender, nondistended. No HSM appreciated.   EXTREM: 1+ nonpitting LE edema.   NEURO: Alert and oriented, cooperative.  No obvious focal deficits.   PSYCH: Normal affect.  SKIN: Warm and dry. No rashes or petechiae appreciated.          Past Medical History:     Past Medical History:   Diagnosis Date    Abnormal Papanicolaou smear of cervix and cervical HPV     colposcopy 1/97    Arthritis     Asthma     on preventative meds and has nebulizer    Chronic rhinitis     COPD (chronic obstructive pulmonary disease)     Coronary artery disease     4/4/17 SHERLEY--PDA    Diastolic CHF, chronic 02/22/2012    Gastro-oesophageal reflux disease     Hyperlipidemia 10/31/2011    KEN (obstructive sleep apnea)     CPAP    Personal history of colonic polyps     colonoscopy 9/97, 2002 (due in 2007)    Pulmonary HTN     Seasonal allergies     Subclavian artery stenosis, left 08/07/2013    S/p stent in 2013     Subclinical hyperthyroidism 10/17/2016    Uncomplicated asthma 1995              Past Surgical History:     Past Surgical History:   Procedure Laterality Date    ARTHROPLASTY HIP Right 10/19/2015    Procedure: ARTHROPLASTY HIP;  Surgeon: Aron Torres MD;  Location:  OR    COLONOSCOPY  01/01/2008    Polyp removed, bx.    COLONOSCOPY  01/12/2010    COLONOSCOPY N/A 02/17/2015    Procedure: COLONOSCOPY;  Surgeon: Gato Quiles MD;  Location:  GI    CT CORONARY ANGIOGRAM  04/04/2017    SHERLEY to PDA    CV CORONARY ANGIOGRAM N/A 04/22/2020    Procedure: Coronary Angiogram;  Surgeon: Handy Denise MD;  Location:  HEART CARDIAC CATH LAB    CV CORONARY ANGIOGRAM N/A 07/01/2021    Procedure: Coronary Angiogram;  Surgeon: Handy Denise MD;  Location:  HEART CARDIAC CATH LAB    CV INSTANTANEOUS WAVE-FREE RATIO N/A 04/22/2020    Procedure: Instantaneous  Wave-Free Ratio;  Surgeon: Handy Denise MD;  Location:  HEART CARDIAC CATH LAB    CV LEFT VENTRICULOGRAM N/A 04/22/2020    Procedure: Left Ventriculogram;  Surgeon: Handy Denise MD;  Location:  HEART CARDIAC CATH LAB    Liposuction of legs and stomach  1990    MAMMOPLASTY REDUCTION  02/1989    OPEN REDUCTION INTERNAL FIXATION ANKLE  04/1987    Removal of ankle hardware NOS  1990    SOFT TISSUE SURGERY  1989 & 1990    Liposuction    VASCULAR SURGERY  2013    angiogram & left subclavical artery stent              Allergies:   Animal dander, Dust mites, Kiwi, and Pollen extract       Data:   All laboratory data reviewed:    LAST CHOLESTEROL:  Lab Results   Component Value Date    CHOL 155 11/25/2022    CHOL 167 07/17/2020     Lab Results   Component Value Date    HDL 78 11/25/2022    HDL 72 07/17/2020     Lab Results   Component Value Date    LDL 60 11/25/2022    LDL 82 07/17/2020     Lab Results   Component Value Date    TRIG 84 11/25/2022    TRIG 65 07/17/2020     Lab Results   Component Value Date    CHOLHDLRATIO 2.5 08/20/2014       LAST BMP:  Lab Results   Component Value Date     06/22/2023     07/01/2021      Lab Results   Component Value Date    POTASSIUM 3.8 06/22/2023    POTASSIUM 4.1 04/08/2022    POTASSIUM 3.6 07/01/2021     Lab Results   Component Value Date    CHLORIDE 102 06/22/2023    CHLORIDE 108 04/08/2022    CHLORIDE 111 07/01/2021     Lab Results   Component Value Date    PHILLIP 9.5 06/22/2023    PHILLIP 8.3 07/01/2021     Lab Results   Component Value Date    CO2 26 06/22/2023    CO2 28 04/08/2022    CO2 26 07/01/2021     Lab Results   Component Value Date    BUN 13.2 06/22/2023    BUN 18 04/08/2022    BUN 11 07/01/2021     Lab Results   Component Value Date    CR 0.72 06/22/2023    CR 0.76 07/01/2021     Lab Results   Component Value Date     06/22/2023     06/07/2023    GLC 83 04/08/2022    GLC 86 07/01/2021       LAST CBC:  Lab Results   Component Value Date     WBC 7.8 06/22/2023    WBC 5.4 07/01/2021     Lab Results   Component Value Date    RBC 4.43 06/22/2023    RBC 4.63 07/01/2021     Lab Results   Component Value Date    HGB 13.3 06/22/2023    HGB 13.3 07/01/2021     Lab Results   Component Value Date    HCT 39.8 06/22/2023    HCT 41.1 07/01/2021     Lab Results   Component Value Date    MCV 90 06/22/2023    MCV 89 07/01/2021     Lab Results   Component Value Date    MCH 30.0 06/22/2023    MCH 28.7 07/01/2021     Lab Results   Component Value Date    MCHC 33.4 06/22/2023    MCHC 32.4 07/01/2021     Lab Results   Component Value Date    RDW 13.9 06/22/2023    RDW 13.9 07/01/2021     Lab Results   Component Value Date     06/22/2023     07/01/2021               Thank you for allowing me to participate in the care of your patient.      Sincerely,     NIMESH Marquis CNP     [facility]  cc:   No referring provider defined for this encounter.

## 2023-07-13 NOTE — PROGRESS NOTES
Cardiology Clinic Progress Note  Victoria Montalvo MRN# [id number]   YOB: 1940 Age: 83 year old     Reason For Visit:  Annual Follow up   Primary Cardiologist:   Dr. Yepez           History of Presenting Illness:      Victoria Montalvo is a pleasant 83 year old patient who carries a past medical history significant for coronary artery disease status post PCI to the left PDA in 2017, recurrent chest pain in 2021 followed by coronary angiogram showing severe spasm of the proximal RCA relieved with IC nitroglycerin.  Stent was widely patent.  Subclavian stenosis with intervention in 2013, hypertension, pulmonary hypertension, hyperlipidemia, diastolic heart failure, venous insufficiency, obstructive sleep apnea intolerant to CPAP, and COPD.    She returns to the office today for annual follow up.  Victoria has had a rough few months after the recent passing of her  in April.  She was seen in the emergency room in early June with chest pain.  Her cardiac work-up consisted of negative troponin, echocardiogram that showed a normal ejection fraction estimated at 60 to 65%, mild mid lateral wall hypokinesis, normal RV function.  A nuclear stress test was negative for inducible myocardial ischemia or infarction.  LV was noted to be hyperdynamic at 75%. Medications remained unchanged.     She returned to the ER on 6/22/2023 with intractable vomiting, epigastric and chest pain.  Per ER work-up consisted of a CT that showed either stenosis or esophageal food impaction leading to her symptoms.  She was ruled out for ACS.  She has been recommended for follow-up with GI for possible esophageal dilation.    Today, she is feeling well on a cardiac standpoint, denies chest pain, shortness of breath, palpitations, PND, orthopnea, presyncope, syncope, and chronic stable lower extremity edema.  She admits to being under a great deal of personal stress following her 's death and arguments with her daughter. She finds  support from her neighbors.      Blood pressure is well controlled at 118/60  BMP and CBC were unremarkable  Lipid panel showed a total cholesterol of 155, HDL 78, LDL 60, triglycerides 84  BMI 30.99, 164 pounds    Remains compliant with all medications.                   Assessment and Plan:       1.  Coronary artery disease -remote PCI to the LPDA in 2017, severe coronary spasm to the proximal RCA per angiogram in 2021, Stent was widely patent.  Recent nuclear stress test was negative for inducible myocardial ischemia and infarction.  Continue current medical therapy.  Encourage exercise, weight loss and heart healthy diet.    2.  Diastolic heart failure - echocardiogram showed a normal ejection fraction estimated at 60 to 65%, mild mid lateral wall hypokinesis, normal RV function.  Euvolemic upon exam.  Weight stable.  Continue furosemide, isosorbide, and spironolactone.     3.  Obstructive sleep apnea -intolerant to CPAP  4.  COPD              Thank you for allowing me to participate in this delightful patient's care. I have recommended she follow up in 1 year with Dr. Yepez or sooner if needed.       Marleni Raza, APRN CNP         Review of Systems:     Review of Systems:  Skin:        Eyes:       ENT:       Respiratory:  Positive for sleep apnea  Cardiovascular:    Positive for;edema  Gastroenterology:      Genitourinary:       Musculoskeletal:       Neurologic:       Psychiatric:       Heme/Lymph/Imm:       Endocrine:                   Physical Exam:     GEN:  In general, this is a overweight female in no acute distress.  HEENT:  Pupils equal, round. Sclerae nonicteric. Clear oropharynx. Mucous membranes moist.  NECK:  No JVD   C/V:  Regular rate and rhythm, no murmur, rub or gallop. No S3 or RV heave.   RESP: Respirations are unlabored. No use of accessory muscles. Clear to auscultation bilaterally without wheezing, rales, or rhonchi.  GI: Abdomen soft, nontender, nondistended. No HSM appreciated.   EXTREM:  1+ nonpitting LE edema.   NEURO: Alert and oriented, cooperative.  No obvious focal deficits.   PSYCH: Normal affect.  SKIN: Warm and dry. No rashes or petechiae appreciated.          Past Medical History:     Past Medical History:   Diagnosis Date     Abnormal Papanicolaou smear of cervix and cervical HPV     colposcopy 1/97     Arthritis      Asthma     on preventative meds and has nebulizer     Chronic rhinitis      COPD (chronic obstructive pulmonary disease)      Coronary artery disease     4/4/17 SHERLEY--PDA     Diastolic CHF, chronic 02/22/2012     Gastro-oesophageal reflux disease      Hyperlipidemia 10/31/2011     KEN (obstructive sleep apnea)     CPAP     Personal history of colonic polyps     colonoscopy 9/97, 2002 (due in 2007)     Pulmonary HTN      Seasonal allergies      Subclavian artery stenosis, left 08/07/2013    S/p stent in 2013      Subclinical hyperthyroidism 10/17/2016     Uncomplicated asthma 1995              Past Surgical History:     Past Surgical History:   Procedure Laterality Date     ARTHROPLASTY HIP Right 10/19/2015    Procedure: ARTHROPLASTY HIP;  Surgeon: Aron Torres MD;  Location: [facility] OR     COLONOSCOPY  01/01/2008    Polyp removed, bx.     COLONOSCOPY  01/12/2010     COLONOSCOPY N/A 02/17/2015    Procedure: COLONOSCOPY;  Surgeon: Gato Quiles MD;  Location:  GI     CT CORONARY ANGIOGRAM  04/04/2017    SHERLEY to PDA     CV CORONARY ANGIOGRAM N/A 04/22/2020    Procedure: Coronary Angiogram;  Surgeon: Handy Denise MD;  Location: [facility] CARDIAC CATH LAB     CV CORONARY ANGIOGRAM N/A 07/01/2021    Procedure: Coronary Angiogram;  Surgeon: Handy Denise MD;  Location: [facility] CARDIAC CATH LAB     CV INSTANTANEOUS WAVE-FREE RATIO N/A 04/22/2020    Procedure: Instantaneous Wave-Free Ratio;  Surgeon: Handy Denise MD;  Location:  HEART CARDIAC CATH LAB     CV LEFT VENTRICULOGRAM N/A 04/22/2020    Procedure: Left Ventriculogram;  Surgeon: Camelia  Handy KAY MD;  Location:  HEART CARDIAC CATH LAB     Liposuction of legs and stomach  1990     MAMMOPLASTY REDUCTION  02/1989     OPEN REDUCTION INTERNAL FIXATION ANKLE  04/1987     Removal of ankle hardware NOS  1990     SOFT TISSUE SURGERY  1989 & 1990    Liposuction     VASCULAR SURGERY  2013    angiogram & left subclavical artery stent              Allergies:   Animal dander, Dust mites, Kiwi, and Pollen extract       Data:   All laboratory data reviewed:    LAST CHOLESTEROL:  Lab Results   Component Value Date    CHOL 155 11/25/2022    CHOL 167 07/17/2020     Lab Results   Component Value Date    HDL 78 11/25/2022    HDL 72 07/17/2020     Lab Results   Component Value Date    LDL 60 11/25/2022    LDL 82 07/17/2020     Lab Results   Component Value Date    TRIG 84 11/25/2022    TRIG 65 07/17/2020     Lab Results   Component Value Date    CHOLHDLRATIO 2.5 08/20/2014       LAST BMP:  Lab Results   Component Value Date     06/22/2023     07/01/2021      Lab Results   Component Value Date    POTASSIUM 3.8 06/22/2023    POTASSIUM 4.1 04/08/2022    POTASSIUM 3.6 07/01/2021     Lab Results   Component Value Date    CHLORIDE 102 06/22/2023    CHLORIDE 108 04/08/2022    CHLORIDE 111 07/01/2021     Lab Results   Component Value Date    PHILLIP 9.5 06/22/2023    PHILLIP 8.3 07/01/2021     Lab Results   Component Value Date    CO2 26 06/22/2023    CO2 28 04/08/2022    CO2 26 07/01/2021     Lab Results   Component Value Date    BUN 13.2 06/22/2023    BUN 18 04/08/2022    BUN 11 07/01/2021     Lab Results   Component Value Date    CR 0.72 06/22/2023    CR 0.76 07/01/2021     Lab Results   Component Value Date     06/22/2023     06/07/2023    GLC 83 04/08/2022    GLC 86 07/01/2021       LAST CBC:  Lab Results   Component Value Date    WBC 7.8 06/22/2023    WBC 5.4 07/01/2021     Lab Results   Component Value Date    RBC 4.43 06/22/2023    RBC 4.63 07/01/2021     Lab Results   Component Value Date    HGB  13.3 06/22/2023    HGB 13.3 07/01/2021     Lab Results   Component Value Date    HCT 39.8 06/22/2023    HCT 41.1 07/01/2021     Lab Results   Component Value Date    MCV 90 06/22/2023    MCV 89 07/01/2021     Lab Results   Component Value Date    MCH 30.0 06/22/2023    MCH 28.7 07/01/2021     Lab Results   Component Value Date    MCHC 33.4 06/22/2023    MCHC 32.4 07/01/2021     Lab Results   Component Value Date    RDW 13.9 06/22/2023    RDW 13.9 07/01/2021     Lab Results   Component Value Date     06/22/2023     07/01/2021

## 2023-07-14 NOTE — TELEPHONE ENCOUNTER
REFERRAL INFORMATION:    Referring Provider:  Dr. Elsy Bah MD    Referring Clinic:  [facility]    Reason for Visit/Diagnosis: Abnormal swallow study     FUTURE VISIT INFORMATION:    Appointment Date: 7/25/23    Appointment Time: 10 AM     NOTES STATUS DETAILS   [facility]  [facility]; 8/5/21- OV with Elsy Bah MD   OFFICE NOTE from Other Specialist N/A    HOSPITAL DISCHARGE SUMMARY/  ED VISITS [facility]:  6/22/23-6/23/23- ED visit with Jordon Edwards MD  6/7/23-6/8/23- ED visit with Shar Salazar MD  9/17/22- ED visit with Nelson Ko MD  more in [facility]*   OPERATIVE REPORT N/A    MEDICATION LIST Internal         ENDOSCOPY  N/A    COLONOSCOPY N/A    ERCP N/A    EUS N/A    STOOL TESTING N/A    PERTINENT LABS Internal    PATHOLOGY REPORTS (RELATED) N/A    IMAGING (CT, MRI, EGD, MRCP, Small Bowel Follow Through/SBT, MR/CT Enterography) [facility]:  6/22/23- CT Chest/Abd/Pelvis  2/16/23- XR Video Swallow

## 2023-07-19 ENCOUNTER — OFFICE VISIT (OUTPATIENT)
Dept: PEDIATRICS | Facility: CLINIC | Age: 83
End: 2023-07-19
Payer: COMMERCIAL

## 2023-07-19 VITALS
DIASTOLIC BLOOD PRESSURE: 68 MMHG | HEIGHT: 61 IN | OXYGEN SATURATION: 96 % | SYSTOLIC BLOOD PRESSURE: 128 MMHG | TEMPERATURE: 97.2 F | WEIGHT: 165.6 LBS | HEART RATE: 79 BPM | BODY MASS INDEX: 31.26 KG/M2

## 2023-07-19 DIAGNOSIS — I20.1 VASOSPASTIC ANGINA (H): ICD-10-CM

## 2023-07-19 DIAGNOSIS — R13.10 DYSPHAGIA, UNSPECIFIED TYPE: Primary | ICD-10-CM

## 2023-07-19 DIAGNOSIS — I77.1 SUBCLAVIAN ARTERY STENOSIS, LEFT (H): ICD-10-CM

## 2023-07-19 PROCEDURE — 99214 OFFICE O/P EST MOD 30 MIN: CPT | Performed by: PEDIATRICS

## 2023-07-19 RX ORDER — LANSOPRAZOLE 30 MG/1
30 CAPSULE, DELAYED RELEASE ORAL DAILY
Qty: 90 CAPSULE | Refills: 3 | Status: SHIPPED | OUTPATIENT
Start: 2023-07-19 | End: 2024-07-25

## 2023-07-19 ASSESSMENT — PAIN SCALES - GENERAL: PAINLEVEL: NO PAIN (0)

## 2023-07-19 NOTE — PROGRESS NOTES
"  Assessment & Plan     (R13.10) Dysphagia, unspecified type  (primary encounter diagnosis)  Comment: patient with GI appt next week. Reviewed that she will likely be doing more tests, possible stretching of her esophagus. Patient prefers lansoprazole to omeprazole.   Plan: LANsoprazole (PREVACID) 30 MG DR capsule            (I77.1) Subclavian artery stenosis, left (H)  Comment: stable  Plan: per cardiology    (I20.1) Vasospastic angina (H)  Comment: stable   Plan: per cardiology           MED REC REQUIRED  Post Medication Reconciliation Status: discharge medications reconciled and changed, per note/orders  BMI:   Estimated body mass index is 31.29 kg/m  as calculated from the following:    Height as of this encounter: 1.549 m (5' 1\").    Weight as of this encounter: 75.1 kg (165 lb 9.6 oz).       Patient Instructions   - follow up with GI next week - they will likely order and upper GI test and swallow studies    - I will send lansoprazole - when this arrives you can switch this with your omeprazole.       Elsy Bah MD  [facility] SEAN Kessler is a 83 year old, presenting for the following health issues:  ER F/U (Patient is here for follow up visit from the ER she was seen on 6/7/23 and on 6/22/23.)        7/19/2023    10:12 AM   Additional Questions   Roomed by Gabbi   Accompanied by self         7/19/2023    10:12 AM   Patient Reported Additional Medications   Patient reports taking the following new medications none     HPI     ED/UC Followup:    Facility:  [facility]  Date of visit: 6/7/23 and 6/22/23  Reason for visit: on 6/7/23 chest pain 6/22/23 for GI issue  Current Status: feeling better    Reviewed Hospital admission 6/7-6/8  --chest pain -     #h/o vasospastic angina  -denies significant chest pain since the ED, feeling good and would like to discharge   -cardiac cath 2021- patient had RCA spasm  -stress echo 2022 did not show inducible " ischemia, nuclear stress trest recommended for further evaluation  -troponin neg x 2 in ED  -monitored on tele overnight and WNL  -Lexiscan stress test is negative for inducible myocardial ischemia or infarction  -discharge on PTA rosuvastatin, isosorbide, asa, nitro PRN        #diastolic CHF  -endorses increased edema and dyspnea on exertion.  -recent stress over the last 6 months with caring for his  and then his death in April 2023  -echocardiogram- left ventricle visual ejection fraction is 60-65%.Mid lateral wall mild  hypokinesia.The right ventricular systolic function is normal.Aortic valve sclerosis notedThe inferior vena cava was normal in size with preserved respiratoryvariability.  -continue PTA spironolactone, lasix        #Left LE pain and numbness  -states she has pain from low back radiating down her leg that started in April, she was told she has sciatic.  -reports overall this pain has been improving  -monitor for increased pain or decreased mobility     #asthma  -no c/o of wheezing   -continue PTA inhalers and montelukast     #GERD  -denies symptoms  -continue PTA PPI    Then reviewed EMERGENCY DEPARTMENT visit on 6/22/23:  --intracable vomiting with epigastric/chest pain, dysphagia - has GI follow up next week    Chest CT CAP:   Impression     IMPRESSION:  1.  Moderate hiatal hernia with marked fluid distention of the esophagus. Possible obstruction secondary to impacted ingested material, distal esophageal stricture also considered. Consider follow-up GI consultation.  2.  Severe distal colonic diverticulosis.     Today, feeling fine. Swallowing is ok right now, but she recounts that is was very hard the day of the EMERGENCY DEPARTMENT visit. Immediate vomiting after a teaspoon if rice soup.  Now eating less. Taking very tiny bites, can swallow after much chewing.  Yesterday tuna salad, chicken wild rice soup, cookie, cucumbers. No meat - this gets stuck. Liquids now going ok. She is doing  "BOOST shakes twice daily to keep up weight. It is low sugar. Patient states lowest weight is 159 a few days ago.  Son staying with her, so probably eating better right now.     Now sometimes getting constipated. Last colonoscopy  - has aged out of screenings. No bloody stools, brown.     When she was moving things before   she aggrivated left sacral spinal stenosis - seen by ortho. Offered lots of options, but patient would just like to monitor. Epic notes reviewed.     Wt Readings from Last 3 Encounters:   23 75.1 kg (165 lb 9.6 oz)   23 74.4 kg (164 lb)   23 74.8 kg (165 lb)               Review of Systems         Objective    /68 (BP Location: Right arm, Patient Position: Sitting, Cuff Size: Adult Regular)   Pulse 79   Temp 97.2  F (36.2  C) (Temporal)   Ht 1.549 m (5' 1\")   Wt 75.1 kg (165 lb 9.6 oz)   LMP  (LMP Unknown)   SpO2 96%   BMI 31.29 kg/m    Body mass index is 31.29 kg/m .  Physical Exam                       "

## 2023-07-19 NOTE — PATIENT INSTRUCTIONS
- follow up with GI next week - they will likely order and upper GI test and swallow studies    - I will send lansoprazole - when this arrives you can switch this with your omeprazole.

## 2023-07-21 ENCOUNTER — TELEPHONE (OUTPATIENT)
Dept: PEDIATRICS | Facility: CLINIC | Age: 83
End: 2023-07-21
Payer: COMMERCIAL

## 2023-07-25 ENCOUNTER — VIRTUAL VISIT (OUTPATIENT)
Dept: GASTROENTEROLOGY | Facility: CLINIC | Age: 83
End: 2023-07-25
Attending: PEDIATRICS
Payer: COMMERCIAL

## 2023-07-25 ENCOUNTER — PRE VISIT (OUTPATIENT)
Dept: GASTROENTEROLOGY | Facility: CLINIC | Age: 83
End: 2023-07-25

## 2023-07-25 VITALS — WEIGHT: 160 LBS | BODY MASS INDEX: 30.23 KG/M2

## 2023-07-25 DIAGNOSIS — R13.19 ESOPHAGEAL DYSPHAGIA: Primary | ICD-10-CM

## 2023-07-25 DIAGNOSIS — K21.9 GERD WITHOUT ESOPHAGITIS: ICD-10-CM

## 2023-07-25 DIAGNOSIS — K44.9 HIATAL HERNIA: ICD-10-CM

## 2023-07-25 PROCEDURE — 99204 OFFICE O/P NEW MOD 45 MIN: CPT | Mod: VID | Performed by: PHYSICIAN ASSISTANT

## 2023-07-25 ASSESSMENT — PAIN SCALES - GENERAL: PAINLEVEL: NO PAIN (0)

## 2023-07-25 NOTE — PATIENT INSTRUCTIONS
It was a pleasure taking care of you today.  I've included a brief summary of our discussion and care plan from today's visit below.  Please review this information with your primary care provider.  _______________________________________________________________________    My recommendations are summarized as follows:    - Upper endoscopy +/- dilation for evaluation of dysphagia/GERD and abnormal findings on CT scan   - Timed barium esophagram with tablet   - Continue Prevacid 30mg once daily this is best taken on an empty stomach at least 30-60 minutes prior to the first meal of the time. Okay to use TUMs as needed for breakthrough symptoms   - If needed MiraLAX 17g (1 capful) daily this can be increased to twice daily dosing if needed  - Please start taking daily fiber supplementation. Start with 1 - 2 teaspoons daily which can be slowly uptitrated to 2 - 3 tablespoons as needed    Fiber Recommendations:  -- Recommend starting supplementation with a powdered soluble fiber. When used on a daily basis, this can help regulate the consistency of your stools. Metamucil (psyllium) and Citrucel are preferred examples. You can start with 1-2 teaspoons per day, with goal to gradually increase to 1 tablespoon daily. You can increase up to 1 tablespoon three times daily if needed. It is important to stay well-hydrated with use of fiber supplementation and to make sure that the fiber powder is well mixed with water as directed on the label. You may experience some bloating with initiation of fiber, which will improve over the first few weeks. A good trial to evaluate the effect is 3-6 months. Of note, many of the fiber products contain artificial sweeteners, which can cause bloating, gas, and diarrhea in those who may be sensitive to artificial sweeteners. If this is the case, recommend trying Metamucil Premium Blend (with Stevia), Metamucil 4-in-1 without Added Sweeteners, or Bellway (sweetened with Monk fruit  extract).      To schedule endoscopic procedures you may call: [phone]  To schedule radiology (imaging) tests you may call: [phone]  To schedule an ENT appointment you may call: [phone]    Please call my nurse Kasey ([phone]), Kerwin ([phone]) with any questions or concerns.      Return to GI Clinic in 3 months to review your progress.    _______________________________________________________________________    Who do I call with any questions after my visit?  Please be in touch if there are any further questions that arise following today's visit.  There are multiple ways to contact your gastroenterology care team.      During business hours, you may reach a Gastroenterology nurse at [phone] and choose option 3.       To schedule or reschedule an appointment, please call [phone].     You can always send a secure message through Ter[employer]itz.  Terabitz messages are answered by your nurse or doctor typically within 24 hours.  Please allow extra time on weekends and holidays.      For urgent/emergent questions after business hours, you may reach the on-call GI Fellow by contacting the   at [phone].     How will I get the results of any tests ordered?    You will receive all of your results.  If you have signed up for [employer], any tests ordered at your visit will be available to you after your physician reviews them.  Typically this takes 1-2 weeks.  If there are urgent results that require a change in your care plan, your physician or nurse will call you to discuss the next steps.      What is Terabitz?  Terabitz is a secure way for you to access all of your healthcare records from the [facility].  It is a web based computer program, so you can sign on to it from any location.  It also allows you to send secure messages to your care team.  I recommend signing up for Terabitz access if you have not already done so and are comfortable with using a  computer.      How to I schedule a follow-up visit?  If you did not schedule a follow-up visit today, please call [phone] to schedule a follow-up office visit.      If you feel you received exceptional care and are interested in supporting the clinical and research goals of Suzette Crawford PA-C or the Division of Gastroenterology, Hepatology, and Nutrition please contact [email or web address] from the [facility] to discuss opportunities to donate.    Sincerely,    Suzette Crawford PA-C  Division of Gastroenterology, Hepatology, and Nutrition  [facility]

## 2023-07-25 NOTE — NURSING NOTE
Is the patient currently in the state of [place]? YES    Visit mode:VIDEO    If the visit is dropped, the patient can be reconnected by: TELEPHONE VISIT: Phone number: [phone]    Will anyone else be joining the visit? NO      How would you like to obtain your AVS? MyChart    Are changes needed to the allergy or medication list? NO    Reason for visit: Video Visit (Consult)

## 2023-07-25 NOTE — PROGRESS NOTES
"Victoria Montalvo is a 83 year old female who is being evaluated via a billable video visit.    Virtual Visit Details    Type of service:  Video Visit   Video Start Time: 10:02 AM  Video End Time:1030    Originating Location (pt. Location): Home    Distant Location (provider location):  Off-site  Platform used for Video Visit: [facility]     Gastroenterology Visit for: Victoria Montalvo 1940   MRN: [id number]     Reason for Visit:  chief complaint    Referred by: Niurka  / [facility]  / [place] [id number]  Patient Care Team:  Elsy Bah MD as PCP - General (Internal Medicine)  Aron Torres MD as MD (Orthopedics)  Elsy Bah MD as Assigned PCP  Yogi Yepez MD as Assigned Heart and Vascular Provider  Ashlee Soriano as Personal Advocate & Liaison (PAL)  Aron Pro MD as Assigned Neuroscience Provider    History of Present Illness:   Victoria Montalvo is a 83 year old female with significant past medical history pertinent for asthma, HTN, HLD, COPD, KEN, CHF, CAD, subclavian artery stenosis who is presenting as a new patient in consultation at the request of Dr. Bah with a chief complaint of GERD/dysphagia.    Prior to going to the ER took a small bite of \"rice hot-dish\" which then resulted in intractable emesis. Reports history of dysphagia for 10+ years. Avoids meats as this is a trigger for her. Denies dysphagia to semi solids, liquids and pills.     Since being seen in the ER has not had symptoms of dysphagia however made significant lifestyle modifications and dietary alterations.      Currently takes Prevacid 30mg with break through symptoms occurring multiple times per month which is relieved with the use of TUMs.     Associated with 20 lb weight loss in the past 12 months with stability over the past 3-6 months. Notes she does not eat when alone secondary to lack of desire to eat. Notes \"If I were to make myself a sandwich I would only eat half.\" Not " currently eating 3 meals per day. Will supplement with Boost/Ensure x2 daily.    Will have a BM daily that fluctuates between [place] Stool Scale Type 1 - 4.      Denies weight loss,odynophagia, dysphonia/hoarseness, chronic cough/throat clearing, abdominal pain, diarrhea, constipation (< 3 stools per week), nocturnal stooling, incontinence of feces, melena, hematochezia and BRBR.     No prior intraabdominal surgeries. No history of prior vaginal births/ section.     Very rare/seldom use of ETOH products one serving per month.     History of 15 - 20 pack year history.      No family history or GI related malignancy (esophageal, gastric, pancreatic, liver or colon).      past away 2023.     Please also see questionnaires below when reviewing subjective history.       Esophageal Questionnaire(s)    BEDQ Questionnaire      2023    10:33 AM 2023     2:31 PM   BEDQ Questionnaire: How Often Have You Had the Following?   Trouble eating solid food (meat, bread, vegetables) 5 5   Trouble eating soft foods (yogurt, jello, pudding) 3 4   Trouble swallowing liquids 0 5   Pain while swallowing 1 0   Coughing or choking while swallowing foods or liquids 1 1   Total Score: 10 15         2023    10:33 AM 2023     2:31 PM   BEDQ Questionnaire: Discomfort/Pain Ratings   Eating solid food (meat, bread, vegetables) 5 5   Eating soft foods (yogurt, jello, pudding) 0 0   Drinking liquid 0 0   Total Score: 5 5       Eckardt Questionnaire      2023    10:34 AM 2023     2:32 PM   Eckardt Questionnaire   Dysphagia 1 1   Regurgitation 1 1   Retrosternal Pain 0 1   Weight Loss (kg) 3 3   Total Score:  5 6       Promis 10 Questionnaire      2023    10:36 AM   PROMIS 10 FLOWSHEET DATA   In general, would you say your health is: 3    3   In general, would you say your quality of life is: 3    3   In general, how would you rate your physical health? 3    3   In general, how would you rate  your mental health, including your mood and your ability to think? 4    4   In general, how would you rate your satisfaction with your social activities and relationships? 4    4   In general, please rate how well you carry out your usual social activities and roles. (This includes activities at home, at work and in your community, and responsibilities as a parent, child, spouse, employee, friend, etc.) 4    4   To what extent are you able to carry out your everyday physical activities such as walking, climbing stairs, carrying groceries, or moving a chair? 4    4   In the past 7 days, how often have you been bothered by emotional problems such as feeling anxious, depressed, or irritable? 4    4   In the past 7 days, how would you rate your fatigue on average? 3    3   In the past 7 days, how would you rate your pain on average, where 0 means no pain, and 10 means worst imaginable pain? 0    0   Mental health question re-calculation - no clinical value 2    2   Physical health question re-calculation - no clinical value 3    3   Pain question re-calculation - no clinical value 5    5   Global Mental Health Score 13    13   Global Physical Health Score 15    15   PROMIS TOTAL - SUBSCORES 28    28           STUDIES & PROCEDURES:    EGD:       Colonoscopy:    2015   Findings:        The perianal and digital rectal examinations were normal. There is        decreased sphincter tone.        A few small-mouthed diverticula were found in the cecum.        Multiple medium-mouthed diverticula were found in the sigmoid colon.                                                                                     Impression:          - Diverticulosis in the cecum.                        - Diverticulosis in the sigmoid colon.     EndoFLIP directed at the UES or LES (8cm (EF-325) balloon length or 16cm (EF-322) balloon length):   Date:  8cm balloon  Balloon inflation Balloon pressure CSA (mm^2) DI (mm^2/mmHg) Dmin (mm) Compliance    20 (ladmark ID)        30        40        50           16cm balloon  Balloon inflation Balloon pressure CSA (mm^2) DI (mm^2/mmHg) Dmin (mm) Compliance   30 (ladmark ID)        40        50        60        70           High Resolution Manometry:    PH/Impedance:     Rizo:    CT:    6/22/2023 CT CAP W Contrast   FINDINGS:   LUNGS AND PLEURA: No pulmonary mass, consolidation, or suspicious pulmonary nodule. No pleural effusion or pneumothorax.     MEDIASTINUM/AXILLAE: No abnormally enlarged lymph nodes. Great vessels normal in caliber. There is a patent stent in the proximal left subclavian artery. No abnormally enlarged lymph nodes. Moderate sized hiatal hernia. There is marked fluid distention   of the esophagus.  No esophageal wall thickening. Possible impacted ingested material measuring approximately 3 cm, best seen on coronal 56.     CORONARY ARTERY CALCIFICATION: Moderate.     HEPATOBILIARY: Normal.     PANCREAS: Normal.     SPLEEN: Normal.     ADRENAL GLANDS: Normal.     KIDNEYS/BLADDER: Normal.     BOWEL: Severe distal colonic diverticulosis. No evidence for acute diverticulitis. No free air, free fluid, or inflammatory change. Normal caliber appendix.     LYMPH NODES: Normal.     VASCULATURE: Diffuse atherosclerotic calcification of the abdominal aorta, but no aneurysm.     PELVIC ORGANS: Normal.     MUSCULOSKELETAL: Prior right hip replacement. Osteopenia. Severe degenerative change in the lower lumbar spine. Severe degenerative disc disease in the lower thoracic spine.                                                                      IMPRESSION:  1.  Moderate hiatal hernia with marked fluid distention of the esophagus. Possible obstruction secondary to impacted ingested material, distal esophageal stricture also considered. Consider follow-up GI consultation.  2.  Severe distal colonic diverticulosis.    Esophagram:    FL VSS:     GES:    U/S:     XRAY:    Other:       Prior medical records were  reviewed including, but not limited to, notes from referring providers, lab work, radiographic tests, and other diagnostic tests. Pertinent results were summarized above.     History     Past Medical History:   Diagnosis Date     Abnormal Papanicolaou smear of cervix and cervical HPV     colposcopy 1/97     Arthritis      Asthma     on preventative meds and has nebulizer     Chronic rhinitis      COPD (chronic obstructive pulmonary disease)      Coronary artery disease     4/4/17 SHERLEY--PDA     Diastolic CHF, chronic 02/22/2012     Gastro-oesophageal reflux disease      Hyperlipidemia 10/31/2011     KEN (obstructive sleep apnea)     CPAP     Personal history of colonic polyps     colonoscopy 9/97, 2002 (due in 2007)     Pulmonary HTN      Seasonal allergies      Subclavian artery stenosis, left 08/07/2013    S/p stent in 2013      Subclinical hyperthyroidism 10/17/2016     Uncomplicated asthma 1995       Past Surgical History:   Procedure Laterality Date     ARTHROPLASTY HIP Right 10/19/2015    Procedure: ARTHROPLASTY HIP;  Surgeon: Aron Torres MD;  Location: [facility] OR     COLONOSCOPY  01/01/2008    Polyp removed, bx.     COLONOSCOPY  01/12/2010     COLONOSCOPY N/A 02/17/2015    Procedure: COLONOSCOPY;  Surgeon: Gato Quiles MD;  Location:  GI     CT CORONARY ANGIOGRAM  04/04/2017    SHERLEY to PDA     CV CORONARY ANGIOGRAM N/A 04/22/2020    Procedure: Coronary Angiogram;  Surgeon: Handy Denise MD;  Location:  HEART CARDIAC CATH LAB     CV CORONARY ANGIOGRAM N/A 07/01/2021    Procedure: Coronary Angiogram;  Surgeon: Handy Denise MD;  Location: [facility] CARDIAC CATH LAB     CV INSTANTANEOUS WAVE-FREE RATIO N/A 04/22/2020    Procedure: Instantaneous Wave-Free Ratio;  Surgeon: Handy Denise MD;  Location:  HEART CARDIAC CATH LAB     CV LEFT VENTRICULOGRAM N/A 04/22/2020    Procedure: Left Ventriculogram;  Surgeon: Handy Denise MD;  Location:  HEART CARDIAC CATH LAB      Liposuction of legs and stomach       MAMMOPLASTY REDUCTION  1989     OPEN REDUCTION INTERNAL FIXATION ANKLE  1987     Removal of ankle hardware NOS  1990     SOFT TISSUE SURGERY   &     Liposuction     VASCULAR SURGERY  2013    angiogram & left subclavical artery stent       Social History     Socioeconomic History     Marital status:      Spouse name: Trenton     Number of children: 2     Years of education: 12     Highest education level: Not on file   Occupational History     Occupation: retired   Tobacco Use     Smoking status: Former     Packs/day: 1.00     Years: 30.00     Pack years: 30.00     Types: Cigarettes     Start date: 7/15/1973     Quit date: 1993     Years since quittin.2     Smokeless tobacco: Never   Vaping Use     Vaping Use: Never used   Substance and Sexual Activity     Alcohol use: Yes     Comment: Not very often     Drug use: No     Sexual activity: Not Currently     Partners: Male   Other Topics Concern      Service Not Asked     Blood Transfusions Not Asked     Caffeine Concern No     Occupational Exposure Not Asked     Hobby Hazards Not Asked     Sleep Concern Not Asked     Stress Concern Not Asked     Weight Concern Not Asked     Special Diet No     Comment: regular diet      Back Care Not Asked     Exercise No     Comment: shopping a lot so [employer] Helmet Not Asked     Seat Belt Not Asked     Self-Exams Not Asked     Parent/sibling w/ CABG, MI or angioplasty before 65F 55M? Yes     Comment: Brother and Sister and Father   Social History Narrative     Not on file     Social Determinants of Health     Financial Resource Strain: Not on file   Food Insecurity: Not on file   Transportation Needs: Not on file   Physical Activity: Not on file   Stress: Not on file   Social Connections: Not on file   Intimate Partner Violence: Not on file   Housing Stability: Not on file       Family History   Problem Relation Age of Onset     Alzheimer  Disease Mother      Gastrointestinal Disease Mother      Cancer Father         throat and lungs, liver,     Heart Disease Father 57        CABG     Hyperlipidemia Father      Other Cancer Father         Throat, Lung and Liver     Hypertension Maternal Grandmother      Lipids Maternal Grandmother      Cancer Maternal Grandmother         stomach     Other Cancer Maternal Grandmother         Stomach Cancer     Cancer Paternal Grandmother         stomach     Other Cancer Paternal Grandmother         Stomach Cancer     Heart Disease Brother         suicidal     Pacemaker Brother      Other Cancer Brother         Prostrate     Depression Brother      Heart Disease Sister      Allergies Son      Asthma Son      Allergies Daughter      Asthma Daughter      Family history reviewed and edited as appropriate    Medications and Allergies:     Outpatient Encounter Medications as of 7/25/2023   Medication Sig Dispense Refill     albuterol (PROAIR HFA/PROVENTIL HFA/VENTOLIN HFA) 108 (90 Base) MCG/ACT inhaler USE 2 INHALATIONS EVERY 6 HOURS AS NEEDED FOR SHORTNESS OF BREATH, DYSPNEA, OR WHEEZING 25.5 g 0     albuterol (PROVENTIL) (2.5 MG/3ML) 0.083% neb solution Take 1 vial (2.5 mg) by nebulization every 6 hours as needed for shortness of breath / dyspnea 3 mL 11     amLODIPine (NORVASC) 5 MG tablet Take 1 tablet (5 mg) by mouth every evening 90 tablet 3     aspirin 81 MG tablet Take 81 mg by mouth daily.       Boswellia-Glucosamine-Vit D (OSTEO BI-FLEX/5-LOXIN ADVANCED) TABS Take 1,500 mg by mouth 2 times daily        Calcium 7350-2074 MG-UNIT CHEW Take 1 tablet by mouth daily.       Cholecalciferol (VITAMIN D3 PO) Take 4,000 Units by mouth daily        diclofenac (VOLTAREN) 1 % topical gel Apply 2 g topically 4 times daily (Patient taking differently: Apply 2 g topically 4 times daily as needed) 100 g 11     fluticasone (FLONASE) 50 MCG/ACT nasal spray USE 1 TO 2 SPRAYS IN EACH NOSTRIL DAILY 48 g 11     fluticasone-salmeterol  "(ADVAIR) 250-50 MCG/ACT inhaler Inhale 1 puff into the lungs 2 times daily 60 each 11     furosemide (LASIX) 20 MG tablet Take 1 tablet (20 mg) by mouth daily       isosorbide mononitrate (IMDUR) 60 MG 24 hr tablet TAKE 1 TABLET EVERY MORNING HOLD IF SYSTOLIC BLOOD PRESSURE IS LESS THAN 85 90 tablet 3     LANsoprazole (PREVACID) 30 MG DR capsule Take 1 capsule (30 mg) by mouth daily 90 capsule 3     montelukast (SINGULAIR) 10 MG tablet Take 1 tablet (10 mg) by mouth At Bedtime 90 tablet 3     nitroGLYcerin (NITROSTAT) 0.4 MG sublingual tablet One tablet under the tongue every 5 minutes if needed for chest pain. May repeat every 5 minutes for a maximum of 3 doses in 15 minutes\" 25 tablet 3     omega-3 fatty acids 1200 MG capsule Take 1 capsule by mouth 2 times daily        Pyridoxine HCl (VITAMIN B6) 250 MG TABS Take by mouth daily       rosuvastatin (CRESTOR) 40 MG tablet Take 1 tablet (40 mg) by mouth daily 90 tablet 3     spironolactone (ALDACTONE) 25 MG tablet Take 0.5 tablets (12.5 mg) by mouth daily 90 tablet 3     No facility-administered encounter medications on file as of 7/25/2023.        Allergies   Allergen Reactions     Animal Dander      Dust Mites      Kiwi Itching     Itching and red all over     Pollen Extract      Pollen,dust, trees, grass, mold-hayfever symptoms        Review of systems:  A full 10 point review of systems was obtained and was negative except for the pertinent positives and negatives stated within the HPI.    Objective Findings:   Physical Exam:    Constitutional: Wt 72.6 kg (160 lb)   LMP  (LMP Unknown)   BMI 30.23 kg/m    General: Alert, cooperative, no distress, well-appearing  Head: Atraumatic, normocephalic, no obvious abnormalities   Eyes: Sclera anicteric, no obvious conjunctival hemorrhage   Nose: Nares normal, no obvious malformation, no obvious rhinorrhea   Skin: No jaundice, no obvious rash  Neurologic: AAOx3, no obvious neurologic abnormality  Psychiatric: Normal " Affect, appropriate mood  Extremities: No obvious edema, no obvious malformation     Labs, Radiology, Pathology     Lab Results   Component Value Date    WBC 7.8 06/22/2023    WBC 8.0 06/07/2023    WBC 5.4 09/17/2022    HGB 13.3 06/22/2023    HGB 13.1 06/07/2023    HGB 11.9 09/17/2022     06/22/2023     06/07/2023     09/17/2022    CHOL 155 11/25/2022    CHOL 157 07/22/2021    CHOL 167 07/17/2020    TRIG 84 11/25/2022    TRIG 52 07/22/2021    TRIG 65 07/17/2020    HDL 78 11/25/2022    HDL 88 07/22/2021    HDL 72 07/17/2020    ALT 19 06/22/2023    ALT 19 11/25/2022    ALT 26 07/22/2021    AST 26 06/22/2023    AST 18 07/22/2021    AST 19 07/17/2020     06/22/2023     06/07/2023     09/17/2022    BUN 13.2 06/22/2023    BUN 14.8 06/07/2023    BUN 10.4 09/17/2022    CO2 26 06/22/2023    CO2 28 06/07/2023    CO2 27 09/17/2022    TSH 1.27 07/22/2021    TSH 0.84 07/17/2020    TSH 2.29 07/18/2019    INR 1.13 07/01/2021    INR 1.00 04/22/2020    INR 1.15 (H) 10/01/2018        Liver Function Studies -   Recent Labs   Lab Test 06/22/23 1947   PROTTOTAL 7.5   ALBUMIN 4.4   BILITOTAL 0.4   ALKPHOS 60   AST 26   ALT 19          Patient Active Problem List    Diagnosis Date Noted     Lumbosacral spinal stenosis 06/20/2023     Priority: Medium     Scoliosis of thoracolumbar spine, unspecified scoliosis type 06/20/2023     Priority: Medium     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     Priority: Medium     Facet arthropathy, lumbosacral 06/20/2023     Priority: Medium     DDD (degenerative disc disease), lumbosacral 06/20/2023     Priority: Medium     Chest pain, unspecified type 06/07/2023     Priority: Medium     History of CAD (coronary artery disease) 06/07/2023     Priority: Medium     Venous reflux 07/12/2021     Priority: Medium     Vasospastic angina (H) 06/29/2021     Priority: Medium     Added automatically from request for surgery [id number]       Exertional chest pain 06/29/2021      Priority: Medium     Added automatically from request for surgery [id number]       Abnormal findings on diagnostic imaging of heart and coronary circulation 06/29/2021     Priority: Medium     Added automatically from request for surgery [id number]       Status post coronary angiogram 04/22/2020     Priority: Medium     Unstable angina pectoris (H) 04/17/2020     Priority: Medium     Added automatically from request for surgery [id number]       Chest pain 02/26/2018     Priority: Medium     Hypertension 09/22/2017     Priority: Medium     Subclinical hyperthyroidism 10/17/2016     Priority: Medium     Mild coronary artery disease 01/30/2016     Priority: Medium     On ASA       Hip osteoarthritis 10/19/2015     Priority: Medium     KEN (obstructive sleep apnea) 08/20/2015     Priority: Medium     Severe persistent asthma 08/25/2014     Priority: Medium     Subclavian artery stenosis, left (H) 08/07/2013     Priority: Medium     S/p stent in 2013       Diastolic CHF, chronic (H) 02/22/2012     Priority: Medium     Hyperlipidemia LDL goal <130 10/31/2011     Priority: Medium     Osteopenia 12/21/2010     Priority: Medium     Esophageal reflux 09/03/2004     Priority: Medium     Female stress incontinence 09/03/2004     Priority: Medium     LUMBOSACRAL NEURITIS , numbness left thigh 09/03/2004     Priority: Medium     History of colonic polyps 06/19/2003     Priority: Medium     Problem list name updated by automated process. Provider to review       Chronic rhinitis 06/19/2003     Priority: Medium      Assessment and Plan   Assessment/Plan:    Victoria Montalvo is a 83 year old female with significant past medical history pertinent for asthma, HTN, HLD, COPD, KEN, CHF, CAD, subclavian artery stenosis who is presenting as a new patient in consultation at the request of Dr. Bah with a chief complaint of GERD/dysphagia.    #Dysphagia  #GERD - Heartburn/Regurgiation   June presents today with chronic history of dysphagia and  reflux without extra esophageal symptoms including both heartburn/regurgitation for approximately 10+ years.  More recently she was seen in the ER 6/22/2023 for intractable emesis/food impaction. CT chest abdomen pelvis with contrast was performed that was notable for moderate size hiatal hernia with marked fluid distention in the esophagus and possible obstruction secondary to ingested material.  There was also concern for possible distal esophageal stricture.  Symptoms had subsided while she was in the ER she was recommended a semisolid/liquid diet and to follow-up with GI for additional evaluation.    Today she reports that with significant lifestyle modifications her symptoms of dysphagia are well controlled.  She is adhering to a semisoft/liquid diet and notes that if she were to consume solid she would be symptomatic daily.  As for her heartburn/regurgitation this is well controlled with Prevacid 30 mg once daily with rare breakthrough symptoms occurring once or twice per month relieved with Tums.  She does report a 20 pound weight loss approximately 1 year prior however weight has been stable for the past 3 to 6 months.  During this time she did lose her  and was his primary caretaker. Labs without anemia. No family history of GI related cancers.  Pertinent history for 15-20 pack-year history of tobacco use.    Differential for dysphagia includes structural abnormality of the esophagus specifically moderate size hiatal hernia appreciated on CT scan however additional considerations include narrowing/stricture/ring/web/mass, reflux/esophagitis, intrinsic motility disorder of the esophagus, presbyesophagus vs other     - Upper endoscopy +/- dilation for evaluation of dysphagia/GERD and abnormal findings on CT scan   - Timed barium esophagram with tablet   - Continue Prevacid 30mg once daily this is best taken on an empty stomach at least 30-60 minutes prior to the first meal of the time. Okay to use TUMs  as needed for breakthrough symptoms   - If needed MiraLAX 17g (1 capful) daily this can be increased to twice daily dosing if needed  - Please start taking daily fiber supplementation. Start with 1 - 2 teaspoons daily which can be slowly uptitrated to 2 - 3 tablespoons as needed    Follow up plan:   Return to clinic 3 months and as needed.    The risks and benefits of my recommendations, as well as other treatment options were discussed with the patient and any available family today. All questions were answered.     o Follow up: As planned above. Today, I personally spent 28 minutes in direct face to face time with the patient, of which greater than 50% of the time was spent in patient education and counseling as described above. Approximately 19 minutes were spent on indirect care associated with the patient's consultation including but not limited to review of: patient medical records to date, clinic visits, hospital records, lab results, imaging studies, procedural documentation, and coordinating care with other providers. The findings from this review are summarized in the above note. All of the above accounted for a cumulative time of 47 minutes and was performed on the date of service.     The patient verbalized understanding of the plan and was appreciative for the time spent and information provided during the office visit.           Suzette Crawford PA-C  Division of Gastroenterology, Hepatology, and Nutrition  [facility]       Documentation assisted by voice recognition and documentation system.

## 2023-07-25 NOTE — LETTER
"    7/25/2023         RE: Victoria Montalvo  [address] [place] [address]        Dear Colleague,    Thank you for referring your patient, Victoria Montalvo, to the [facility] [place]. Please see a copy of my visit note below.    Victoria Montalvo is a 83 year old female who is being evaluated via a billable video visit.        Gastroenterology Visit for: Victoria Montalvo 1940   MRN: [id number]     Reason for Visit:  chief complaint    Referred by: Niurka  / [facility]  / [place] [address]  Patient Care Team:  Elsy Bah MD as PCP - General (Internal Medicine)  Aron Torres MD as MD (Orthopedics)  Elsy Bah MD as Assigned PCP  Yogi Yepez MD as Assigned Heart and Vascular Provider  Ashlee Soriano as Personal Advocate & Liaison (PAL)  Aron Pro MD as Assigned Neuroscience Provider    History of Present Illness:   Victoria Montalvo is a 83 year old female with significant past medical history pertinent for asthma, HTN, HLD, COPD, KEN, CHF, CAD, subclavian artery stenosis who is presenting as a new patient in consultation at the request of Dr. Bah with a chief complaint of GERD/dysphagia.    Prior to going to the ER took a small bite of \"rice hot-dish\" which then resulted in intractable emesis. Reports history of dysphagia for 10+ years. Avoids meats as this is a trigger for her. Denies dysphagia to semi solids, liquids and pills.     Since being seen in the ER has not had symptoms of dysphagia however made significant lifestyle modifications and dietary alterations.      Currently takes Prevacid 30mg with break through symptoms occurring multiple times per month which is relieved with the use of TUMs.     Associated with 20 lb weight loss in the past 12 months with stability over the past 3-6 months. Notes she does not eat when alone secondary to lack of desire to eat. Notes \"If I were to make myself a sandwich " "I would only eat half.\" Not currently eating 3 meals per day. Will supplement with Boost/Ensure x2 daily.    Will have a BM daily that fluctuates between [place] Stool Scale Type 1 - 4.      Denies weight loss,odynophagia, dysphonia/hoarseness, chronic cough/throat clearing, abdominal pain, diarrhea, constipation (< 3 stools per week), nocturnal stooling, incontinence of feces, melena, hematochezia and BRBR.     No prior intraabdominal surgeries. No history of prior vaginal births/ section.     Very rare/seldom use of ETOH products one serving per month.     History of 15 - 20 pack year history.      No family history or GI related malignancy (esophageal, gastric, pancreatic, liver or colon).      past away 2023.     Please also see questionnaires below when reviewing subjective history.       Esophageal Questionnaire(s)    BEDQ Questionnaire      2023    10:33 AM 2023     2:31 PM   BEDQ Questionnaire: How Often Have You Had the Following?   Trouble eating solid food (meat, bread, vegetables) 5 5   Trouble eating soft foods (yogurt, jello, pudding) 3 4   Trouble swallowing liquids 0 5   Pain while swallowing 1 0   Coughing or choking while swallowing foods or liquids 1 1   Total Score: 10 15         2023    10:33 AM 2023     2:31 PM   BEDQ Questionnaire: Discomfort/Pain Ratings   Eating solid food (meat, bread, vegetables) 5 5   Eating soft foods (yogurt, jello, pudding) 0 0   Drinking liquid 0 0   Total Score: 5 5       Eckardt Questionnaire      2023    10:34 AM 2023     2:32 PM   Eckardt Questionnaire   Dysphagia 1 1   Regurgitation 1 1   Retrosternal Pain 0 1   Weight Loss (kg) 3 3   Total Score:  5 6       Promis 10 Questionnaire      2023    10:36 AM   PROMIS 10 FLOWSHEET DATA   In general, would you say your health is: 3    3   In general, would you say your quality of life is: 3    3   In general, how would you rate your physical health? 3    3   In " general, how would you rate your mental health, including your mood and your ability to think? 4    4   In general, how would you rate your satisfaction with your social activities and relationships? 4    4   In general, please rate how well you carry out your usual social activities and roles. (This includes activities at home, at work and in your community, and responsibilities as a parent, child, spouse, employee, friend, etc.) 4    4   To what extent are you able to carry out your everyday physical activities such as walking, climbing stairs, carrying groceries, or moving a chair? 4    4   In the past 7 days, how often have you been bothered by emotional problems such as feeling anxious, depressed, or irritable? 4    4   In the past 7 days, how would you rate your fatigue on average? 3    3   In the past 7 days, how would you rate your pain on average, where 0 means no pain, and 10 means worst imaginable pain? 0    0   Mental health question re-calculation - no clinical value 2    2   Physical health question re-calculation - no clinical value 3    3   Pain question re-calculation - no clinical value 5    5   Global Mental Health Score 13    13   Global Physical Health Score 15    15   PROMIS TOTAL - SUBSCORES 28    28           STUDIES & PROCEDURES:    EGD:       Colonoscopy:    2015   Findings:        The perianal and digital rectal examinations were normal. There is        decreased sphincter tone.        A few small-mouthed diverticula were found in the cecum.        Multiple medium-mouthed diverticula were found in the sigmoid colon.                                                                                     Impression:          - Diverticulosis in the cecum.                        - Diverticulosis in the sigmoid colon.     EndoFLIP directed at the UES or LES (8cm (EF-325) balloon length or 16cm (EF-322) balloon length):   Date:  8cm balloon  Balloon inflation Balloon pressure CSA (mm^2) DI  (mm^2/mmHg) Dmin (mm) Compliance   20 (ladmark ID)        30        40        50           16cm balloon  Balloon inflation Balloon pressure CSA (mm^2) DI (mm^2/mmHg) Dmin (mm) Compliance   30 (ladmark ID)        40        50        60        70           High Resolution Manometry:    PH/Impedance:     Rizo:    CT:    6/22/2023 CT CAP W Contrast   FINDINGS:   LUNGS AND PLEURA: No pulmonary mass, consolidation, or suspicious pulmonary nodule. No pleural effusion or pneumothorax.     MEDIASTINUM/AXILLAE: No abnormally enlarged lymph nodes. Great vessels normal in caliber. There is a patent stent in the proximal left subclavian artery. No abnormally enlarged lymph nodes. Moderate sized hiatal hernia. There is marked fluid distention   of the esophagus.  No esophageal wall thickening. Possible impacted ingested material measuring approximately 3 cm, best seen on coronal 56.     CORONARY ARTERY CALCIFICATION: Moderate.     HEPATOBILIARY: Normal.     PANCREAS: Normal.     SPLEEN: Normal.     ADRENAL GLANDS: Normal.     KIDNEYS/BLADDER: Normal.     BOWEL: Severe distal colonic diverticulosis. No evidence for acute diverticulitis. No free air, free fluid, or inflammatory change. Normal caliber appendix.     LYMPH NODES: Normal.     VASCULATURE: Diffuse atherosclerotic calcification of the abdominal aorta, but no aneurysm.     PELVIC ORGANS: Normal.     MUSCULOSKELETAL: Prior right hip replacement. Osteopenia. Severe degenerative change in the lower lumbar spine. Severe degenerative disc disease in the lower thoracic spine.                                                                      IMPRESSION:  1.  Moderate hiatal hernia with marked fluid distention of the esophagus. Possible obstruction secondary to impacted ingested material, distal esophageal stricture also considered. Consider follow-up GI consultation.  2.  Severe distal colonic diverticulosis.    Esophagram:    FL VSS:     GES:    U/S:     XRAY:    Other:        Prior medical records were reviewed including, but not limited to, notes from referring providers, lab work, radiographic tests, and other diagnostic tests. Pertinent results were summarized above.     History     Past Medical History:   Diagnosis Date    Abnormal Papanicolaou smear of cervix and cervical HPV     colposcopy 1/97    Arthritis     Asthma     on preventative meds and has nebulizer    Chronic rhinitis     COPD (chronic obstructive pulmonary disease)     Coronary artery disease     4/4/17 SHERLEY--PDA    Diastolic CHF, chronic 02/22/2012    Gastro-oesophageal reflux disease     Hyperlipidemia 10/31/2011    KEN (obstructive sleep apnea)     CPAP    Personal history of colonic polyps     colonoscopy 9/97, 2002 (due in 2007)    Pulmonary HTN     Seasonal allergies     Subclavian artery stenosis, left 08/07/2013    S/p stent in 2013     Subclinical hyperthyroidism 10/17/2016    Uncomplicated asthma 1995       Past Surgical History:   Procedure Laterality Date    ARTHROPLASTY HIP Right 10/19/2015    Procedure: ARTHROPLASTY HIP;  Surgeon: Aron Torres MD;  Location:  OR    COLONOSCOPY  01/01/2008    Polyp removed, bx.    COLONOSCOPY  01/12/2010    COLONOSCOPY N/A 02/17/2015    Procedure: COLONOSCOPY;  Surgeon: Gato Quiles MD;  Location:  GI    CT CORONARY ANGIOGRAM  04/04/2017    SHERLEY to PDA    CV CORONARY ANGIOGRAM N/A 04/22/2020    Procedure: Coronary Angiogram;  Surgeon: Handy Denise MD;  Location: [facility] CARDIAC CATH LAB    CV CORONARY ANGIOGRAM N/A 07/01/2021    Procedure: Coronary Angiogram;  Surgeon: Handy Denise MD;  Location: [facility] CARDIAC CATH LAB    CV INSTANTANEOUS WAVE-FREE RATIO N/A 04/22/2020    Procedure: Instantaneous Wave-Free Ratio;  Surgeon: Handy Denise MD;  Location: [facility] CARDIAC CATH LAB    CV LEFT VENTRICULOGRAM N/A 04/22/2020    Procedure: Left Ventriculogram;  Surgeon: Handy Denise MD;  Location:  HEART CARDIAC CATH LAB     Liposuction of legs and stomach      MAMMOPLASTY REDUCTION  1989    OPEN REDUCTION INTERNAL FIXATION ANKLE  1987    Removal of ankle hardware NOS  1990    SOFT TISSUE SURGERY   &     Liposuction    VASCULAR SURGERY  2013    angiogram & left subclavical artery stent       Social History     Socioeconomic History    Marital status:      Spouse name: Trenton    Number of children: 2    Years of education: 12    Highest education level: Not on file   Occupational History    Occupation: retired   Tobacco Use    Smoking status: Former     Packs/day: 1.00     Years: 30.00     Pack years: 30.00     Types: Cigarettes     Start date: 7/15/1973     Quit date: 1993     Years since quittin.2    Smokeless tobacco: Never   Vaping Use    Vaping Use: Never used   Substance and Sexual Activity    Alcohol use: Yes     Comment: Not very often    Drug use: No    Sexual activity: Not Currently     Partners: Male   Other Topics Concern     Service Not Asked    Blood Transfusions Not Asked    Caffeine Concern No    Occupational Exposure Not Asked    Hobby Hazards Not Asked    Sleep Concern Not Asked    Stress Concern Not Asked    Weight Concern Not Asked    Special Diet No     Comment: regular diet     Back Care Not Asked    Exercise No     Comment: shopping a lot so [email or web address] Helmet Not Asked    Seat Belt Not Asked    Self-Exams Not Asked    Parent/sibling w/ CABG, MI or angioplasty before 65F 55M? Yes     Comment: Brother and Sister and Father   Social History Narrative    Not on file     Social Determinants of Health     Financial Resource Strain: Not on file   Food Insecurity: Not on file   Transportation Needs: Not on file   Physical Activity: Not on file   Stress: Not on file   Social Connections: Not on file   Intimate Partner Violence: Not on file   Housing Stability: Not on file       Family History   Problem Relation Age of Onset    Alzheimer Disease Mother     Gastrointestinal  Disease Mother     Cancer Father         throat and lungs, liver,    Heart Disease Father 57        CABG    Hyperlipidemia Father     Other Cancer Father         Throat, Lung and Liver    Hypertension Maternal Grandmother     Lipids Maternal Grandmother     Cancer Maternal Grandmother         stomach    Other Cancer Maternal Grandmother         Stomach Cancer    Cancer Paternal Grandmother         stomach    Other Cancer Paternal Grandmother         Stomach Cancer    Heart Disease Brother         suicidal    Pacemaker Brother     Other Cancer Brother         Prostrate    Depression Brother     Heart Disease Sister     Allergies Son     Asthma Son     Allergies Daughter     Asthma Daughter      Family history reviewed and edited as appropriate    Medications and Allergies:     Outpatient Encounter Medications as of 7/25/2023   Medication Sig Dispense Refill    albuterol (PROAIR HFA/PROVENTIL HFA/VENTOLIN HFA) 108 (90 Base) MCG/ACT inhaler USE 2 INHALATIONS EVERY 6 HOURS AS NEEDED FOR SHORTNESS OF BREATH, DYSPNEA, OR WHEEZING 25.5 g 0    albuterol (PROVENTIL) (2.5 MG/3ML) 0.083% neb solution Take 1 vial (2.5 mg) by nebulization every 6 hours as needed for shortness of breath / dyspnea 3 mL 11    amLODIPine (NORVASC) 5 MG tablet Take 1 tablet (5 mg) by mouth every evening 90 tablet 3    aspirin 81 MG tablet Take 81 mg by mouth daily.      Boswellia-Glucosamine-Vit D (OSTEO BI-FLEX/5-LOXIN ADVANCED) TABS Take 1,500 mg by mouth 2 times daily       Calcium 6255-5803 MG-UNIT CHEW Take 1 tablet by mouth daily.      Cholecalciferol (VITAMIN D3 PO) Take 4,000 Units by mouth daily       diclofenac (VOLTAREN) 1 % topical gel Apply 2 g topically 4 times daily (Patient taking differently: Apply 2 g topically 4 times daily as needed) 100 g 11    fluticasone (FLONASE) 50 MCG/ACT nasal spray USE 1 TO 2 SPRAYS IN EACH NOSTRIL DAILY 48 g 11    fluticasone-salmeterol (ADVAIR) 250-50 MCG/ACT inhaler Inhale 1 puff into the lungs 2 times  "daily 60 each 11    furosemide (LASIX) 20 MG tablet Take 1 tablet (20 mg) by mouth daily      isosorbide mononitrate (IMDUR) 60 MG 24 hr tablet TAKE 1 TABLET EVERY MORNING HOLD IF SYSTOLIC BLOOD PRESSURE IS LESS THAN 85 90 tablet 3    LANsoprazole (PREVACID) 30 MG DR capsule Take 1 capsule (30 mg) by mouth daily 90 capsule 3    montelukast (SINGULAIR) 10 MG tablet Take 1 tablet (10 mg) by mouth At Bedtime 90 tablet 3    nitroGLYcerin (NITROSTAT) 0.4 MG sublingual tablet One tablet under the tongue every 5 minutes if needed for chest pain. May repeat every 5 minutes for a maximum of 3 doses in 15 minutes\" 25 tablet 3    omega-3 fatty acids 1200 MG capsule Take 1 capsule by mouth 2 times daily       Pyridoxine HCl (VITAMIN B6) 250 MG TABS Take by mouth daily      rosuvastatin (CRESTOR) 40 MG tablet Take 1 tablet (40 mg) by mouth daily 90 tablet 3    spironolactone (ALDACTONE) 25 MG tablet Take 0.5 tablets (12.5 mg) by mouth daily 90 tablet 3     No facility-administered encounter medications on file as of 7/25/2023.        Allergies   Allergen Reactions    Animal Dander     Dust Mites     Kiwi Itching     Itching and red all over    Pollen Extract      Pollen,dust, trees, grass, mold-hayfever symptoms        Review of systems:  A full 10 point review of systems was obtained and was negative except for the pertinent positives and negatives stated within the HPI.    Objective Findings:   Physical Exam:    Constitutional: Wt 72.6 kg (160 lb)   LMP  (LMP Unknown)   BMI 30.23 kg/m    General: Alert, cooperative, no distress, well-appearing  Head: Atraumatic, normocephalic, no obvious abnormalities   Eyes: Sclera anicteric, no obvious conjunctival hemorrhage   Nose: Nares normal, no obvious malformation, no obvious rhinorrhea   Skin: No jaundice, no obvious rash  Neurologic: AAOx3, no obvious neurologic abnormality  Psychiatric: Normal Affect, appropriate mood  Extremities: No obvious edema, no obvious " malformation     Labs, Radiology, Pathology     Lab Results   Component Value Date    WBC 7.8 06/22/2023    WBC 8.0 06/07/2023    WBC 5.4 09/17/2022    HGB 13.3 06/22/2023    HGB 13.1 06/07/2023    HGB 11.9 09/17/2022     06/22/2023     06/07/2023     09/17/2022    CHOL 155 11/25/2022    CHOL 157 07/22/2021    CHOL 167 07/17/2020    TRIG 84 11/25/2022    TRIG 52 07/22/2021    TRIG 65 07/17/2020    HDL 78 11/25/2022    HDL 88 07/22/2021    HDL 72 07/17/2020    ALT 19 06/22/2023    ALT 19 11/25/2022    ALT 26 07/22/2021    AST 26 06/22/2023    AST 18 07/22/2021    AST 19 07/17/2020     06/22/2023     06/07/2023     09/17/2022    BUN 13.2 06/22/2023    BUN 14.8 06/07/2023    BUN 10.4 09/17/2022    CO2 26 06/22/2023    CO2 28 06/07/2023    CO2 27 09/17/2022    TSH 1.27 07/22/2021    TSH 0.84 07/17/2020    TSH 2.29 07/18/2019    INR 1.13 07/01/2021    INR 1.00 04/22/2020    INR 1.15 (H) 10/01/2018        Liver Function Studies -   Recent Labs   Lab Test 06/22/23 1947   PROTTOTAL 7.5   ALBUMIN 4.4   BILITOTAL 0.4   ALKPHOS 60   AST 26   ALT 19          Patient Active Problem List    Diagnosis Date Noted    Lumbosacral spinal stenosis 06/20/2023     Priority: Medium    Scoliosis of thoracolumbar spine, unspecified scoliosis type 06/20/2023     Priority: Medium    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     Priority: Medium    Facet arthropathy, lumbosacral 06/20/2023     Priority: Medium    DDD (degenerative disc disease), lumbosacral 06/20/2023     Priority: Medium    Chest pain, unspecified type 06/07/2023     Priority: Medium    History of CAD (coronary artery disease) 06/07/2023     Priority: Medium    Venous reflux 07/12/2021     Priority: Medium    Vasospastic angina (H) 06/29/2021     Priority: Medium     Added automatically from request for surgery [id number]      Exertional chest pain 06/29/2021     Priority: Medium     Added automatically from request for surgery [id number]       Abnormal findings on diagnostic imaging of heart and coronary circulation 06/29/2021     Priority: Medium     Added automatically from request for surgery [id number]      Status post coronary angiogram 04/22/2020     Priority: Medium    Unstable angina pectoris (H) 04/17/2020     Priority: Medium     Added automatically from request for surgery [id number]      Chest pain 02/26/2018     Priority: Medium    Hypertension 09/22/2017     Priority: Medium    Subclinical hyperthyroidism 10/17/2016     Priority: Medium    Mild coronary artery disease 01/30/2016     Priority: Medium     On ASA      Hip osteoarthritis 10/19/2015     Priority: Medium    KEN (obstructive sleep apnea) 08/20/2015     Priority: Medium    Severe persistent asthma 08/25/2014     Priority: Medium    Subclavian artery stenosis, left (H) 08/07/2013     Priority: Medium     S/p stent in 2013      Diastolic CHF, chronic (H) 02/22/2012     Priority: Medium    Hyperlipidemia LDL goal <130 10/31/2011     Priority: Medium    Osteopenia 12/21/2010     Priority: Medium    Esophageal reflux 09/03/2004     Priority: Medium    Female stress incontinence 09/03/2004     Priority: Medium    LUMBOSACRAL NEURITIS , numbness left thigh 09/03/2004     Priority: Medium    History of colonic polyps 06/19/2003     Priority: Medium     Problem list name updated by automated process. Provider to review      Chronic rhinitis 06/19/2003     Priority: Medium      Assessment and Plan   Assessment/Plan:    Victoria Montalvo is a 83 year old female with significant past medical history pertinent for asthma, HTN, HLD, COPD, KEN, CHF, CAD, subclavian artery stenosis who is presenting as a new patient in consultation at the request of Dr. Bah with a chief complaint of GERD/dysphagia.    #Dysphagia  #GERD - Heartburn/Regurgiation   June presents today with chronic history of dysphagia and reflux without extra esophageal symptoms including both heartburn/regurgitation for approximately 10+  years.  More recently she was seen in the ER 6/22/2023 for intractable emesis/food impaction. CT chest abdomen pelvis with contrast was performed that was notable for moderate size hiatal hernia with marked fluid distention in the esophagus and possible obstruction secondary to ingested material.  There was also concern for possible distal esophageal stricture.  Symptoms had subsided while she was in the ER she was recommended a semisolid/liquid diet and to follow-up with GI for additional evaluation.    Today she reports that with significant lifestyle modifications her symptoms of dysphagia are well controlled.  She is adhering to a semisoft/liquid diet and notes that if she were to consume solid she would be symptomatic daily.  As for her heartburn/regurgitation this is well controlled with Prevacid 30 mg once daily with rare breakthrough symptoms occurring once or twice per month relieved with Tums.  She does report a 20 pound weight loss approximately 1 year prior however weight has been stable for the past 3 to 6 months.  During this time she did lose her  and was his primary caretaker. Labs without anemia. No family history of GI related cancers.  Pertinent history for 15-20 pack-year history of tobacco use.    Differential for dysphagia includes structural abnormality of the esophagus specifically moderate size hiatal hernia appreciated on CT scan however additional considerations include narrowing/stricture/ring/web/mass, reflux/esophagitis, intrinsic motility disorder of the esophagus, presbyesophagus vs other     - Upper endoscopy +/- dilation for evaluation of dysphagia/GERD and abnormal findings on CT scan   - Timed barium esophagram with tablet   - Continue Prevacid 30mg once daily this is best taken on an empty stomach at least 30-60 minutes prior to the first meal of the time. Okay to use TUMs as needed for breakthrough symptoms   - If needed MiraLAX 17g (1 capful) daily this can be increased  to twice daily dosing if needed  - Please start taking daily fiber supplementation. Start with 1 - 2 teaspoons daily which can be slowly uptitrated to 2 - 3 tablespoons as needed    Follow up plan:   Return to clinic 3 months and as needed.    The risks and benefits of my recommendations, as well as other treatment options were discussed with the patient and any available family today. All questions were answered.     Follow up: As planned above. Today, I personally spent 28 minutes in direct face to face time with the patient, of which greater than 50% of the time was spent in patient education and counseling as described above. Approximately 19 minutes were spent on indirect care associated with the patient's consultation including but not limited to review of: patient medical records to date, clinic visits, hospital records, lab results, imaging studies, procedural documentation, and coordinating care with other providers. The findings from this review are summarized in the above note. All of the above accounted for a cumulative time of 47 minutes and was performed on the date of service.     The patient verbalized understanding of the plan and was appreciative for the time spent and information provided during the office visit.        Documentation assisted by voice recognition and documentation system.        Again, thank you for allowing me to participate in the care of your patient.      Sincerely,    Suzette Crawford PA-C

## 2023-07-26 ENCOUNTER — TELEPHONE (OUTPATIENT)
Dept: GASTROENTEROLOGY | Facility: CLINIC | Age: 83
End: 2023-07-26
Payer: COMMERCIAL

## 2023-07-26 NOTE — TELEPHONE ENCOUNTER
"Endoscopy Scheduling Screen    Have you had a positive Covid test in the last 14 days?  No    Are you active on MyChart?   Yes    What insurance is in the chart?  Other:  [facility] MEDICARE    Ordering/Referring Provider:   SCOTTIE MICHAEL        (If ordering provider performs procedure, schedule with ordering provider unless otherwise instructed. )    BMI: Estimated body mass index is 30.23 kg/m  as calculated from the following:    Height as of 7/19/23: 1.549 m (5' 1\").    Weight as of 7/25/23: 72.6 kg (160 lb).     Sedation Ordered  MAC/deep sedation.   BMI<= 45 45 < BMI <= 48 48 < BMI < = 50  BMI > 50   No Restrictions No [facility]  No [facility]  [facility] with exceptions Hospital Only OR Only       Do you have a history of malignant hyperthermia or adverse reaction to anesthesia?  No    Have you been diagnosed or told you have pulmonary hypertension?   No    Do you have an LVAD?  No    Have you been told you have moderate to severe sleep apnea?  Yes (RN Review required for scheduling unless scheduling in Hospital.)    Have you been told you have COPD, asthma, or any other lung disease?  Yes     What breathing problems do you have?  Asthma     Do you use home oxygen?  No    Have your breathing problems required an ED visit or hospitalization in the last year?  No    Do you have any heart conditions?  Yes     In the past 6 months, have you had any hospitalizations for heart related issues including cardiomyopathy, heart attack, or stent placement?  No    Do you have any implantable devices in your body (pacemaker, ICD)?  No    Do you take nitroglycerine?  Yes, less than 1 time per week    Have you ever had or are you awaiting a heart or lung transplant?   No    Have you had a stroke or transient ischemic attack (TIA aka \"mini stroke\" in the last 6 months?   No    Have you been diagnosed with or been told you have cirrhosis of the liver?   No    Are you currently on dialysis?   No    Do you need assistance " "transferring?   No    BMI: Estimated body mass index is 30.23 kg/m  as calculated from the following:    Height as of 7/19/23: 1.549 m (5' 1\").    Weight as of 7/25/23: 72.6 kg (160 lb).     Is patients BMI > 40 and scheduling location [facility]?  No    Do you take the medication Phentermine, Ozempic or Wegovy?  No    Do you take the medication Naltrexone?  No    Do you take blood thinners?  No      Preferred Pharmacy:  [facility] [place] [address] [facility] [address]  Phone: [phone] Fax: [phone]      Final Scheduling Details   Colonoscopy prep sent?  N/A    Procedure scheduled  Upper endoscopy (EGD)    Surgeon:  SHAYY     Date of procedure:  8/17/2023     Schedule PAC:   No    Location  [facility]    Sedation   MAC/Deep Sedation    Patient Reminders:   You will receive a call from a Nurse to review instructions and health history.  This assessment must be completed prior to your procedure.  Failure to complete the Nurse assessment may result in the procedure being cancelled.      On the day of your procedure, please designate an adult(s) who can drive you home stay with you for the next 24 hours. The medicines used in the exam will make you sleepy. You will not be able to drive.      You cannot take public transportation, ride share services, or non-medical taxi service without a responsible caregiver.  Medical transport services are allowed with the requirement that a responsible caregiver will receive you at your destination.  We require that drivers and caregivers are confirmed prior to your procedure.  "

## 2023-07-27 ENCOUNTER — TELEPHONE (OUTPATIENT)
Dept: GASTROENTEROLOGY | Facility: CLINIC | Age: 83
End: 2023-07-27
Payer: COMMERCIAL

## 2023-07-27 NOTE — TELEPHONE ENCOUNTER
Spoke with patient and scheduled the 3 mo follow-up order per Suzette Crawford. Patient is scheduled for an in person visit with Suzette on 10/27/23.

## 2023-08-02 ENCOUNTER — TELEPHONE (OUTPATIENT)
Dept: GASTROENTEROLOGY | Facility: CLINIC | Age: 83
End: 2023-08-02
Payer: COMMERCIAL

## 2023-08-02 NOTE — TELEPHONE ENCOUNTER
Pre visit planning completed.      Procedure details:    Patient scheduled for Upper endoscopy (EGD) on 8/17/2023.     Arrival time: 0815. Procedure time 0945    Pre op exam needed? Yes. PAC eval needed due to severe asthma    Facility location: [facility]; [address] [place] [address], [address] Floor, [place] [address]    Sedation type: MAC    Indication for procedure: EGD +/- dilation MAC. Solid food dysphagia. Moderate sized HH on CT. Possible distal stircture. History of food impaction in the ER.       Chart review:     Electronic implanted devices? No    Diabetic? No    Diabetic medication HOLDING recommendations: (if applicable)  Oral diabetic medications: N/A  Diabetic injectables: N/A  Insulin: N/A      Medication review:    Anticoagulants? No    NSAIDS? No NSAID medications per patient's medication list.  RN will verify with pre-assessment call.    Other medication HOLDING recommendations:  N/A      Prep for procedure:     Bowel prep recommendation: N/A     Prep instructions sent via zeke Manzo RN  Endoscopy Procedure Pre Assessment RN  [phone] option 4

## 2023-08-02 NOTE — TELEPHONE ENCOUNTER
FUTURE VISIT INFORMATION      SURGERY INFORMATION:  Date: 23  Location:  [facility]  Surgeon:  Genesis Kuhn MD   Anesthesia Type:  MAC  Procedure: Esophagoscopy, gastroscopy, duodenoscopy (EGD), combined     RECORDS REQUESTED FROM:       Primary Care Provider:Anthony Castelan LSW - [facility]    Most recent EKG+ Tracin23    Most recent ECHO: 23    Most recent Cardiac Stress Test: 23    Most recent Coronary Angiogram: 21

## 2023-08-02 NOTE — TELEPHONE ENCOUNTER
Pre assessment completed for upcoming procedure.   (Please see previous telephone encounter notes for complete details)      Procedure details:    Arrival time and facility location reviewed    Pre op exam needed? Yes.  Patient is aware they need a PAC eval prior to procedure. Pt warm transferred to PAC Clinic to schedule this appt    Designated  policy reviewed. Instructed to have someone stay 24 hours post procedure.     COVID policy reviewed.      Medication review:    Medications reviewed. Please see supporting documentation below. Holding recommendations discussed (if applicable).       Prep for procedure:     Reviewed procedure prep instructions.       Additional information needed?  Pt will hold prn Ibuprofen x 1 day      Patient  verbalized understanding and had no questions or concerns at this time.      Jennifer Manzo RN  Endoscopy Procedure Pre Assessment RN  [phone] option 4

## 2023-08-08 ENCOUNTER — VIRTUAL VISIT (OUTPATIENT)
Dept: SURGERY | Facility: CLINIC | Age: 83
End: 2023-08-08
Payer: COMMERCIAL

## 2023-08-08 ENCOUNTER — PRE VISIT (OUTPATIENT)
Dept: SURGERY | Facility: CLINIC | Age: 83
End: 2023-08-08

## 2023-08-08 ENCOUNTER — ANESTHESIA EVENT (OUTPATIENT)
Dept: GASTROENTEROLOGY | Facility: CLINIC | Age: 83
End: 2023-08-08
Payer: COMMERCIAL

## 2023-08-08 DIAGNOSIS — Z01.818 PREOP EXAMINATION: Primary | ICD-10-CM

## 2023-08-08 DIAGNOSIS — K44.9 HIATAL HERNIA: ICD-10-CM

## 2023-08-08 DIAGNOSIS — R13.19 ESOPHAGEAL DYSPHAGIA: ICD-10-CM

## 2023-08-08 DIAGNOSIS — K21.9 GASTROESOPHAGEAL REFLUX DISEASE WITHOUT ESOPHAGITIS: ICD-10-CM

## 2023-08-08 PROCEDURE — 99203 OFFICE O/P NEW LOW 30 MIN: CPT | Mod: VID | Performed by: NURSE PRACTITIONER

## 2023-08-08 RX ORDER — CALCIUM CARBONATE 500 MG/1
1 TABLET, CHEWABLE ORAL PRN
COMMUNITY
Start: 2023-08-08 | End: 2024-05-08

## 2023-08-08 ASSESSMENT — PAIN SCALES - GENERAL: PAINLEVEL: NO PAIN (0)

## 2023-08-08 ASSESSMENT — COPD QUESTIONNAIRES: COPD: 1

## 2023-08-08 ASSESSMENT — ENCOUNTER SYMPTOMS: ORTHOPNEA: 0

## 2023-08-08 NOTE — PATIENT INSTRUCTIONS
Preparing for Your Procedure        Name:  Victoria Montalvo   MRN:  [id number]   :  1940   Today's Date:  2023     GASTROINTESTINAL STAFF WILL CONTACT YOU WITH ADDITIONAL INFORMATION REGARDING LOCATION, ARRIVAL TIME, AND EATING AND DRINKING INSTRUCTIONS BEFORE PROCEDURE.        -  No Alcohol or cannabis products for at least 24 hours before procedure.     Which medicines can I take?    Hold Aspirin for 1 day before procedure.   Hold Multivitamins for 7 days before procedure.  Hold fish oil for 7 days before procedure.  Hold Ibuprofen (Advil, Motrin) for 1 day(s) before procedure--unless otherwise directed by surgeon.  Hold Naproxen (Aleve) for 4 days before procedure.    -  DO NOT take these medications the day of procedure:  Lasix (furosemide) + aldactone (spironolactone) + Supplements ( TUMS calcium + B6 + D3).    -  PLEASE TAKE these medications the day of procedure:  Tylenol if needed; take all other morning medications.      Bring inhalers if currently using.      Questions or Concerns:        - If you have health changes between today and your procedure, please call your surgeon.       - For questions after procedure, please call your surgeons office.         .

## 2023-08-08 NOTE — PROGRESS NOTES
Victoria is a 83 year old who is being evaluated via a billable video visit.      How would you like to obtain your AVS? MyChart  If the video visit is dropped, the invitation should be resent by: Text to cell phone: [phone]          HPI               Review of Systems                 Physical Exam

## 2023-08-08 NOTE — H&P
Pre-Operative H & P     CC:  Preoperative exam to assess for increased cardiopulmonary risk while undergoing surgery and anesthesia.    Date of Encounter: 8/8/2023  Primary Care Physician:  Elsy Bah     Reason for visit:   Encounter Diagnoses   Name Primary?    Preop examination Yes    Gastroesophageal reflux disease without esophagitis     Esophageal dysphagia     Hiatal hernia        HPI  Victoria Montalvo is a 83 year old female who presents for pre-operative H & P in preparation for  Procedure Information       Case: [id number] Date/Time: 08/17/23 0945    Procedure: Esophagoscopy, gastroscopy, duodenoscopy (EGD), combined (Esophagus)    Anesthesia type: MAC    Diagnosis:       Esophageal dysphagia [R13.19]      Hiatal hernia [K44.9]      GERD without esophagitis [K21.9]    Pre-op diagnosis:       Esophageal dysphagia [R13.19]      Hiatal hernia [K44.9]      GERD without esophagitis [K21.9]    Location:  GI 02 /  GI    Providers: Genesis Kuhn MD            Victoria Montalvo is a 83 year old female with heart failure, CAD, hyperlipidemia, hypertension, left subclavian stenosis, history of pulmonary hypertension, asthma/COPD, sleep apnea and obesity that has GERD, dysphagia and a hiatal hernia.  She notes that she has had dysphagia intermittently for at least 10 years now.  She recently consulted with SOPHIE Crawford PA-C in GI to discuss.  The above listed procedure has now been recommended for further evaluation and treatment.     History is obtained from the patient and chart review    Hx of abnormal bleeding or anti-platelet use: aspirin    Menstrual history: No LMP recorded (lmp unknown). Patient is postmenopausal.:      Past Medical History  Past Medical History:   Diagnosis Date    Abnormal Papanicolaou smear of cervix and cervical HPV     colposcopy 1/97    Arthritis     Asthma     on preventative meds and has nebulizer    Chronic rhinitis     COPD (chronic obstructive pulmonary disease)      Coronary artery disease     4/4/17 SHERLEY--PDA    Diastolic CHF, chronic 02/22/2012    Gastro-oesophageal reflux disease     Hyperlipidemia 10/31/2011    KEN (obstructive sleep apnea)     CPAP    Personal history of colonic polyps     colonoscopy 9/97, 2002 (due in 2007)    Pulmonary HTN     Seasonal allergies     Subclavian artery stenosis, left 08/07/2013    S/p stent in 2013     Subclinical hyperthyroidism 10/17/2016    Uncomplicated asthma 1995       Past Surgical History  Past Surgical History:   Procedure Laterality Date    ARTHROPLASTY HIP Right 10/19/2015    Procedure: ARTHROPLASTY HIP;  Surgeon: Aron Torres MD;  Location: [facility] OR    COLONOSCOPY  01/01/2008    Polyp removed, bx.    COLONOSCOPY  01/12/2010    COLONOSCOPY N/A 02/17/2015    Procedure: COLONOSCOPY;  Surgeon: Gato Quiles MD;  Location:  GI    CT CORONARY ANGIOGRAM  04/04/2017    SHERLEY to PDA    CV CORONARY ANGIOGRAM N/A 04/22/2020    Procedure: Coronary Angiogram;  Surgeon: Handy Denise MD;  Location: [facility] CARDIAC CATH LAB    CV CORONARY ANGIOGRAM N/A 07/01/2021    Procedure: Coronary Angiogram;  Surgeon: Handy Denise MD;  Location: [facility] CARDIAC CATH LAB    CV INSTANTANEOUS WAVE-FREE RATIO N/A 04/22/2020    Procedure: Instantaneous Wave-Free Ratio;  Surgeon: Handy Denise MD;  Location: [facility] CARDIAC CATH LAB    CV LEFT VENTRICULOGRAM N/A 04/22/2020    Procedure: Left Ventriculogram;  Surgeon: Handy Denise MD;  Location:  HEART CARDIAC CATH LAB    Liposuction of legs and stomach  1990    MAMMOPLASTY REDUCTION  02/1989    OPEN REDUCTION INTERNAL FIXATION ANKLE  04/1987    Removal of ankle hardware NOS  1990    SOFT TISSUE SURGERY  1989 & 1990    Liposuction    VASCULAR SURGERY  2013    angiogram & left subclavical artery stent       Prior to Admission Medications  Current Outpatient Medications   Medication Sig Dispense Refill    albuterol (PROAIR HFA/PROVENTIL HFA/VENTOLIN HFA) 108 (90 Base)  MCG/ACT inhaler USE 2 INHALATIONS EVERY 6 HOURS AS NEEDED FOR SHORTNESS OF BREATH, DYSPNEA, OR WHEEZING 25.5 g 0    albuterol (PROVENTIL) (2.5 MG/3ML) 0.083% neb solution Take 1 vial (2.5 mg) by nebulization every 6 hours as needed for shortness of breath / dyspnea 3 mL 11    amLODIPine (NORVASC) 5 MG tablet Take 1 tablet (5 mg) by mouth every evening 90 tablet 3    aspirin 81 MG tablet Take 81 mg by mouth every morning      Boswellia-Glucosamine-Vit D (OSTEO BI-FLEX/5-LOXIN ADVANCED) TABS Take 1,500 mg by mouth every morning      Calcium 8605-2590 MG-UNIT CHEW Take 1 tablet by mouth every morning      calcium carbonate (TUMS) 500 MG chewable tablet Take 1 tablet (500 mg) by mouth as needed for heartburn      Cholecalciferol (VITAMIN D3 PO) Take 4,000 Units by mouth every morning      diclofenac (VOLTAREN) 1 % topical gel Apply 2 g topically 4 times daily (Patient taking differently: Apply 2 g topically 4 times daily as needed) 100 g 11    fluticasone (FLONASE) 50 MCG/ACT nasal spray USE 1 TO 2 SPRAYS IN EACH NOSTRIL DAILY (Patient taking differently: Spray 1 spray into both nostrils every morning USE 1 TO 2 SPRAYS IN EACH NOSTRIL DAILY) 48 g 11    fluticasone-salmeterol (ADVAIR) 250-50 MCG/ACT inhaler Inhale 1 puff into the lungs 2 times daily 60 each 11    furosemide (LASIX) 20 MG tablet Take 20 mg by mouth every morning      isosorbide mononitrate (IMDUR) 60 MG 24 hr tablet TAKE 1 TABLET EVERY MORNING HOLD IF SYSTOLIC BLOOD PRESSURE IS LESS THAN 85 (Patient taking differently: Take 60 mg by mouth every morning TAKE 1 TABLET EVERY MORNING HOLD IF SYSTOLIC BLOOD PRESSURE IS LESS THAN 85) 90 tablet 3    LANsoprazole (PREVACID) 30 MG DR capsule Take 1 capsule (30 mg) by mouth daily (Patient taking differently: Take 30 mg by mouth every morning (before breakfast)) 90 capsule 3    montelukast (SINGULAIR) 10 MG tablet Take 1 tablet (10 mg) by mouth At Bedtime 90 tablet 3    nitroGLYcerin (NITROSTAT) 0.4 MG sublingual  "tablet One tablet under the tongue every 5 minutes if needed for chest pain. May repeat every 5 minutes for a maximum of 3 doses in 15 minutes\" 25 tablet 3    omega-3 fatty acids 1200 MG capsule Take 1 capsule by mouth every morning      Pyridoxine HCl (VITAMIN B6) 250 MG TABS Take 1 tablet by mouth every morning      rosuvastatin (CRESTOR) 40 MG tablet Take 1 tablet (40 mg) by mouth daily (Patient taking differently: Take 40 mg by mouth every evening) 90 tablet 3    spironolactone (ALDACTONE) 25 MG tablet Take 12.5 mg by mouth every morning 90 tablet 3       Allergies  Allergies   Allergen Reactions    Animal Dander     Dust Mites     Kiwi Itching     Itching and red all over    Pollen Extract      Pollen,dust, trees, grass, mold-hayfever symptoms       Social History  Social History     Socioeconomic History    Marital status:      Spouse name: Trenton    Number of children: 2    Years of education: 12    Highest education level: Not on file   Occupational History    Occupation: retired   Tobacco Use    Smoking status: Former     Packs/day: 1.00     Years: 30.00     Pack years: 30.00     Types: Cigarettes     Start date: 7/15/1973     Quit date: 1993     Years since quittin.2    Smokeless tobacco: Never   Vaping Use    Vaping Use: Never used   Substance and Sexual Activity    Alcohol use: Not Currently    Drug use: Never    Sexual activity: Not Currently     Partners: Male   Other Topics Concern     Service Not Asked    Blood Transfusions Not Asked    Caffeine Concern No    Occupational Exposure Not Asked    Hobby Hazards Not Asked    Sleep Concern Not Asked    Stress Concern Not Asked    Weight Concern Not Asked    Special Diet No     Comment: regular diet     Back Care Not Asked    Exercise No     Comment: shopping a lot so [employer]     Bike Helmet Not Asked    Seat Belt Not Asked    Self-Exams Not Asked    Parent/sibling w/ CABG, MI or angioplasty before 65F 55M? Yes     Comment: Brother " and Sister and Father   Social History Narrative    Not on file     Social Determinants of Health     Financial Resource Strain: Not on file   Food Insecurity: Not on file   Transportation Needs: Not on file   Physical Activity: Not on file   Stress: Not on file   Social Connections: Not on file   Intimate Partner Violence: Not on file   Housing Stability: Not on file       Family History  Family History   Problem Relation Age of Onset    Alzheimer Disease Mother     Gastrointestinal Disease Mother     Cancer Father         throat and lungs, liver,    Heart Disease Father 57        CABG    Hyperlipidemia Father     Other Cancer Father         Throat, Lung and Liver    Heart Disease Sister     Heart Disease Brother         suicidal    Pacemaker Brother     Other Cancer Brother         Prostrate    Depression Brother     Hypertension Maternal Grandmother     Lipids Maternal Grandmother     Cancer Maternal Grandmother         stomach    Other Cancer Maternal Grandmother         Stomach Cancer    Cancer Paternal Grandmother         stomach    Other Cancer Paternal Grandmother         Stomach Cancer    Allergies Daughter     Asthma Daughter     Allergies Son     Asthma Son     Anesthesia Reaction No family hx of     Thrombosis No family hx of        Review of Systems  The complete review of systems is negative other than noted in the HPI or here.   Anesthesia Evaluation   Pt has had prior anesthetic.     No history of anesthetic complications       ROS/MED HX  ENT/Pulmonary:     (+) sleep apnea, doesn't use CPAP,         allergic rhinitis,          asthma Last exacerbation: >2-3 months,  Treatment: Inhaler prn, Inhaled steroids and Nebulizer prn,   COPD,              Neurologic:  - neg neurologic ROS     Cardiovascular:     (+)  hypertension- -  CAD -  - stent- 3 Drug Eluting Stent.    CHF     ESPITIA.                     pulmonary hypertension, Previous cardiac testing   Echo: Date: 6/2023 Results:  Interpretation Summary      The left ventricle visual ejection fraction is 60-65%.Mid lateral wall mild  hypokinesia.  The right ventricular systolic function is normal.  Aortic valve sclerosis noted  The inferior vena cava was normal in size with preserved respiratory  variability.     On direct comparison to resting echo images dated 04/09/2022 mid lateral wall  hypokinesia appears new.    Stress Test:  Date: 6/2023 Results:     The nuclear stress test is negative for inducible myocardial ischemia or infarction.     Left ventricular function is hyperdynamic.     The left ventricular ejection fraction at rest is 75%.     A prior study was conducted on 4/19/2022. Compared to prior study no significant changes.    ECG Reviewed:  Date: 6/2023 Results:    Sinus rhythm  Possible Left atrial enlargement  Left axis deviation  Abnormal ECG  When compared with ECG of 17-SEP-2022 08:00,  No significant change was found    Cath:  Date: Results:   (-) orthopnea/PND   METS/Exercise Tolerance: 4 - Raking leaves, gardening Comment: Doesn't exercise purposefully,but does yard work, cleans her own home, shops and goes up and down her stairs multiple times per day.      Denies any exertional angina, but does have some exertional dyspnea with heavy exertion (carrying laundry up the stairs).    Hematologic:  - neg hematologic  ROS     Musculoskeletal: Comment: Intermittent back pain, intermittent sciatica LLE      GI/Hepatic: Comment: dysphagia    (+) GERD, Symptomatic,    hiatal hernia,              Renal/Genitourinary:  - neg Renal ROS     Endo:     (+)               Obesity,       Psychiatric/Substance Use:  - neg psychiatric ROS     Infectious Disease:  - neg infectious disease ROS     Malignancy:  - neg malignancy ROS     Other:  - neg other ROS          Virtual visit -  No vitals were obtained    Physical Exam  Constitutional: Awake, alert, cooperative, no apparent distress, and appears stated age.  Eyes: Pupils equal  HENT: Normocephalic  Respiratory:  non labored breathing   Neurologic: Awake, alert, oriented to name, place and time.   Neuropsychiatric: Calm, cooperative. Normal affect.      Prior Labs/Diagnostic Studies   All labs and imaging personally reviewed     EKG/ stress test - if available please see in ROS above       Component      Latest Ref Rng 6/22/2023  7:47 PM   WBC      4.0 - 11.0 10e3/uL 7.8    RBC Count      3.80 - 5.20 10e6/uL 4.43    Hemoglobin      11.7 - 15.7 g/dL 13.3    Hematocrit      35.0 - 47.0 % 39.8    MCV      78 - 100 fL 90    MCH      26.5 - 33.0 pg 30.0    MCHC      31.5 - 36.5 g/dL 33.4    RDW      10.0 - 15.0 % 13.9    Platelet Count      150 - 450 10e3/uL 222    % Neutrophils      % 76    % Lymphocytes      % 19    % Monocytes      % 5    % Eosinophils      % 0    % Basophils      % 0    % Immature Granulocytes      % 0    NRBCs per 100 WBC      <1 /100 0    Absolute Neutrophils      1.6 - 8.3 10e3/uL 5.9    Absolute Lymphocytes      0.8 - 5.3 10e3/uL 1.5    Absolute Monocytes      0.0 - 1.3 10e3/uL 0.4    Absolute Eosinophils      0.0 - 0.7 10e3/uL 0.0    Absolute Basophils      0.0 - 0.2 10e3/uL 0.0    Absolute Immature Granulocytes      <=0.4 10e3/uL 0.0    Absolute NRBCs      10e3/uL 0.0    Sodium      136 - 145 mmol/L 141    Potassium      3.4 - 5.3 mmol/L 3.8    Chloride      98 - 107 mmol/L 102    Carbon Dioxide (CO2)      22 - 29 mmol/L 26    Anion Gap      7 - 15 mmol/L 13    Urea Nitrogen      8.0 - 23.0 mg/dL 13.2    Creatinine      0.51 - 0.95 mg/dL 0.72    Calcium      8.8 - 10.2 mg/dL 9.5    Glucose      70 - 99 mg/dL 116 (H)    Alkaline Phosphatase      35 - 104 U/L 60    AST      0 - 45 U/L 26    ALT      0 - 50 U/L 19    Protein Total      6.4 - 8.3 g/dL 7.5    Albumin      3.5 - 5.2 g/dL 4.4    Bilirubin Total      <=1.2 mg/dL 0.4    GFR Estimate      >60 mL/min/1.73m2 82       Legend:  (H) High        The patient's records and results personally reviewed by this provider.     Outside records reviewed from:  "Care Everywhere      Assessment    Victoria BALTA Montalvo is a 83 year old female seen as a PAC referral for risk assessment and optimization for anesthesia.    Plan/Recommendations  Pt will be optimized for the proposed procedure.  See below for details on the assessment, risk, and preoperative recommendations    NEUROLOGY  - No history of TIA, CVA or seizure    -Post Op delirium risk factors:  Age and High co-morbid index    ENT  - No current airway concerns.  Will need to be reassessed day of surgery.  Mallampati: Unable to assess  TM: Unable to assess    CARDIAC  - No history of Afib  - following with cardiology for history diastolic heart failure, CAD, history of coronary spasms, hypertension, hyperlipidemia, history of pulmonary hypertension and s/p subclavian stenosis stenting in 2013 (left).  Was seen last on 7/13/23 for follow up after a June admission for chest pain.  Cardiac testing done in June as above.  No further cardiac testing was ordered and no changes were made at the 7/13/23 visit.    - METS (Metabolic Equivalents)  Patient performs 4 or more METS exercise without symptoms            Total Score: 0      RCRI-Moderate risk: Class 3  6.6% complication rate            Total Score: 2    RCRI: CAD    RCRI: CHF        PULMONARY  - Obstructive Sleep Apnea  KEN without home CPAP use.    - Asthma  Well controlled  - COPD  Well controlled  - Tobacco History    History   Smoking Status    Former    Packs/day: 1.00    Years: 30.00    Types: Cigarettes    Start date: 7/15/1973    Quit date: 5/19/1993   Smokeless Tobacco    Never       GI  - GERD isn't well controlled despite prevacid use.  - dysphagia - procedure planned as above.   PONV Medium Risk  Total Score: 2           1 AN PONV: Pt is Female    1 AN PONV: Patient is not a current smoker            ENDOCRINE   - BMI: Estimated body mass index is 30.23 kg/m  as calculated from the following:    Height as of 7/19/23: 1.549 m (5' 1\").    Weight as of 7/25/23: 72.6 " kg (160 lb).  Obesity (BMI >30)  - No history of Diabetes Mellitus    HEME  VTE Low Risk 0.26%            Total Score: 1    VTE: Greater than 59 yrs old      - hold aspirin DOS      MSK  - intermittent back pain and sciatica.  Consider cautious positioning.         Different anesthesia methods/types have been discussed with the patient, but they are aware that the final plan will be decided by the assigned anesthesia provider on the date of service.      The patient is optimized for their procedure. AVS with information on surgery time/arrival time, meds and NPO status given by nursing staff. No further diagnostic testing indicated.    Please refer to the physical examination documented by the anesthesiologist in the anesthesia record on the day of surgery.    Video-Visit Details    Type of service:  Video Visit    Provider received verbal consent for a Video Visit from the patient? Yes   Video Start Time:  1129  Video End Time: 1115    Originating Location (pt. Location): Home    Distant Location (provider location):  Off-site  Mode of Communication:  Video Conference via [employer]  On the day of service:     Prep time: 9 minutes  Visit time: 17 minutes  Documentation time: 15 minutes  ------------------------------------------  Total time: 41 minutes      NIMESH Cotton CNP  Preoperative Assessment Center  [facility]  [facility]  Phone: [phone]  Fax: [phone]

## 2023-08-17 ENCOUNTER — HOSPITAL ENCOUNTER (OUTPATIENT)
Facility: CLINIC | Age: 83
Discharge: HOME | End: 2023-08-17
Attending: INTERNAL MEDICINE | Admitting: INTERNAL MEDICINE
Payer: COMMERCIAL

## 2023-08-17 ENCOUNTER — ANESTHESIA (OUTPATIENT)
Dept: ANESTHESIOLOGY | Facility: CLINIC | Age: 83
End: 2023-08-17
Payer: COMMERCIAL

## 2023-08-17 VITALS
DIASTOLIC BLOOD PRESSURE: 95 MMHG | SYSTOLIC BLOOD PRESSURE: 115 MMHG | HEART RATE: 82 BPM | OXYGEN SATURATION: 95 % | RESPIRATION RATE: 16 BRPM

## 2023-08-17 DIAGNOSIS — K44.9 HIATAL HERNIA: Primary | ICD-10-CM

## 2023-08-17 LAB — UPPER GI ENDOSCOPY: NORMAL

## 2023-08-17 PROCEDURE — 258N000003 HC RX IP 258 OP 636: Performed by: ANESTHESIOLOGY

## 2023-08-17 PROCEDURE — 370N000017 HC ANESTHESIA TECHNICAL FEE, PER MIN: Performed by: INTERNAL MEDICINE

## 2023-08-17 PROCEDURE — 250N000011 HC RX IP 250 OP 636: Performed by: ANESTHESIOLOGY

## 2023-08-17 PROCEDURE — 43235 EGD DIAGNOSTIC BRUSH WASH: CPT | Performed by: INTERNAL MEDICINE

## 2023-08-17 PROCEDURE — 250N000009 HC RX 250: Performed by: ANESTHESIOLOGY

## 2023-08-17 RX ORDER — FLUMAZENIL 0.1 MG/ML
0.2 INJECTION, SOLUTION INTRAVENOUS
Status: CANCELLED | OUTPATIENT
Start: 2023-08-17 | End: 2023-08-17

## 2023-08-17 RX ORDER — NALOXONE HYDROCHLORIDE 0.4 MG/ML
0.4 INJECTION, SOLUTION INTRAMUSCULAR; INTRAVENOUS; SUBCUTANEOUS
Status: CANCELLED | OUTPATIENT
Start: 2023-08-17

## 2023-08-17 RX ORDER — ONDANSETRON 2 MG/ML
4 INJECTION INTRAMUSCULAR; INTRAVENOUS EVERY 6 HOURS PRN
Status: CANCELLED | OUTPATIENT
Start: 2023-08-17

## 2023-08-17 RX ORDER — HYDROMORPHONE HCL IN WATER/PF 6 MG/30 ML
0.2 PATIENT CONTROLLED ANALGESIA SYRINGE INTRAVENOUS EVERY 5 MIN PRN
Status: CANCELLED | OUTPATIENT
Start: 2023-08-17

## 2023-08-17 RX ORDER — OXYCODONE HYDROCHLORIDE 10 MG/1
10 TABLET ORAL
Status: CANCELLED | OUTPATIENT
Start: 2023-08-17

## 2023-08-17 RX ORDER — ONDANSETRON 2 MG/ML
INJECTION INTRAMUSCULAR; INTRAVENOUS PRN
Status: DISCONTINUED | OUTPATIENT
Start: 2023-08-17 | End: 2023-08-17

## 2023-08-17 RX ORDER — PROCHLORPERAZINE MALEATE 5 MG
5 TABLET ORAL EVERY 6 HOURS PRN
Status: CANCELLED | OUTPATIENT
Start: 2023-08-17

## 2023-08-17 RX ORDER — PROPOFOL 10 MG/ML
INJECTION, EMULSION INTRAVENOUS PRN
Status: DISCONTINUED | OUTPATIENT
Start: 2023-08-17 | End: 2023-08-17

## 2023-08-17 RX ORDER — FENTANYL CITRATE 50 UG/ML
50 INJECTION, SOLUTION INTRAMUSCULAR; INTRAVENOUS EVERY 5 MIN PRN
Status: CANCELLED | OUTPATIENT
Start: 2023-08-17

## 2023-08-17 RX ORDER — LIDOCAINE 40 MG/G
CREAM TOPICAL
Status: DISCONTINUED | OUTPATIENT
Start: 2023-08-17 | End: 2023-08-17 | Stop reason: HOSPADM

## 2023-08-17 RX ORDER — SODIUM CHLORIDE, SODIUM LACTATE, POTASSIUM CHLORIDE, CALCIUM CHLORIDE 600; 310; 30; 20 MG/100ML; MG/100ML; MG/100ML; MG/100ML
INJECTION, SOLUTION INTRAVENOUS CONTINUOUS PRN
Status: DISCONTINUED | OUTPATIENT
Start: 2023-08-17 | End: 2023-08-17

## 2023-08-17 RX ORDER — ONDANSETRON 2 MG/ML
4 INJECTION INTRAMUSCULAR; INTRAVENOUS EVERY 30 MIN PRN
Status: CANCELLED | OUTPATIENT
Start: 2023-08-17

## 2023-08-17 RX ORDER — ONDANSETRON 4 MG/1
4 TABLET, ORALLY DISINTEGRATING ORAL EVERY 30 MIN PRN
Status: CANCELLED | OUTPATIENT
Start: 2023-08-17

## 2023-08-17 RX ORDER — SODIUM CHLORIDE, SODIUM LACTATE, POTASSIUM CHLORIDE, CALCIUM CHLORIDE 600; 310; 30; 20 MG/100ML; MG/100ML; MG/100ML; MG/100ML
INJECTION, SOLUTION INTRAVENOUS CONTINUOUS
Status: DISCONTINUED | OUTPATIENT
Start: 2023-08-17 | End: 2023-08-17 | Stop reason: HOSPADM

## 2023-08-17 RX ORDER — ONDANSETRON 4 MG/1
4 TABLET, ORALLY DISINTEGRATING ORAL EVERY 6 HOURS PRN
Status: CANCELLED | OUTPATIENT
Start: 2023-08-17

## 2023-08-17 RX ORDER — NALOXONE HYDROCHLORIDE 0.4 MG/ML
0.2 INJECTION, SOLUTION INTRAMUSCULAR; INTRAVENOUS; SUBCUTANEOUS
Status: CANCELLED | OUTPATIENT
Start: 2023-08-17

## 2023-08-17 RX ORDER — LIDOCAINE HYDROCHLORIDE 20 MG/ML
INJECTION, SOLUTION INFILTRATION; PERINEURAL PRN
Status: DISCONTINUED | OUTPATIENT
Start: 2023-08-17 | End: 2023-08-17

## 2023-08-17 RX ORDER — HYDROMORPHONE HCL IN WATER/PF 6 MG/30 ML
0.4 PATIENT CONTROLLED ANALGESIA SYRINGE INTRAVENOUS EVERY 5 MIN PRN
Status: CANCELLED | OUTPATIENT
Start: 2023-08-17

## 2023-08-17 RX ORDER — PROPOFOL 10 MG/ML
INJECTION, EMULSION INTRAVENOUS CONTINUOUS PRN
Status: DISCONTINUED | OUTPATIENT
Start: 2023-08-17 | End: 2023-08-17

## 2023-08-17 RX ORDER — ONDANSETRON 2 MG/ML
4 INJECTION INTRAMUSCULAR; INTRAVENOUS
Status: DISCONTINUED | OUTPATIENT
Start: 2023-08-17 | End: 2023-08-17 | Stop reason: HOSPADM

## 2023-08-17 RX ORDER — SODIUM CHLORIDE, SODIUM LACTATE, POTASSIUM CHLORIDE, CALCIUM CHLORIDE 600; 310; 30; 20 MG/100ML; MG/100ML; MG/100ML; MG/100ML
INJECTION, SOLUTION INTRAVENOUS CONTINUOUS
Status: CANCELLED | OUTPATIENT
Start: 2023-08-17

## 2023-08-17 RX ORDER — OXYCODONE HYDROCHLORIDE 5 MG/1
5 TABLET ORAL
Status: CANCELLED | OUTPATIENT
Start: 2023-08-17

## 2023-08-17 RX ORDER — FENTANYL CITRATE 50 UG/ML
25 INJECTION, SOLUTION INTRAMUSCULAR; INTRAVENOUS EVERY 5 MIN PRN
Status: CANCELLED | OUTPATIENT
Start: 2023-08-17

## 2023-08-17 RX ADMIN — ONDANSETRON 4 MG: 2 INJECTION INTRAMUSCULAR; INTRAVENOUS at 10:08

## 2023-08-17 RX ADMIN — PROPOFOL 150 MCG/KG/MIN: 10 INJECTION, EMULSION INTRAVENOUS at 10:01

## 2023-08-17 RX ADMIN — BENZOCAINE 1 EACH: 220 SPRAY, METERED PERIODONTAL at 10:08

## 2023-08-17 RX ADMIN — LIDOCAINE HYDROCHLORIDE 50 MG: 20 INJECTION, SOLUTION INFILTRATION; PERINEURAL at 10:01

## 2023-08-17 RX ADMIN — SODIUM CHLORIDE, POTASSIUM CHLORIDE, SODIUM LACTATE AND CALCIUM CHLORIDE: 600; 310; 30; 20 INJECTION, SOLUTION INTRAVENOUS at 10:01

## 2023-08-17 RX ADMIN — PROPOFOL 30 MG: 10 INJECTION, EMULSION INTRAVENOUS at 10:08

## 2023-08-17 ASSESSMENT — COPD QUESTIONNAIRES: COPD: 1

## 2023-08-17 ASSESSMENT — ACTIVITIES OF DAILY LIVING (ADL)
ADLS_ACUITY_SCORE: 35
ADLS_ACUITY_SCORE: 35

## 2023-08-17 ASSESSMENT — ENCOUNTER SYMPTOMS: ORTHOPNEA: 0
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
## 2023-08-17 NOTE — ANESTHESIA CARE TRANSFER NOTE
Patient: June V Holden    Procedure: Procedure(s):  Esophagoscopy, gastroscopy, duodenoscopy (EGD), combined       Diagnosis: Esophageal dysphagia [R13.19]  Hiatal hernia [K44.9]  GERD without esophagitis [K21.9]  Diagnosis Additional Information: No value filed.    Anesthesia Type:   MAC     Note:    Oropharynx: oropharynx clear of all foreign objects  Level of Consciousness: awake  Oxygen Supplementation: room air    Independent Airway: airway patency satisfactory and stable  Dentition: dentition unchanged  Vital Signs Stable: post-procedure vital signs reviewed and stable  Report to RN Given: handoff report given  Patient transferred to: Phase II    Handoff Report: Identifed the Patient, Identified the Reponsible Provider, Reviewed the pertinent medical history, Discussed the surgical course, Reviewed Intra-OP anesthesia mangement and issues during anesthesia, Set expectations for post-procedure period and Allowed opportunity for questions and acknowledgement of understanding      Vitals:  Vitals Value Taken Time   BP     Temp     Pulse     Resp     SpO2         Electronically Signed By: NIMESH Land CRNA  August 17, 2023  10:20 AM

## 2023-08-17 NOTE — PROGRESS NOTES
Clinic Care Coordination Contact  Care Coordination Clinician Chart Review    Situation: Patient chart reviewed by Care Coordinator.       Background: Care Coordination Program started: 11/25/2022. Initial assessment completed and patient-centered care plan(s) were developed with participation from patient. Lead CC handed patient off to CHW for continued outreaches.       Assessment: Per chart review, patient outreach completed by CC CHW on 3/29/2023.  Care Coordination received notification pt spouse passed 4/1/2023. Goal in place was to assist with establishing with caregiver supports. This goal was deleted on this date.       Plan/Recommendations: Care Coordination team to assess pt interest in continuing care coordination and if there are any other resources or supports which are needed in the wake of current season of grief. SWCC to outreach in 3-5 business days.      Plan of Care updated and sent to patient: No    PARAMJIT Burroughs  Clinic Care Coordinator  [facility]  [phone]  [email or web address]                 Unit RN to OR RN

## 2023-08-17 NOTE — OR NURSING
Patient underwent EGD under MAC sedation. Tolerated procedure. Report given to endo recovery RN.

## 2023-08-17 NOTE — ANESTHESIA POSTPROCEDURE EVALUATION
Patient: June V Montalvo    Procedure: Procedure(s):  Esophagoscopy, gastroscopy, duodenoscopy (EGD), combined       Anesthesia Type:  MAC    Note:  Disposition: Outpatient   Postop Pain Control: Uneventful            Sign Out: Well controlled pain   PONV: No   Neuro/Psych: Uneventful            Sign Out: Acceptable/Baseline neuro status   Airway/Respiratory: Uneventful            Sign Out: Acceptable/Baseline resp. status   CV/Hemodynamics: Uneventful            Sign Out: Acceptable CV status; No obvious hypovolemia; No obvious fluid overload   Other NRE: NONE   DID A NON-ROUTINE EVENT OCCUR? No           Last vitals:  Vitals Value Taken Time   BP 92/73 08/17/23 1023   Temp     Pulse 81 08/17/23 1023   Resp 12 08/17/23 1023   SpO2 94 % 08/17/23 1023       Electronically Signed By: CLAUDIA GUSTAFSON MD  August 17, 2023  10:38 AM

## 2023-08-17 NOTE — ANESTHESIA PREPROCEDURE EVALUATION
Anesthesia Pre-Procedure Evaluation    Patient: Victoria Montalvo   MRN: [id number] : 1940        Procedure : Procedure(s):  Esophagoscopy, gastroscopy, duodenoscopy (EGD), combined          Past Medical History:   Diagnosis Date    Abnormal Papanicolaou smear of cervix and cervical HPV     colposcopy     Arthritis     Asthma     on preventative meds and has nebulizer    Chronic rhinitis     COPD (chronic obstructive pulmonary disease)     Coronary artery disease     17 SHERLEY--PDA    Diastolic CHF, chronic 2012    Gastro-oesophageal reflux disease     Hyperlipidemia 10/31/2011    KEN (obstructive sleep apnea)     CPAP    Personal history of colonic polyps     colonoscopy 2002 (due in )    Pulmonary HTN     Seasonal allergies     Subclavian artery stenosis, left 2013    S/p stent in      Subclinical hyperthyroidism 10/17/2016    Uncomplicated asthma       Past Surgical History:   Procedure Laterality Date    ARTHROPLASTY HIP Right 10/19/2015    Procedure: ARTHROPLASTY HIP;  Surgeon: Aron Torres MD;  Location: [facility] OR    COLONOSCOPY  2008    Polyp removed, bx.    COLONOSCOPY  2010    COLONOSCOPY N/A 2015    Procedure: COLONOSCOPY;  Surgeon: Gato Quiles MD;  Location:  GI    CT CORONARY ANGIOGRAM  2017    SHERLEY to PDA    CV CORONARY ANGIOGRAM N/A 2020    Procedure: Coronary Angiogram;  Surgeon: Handy Denise MD;  Location:  HEART CARDIAC CATH LAB    CV CORONARY ANGIOGRAM N/A 2021    Procedure: Coronary Angiogram;  Surgeon: Handy Denise MD;  Location: [facility] CARDIAC CATH LAB    CV INSTANTANEOUS WAVE-FREE RATIO N/A 2020    Procedure: Instantaneous Wave-Free Ratio;  Surgeon: Handy Denise MD;  Location: [facility] CARDIAC CATH LAB    CV LEFT VENTRICULOGRAM N/A 2020    Procedure: Left Ventriculogram;  Surgeon: Handy Denise MD;  Location:  HEART CARDIAC CATH LAB    Liposuction of legs and  stomach  1990    MAMMOPLASTY REDUCTION  1989    OPEN REDUCTION INTERNAL FIXATION ANKLE  1987    Removal of ankle hardware NOS  1990    SOFT TISSUE SURGERY   &     Liposuction    VASCULAR SURGERY  2013    angiogram & left subclavical artery stent      Allergies   Allergen Reactions    Animal Dander     Dust Mites     Kiwi Itching     Itching and red all over    Pollen Extract      Pollen,dust, trees, grass, mold-hayfever symptoms      Social History     Tobacco Use    Smoking status: Former     Packs/day: 1.00     Years: 30.00     Pack years: 30.00     Types: Cigarettes     Start date: 7/15/1973     Quit date: 1993     Years since quittin.2    Smokeless tobacco: Never   Substance Use Topics    Alcohol use: Not Currently      Wt Readings from Last 1 Encounters:   23 72.6 kg (160 lb)        Anesthesia Evaluation   Pt has had prior anesthetic. Type: General.    No history of anesthetic complications       ROS/MED HX  ENT/Pulmonary:     (+) sleep apnea, doesn't use CPAP,         allergic rhinitis,          asthma Last exacerbation: >2-3 months,  Treatment: Inhaler prn, Inhaled steroids and Nebulizer prn,   COPD,              Neurologic:  - neg neurologic ROS     Cardiovascular:     (+)  hypertension- -  CAD -  - stent- 3 Drug Eluting Stent.    CHF     ESPITIA.                     pulmonary hypertension, Previous cardiac testing   Echo: Date: 2023 Results:  Interpretation Summary     The left ventricle visual ejection fraction is 60-65%.Mid lateral wall mild  hypokinesia.  The right ventricular systolic function is normal.  Aortic valve sclerosis noted  The inferior vena cava was normal in size with preserved respiratory  variability.     On direct comparison to resting echo images dated 2022 mid lateral wall  hypokinesia appears new.    Stress Test:  Date: 2023 Results:     The nuclear stress test is negative for inducible myocardial ischemia or infarction.     Left ventricular  function is hyperdynamic.     The left ventricular ejection fraction at rest is 75%.     A prior study was conducted on 4/19/2022. Compared to prior study no significant changes.    ECG Reviewed:  Date: 6/2023 Results:    Sinus rhythm  Possible Left atrial enlargement  Left axis deviation  Abnormal ECG  When compared with ECG of 17-SEP-2022 08:00,  No significant change was found    Cath:  Date: Results:   (-) orthopnea/PND   METS/Exercise Tolerance: 4 - Raking leaves, gardening Comment: Doesn't exercise purposefully,but does yard work, cleans her own home, shops and goes up and down her stairs multiple times per day.      Denies any exertional angina, but does have some exertional dyspnea with heavy exertion (carrying laundry up the stairs).    Hematologic:  - neg hematologic  ROS     Musculoskeletal: Comment: Intermittent back pain, intermittent sciatica LLE      GI/Hepatic: Comment: dysphagia    (+) GERD, Symptomatic,    hiatal hernia,              Renal/Genitourinary:  - neg Renal ROS     Endo:     (+)               Obesity,       Psychiatric/Substance Use:  - neg psychiatric ROS     Infectious Disease:  - neg infectious disease ROS     Malignancy:  - neg malignancy ROS     Other:  - neg other ROS          Physical Exam    Airway        Mallampati: I   TM distance: > 3 FB   Neck ROM: full   Mouth opening: > 3 cm    Respiratory Devices and Support         Dental       (+) Minor Abnormalities - some fillings, tiny chips      Cardiovascular   cardiovascular exam normal          Pulmonary   pulmonary exam normal                OUTSIDE LABS:  CBC:   Lab Results   Component Value Date    WBC 7.8 06/22/2023    WBC 8.0 06/07/2023    HGB 13.3 06/22/2023    HGB 13.1 06/07/2023    HCT 39.8 06/22/2023    HCT 39.9 06/07/2023     06/22/2023     06/07/2023     BMP:   Lab Results   Component Value Date     06/22/2023     06/07/2023    POTASSIUM 3.8 06/22/2023    POTASSIUM 3.8 06/07/2023    CHLORIDE 102  06/22/2023    CHLORIDE 106 06/07/2023    CO2 26 06/22/2023    CO2 28 06/07/2023    BUN 13.2 06/22/2023    BUN 14.8 06/07/2023    CR 0.72 06/22/2023    CR 0.80 06/07/2023     (H) 06/22/2023     (H) 06/07/2023     COAGS:   Lab Results   Component Value Date    PTT 28 07/01/2021    INR 1.13 07/01/2021     POC:   Lab Results   Component Value Date    BGM 93 10/19/2015     HEPATIC:   Lab Results   Component Value Date    ALBUMIN 4.4 06/22/2023    PROTTOTAL 7.5 06/22/2023    ALT 19 06/22/2023    AST 26 06/22/2023    ALKPHOS 60 06/22/2023    BILITOTAL 0.4 06/22/2023     OTHER:   Lab Results   Component Value Date    PHILLIP 9.5 06/22/2023    LIPASE 19 06/22/2023    TSH 1.27 07/22/2021    T4 0.99 07/18/2019    SED 41 (H) 12/29/2004       Anesthesia Plan    ASA Status:  4       Anesthesia Type: MAC.   Induction: Propofol.   Maintenance: TIVA.        Consents    Anesthesia Plan(s) and associated risks, benefits, and realistic alternatives discussed. Questions answered and patient/representative(s) expressed understanding.     - Discussed: Risks, Benefits and Alternatives for the PROCEDURE were discussed     - Discussed with:  Patient      - Extended Intubation/Ventilatory Support Discussed: No.      - Patient is DNR/DNI Status: No     Use of blood products discussed: No .     Postoperative Care    Pain management: IV analgesics, Multi-modal analgesia.   PONV prophylaxis: Ondansetron (or other 5HT-3), Dexamethasone or Solumedrol     Comments:                CLAUDIA GUSTAFSON MD

## 2023-08-18 ENCOUNTER — MYC MEDICAL ADVICE (OUTPATIENT)
Dept: GASTROENTEROLOGY | Facility: CLINIC | Age: 83
End: 2023-08-18
Payer: COMMERCIAL

## 2023-08-18 DIAGNOSIS — K44.9 HIATAL HERNIA: Primary | ICD-10-CM

## 2023-08-21 ENCOUNTER — PATIENT OUTREACH (OUTPATIENT)
Dept: ONCOLOGY | Facility: CLINIC | Age: 83
End: 2023-08-21
Payer: COMMERCIAL

## 2023-08-21 NOTE — PROGRESS NOTES
New Patient Oncology Nurse Navigator Note     Referring provider: Dao Crawford PA-C    Referring Clinic/Organization: [facility]  Referred to: Thoracic Surgery  Requested provider (if applicable): First available - did not specify   Referral Received: 08/21/23       Evaluation for :   Diagnosis   K44.9 (ICD-10-CM) - Hiatal hernia     Clinical History (per Nurse review of records provided):      06/22/2023 CT Chest/Abdomen/Pelvis (bookmarked) showed:   IMPRESSION:  1.  Moderate hiatal hernia with marked fluid distention of the esophagus. Possible obstruction secondary to impacted ingested material, distal esophageal stricture also considered. Consider follow-up GI consultation.  2.  Severe distal colonic diverticulosis.    08/17/2023 Upper GI Endoscopy (bookmarked) showed:   Impression:            - Dilation in the entire esophagus.                          - Z-line regular, 30 cm from the incisors.                          - Large hiatal hernia.                          - Normal examined duodenum.                          - No specimens collected.   Clinical Assessment / Barriers to Care (Per Nurse):    Tobacco History    Smoking Status  Former Smoking Start Date  7/15/1973 Quit Date  5/19/1993 Smoking Frequency  1 pack/day for 30.00 years (30.00 pk-yrs)     Records Location: Psychiatric   Records Needed: None  Additional testing needed prior to consult: None    MELVIN FriasN, RN, OCN  [facility] Oncology Nurse Navigator  [phone] / [phone]

## 2023-08-23 NOTE — TELEPHONE ENCOUNTER
Patient had canceled appointment on 10/27/23 as they will be out of town. Spoke with patient and rescheduled for an in person visit on 12/4/23 with Suzette Crawford.

## 2023-08-30 NOTE — TELEPHONE ENCOUNTER
RECORDS STATUS - ALL OTHER DIAGNOSIS      RECORDS RECEIVED FROM:    Appt Date: 9/11/2023 with Dr. Bello - Appt Date changed to 9/18/2023   NOTES STATUS DETAILS   OFFICE NOTE from referring provider Complete Suzette Nettles PA-C    DISCHARGE SUMMARY from hospital     DISCHARGE REPORT from the ER     OPERATIVE REPORT Complete 8/17/2023 Upper GI Endoscopy    MEDICATION LIST Complete [facility]   CLINICAL TRIAL TREATMENTS TO DATE     LABS     PATHOLOGY REPORTS     ANYTHING RELATED TO DIAGNOSIS Complete Labs last updated on 6/22/2023   GENONOMIC TESTING     TYPE:     IMAGING (NEED IMAGES & REPORT)     CT SCANS Complete CT Chest Abdomen 6/22/2023   Xray Chest Complete 6/7/2023 more in PACS   Xray Lumbar Spine Complete 4/18/2023   MAMMO     ULTRASOUND     PET

## 2023-09-11 ENCOUNTER — PRE VISIT (OUTPATIENT)
Dept: ONCOLOGY | Facility: CLINIC | Age: 83
End: 2023-09-11
Payer: COMMERCIAL

## 2023-09-14 ENCOUNTER — OFFICE VISIT (OUTPATIENT)
Dept: PEDIATRICS | Facility: CLINIC | Age: 83
End: 2023-09-14
Payer: COMMERCIAL

## 2023-09-14 VITALS
SYSTOLIC BLOOD PRESSURE: 113 MMHG | BODY MASS INDEX: 30.29 KG/M2 | TEMPERATURE: 97.2 F | HEART RATE: 71 BPM | RESPIRATION RATE: 16 BRPM | DIASTOLIC BLOOD PRESSURE: 70 MMHG | HEIGHT: 62 IN | OXYGEN SATURATION: 97 % | WEIGHT: 164.6 LBS

## 2023-09-14 DIAGNOSIS — R45.89 LONELINESS: ICD-10-CM

## 2023-09-14 DIAGNOSIS — M54.42 ACUTE LEFT-SIDED LOW BACK PAIN WITH LEFT-SIDED SCIATICA: ICD-10-CM

## 2023-09-14 DIAGNOSIS — M19.90 ARTHRITIS: Primary | ICD-10-CM

## 2023-09-14 DIAGNOSIS — K44.9 HIATAL HERNIA: ICD-10-CM

## 2023-09-14 PROCEDURE — 99214 OFFICE O/P EST MOD 30 MIN: CPT | Performed by: PEDIATRICS

## 2023-09-14 RX ORDER — TRAMADOL HYDROCHLORIDE 50 MG/1
50 TABLET ORAL EVERY 6 HOURS PRN
Qty: 10 TABLET | Refills: 0 | Status: SHIPPED | OUTPATIENT
Start: 2023-09-14 | End: 2023-09-17

## 2023-09-14 ASSESSMENT — PAIN SCALES - GENERAL: PAINLEVEL: SEVERE PAIN (7)

## 2023-09-14 NOTE — PATIENT INSTRUCTIONS
Ok to take tramadol during your airport trip. We will see how this went at our follow up visit in November.    Continue the lansoprazole for the stomach symptoms.

## 2023-09-14 NOTE — PROGRESS NOTES
"  Assessment & Plan     (M19.90) Arthritis  (primary encounter diagnosis)  Comment: worsening left leg/hip pain.  Patient has no interested in intervention for this.   Plan: diclofenac (VOLTAREN) 1 % topical gel, [facility]        Order for DME - ONLY FOR DME            (M54.42) Acute left-sided low back pain with left-sided sciatica  Comment: she is traveling and ok'd tramadol during this time.  Plan: traMADol (ULTRAM) 50 MG tablet, [facility] Order         for DME - ONLY FOR DME            (Z65.8) Loneliness  Comment: discussed silver sneakers, hobbies, etc.   Plan: will keep discussing. She does have a dog.     DME order for alert pendent as she is scared of falling by herself at home.  She does have cane/walker she uses prn    (K44.9) Hiatal hernia  Comment: patient not interested in surgery  Plan: continue PPI             BMI:   Estimated body mass index is 30.01 kg/m  as calculated from the following:    Height as of this encounter: 1.577 m (5' 2.1\").    Weight as of this encounter: 74.7 kg (164 lb 9.6 oz).       Patient Instructions   Ok to take tramadol during your airport trip. We will see how this went at our follow up visit in November.    Continue the lansoprazole for the stomach symptoms.     Elsy Bha MD  [facility]   Victoria is a 83 year old, presenting for the following health issues:  Surgical Followup (Esophagoscopy, gastroscopy, duodenoscopy (EGD), combined)        9/14/2023    12:56 PM   Additional Questions   Roomed by clinton nguyen   Accompanied by n/a         9/14/2023    12:56 PM   Patient Reported Additional Medications   Patient reports taking the following new medications n/a       History of Present Illness       Heart Failure:  She presents for follow up of heart failure. She is experiencing shortness of breath with activity only, which is same as usual. She is experiencing lower extremity edema which is worse than usual.   She has orthopenea and is not " coughing at night. Patient is checking weight daily. She has recently had a None.  She has no side effects from medications.  She has had no other medical visits for heart failure since the last visit.    Vascular Disease:  She presents for follow up of vascular disease.      She takes daily aspirin.    Reason for visit:  Followup for a procedure and swelling and pain in leg.    She eats 2-3 servings of fruits and vegetables daily.She consumes 1 sweetened beverage(s) daily.She exercises with enough effort to increase her heart rate 20 to 29 minutes per day.  She exercises with enough effort to increase her heart rate 5 days per week. She is missing 1 dose(s) of medications per week.  She is not taking prescribed medications regularly due to other.     Last Echo: Echo result w/o MOPS: Interpretation Summary The left ventricle visual ejection fraction is 60-65%.Mid lateral wall mildhypokinesia.The right ventricular systolic function is normal.Aortic valve sclerosis notedThe inferior vena cava was normal in size with preserved respiratoryvariability. On direct comparison to resting echo images dated 04/09/2022 mid lateral wallhypokinesia appears new.    Follow-up after EGD.  Dysphagia and regurgitation.    EGD 8/17/23:  Findings:       The lumen of the esophagus was moderately dilated.        The Z-line was regular and was found 30 cm from the incisors.        There is no endoscopic evidence of esophagitis or stricture at the        gastroesophageal junction.        A large hiatal hernia was found. The proximal extent of the gastric        folds (end of tubular esophagus) was 30 cm from the incisors. The hiatal        narrowing was 38 cm from the incisors. The Z-line was 30 cm from the        incisors.        The exam of the stomach was otherwise normal.        The examined duodenum was normal.                                                                                    Impression:            - Dilation in the  "entire esophagus.                          - Z-line regular, 30 cm from the incisors.                          - Large hiatal hernia.                          - Normal examined duodenum.                          - No specimens collected.   Recommendation:        - Discharge patient to home (with escort).                          - A referral has been placed to a surgeon at the next                          available appointment to talk about the pros and cons                          of fixing your hiatal hernia. (I see referral to thoracic surgery placed 8/18/23)                         - The findings and recommendations were discussed with                          the patient.     ___________    Today     Patient not interested in hiatal hernia surgery  2. Increased swelling left leg - ramping up to left thigh. She states this feels similar to when she had her right hip replacement. Patient does have cane and walker at home. Uses cane sometimes in morning because it is hard to walk. She is currently on imdur, lasix and spironolactone.    Patient feeling depressed -  diet recently and now neighbor in hospice.  Other neighbor only there rarely (caring for their own parents).  Patient is very lonely.     Going to visit family next month for 3 weeks.               Review of Systems         Objective    /70 (BP Location: Right arm, Patient Position: Sitting, Cuff Size: Adult Large)   Pulse 71   Temp 97.2  F (36.2  C) (Temporal)   Resp 16   Ht 1.577 m (5' 2.1\")   Wt 74.7 kg (164 lb 9.6 oz)   LMP  (LMP Unknown)   SpO2 97%   BMI 30.01 kg/m    Body mass index is 30.01 kg/m .  Physical Exam                         "

## 2023-09-18 ENCOUNTER — ONCOLOGY VISIT (OUTPATIENT)
Dept: ONCOLOGY | Facility: CLINIC | Age: 83
End: 2023-09-18
Attending: PHYSICIAN ASSISTANT
Payer: COMMERCIAL

## 2023-09-18 VITALS
DIASTOLIC BLOOD PRESSURE: 68 MMHG | RESPIRATION RATE: 18 BRPM | TEMPERATURE: 98.2 F | OXYGEN SATURATION: 95 % | SYSTOLIC BLOOD PRESSURE: 115 MMHG | HEART RATE: 84 BPM | BODY MASS INDEX: 30.21 KG/M2 | WEIGHT: 165.7 LBS

## 2023-09-18 DIAGNOSIS — K44.9 HIATAL HERNIA WITH GERD: ICD-10-CM

## 2023-09-18 DIAGNOSIS — K21.9 HIATAL HERNIA WITH GERD: ICD-10-CM

## 2023-09-18 PROCEDURE — G0463 HOSPITAL OUTPT CLINIC VISIT: HCPCS | Performed by: THORACIC SURGERY (CARDIOTHORACIC VASCULAR SURGERY)

## 2023-09-18 PROCEDURE — 99203 OFFICE O/P NEW LOW 30 MIN: CPT | Performed by: THORACIC SURGERY (CARDIOTHORACIC VASCULAR SURGERY)

## 2023-09-18 NOTE — PROGRESS NOTES
THORACIC SURGERY - NEW PATIENT OFFICE VISIT      Dear Dr. Bah,    I saw Victoria Montalvo at [facility] s request in consultation for the evaluation and treatment of a hiatal hernia.     HPI  Victoria Montalvo is a 83 year old female with a large hiatal hernia. She has had dysphagia to solids for at least 10 years. She recently went to the emergency department with intractable emesis after a small bit of rice hot dish. She takes Prevacid 30 mg with breakthrough  symptoms treated with TUMS. She has taken antacids for 40 years continuously. She lost 20 pounds over the last year, with stable weight over the last 3-6 months. She takes Boost/Ensure twice daily. She had dysphagia to solid foods until undergoing the endoscopy. Since then, she has been eating fine. She has daily bowel movements. She does housework daily. She can about a block but has to stop due to leg pain (swollen legs, hip pain).     Previsit Tests   CT chest/abdomen/pelvis 6/22/2023: Moderate hiatal hernia      EGD 8/17/2023: Large hiatal hernia; Z-line at 30 cm    CT chest 9/20/2020:    Moderate hiatal hernia    PMH  Reviewed, as below    Past Medical History:   Diagnosis Date    Abnormal Papanicolaou smear of cervix and cervical HPV     colposcopy 1/97    Arthritis     Asthma     on preventative meds and has nebulizer    Chronic rhinitis     COPD (chronic obstructive pulmonary disease)     Coronary artery disease     4/4/17 SHERLEY--PDA    Diastolic CHF, chronic 02/22/2012    Gastro-oesophageal reflux disease     Hyperlipidemia 10/31/2011    KEN (obstructive sleep apnea)     CPAP    Personal history of colonic polyps     colonoscopy 9/97, 2002 (due in 2007)    Pulmonary HTN     Seasonal allergies     Subclavian artery stenosis, left 08/07/2013    S/p stent in 2013     Subclinical hyperthyroidism 10/17/2016    Uncomplicated asthma 1995        PSH  Reviewed, as below    Past Surgical History:   Procedure Laterality Date    ARTHROPLASTY HIP Right 10/19/2015     Procedure: ARTHROPLASTY HIP;  Surgeon: Aron Torres MD;  Location: [facility] OR    COLONOSCOPY  01/01/2008    Polyp removed, bx.    COLONOSCOPY  01/12/2010    COLONOSCOPY N/A 02/17/2015    Procedure: COLONOSCOPY;  Surgeon: Gato Quiles MD;  Location:  GI    CT CORONARY ANGIOGRAM  04/04/2017    SHERLEY to PDA    CV CORONARY ANGIOGRAM N/A 04/22/2020    Procedure: Coronary Angiogram;  Surgeon: Handy Denise MD;  Location:  HEART CARDIAC CATH LAB    CV CORONARY ANGIOGRAM N/A 07/01/2021    Procedure: Coronary Angiogram;  Surgeon: Handy Denise MD;  Location:  HEART CARDIAC CATH LAB    CV INSTANTANEOUS WAVE-FREE RATIO N/A 04/22/2020    Procedure: Instantaneous Wave-Free Ratio;  Surgeon: Handy Denise MD;  Location:  HEART CARDIAC CATH LAB    CV LEFT VENTRICULOGRAM N/A 04/22/2020    Procedure: Left Ventriculogram;  Surgeon: Handy Denise MD;  Location:  HEART CARDIAC CATH LAB    ESOPHAGOSCOPY, GASTROSCOPY, DUODENOSCOPY (EGD), COMBINED N/A 8/17/2023    Procedure: Esophagoscopy, gastroscopy, duodenoscopy (EGD), combined;  Surgeon: Genesis Kuhn MD;  Location:  GI    Liposuction of legs and stomach  1990    MAMMOPLASTY REDUCTION  02/1989    OPEN REDUCTION INTERNAL FIXATION ANKLE  04/1987    Removal of ankle hardware NOS  1990    SOFT TISSUE SURGERY  1989 & 1990    Liposuction    VASCULAR SURGERY  2013    angiogram & left subclavical artery stent        ETOH: Rare  TOB:  ages 19 - 53. 1 pack per day. (34 pack years); Quit 1993    Physical examination  /68 (BP Location: Right arm, Patient Position: Sitting, Cuff Size: Adult Regular)   Pulse 84   Temp 98.2  F (36.8  C) (Oral)   Resp 18   Wt 75.2 kg (165 lb 11.2 oz)   LMP  (LMP Unknown)   SpO2 95%   BMI 30.21 kg/m     Physical Exam  Constitutional:       Appearance: Normal appearance.   Eyes:      Conjunctiva/sclera: Conjunctivae normal.   Cardiovascular:      Rate and Rhythm: Normal rate.   Pulmonary:      Effort:  "Pulmonary effort is normal.   Skin:     General: Skin is warm and dry.   Neurological:      Mental Status: She is oriented to person, place, and time.   Psychiatric:         Mood and Affect: Mood normal.         Behavior: Behavior normal.         Thought Content: Thought content normal.         Judgment: Judgment normal.          From a personal perspective, she lives alone in a townhouse and she is up and down the stairs daily. 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 She is here with her daughter and has one other child. She has 5 grandchildren and [facility] [place]    IMPRESSION (K44.9,  K21.9) Hiatal hernia with GERD      This person is a 83 year old female with a symptomatic moderate hiatal hernia. She is a reasonable surgical candidate. She feels well right now.    PLAN  I spent 35 min on the date of the encounter in chart review, patient visit, review of tests, documentation and/or discussion with other providers about the issues documented above. I reviewed the plan as follows:    Procedure planned: Robot-assisted laparoscopic hiatal hernia repair, Nissen fundoplication, possible gastroplasty, gastrostomy.    The risks include, but are not limited to the following.  There is a risk of injury to the stomach, esophagus or abdominal contents.  There is a risk of injury to the vagus nerves, which could result in functional emptying issues for the stomach.  There is a risk of problems with the wrap, which can include difficulty swallowing, persistent reflux and pain.  Coughing or retching/vomiting in the weeks after surgery can result in breakdown of the repair.  If the esophagus does not reach the abdominal cavity, a \"new\" esophagus will be made out of the top of the stomach.  This gastroplasty can dilate over time, causing new swallowing difficulties, and continues to produce acid.  The wrap may loosen over time and need to be revised with a new operation.  The lung " cavities may be entered and tubes placed to evacuate air and fluid.   Finally, there is a risk of death.       She will consider the option of surgery.    Necessary Preop Tests & Appointments: Preoperative assessment clinic    Regional Anesthesia Plan: None    Anticoagulation Plan: Prophylactic Heparin         I appreciate the opportunity to participate in the care of your patient and will keep you updated.      Sincerely,    Shaji Bello MD

## 2023-09-18 NOTE — NURSING NOTE
"Oncology Rooming Note    September 18, 2023 10:16 AM   Victoria Montalvo is a 83 year old female who presents for:    Chief Complaint   Patient presents with    New Patient     Hiatal hernia        Initial Vitals: /68 (BP Location: Right arm, Patient Position: Sitting, Cuff Size: Adult Regular)   Pulse 84   Temp 98.2  F (36.8  C) (Oral)   Resp 18   Wt 75.2 kg (165 lb 11.2 oz)   LMP  (LMP Unknown)   SpO2 95%   BMI 30.21 kg/m   Estimated body mass index is 30.21 kg/m  as calculated from the following:    Height as of 9/14/23: 1.577 m (5' 2.1\").    Weight as of this encounter: 75.2 kg (165 lb 11.2 oz). Body surface area is 1.82 meters squared.  Data Unavailable Comment: Data Unavailable   No LMP recorded (lmp unknown). Patient is postmenopausal.  Allergies reviewed: Yes  Medications reviewed: Yes    Medications: Medication refills not needed today.  Pharmacy name entered into EPIC:    [facility] - [facility] [place] - [address] [place] [address] [id number] - [place], [place] - [address] AT [place] [address]    Clinical concerns: new patient. Medication reviewed on Thursday of last week, no changes.        Leslie Mojica CMA              "

## 2023-09-18 NOTE — LETTER
9/18/2023         RE: Victoria Montalvo  [address] [place] [address]        Dear Colleague,    Thank you for referring your patient, Victoria Montalvo, to the [facility]. Please see a copy of my visit note below.    THORACIC SURGERY - NEW PATIENT OFFICE VISIT      Dear Dr. Bah,    I saw Victoria Montalvo at [facility] s request in consultation for the evaluation and treatment of a hiatal hernia.     HPI  Victoria Montalvo is a 83 year old female with a large hiatal hernia. She has had dysphagia to solids for at least 10 years. She recently went to the emergency department with intractable emesis after a small bit of rice hot dish. She takes Prevacid 30 mg with breakthrough  symptoms treated with TUMS. She has taken antacids for 40 years continuously. She lost 20 pounds over the last year, with stable weight over the last 3-6 months. She takes Boost/Ensure twice daily. She had dysphagia to solid foods until undergoing the endoscopy. Since then, she has been eating fine. She has daily bowel movements. She does housework daily. She can about a block but has to stop due to leg pain (swollen legs, hip pain).     Previsit Tests   CT chest/abdomen/pelvis 6/22/2023: Moderate hiatal hernia      EGD 8/17/2023: Large hiatal hernia; Z-line at 30 cm    CT chest 9/20/2020:    Moderate hiatal hernia    PMH  Reviewed, as below    Past Medical History:   Diagnosis Date     Abnormal Papanicolaou smear of cervix and cervical HPV     colposcopy 1/97     Arthritis      Asthma     on preventative meds and has nebulizer     Chronic rhinitis      COPD (chronic obstructive pulmonary disease)      Coronary artery disease     4/4/17 SHERLEY--PDA     Diastolic CHF, chronic 02/22/2012     Gastro-oesophageal reflux disease      Hyperlipidemia 10/31/2011     KEN (obstructive sleep apnea)     CPAP     Personal history of colonic polyps     colonoscopy 9/97, 2002 (due in 2007)     Pulmonary HTN      Seasonal  allergies      Subclavian artery stenosis, left 08/07/2013    S/p stent in 2013      Subclinical hyperthyroidism 10/17/2016     Uncomplicated asthma 1995        PSH  Reviewed, as below    Past Surgical History:   Procedure Laterality Date     ARTHROPLASTY HIP Right 10/19/2015    Procedure: ARTHROPLASTY HIP;  Surgeon: Aron Torres MD;  Location: [facility] OR     COLONOSCOPY  01/01/2008    Polyp removed, bx.     COLONOSCOPY  01/12/2010     COLONOSCOPY N/A 02/17/2015    Procedure: COLONOSCOPY;  Surgeon: Gato Quiles MD;  Location: [facility] GI     CT CORONARY ANGIOGRAM  04/04/2017    SHERLEY to PDA     CV CORONARY ANGIOGRAM N/A 04/22/2020    Procedure: Coronary Angiogram;  Surgeon: Handy Denise MD;  Location:  HEART CARDIAC CATH LAB     CV CORONARY ANGIOGRAM N/A 07/01/2021    Procedure: Coronary Angiogram;  Surgeon: Handy Denise MD;  Location: [facility] CARDIAC CATH LAB     CV INSTANTANEOUS WAVE-FREE RATIO N/A 04/22/2020    Procedure: Instantaneous Wave-Free Ratio;  Surgeon: Handy Denise MD;  Location:  HEART CARDIAC CATH LAB     CV LEFT VENTRICULOGRAM N/A 04/22/2020    Procedure: Left Ventriculogram;  Surgeon: Handy Denise MD;  Location:  HEART CARDIAC CATH LAB     ESOPHAGOSCOPY, GASTROSCOPY, DUODENOSCOPY (EGD), COMBINED N/A 8/17/2023    Procedure: Esophagoscopy, gastroscopy, duodenoscopy (EGD), combined;  Surgeon: Genesis Kuhn MD;  Location:  GI     Liposuction of legs and stomach  1990     MAMMOPLASTY REDUCTION  02/1989     OPEN REDUCTION INTERNAL FIXATION ANKLE  04/1987     Removal of ankle hardware NOS  1990     SOFT TISSUE SURGERY  1989 & 1990    Liposuction     VASCULAR SURGERY  2013    angiogram & left subclavical artery stent        ETOH: Rare  TOB:  ages 19 - 53. 1 pack per day. (34 pack years); Quit 1993    Physical examination  /68 (BP Location: Right arm, Patient Position: Sitting, Cuff Size: Adult Regular)   Pulse 84   Temp 98.2  F (36.8  C) (Oral)    Resp 18   Wt 75.2 kg (165 lb 11.2 oz)   LMP  (LMP Unknown)   SpO2 95%   BMI 30.21 kg/m     Physical Exam  Constitutional:       Appearance: Normal appearance.   Eyes:      Conjunctiva/sclera: Conjunctivae normal.   Cardiovascular:      Rate and Rhythm: Normal rate.   Pulmonary:      Effort: Pulmonary effort is normal.   Skin:     General: Skin is warm and dry.   Neurological:      Mental Status: She is oriented to person, place, and time.   Psychiatric:         Mood and Affect: Mood normal.         Behavior: Behavior normal.         Thought Content: Thought content normal.         Judgment: Judgment normal.          From a personal perspective, she lives alone in a townhouse and she is up and down the stairs daily. 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 She is here with her daughter and has one other child. [facility] 5 [facility] and [facility] (in [place]    IMPRESSION (K44.9,  K21.9) Hiatal hernia with GERD      This person is a 83 year old female with a symptomatic moderate hiatal hernia. She is a reasonable surgical candidate. She feels well right now.    PLAN  I spent 35 min on the date of the encounter in chart review, patient visit, review of tests, documentation and/or discussion with other providers about the issues documented above. I reviewed the plan as follows:    Procedure planned: Robot-assisted laparoscopic hiatal hernia repair, Nissen fundoplication, possible gastroplasty, gastrostomy.    The risks include, but are not limited to the following.  There is a risk of injury to the stomach, esophagus or abdominal contents.  There is a risk of injury to the vagus nerves, which could result in functional emptying issues for the stomach.  There is a risk of problems with the wrap, which can include difficulty swallowing, persistent reflux and pain.  Coughing or retching/vomiting in the weeks after surgery can result in breakdown of the repair.  If the esophagus  "does not reach the abdominal cavity, a \"new\" esophagus will be made out of the top of the stomach.  This gastroplasty can dilate over time, causing new swallowing difficulties, and continues to produce acid.  The wrap may loosen over time and need to be revised with a new operation.  The lung cavities may be entered and tubes placed to evacuate air and fluid.   Finally, there is a risk of death.       She will consider the option of surgery.    Necessary Preop Tests & Appointments: Preoperative assessment clinic    Regional Anesthesia Plan: None    Anticoagulation Plan: Prophylactic Heparin         I appreciate the opportunity to participate in the care of your patient and will keep you updated.      Sincerely,    Shaji Bello MD        Again, thank you for allowing me to participate in the care of your patient.        Sincerely,        Shaji Bello MD  "

## 2023-09-26 ENCOUNTER — PATIENT OUTREACH (OUTPATIENT)
Dept: ONCOLOGY | Facility: CLINIC | Age: 83
End: 2023-09-26
Payer: COMMERCIAL

## 2023-09-26 NOTE — PROGRESS NOTES
"Writer called patient to inquire if she has made a decision regarding whether or not she would like to proceed with surgery with Dr. Bello (please see his note from 9/18/23). Patient indicates that she has not yet made a decision if she wants to have surgery or not at this time. She indicates that she is going to be traveling for the \"next month or so and will be back in town around 11/4/23\". Patient stated she is planning to make a decision after she returns from her travels. Patient confirmed that she has our clinic contact information, and writer relayed that if she has any further questions, or concerns, to contact our office to discuss. Patient stated understanding, all questions answered.     Writer will plan to chart check after 11/4/23 when patient returns from her travels for follow up on this.     Lynette Yancey RN on 9/26/2023 at 1:01 PM    "

## 2023-09-29 ENCOUNTER — PATIENT OUTREACH (OUTPATIENT)
Dept: ONCOLOGY | Facility: CLINIC | Age: 83
End: 2023-09-29
Payer: COMMERCIAL

## 2023-09-29 NOTE — PROGRESS NOTES
[facility]: Cancer Care                                                                                          Patient calling in wanting to discuss with Dr. Bello and Lynette RNCC regarding her surgery and how urgent she needs this procedure. She wants to know how life threatening her hernia is and what Dr. Bello thinks of delaying this procedure or if she medically needs this.     Writer to update Dr. Bello and Lynette RNCC of patient questions to follow up.     Humphrey Andrade, RN, BSN.  RN Care Coordinator     [facility]   [phone]

## 2023-10-02 ENCOUNTER — PREP FOR PROCEDURE (OUTPATIENT)
Dept: SURGERY | Facility: CLINIC | Age: 83
End: 2023-10-02
Payer: COMMERCIAL

## 2023-10-02 DIAGNOSIS — K21.9 HIATAL HERNIA WITH GERD: Primary | ICD-10-CM

## 2023-10-02 DIAGNOSIS — J45.40 MODERATE PERSISTENT ASTHMA, UNCOMPLICATED: ICD-10-CM

## 2023-10-02 DIAGNOSIS — K44.9 HIATAL HERNIA WITH GERD: Primary | ICD-10-CM

## 2023-10-02 RX ORDER — ENOXAPARIN SODIUM 100 MG/ML
40 INJECTION SUBCUTANEOUS
Status: CANCELLED | OUTPATIENT
Start: 2023-10-02

## 2023-10-02 NOTE — PROGRESS NOTES
Writer called patient to follow up with her regarding the advised procedure with Dr. Bello (please see him note from 9/18/23). Patient indicating that she is ready to proceed with scheduling the procedure, but would like to have another visit with Dr. Bello to review the procedure again. Writer able to assist with scheduling follow up visit with Dr. Bello on 10/9/23 at 11:00am for a virtual visit.    Lynette Yancey RN on 10/2/2023 at 11:36 AM

## 2023-10-05 RX ORDER — ALBUTEROL SULFATE 90 UG/1
AEROSOL, METERED RESPIRATORY (INHALATION)
Qty: 25.5 G | Refills: 1 | Status: SHIPPED | OUTPATIENT
Start: 2023-10-05

## 2023-10-07 NOTE — PROGRESS NOTES
Virtual Visit Details    Type of service:  Video Visit   Video Start Time: 11:22 AM  Video End Time:11:27 AM    Originating Location (pt. Location): Home    Distant Location (provider location):  On-site  Platform used for Video Visit: [facility]      THORACIC SURGERY -  OFFICE VISIT       Dear Dr. Bah,     I saw Victoria Montalvo at [facility] s request in consultation for the evaluation and treatment of a hiatal hernia.      HPI  From the visit of 9/18/2023: Victoria Montalvo is a 83 year old female with a large hiatal hernia. She has had dysphagia to solids for at least 10 years. She recently went to the emergency department with intractable emesis after a small bit of rice hot dish. She takes Prevacid 30 mg with breakthrough  symptoms treated with TUMS. She has taken antacids for 40 years continuously. She lost 20 pounds over the last year, with stable weight over the last 3-6 months. She takes Boost/Ensure twice daily. She had dysphagia to solid foods until undergoing the endoscopy. Since then, she has been eating fine. She has daily bowel movements. She does housework daily. She can about a block but has to stop due to leg pain (swollen legs, hip pain).  We discussed hiatal hernia repair and she has more questions.     Previsit Tests   CT chest/abdomen/pelvis 6/22/2023: Moderate hiatal hernia       EGD 8/17/2023: Large hiatal hernia; Z-line at 30 cm     CT chest 9/20/2020:    Moderate hiatal hernia     PMH  Reviewed, as below     Past Medical History        Past Medical History:   Diagnosis Date    Abnormal Papanicolaou smear of cervix and cervical HPV       colposcopy 1/97    Arthritis      Asthma       on preventative meds and has nebulizer    Chronic rhinitis      COPD (chronic obstructive pulmonary disease)      Coronary artery disease       4/4/17 SHERLEY--PDA    Diastolic CHF, chronic 02/22/2012    Gastro-oesophageal reflux disease      Hyperlipidemia 10/31/2011    KEN (obstructive sleep apnea)       CPAP    Personal  history of colonic polyps       colonoscopy 9/97, 2002 (due in 2007)    Pulmonary HTN      Seasonal allergies      Subclavian artery stenosis, left 08/07/2013     S/p stent in 2013     Subclinical hyperthyroidism 10/17/2016    Uncomplicated asthma 1995            PSH  Reviewed, as below     Past Surgical History         Past Surgical History:  Procedure  Laterality  Date  ARTHROPLASTY HIP  Right  10/19/2015  Procedure: ARTHROPLASTY HIP;  Surgeon: Aron Torres MD;  Location: [facility] OR  COLONOSCOPY     01/01/2008  Polyp removed, bx.  COLONOSCOPY     01/12/2010  COLONOSCOPY  N/A  02/17/2015  Procedure: COLONOSCOPY;  Surgeon: Gato Quiles MD;  Location:  GI  CT CORONARY ANGIOGRAM     04/04/2017  SHERLEY to PDA  CV CORONARY ANGIOGRAM  N/A  04/22/2020  Procedure: Coronary Angiogram;  Surgeon: Handy Denise MD;  Location:  HEART CARDIAC CATH LAB  CV CORONARY ANGIOGRAM  N/A  07/01/2021  Procedure: Coronary Angiogram;  Surgeon: Handy Denise MD;  Location: [facility] CARDIAC CATH LAB  CV INSTANTANEOUS WAVE-FREE RATIO  N/A  04/22/2020  Procedure: Instantaneous Wave-Free Ratio;  Surgeon: Handy Denise MD;  Location:  HEART CARDIAC CATH LAB  CV LEFT VENTRICULOGRAM  N/A  04/22/2020  Procedure: Left Ventriculogram;  Surgeon: Handy Denise MD;  Location:  HEART CARDIAC CATH LAB  ESOPHAGOSCOPY, GASTROSCOPY, DUODENOSCOPY (EGD), COMBINED  N/A  8/17/2023  Procedure: Esophagoscopy, gastroscopy, duodenoscopy (EGD), combined;  Surgeon: Genesis Kuhn MD;  Location:  GI  Liposuction of legs and stomach     1990  MAMMOPLASTY REDUCTION     02/1989  OPEN REDUCTION INTERNAL FIXATION ANKLE     04/1987  Removal of ankle hardware NOS     1990  SOFT TISSUE SURGERY     1989 & 1990  Liposuction  VASCULAR SURGERY     2013  angiogram & left subclavical artery stent            ETOH: Rare  TOB:  ages 19 - 53. 1 pack per day. (34 pack years); Quit 1993     Physical examination  /68 (BP Location:  Right arm, Patient Position: Sitting, Cuff Size: Adult Regular)   Pulse 84   Temp 98.2  F (36.8  C) (Oral)   Resp 18   Wt 75.2 kg (165 lb 11.2 oz)   LMP  (LMP Unknown)   SpO2 95%   BMI 30.21 kg/m     Physical Exam  Constitutional:       Appearance: Normal appearance.   Eyes:      Conjunctiva/sclera: Conjunctivae normal.   Cardiovascular:      Rate and Rhythm: Normal rate.   Pulmonary:      Effort: Pulmonary effort is normal.   Skin:     General: Skin is warm and dry.   Neurological:      Mental Status: She is oriented to person, place, and time.   Psychiatric:         Mood and Affect: Mood normal.         Behavior: Behavior normal.         Thought Content: Thought content normal.         Judgment: Judgment normal.            From a personal perspective, she lives alone in a townhouse and she is up and down the stairs daily. 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 She is here with her daughter and has one other child. She has 5 grandchildren and 3 [facility] (in [place])     IMPRESSION (K44.9,  K21.9) Hiatal hernia with GERD        This person is a 83 year old female with a symptomatic moderate hiatal hernia. She is a reasonable surgical candidate. She feels well right now.     PLAN  I spent 10 min on the date of the encounter in chart review, patient visit, review of tests, documentation and/or discussion with other providers about the issues documented above. I reviewed the plan as follows:     Procedure planned: Robot-assisted laparoscopic hiatal hernia repair, Nissen fundoplication, possible gastroplasty, gastrostomy.     The risks include, but are not limited to the following.  There is a risk of injury to the stomach, esophagus or abdominal contents.  There is a risk of injury to the vagus nerves, which could result in functional emptying issues for the stomach.  There is a risk of problems with the wrap, which can include difficulty swallowing, persistent reflux and  "pain.  Coughing or retching/vomiting in the weeks after surgery can result in breakdown of the repair.  If the esophagus does not reach the abdominal cavity, a \"new\" esophagus will be made out of the top of the stomach.  This gastroplasty can dilate over time, causing new swallowing difficulties, and continues to produce acid.  The wrap may loosen over time and need to be revised with a new operation.  The lung cavities may be entered and tubes placed to evacuate air and fluid.   Finally, there is a risk of death.        She would like to proceed with surgery.     Necessary Preop Tests & Appointments: Preoperative assessment clinic     Regional Anesthesia Plan: None     Anticoagulation Plan: Prophylactic Heparin            I appreciate the opportunity to participate in the care of your patient and will keep you updated.        Sincerely,     Shaji Bello MD    " yes

## 2023-10-09 ENCOUNTER — VIRTUAL VISIT (OUTPATIENT)
Dept: ONCOLOGY | Facility: CLINIC | Age: 83
End: 2023-10-09
Attending: THORACIC SURGERY (CARDIOTHORACIC VASCULAR SURGERY)
Payer: COMMERCIAL

## 2023-10-09 VITALS — WEIGHT: 161 LBS | BODY MASS INDEX: 30.4 KG/M2 | HEIGHT: 61 IN

## 2023-10-09 DIAGNOSIS — K44.9 HIATAL HERNIA WITH GERD: Primary | ICD-10-CM

## 2023-10-09 DIAGNOSIS — K21.9 HIATAL HERNIA WITH GERD: Primary | ICD-10-CM

## 2023-10-09 PROCEDURE — 99213 OFFICE O/P EST LOW 20 MIN: CPT | Mod: VID | Performed by: THORACIC SURGERY (CARDIOTHORACIC VASCULAR SURGERY)

## 2023-10-09 ASSESSMENT — PAIN SCALES - GENERAL: PAINLEVEL: NO PAIN (0)

## 2023-10-09 NOTE — LETTER
10/9/2023         RE: Victoria Montalvo  [address] [place] [address]        Dear Colleague,    Thank you for referring your patient, Victoria Montalvo, to the [facility] CANCER [facility]. Please see a copy of my visit note below.    Virtual Visit Details    Type of service:  Video Visit   Video Start Time: 11:22 AM  Video End Time:11:27 AM    Originating Location (pt. Location): Home    Distant Location (provider location):  On-site  Platform used for Video Visit: [facility]      THORACIC SURGERY -  OFFICE VISIT       Dear Dr. Bah,     I saw Victoria Montalvo at [facility] s request in consultation for the evaluation and treatment of a hiatal hernia.      HPI  From the visit of 9/18/2023: Victoria Montalvo is a 83 year old female with a large hiatal hernia. She has had dysphagia to solids for at least 10 years. She recently went to the emergency department with intractable emesis after a small bit of rice hot dish. She takes Prevacid 30 mg with breakthrough  symptoms treated with TUMS. She has taken antacids for 40 years continuously. She lost 20 pounds over the last year, with stable weight over the last 3-6 months. She takes Boost/Ensure twice daily. She had dysphagia to solid foods until undergoing the endoscopy. Since then, she has been eating fine. She has daily bowel movements. She does housework daily. She can about a block but has to stop due to leg pain (swollen legs, hip pain).  We discussed hiatal hernia repair and she has more questions.     Previsit Tests   CT chest/abdomen/pelvis 6/22/2023: Moderate hiatal hernia       EGD 8/17/2023: Large hiatal hernia; Z-line at 30 cm     CT chest 9/20/2020:    Moderate hiatal hernia     PMH  Reviewed, as below     Past Medical History        Past Medical History:   Diagnosis Date     Abnormal Papanicolaou smear of cervix and cervical HPV       colposcopy 1/97     Arthritis       Asthma       on preventative meds and has nebulizer     Chronic rhinitis        COPD (chronic obstructive pulmonary disease)       Coronary artery disease       4/4/17 SHERLEY--PDA     Diastolic CHF, chronic 02/22/2012     Gastro-oesophageal reflux disease       Hyperlipidemia 10/31/2011     KEN (obstructive sleep apnea)       CPAP     Personal history of colonic polyps       colonoscopy 9/97, 2002 (due in 2007)     Pulmonary HTN       Seasonal allergies       Subclavian artery stenosis, left 08/07/2013     S/p stent in 2013      Subclinical hyperthyroidism 10/17/2016     Uncomplicated asthma 1995            PSH  Reviewed, as below     Past Surgical History         Past Surgical History:  Procedure  Laterality  Date  ARTHROPLASTY HIP  Right  10/19/2015  Procedure: ARTHROPLASTY HIP;  Surgeon: Aron Torres MD;  Location: [facility] OR  COLONOSCOPY     01/01/2008  Polyp removed, bx.  COLONOSCOPY     01/12/2010  COLONOSCOPY  N/A  02/17/2015  Procedure: COLONOSCOPY;  Surgeon: Gato Quiles MD;  Location:  GI  CT CORONARY ANGIOGRAM     04/04/2017  SHERLEY to PDA  CV CORONARY ANGIOGRAM  N/A  04/22/2020  Procedure: Coronary Angiogram;  Surgeon: Handy Denise MD;  Location:  HEART CARDIAC CATH LAB  CV CORONARY ANGIOGRAM  N/A  07/01/2021  Procedure: Coronary Angiogram;  Surgeon: Handy Denise MD;  Location: [facility] CARDIAC CATH LAB  CV INSTANTANEOUS WAVE-FREE RATIO  N/A  04/22/2020  Procedure: Instantaneous Wave-Free Ratio;  Surgeon: Handy Denise MD;  Location:  HEART CARDIAC CATH LAB  CV LEFT VENTRICULOGRAM  N/A  04/22/2020  Procedure: Left Ventriculogram;  Surgeon: Handy Denise MD;  Location:  HEART CARDIAC CATH LAB  ESOPHAGOSCOPY, GASTROSCOPY, DUODENOSCOPY (EGD), COMBINED  N/A  8/17/2023  Procedure: Esophagoscopy, gastroscopy, duodenoscopy (EGD), combined;  Surgeon: Genesis Kuhn MD;  Location:  GI  Liposuction of legs and stomach     1990  MAMMOPLASTY REDUCTION     02/1989  OPEN REDUCTION INTERNAL FIXATION ANKLE     04/1987  Removal of ankle  hardware NOS     1990  SOFT TISSUE SURGERY     1989 & 1990  Liposuction  VASCULAR SURGERY     2013  angiogram & left subclavical artery stent            ETOH: Rare  TOB:  ages 19 - 53. 1 pack per day. (34 pack years); Quit 1993     Physical examination  /68 (BP Location: Right arm, Patient Position: Sitting, Cuff Size: Adult Regular)   Pulse 84   Temp 98.2  F (36.8  C) (Oral)   Resp 18   Wt 75.2 kg (165 lb 11.2 oz)   LMP  (LMP Unknown)   SpO2 95%   BMI 30.21 kg/m     Physical Exam  Constitutional:       Appearance: Normal appearance.   Eyes:      Conjunctiva/sclera: Conjunctivae normal.   Cardiovascular:      Rate and Rhythm: Normal rate.   Pulmonary:      Effort: Pulmonary effort is normal.   Skin:     General: Skin is warm and dry.   Neurological:      Mental Status: She is oriented to person, place, and time.   Psychiatric:         Mood and Affect: Mood normal.         Behavior: Behavior normal.         Thought Content: Thought content normal.         Judgment: Judgment normal.            From a personal perspective, she lives alone in a townhouse and she is up and down the stairs daily. 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 She is here with her daughter and has one other child. She has 5 grandchildren and 3 [facility] (in [place])     IMPRESSION (K44.9,  K21.9) Hiatal hernia with GERD        This person is a 83 year old female with a symptomatic moderate hiatal hernia. She is a reasonable surgical candidate. She feels well right now.     PLAN  I spent 10 min on the date of the encounter in chart review, patient visit, review of tests, documentation and/or discussion with other providers about the issues documented above. I reviewed the plan as follows:     Procedure planned: Robot-assisted laparoscopic hiatal hernia repair, Nissen fundoplication, possible gastroplasty, gastrostomy.     The risks include, but are not limited to the following.  There is a risk of  "injury to the stomach, esophagus or abdominal contents.  There is a risk of injury to the vagus nerves, which could result in functional emptying issues for the stomach.  There is a risk of problems with the wrap, which can include difficulty swallowing, persistent reflux and pain.  Coughing or retching/vomiting in the weeks after surgery can result in breakdown of the repair.  If the esophagus does not reach the abdominal cavity, a \"new\" esophagus will be made out of the top of the stomach.  This gastroplasty can dilate over time, causing new swallowing difficulties, and continues to produce acid.  The wrap may loosen over time and need to be revised with a new operation.  The lung cavities may be entered and tubes placed to evacuate air and fluid.   Finally, there is a risk of death.        She would like to proceed with surgery.     Necessary Preop Tests & Appointments: Preoperative assessment clinic     Regional Anesthesia Plan: None     Anticoagulation Plan: Prophylactic Heparin            I appreciate the opportunity to participate in the care of your patient and will keep you updated.        Sincerely,     Shaji Bello MD        Again, thank you for allowing me to participate in the care of your patient.        Sincerely,        Shaji Bello MD  "

## 2023-10-09 NOTE — NURSING NOTE
Is the patient currently in the state of [place]? YES    Visit mode:VIDEO    If the visit is dropped, the patient can be reconnected by: VIDEO VISIT: Send to e-mail at: [email or web address]    Will anyone else be joining the visit? NO  (If patient encounters technical issues they should call [phone]    How would you like to obtain your AVS? MyChart    Are changes needed to the allergy or medication list? Pt stated no changes to allergies and Pt stated no med changes    Reason for visit: GURU VARGAS

## 2023-10-09 NOTE — LETTER
10/9/2023         RE: Victoria Montalvo  [address] [place] [address]        Dear Colleague,    Thank you for referring your patient, Victoria Montalvo, to the [facility] CANCER [facility]. Please see a copy of my visit note below.    Virtual Visit Details    Type of service:  Video Visit   Video Start Time: 11:22 AM  Video End Time:11:27 AM    Originating Location (pt. Location): Home    Distant Location (provider location):  On-site  Platform used for Video Visit: [facility]      THORACIC SURGERY -  OFFICE VISIT       Dear Dr. Bah,     I saw Victoria Montalvo at [facility] s request in consultation for the evaluation and treatment of a hiatal hernia.      HPI  From the visit of 9/18/2023: Victoria Montalvo is a 83 year old female with a large hiatal hernia. She has had dysphagia to solids for at least 10 years. She recently went to the emergency department with intractable emesis after a small bit of rice hot dish. She takes Prevacid 30 mg with breakthrough  symptoms treated with TUMS. She has taken antacids for 40 years continuously. She lost 20 pounds over the last year, with stable weight over the last 3-6 months. She takes Boost/Ensure twice daily. She had dysphagia to solid foods until undergoing the endoscopy. Since then, she has been eating fine. She has daily bowel movements. She does housework daily. She can about a block but has to stop due to leg pain (swollen legs, hip pain).  We discussed hiatal hernia repair and she has more questions.     Previsit Tests   CT chest/abdomen/pelvis 6/22/2023: Moderate hiatal hernia       EGD 8/17/2023: Large hiatal hernia; Z-line at 30 cm     CT chest 9/20/2020:    Moderate hiatal hernia     PMH  Reviewed, as below     Past Medical History        Past Medical History:   Diagnosis Date     Abnormal Papanicolaou smear of cervix and cervical HPV       colposcopy 1/97     Arthritis       Asthma       on preventative meds and has nebulizer     Chronic rhinitis        COPD (chronic obstructive pulmonary disease)       Coronary artery disease       4/4/17 SHERLEY--PDA     Diastolic CHF, chronic 02/22/2012     Gastro-oesophageal reflux disease       Hyperlipidemia 10/31/2011     KEN (obstructive sleep apnea)       CPAP     Personal history of colonic polyps       colonoscopy 9/97, 2002 (due in 2007)     Pulmonary HTN       Seasonal allergies       Subclavian artery stenosis, left 08/07/2013     S/p stent in 2013      Subclinical hyperthyroidism 10/17/2016     Uncomplicated asthma 1995            PSH  Reviewed, as below     Past Surgical History         Past Surgical History:  Procedure  Laterality  Date  ARTHROPLASTY HIP  Right  10/19/2015  Procedure: ARTHROPLASTY HIP;  Surgeon: Aron Torres MD;  Location: [facility] OR  COLONOSCOPY     01/01/2008  Polyp removed, bx.  COLONOSCOPY     01/12/2010  COLONOSCOPY  N/A  02/17/2015  Procedure: COLONOSCOPY;  Surgeon: Gato Quiles MD;  Location:  GI  CT CORONARY ANGIOGRAM     04/04/2017  SHERLEY to PDA  CV CORONARY ANGIOGRAM  N/A  04/22/2020  Procedure: Coronary Angiogram;  Surgeon: Handy Denise MD;  Location:  HEART CARDIAC CATH LAB  CV CORONARY ANGIOGRAM  N/A  07/01/2021  Procedure: Coronary Angiogram;  Surgeon: Handy Denise MD;  Location: [facility] CARDIAC CATH LAB  CV INSTANTANEOUS WAVE-FREE RATIO  N/A  04/22/2020  Procedure: Instantaneous Wave-Free Ratio;  Surgeon: Handy Denise MD;  Location:  HEART CARDIAC CATH LAB  CV LEFT VENTRICULOGRAM  N/A  04/22/2020  Procedure: Left Ventriculogram;  Surgeon: Handy Denise MD;  Location:  HEART CARDIAC CATH LAB  ESOPHAGOSCOPY, GASTROSCOPY, DUODENOSCOPY (EGD), COMBINED  N/A  8/17/2023  Procedure: Esophagoscopy, gastroscopy, duodenoscopy (EGD), combined;  Surgeon: Genesis Kuhn MD;  Location:  GI  Liposuction of legs and stomach     1990  MAMMOPLASTY REDUCTION     02/1989  OPEN REDUCTION INTERNAL FIXATION ANKLE     04/1987  Removal of ankle  hardware NOS     1990  SOFT TISSUE SURGERY     1989 & 1990  Liposuction  VASCULAR SURGERY     2013  angiogram & left subclavical artery stent            ETOH: Rare  TOB:  ages 19 - 53. 1 pack per day. (34 pack years); Quit 1993     Physical examination  /68 (BP Location: Right arm, Patient Position: Sitting, Cuff Size: Adult Regular)   Pulse 84   Temp 98.2  F (36.8  C) (Oral)   Resp 18   Wt 75.2 kg (165 lb 11.2 oz)   LMP  (LMP Unknown)   SpO2 95%   BMI 30.21 kg/m     Physical Exam  Constitutional:       Appearance: Normal appearance.   Eyes:      Conjunctiva/sclera: Conjunctivae normal.   Cardiovascular:      Rate and Rhythm: Normal rate.   Pulmonary:      Effort: Pulmonary effort is normal.   Skin:     General: Skin is warm and dry.   Neurological:      Mental Status: She is oriented to person, place, and time.   Psychiatric:         Mood and Affect: Mood normal.         Behavior: Behavior normal.         Thought Content: Thought content normal.         Judgment: Judgment normal.            From a personal perspective, she lives alone in a townhouse and she is up and down the stairs daily. She worked at the [employer] in [place] and retired in 1997. She is here with her daughter and has one other child. She has 5 grandchildren and 3 [facility] (in [place])     IMPRESSION (K44.9,  K21.9) Hiatal hernia with GERD        This person is a 83 year old female with a symptomatic moderate hiatal hernia. She is a reasonable surgical candidate. She feels well right now.     PLAN  I spent 10 min on the date of the encounter in chart review, patient visit, review of tests, documentation and/or discussion with other providers about the issues documented above. I reviewed the plan as follows:     Procedure planned: Robot-assisted laparoscopic hiatal hernia repair, Nissen fundoplication, possible gastroplasty, gastrostomy.     The risks include, but are not limited to the following.  There is a risk of  "injury to the stomach, esophagus or abdominal contents.  There is a risk of injury to the vagus nerves, which could result in functional emptying issues for the stomach.  There is a risk of problems with the wrap, which can include difficulty swallowing, persistent reflux and pain.  Coughing or retching/vomiting in the weeks after surgery can result in breakdown of the repair.  If the esophagus does not reach the abdominal cavity, a \"new\" esophagus will be made out of the top of the stomach.  This gastroplasty can dilate over time, causing new swallowing difficulties, and continues to produce acid.  The wrap may loosen over time and need to be revised with a new operation.  The lung cavities may be entered and tubes placed to evacuate air and fluid.   Finally, there is a risk of death.        She would like to proceed with surgery.     Necessary Preop Tests & Appointments: Preoperative assessment clinic     Regional Anesthesia Plan: None     Anticoagulation Plan: Prophylactic Heparin            I appreciate the opportunity to participate in the care of your patient and will keep you updated.        Sincerely,     Shaji Bello MD        Again, thank you for allowing me to participate in the care of your patient.        Sincerely,        Shaji Bello MD  "

## 2023-10-10 ENCOUNTER — TELEPHONE (OUTPATIENT)
Dept: ONCOLOGY | Facility: CLINIC | Age: 83
End: 2023-10-10
Payer: COMMERCIAL

## 2023-10-10 NOTE — TELEPHONE ENCOUNTER
Called and spoke with pt to offer 10/13 surgery date and pt declined stating she is on her way out of town and will not be back until November. Pt stated she would prefer to have surgery at [facility] or even . Informed ppt writer would reach out to surgeon and get back to her. Pt understood and agreed.    Nicolle Reinoso on 10/10/2023 at 2:18 PM

## 2023-10-11 ENCOUNTER — PATIENT OUTREACH (OUTPATIENT)
Dept: CARE COORDINATION | Facility: CLINIC | Age: 83
End: 2023-10-11
Payer: COMMERCIAL

## 2023-10-11 NOTE — PROGRESS NOTES
Social Work - Distress Screen Intervention  [facility]    Identified Concern and Score from Distress Screenin. How concerned are you about your ability to eat? (!) 8     2. How concerned are you about unintended weight loss or your current weight? 3     3. How concerned are you about feeling depressed or very sad?  5     4. How concerned are you about feeling anxious or very scared?  (!) 8     5. Do you struggle with the loss of meaning and mirna in your life?  Quite a bit     6. How concerned are you about work and home life issues that may be affected by your cancer?  1     7. How concerned are you about knowing what resources are available to help you?  5     8. Do you currently have what you would describe as Baptism or spiritual struggles? Somewhat     9. If you want to be contacted by one of our professionals, I can send a message to them right now.  No data recorded     Date of Distress Screen: 10/09/23  Data: At time of last visit, patient scored positive on distress screening.  outreached to patient today to follow up on elevated distress and introduce psychosocial services and support.  Intervention/Education provided:  contacted patient by phone to discuss distress screening results. Left voicemail message    Follow-up Required:  to remain available for support and await return call from patient    Evelina Amezcua, MSW, LICSW, OSW-C  Clinical - Adult Oncology  She/Her/Hers  Phone: [phone]  [facility]: M, Thu  *every other Tue, 8am-4:30pm  [facility]: W, F, *every other Tue, 8am-4:30pm

## 2023-10-12 ENCOUNTER — PATIENT OUTREACH (OUTPATIENT)
Dept: CARE COORDINATION | Facility: CLINIC | Age: 83
End: 2023-10-12
Payer: COMMERCIAL

## 2023-10-12 NOTE — PROGRESS NOTES
"Social Work - Distress Screen Intervention  [facility]    Identified Concern and Score from Distress Screenin. How concerned are you about your ability to eat? (!) 8     2. How concerned are you about unintended weight loss or your current weight? 3     3. How concerned are you about feeling depressed or very sad?  5     4. How concerned are you about feeling anxious or very scared?  (!) 8     5. Do you struggle with the loss of meaning and mirna in your life?  Quite a bit     6. How concerned are you about work and home life issues that may be affected by your cancer?  1     7. How concerned are you about knowing what resources are available to help you?  5     8. Do you currently have what you would describe as Religion or spiritual struggles? Somewhat     9. If you want to be contacted by one of our professionals, I can send a message to them right now.  No data recorded     Date of Distress Screen: 10/09/23  Data: At time of last visit, patient scored positive on distress screening.  Intervention/Education provided: DARIELA received return call from . Victoria expressed that she had been feeling increasingly anxious in context of upcoming procedure given history of mother's surgeries and role that she played, 's death 2023, neighbor and good friend now on hospice.     Victoria reports that her son and granddaughters (who live in [place]) will be able to support somewhat, as will her local daughter who resides in [place].     Victoria reports feeling \"more relaxed\" after discussion with Dr. Bello.     Emotional support provided, validation of expressed feelings.     Follow-up Required:   Onc SW will continue to be available as needed for psychosocial support.   Evelina Amezcua, MSW, LICSW, OSW-C  Clinical - Adult Oncology  She/Her/Hers  Phone: [phone]  [facility]: M, Thu  *every other Tue, 8am-4:30pm  [facility]: W, F, *every other Tue, 8am-4:30pm       "

## 2023-10-12 NOTE — TELEPHONE ENCOUNTER
Spoke with patient to schedule procedure with Dr. Bello   Procedure was scheduled on 01/19/24 at [facility] OR  Patient will have H&P with PAC video    Patient is aware a COVID-19 test is needed before their procedure ONLY IF symptomatic.   (Patient is aware Thoracic is no longer requiring COVID-19 test)       Patient is aware a / is needed day of surgery.   Surgery Letter was sent via [employer],     Patient has my direct contact information for any further questions.        Jan 02, 2024 11:15 AM  (Arrive by 11:00 AM)  PAC EVALUATION with Linda Mayo PA-C  [facility] [place] ([facility] ) [phone]     Feb 12, 2024 11:15 AM  (Arrive by 11:00 AM)  Return Patient with NIMESH [facility] ([facility] ) [phone]              Nicolle Reinoso on 10/12/2023 at 4:18 PM

## 2023-10-13 NOTE — TELEPHONE ENCOUNTER
FUTURE VISIT INFORMATION      SURGERY INFORMATION:  Date: 24  Location: [facility] or  Surgeon:  Shaji Bello MD   Anesthesia Type:  general  Procedure: Robot-assisted hiatal hernia repair, Nissen fundoplication, possible gastroplasty, gastrostomy   Consult: virtual visit 10/9/23    RECORDS REQUESTED FROM:       Primary Care Provider:   Elsy Bah MD- [facility]     Pertinent Medical History: nazario, diastolic chf, mild coronary artery disease    Most recent EKG+ Tracin23    Most recent ECHO: 23    Most recent Cardiac Stress Test: 23    Most recent Coronary Angiogram: 20

## 2023-11-25 ASSESSMENT — ENCOUNTER SYMPTOMS
SORE THROAT: 0
BREAST MASS: 0
DIARRHEA: 0
FREQUENCY: 0
SHORTNESS OF BREATH: 0
PARESTHESIAS: 0
JOINT SWELLING: 0
HEMATURIA: 0
MYALGIAS: 0
HEADACHES: 0
PALPITATIONS: 0
DIZZINESS: 0
EYE PAIN: 0
CONSTIPATION: 0
ARTHRALGIAS: 0
ABDOMINAL PAIN: 0
NAUSEA: 0
COUGH: 0
HEARTBURN: 0
NERVOUS/ANXIOUS: 0
FEVER: 0
WEAKNESS: 0
CHILLS: 0
HEMATOCHEZIA: 0
DYSURIA: 0

## 2023-11-25 ASSESSMENT — ACTIVITIES OF DAILY LIVING (ADL): CURRENT_FUNCTION: NO ASSISTANCE NEEDED

## 2023-11-29 ASSESSMENT — ASTHMA QUESTIONNAIRES
ACT_TOTALSCORE: 23
ACT_TOTALSCORE: 23
QUESTION_4 LAST FOUR WEEKS HOW OFTEN HAVE YOU USED YOUR RESCUE INHALER OR NEBULIZER MEDICATION (SUCH AS ALBUTEROL): ONCE A WEEK OR LESS
QUESTION_5 LAST FOUR WEEKS HOW WOULD YOU RATE YOUR ASTHMA CONTROL: COMPLETELY CONTROLLED
QUESTION_1 LAST FOUR WEEKS HOW MUCH OF THE TIME DID YOUR ASTHMA KEEP YOU FROM GETTING AS MUCH DONE AT WORK, SCHOOL OR AT HOME: NONE OF THE TIME
QUESTION_2 LAST FOUR WEEKS HOW OFTEN HAVE YOU HAD SHORTNESS OF BREATH: ONCE OR TWICE A WEEK
QUESTION_3 LAST FOUR WEEKS HOW OFTEN DID YOUR ASTHMA SYMPTOMS (WHEEZING, COUGHING, SHORTNESS OF BREATH, CHEST TIGHTNESS OR PAIN) WAKE YOU UP AT NIGHT OR EARLIER THAN USUAL IN THE MORNING: NOT AT ALL

## 2023-11-30 ENCOUNTER — OFFICE VISIT (OUTPATIENT)
Dept: PEDIATRICS | Facility: CLINIC | Age: 83
End: 2023-11-30
Payer: COMMERCIAL

## 2023-11-30 VITALS
HEIGHT: 61 IN | DIASTOLIC BLOOD PRESSURE: 62 MMHG | BODY MASS INDEX: 30.58 KG/M2 | OXYGEN SATURATION: 99 % | RESPIRATION RATE: 16 BRPM | WEIGHT: 162 LBS | SYSTOLIC BLOOD PRESSURE: 102 MMHG | TEMPERATURE: 97.9 F | HEART RATE: 84 BPM

## 2023-11-30 DIAGNOSIS — I20.0 UNSTABLE ANGINA PECTORIS (H): ICD-10-CM

## 2023-11-30 DIAGNOSIS — J45.40 MODERATE PERSISTENT ASTHMA, UNCOMPLICATED: ICD-10-CM

## 2023-11-30 DIAGNOSIS — I20.1 VASOSPASTIC ANGINA (H): ICD-10-CM

## 2023-11-30 DIAGNOSIS — I10 HYPERTENSION, UNSPECIFIED TYPE: ICD-10-CM

## 2023-11-30 DIAGNOSIS — I25.10 MILD CORONARY ARTERY DISEASE: ICD-10-CM

## 2023-11-30 DIAGNOSIS — Z00.01 ENCOUNTER FOR GENERAL ADULT MEDICAL EXAMINATION WITH ABNORMAL FINDINGS: Primary | ICD-10-CM

## 2023-11-30 PROCEDURE — 99397 PER PM REEVAL EST PAT 65+ YR: CPT | Performed by: PEDIATRICS

## 2023-11-30 PROCEDURE — 80061 LIPID PANEL: CPT | Performed by: PEDIATRICS

## 2023-11-30 PROCEDURE — 36415 COLL VENOUS BLD VENIPUNCTURE: CPT | Performed by: PEDIATRICS

## 2023-11-30 PROCEDURE — 99214 OFFICE O/P EST MOD 30 MIN: CPT | Mod: 25 | Performed by: PEDIATRICS

## 2023-11-30 RX ORDER — ISOSORBIDE MONONITRATE 60 MG/1
TABLET, EXTENDED RELEASE ORAL
Qty: 90 TABLET | Refills: 3 | Status: SHIPPED | OUTPATIENT
Start: 2023-11-30

## 2023-11-30 RX ORDER — MONTELUKAST SODIUM 10 MG/1
1 TABLET ORAL AT BEDTIME
Qty: 90 TABLET | Refills: 3 | Status: SHIPPED | OUTPATIENT
Start: 2023-11-30

## 2023-11-30 RX ORDER — AMLODIPINE BESYLATE 5 MG/1
5 TABLET ORAL EVERY EVENING
Qty: 90 TABLET | Refills: 3 | Status: SHIPPED | OUTPATIENT
Start: 2023-11-30

## 2023-11-30 RX ORDER — RESPIRATORY SYNCYTIAL VIRUS VACCINE 120MCG/0.5
0.5 KIT INTRAMUSCULAR ONCE
Qty: 1 EACH | Refills: 0 | Status: CANCELLED | OUTPATIENT
Start: 2023-11-30 | End: 2023-11-30

## 2023-11-30 RX ORDER — ROSUVASTATIN CALCIUM 40 MG/1
40 TABLET, COATED ORAL DAILY
Qty: 90 TABLET | Refills: 3 | Status: SHIPPED | OUTPATIENT
Start: 2023-11-30

## 2023-11-30 RX ORDER — IPRATROPIUM BROMIDE AND ALBUTEROL SULFATE 2.5; .5 MG/3ML; MG/3ML
1 SOLUTION RESPIRATORY (INHALATION) EVERY 6 HOURS PRN
Qty: 90 ML | Refills: 11 | Status: SHIPPED | OUTPATIENT
Start: 2023-11-30

## 2023-11-30 ASSESSMENT — ENCOUNTER SYMPTOMS
COUGH: 0
PARESTHESIAS: 0
BREAST MASS: 0
CHILLS: 0
ARTHRALGIAS: 0
DYSURIA: 0
NERVOUS/ANXIOUS: 0
ABDOMINAL PAIN: 0
CONSTIPATION: 0
HEARTBURN: 0
DIARRHEA: 0
HEMATOCHEZIA: 0
JOINT SWELLING: 0
HEADACHES: 0
PALPITATIONS: 0
MYALGIAS: 0
EYE PAIN: 0
FREQUENCY: 0
SHORTNESS OF BREATH: 0
WEAKNESS: 0
DIZZINESS: 0
HEMATURIA: 0
NAUSEA: 0
SORE THROAT: 0
FEVER: 0

## 2023-11-30 ASSESSMENT — ACTIVITIES OF DAILY LIVING (ADL): CURRENT_FUNCTION: NO ASSISTANCE NEEDED

## 2023-11-30 NOTE — PATIENT INSTRUCTIONS
Patient Education   Personalized Prevention Plan  You are due for the preventive services outlined below.  Your care team is available to assist you in scheduling these services.  If you have already completed any of these items, please share that information with your care team to update in your medical record.  Health Maintenance Due   Topic Date Due     Zoster (Shingles) Vaccine (1 of 2) Never done     RSV VACCINE (Pregnancy & 60+) (1 - 1-dose 60+ series) Never done     Diptheria Tetanus Pertussis (DTAP/TDAP/TD) Vaccine (1 - Tdap) 04/15/2007     Pneumococcal Vaccine (2 - PCV) 12/08/2011     Colorectal Cancer Screening  02/17/2020     Heart Failure Action Plan  08/04/2020     Asthma Action Plan - yearly  04/28/2022     Flu Vaccine (1) 09/01/2023     COVID-19 Vaccine (6 - 2023-24 season) 09/01/2023     Cholesterol Lab  11/25/2023     ANNUAL REVIEW OF HM ORDERS  11/25/2023     Basic Metabolic Panel  12/22/2023     Preventive Health Recommendations    See your health care provider every year to  Review health changes.   Discuss preventive care.    Review your medicines if your doctor has prescribed any.  You no longer need a yearly Pap test unless you've had an abnormal Pap test in the past 10 years. If you have vaginal symptoms, such as bleeding or discharge, be sure to talk with your provider about a Pap test.  Every 1 to 2 years, have a mammogram.  If you are over 69, talk with your health care provider about whether or not you want to continue having screening mammograms.  Every 10 years, have a colonoscopy. Or, have a yearly FIT test (stool test). These exams will check for colon cancer.   Have a cholesterol test every 5 years, or more often if your doctor advises it.   Have a diabetes test (fasting glucose) every three years. If you are at risk for diabetes, you should have this test more often.   At age 65, have a bone density scan (DEXA) to check for osteoporosis (brittle bone disease).    Shots:  Get a flu  shot each year.  Get a tetanus shot every 10 years.  Talk to your doctor about your pneumonia vaccines. There are now two you should receive - Pneumovax (PPSV 23) and Prevnar (PCV 13).  Talk to your pharmacist about the shingles vaccine.  Talk to your doctor about the hepatitis B vaccine.    Nutrition:   Eat at least 5 servings of fruits and vegetables each day.  Eat whole-grain bread, whole-wheat pasta and brown rice instead of white grains and rice.  Get adequate Calcium and Vitamin D.     Lifestyle  Exercise at least 150 minutes a week (30 minutes a day, 5 days a week). This will help you control your weight and prevent disease.  Limit alcohol to one drink per day.  No smoking.   Wear sunscreen to prevent skin cancer.   See your dentist twice a year for an exam and cleaning.  See your eye doctor every 1 to 2 years to screen for conditions such as glaucoma, macular degeneration and cataracts.    Personalized Prevention Plan  You are due for the preventive services outlined below.  Your care team is available to assist you in scheduling these services.  If you have already completed any of these items, please share that information with your care team to update in your medical record.  Health Maintenance   Topic Date Due     ZOSTER IMMUNIZATION (1 of 2) Never done     RSV VACCINE (Pregnancy & 60+) (1 - 1-dose 60+ series) Never done     DTAP/TDAP/TD IMMUNIZATION (1 - Tdap) 04/15/2007     Pneumococcal Vaccine: 65+ Years (2 - PCV) 12/08/2011     COLORECTAL CANCER SCREENING  02/17/2020     HF ACTION PLAN  08/04/2020     ASTHMA ACTION PLAN  04/28/2022     INFLUENZA VACCINE (1) 09/01/2023     COVID-19 Vaccine (6 - 2023-24 season) 09/01/2023     LIPID  11/25/2023     ANNUAL REVIEW OF HM ORDERS  11/25/2023     BMP  12/22/2023     ASTHMA CONTROL TEST  05/30/2024     ALT  06/22/2024     CBC  06/22/2024     MEDICARE ANNUAL WELLNESS VISIT  11/30/2024     FALL RISK ASSESSMENT  11/30/2024     ADVANCE CARE PLANNING  11/25/2027      [facility]  08/09/2032     TSH W/FREE T4 REFLEX  Completed     PHQ-2 (once per calendar year)  Completed     IPV IMMUNIZATION  Aged Out     HPV IMMUNIZATION  Aged Out     MENINGITIS IMMUNIZATION  Aged Out     RSV MONOCLONAL ANTIBODY  Aged Out     Bladder Training: Care Instructions  Your Care Instructions     Bladder training is used to treat urge incontinence and stress incontinence. Urge incontinence means that the need to urinate comes on so fast that you can't get to a toilet in time. Stress incontinence means that you leak urine because of pressure on your bladder. For example, it may happen when you laugh, cough, or lift something heavy.  Bladder training can increase how long you can wait before you have to urinate. It can also help your bladder hold more urine. And it can give you better control over the urge to urinate.  It is important to remember that bladder training takes a few weeks to a few months to make a difference. You may not see results right away, but don't give up.  Follow-up care is a key part of your treatment and safety. Be sure to make and go to all appointments, and call your doctor if you are having problems. It's also a good idea to know your test results and keep a list of the medicines you take.  How can you care for yourself at home?  Work with your doctor to come up with a bladder training program that is right for you. You may use one or more of the following methods.  Delayed urination  In the beginning, try to keep from urinating for 5 minutes after you first feel the need to go.  While you wait, take deep, slow breaths to relax. Kegel exercises can also help you delay the need to go to the bathroom.  After some practice, when you can easily wait 5 minutes to urinate, try to wait 10 minutes before you urinate.  Slowly increase the waiting period until you are able to control when you have to urinate.  Scheduled urination  Empty your bladder when you first wake up in the  "morning.  Schedule times throughout the day when you will urinate.  Start by going to the bathroom every hour, even if you don't need to go.  Slowly increase the time between trips to the bathroom.  When you have found a schedule that works well for you, keep doing it.  If you wake up during the night and have to urinate, do it. Apply your schedule to waking hours only.  Kegel exercises  These tighten and strengthen pelvic muscles, which can help you control the flow of urine. (If doing these exercises causes pain, stop doing them and talk with your doctor.) To do Kegel exercises:  Squeeze your muscles as if you were trying not to pass gas. Or squeeze your muscles as if you were stopping the flow of urine. Your belly, legs, and buttocks shouldn't move.  Hold the squeeze for 3 seconds, then relax for 5 to 10 seconds.  Start with 3 seconds, then add 1 second each week until you are able to squeeze for 10 seconds.  Repeat the exercise 10 times a session. Do 3 to 8 sessions a day.  When should you call for help?  Watch closely for changes in your health, and be sure to contact your doctor if:    Your incontinence is getting worse.     You do not get better as expected.   Where can you learn more?  Go to https://www.[employer].net/patiented  Enter V684 in the search box to learn more about \"Bladder Training: Care Instructions.\"  Current as of: February 28, 2023               Content Version: 13.8    0482-7204 [employer].   Care instructions adapted under license by your healthcare professional. If you have questions about a medical condition or this instruction, always ask your healthcare professional. [employer] disclaims any warranty or liability for your use of this information.         "

## 2023-11-30 NOTE — PROGRESS NOTES
"SUBJECTIVE:   Victoria is a 83 year old, presenting for the following:  Physical        11/30/2023     1:31 PM   Additional Questions   Roomed by Sonya Durán CMA       Are you in the first 12 months of your Medicare coverage?  No    Healthy Habits:     In general, how would you rate your overall health?  Excellent    Frequency of exercise:  4-5 days/week    Duration of exercise:  15-30 minutes    Do you usually eat at least 4 servings of fruit and vegetables a day, include whole grains    & fiber and avoid regularly eating high fat or \"junk\" foods?  Yes    Medication side effects:  None    Ability to successfully perform activities of daily living:  No assistance needed    Home Safety:  No safety concerns identified    Hearing Impairment:  No hearing concerns    In the past 6 months, have you been bothered by leaking of urine? Yes    In general, how would you rate your overall mental or emotional health?  Excellent    Additional concerns today:  No    Today is last day of treatment for bronchitis - anitbiotics and steroids.    Still using advair and albuterol prn.     Today's PHQ-2 Score:       11/29/2023     5:49 PM   PHQ-2 ( 1999 [employer])   Q1: Little interest or pleasure in doing things 0   Q2: Feeling down, depressed or hopeless 0   PHQ-2 Score 0   Q1: Little interest or pleasure in doing things Not at all   Q2: Feeling down, depressed or hopeless Not at all   PHQ-2 Score 0           Have you ever done Advance Care Planning? (For example, a Health Directive, POLST, or a discussion with a medical provider or your loved ones about your wishes): Yes, patient states has an Advance Care Planning document and will bring a copy to the clinic.       Fall risk  Fallen 2 or more times in the past year?: No  Any fall with injury in the past year?: No    Cognitive Screening   1) Repeat 3 items (Leader, Season, Table)    2) Clock draw: NORMAL  3) 3 item recall: Recalls 3 objects  Results: 3 items recalled: COGNITIVE IMPAIRMENT " LESS LIKELY    Mini-CogTM Copyright ANGELA Simental. Licensed by the author for use in [facility]; reprinted with permission (ankit@[facility]). All rights reserved.          Reviewed and updated as needed this visit by clinical staff                  Reviewed and updated as needed this visit by Provider                 Social History     Tobacco Use     Smoking status: Former     Packs/day: 1.00     Years: 30.00     Additional pack years: 0.00     Total pack years: 30.00     Types: Cigarettes     Start date: 7/15/1973     Quit date: 1993     Years since quittin.5     Smokeless tobacco: Never   Substance Use Topics     Alcohol use: Yes     Comment: Not very often             2023     6:43 PM   Alcohol Use   Prescreen: >3 drinks/day or >7 drinks/week? No     Do you have a current opioid prescription? No  Do you use any other controlled substances or medications that are not prescribed by a provider? None    Current providers sharing in care for this patient include:   Patient Care Team:  Elsy Bah MD as PCP - General (Internal Medicine)  Aron Torres MD as MD (Orthopedics)  Elsy Bah MD as Assigned PCP  Ashlee Soriano as Personal Advocate & Liaison (PAL)  Aron Pro MD as Assigned Neuroscience Provider  Lilli Way APRN CNP as Nurse Practitioner (Anesthesiology)  Genesis Kuhn MD as MD (Gastroenterology)  Shaji Bello MD as MD (Cardiovascular & Thoracic Surgery)  Elsy Bah MD as Assigned Pain Medication Provider  Shaji Bello MD as Assigned Heart and Vascular Provider  Lynette Yancey, TATE as Specialty Care Coordinator    The following health maintenance items are reviewed in Epic and correct as of today:  Health Maintenance   Topic Date Due     ZOSTER IMMUNIZATION (1 of 2) Never done     RSV VACCINE (Pregnancy & 60+) (1 - 1-dose 60+ series) Never done     DTAP/TDAP/TD IMMUNIZATION (1 - Tdap) 04/15/2007     Pneumococcal  Vaccine: 65+ Years (2 - PCV) 12/08/2011     COLORECTAL CANCER SCREENING  02/17/2020     HF ACTION PLAN  08/04/2020     ASTHMA ACTION PLAN  04/28/2022     INFLUENZA VACCINE (1) 09/01/2023     COVID-19 Vaccine (6 - 2023-24 season) 09/01/2023     LIPID  11/25/2023     BMP  12/22/2023     ASTHMA CONTROL TEST  05/30/2024     ALT  06/22/2024     CBC  06/22/2024     MEDICARE ANNUAL WELLNESS VISIT  11/30/2024     ANNUAL REVIEW OF HM ORDERS  11/30/2024     FALL RISK ASSESSMENT  11/30/2024     ADVANCE CARE PLANNING  11/25/2027     DEXA  08/09/2032     TSH W/FREE T4 REFLEX  Completed     PHQ-2 (once per calendar year)  Completed     IPV IMMUNIZATION  Aged Out     HPV IMMUNIZATION  Aged Out     MENINGITIS IMMUNIZATION  Aged Out     RSV MONOCLONAL ANTIBODY  Aged Out           Mammogram Screening - Patient over age 75, has elected to discontinue screenings.  Pertinent mammograms are reviewed under the imaging tab.    Review of Systems   Constitutional:  Negative for chills and fever.   HENT:  Positive for congestion. Negative for ear pain, hearing loss and sore throat.    Eyes:  Negative for pain and visual disturbance.   Respiratory:  Negative for cough and shortness of breath.    Cardiovascular:  Positive for peripheral edema. Negative for chest pain and palpitations.   Gastrointestinal:  Negative for abdominal pain, constipation, diarrhea, heartburn, hematochezia and nausea.   Breasts:  Negative for tenderness, breast mass and discharge.   Genitourinary:  Negative for dysuria, frequency, genital sores, hematuria, pelvic pain, urgency, vaginal bleeding and vaginal discharge.   Musculoskeletal:  Negative for arthralgias, joint swelling and myalgias.   Skin:  Negative for rash.   Neurological:  Negative for dizziness, weakness, headaches and paresthesias.   Psychiatric/Behavioral:  Negative for mood changes. The patient is not nervous/anxious.          OBJECTIVE:   /62 (BP Location: Right arm, Patient Position: Sitting,  "Cuff Size: Adult Large)   Pulse 84   Temp 97.9  F (36.6  C) (Tympanic)   Resp 16   Ht 1.549 m (5' 1\")   Wt 73.5 kg (162 lb)   LMP  (LMP Unknown)   SpO2 99%   BMI 30.61 kg/m   Estimated body mass index is 30.61 kg/m  as calculated from the following:    Height as of this encounter: 1.549 m (5' 1\").    Weight as of this encounter: 73.5 kg (162 lb).  Physical Exam  GENERAL APPEARANCE: healthy, alert and no distress  EYES: Eyes grossly normal to inspection, PERRL and conjunctivae and sclerae normal  HENT: ear canals and TM's normal, nose and mouth without ulcers or lesions, oropharynx clear and oral mucous membranes moist  NECK: no adenopathy, no asymmetry, masses, or scars and thyroid normal to palpation  RESP: lungs clear to auscultation - no rales, rhonchi or wheezes  CV: regular rate and rhythm, normal S1 S2, no S3 or S4, no murmur, click or rub, no peripheral edema and peripheral pulses strong  ABDOMEN: soft, nontender, no hepatosplenomegaly, no masses and bowel sounds normal  MS: no musculoskeletal defects are noted and gait is age appropriate without ataxia  SKIN: no suspicious lesions or rashes  NEURO: Normal strength and tone, sensory exam grossly normal, mentation intact and speech normal  PSYCH: mentation appears normal and affect normal/bright    Diagnostic Test Results:  Labs reviewed in Epic    ASSESSMENT / PLAN:   (Z00.01) Encounter for general adult medical examination with abnormal findings  (primary encounter diagnosis)  Comment: declines all vaccines  Plan:     (I20.1) Vasospastic angina (H24)  Comment: stable  Plan: Lipid panel reflex to direct LDL Non-fasting,         amLODIPine (NORVASC) 5 MG tablet        continue    (I10) Hypertension, unspecified type  Comment: controlled  Plan: continue meds    (I20.0) Unstable angina pectoris (H)  Comment: Added automatically from request for surgery [id number]  Plan: isosorbide mononitrate (IMDUR) 60 MG 24 hr         tablet            (J45.40) Moderate " "persistent asthma, uncomplicated  Comment: controlled. She did find that using her 's Duoneb helped more than her albuterol neb during last illness  Plan: ipratropium - albuterol 0.5 mg/2.5 mg/3 mL         (DUONEB) 0.5-2.5 (3) MG/3ML neb solution,         montelukast (SINGULAIR) 10 MG tablet            (I25.10) Mild coronary artery disease  Comment: stable  Plan: rosuvastatin (CRESTOR) 40 MG tablet            Patient has been advised of split billing requirements and indicates understanding: Yes      COUNSELING:  Reviewed preventive health counseling, as reflected in patient instructions      BMI:   Estimated body mass index is 30.61 kg/m  as calculated from the following:    Height as of this encounter: 1.549 m (5' 1\").    Weight as of this encounter: 73.5 kg (162 lb).         She reports that she quit smoking about 30 years ago. Her smoking use included cigarettes. She started smoking about 50 years ago. She has a 30.00 pack-year smoking history. She has never used smokeless tobacco.      Appropriate preventive services were discussed with this patient, including applicable screening as appropriate for fall prevention, nutrition, physical activity, Tobacco-use cessation, weight loss and cognition.  Checklist reviewing preventive services available has been given to the patient.    Reviewed patients plan of care and provided an AVS. The Basic Care Plan (routine screening as documented in Health Maintenance) for June meets the Care Plan requirement. This Care Plan has been established and reviewed with the Patient.          Elsy Bah MD  [facility]    Identified Health Risks:  I have reviewed Opioid Use Disorder and Substance Use Disorder risk factors and made any needed referrals.   "

## 2023-12-01 LAB
CHOLEST SERPL-MCNC: 158 MG/DL
HDLC SERPL-MCNC: 83 MG/DL
LDLC SERPL CALC-MCNC: 60 MG/DL
NONHDLC SERPL-MCNC: 75 MG/DL
TRIGL SERPL-MCNC: 76 MG/DL

## 2023-12-04 DIAGNOSIS — I87.2 VENOUS REFLUX: ICD-10-CM

## 2023-12-04 DIAGNOSIS — I50.32 DIASTOLIC CHF, CHRONIC (H): Primary | ICD-10-CM

## 2023-12-04 DIAGNOSIS — I25.10 CORONARY ARTERY DISEASE INVOLVING NATIVE CORONARY ARTERY OF NATIVE HEART WITHOUT ANGINA PECTORIS: ICD-10-CM

## 2023-12-04 RX ORDER — FUROSEMIDE 20 MG
20 TABLET ORAL DAILY
Qty: 90 TABLET | Refills: 3 | Status: SHIPPED | OUTPATIENT
Start: 2023-12-04

## 2023-12-04 NOTE — TELEPHONE ENCOUNTER
Received faxed request from [facility] mail order pharmacy for 90 day fill instead of 60 day fill.    Writer has sent a new Rx.    Faxed back to [facility] that we have sent a whole new Rx.    Cara Saldaña RN on 12/4/2023 at 3:34 PM

## 2023-12-12 ENCOUNTER — ANCILLARY PROCEDURE (OUTPATIENT)
Dept: GENERAL RADIOLOGY | Facility: CLINIC | Age: 83
End: 2023-12-12
Attending: PHYSICIAN ASSISTANT
Payer: COMMERCIAL

## 2023-12-12 DIAGNOSIS — K44.9 HIATAL HERNIA: ICD-10-CM

## 2023-12-12 DIAGNOSIS — K21.9 GERD WITHOUT ESOPHAGITIS: ICD-10-CM

## 2023-12-12 DIAGNOSIS — R13.19 ESOPHAGEAL DYSPHAGIA: ICD-10-CM

## 2023-12-12 PROCEDURE — 74220 X-RAY XM ESOPHAGUS 1CNTRST: CPT | Mod: GC | Performed by: STUDENT IN AN ORGANIZED HEALTH CARE EDUCATION/TRAINING PROGRAM

## 2023-12-22 ENCOUNTER — OFFICE VISIT (OUTPATIENT)
Dept: URGENT CARE | Facility: URGENT CARE | Age: 83
End: 2023-12-22
Payer: COMMERCIAL

## 2023-12-22 VITALS
DIASTOLIC BLOOD PRESSURE: 65 MMHG | RESPIRATION RATE: 14 BRPM | TEMPERATURE: 98.2 F | OXYGEN SATURATION: 100 % | SYSTOLIC BLOOD PRESSURE: 109 MMHG | HEART RATE: 78 BPM

## 2023-12-22 DIAGNOSIS — K44.9 HIATAL HERNIA WITH GERD: Primary | ICD-10-CM

## 2023-12-22 DIAGNOSIS — H11.31 SUBCONJUNCTIVAL HEMORRHAGE OF RIGHT EYE: Primary | ICD-10-CM

## 2023-12-22 DIAGNOSIS — K21.9 HIATAL HERNIA WITH GERD: Primary | ICD-10-CM

## 2023-12-22 PROCEDURE — 99213 OFFICE O/P EST LOW 20 MIN: CPT | Performed by: FAMILY MEDICINE

## 2023-12-22 ASSESSMENT — ENCOUNTER SYMPTOMS
EYE REDNESS: 1
FEVER: 0
EYE ITCHING: 0
EYE DISCHARGE: 0
EYE PAIN: 0
PHOTOPHOBIA: 0

## 2023-12-22 NOTE — PROGRESS NOTES
"  Assessment & Plan     Subconjunctival hemorrhage of right eye  Asymptomatic, no evidence of pre or postseptal cellulitis.  Patient is currently on a baby aspirin which can slightly increase risk.  Otherwise reassured patient naturally he will like a bruise.  If have any blurry vision, swelling around the eye, purulent discharge or having any pain to come back immediately.               BMI:   Estimated body mass index is 30.61 kg/m  as calculated from the following:    Height as of 11/30/23: 1.549 m (5' 1\").    Weight as of 11/30/23: 73.5 kg (162 lb).           Return in about 1 week (around 12/29/2023) for If symptoms do not improve or gets worse..    Gopal Raza MD  [facility]    [place]   Victoria is a 83 year old, presenting for the following health issues:  Eye Problem (Redness in right eye, no pain, no drainage)      HPI     Patient is a pleasant 83-year-old female who presents with right eye redness x 1 day, no pain or drainage.  Has never had this before.  Denies any blurry vision.  No recent straining carrying heavy objects or coughing.            Review of Systems   Constitutional:  Negative for fever.   Eyes:  Positive for redness. Negative for photophobia, pain, discharge, itching and visual disturbance.            Objective    /65   Pulse 78   Temp 98.2  F (36.8  C) (Oral)   Resp 14   LMP  (LMP Unknown)   SpO2 100%   There is no height or weight on file to calculate BMI.  Physical Exam  Eyes:      General:         Right eye: No foreign body, discharge or hordeolum.         Left eye: No foreign body, discharge or hordeolum.      Extraocular Movements: Extraocular movements intact.        Comments: Right eye, no hyphema or hypopyon.  Eyelids normal.    No blurriness to vision with left eye cover test                              "

## 2023-12-23 ENCOUNTER — HOSPITAL ENCOUNTER (EMERGENCY)
Facility: CLINIC | Age: 83
Discharge: HOME | End: 2023-12-23
Attending: EMERGENCY MEDICINE | Admitting: EMERGENCY MEDICINE
Payer: COMMERCIAL

## 2023-12-23 ENCOUNTER — APPOINTMENT (OUTPATIENT)
Dept: GENERAL RADIOLOGY | Facility: CLINIC | Age: 83
End: 2023-12-23
Attending: EMERGENCY MEDICINE
Payer: COMMERCIAL

## 2023-12-23 VITALS
RESPIRATION RATE: 12 BRPM | OXYGEN SATURATION: 97 % | SYSTOLIC BLOOD PRESSURE: 134 MMHG | DIASTOLIC BLOOD PRESSURE: 75 MMHG | TEMPERATURE: 97.5 F | HEART RATE: 70 BPM

## 2023-12-23 DIAGNOSIS — Z86.79 HISTORY OF CORONARY ARTERY DISEASE: ICD-10-CM

## 2023-12-23 DIAGNOSIS — R07.9 CHEST PAIN, UNSPECIFIED TYPE: ICD-10-CM

## 2023-12-23 DIAGNOSIS — R91.1 PULMONARY NODULE: ICD-10-CM

## 2023-12-23 LAB
ANION GAP SERPL CALCULATED.3IONS-SCNC: 9 MMOL/L (ref 7–15)
BASOPHILS # BLD AUTO: 0 10E3/UL (ref 0–0.2)
BASOPHILS NFR BLD AUTO: 0 %
BUN SERPL-MCNC: 23.5 MG/DL (ref 8–23)
CALCIUM SERPL-MCNC: 8.6 MG/DL (ref 8.8–10.2)
CHLORIDE SERPL-SCNC: 105 MMOL/L (ref 98–107)
CREAT SERPL-MCNC: 0.69 MG/DL (ref 0.51–0.95)
D DIMER PPP FEU-MCNC: 0.82 UG/ML FEU (ref 0–0.5)
DEPRECATED HCO3 PLAS-SCNC: 24 MMOL/L (ref 22–29)
EGFRCR SERPLBLD CKD-EPI 2021: 85 ML/MIN/1.73M2
EOSINOPHIL # BLD AUTO: 0.1 10E3/UL (ref 0–0.7)
EOSINOPHIL NFR BLD AUTO: 2 %
ERYTHROCYTE [DISTWIDTH] IN BLOOD BY AUTOMATED COUNT: 14 % (ref 10–15)
GLUCOSE SERPL-MCNC: 93 MG/DL (ref 70–99)
HCT VFR BLD AUTO: 35.4 % (ref 35–47)
HGB BLD-MCNC: 11.7 G/DL (ref 11.7–15.7)
IMM GRANULOCYTES # BLD: 0 10E3/UL
IMM GRANULOCYTES NFR BLD: 0 %
LYMPHOCYTES # BLD AUTO: 1.8 10E3/UL (ref 0.8–5.3)
LYMPHOCYTES NFR BLD AUTO: 27 %
MCH RBC QN AUTO: 29.7 PG (ref 26.5–33)
MCHC RBC AUTO-ENTMCNC: 33.1 G/DL (ref 31.5–36.5)
MCV RBC AUTO: 90 FL (ref 78–100)
MONOCYTES # BLD AUTO: 0.5 10E3/UL (ref 0–1.3)
MONOCYTES NFR BLD AUTO: 7 %
NEUTROPHILS # BLD AUTO: 4.2 10E3/UL (ref 1.6–8.3)
NEUTROPHILS NFR BLD AUTO: 64 %
NRBC # BLD AUTO: 0 10E3/UL
NRBC BLD AUTO-RTO: 0 /100
PLATELET # BLD AUTO: 198 10E3/UL (ref 150–450)
POTASSIUM SERPL-SCNC: 3.9 MMOL/L (ref 3.4–5.3)
RBC # BLD AUTO: 3.94 10E6/UL (ref 3.8–5.2)
SODIUM SERPL-SCNC: 138 MMOL/L (ref 135–145)
TROPONIN T SERPL HS-MCNC: 8 NG/L
TROPONIN T SERPL HS-MCNC: 8 NG/L
WBC # BLD AUTO: 6.7 10E3/UL (ref 4–11)

## 2023-12-23 PROCEDURE — 85025 COMPLETE CBC W/AUTO DIFF WBC: CPT | Performed by: EMERGENCY MEDICINE

## 2023-12-23 PROCEDURE — 71046 X-RAY EXAM CHEST 2 VIEWS: CPT

## 2023-12-23 PROCEDURE — 85379 FIBRIN DEGRADATION QUANT: CPT | Performed by: EMERGENCY MEDICINE

## 2023-12-23 PROCEDURE — 99285 EMERGENCY DEPT VISIT HI MDM: CPT | Mod: 25

## 2023-12-23 PROCEDURE — 80048 BASIC METABOLIC PNL TOTAL CA: CPT | Performed by: EMERGENCY MEDICINE

## 2023-12-23 PROCEDURE — 36415 COLL VENOUS BLD VENIPUNCTURE: CPT | Performed by: EMERGENCY MEDICINE

## 2023-12-23 PROCEDURE — 93005 ELECTROCARDIOGRAM TRACING: CPT

## 2023-12-23 PROCEDURE — 84484 ASSAY OF TROPONIN QUANT: CPT | Performed by: EMERGENCY MEDICINE

## 2023-12-23 PROCEDURE — 250N000013 HC RX MED GY IP 250 OP 250 PS 637: Performed by: EMERGENCY MEDICINE

## 2023-12-23 RX ORDER — ASPIRIN 81 MG/1
243 TABLET, CHEWABLE ORAL ONCE
Status: COMPLETED | OUTPATIENT
Start: 2023-12-23 | End: 2023-12-23

## 2023-12-23 RX ADMIN — ASPIRIN 81 MG CHEWABLE TABLET 243 MG: 81 TABLET CHEWABLE at 17:06

## 2023-12-23 ASSESSMENT — ACTIVITIES OF DAILY LIVING (ADL)
ADLS_ACUITY_SCORE: 35
ADLS_ACUITY_SCORE: 35

## 2023-12-23 NOTE — ED PROVIDER NOTES
History     Chief Complaint:  Chest Pain       The history is provided by the patient.      Victoria Montalvo is an 83 year old female with history of coronary artery disease as well as coronary vasospasm on aspirin who presents with her son for now resolved chest pain.  She had chest pain beginning somewhere around an hour prior to arrival on the way to [employer].  Her pain persisted while walking around the store, not particularly worsening.  She describes it as a severe, stabbing sensation dissimilar to pain she has had in the past.  She did take nitroglycerin with resolution of her pain.  She thinks it lasted about an hour.  She does not have pain during interview.  She did feel short of breath which is also resolved during interview.  She is no change in her baseline leg swelling.  She has had no cough, fever, or other illness since she recovered from one about 3 weeks ago.    Notably, she does take nitroglycerin somewhat frequently (last was last week while watching TV) but she describes that pain as quite different, a mild discomfort across left chest.    Independent Historian:   As indicated above.    Review of External Notes:   I reviewed a wellness exam note on 11/30/23.  I reviewed urgent care note from 12/22/23 for subconjunctival hemorrhage of right eye.    Medications:    Albuterol  Amlodipine Besylate  Aspirin 81 mg  Cholecalciferol  Diclofenac Sodium  Advair  Lasix  Imdur  Prevasid  Singluar  Nitrostat  Crestor    Past Medical History:    Arthritis  Asthma  COPD  Pulminary HTN  GERD  CAD  KEN  Subclavian artery stenosis, left  Subclinical hyperthyroidism   Colonic polyps  Diastolic CHF  Degenerative disc disease  Scoliosis  Venous reflux   Vasospastic angina     Past Surgical History:    Arthroplasty hip  Colonoscopy  CV coronary angiogram  CV instantaneous wave-free ratio  CV left ventriculogram  Esophagoscopy, Gastroscopy, Duodenoscopy  Liposuction on stomach and legs  Mammoplasty reduction  Open  redirection fixation ankle  Vascular surgery      Physical Exam   Patient Vitals for the past 24 hrs:   BP Temp Temp src Pulse Resp SpO2   12/23/23 1730 130/82 -- -- 76 12 96 %   12/23/23 1639 -- -- -- -- -- 96 %   12/23/23 1624 (!) 153/68 97.5  F (36.4  C) Temporal 87 20 100 %        Physical Exam  General: Well-developed and well-nourished. Well appearing elderly  woman. Cooperative.  Head:  Atraumatic.  Eyes:  Right subconjunctival hemorrhage.  Conjunctivae, lids, and sclerae are normal on the left.  ENT:    Normal nose. Moist mucous membranes.  Neck:  Supple. Normal range of motion.  CV:  Regular rate and rhythm. Normal heart sounds with no murmurs, rubs, or gallops detected.  Resp:  No respiratory distress. Clear to auscultation bilaterally without decreased breath sounds, wheezing, rales, or rhonchi.  GI:  Soft. Non-distended. Non-tender.    MS:  Normal ROM.  Trace nonpitting bilateral lower extremity edema.  Skin:  Warm. Non-diaphoretic. No pallor.  Neuro:  Awake. A&Ox3. Normal strength.  Psych: Normal mood and affect. Normal speech.  Vitals reviewed.    Emergency Department Course   EKG  Indication: Chest pain  Time: 1615  Rate 86 bpm. MT interval 156. QRS duration 78. QT/QTc 370/442.   Sinus rhythm with frequent premature ventricular complexes. Low voltage QRS. Borderline ECG,  No acute ST changes.  No significant change as compared to prior, dated 06/07/23.     Imaging:  XR Chest 2 Views   Final Result   IMPRESSION: Newly visualized subtle 15 mm left basilar subpleural nodular opacity. Recommend short interval follow-up within 4-6 weeks. No focal pneumonic consolidation or pleural effusion. Normal heart size. Spinal degenerative changes.         Laboratory:  Labs Ordered and Resulted from Time of ED Arrival to Time of ED Departure   D DIMER QUANTITATIVE - Abnormal       Result Value    D-Dimer Quantitative 0.82 (*)    BASIC METABOLIC PANEL - Abnormal    Sodium 138      Potassium 3.9      Chloride  105      Carbon Dioxide (CO2) 24      Anion Gap 9      Urea Nitrogen 23.5 (*)     Creatinine 0.69      GFR Estimate 85      Calcium 8.6 (*)     Glucose 93     TROPONIN T, HIGH SENSITIVITY - Normal    Troponin T, High Sensitivity 8     TROPONIN T, HIGH SENSITIVITY - Normal    Troponin T, High Sensitivity 8     CBC WITH PLATELETS AND DIFFERENTIAL    WBC Count 6.7      RBC Count 3.94      Hemoglobin 11.7      Hematocrit 35.4      MCV 90      MCH 29.7      MCHC 33.1      RDW 14.0      Platelet Count 198      % Neutrophils 64      % Lymphocytes 27      % Monocytes 7      % Eosinophils 2      % Basophils 0      % Immature Granulocytes 0      NRBCs per 100 WBC 0      Absolute Neutrophils 4.2      Absolute Lymphocytes 1.8      Absolute Monocytes 0.5      Absolute Eosinophils 0.1      Absolute Basophils 0.0      Absolute Immature Granulocytes 0.0      Absolute NRBCs 0.0        Emergency Department Course & Assessments  Interventions:  Medications   aspirin (ASA) chewable tablet 243 mg (243 mg Oral $Given 12/23/23 1706)       Independent Interpretation (X-rays, CTs, rhythm strip):  Independently interpreted chest XR. I appreciate no pneumonia, no pneumothorax.    Assessments/Consultations/Discussion of Management or Tests:  ED Course as of 12/23/23 2031   Sat Dec 23, 2023   1646 I obtained history and examined the patient as noted above.    2022 I rechecked and updated the patient. She prefers discharge to admission.        Social Determinants of Health affecting care:   Supportive family    Disposition:  The patient was discharged.     Impression & Plan    Medical Decision Making:  Victoria is a 83 year old woman who developed chest pain that was a stabbing sensation dissimilar to pain she has had in the past with coronary artery disease shortly prior to arrival today.  She thinks it lasted about an hour but improved with nitroglycerin and she does not have pain during interview.  Her exam is unremarkable.  She appears  well.    EKG is reassuring without acute ST changes or arrhythmias.  Both initial and repeat troponin are normal.  However, Victoria is a high risk patient and she understands this.  I did offer her admission for further cardiac workup but she really prefers to be discharged, particularly this close to the holiday. I think this is reasonable as she verbalizes understanding of risk and accepts it (including permament disability and death) and I am reassured by a negative Lexiscan in June 2023. She was given aspirin here. Her dimer is negative when adjusted for age essentially ruling out pulmonary embolism.  Chest x-ray reveals a new lung nodule of which she was made aware.  She understands she needs repeat follow-up in 4 to 6 weeks and will follow-up with primary care for this.  However, no cause for her pain is identified on this study.  She does not have kidney injury, electrolyte derangement, leukocytosis, or anemia.    She had no recurrence of pain or other issues in the ED and required no further interventions. At this point I will discharge her at her request.  I encouraged her to follow-up closely with her cardiologist given her longstanding history of coronary artery disease.  I also encouraged her to return if she has recurrence of symptoms or any new concerns and also follow-up with primary care regarding the lung nodule.    Diagnosis:    ICD-10-CM    1. Chest pain, unspecified type  R07.9       2. History of coronary artery disease  Z86.79       3. Pulmonary nodule  R91.1            Discharge Medications:  New Prescriptions    No medications on file      Scribe Disclosure:  Cait YOUNG, am serving as a scribe  for Krista Simmons at 5:28 PM on 12/23/2023 to document services personally performed by Gaby Cuevas MD based on my observations and the provider's statements to me.    Scribe Disclosure:  Krista YOUNG, am serving as a scribe at 5:19 PM on 12/23/2023 to document services personally  performed by Gaby Cuevas MD based on my observations and the provider's statements to me.   12/23/2023   Gaby Cuevas MD Dixson, Kylie S, MD  12/30/23 [id number]

## 2023-12-23 NOTE — ED TRIAGE NOTES
Pt presents to the ED with complaint of chest pain and SOB that started 1.5 hours ago. Pt states she took a nitroglycerin and states the chest pain 8/10 is gone, but she still feels SOB. Pt states she has a long cardiac history.      Triage Assessment (Adult)       Row Name 12/23/23 8822          Triage Assessment    Airway WDL WDL        Respiratory WDL    Respiratory WDL WDL        Skin Circulation/Temperature WDL    Skin Circulation/Temperature WDL WDL        Cardiac WDL    Cardiac WDL WDL        Peripheral/Neurovascular WDL    Peripheral Neurovascular WDL WDL        Cognitive/Neuro/Behavioral WDL    Cognitive/Neuro/Behavioral WDL WDL

## 2023-12-24 LAB
ATRIAL RATE - MUSE: 86 BPM
DIASTOLIC BLOOD PRESSURE - MUSE: NORMAL MMHG
INTERPRETATION ECG - MUSE: NORMAL
P AXIS - MUSE: 64 DEGREES
PR INTERVAL - MUSE: 156 MS
QRS DURATION - MUSE: 78 MS
QT - MUSE: 370 MS
QTC - MUSE: 442 MS
R AXIS - MUSE: -17 DEGREES
SYSTOLIC BLOOD PRESSURE - MUSE: NORMAL MMHG
T AXIS - MUSE: 38 DEGREES
VENTRICULAR RATE- MUSE: 86 BPM

## 2023-12-24 NOTE — DISCHARGE INSTRUCTIONS
Please call your cardiologist first thing on Tuesday to arrange an appointment.  You should ask if you need another stress test or angiogram.  Although your test are good today, it does not mean that the pain was not related to your heart and that means you could not have a heart attack in the near future. This could cause permanent disability or death. However, you prefer discharge to admission and agree to return if you have recurrent pain, change your mind, or have any new symptoms.  You also need to follow with your primary care provider for repeat chest x-ray in 4 to 6 weeks to follow-up on the lung nodule.

## 2023-12-27 ENCOUNTER — PREP FOR PROCEDURE (OUTPATIENT)
Dept: SURGERY | Facility: CLINIC | Age: 83
End: 2023-12-27
Payer: COMMERCIAL

## 2023-12-27 NOTE — TELEPHONE ENCOUNTER
Called and spoke with pt to let her know surgeon needs to move surgery date and his partner has an earlier opening on 1/12. Pt understood and agreed.    Nicolle Reinoso on 12/27/2023 at 12:52 PM

## 2024-01-02 ENCOUNTER — PRE VISIT (OUTPATIENT)
Dept: SURGERY | Facility: CLINIC | Age: 84
End: 2024-01-02

## 2024-01-02 ENCOUNTER — VIRTUAL VISIT (OUTPATIENT)
Dept: SURGERY | Facility: CLINIC | Age: 84
End: 2024-01-02
Payer: COMMERCIAL

## 2024-01-02 ENCOUNTER — ANESTHESIA EVENT (OUTPATIENT)
Dept: SURGERY | Facility: CLINIC | Age: 84
DRG: 327 | End: 2024-01-02
Payer: COMMERCIAL

## 2024-01-02 DIAGNOSIS — K44.9 HIATAL HERNIA WITH GERD: ICD-10-CM

## 2024-01-02 DIAGNOSIS — K21.9 HIATAL HERNIA WITH GERD: ICD-10-CM

## 2024-01-02 DIAGNOSIS — Z01.818 PRE-OPERATIVE EXAMINATION: Primary | ICD-10-CM

## 2024-01-02 LAB
ABO/RH(D): NORMAL
ANTIBODY SCREEN: NEGATIVE
SPECIMEN EXPIRATION DATE: NORMAL

## 2024-01-02 PROCEDURE — 99215 OFFICE O/P EST HI 40 MIN: CPT | Mod: 95 | Performed by: PHYSICIAN ASSISTANT

## 2024-01-02 RX ORDER — MULTIPLE VITAMINS W/ MINERALS TAB 9MG-400MCG
1 TAB ORAL EVERY MORNING
COMMUNITY

## 2024-01-02 ASSESSMENT — COPD QUESTIONNAIRES
CAT_SEVERITY: MILD
COPD: 1

## 2024-01-02 ASSESSMENT — ENCOUNTER SYMPTOMS
SEIZURES: 0
ORTHOPNEA: 0

## 2024-01-02 NOTE — H&P
Pre-Operative H & P     ADDENDUM:  Patient sent me screen shot of message from Dr. Yepez. Also did hear back from Marleni BEAVERS CNP about the patient and upcoming procedure and recent ER visit. She felt that since the patient was seen in June with an echo with mild abnormalities but had follow up nuclear stress test that was negative for ischemia. She reviewed the patient's recent ER visit/evaluation and does not feel that she needs any further diagnostic testing prior to her surgery. She did recommend the patient should have follow up with her cardiology team as a part of her hospital follow up though. Information passed along to the patient. She is optimized for her procedure.    CC:  Preoperative exam to assess for increased cardiopulmonary risk while undergoing surgery and anesthesia.    Date of Encounter: 1/2/2024  Primary Care Physician:  Elsy Bah     Reason for visit:   Encounter Diagnoses   Name Primary?    Pre-operative examination Yes    Hiatal hernia with GERD        HPI  June V Selvin is a 83 year old female who presents for pre-operative H & P in preparation for  Procedure Information       Case: [id number] Date/Time: 01/12/24 0730    Procedure: laparoscopic hiatal hernia repair, Nissen fundoplication, possible gastroplasty, gastrostomy (Abdomen)    Anesthesia type: General    Diagnosis: Hiatal hernia with GERD [K44.9, K21.9]    Pre-op diagnosis: Hiatal hernia with GERD [K44.9, K21.9]    Location:  OR  /  OR    Providers: Oscar Stern MD            The patient is a 83-year-old woman with a past medical history significant for coronary artery disease status post drug-eluting stent, diastolic heart failure, history of pulmonary hypertension, hypertension, hyperlipidemia, history of left subclavian artery stenosis status post stenting, aortic valve sclerosis, cortical artery spasms, asthma, COPD, obstructive sleep apnea, former smoker, obesity, urinary urgency,  osteoarthritis, sciatica, GERD, dysphagia and hiatal hernia.  Given her ongoing symptoms from her hiatal hernia she has met with Dr. Bello and been counseled for the procedure as above.    History is obtained from the patient and chart review    Hx of abnormal bleeding or anti-platelet use: ASA 81 mg     Menstrual history: No LMP recorded (lmp unknown). Patient is postmenopausal.:      Past Medical History  Past Medical History:   Diagnosis Date    Abnormal Papanicolaou smear of cervix and cervical HPV     colposcopy 1/97    Aortic valve sclerosis     Arthritis     Asthma     on preventative meds and has nebulizer    Chronic rhinitis     COPD (chronic obstructive pulmonary disease)     Coronary artery disease     4/4/17 SHERLEY--PDA    Coronary artery spasm (H24)     Diastolic CHF, chronic 02/22/2012    Gastro-oesophageal reflux disease     Hiatal hernia     Hyperlipidemia 10/31/2011    Obesity     KEN (obstructive sleep apnea)     CPAP    Personal history of colonic polyps     colonoscopy 9/97, 2002 (due in 2007)    Pulmonary HTN     Seasonal allergies     Status post coronary angiogram 04/22/2020    Subclavian artery stenosis, left 08/07/2013    S/p stent in 2013     Subclinical hyperthyroidism 10/17/2016       Past Surgical History  Past Surgical History:   Procedure Laterality Date    ARTHROPLASTY HIP Right 10/19/2015    Procedure: ARTHROPLASTY HIP;  Surgeon: Aron Torres MD;  Location: [facility] OR    COLONOSCOPY  01/01/2008    Polyp removed, bx.    COLONOSCOPY  01/12/2010    COLONOSCOPY N/A 02/17/2015    Procedure: COLONOSCOPY;  Surgeon: Gato Quiles MD;  Location:  GI    CT CORONARY ANGIOGRAM  04/04/2017    SHERLEY to PDA    CV CORONARY ANGIOGRAM N/A 04/22/2020    Procedure: Coronary Angiogram;  Surgeon: Handy Denise MD;  Location: [facility] CARDIAC CATH LAB    CV CORONARY ANGIOGRAM N/A 07/01/2021    Procedure: Coronary Angiogram;  Surgeon: Handy Denise MD;  Location: [facility] CARDIAC  CATH LAB    CV INSTANTANEOUS WAVE-FREE RATIO N/A 04/22/2020    Procedure: Instantaneous Wave-Free Ratio;  Surgeon: Handy Denise MD;  Location:  HEART CARDIAC CATH LAB    CV LEFT VENTRICULOGRAM N/A 04/22/2020    Procedure: Left Ventriculogram;  Surgeon: Handy Denise MD;  Location:  HEART CARDIAC CATH LAB    ESOPHAGOSCOPY, GASTROSCOPY, DUODENOSCOPY (EGD), COMBINED N/A 8/17/2023    Procedure: Esophagoscopy, gastroscopy, duodenoscopy (EGD), combined;  Surgeon: Genesis Kuhn MD;  Location:  GI    Liposuction of legs and stomach  1990    MAMMOPLASTY REDUCTION  02/1989    OPEN REDUCTION INTERNAL FIXATION ANKLE  04/1987    Removal of ankle hardware NOS  1990    SOFT TISSUE SURGERY  1989 & 1990    Liposuction    VASCULAR SURGERY  2013    angiogram & left subclavical artery stent       Prior to Admission Medications  Current Outpatient Medications   Medication Sig Dispense Refill    albuterol (PROAIR HFA/PROVENTIL HFA/VENTOLIN HFA) 108 (90 Base) MCG/ACT inhaler USE 2 INHALATIONS EVERY 6 HOURS AS NEEDED FOR SHORTNESS OF BREATH, DYSPNEA, OR WHEEZING (Patient taking differently: 2 puffs every 6 hours as needed USE 2 INHALATIONS EVERY 6 HOURS AS NEEDED FOR SHORTNESS OF BREATH, DYSPNEA, OR WHEEZING) 25.5 g 1    amLODIPine (NORVASC) 5 MG tablet Take 1 tablet (5 mg) by mouth every evening 90 tablet 3    aspirin 81 MG tablet Take 81 mg by mouth every morning      Boswellia-Glucosamine-Vit D (OSTEO BI-FLEX/5-LOXIN ADVANCED) TABS Take 1,500 mg by mouth every morning      Calcium 9889-7143 MG-UNIT CHEW Take 1 tablet by mouth every morning      calcium carbonate (TUMS) 500 MG chewable tablet Take 1 tablet (500 mg) by mouth as needed for heartburn      Cholecalciferol (VITAMIN D3 PO) Take 4,000 Units by mouth every morning      fluticasone (FLONASE) 50 MCG/ACT nasal spray USE 1 TO 2 SPRAYS IN EACH NOSTRIL DAILY (Patient taking differently: Spray 1 spray into both nostrils every morning USE 1 TO 2 SPRAYS IN EACH  "NOSTRIL DAILY) 48 g 11    fluticasone-salmeterol (ADVAIR) 250-50 MCG/ACT inhaler Inhale 1 puff into the lungs 2 times daily 60 each 11    furosemide (LASIX) 20 MG tablet Take 1 tablet (20 mg) by mouth daily (Patient taking differently: Take 20 mg by mouth every morning) 90 tablet 3    ipratropium - albuterol 0.5 mg/2.5 mg/3 mL (DUONEB) 0.5-2.5 (3) MG/3ML neb solution Take 1 vial (3 mLs) by nebulization every 6 hours as needed for shortness of breath, wheezing or cough 90 mL 11    isosorbide mononitrate (IMDUR) 60 MG 24 hr tablet TAKE 1 TABLET EVERY MORNING HOLD IF SYSTOLIC BLOOD PRESSURE IS LESS THAN 85 (Patient taking differently: 60 mg every morning TAKE 1 TABLET EVERY MORNING HOLD IF SYSTOLIC BLOOD PRESSURE IS LESS THAN 85) 90 tablet 3    LANsoprazole (PREVACID) 30 MG DR capsule Take 1 capsule (30 mg) by mouth daily (Patient taking differently: Take 30 mg by mouth every morning (before breakfast)) 90 capsule 3    montelukast (SINGULAIR) 10 MG tablet Take 1 tablet (10 mg) by mouth at bedtime 90 tablet 3    multivitamin w/minerals (MULTI-VITAMIN) tablet Take 1 tablet by mouth every morning      nitroGLYcerin (NITROSTAT) 0.4 MG sublingual tablet One tablet under the tongue every 5 minutes if needed for chest pain. May repeat every 5 minutes for a maximum of 3 doses in 15 minutes\" 25 tablet 3    omega-3 fatty acids 1200 MG capsule Take 1 capsule by mouth every morning      Pyridoxine HCl (VITAMIN B6) 250 MG TABS Take 1 tablet by mouth every morning      rosuvastatin (CRESTOR) 40 MG tablet Take 1 tablet (40 mg) by mouth daily (Patient taking differently: Take 40 mg by mouth every evening) 90 tablet 3    spironolactone (ALDACTONE) 25 MG tablet Take 12.5 mg by mouth every morning 90 tablet 3    diclofenac (VOLTAREN) 1 % topical gel Apply 2 g topically 4 times daily as needed for moderate pain 350 g 11       Allergies  Allergies   Allergen Reactions    Animal Dander     Bee Pollen Other (See Comments)     Pollen,dust, " trees, grass, mold-hayfever symptoms    Dust Mites     Kiwi Itching     Itching and red all over    Pollen Extract      Pollen,dust, trees, grass, mold-hayfever symptoms       Social History  Social History     Socioeconomic History    Marital status:      Spouse name: Trenton    Number of children: 2    Years of education: 12    Highest education level: Not on file   Occupational History    Occupation: retired   Tobacco Use    Smoking status: Former     Packs/day: 1.00     Years: 30.00     Additional pack years: 0.00     Total pack years: 30.00     Types: Cigarettes     Start date: 7/15/1973     Quit date: 1993     Years since quittin.6    Smokeless tobacco: Never   Vaping Use    Vaping Use: Never used   Substance and Sexual Activity    Alcohol use: Yes     Comment: Not very often    Drug use: No    Sexual activity: Not Currently     Partners: Male   Other Topics Concern     Service Not Asked    Blood Transfusions Not Asked    Caffeine Concern No    Occupational Exposure Not Asked    Hobby Hazards Not Asked    Sleep Concern Not Asked    Stress Concern Not Asked    Weight Concern Not Asked    Special Diet No     Comment: regular diet     Back Care Not Asked    Exercise No     Comment: shopping a lot so [employer]     Bike Helmet Not Asked    Seat Belt Not Asked    Self-Exams Not Asked    Parent/sibling w/ CABG, MI or angioplasty before 65F 55M? Yes     Comment: Brother and Sister, Daughter and Father   Social History Narrative    Not on file     Social Determinants of Health     Financial Resource Strain: Low Risk  (2023)    Financial Resource Strain     Within the past 12 months, have you or your family members you live with been unable to get utilities (heat, electricity) when it was really needed?: No   Food Insecurity: Low Risk  (2023)    Food Insecurity     Within the past 12 months, did you worry that your food would run out before you got money to buy more?: No     Within the  past 12 months, did the food you bought just not last and you didn t have money to get more?: No   Transportation Needs: Low Risk  (11/25/2023)    Transportation Needs     Within the past 12 months, has lack of transportation kept you from medical appointments, getting your medicines, non-medical meetings or appointments, work, or from getting things that you need?: No   Physical Activity: Not on file   Stress: Not on file   Social Connections: Not on file   Interpersonal Safety: Low Risk  (11/30/2023)    Interpersonal Safety     Do you feel physically and emotionally safe where you currently live?: Yes     Within the past 12 months, have you been hit, slapped, kicked or otherwise physically hurt by someone?: No     Within the past 12 months, have you been humiliated or emotionally abused in other ways by your partner or ex-partner?: No   Housing Stability: Low Risk  (11/25/2023)    Housing Stability     Do you have housing? : Yes     Are you worried about losing your housing?: No       Family History  Family History   Problem Relation Age of Onset    Alzheimer Disease Mother     Gastrointestinal Disease Mother     Cancer Father         throat and lungs, liver,    Heart Disease Father 57        CABG    Hyperlipidemia Father     Other Cancer Father         Throat, Lung and Liver    Coronary Artery Disease Father     Coronary Artery Disease Sister     Coronary Artery Disease Sister     Heart Disease Sister     Heart Disease Brother         suicidal    Pacemaker Brother     Other Cancer Brother         Prostrate    Depression Brother     Coronary Artery Disease Brother     Hypertension Maternal Grandmother     Lipids Maternal Grandmother     Cancer Maternal Grandmother         stomach    Other Cancer Maternal Grandmother         Stomach Cancer    Cancer Paternal Grandmother         stomach    Other Cancer Paternal Grandmother         Stomach Cancer    Allergies Daughter     Asthma Daughter     Coronary Artery Disease  Daughter     Allergies Son     Asthma Son     Anesthesia Reaction No family hx of     Thrombosis No family hx of        Review of Systems  The complete review of systems is negative other than noted in the HPI or here.   Anesthesia Evaluation   Pt has had prior anesthetic. Type: MAC and General.    No history of anesthetic complications       ROS/MED HX  ENT/Pulmonary:     (+) sleep apnea, doesn't use CPAP,         allergic rhinitis,          Mild Persistent, asthma Last exacerbation: 3 weeks,  Treatment: Inhaler prn, Inhaled steroids and Nebulizer prn,  mild,  COPD,              Neurologic:  - neg neurologic ROS  (-) no seizures, no CVA and no TIA   Cardiovascular: Comment: History of subclavian artery stenosis s/p stent 2013    (+)  hypertension- -  CAD angina (coronary artery spasms)-  - stent- 3 Drug Eluting Stent.    CHF etiology: diastolic Last EF: 60-65% date: 6/2023  ESPITIA.                valvular problems/murmurs  aortic sclerosis.   pulmonary hypertension (history of in 2017 (mild)), Previous cardiac testing   Echo: Date: 6/2023 Results:  Interpretation Summary     The left ventricle visual ejection fraction is 60-65%.Mid lateral wall mild  hypokinesia.  The right ventricular systolic function is normal.  Aortic valve sclerosis noted  The inferior vena cava was normal in size with preserved respiratory  variability.     On direct comparison to resting echo images dated 04/09/2022 mid lateral wall  hypokinesia appears new.    Stress Test:  Date: 6/2023 Results:     The nuclear stress test is negative for inducible myocardial ischemia or infarction.     Left ventricular function is hyperdynamic.     The left ventricular ejection fraction at rest is 75%.     A prior study was conducted on 4/19/2022. Compared to prior study no significant changes.    ECG Reviewed:  Date: 12/23/23 Results:  Sinus rhythm with frequent Premature ventricular complexes   Low voltage QRS   Borderline ECG       Cath:  Date: 2021  Results:   (-) orthopnea/PND   METS/Exercise Tolerance: 4 - Raking leaves, gardening Comment: Doesn't exercise purposefully,but does yard work, cleans her own home, shops and goes up and down her stairs multiple times per day.      Denies any exertional angina, but does have some exertional dyspnea with heavy exertion (carrying laundry up the stairs).    Hematologic:  - neg hematologic  ROS     Musculoskeletal: Comment: History of intermittent sciatica causing a warm sensation     S/p right PRASANTH and prior ankle ORIF    (+)  arthritis,             GI/Hepatic: Comment: dysphagia    (+) GERD, Symptomatic,    hiatal hernia,              Renal/Genitourinary: Comment: Urinary urgency      Endo:     (+)               Obesity,       Psychiatric/Substance Use:  - neg psychiatric ROS     Infectious Disease:  - neg infectious disease ROS     Malignancy:  - neg malignancy ROS     Other:  - neg other ROS          Virtual visit -  No vitals were obtained    Physical Exam  Constitutional: Awake, alert, cooperative, no apparent distress, and appears stated age.  Eyes: Pupils equal  HENT: Normocephalic  Respiratory: non labored breathing   Neurologic: Awake, alert, oriented to name, place and time.   Neuropsychiatric: Calm, cooperative. Normal affect.      Prior Labs/Diagnostic Studies   All labs and imaging personally reviewed    Latest Reference Range & Units 12/23/23 17:06   Sodium 135 - 145 mmol/L 138   Potassium 3.4 - 5.3 mmol/L 3.9   Chloride 98 - 107 mmol/L 105   Carbon Dioxide (CO2) 22 - 29 mmol/L 24   Urea Nitrogen 8.0 - 23.0 mg/dL 23.5 (H)   Creatinine 0.51 - 0.95 mg/dL 0.69   GFR Estimate >60 mL/min/1.73m2 85   Calcium 8.8 - 10.2 mg/dL 8.6 (L)   Anion Gap 7 - 15 mmol/L 9   Glucose 70 - 99 mg/dL 93   Troponin T, High Sensitivity <=14 ng/L 8   WBC 4.0 - 11.0 10e3/uL 6.7   Hemoglobin 11.7 - 15.7 g/dL 11.7   Hematocrit 35.0 - 47.0 % 35.4   Platelet Count 150 - 450 10e3/uL 198   RBC Count 3.80 - 5.20 10e6/uL 3.94   MCV 78 - 100  fL 90   MCH 26.5 - 33.0 pg 29.7   MCHC 31.5 - 36.5 g/dL 33.1   RDW 10.0 - 15.0 % 14.0   % Neutrophils % 64   % Lymphocytes % 27   % Monocytes % 7   % Eosinophils % 2   % Basophils % 0   Absolute Basophils 0.0 - 0.2 10e3/uL 0.0   Absolute Eosinophils 0.0 - 0.7 10e3/uL 0.1   Absolute Immature Granulocytes <=0.4 10e3/uL 0.0   Absolute Lymphocytes 0.8 - 5.3 10e3/uL 1.8   Absolute Monocytes 0.0 - 1.3 10e3/uL 0.5   % Immature Granulocytes % 0   Absolute Neutrophils 1.6 - 8.3 10e3/uL 4.2   Absolute NRBCs 10e3/uL 0.0   NRBCs per 100 WBC <1 /100 0   (H): Data is abnormally high  (L): Data is abnormally low    EKG 12/23/23  Sinus rhythm with frequent Premature ventricular complexes   Low voltage QRS   Borderline ECG       Echo 6/7/23  Interpretation Summary     The left ventricle visual ejection fraction is 60-65%.Mid lateral wall mild  hypokinesia.  The right ventricular systolic function is normal.  Aortic valve sclerosis noted  The inferior vena cava was normal in size with preserved respiratory  variability.     On direct comparison to resting echo images dated 04/09/2022 mid lateral wall  hypokinesia appears new.      NM stress test 6/7/23  Result Text       The nuclear stress test is negative for inducible myocardial ischemia or infarction.    Left ventricular function is hyperdynamic.    The left ventricular ejection fraction at rest is 75%.    A prior study was conducted on 4/19/2022. Compared to prior study no significant changes.    Coronary angiogram 2021  Conclusion          Prox RCA lesion is 80% stenosed.    Prox LAD lesion is 35% stenosed.    Prox Cx lesion is 40% stenosed.    Mid Cx lesion is 20% stenosed.     1. L dom system with mild/mod LCA lesions similar to 2020 cath  (see Patient narrative section)  2. Severe spasm of prox non dominant RCA which may be catheter induced spasm: The RCA is a very small non dominant vessel with mild diffuse disease. After the second injection there was severe spasm (80%) at  "the tip of the catheter. It was relieved with IC NTG  3. I did not do ACh spasm test since patient was already on norvasc, imdur and received IC NTG after RCA spasm. I did do \"cold pressor\" test (hands in ice water). There was a non specific diffuse mild narrowing of the LCA system but the L-PDA seemed to have the largest amount of diameter change with ice water and improvement after IC NTG  REC- this is a non specific result (see above) but clinically pt states CP better after SL NTG. Given the elevated BP here she should be checked in the office and if still high increase Imdur, norvasc and possibly add ACE and L-arginine. Avoid beta blocker and continue high dose statins          Latest Ref Rng & Units 10/22/2014     9:18 AM   PFT   FVC L 2.22    FEV1 L 1.73    FVC% % 89    FEV1% % 89          The patient's records and results personally reviewed by this provider.     Outside records reviewed from: Care Everywhere      Assessment    Victoria Montalvo is a 83 year old female seen as a PAC referral for risk assessment and optimization for anesthesia.    Plan/Recommendations  Pt will be optimized for the proposed procedure.  See below for details on the assessment, risk, and preoperative recommendations    NEUROLOGY  - No history of TIA, CVA or seizure  -Post Op delirium risk factors:  Age and High co-morbid index    ENT  - No current airway concerns.  Will need to be reassessed day of surgery.  Mallampati: Unable to assess  TM: Unable to assess    CARDIAC  - CAD  Well controlled on home regimen - the patient is s/p PCI with SHERLEY in 2017. She has coronary artery spasms and continues to have intermittent chest pain. She was in the ED on 12/23/23 as she experienced new chest pain for about 1 hour while out and took her nitroglycerin. By the time she got to the ED it was resolved. She reports she was advised by the ED to follow up with her cardiologist. She reports she got a message from her cardiologist, Dr. Yepez, who said " she needed no further follow up. She will try to send me a screen shot of this message. Have also messaged Dr. Yepez and Marleni Raza APRN CNP.   - CHF  Diastolic heart failure - Echo in 6/2023 with EF 60-65%. The patient will hold her lasix but continue spironolactone and isosorbide DOS.   - Hypertension  Well controlled  - Hyperlipidemia  Well controlled on home regimen  - Aortic valve sclerosis  ~ History of left subclavian stenosis s/p stents in 2013.   - METS (Metabolic Equivalents)  Patient CANNOT perform 4 METS exercise without symptoms            Total Score: 1    Functional Capacity: Unable to complete 4 METS      RCRI-High risk: Class 4  >11% complication reate            Total Score: 3    RCRI: High Risk Surgery    RCRI: CAD    RCRI: CHF    ~ The patient reports she can walk across the parking lot and all throughout the store without issues. She can go up the flight of stairs but at the top will feel short of breath.   ~ The patient had EKG, Echo and stress test in 6/2023 without significant findings. She was seen by cardiology in 7/2023 and was advised to continue medical therapy. She did just go to the ED for chest pain 12/2023 and message sent to cardiology team if further follow up needed.     PULMONARY  - Obstructive Sleep Apnea  KEN without home CPAP use. Patient is intolerant to CPAP.   KEN Low Risk            Total Score: 2    KEN: Hypertension    KEN: Over 50 ys old      - Asthma  Well controlled - the patient reports she used her nebulizer 3 weeks ago for wheezing.   - COPD  Well controlled  - Tobacco History    History   Smoking Status    Former    Packs/day: 1.00    Years: 30.00    Types: Cigarettes    Start date: 7/15/1973    Quit date: 5/19/1993   Smokeless Tobacco    Never   ~ When the patient was in the ED for chest pain she was found to have a new subtle left subpleural nodular opacity. She has a follow up appointment tomorrow with her PCP.     GI  - GERD/ hiatal hernia/ dysphagia =  "procedure as above.   Not controlled on medications  PONV High Risk  Total Score: 3           1 AN PONV: Pt is Female    1 AN PONV: Patient is not a current smoker    1 AN PONV: Intended Post Op Opioids        /RENAL  - Baseline Creatinine  0.76  ~ Patient reports urinary urgency but no UTI symptoms.     ENDOCRINE    - BMI: Estimated body mass index is 30.61 kg/m  as calculated from the following:    Height as of 11/30/23: 1.549 m (5' 1\").    Weight as of 11/30/23: 73.5 kg (162 lb).  Obesity (BMI >30)  - No history of Diabetes Mellitus    HEME  VTE Low Risk 0.26%            Total Score: 1    VTE: Greater than 59 yrs old      - Platelet disfunction second to Aspirin (Estephania, many others) - hold DOS per thoracic protocol  ~ Type and screen completed for the patient.     MSK  Patient is NOT Frail            Total Score: 0        ~ Sciatica - having symptoms of warmth. She denies pain.   ~ S/p prior ORIF ankle, s/p right PRASANTH.       Different anesthesia methods/types have been discussed with the patient, but they are aware that the final plan will be decided by the assigned anesthesia provider on the date of service.    Patient was discussed with Dr. Morrow    The patient is optimized for their procedure pending cardiology input that no further follow up needed given chest pain.      AVS with information on surgery time/arrival time, meds and NPO status given by nursing staff. No further diagnostic testing indicated.    Please refer to the physical examination documented by the anesthesiologist in the anesthesia record on the day of surgery.    Video-Visit Details    Type of service:  Video Visit    Provider received verbal consent for a Video Visit from the patient? Yes   Video Start Time:  11:03  Video End Time: 11:21    Originating Location (pt. Location): Home    Distant Location (provider location):  Off-site  Mode of Communication:  Video Conference via [employer]  On the day of service:     Prep time: 10 " minutes  Visit time: 19 minutes  Documentation time: 23 minutes  ------------------------------------------  Total time: 52 minutes      Linda Mayo PA-C  Preoperative Assessment Center  [facility]  Phone: [phone]  Fax: [phone]

## 2024-01-02 NOTE — PROGRESS NOTES
Victoria is a 83 year old who is being evaluated via a billable video visit.      How would you like to obtain your AVS? MyChart              Subjective   Victoria is a 83 year old, presenting for the following health issues:  Pre-Op Exam (/)            ELIZABETH Taylor LPN

## 2024-01-02 NOTE — PATIENT INSTRUCTIONS
Preparing for Your Surgery      Name:  Victoria Montalvo   MRN:  [id number]   :  1940   Today's Date:  2024       Arriving for surgery:  Surgery date:  24  Arrival time:  5:30 am    Please come to:     [facility] [address]  [address] [place]  [address]      The [facility] Patient /Visitor Ramp is located at [address]. Patients and visitors who self-park will receive the reduced hospital parking rate. If the Patient /Visitor Ramp is full, please follow the signs to the [email or web address] parking located at the main hospital entrance.     parking is available ( 24 hours/ 7 days a week)    Discounted parking pass options are available for patients and visitors. They can be purchased at the [employer] desk at the main hospital entrance.    -  Stop at the security desk and they will direct surgery patients to Registration, and then the 3rd floor Surgery Waiting Room. [phone]     -  If you are in need of directions, wheelchair or escort please stop at the Information/security desk in the lobby.       What can I eat or drink?  -  You may eat and drink normally up to 8 hours prior to arrival time. (Until 9:30 pm 24)  -  You may have clear liquids until 2 hours prior to arrival time. (Until 3:30 am)    Examples of clear liquids:  Water  Clear broth  Juices (apple, white grape, white cranberry  and cider) without pulp  Noncarbonated, powder based beverages  (lemonade and Art-Aid)  Sodas (Sprite, 7-Up, ginger ale and seltzer)  Coffee or tea (without milk or cream)  Gatorade    -  No Alcohol or cannabis products for at least 24 hours before surgery.     Which medicines can I take?    Hold Multivitamins for 7 days before surgery. Take the last Multivitamin on 24, and then hold until surgery.  Hold Supplements for 7 days before surgery. Take the last Boswellia-Glucosamine-Vitamin D (Osteo Bi-Flex) and Omega-3 Fatty  Acids on 1-4-24, and then hold until surgery.  Hold Ibuprofen (Advil, Motrin) for 1 day before surgery--unless otherwise directed by surgeon.  Hold Naproxen (Aleve) for 4 days before surgery.  Acetaminophen (Tylenol) is okay to take if needed.    Hold Diclofenac (Voltaren) gel for 24 hours prior to surgery.    -  DO NOT take these medications the day of surgery:  Aspirin  Furosemide (Lasix)  Calcium  Calcium Carbonate (TUMS)  Vitamin D3 (Cholecalciferol)  Vitamin B-6 (Pyridoxine HCl)      -  PLEASE TAKE these medications the day of surgery:  Spironolactone (Aldactone)   Fluticasone (Flonase) nasal spray  Fluticasone-Salmeterol (Advair) inhaler  Isosorbide Mononitrate (Imdur)  Lansoprazole (Prevacid)  Albuterol inhaler if needed  Ipratropium-Albuterol (DUOneb) neb if needed  Acetaminophen (Tylenol) if needed    Bring inhalers to hospital.    How do I prepare myself?  - Please take 2 showers (one the night prior to surgery and one the morning of surgery) using Scrubcare or Hibiclens soap.    Use this soap only from the neck to your toes.     Leave the soap on your skin for one minute--then rinse thoroughly.      You may use your own shampoo and conditioner. No other hair products.   - Please remove all jewelry and body piercings.  - No lotions, deodorants or fragrance.  - No makeup or fingernail polish.   - Bring your ID and insurance card.    -If you use a CPAP machine, please bring the CPAP machine, tubing, and mask to hospital.    -If you have a Deep Brain Stimulator, Spinal Cord Stimulator, or any Neuro Stimulator device---you must bring the remote control to the hospital.      ALL PATIENTS GOING HOME THE SAME DAY OF SURGERY ARE REQUIRED TO HAVE A RESPONSIBLE ADULT TO DRIVE AND BE IN ATTENDANCE WITH THEM FOR 24 HOURS FOLLOWING SURGERY.    Covid testing policy as of 12/06/2022  Your surgeon will notify and schedule you for a COVID test if one is needed before surgery--please direct any questions or COVID symptoms  to your surgeon      Questions or Concerns:    - For any questions regarding the day of surgery or your hospital stay, please contact the Pre Admission Nursing Office at [phone].       - If you have health changes between today and your surgery, please call your surgeon.       - For questions after surgery, please call your surgeons office.           Current Visitor Guidelines    You may have 2 visitors in the pre op area.    Visiting hours: 8 a.m. to 8:30 p.m.    Patients confirmed or suspected to have symptoms of COVID 19 or flu:     No visitors allowed for adult patients.   Children (under age 18) can have 1 named visitor.     People who are sick or showing symptoms of COVID 19 or flu:    Are not allowed to visit patients--we can only make exceptions in special situations.       Please follow these guidelines for your visit:          Please maintain social distance          Masking is optional--however at times you may be asked to wear a mask for the safety of yourself and others     Clean your hands with alcohol hand . Do this when you arrive at and leave the building and patient room,    And again after you touch your mask or anything in the room.     Go directly to and from the room you are visiting.     Stay in the patient s room during your visit. Limit going to other places in the hospital as much as possible     Leave bags and jackets at home or in the car.     For everyone s health, please don t come and go during your visit. That includes for smoking   during your visit.

## 2024-01-03 ENCOUNTER — LAB (OUTPATIENT)
Dept: LAB | Facility: CLINIC | Age: 84
End: 2024-01-03
Payer: COMMERCIAL

## 2024-01-03 ENCOUNTER — OFFICE VISIT (OUTPATIENT)
Dept: PEDIATRICS | Facility: CLINIC | Age: 84
End: 2024-01-03
Payer: COMMERCIAL

## 2024-01-03 VITALS
RESPIRATION RATE: 20 BRPM | BODY MASS INDEX: 32.33 KG/M2 | DIASTOLIC BLOOD PRESSURE: 76 MMHG | HEART RATE: 82 BPM | OXYGEN SATURATION: 98 % | SYSTOLIC BLOOD PRESSURE: 126 MMHG | TEMPERATURE: 97.9 F | WEIGHT: 171.1 LBS

## 2024-01-03 DIAGNOSIS — R91.8 PULMONARY NODULES: ICD-10-CM

## 2024-01-03 DIAGNOSIS — R07.9 CHEST PAIN, UNSPECIFIED TYPE: Primary | ICD-10-CM

## 2024-01-03 DIAGNOSIS — Z01.818 PRE-OPERATIVE EXAMINATION: ICD-10-CM

## 2024-01-03 DIAGNOSIS — K21.9 HIATAL HERNIA WITH GERD: ICD-10-CM

## 2024-01-03 DIAGNOSIS — K44.9 HIATAL HERNIA WITH GERD: ICD-10-CM

## 2024-01-03 PROCEDURE — 86900 BLOOD TYPING SEROLOGIC ABO: CPT

## 2024-01-03 PROCEDURE — 86850 RBC ANTIBODY SCREEN: CPT

## 2024-01-03 PROCEDURE — 36415 COLL VENOUS BLD VENIPUNCTURE: CPT

## 2024-01-03 PROCEDURE — 99213 OFFICE O/P EST LOW 20 MIN: CPT | Performed by: PHYSICIAN ASSISTANT

## 2024-01-03 PROCEDURE — 86901 BLOOD TYPING SEROLOGIC RH(D): CPT

## 2024-01-03 RX ORDER — RESPIRATORY SYNCYTIAL VIRUS VACCINE 120MCG/0.5
0.5 KIT INTRAMUSCULAR ONCE
Qty: 1 EACH | Refills: 0 | Status: CANCELLED | OUTPATIENT
Start: 2024-01-03 | End: 2024-01-03

## 2024-01-03 ASSESSMENT — PAIN SCALES - GENERAL: PAINLEVEL: NO PAIN (0)

## 2024-01-03 NOTE — PROGRESS NOTES
"  Assessment & Plan     Chest pain, unspecified type      Pulmonary nodules    - PRIMARY CARE FOLLOW-UP SCHEDULING; Future      Patient is doing well.  She will have followup imaging on the chest XR in about 4 weeks.  She was advised to be seen sooner if needed.           MED REC REQUIRED  Post Medication Reconciliation Status:   BMI:   Estimated body mass index is 32.33 kg/m  as calculated from the following:    Height as of 11/30/23: 1.549 m (5' 1\").    Weight as of this encounter: 77.6 kg (171 lb 1.6 oz).       Cesilia Alonso PA-C  [facility] SEAN Kessler is a 83 year old, presenting for the following health issues:  Hospital F/U        1/3/2024     2:23 PM   Additional Questions   Roomed by LILIAN Easton   Accompanied by Avani Perdomo         1/3/2024     2:23 PM   Patient Reported Additional Medications   Patient reports taking the following new medications n/a     CONCERNS: Would like results on nodules in the left lung    HPI         Hospital Follow-up Visit:    Hospital/Nursing Home/IP Rehab Facility: [facility]  Date of Admission: 12/23/2023  Date of Discharge: 12/23/2023  Reason(s) for Admission: Chest Pain     Was your hospitalization related to COVID-19? No   Problems taking medications regularly:  None  Medication changes since discharge: None  Problems adhering to non-medication therapy:  None    This was not a hospitalization.  This was an ER visit.  Patient reports that the symptoms have resolved.  She does not have chest pain now.  She does want to discuss the XR findings on her chest XR.                 Review of Systems   Constitutional, HEENT, cardiovascular, pulmonary, gi and gu systems are negative, except as otherwise noted.      Objective    /76 (BP Location: Right arm, Patient Position: Sitting, Cuff Size: Adult Large)   Pulse 82   Temp 97.9  F (36.6  C) (Oral)   Resp 20   Wt 77.6 kg (171 lb 1.6 oz)   LMP  (LMP Unknown)  "  SpO2 98%   BMI 32.33 kg/m    Body mass index is 32.33 kg/m .  Physical Exam   GENERAL: healthy, alert and no distress  EYES: Eyes grossly normal to inspection, PERRL and conjunctivae and sclerae normal  NECK: no adenopathy, no asymmetry, masses, or scars and thyroid normal to palpation  RESP: lungs clear to auscultation - no rales, rhonchi or wheezes  CV: regular rate and rhythm, normal S1 S2, no S3 or S4, no murmur, click or rub, no peripheral edema and peripheral pulses strong  MS: no gross musculoskeletal defects noted, no edema      Cesilia Alonso PA-C

## 2024-01-08 DIAGNOSIS — K21.9 HIATAL HERNIA WITH GERD: Primary | ICD-10-CM

## 2024-01-08 DIAGNOSIS — K44.9 HIATAL HERNIA WITH GERD: Primary | ICD-10-CM

## 2024-01-08 RX ORDER — FLUMAZENIL 0.1 MG/ML
0.2 INJECTION, SOLUTION INTRAVENOUS
Status: CANCELLED | OUTPATIENT
Start: 2024-01-08

## 2024-01-08 RX ORDER — NALOXONE HYDROCHLORIDE 0.4 MG/ML
0.4 INJECTION, SOLUTION INTRAMUSCULAR; INTRAVENOUS; SUBCUTANEOUS
Status: CANCELLED | OUTPATIENT
Start: 2024-01-08

## 2024-01-08 RX ORDER — NALOXONE HYDROCHLORIDE 0.4 MG/ML
0.2 INJECTION, SOLUTION INTRAMUSCULAR; INTRAVENOUS; SUBCUTANEOUS
Status: CANCELLED | OUTPATIENT
Start: 2024-01-08

## 2024-01-08 RX ORDER — FENTANYL CITRATE 50 UG/ML
25-50 INJECTION, SOLUTION INTRAMUSCULAR; INTRAVENOUS
Status: CANCELLED | OUTPATIENT
Start: 2024-01-08

## 2024-01-09 ENCOUNTER — LAB (OUTPATIENT)
Dept: LAB | Facility: CLINIC | Age: 84
End: 2024-01-09
Attending: CLINICAL NURSE SPECIALIST
Payer: COMMERCIAL

## 2024-01-09 DIAGNOSIS — K21.9 HIATAL HERNIA WITH GERD: ICD-10-CM

## 2024-01-09 DIAGNOSIS — K44.9 HIATAL HERNIA WITH GERD: ICD-10-CM

## 2024-01-09 PROCEDURE — 36415 COLL VENOUS BLD VENIPUNCTURE: CPT

## 2024-01-09 PROCEDURE — 82040 ASSAY OF SERUM ALBUMIN: CPT

## 2024-01-09 PROCEDURE — 84134 ASSAY OF PREALBUMIN: CPT

## 2024-01-10 LAB
ALBUMIN SERPL BCG-MCNC: 4.1 G/DL (ref 3.5–5.2)
PREALB SERPL-MCNC: 20.1 MG/DL (ref 20–40)

## 2024-01-11 ASSESSMENT — COPD QUESTIONNAIRES
COPD: 1
CAT_SEVERITY: MILD

## 2024-01-11 ASSESSMENT — ENCOUNTER SYMPTOMS
ORTHOPNEA: 0
SEIZURES: 0

## 2024-01-11 NOTE — ANESTHESIA PREPROCEDURE EVALUATION
Anesthesia Pre-Procedure Evaluation    Patient: Victoria Montalvo   MRN: [id number] : 1940        Procedure : Procedure(s):  laparoscopic hiatal hernia repair, Nissen fundoplication, possible gastroplasty, gastrostomy          Past Medical History:   Diagnosis Date    Abnormal Papanicolaou smear of cervix and cervical HPV     colposcopy     Aortic valve sclerosis     Arthritis     Asthma     on preventative meds and has nebulizer    Chronic rhinitis     COPD (chronic obstructive pulmonary disease)     Coronary artery disease     17 SHERLEY--PDA    Coronary artery spasm (H24)     Diastolic CHF, chronic 2012    Gastro-oesophageal reflux disease     Hiatal hernia     Hyperlipidemia 10/31/2011    Obesity     KEN (obstructive sleep apnea)     CPAP    Personal history of colonic polyps     colonoscopy 2002 (due in )    Pulmonary HTN     Seasonal allergies     Status post coronary angiogram 2020    Subclavian artery stenosis, left 2013    S/p stent in      Subclinical hyperthyroidism 10/17/2016      Past Surgical History:   Procedure Laterality Date    ARTHROPLASTY HIP Right 10/19/2015    Procedure: ARTHROPLASTY HIP;  Surgeon: Aron Torres MD;  Location:  OR    COLONOSCOPY  2008    Polyp removed, bx.    COLONOSCOPY  2010    COLONOSCOPY N/A 2015    Procedure: COLONOSCOPY;  Surgeon: Gato Quiles MD;  Location:  GI    CT CORONARY ANGIOGRAM  2017    SHERLEY to PDA    CV CORONARY ANGIOGRAM N/A 2020    Procedure: Coronary Angiogram;  Surgeon: Handy Denise MD;  Location:  HEART CARDIAC CATH LAB    CV CORONARY ANGIOGRAM N/A 2021    Procedure: Coronary Angiogram;  Surgeon: Handy Denise MD;  Location: [facility] CARDIAC CATH LAB    CV INSTANTANEOUS WAVE-FREE RATIO N/A 2020    Procedure: Instantaneous Wave-Free Ratio;  Surgeon: Handy Denise MD;  Location:  HEART CARDIAC CATH LAB    CV LEFT VENTRICULOGRAM N/A  2020    Procedure: Left Ventriculogram;  Surgeon: Handy Denise MD;  Location:  HEART CARDIAC CATH LAB    ESOPHAGOSCOPY, GASTROSCOPY, DUODENOSCOPY (EGD), COMBINED N/A 2023    Procedure: Esophagoscopy, gastroscopy, duodenoscopy (EGD), combined;  Surgeon: Genesis Kuhn MD;  Location:  GI    Liposuction of legs and stomach      MAMMOPLASTY REDUCTION  1989    OPEN REDUCTION INTERNAL FIXATION ANKLE  1987    Removal of ankle hardware NOS  1990    SOFT TISSUE SURGERY   &     Liposuction    VASCULAR SURGERY      angiogram & left subclavical artery stent      Allergies   Allergen Reactions    Animal Dander     Bee Pollen Other (See Comments)     Pollen,dust, trees, grass, mold-hayfever symptoms    Dust Mites     Kiwi Itching     Itching and red all over    Pollen Extract      Pollen,dust, trees, grass, mold-hayfever symptoms      Social History     Tobacco Use    Smoking status: Former     Packs/day: 1.00     Years: 30.00     Additional pack years: 0.00     Total pack years: 30.00     Types: Cigarettes     Start date: 7/15/1973     Quit date: 1993     Years since quittin.6    Smokeless tobacco: Never   Substance Use Topics    Alcohol use: Yes     Comment: Not very often      Wt Readings from Last 1 Encounters:   24 77.6 kg (171 lb 1.6 oz)        Anesthesia Evaluation   Pt has had prior anesthetic. Type: MAC and General.    No history of anesthetic complications       ROS/MED HX  ENT/Pulmonary:     (+) sleep apnea, doesn't use CPAP,         allergic rhinitis,          Mild Persistent, asthma Last exacerbation: 3 weeks,  Treatment: Inhaler prn, Inhaled steroids and Nebulizer prn,  mild,  COPD,              Neurologic:  - neg neurologic ROS  (-) no seizures, no CVA and no TIA   Cardiovascular: Comment: History of subclavian artery stenosis s/p stent     (+)  hypertension- -  CAD angina (coronary artery spasms)-  - stent- 3 Drug Eluting Stent.    CHF  etiology: diastolic Last EF: 60-65% date: 6/2023  ESPITIA.                valvular problems/murmurs  aortic sclerosis.   pulmonary hypertension (history of in 2017 (mild)), Previous cardiac testing   Echo: Date: 6/2023 Results:  Interpretation Summary     The left ventricle visual ejection fraction is 60-65%.Mid lateral wall mild  hypokinesia.  The right ventricular systolic function is normal.  Aortic valve sclerosis noted  The inferior vena cava was normal in size with preserved respiratory  variability.     On direct comparison to resting echo images dated 04/09/2022 mid lateral wall  hypokinesia appears new.    Stress Test:  Date: 6/2023 Results:     The nuclear stress test is negative for inducible myocardial ischemia or infarction.     Left ventricular function is hyperdynamic.     The left ventricular ejection fraction at rest is 75%.     A prior study was conducted on 4/19/2022. Compared to prior study no significant changes.    ECG Reviewed:  Date: 12/23/23 Results:  Sinus rhythm with frequent Premature ventricular complexes   Low voltage QRS   Borderline ECG       Cath:  Date: 2021 Results:   (-) orthopnea/PND   METS/Exercise Tolerance: 4 - Raking leaves, gardening Comment: Doesn't exercise purposefully,but does yard work, cleans her own home, shops and goes up and down her stairs multiple times per day.      Denies any exertional angina, but does have some exertional dyspnea with heavy exertion (carrying laundry up the stairs).    Hematologic:  - neg hematologic  ROS     Musculoskeletal: Comment: History of intermittent sciatica causing a warm sensation     S/p right PRASANTH and prior ankle ORIF    (+)  arthritis,             GI/Hepatic: Comment: dysphagia    (+) GERD, Symptomatic,    hiatal hernia,              Renal/Genitourinary: Comment: Urinary urgency      Endo:     (+)               Obesity,       Psychiatric/Substance Use:  - neg psychiatric ROS     Infectious Disease:  - neg infectious disease ROS      Malignancy:  - neg malignancy ROS     Other:  - neg other ROS             OUTSIDE LABS:  CBC:   Lab Results   Component Value Date    WBC 6.7 12/23/2023    WBC 7.8 06/22/2023    HGB 11.7 12/23/2023    HGB 13.3 06/22/2023    HCT 35.4 12/23/2023    HCT 39.8 06/22/2023     12/23/2023     06/22/2023     BMP:   Lab Results   Component Value Date     12/23/2023     06/22/2023    POTASSIUM 3.9 12/23/2023    POTASSIUM 3.8 06/22/2023    CHLORIDE 105 12/23/2023    CHLORIDE 102 06/22/2023    CO2 24 12/23/2023    CO2 26 06/22/2023    BUN 23.5 (H) 12/23/2023    BUN 13.2 06/22/2023    CR 0.69 12/23/2023    CR 0.72 06/22/2023    GLC 93 12/23/2023     (H) 06/22/2023     COAGS:   Lab Results   Component Value Date    PTT 28 07/01/2021    INR 1.13 07/01/2021     POC:   Lab Results   Component Value Date    BGM 93 10/19/2015     HEPATIC:   Lab Results   Component Value Date    ALBUMIN 4.1 01/09/2024    PROTTOTAL 7.5 06/22/2023    ALT 19 06/22/2023    AST 26 06/22/2023    ALKPHOS 60 06/22/2023    BILITOTAL 0.4 06/22/2023     OTHER:   Lab Results   Component Value Date    PHILLIP 8.6 (L) 12/23/2023    LIPASE 19 06/22/2023    TSH 1.27 07/22/2021    T4 0.99 07/18/2019    SED 41 (H) 12/29/2004       Anesthesia Plan    ASA Status:  4       Anesthesia Type: General.     - Airway: ETT   Induction: RSI.   Maintenance: Balanced.   Techniques and Equipment:     - Airway: Video-Laryngoscope     - Lines/Monitors: 2nd IV, Arterial Line, BIS     - Drips/Meds: Phenylephrine     Consents    Anesthesia Plan(s) and associated risks, benefits, and realistic alternatives discussed. Questions answered and patient/representative(s) expressed understanding.     - Discussed: Risks, Benefits and Alternatives for BOTH SEDATION and the PROCEDURE were discussed     - Discussed with:  Patient      - Extended Intubation/Ventilatory Support Discussed: Yes.      Use of blood products discussed: Yes.     - Discussed with: Patient.      Postoperative Care    Pain management: IV analgesics, Oral pain medications, Multi-modal analgesia.   PONV prophylaxis: Ondansetron (or other 5HT-3), Dexamethasone or Solumedrol, Aprepitant     Comments:               Dorothy Kathleen MD    I have reviewed the pertinent notes and labs in the chart from the past 30 days and (re)examined the patient.  Any updates or changes from those notes are reflected in this note.

## 2024-01-12 ENCOUNTER — APPOINTMENT (OUTPATIENT)
Dept: GENERAL RADIOLOGY | Facility: CLINIC | Age: 84
DRG: 327 | End: 2024-01-12
Payer: COMMERCIAL

## 2024-01-12 ENCOUNTER — HOSPITAL ENCOUNTER (INPATIENT)
Facility: CLINIC | Age: 84
LOS: 3 days | Discharge: HOME | DRG: 327 | End: 2024-01-15
Attending: THORACIC SURGERY (CARDIOTHORACIC VASCULAR SURGERY) | Admitting: THORACIC SURGERY (CARDIOTHORACIC VASCULAR SURGERY)
Payer: COMMERCIAL

## 2024-01-12 ENCOUNTER — ANESTHESIA (OUTPATIENT)
Dept: SURGERY | Facility: CLINIC | Age: 84
DRG: 327 | End: 2024-01-12
Payer: COMMERCIAL

## 2024-01-12 DIAGNOSIS — K44.9 HIATAL HERNIA: Primary | ICD-10-CM

## 2024-01-12 LAB
ABO/RH(D): NORMAL
ANION GAP SERPL CALCULATED.3IONS-SCNC: 9 MMOL/L (ref 7–15)
ANTIBODY SCREEN: NEGATIVE
BASOPHILS # BLD AUTO: 0 10E3/UL (ref 0–0.2)
BASOPHILS NFR BLD AUTO: 0 %
BUN SERPL-MCNC: 14.2 MG/DL (ref 8–23)
CALCIUM SERPL-MCNC: 8.9 MG/DL (ref 8.8–10.2)
CHLORIDE SERPL-SCNC: 104 MMOL/L (ref 98–107)
CREAT SERPL-MCNC: 0.81 MG/DL (ref 0.51–0.95)
CREAT SERPL-MCNC: 0.87 MG/DL (ref 0.51–0.95)
DEPRECATED HCO3 PLAS-SCNC: 27 MMOL/L (ref 22–29)
EGFRCR SERPLBLD CKD-EPI 2021: 65 ML/MIN/1.73M2
EGFRCR SERPLBLD CKD-EPI 2021: 71 ML/MIN/1.73M2
EOSINOPHIL # BLD AUTO: 0.1 10E3/UL (ref 0–0.7)
EOSINOPHIL NFR BLD AUTO: 2 %
ERYTHROCYTE [DISTWIDTH] IN BLOOD BY AUTOMATED COUNT: 14.6 % (ref 10–15)
GLUCOSE SERPL-MCNC: 102 MG/DL (ref 70–99)
HCT VFR BLD AUTO: 38.7 % (ref 35–47)
HGB BLD-MCNC: 12.4 G/DL (ref 11.7–15.7)
HOLD SPECIMEN: NORMAL
HOLD SPECIMEN: NORMAL
IMM GRANULOCYTES # BLD: 0 10E3/UL
IMM GRANULOCYTES NFR BLD: 0 %
LYMPHOCYTES # BLD AUTO: 1.3 10E3/UL (ref 0.8–5.3)
LYMPHOCYTES NFR BLD AUTO: 19 %
MCH RBC QN AUTO: 29.5 PG (ref 26.5–33)
MCHC RBC AUTO-ENTMCNC: 32 G/DL (ref 31.5–36.5)
MCV RBC AUTO: 92 FL (ref 78–100)
MONOCYTES # BLD AUTO: 0.5 10E3/UL (ref 0–1.3)
MONOCYTES NFR BLD AUTO: 6 %
NEUTROPHILS # BLD AUTO: 5.2 10E3/UL (ref 1.6–8.3)
NEUTROPHILS NFR BLD AUTO: 73 %
NRBC # BLD AUTO: 0 10E3/UL
NRBC BLD AUTO-RTO: 0 /100
PLATELET # BLD AUTO: 192 10E3/UL (ref 150–450)
POTASSIUM SERPL-SCNC: 4.1 MMOL/L (ref 3.4–5.3)
RBC # BLD AUTO: 4.21 10E6/UL (ref 3.8–5.2)
SODIUM SERPL-SCNC: 140 MMOL/L (ref 135–145)
SPECIMEN EXPIRATION DATE: NORMAL
WBC # BLD AUTO: 7.2 10E3/UL (ref 4–11)

## 2024-01-12 PROCEDURE — 250N000009 HC RX 250

## 2024-01-12 PROCEDURE — 250N000011 HC RX IP 250 OP 636: Performed by: STUDENT IN AN ORGANIZED HEALTH CARE EDUCATION/TRAINING PROGRAM

## 2024-01-12 PROCEDURE — 250N000013 HC RX MED GY IP 250 OP 250 PS 637

## 2024-01-12 PROCEDURE — 32551 INSERTION OF CHEST TUBE: CPT | Mod: 50 | Performed by: THORACIC SURGERY (CARDIOTHORACIC VASCULAR SURGERY)

## 2024-01-12 PROCEDURE — 710N000009 HC RECOVERY PHASE 1, LEVEL 1, PER MIN: Performed by: THORACIC SURGERY (CARDIOTHORACIC VASCULAR SURGERY)

## 2024-01-12 PROCEDURE — 360N000077 HC SURGERY LEVEL 4, PER MIN: Performed by: THORACIC SURGERY (CARDIOTHORACIC VASCULAR SURGERY)

## 2024-01-12 PROCEDURE — 36415 COLL VENOUS BLD VENIPUNCTURE: CPT | Performed by: THORACIC SURGERY (CARDIOTHORACIC VASCULAR SURGERY)

## 2024-01-12 PROCEDURE — 86900 BLOOD TYPING SEROLOGIC ABO: CPT | Performed by: STUDENT IN AN ORGANIZED HEALTH CARE EDUCATION/TRAINING PROGRAM

## 2024-01-12 PROCEDURE — 71045 X-RAY EXAM CHEST 1 VIEW: CPT | Mod: 26 | Performed by: RADIOLOGY

## 2024-01-12 PROCEDURE — 999N000141 HC STATISTIC PRE-PROCEDURE NURSING ASSESSMENT: Performed by: THORACIC SURGERY (CARDIOTHORACIC VASCULAR SURGERY)

## 2024-01-12 PROCEDURE — 120N000002 HC R&B MED SURG/OB UMMC

## 2024-01-12 PROCEDURE — 36415 COLL VENOUS BLD VENIPUNCTURE: CPT

## 2024-01-12 PROCEDURE — 250N000013 HC RX MED GY IP 250 OP 250 PS 637: Performed by: STUDENT IN AN ORGANIZED HEALTH CARE EDUCATION/TRAINING PROGRAM

## 2024-01-12 PROCEDURE — 0DH63UZ INSERTION OF FEEDING DEVICE INTO STOMACH, PERCUTANEOUS APPROACH: ICD-10-PCS | Performed by: THORACIC SURGERY (CARDIOTHORACIC VASCULAR SURGERY)

## 2024-01-12 PROCEDURE — 258N000003 HC RX IP 258 OP 636

## 2024-01-12 PROCEDURE — 250N000011 HC RX IP 250 OP 636

## 2024-01-12 PROCEDURE — 36415 COLL VENOUS BLD VENIPUNCTURE: CPT | Performed by: STUDENT IN AN ORGANIZED HEALTH CARE EDUCATION/TRAINING PROGRAM

## 2024-01-12 PROCEDURE — 43281 LAP PARAESOPHAG HERN REPAIR: CPT | Mod: GC | Performed by: THORACIC SURGERY (CARDIOTHORACIC VASCULAR SURGERY)

## 2024-01-12 PROCEDURE — 0BQT4ZZ REPAIR DIAPHRAGM, PERCUTANEOUS ENDOSCOPIC APPROACH: ICD-10-PCS | Performed by: THORACIC SURGERY (CARDIOTHORACIC VASCULAR SURGERY)

## 2024-01-12 PROCEDURE — 250N000011 HC RX IP 250 OP 636: Performed by: THORACIC SURGERY (CARDIOTHORACIC VASCULAR SURGERY)

## 2024-01-12 PROCEDURE — 250N000009 HC RX 250: Performed by: STUDENT IN AN ORGANIZED HEALTH CARE EDUCATION/TRAINING PROGRAM

## 2024-01-12 PROCEDURE — 80048 BASIC METABOLIC PNL TOTAL CA: CPT | Performed by: THORACIC SURGERY (CARDIOTHORACIC VASCULAR SURGERY)

## 2024-01-12 PROCEDURE — 272N000001 HC OR GENERAL SUPPLY STERILE: Performed by: THORACIC SURGERY (CARDIOTHORACIC VASCULAR SURGERY)

## 2024-01-12 PROCEDURE — 85025 COMPLETE CBC W/AUTO DIFF WBC: CPT | Performed by: THORACIC SURGERY (CARDIOTHORACIC VASCULAR SURGERY)

## 2024-01-12 PROCEDURE — 94640 AIRWAY INHALATION TREATMENT: CPT

## 2024-01-12 PROCEDURE — 250N000009 HC RX 250: Performed by: THORACIC SURGERY (CARDIOTHORACIC VASCULAR SURGERY)

## 2024-01-12 PROCEDURE — 370N000017 HC ANESTHESIA TECHNICAL FEE, PER MIN: Performed by: THORACIC SURGERY (CARDIOTHORACIC VASCULAR SURGERY)

## 2024-01-12 PROCEDURE — 250N000025 HC SEVOFLURANE, PER MIN: Performed by: THORACIC SURGERY (CARDIOTHORACIC VASCULAR SURGERY)

## 2024-01-12 PROCEDURE — 258N000003 HC RX IP 258 OP 636: Performed by: STUDENT IN AN ORGANIZED HEALTH CARE EDUCATION/TRAINING PROGRAM

## 2024-01-12 PROCEDURE — 999N000065 XR CHEST PORT 1 VIEW

## 2024-01-12 PROCEDURE — 999N000157 HC STATISTIC RCP TIME EA 10 MIN

## 2024-01-12 PROCEDURE — 82565 ASSAY OF CREATININE: CPT

## 2024-01-12 RX ORDER — FENTANYL CITRATE 50 UG/ML
INJECTION, SOLUTION INTRAMUSCULAR; INTRAVENOUS PRN
Status: DISCONTINUED | OUTPATIENT
Start: 2024-01-12 | End: 2024-01-12

## 2024-01-12 RX ORDER — HYDROMORPHONE HCL IN WATER/PF 6 MG/30 ML
0.4 PATIENT CONTROLLED ANALGESIA SYRINGE INTRAVENOUS EVERY 5 MIN PRN
Status: DISCONTINUED | OUTPATIENT
Start: 2024-01-12 | End: 2024-01-12 | Stop reason: HOSPADM

## 2024-01-12 RX ORDER — ALBUTEROL SULFATE 90 UG/1
AEROSOL, METERED RESPIRATORY (INHALATION) PRN
Status: DISCONTINUED | OUTPATIENT
Start: 2024-01-12 | End: 2024-01-12

## 2024-01-12 RX ORDER — SPIRONOLACTONE 25 MG
12.5 TABLET ORAL EVERY MORNING
Status: DISCONTINUED | OUTPATIENT
Start: 2024-01-13 | End: 2024-01-15 | Stop reason: HOSPADM

## 2024-01-12 RX ORDER — NALOXONE HYDROCHLORIDE 0.4 MG/ML
0.2 INJECTION, SOLUTION INTRAMUSCULAR; INTRAVENOUS; SUBCUTANEOUS
Status: DISCONTINUED | OUTPATIENT
Start: 2024-01-12 | End: 2024-01-15 | Stop reason: HOSPADM

## 2024-01-12 RX ORDER — METHOCARBAMOL 500 MG/1
500 TABLET, FILM COATED ORAL EVERY 6 HOURS PRN
Status: DISCONTINUED | OUTPATIENT
Start: 2024-01-12 | End: 2024-01-15 | Stop reason: HOSPADM

## 2024-01-12 RX ORDER — HYDROMORPHONE HCL IN WATER/PF 6 MG/30 ML
0.1 PATIENT CONTROLLED ANALGESIA SYRINGE INTRAVENOUS
Status: DISCONTINUED | OUTPATIENT
Start: 2024-01-12 | End: 2024-01-15 | Stop reason: HOSPADM

## 2024-01-12 RX ORDER — FLUTICASONE PROPIONATE 50 MCG
1 SPRAY, SUSPENSION (ML) NASAL EVERY MORNING
Status: DISCONTINUED | OUTPATIENT
Start: 2024-01-13 | End: 2024-01-15 | Stop reason: HOSPADM

## 2024-01-12 RX ORDER — DEXTROSE MONOHYDRATE, SODIUM CHLORIDE, AND POTASSIUM CHLORIDE 50; 1.49; 4.5 G/1000ML; G/1000ML; G/1000ML
INJECTION, SOLUTION INTRAVENOUS CONTINUOUS
Status: DISCONTINUED | OUTPATIENT
Start: 2024-01-12 | End: 2024-01-14

## 2024-01-12 RX ORDER — LIDOCAINE 40 MG/G
CREAM TOPICAL
Status: DISCONTINUED | OUTPATIENT
Start: 2024-01-12 | End: 2024-01-12 | Stop reason: HOSPADM

## 2024-01-12 RX ORDER — AMOXICILLIN 250 MG
1 CAPSULE ORAL 2 TIMES DAILY
Status: DISCONTINUED | OUTPATIENT
Start: 2024-01-12 | End: 2024-01-15 | Stop reason: HOSPADM

## 2024-01-12 RX ORDER — NALOXONE HYDROCHLORIDE 0.4 MG/ML
0.4 INJECTION, SOLUTION INTRAMUSCULAR; INTRAVENOUS; SUBCUTANEOUS
Status: DISCONTINUED | OUTPATIENT
Start: 2024-01-12 | End: 2024-01-15 | Stop reason: HOSPADM

## 2024-01-12 RX ORDER — PHENYLEPHRINE HCL IN 0.9% NACL 50MG/250ML
.1-6 PLASTIC BAG, INJECTION (ML) INTRAVENOUS CONTINUOUS
Status: DISCONTINUED | OUTPATIENT
Start: 2024-01-12 | End: 2024-01-12 | Stop reason: HOSPADM

## 2024-01-12 RX ORDER — CEFAZOLIN SODIUM/WATER 2 G/20 ML
2 SYRINGE (ML) INTRAVENOUS
Status: DISCONTINUED | OUTPATIENT
Start: 2024-01-12 | End: 2024-01-12 | Stop reason: HOSPADM

## 2024-01-12 RX ORDER — SODIUM CHLORIDE, SODIUM LACTATE, POTASSIUM CHLORIDE, CALCIUM CHLORIDE 600; 310; 30; 20 MG/100ML; MG/100ML; MG/100ML; MG/100ML
INJECTION, SOLUTION INTRAVENOUS CONTINUOUS
Status: DISCONTINUED | OUTPATIENT
Start: 2024-01-12 | End: 2024-01-12 | Stop reason: HOSPADM

## 2024-01-12 RX ORDER — PROPOFOL 10 MG/ML
INJECTION, EMULSION INTRAVENOUS CONTINUOUS PRN
Status: DISCONTINUED | OUTPATIENT
Start: 2024-01-12 | End: 2024-01-12

## 2024-01-12 RX ORDER — CEFAZOLIN SODIUM/WATER 2 G/20 ML
2 SYRINGE (ML) INTRAVENOUS SEE ADMIN INSTRUCTIONS
Status: DISCONTINUED | OUTPATIENT
Start: 2024-01-12 | End: 2024-01-12 | Stop reason: HOSPADM

## 2024-01-12 RX ORDER — ONDANSETRON 4 MG/1
4 TABLET, ORALLY DISINTEGRATING ORAL EVERY 30 MIN PRN
Status: DISCONTINUED | OUTPATIENT
Start: 2024-01-12 | End: 2024-01-12 | Stop reason: HOSPADM

## 2024-01-12 RX ORDER — IPRATROPIUM BROMIDE AND ALBUTEROL SULFATE 2.5; .5 MG/3ML; MG/3ML
3 SOLUTION RESPIRATORY (INHALATION) ONCE
Status: DISCONTINUED | OUTPATIENT
Start: 2024-01-12 | End: 2024-01-12

## 2024-01-12 RX ORDER — ONDANSETRON 2 MG/ML
4 INJECTION INTRAMUSCULAR; INTRAVENOUS EVERY 6 HOURS PRN
Status: DISCONTINUED | OUTPATIENT
Start: 2024-01-12 | End: 2024-01-15 | Stop reason: HOSPADM

## 2024-01-12 RX ORDER — ACETAMINOPHEN 325 MG/1
650 TABLET ORAL EVERY 4 HOURS PRN
Status: DISCONTINUED | OUTPATIENT
Start: 2024-01-15 | End: 2024-01-15 | Stop reason: HOSPADM

## 2024-01-12 RX ORDER — BUPIVACAINE HYDROCHLORIDE AND EPINEPHRINE 2.5; 5 MG/ML; UG/ML
INJECTION, SOLUTION INFILTRATION; PERINEURAL PRN
Status: DISCONTINUED | OUTPATIENT
Start: 2024-01-12 | End: 2024-01-12 | Stop reason: HOSPADM

## 2024-01-12 RX ORDER — FUROSEMIDE 20 MG
20 TABLET ORAL EVERY MORNING
Status: DISCONTINUED | OUTPATIENT
Start: 2024-01-13 | End: 2024-01-15 | Stop reason: HOSPADM

## 2024-01-12 RX ORDER — ONDANSETRON 2 MG/ML
4 INJECTION INTRAMUSCULAR; INTRAVENOUS EVERY 30 MIN PRN
Status: DISCONTINUED | OUTPATIENT
Start: 2024-01-12 | End: 2024-01-12 | Stop reason: HOSPADM

## 2024-01-12 RX ORDER — ENOXAPARIN SODIUM 100 MG/ML
40 INJECTION SUBCUTANEOUS
Status: COMPLETED | OUTPATIENT
Start: 2024-01-12 | End: 2024-01-12

## 2024-01-12 RX ORDER — AMLODIPINE BESYLATE 5 MG/1
5 TABLET ORAL EVERY EVENING
Status: DISCONTINUED | OUTPATIENT
Start: 2024-01-12 | End: 2024-01-15 | Stop reason: HOSPADM

## 2024-01-12 RX ORDER — ACETAMINOPHEN 325 MG/1
975 TABLET ORAL EVERY 8 HOURS
Status: COMPLETED | OUTPATIENT
Start: 2024-01-12 | End: 2024-01-15

## 2024-01-12 RX ORDER — OXYCODONE HYDROCHLORIDE 10 MG/1
10 TABLET ORAL
Status: DISCONTINUED | OUTPATIENT
Start: 2024-01-12 | End: 2024-01-12

## 2024-01-12 RX ORDER — ROSUVASTATIN CALCIUM 40 MG/1
40 TABLET, COATED ORAL EVERY EVENING
Status: DISCONTINUED | OUTPATIENT
Start: 2024-01-12 | End: 2024-01-15 | Stop reason: HOSPADM

## 2024-01-12 RX ORDER — CALCIUM CARBONATE 500 MG/1
500 TABLET, CHEWABLE ORAL 2 TIMES DAILY PRN
Status: DISCONTINUED | OUTPATIENT
Start: 2024-01-12 | End: 2024-01-15 | Stop reason: HOSPADM

## 2024-01-12 RX ORDER — FENTANYL CITRATE 50 UG/ML
25 INJECTION, SOLUTION INTRAMUSCULAR; INTRAVENOUS EVERY 5 MIN PRN
Status: DISCONTINUED | OUTPATIENT
Start: 2024-01-12 | End: 2024-01-12

## 2024-01-12 RX ORDER — ACETAMINOPHEN 325 MG/1
975 TABLET ORAL ONCE
Status: COMPLETED | OUTPATIENT
Start: 2024-01-12 | End: 2024-01-12

## 2024-01-12 RX ORDER — PROCHLORPERAZINE MALEATE 5 MG
5 TABLET ORAL EVERY 6 HOURS PRN
Status: DISCONTINUED | OUTPATIENT
Start: 2024-01-12 | End: 2024-01-15 | Stop reason: HOSPADM

## 2024-01-12 RX ORDER — PANTOPRAZOLE SODIUM 40 MG/1
40 TABLET, DELAYED RELEASE ORAL
Status: DISCONTINUED | OUTPATIENT
Start: 2024-01-13 | End: 2024-01-15 | Stop reason: HOSPADM

## 2024-01-12 RX ORDER — BISACODYL 10 MG
10 SUPPOSITORY, RECTAL RECTAL DAILY PRN
Status: DISCONTINUED | OUTPATIENT
Start: 2024-01-12 | End: 2024-01-15 | Stop reason: HOSPADM

## 2024-01-12 RX ORDER — PROPOFOL 10 MG/ML
INJECTION, EMULSION INTRAVENOUS PRN
Status: DISCONTINUED | OUTPATIENT
Start: 2024-01-12 | End: 2024-01-12

## 2024-01-12 RX ORDER — SODIUM PHOSPHATE,MONO-DIBASIC 19G-7G/118
1500 ENEMA (ML) RECTAL EVERY MORNING
Status: DISCONTINUED | OUTPATIENT
Start: 2024-01-13 | End: 2024-01-15 | Stop reason: HOSPADM

## 2024-01-12 RX ORDER — OXYCODONE HYDROCHLORIDE 5 MG/1
5 TABLET ORAL EVERY 4 HOURS PRN
Status: DISCONTINUED | OUTPATIENT
Start: 2024-01-12 | End: 2024-01-15 | Stop reason: HOSPADM

## 2024-01-12 RX ORDER — HYDROMORPHONE HCL IN WATER/PF 6 MG/30 ML
0.2 PATIENT CONTROLLED ANALGESIA SYRINGE INTRAVENOUS
Status: DISCONTINUED | OUTPATIENT
Start: 2024-01-12 | End: 2024-01-15 | Stop reason: HOSPADM

## 2024-01-12 RX ORDER — ISOSORBIDE MONONITRATE 30 MG/1
60 TABLET, EXTENDED RELEASE ORAL EVERY MORNING
Status: DISCONTINUED | OUTPATIENT
Start: 2024-01-13 | End: 2024-01-15 | Stop reason: HOSPADM

## 2024-01-12 RX ORDER — SODIUM CHLORIDE, SODIUM LACTATE, POTASSIUM CHLORIDE, CALCIUM CHLORIDE 600; 310; 30; 20 MG/100ML; MG/100ML; MG/100ML; MG/100ML
INJECTION, SOLUTION INTRAVENOUS CONTINUOUS PRN
Status: DISCONTINUED | OUTPATIENT
Start: 2024-01-12 | End: 2024-01-12

## 2024-01-12 RX ORDER — LABETALOL HYDROCHLORIDE 5 MG/ML
10 INJECTION, SOLUTION INTRAVENOUS
Status: DISCONTINUED | OUTPATIENT
Start: 2024-01-12 | End: 2024-01-12 | Stop reason: HOSPADM

## 2024-01-12 RX ORDER — ONDANSETRON 2 MG/ML
INJECTION INTRAMUSCULAR; INTRAVENOUS PRN
Status: DISCONTINUED | OUTPATIENT
Start: 2024-01-12 | End: 2024-01-12

## 2024-01-12 RX ORDER — ONDANSETRON 4 MG/1
4 TABLET, ORALLY DISINTEGRATING ORAL EVERY 6 HOURS PRN
Status: DISCONTINUED | OUTPATIENT
Start: 2024-01-12 | End: 2024-01-15 | Stop reason: HOSPADM

## 2024-01-12 RX ORDER — ENOXAPARIN SODIUM 100 MG/ML
40 INJECTION SUBCUTANEOUS EVERY 24 HOURS
Status: DISCONTINUED | OUTPATIENT
Start: 2024-01-13 | End: 2024-01-15 | Stop reason: HOSPADM

## 2024-01-12 RX ORDER — FENTANYL CITRATE 50 UG/ML
50 INJECTION, SOLUTION INTRAMUSCULAR; INTRAVENOUS EVERY 5 MIN PRN
Status: DISCONTINUED | OUTPATIENT
Start: 2024-01-12 | End: 2024-01-12

## 2024-01-12 RX ORDER — POLYETHYLENE GLYCOL 3350 17 G/17G
17 POWDER, FOR SOLUTION ORAL DAILY
Status: DISCONTINUED | OUTPATIENT
Start: 2024-01-13 | End: 2024-01-15 | Stop reason: HOSPADM

## 2024-01-12 RX ORDER — ALBUTEROL SULFATE 90 UG/1
2 AEROSOL, METERED RESPIRATORY (INHALATION) EVERY 6 HOURS PRN
Status: DISCONTINUED | OUTPATIENT
Start: 2024-01-12 | End: 2024-01-15 | Stop reason: HOSPADM

## 2024-01-12 RX ORDER — HYDROMORPHONE HCL IN WATER/PF 6 MG/30 ML
0.2 PATIENT CONTROLLED ANALGESIA SYRINGE INTRAVENOUS EVERY 5 MIN PRN
Status: DISCONTINUED | OUTPATIENT
Start: 2024-01-12 | End: 2024-01-12 | Stop reason: HOSPADM

## 2024-01-12 RX ORDER — FLUTICASONE FUROATE AND VILANTEROL 100; 25 UG/1; UG/1
1 POWDER RESPIRATORY (INHALATION) 2 TIMES DAILY
Status: DISCONTINUED | OUTPATIENT
Start: 2024-01-12 | End: 2024-01-15 | Stop reason: HOSPADM

## 2024-01-12 RX ORDER — ONDANSETRON 4 MG/1
4 TABLET, ORALLY DISINTEGRATING ORAL EVERY 30 MIN PRN
Status: DISCONTINUED | OUTPATIENT
Start: 2024-01-12 | End: 2024-01-12

## 2024-01-12 RX ORDER — MONTELUKAST SODIUM 10 MG/1
10 TABLET ORAL AT BEDTIME
Status: DISCONTINUED | OUTPATIENT
Start: 2024-01-12 | End: 2024-01-15 | Stop reason: HOSPADM

## 2024-01-12 RX ORDER — DEXAMETHASONE SODIUM PHOSPHATE 4 MG/ML
INJECTION, SOLUTION INTRA-ARTICULAR; INTRALESIONAL; INTRAMUSCULAR; INTRAVENOUS; SOFT TISSUE PRN
Status: DISCONTINUED | OUTPATIENT
Start: 2024-01-12 | End: 2024-01-12

## 2024-01-12 RX ORDER — LIDOCAINE HYDROCHLORIDE 20 MG/ML
INJECTION, SOLUTION INFILTRATION; PERINEURAL PRN
Status: DISCONTINUED | OUTPATIENT
Start: 2024-01-12 | End: 2024-01-12

## 2024-01-12 RX ORDER — IPRATROPIUM BROMIDE AND ALBUTEROL SULFATE 2.5; .5 MG/3ML; MG/3ML
1 SOLUTION RESPIRATORY (INHALATION) EVERY 6 HOURS PRN
Status: DISCONTINUED | OUTPATIENT
Start: 2024-01-12 | End: 2024-01-15 | Stop reason: HOSPADM

## 2024-01-12 RX ORDER — ONDANSETRON 2 MG/ML
4 INJECTION INTRAMUSCULAR; INTRAVENOUS EVERY 30 MIN PRN
Status: DISCONTINUED | OUTPATIENT
Start: 2024-01-12 | End: 2024-01-12

## 2024-01-12 RX ORDER — OXYCODONE HYDROCHLORIDE 5 MG/1
5 TABLET ORAL
Status: DISCONTINUED | OUTPATIENT
Start: 2024-01-12 | End: 2024-01-12

## 2024-01-12 RX ADMIN — HYDROMORPHONE HYDROCHLORIDE 0.25 MG: 1 INJECTION, SOLUTION INTRAMUSCULAR; INTRAVENOUS; SUBCUTANEOUS at 10:05

## 2024-01-12 RX ADMIN — HYDROMORPHONE HYDROCHLORIDE 0.4 MG: 0.2 INJECTION, SOLUTION INTRAMUSCULAR; INTRAVENOUS; SUBCUTANEOUS at 12:34

## 2024-01-12 RX ADMIN — ALBUTEROL SULFATE 10 PUFF: 108 INHALANT RESPIRATORY (INHALATION) at 10:17

## 2024-01-12 RX ADMIN — PHENYLEPHRINE HYDROCHLORIDE 100 MCG: 10 INJECTION INTRAVENOUS at 10:09

## 2024-01-12 RX ADMIN — PHENYLEPHRINE HYDROCHLORIDE 100 MCG: 10 INJECTION INTRAVENOUS at 10:50

## 2024-01-12 RX ADMIN — AMLODIPINE BESYLATE 5 MG: 5 TABLET ORAL at 20:14

## 2024-01-12 RX ADMIN — SODIUM CHLORIDE, POTASSIUM CHLORIDE, SODIUM LACTATE AND CALCIUM CHLORIDE: 600; 310; 30; 20 INJECTION, SOLUTION INTRAVENOUS at 07:36

## 2024-01-12 RX ADMIN — PHENYLEPHRINE HYDROCHLORIDE 200 MCG: 10 INJECTION INTRAVENOUS at 09:12

## 2024-01-12 RX ADMIN — FLUTICASONE FUROATE AND VILANTEROL TRIFENATATE 1 PUFF: 100; 25 POWDER RESPIRATORY (INHALATION) at 20:16

## 2024-01-12 RX ADMIN — HYDROMORPHONE HYDROCHLORIDE 0.1 MG: 0.2 INJECTION, SOLUTION INTRAMUSCULAR; INTRAVENOUS; SUBCUTANEOUS at 18:44

## 2024-01-12 RX ADMIN — PHENYLEPHRINE HYDROCHLORIDE 100 MCG: 10 INJECTION INTRAVENOUS at 08:54

## 2024-01-12 RX ADMIN — ENOXAPARIN SODIUM 40 MG: 40 INJECTION SUBCUTANEOUS at 06:36

## 2024-01-12 RX ADMIN — PROPOFOL 40 MG: 10 INJECTION, EMULSION INTRAVENOUS at 10:19

## 2024-01-12 RX ADMIN — POTASSIUM CHLORIDE, DEXTROSE MONOHYDRATE AND SODIUM CHLORIDE: 150; 5; 450 INJECTION, SOLUTION INTRAVENOUS at 17:12

## 2024-01-12 RX ADMIN — PROPOFOL 100 MCG/KG/MIN: 10 INJECTION, EMULSION INTRAVENOUS at 07:48

## 2024-01-12 RX ADMIN — DEXAMETHASONE SODIUM PHOSPHATE 8 MG: 4 INJECTION, SOLUTION INTRA-ARTICULAR; INTRALESIONAL; INTRAMUSCULAR; INTRAVENOUS; SOFT TISSUE at 08:44

## 2024-01-12 RX ADMIN — MONTELUKAST 10 MG: 10 TABLET, FILM COATED ORAL at 22:24

## 2024-01-12 RX ADMIN — Medication 2 G: at 08:01

## 2024-01-12 RX ADMIN — SUCCINYLCHOLINE CHLORIDE 140 MG: 20 INJECTION, SOLUTION INTRAMUSCULAR; INTRAVENOUS; PARENTERAL at 07:45

## 2024-01-12 RX ADMIN — OXYCODONE HYDROCHLORIDE 2.5 MG: 5 TABLET ORAL at 20:14

## 2024-01-12 RX ADMIN — Medication 20 MG: at 09:23

## 2024-01-12 RX ADMIN — PHENYLEPHRINE HYDROCHLORIDE 100 MCG: 10 INJECTION INTRAVENOUS at 10:00

## 2024-01-12 RX ADMIN — PHENYLEPHRINE HYDROCHLORIDE 100 MCG: 10 INJECTION INTRAVENOUS at 10:01

## 2024-01-12 RX ADMIN — PROPOFOL 30 MG: 10 INJECTION, EMULSION INTRAVENOUS at 11:01

## 2024-01-12 RX ADMIN — ROSUVASTATIN CALCIUM 40 MG: 40 TABLET, COATED ORAL at 20:14

## 2024-01-12 RX ADMIN — PHENYLEPHRINE HYDROCHLORIDE 100 MCG: 10 INJECTION INTRAVENOUS at 08:14

## 2024-01-12 RX ADMIN — ACETAMINOPHEN 975 MG: 325 TABLET, FILM COATED ORAL at 06:36

## 2024-01-12 RX ADMIN — PROPOFOL 50 MG: 10 INJECTION, EMULSION INTRAVENOUS at 08:31

## 2024-01-12 RX ADMIN — PHENYLEPHRINE HYDROCHLORIDE 100 MCG: 10 INJECTION INTRAVENOUS at 08:11

## 2024-01-12 RX ADMIN — ONDANSETRON 4 MG: 2 INJECTION INTRAMUSCULAR; INTRAVENOUS at 10:05

## 2024-01-12 RX ADMIN — PHENYLEPHRINE HYDROCHLORIDE 100 MCG: 10 INJECTION INTRAVENOUS at 07:45

## 2024-01-12 RX ADMIN — OXYCODONE HYDROCHLORIDE 5 MG: 5 TABLET ORAL at 14:28

## 2024-01-12 RX ADMIN — FENTANYL CITRATE 50 MCG: 50 INJECTION, SOLUTION INTRAMUSCULAR; INTRAVENOUS at 11:54

## 2024-01-12 RX ADMIN — PHENYLEPHRINE HYDROCHLORIDE 100 MCG: 10 INJECTION INTRAVENOUS at 10:30

## 2024-01-12 RX ADMIN — HYDROMORPHONE HYDROCHLORIDE 0.4 MG: 0.2 INJECTION, SOLUTION INTRAMUSCULAR; INTRAVENOUS; SUBCUTANEOUS at 12:58

## 2024-01-12 RX ADMIN — HYDROMORPHONE HYDROCHLORIDE 0.25 MG: 1 INJECTION, SOLUTION INTRAMUSCULAR; INTRAVENOUS; SUBCUTANEOUS at 10:21

## 2024-01-12 RX ADMIN — LIDOCAINE HYDROCHLORIDE 100 MG: 20 INJECTION, SOLUTION INFILTRATION; PERINEURAL at 07:45

## 2024-01-12 RX ADMIN — FENTANYL CITRATE 100 MCG: 50 INJECTION INTRAMUSCULAR; INTRAVENOUS at 07:45

## 2024-01-12 RX ADMIN — IPRATROPIUM BROMIDE AND ALBUTEROL SULFATE 3 ML: .5; 3 SOLUTION RESPIRATORY (INHALATION) at 16:54

## 2024-01-12 RX ADMIN — PROPOFOL 50 MG: 10 INJECTION, EMULSION INTRAVENOUS at 08:28

## 2024-01-12 RX ADMIN — PROPOFOL 50 MG: 10 INJECTION, EMULSION INTRAVENOUS at 09:51

## 2024-01-12 RX ADMIN — PHENYLEPHRINE HYDROCHLORIDE 100 MCG: 10 INJECTION INTRAVENOUS at 08:18

## 2024-01-12 RX ADMIN — PHENYLEPHRINE HYDROCHLORIDE 100 MCG: 10 INJECTION INTRAVENOUS at 08:32

## 2024-01-12 RX ADMIN — PROPOFOL 30 MG: 10 INJECTION, EMULSION INTRAVENOUS at 10:43

## 2024-01-12 RX ADMIN — Medication 10 MG: at 10:27

## 2024-01-12 RX ADMIN — Medication 0.5 MCG/KG/MIN: at 07:49

## 2024-01-12 RX ADMIN — SENNOSIDES AND DOCUSATE SODIUM 1 TABLET: 8.6; 5 TABLET ORAL at 20:15

## 2024-01-12 RX ADMIN — PHENYLEPHRINE HYDROCHLORIDE 100 MCG: 10 INJECTION INTRAVENOUS at 09:30

## 2024-01-12 RX ADMIN — FENTANYL CITRATE 50 MCG: 50 INJECTION, SOLUTION INTRAMUSCULAR; INTRAVENOUS at 11:43

## 2024-01-12 RX ADMIN — ACETAMINOPHEN 975 MG: 325 TABLET, FILM COATED ORAL at 22:24

## 2024-01-12 RX ADMIN — PROPOFOL 100 MG: 10 INJECTION, EMULSION INTRAVENOUS at 07:45

## 2024-01-12 RX ADMIN — Medication 50 MG: at 07:54

## 2024-01-12 RX ADMIN — METHOCARBAMOL 500 MG: 500 TABLET ORAL at 20:14

## 2024-01-12 RX ADMIN — ACETAMINOPHEN 975 MG: 325 TABLET, FILM COATED ORAL at 14:28

## 2024-01-12 RX ADMIN — SUGAMMADEX 200 MG: 100 INJECTION, SOLUTION INTRAVENOUS at 10:55

## 2024-01-12 ASSESSMENT — ACTIVITIES OF DAILY LIVING (ADL)
ADLS_ACUITY_SCORE: 27
ADLS_ACUITY_SCORE: 27
ADLS_ACUITY_SCORE: 24
ADLS_ACUITY_SCORE: 22
ADLS_ACUITY_SCORE: 24
ADLS_ACUITY_SCORE: 24

## 2024-01-12 NOTE — ANESTHESIA POSTPROCEDURE EVALUATION
Patient: June V Montalvo    Procedure: Procedure(s):  Laparoscopic Hiatal Hernia Repair, Esophagogastroduodenscopy, Gastrostomy Tube Placement, Bilateral Chest Tube Placement       Anesthesia Type:  General    Note:  Disposition: Inpatient   Postop Pain Control: Uneventful            Sign Out: Well controlled pain   PONV: No   Neuro/Psych: Uneventful            Sign Out: Acceptable/Baseline neuro status   Airway/Respiratory: Uneventful            Sign Out: Acceptable/Baseline resp. status   CV/Hemodynamics: Uneventful            Sign Out: Acceptable CV status; No obvious hypovolemia; No obvious fluid overload   Other NRE: NONE   DID A NON-ROUTINE EVENT OCCUR? No           Last vitals:  Vitals Value Taken Time   /68 01/12/24 1230   Temp 35.9  C (96.6  F) 01/12/24 1200   Pulse 84 01/12/24 1234   Resp 9 01/12/24 1234   SpO2 93 % 01/12/24 1234   Vitals shown include unfiled device data.    Electronically Signed By: Anuj Hopkins MD  January 12, 2024  12:36 PM

## 2024-01-12 NOTE — ANESTHESIA PROCEDURE NOTES
Airway         Procedure Start/Stop Times: 1/12/2024 7:47 AM  Staff -        Anesthesiologist:  Behrens, Christopher J, MD       Resident/Fellow: Dorothy Kathleen MD       Performed By: residentIndications and Patient Condition       Indications for airway management: katherine-procedural       Induction type:RSI       Mask difficulty assessment: 0 - not attempted    Final Airway Details       Final airway type: endotracheal airway       Successful airway: ETT - single  Endotracheal Airway Details        ETT size (mm): 7.5       Cuffed: yes       Successful intubation technique: video laryngoscopy       VL Blade Size: MAC 3       Grade View of Cords: 2       Adjucts: stylet       Position: Right       Measured from: gums/teeth       Secured at (cm): 21       Bite block used: None    Post intubation assessment        Placement verified by: capnometry, equal breath sounds and chest rise        Number of attempts at approach: 1       Number of other approaches attempted: 0       Secured with: tape       Ease of procedure: easy       Dentition: Unchanged    Medication(s) Administered   Medication Administration Time: 1/12/2024 7:47 AM    Additional Comments       Consider D blade in future

## 2024-01-12 NOTE — ANESTHESIA CARE TRANSFER NOTE
Patient: June V Holden    Procedure: Procedure(s):  Laparoscopic Hiatal Hernia Repair, Esophagogastroduodenscopy, Gastrostomy Tube Placement, Bilateral Chest Tube Placement       Diagnosis: Hiatal hernia with GERD [K44.9, K21.9]  Diagnosis Additional Information: No value filed.    Anesthesia Type:   General     Note:    Oropharynx: oropharynx clear of all foreign objects and spontaneously breathing  Level of Consciousness: drowsy    Level of Supplemental Oxygen (L/min / FiO2): 10  Independent Airway: airway patency satisfactory and stable  Dentition: dentition unchanged  Vital Signs Stable: post-procedure vital signs reviewed and stable  Report to RN Given: handoff report given  Patient transferred to: PACU    Handoff Report: Identifed the Patient, Identified the Reponsible Provider, Reviewed the pertinent medical history, Discussed the surgical course, Reviewed Intra-OP anesthesia mangement and issues during anesthesia, Set expectations for post-procedure period and Allowed opportunity for questions and acknowledgement of understanding      Vitals:  Vitals Value Taken Time   /68 01/12/24 1135   Temp     Pulse 82 01/12/24 1137   Resp 0 01/12/24 1137   SpO2 94 % 01/12/24 1137   Vitals shown include unfiled device data.    Electronically Signed By: Dorothy Kathleen MD  January 12, 2024  11:38 AM

## 2024-01-12 NOTE — BRIEF OP NOTE
[facility]    Brief Operative Note    Pre-operative diagnosis: Hiatal hernia with GERD [K44.9, K21.9]  Post-operative diagnosis Same as pre-operative diagnosis    Procedure: Laparoscopic Hiatal Hernia Repair, Esophagogastroduodenscopy, Gastrostomy Tube Placement, Bilateral Chest Tube Placement, N/A - Abdomen    Surgeon: Surgeon(s) and Role:     * Oscar Stern MD - Primary     * Sara Foster MD - Resident - Assisting  Anesthesia: General   Estimated Blood Loss: 10cc    Drains: Bilateral pleural tubes   Specimens: * No specimens in log *  Findings:   Hiatal hernia .  Complications: None.  Implants: * No implants in log *      NPO, okay for sips/chips, G tube to gravity  CXR in PACU  Chest tubes to water seal  Lovenox pod1    Sara Foster MD  Surgery PGY-3

## 2024-01-12 NOTE — OP NOTE
Preoperative diagnosis: Hiatal hernia type 3   Postoperative diagnosis: Hiatal hernia type 3     Procedure:   Esophagogastroscopy  Laparoscopic repair of hiatal hernia   Gastrostomy tube placement (20 Fr)  Bilateral tube thoracostomy (19 Fr Duncan)     Anesthesia: General   Surgeon: Oscar Stern (present and participated in the entire procedure)  Resident surgeon: Sara Foster  EBL: 10 ml  Complications: none  Findings: Intra-abdominal esophageal length was > 1.5 cm, tissues were healthy. I decided not to perform a Nissen fundoplication since her symptoms are primarily obstructive.      Description of procedure  We secured Victoria Montalvo in the supine position, with a foot-board and prepared and draped the area. We used a 5-port approach and placed the first port using an open technique with fascial sutures for eventual closure. The remainder of the ports were placed under laparoscopic guidance and we used a Daren liver retractor.      We then took down the gastrohepatic ligament and short gastric vessels and dissected the hiatus circumferentially, preserving the peritoneal lining on the crura. We then reduced the hernia sac and we dissected the esophagus high up into the mediastinum to the level of the inferior pulmonary veins while taking care to preserve the vagus nerves. We mobilized the gastroesophageal fat pad and anterior vagus nerve off the GE junction to clearly identify it and closed the hiatus with a total of 4 stitches (0 silk with pledgets).  We verified with a 52 dilator in place that there was sufficient room to comfortably pass a laparoscopic grasper alongside the esophagus and that the approximation was sufficient.      Next, we performed an endoscopy to also verify that we had at least 1.5 cm of intraabdominal esophagus.    G-tube: We then performed a gastropexy and a gastric pursestring and placed a balloon-tipped G-tube with endoscopic and laparoscopic guidance.    Finally, we placed a 19  Fr. Duncan drain through a 1 cm anterolateral chest wall incision in each pleural space (bilateral)    We closed the incisions with absorbable sutures.

## 2024-01-12 NOTE — ANESTHESIA POSTPROCEDURE EVALUATION
Patient: June V Holden    Procedure: Procedure(s):  Laparoscopic Hiatal Hernia Repair, Esophagogastroduodenscopy, Gastrostomy Tube Placement, Bilateral Chest Tube Placement       Anesthesia Type:  General    Note:  Disposition: ICU            ICU Sign Out: Anesthesiologist/ICU physician sign out WAS performed   Postop Pain Control: Uneventful            Sign Out: Well controlled pain   PONV: No   Neuro/Psych: Uneventful            Sign Out: Acceptable/Baseline neuro status   Airway/Respiratory: Uneventful            Sign Out: AIRWAY IN SITU/Resp. Support               Airway in situ/Resp. Support: ETT                 Reason: Planned Pre-op   CV/Hemodynamics: Uneventful            Sign Out: Acceptable CV status   Other NRE: NONE   DID A NON-ROUTINE EVENT OCCUR? No           Last vitals:  Vitals Value Taken Time   /116 01/12/24 1140   Temp     Pulse 82 01/12/24 1147   Resp 12 01/12/24 1147   SpO2 95 % 01/12/24 1147   Vitals shown include unfiled device data.    Electronically Signed By: Christopher J. Behrens, MD  January 12, 2024  11:48 AM

## 2024-01-12 NOTE — ANESTHESIA PROCEDURE NOTES
Arterial Line Procedure Note    Pre-Procedure   Staff -        Anesthesiologist:  Behrens, Christopher J, MD       Resident/Fellow: Dorothy Kathleen MD       Performed By: resident       Location: OR       Pre-Anesthestic Checklist: patient identified, IV checked, risks and benefits discussed, informed consent, monitors and equipment checked, pre-op evaluation and at physician/surgeon's request  Timeout:       Correct Patient: Yes        Correct Procedure: Yes        Correct Site: Yes        Correct Position: Yes   Line Placement:   This line was placed Post Induction  Procedure   Procedure: arterial line       Diagnosis: hemodynamic monitoring       Laterality: left       Insertion Site: radial.  Sterile Prep        Standard elements of sterile barrier followed       Skin prep: Chloraprep  Insertion/Injection        Technique: ultrasound guided        1. Ultrasound was used to evaluate the access site.       2. Artery evaluated via ultrasound for patency/adequacy.       3. Using real-time ultrasound the needle/catheter was observed entering the artery/vein.       Catheter Type/Size: 20 G, 1.75 in/4.5 cm quick cath (integral wire)  Narrative        Tegaderm dressing used.       Complications: None apparent,        Arterial waveform: Yes        IBP within 10% of NIBP: Yes

## 2024-01-12 NOTE — PLAN OF CARE
Goal Outcome Evaluation:    Admitted/transferred from:   2 RN full   skin assessment completed by Vickie Harding, RN and Modesta Ruggiero RN .  Skin assessment finding: issues found : 5 port sites; covered with steri-strips, 2 chest tube sites: L and R that are Y'ed into one atrium. G tube to gravity. Rash on R side of calf, blanchable.     Interventions/actions: Continue to monitor     Bedside Emergency Equipment Present:  Suction Regulator: Yes  Suction Canister: Yes  Tubing between Regulator and Canister: Yes  O2 Regulator with Tree: Yes  Ambu Bag: Yes    A&Ox4; calling appropriately. Sats approx 92-94% on 2.5L. On continuous capnography. R and L chest tube to waterseal; both are Y'ed into one atrium. G tube to gravity drainage. Denies nausea. NPO except icechips. Voiding adequate amount of urine via bedpan. Continue to monitor and follow plan of care.

## 2024-01-13 ENCOUNTER — APPOINTMENT (OUTPATIENT)
Dept: OCCUPATIONAL THERAPY | Facility: CLINIC | Age: 84
DRG: 327 | End: 2024-01-13
Payer: COMMERCIAL

## 2024-01-13 ENCOUNTER — APPOINTMENT (OUTPATIENT)
Dept: GENERAL RADIOLOGY | Facility: CLINIC | Age: 84
DRG: 327 | End: 2024-01-13
Payer: COMMERCIAL

## 2024-01-13 LAB
ANION GAP SERPL CALCULATED.3IONS-SCNC: 9 MMOL/L (ref 7–15)
BUN SERPL-MCNC: 11.7 MG/DL (ref 8–23)
CALCIUM SERPL-MCNC: 9.7 MG/DL (ref 8.8–10.2)
CHLORIDE SERPL-SCNC: 102 MMOL/L (ref 98–107)
CREAT SERPL-MCNC: 0.59 MG/DL (ref 0.51–0.95)
DEPRECATED HCO3 PLAS-SCNC: 26 MMOL/L (ref 22–29)
EGFRCR SERPLBLD CKD-EPI 2021: 88 ML/MIN/1.73M2
ERYTHROCYTE [DISTWIDTH] IN BLOOD BY AUTOMATED COUNT: 14.5 % (ref 10–15)
GLUCOSE BLDC GLUCOMTR-MCNC: 118 MG/DL (ref 70–99)
GLUCOSE SERPL-MCNC: 113 MG/DL (ref 70–99)
HCT VFR BLD AUTO: 39.1 % (ref 35–47)
HGB BLD-MCNC: 12.9 G/DL (ref 11.7–15.7)
MCH RBC QN AUTO: 29.5 PG (ref 26.5–33)
MCHC RBC AUTO-ENTMCNC: 33 G/DL (ref 31.5–36.5)
MCV RBC AUTO: 90 FL (ref 78–100)
PLATELET # BLD AUTO: 193 10E3/UL (ref 150–450)
POTASSIUM SERPL-SCNC: 4.2 MMOL/L (ref 3.4–5.3)
RBC # BLD AUTO: 4.37 10E6/UL (ref 3.8–5.2)
SODIUM SERPL-SCNC: 137 MMOL/L (ref 135–145)
WBC # BLD AUTO: 9.8 10E3/UL (ref 4–11)

## 2024-01-13 PROCEDURE — 71045 X-RAY EXAM CHEST 1 VIEW: CPT | Mod: 76

## 2024-01-13 PROCEDURE — 97530 THERAPEUTIC ACTIVITIES: CPT | Mod: GO

## 2024-01-13 PROCEDURE — 999N000147 HC STATISTIC PT IP EVAL DEFER: Performed by: PHYSICAL THERAPIST

## 2024-01-13 PROCEDURE — 258N000003 HC RX IP 258 OP 636

## 2024-01-13 PROCEDURE — 250N000011 HC RX IP 250 OP 636

## 2024-01-13 PROCEDURE — 97165 OT EVAL LOW COMPLEX 30 MIN: CPT | Mod: GO

## 2024-01-13 PROCEDURE — 80048 BASIC METABOLIC PNL TOTAL CA: CPT

## 2024-01-13 PROCEDURE — 71045 X-RAY EXAM CHEST 1 VIEW: CPT | Mod: 26 | Performed by: RADIOLOGY

## 2024-01-13 PROCEDURE — 250N000013 HC RX MED GY IP 250 OP 250 PS 637: Performed by: THORACIC SURGERY (CARDIOTHORACIC VASCULAR SURGERY)

## 2024-01-13 PROCEDURE — 250N000013 HC RX MED GY IP 250 OP 250 PS 637

## 2024-01-13 PROCEDURE — 250N000009 HC RX 250

## 2024-01-13 PROCEDURE — 36415 COLL VENOUS BLD VENIPUNCTURE: CPT

## 2024-01-13 PROCEDURE — 999N000157 HC STATISTIC RCP TIME EA 10 MIN

## 2024-01-13 PROCEDURE — 97535 SELF CARE MNGMENT TRAINING: CPT | Mod: GO

## 2024-01-13 PROCEDURE — 94640 AIRWAY INHALATION TREATMENT: CPT

## 2024-01-13 PROCEDURE — 85027 COMPLETE CBC AUTOMATED: CPT

## 2024-01-13 PROCEDURE — 71045 X-RAY EXAM CHEST 1 VIEW: CPT

## 2024-01-13 PROCEDURE — 120N000002 HC R&B MED SURG/OB UMMC

## 2024-01-13 PROCEDURE — 999N000248 HC STATISTIC IV INSERT WITH US BY RN

## 2024-01-13 RX ADMIN — FLUTICASONE PROPIONATE 1 SPRAY: 50 SPRAY, METERED NASAL at 08:17

## 2024-01-13 RX ADMIN — PANTOPRAZOLE SODIUM 40 MG: 40 TABLET, DELAYED RELEASE ORAL at 08:16

## 2024-01-13 RX ADMIN — Medication 1500 MG: at 20:46

## 2024-01-13 RX ADMIN — SENNOSIDES AND DOCUSATE SODIUM 1 TABLET: 8.6; 5 TABLET ORAL at 08:16

## 2024-01-13 RX ADMIN — POLYETHYLENE GLYCOL 3350 17 G: 17 POWDER, FOR SOLUTION ORAL at 08:16

## 2024-01-13 RX ADMIN — SPIRONOLACTONE 12.5 MG: 25 TABLET, FILM COATED ORAL at 08:16

## 2024-01-13 RX ADMIN — ACETAMINOPHEN 975 MG: 325 TABLET, FILM COATED ORAL at 06:26

## 2024-01-13 RX ADMIN — IPRATROPIUM BROMIDE AND ALBUTEROL SULFATE 3 ML: .5; 3 SOLUTION RESPIRATORY (INHALATION) at 09:50

## 2024-01-13 RX ADMIN — ISOSORBIDE MONONITRATE 60 MG: 30 TABLET, EXTENDED RELEASE ORAL at 08:16

## 2024-01-13 RX ADMIN — SENNOSIDES AND DOCUSATE SODIUM 1 TABLET: 8.6; 5 TABLET ORAL at 20:45

## 2024-01-13 RX ADMIN — POTASSIUM CHLORIDE, DEXTROSE MONOHYDRATE AND SODIUM CHLORIDE: 150; 5; 450 INJECTION, SOLUTION INTRAVENOUS at 17:26

## 2024-01-13 RX ADMIN — OXYCODONE HYDROCHLORIDE 2.5 MG: 5 TABLET ORAL at 01:09

## 2024-01-13 RX ADMIN — ACETAMINOPHEN 975 MG: 325 TABLET, FILM COATED ORAL at 22:00

## 2024-01-13 RX ADMIN — FUROSEMIDE 20 MG: 20 TABLET ORAL at 08:17

## 2024-01-13 RX ADMIN — MONTELUKAST 10 MG: 10 TABLET, FILM COATED ORAL at 21:01

## 2024-01-13 RX ADMIN — CHOLECALCIFEROL (VITAMIN D3) 10 MCG (400 UNIT) TABLET 10 MCG: at 08:16

## 2024-01-13 RX ADMIN — ENOXAPARIN SODIUM 40 MG: 40 INJECTION SUBCUTANEOUS at 08:16

## 2024-01-13 RX ADMIN — ROSUVASTATIN CALCIUM 40 MG: 40 TABLET, COATED ORAL at 20:45

## 2024-01-13 RX ADMIN — HYDROMORPHONE HYDROCHLORIDE 0.1 MG: 0.2 INJECTION, SOLUTION INTRAMUSCULAR; INTRAVENOUS; SUBCUTANEOUS at 06:27

## 2024-01-13 RX ADMIN — AMLODIPINE BESYLATE 5 MG: 5 TABLET ORAL at 20:45

## 2024-01-13 RX ADMIN — OXYCODONE HYDROCHLORIDE 2.5 MG: 5 TABLET ORAL at 08:15

## 2024-01-13 RX ADMIN — FLUTICASONE FUROATE AND VILANTEROL TRIFENATATE 1 PUFF: 100; 25 POWDER RESPIRATORY (INHALATION) at 20:45

## 2024-01-13 RX ADMIN — METHOCARBAMOL 500 MG: 500 TABLET ORAL at 03:59

## 2024-01-13 RX ADMIN — FLUTICASONE FUROATE AND VILANTEROL TRIFENATATE 1 PUFF: 100; 25 POWDER RESPIRATORY (INHALATION) at 08:16

## 2024-01-13 ASSESSMENT — ACTIVITIES OF DAILY LIVING (ADL)
ADLS_ACUITY_SCORE: 27
ADLS_ACUITY_SCORE: 29
ADLS_ACUITY_SCORE: 27
IADL_COMMENTS: PT IND WITH IADLS AT BASELINE
ADLS_ACUITY_SCORE: 29
ADLS_ACUITY_SCORE: 27
ADLS_ACUITY_SCORE: 29
ADLS_ACUITY_SCORE: 27
ADLS_ACUITY_SCORE: 29
ADLS_ACUITY_SCORE: 27

## 2024-01-13 NOTE — PROGRESS NOTES
Occupational Therapy Evaluation       01/13/24 0800   Appointment Info   Signing Clinician's Name / Credentials (OT) Jeannette Sheikh OTR/L   Living Environment   People in Home alone   Current Living Arrangements house  (townhouse)   Home Accessibility stairs within home;stairs to enter home   Number of Stairs, Main Entrance 4   Number of Stairs, Within Home, Primary ten  (to basement but does not need)   Transportation Anticipated car, drives self;family or friend will provide   Living Environment Comments Pt lives alone in a townhouse, 4 JUN, all needs on main level. Has walk in shower, shower chair and grab bars. Daughter will be staying with her for a while after discharge.   Self-Care   Usual Activity Tolerance good   Current Activity Tolerance moderate   Equipment Currently Used at Home shower chair   Fall history within last six months no   Activity/Exercise/Self-Care Comment Pt IND with ADLs at baseline   Instrumental Activities of Daily Living (IADL)   IADL Comments Pt IND with IADLs at baseline   General Information   Onset of Illness/Injury or Date of Surgery 01/12/24   Referring Physician Sara Foster MD   Patient/Family Therapy Goal Statement (OT) to return home   Additional Occupational Profile Info/Pertinent History of Current Problem Per chart, 83 year old s/p  Laparoscopic Hiatal Hernia Repair, Esophagogastroduodenscopy, Gastrostomy Tube Placement, Bilateral Chest Tube Placement, N/A - Abdomen   Existing Precautions/Restrictions abdominal   Left Upper Extremity (Weight-bearing Status) partial weight-bearing (PWB)  (<10#)   Right Upper Extremity (Weight-bearing Status) partial weight-bearing (PWB)  (<10#)   Left Lower Extremity (Weight-bearing Status) full weight-bearing (FWB)   Right Lower Extremity (Weight-bearing Status) full weight-bearing (FWB)   General Observations and Info Activity: ambulate with assist   Cognitive Status Examination   Orientation Status orientation to person, place and  time   Affect/Mental Status (Cognitive) WNL   Follows Commands WNL   Cognitive Status Comments At baseline, conversationally appropriate and demonstrated good insight into functioning and safety awareness   Visual Perception   Visual Impairment/Limitations WFL   Sensory   Sensory Quick Adds sensation intact   Pain Assessment   Patient Currently in Pain Yes, see Vital Sign flowsheet   Posture   Posture not impaired   Range of Motion Comprehensive   General Range of Motion bilateral upper extremity ROM WFL   Strength Comprehensive (MMT)   Comment, General Manual Muscle Testing (MMT) Assessment BUE WFL, deferred MMT due to precautions   Coordination   Upper Extremity Coordination No deficits were identified   Bed Mobility   Bed Mobility supine-sit;sit-supine   Supine-Sit [place] (Bed Mobility) modified independence   Sit-Supine [place] (Bed Mobility) modified independence   Transfers   Transfers sit-stand transfer   Sit-Stand Transfer   Sit-Stand [place] (Transfers) independent   Balance   Balance Comments Intermittent UE support for dynamic standing balance   Activities of Daily Living   BADL Assessment/Intervention bathing;upper body dressing;lower body dressing;clothing fastener management;grooming;toileting   Bathing Assessment/Intervention   [place] Level (Bathing) set up   Comment, (Bathing) Per clinical judgement   Upper Body Dressing Assessment/Training   Comment, (Upper Body Dressing) Per clinical judgement   [place] Level (Upper Body Dressing) set up   Lower Body Dressing Assessment/Training   [place] Level (Lower Body Dressing) moderate assist (50% patient effort)   Clothes Fastener Management   [place] Level (Clothes Fastener Management) independent   Grooming Assessment/Training   [place] Level (Grooming) modified independence   Toileting   [place] Level (Toileting) modified independence   Clinical Impression   Criteria for Skilled Therapeutic Interventions Met  (OT) Yes, treatment indicated   OT Diagnosis decreased ADL and IADL independence   OT Problem List-Impairments impacting ADL problems related to;activity tolerance impaired;pain;post-surgical precautions   Assessment of Occupational Performance 1-3 Performance Deficits   Identified Performance Deficits ADL including LB dressing, bathing; IADL   Planned Therapy Interventions (OT) ADL retraining;IADL retraining;transfer training;home program guidelines;progressive activity/exercise   Clinical Decision Making Complexity (OT) problem focused assessment/low complexity   Risk & Benefits of therapy have been explained evaluation/treatment results reviewed;care plan/treatment goals reviewed;risks/benefits reviewed;current/potential barriers reviewed;participants voiced agreement with care plan;participants included;patient   Clinical Impression Comments Pt presents slightly below functional baseline, limited primarily by pain and decreased activity tolerance. Pt would benefit from continued OT to progress ADL independence and safety and facilitate safe discharge planning.   OT Total Evaluation Time   OT Eval, Low Complexity Minutes (71447) 10   OT Goals   Therapy Frequency (OT) 6 times/week   OT Predicted Duration/Target Date for Goal Attainment 01/20/24   OT Goals Hygiene/Grooming;Lower Body Dressing;Lower Body Bathing;Bed Mobility;Transfers;Toilet Transfer/Toileting;Aerobic Activity;OT Goal 1   OT: Hygiene/Grooming modified independent;Goal Met   OT: Lower Body Dressing Modified independent   OT: Lower Body Bathing Modified independent   OT: Bed Mobility Modified independent;Goal Met   OT: Transfer Modified independent;Goal Met   OT: Toilet Transfer/Toileting Modified independent;Goal Met   OT: Perform aerobic activity with stable cardiovascular response continuous activity;10 minutes;ambulation   OT: Goal 1 Pt will complete 4 stairs SBA by discharge.   Self-Care/Home Management   Self-Care/Home Mgmt/ADL, Compensatory,  Meal Prep Minutes (16777) 9   Symptoms Noted During/After Treatment (Meal Preparation/Planning Training) fatigue   Treatment Detail/Skilled Intervention OT: Following evaluation, treatment indicated. Facilitated ADL for increased IND and activity tolerance. Educated pt on abdominal precautions, pt demonstrated good understanding and maintenance throughout. Pt sat EoB mod IND and mobilized in room mod IND. Pt reported difficulty with LB dressing at baseline, educated on use of reacher for LB dressing, pt reported she has a reacher at home.   Therapeutic Activities   Therapeutic Activity Minutes (85955) 12   Symptoms noted during/after treatment fatigue;shortness of breath   Treatment Detail/Skilled Intervention OT: Facilitated hallway ambulation for increased IND and activity tolerance. Pt walked approximateluy 150 ft SBA in guevara without AD. Pt with SOB following activity. Pt returned to supine mod IND, left with call light within reach, all needs met.   OT Discharge Planning   OT Plan OT: LB dressing with reacher, stairs, hallway ambulation, dc OT   OT Discharge Recommendation (DC Rec) home with assist   OT Rationale for DC Rec Pt presents slightly below functional baseline, limited primarily by pain and decreased activity tolerance. Recommend discharge to home when medically ready with intermittent assist from family with heavy IADLs.   OT Brief overview of current status IND in room, SBA in guevara   Total Session Time   Timed Code Treatment Minutes 21   Total Session Time (sum of timed and untimed services) 31

## 2024-01-13 NOTE — PLAN OF CARE
Goal Outcome Evaluation:      Plan of Care Reviewed With: patient    Overall Patient Progress: no change    Neuro:A/O x 4  Respiratory:on 2.5 L NC sats mid 90s   Cardiac:WDL. Denies chest pain  Diet: NPO except meds and ice chips  GI/:voiding spontaneously. No bm this shft. Pt is not passing gas  Incision/Drains:g tube to gravity-clear drainage. CT x 2 to water seal-sanguinous drainage. 5 lap sites with steri strips- minimal dried drainage   IV Access:L PIV infusing D5 0.45 NACL KCL 50 ml/hr.   Pain:managed with PRN dilaudid and oxy  Labs:reviewed   VS:Temp: 98.2  F (36.8  C) Temp src: Oral BP: (!) 144/59 Pulse: 91   Resp: 16 SpO2: 95 % O2 Device: Nasal cannula Oxygen Delivery: 2.5 LPM   Activity:ax1     New Changes this shift:none   Plan: continue POC

## 2024-01-13 NOTE — PROGRESS NOTES
"Resident/Fellow Attestation   I, Sara Foster MD, was present with the medical/DOMINIK student who participated in the service and in the documentation of the note.  I have verified the history and personally performed the physical exam and medical decision making.  I agree with the assessment and plan of care as documented in the note.      Chest tubes removed. Advance to CLD, keep G tube to gravity.     Sara Foster MD  PGY3  Date of Service (when I saw the patient): 01/13/24    [facility]    Progress Note - Cardiothoracic Surgery Service       Date of Admission:  1/12/2024    Assessment & Plan: Surgery   Patient is s/p Laparoscopic Hiatal Hernia Repair, Esophagogastroduodenscopy, Gastrostomy Tube Placement, with Bilateral Chest Tube Placement on 1/12/2024. Progressing well.     - Daily CXR  - Chest tubes removed, post-pull CXR pending  - Multimodal pain control  - Clear Liquid diet with G tube to gravity  - Strict I/Os  - Continue PTA inhalers, amlodipine, lasix, statin  - Ppx: lovenox   - Dispo: pending diet advancement          Diet: Clear Liquid Diet    DVT Prophylaxis: Enoxaparin (Lovenox) SQ  Andrew Catheter: Not present  Lines: None     Drains: PRESENT         - 2 Chest Tube(s)    - 1 Biliary Drain(s)   Cardiac Monitoring: None  Code Status:  Full    Clinically Significant Risk Factors                  # Hypertension: Noted on problem list  # Chronic heart failure with preserved ejection fraction: heart failure noted on problem list and last echo with EF >50%       # Obesity: Estimated body mass index is 30.61 kg/m  as calculated from the following:    Height as of this encounter: 1.562 m (5' 1.5\").    Weight as of this encounter: 74.7 kg (164 lb 10.9 oz)., PRESENT ON ADMISSION     # Asthma: noted on problem list        Disposition Plan     Expected Discharge Date: 01/14/2024      Destination: home           The patient's care was discussed with the Chief " Resident/Fellow and PGY3-Resident .    Sara Foster MD  [facility]  Non-urgent messages: Securely message with [employer] (more info)  Text page via [facility] Paging/Directory     ______________________________________________________________________    Interval History     No concerns overnight; reports some SOB this AM, received pta nebs. Able to ambulate well. G-tube in place.    Physical Exam   Vital Signs: Temp: 97.7  F (36.5  C) Temp src: Oral BP: (!) 151/75 Pulse: 85   Resp: 15 SpO2: 95 % O2 Device: Nasal cannula with humidification Oxygen Delivery: 2 LPM  Weight: 164 lbs 10.94 oz  Intake/Output Summary (Last 24 hours) at 1/13/2024 0654  Last data filed at 1/13/2024 0610  Gross per 24 hour   Intake 1150 ml   Output 1768 ml   Net -618 ml     Constitutional: awake, alert, cooperative, no apparent distress, and appears stated age  Respiratory: Mild respiratory distress  Skin: no bruising or bleeding and normal skin color, texture, turgor  Neuropsychiatric: General: normal, calm, and normal eye contact          Data   ------------------------- PAST 24 HR DATA REVIEWED -----------------------------------------------  Lab Results   Component Value Date    WBC 9.8 01/13/2024    HGB 12.9 01/13/2024    HCT 39.1 01/13/2024     01/13/2024     01/13/2024    POTASSIUM 4.2 01/13/2024    CHLORIDE 102 01/13/2024    CO2 26 01/13/2024    BUN 11.7 01/13/2024    CR 0.59 01/13/2024     (H) 01/13/2024    SED 41 (H) 12/29/2004    DD 0.82 (H) 12/23/2023    NTBNPI 219 02/26/2018    NTBNP 67 06/30/2017    TROPI <0.015 09/24/2020    AST 26 06/22/2023    ALT 19 06/22/2023    ALKPHOS 60 06/22/2023    BILITOTAL 0.4 06/22/2023    INR 1.13 07/01/2021          CXR 1/13   Impression:   1. Left apical tiny pneumothorax, chest tube in place. There is hazy  opacity over the left lower lobe which may represent atelectasis  versus small left pleural effusion and possible  hydropneumothorax.  2. Expected post surgical changes of hilar hernia repair with  extensive subcutaneous emphysema over the chest and bilateral neck.  3. Patchy interstitial and airspace opacities, likely mild pulmonary  edema in the immediate postoperative setting.

## 2024-01-13 NOTE — PHARMACY-ADMISSION MEDICATION HISTORY
Pharmacy Intern Admission Medication History    Admission medication history is complete. The information provided in this note is only as accurate as the sources available at the time of the update.    Information Source(s): Patient and CareEverywhere/SureScripts via in-person    Pertinent Information:   Patient manages meds on own at home and was generally a good historian  Last fill for spironolactone on 5/04/2023 for #45 (90 day supply). Reported still taking consistently  Last fill for lansoprazole in 7/27/23 for 90 day supply. Reported still taking consistently    Changes made to PTA medication list:  Added:   Cranberry capsule (for UTI ppx)  Deleted:   Vitamin B6 (no longer taking at home)  Changed: None    Allergies reviewed with patient and updates made in EHR: no    Medication History Completed By: Neri Guzman 1/13/2024 2:20 PM    Prior to Admission medications    Medication Sig Last Dose Taking? Auth Provider Long Term End Date   albuterol (PROAIR HFA/PROVENTIL HFA/VENTOLIN HFA) 108 (90 Base) MCG/ACT inhaler USE 2 INHALATIONS EVERY 6 HOURS AS NEEDED FOR SHORTNESS OF BREATH, DYSPNEA, OR WHEEZING  Patient taking differently: 2 puffs every 6 hours as needed USE 2 INHALATIONS EVERY 6 HOURS AS NEEDED FOR SHORTNESS OF BREATH, DYSPNEA, OR WHEEZING 1/1/2024 Yes Elsy Bah MD Yes    amLODIPine (NORVASC) 5 MG tablet Take 1 tablet (5 mg) by mouth every evening 1/10/2024 Yes Elsy Bah MD Yes    aspirin 81 MG tablet Take 81 mg by mouth every morning 1/3/2024 Yes Reported, Patient     Boswellia-Glucosamine-Vit D (OSTEO BI-FLEX/5-LOXIN ADVANCED) TABS Take 1,500 mg by mouth every morning 1/3/2024 Yes Reported, Patient     Calcium 3304-6525 MG-UNIT CHEW Take 1 tablet by mouth every morning 1/9/2024 Yes Reported, Patient     calcium carbonate (TUMS) 500 MG chewable tablet Take 1 tablet (500 mg) by mouth as needed for heartburn 1/3/2024 Yes Lilli Way APRN CNP     Cholecalciferol (VITAMIN  D3 PO) Take 4,000 Units by mouth every morning 1/3/2024 Yes Reported, Patient     CRANBERRY PO Take 1 capsule by mouth every morning Past Week Yes Unknown, Entered By History     diclofenac (VOLTAREN) 1 % topical gel Apply 2 g topically 4 times daily as needed for moderate pain 12/28/2023 Yes Elsy Bah MD     fluticasone (FLONASE) 50 MCG/ACT nasal spray USE 1 TO 2 SPRAYS IN EACH NOSTRIL DAILY  Patient taking differently: Spray 1 spray into both nostrils every morning USE 1 TO 2 SPRAYS IN EACH NOSTRIL DAILY 1/11/2024 Yes Elsy Bah MD     fluticasone-salmeterol (ADVAIR) 250-50 MCG/ACT inhaler Inhale 1 puff into the lungs 2 times daily 1/8/2024 Yes Halie Jeffery CNP Yes    furosemide (LASIX) 20 MG tablet Take 1 tablet (20 mg) by mouth daily  Patient taking differently: Take 20 mg by mouth every morning 1/9/2024 Yes Victoria Holliday APRN CNP Yes    ipratropium - albuterol 0.5 mg/2.5 mg/3 mL (DUONEB) 0.5-2.5 (3) MG/3ML neb solution Take 1 vial (3 mLs) by nebulization every 6 hours as needed for shortness of breath, wheezing or cough Past Month Yes Elsy Bah MD Yes    isosorbide mononitrate (IMDUR) 60 MG 24 hr tablet TAKE 1 TABLET EVERY MORNING HOLD IF SYSTOLIC BLOOD PRESSURE IS LESS THAN 85  Patient taking differently: 60 mg every morning TAKE 1 TABLET EVERY MORNING HOLD IF SYSTOLIC BLOOD PRESSURE IS LESS THAN 85 1/12/2024 at 0330 Yes Elsy Bah MD Yes    LANsoprazole (PREVACID) 30 MG DR capsule Take 1 capsule (30 mg) by mouth daily  Patient taking differently: Take 30 mg by mouth every morning (before breakfast) 1/12/2024 at 0330 Yes Elsy Bah MD     montelukast (SINGULAIR) 10 MG tablet Take 1 tablet (10 mg) by mouth at bedtime 1/10/2024 Yes Elsy Bah MD Yes    multivitamin w/minerals (MULTI-VITAMIN) tablet Take 1 tablet by mouth every morning 1/3/2024 Yes Reported, Patient     nitroGLYcerin (NITROSTAT) 0.4 MG sublingual tablet One tablet under the tongue  "every 5 minutes if needed for chest pain. May repeat every 5 minutes for a maximum of 3 doses in 15 minutes\" Past Month Yes Elsy Bah MD Yes    omega-3 fatty acids 1200 MG capsule Take 1 capsule by mouth every morning 1/3/2024 Yes Reported, Patient     rosuvastatin (CRESTOR) 40 MG tablet Take 1 tablet (40 mg) by mouth daily  Patient taking differently: Take 40 mg by mouth every evening 1/10/2024 Yes Elsy Bah MD Yes    spironolactone (ALDACTONE) 25 MG tablet Take 12.5 mg by mouth every morning 1/12/2024 at 0330 Yes Marleni Raza, APRN CNP Yes              "

## 2024-01-13 NOTE — PROGRESS NOTES
Gary received to administer PRN neb, upon arrival by writer, patient leaving room with rehab. PRN not given.    1/13/2024  Halie Tobias, RT

## 2024-01-13 NOTE — PROGRESS NOTES
CLINICAL NUTRITION SERVICES    Reason for Assessment:  Nutrition education regarding diet s/p nissen or similar surgery    Diet History:  Pt admits to not receiving nutrition education on diet advancement and stages of diet advancement s/p nissen or similar surgery.    Nutrition Diagnosis:  Food- and nutrition-related knowledge deficit r/t no previous knowledge of anticipated diet s/p nissen or similar procedure AEB pt report of not receiving nutrition education on s/p nissen or similar surgery in the past.      Interventions:  Nutrition Education  1.  Provided verbal instruction on diet s/p nissen fundoplication or related surgery.  Discussed foods/beverages at each stage of the diet (clear liquids, full liquids, semi-solid, and regular foods) and anticipated diet.  Also, reviewed helpful hints when starting solid food and ways of dealing with potential side effects post-op.    2.  Provided handout:  Diet Guidelines for a Nissen Fundoplication   3.  Collaboration and Referral of Nutrition care - Pt's G-tube is not intended to be used for TFs unless otherwise specified by the team.      Goals:   1.  Patient will verbalize at least two foods or beverages at each stage of the diet.    2.  Pt to verbalize anticipated duration of each diet stage.      Follow-up:    Patient to ask any further nutrition-related questions before discharge.  In addition, pt may request outpatient RD appointment.      Dina Andrea, RD, LD, [facility]  Weekend/Holiday RD pager [phone]

## 2024-01-14 ENCOUNTER — APPOINTMENT (OUTPATIENT)
Dept: GENERAL RADIOLOGY | Facility: CLINIC | Age: 84
DRG: 327 | End: 2024-01-14
Attending: THORACIC SURGERY (CARDIOTHORACIC VASCULAR SURGERY)
Payer: COMMERCIAL

## 2024-01-14 ENCOUNTER — APPOINTMENT (OUTPATIENT)
Dept: GENERAL RADIOLOGY | Facility: CLINIC | Age: 84
DRG: 327 | End: 2024-01-14
Payer: COMMERCIAL

## 2024-01-14 ENCOUNTER — APPOINTMENT (OUTPATIENT)
Dept: OCCUPATIONAL THERAPY | Facility: CLINIC | Age: 84
DRG: 327 | End: 2024-01-14
Attending: THORACIC SURGERY (CARDIOTHORACIC VASCULAR SURGERY)
Payer: COMMERCIAL

## 2024-01-14 LAB
ANION GAP SERPL CALCULATED.3IONS-SCNC: 8 MMOL/L (ref 7–15)
BUN SERPL-MCNC: 8.4 MG/DL (ref 8–23)
CALCIUM SERPL-MCNC: 8.8 MG/DL (ref 8.8–10.2)
CHLORIDE SERPL-SCNC: 104 MMOL/L (ref 98–107)
CREAT SERPL-MCNC: 0.52 MG/DL (ref 0.51–0.95)
DEPRECATED HCO3 PLAS-SCNC: 27 MMOL/L (ref 22–29)
EGFRCR SERPLBLD CKD-EPI 2021: >90 ML/MIN/1.73M2
ERYTHROCYTE [DISTWIDTH] IN BLOOD BY AUTOMATED COUNT: 14.4 % (ref 10–15)
GLUCOSE SERPL-MCNC: 115 MG/DL (ref 70–99)
HCT VFR BLD AUTO: 39.5 % (ref 35–47)
HGB BLD-MCNC: 12.8 G/DL (ref 11.7–15.7)
MCH RBC QN AUTO: 29.4 PG (ref 26.5–33)
MCHC RBC AUTO-ENTMCNC: 32.4 G/DL (ref 31.5–36.5)
MCV RBC AUTO: 91 FL (ref 78–100)
PLATELET # BLD AUTO: 199 10E3/UL (ref 150–450)
POTASSIUM SERPL-SCNC: 4.2 MMOL/L (ref 3.4–5.3)
RBC # BLD AUTO: 4.35 10E6/UL (ref 3.8–5.2)
SODIUM SERPL-SCNC: 139 MMOL/L (ref 135–145)
WBC # BLD AUTO: 8.7 10E3/UL (ref 4–11)

## 2024-01-14 PROCEDURE — 85027 COMPLETE CBC AUTOMATED: CPT

## 2024-01-14 PROCEDURE — 36415 COLL VENOUS BLD VENIPUNCTURE: CPT

## 2024-01-14 PROCEDURE — 71045 X-RAY EXAM CHEST 1 VIEW: CPT | Mod: 26 | Performed by: STUDENT IN AN ORGANIZED HEALTH CARE EDUCATION/TRAINING PROGRAM

## 2024-01-14 PROCEDURE — 71045 X-RAY EXAM CHEST 1 VIEW: CPT

## 2024-01-14 PROCEDURE — 120N000002 HC R&B MED SURG/OB UMMC

## 2024-01-14 PROCEDURE — 71045 X-RAY EXAM CHEST 1 VIEW: CPT | Mod: 26 | Performed by: RADIOLOGY

## 2024-01-14 PROCEDURE — 71045 X-RAY EXAM CHEST 1 VIEW: CPT | Mod: 77

## 2024-01-14 PROCEDURE — 97530 THERAPEUTIC ACTIVITIES: CPT | Mod: GO

## 2024-01-14 PROCEDURE — 250N000013 HC RX MED GY IP 250 OP 250 PS 637

## 2024-01-14 PROCEDURE — 97535 SELF CARE MNGMENT TRAINING: CPT | Mod: GO

## 2024-01-14 PROCEDURE — 250N000013 HC RX MED GY IP 250 OP 250 PS 637: Performed by: THORACIC SURGERY (CARDIOTHORACIC VASCULAR SURGERY)

## 2024-01-14 PROCEDURE — 80048 BASIC METABOLIC PNL TOTAL CA: CPT

## 2024-01-14 PROCEDURE — 250N000011 HC RX IP 250 OP 636

## 2024-01-14 RX ADMIN — MAGNESIUM HYDROXIDE 30 ML: 400 SUSPENSION ORAL at 15:35

## 2024-01-14 RX ADMIN — PANTOPRAZOLE SODIUM 40 MG: 40 TABLET, DELAYED RELEASE ORAL at 08:13

## 2024-01-14 RX ADMIN — ISOSORBIDE MONONITRATE 60 MG: 30 TABLET, EXTENDED RELEASE ORAL at 08:15

## 2024-01-14 RX ADMIN — SPIRONOLACTONE 12.5 MG: 25 TABLET, FILM COATED ORAL at 08:16

## 2024-01-14 RX ADMIN — ACETAMINOPHEN 975 MG: 325 TABLET, FILM COATED ORAL at 15:32

## 2024-01-14 RX ADMIN — ENOXAPARIN SODIUM 40 MG: 40 INJECTION SUBCUTANEOUS at 08:14

## 2024-01-14 RX ADMIN — FLUTICASONE FUROATE AND VILANTEROL TRIFENATATE 1 PUFF: 100; 25 POWDER RESPIRATORY (INHALATION) at 08:16

## 2024-01-14 RX ADMIN — ROSUVASTATIN CALCIUM 40 MG: 40 TABLET, COATED ORAL at 21:33

## 2024-01-14 RX ADMIN — SENNOSIDES AND DOCUSATE SODIUM 1 TABLET: 8.6; 5 TABLET ORAL at 21:32

## 2024-01-14 RX ADMIN — FLUTICASONE FUROATE AND VILANTEROL TRIFENATATE 1 PUFF: 100; 25 POWDER RESPIRATORY (INHALATION) at 21:33

## 2024-01-14 RX ADMIN — FLUTICASONE PROPIONATE 1 SPRAY: 50 SPRAY, METERED NASAL at 08:15

## 2024-01-14 RX ADMIN — AMLODIPINE BESYLATE 5 MG: 5 TABLET ORAL at 21:33

## 2024-01-14 RX ADMIN — POLYETHYLENE GLYCOL 3350 17 G: 17 POWDER, FOR SOLUTION ORAL at 08:17

## 2024-01-14 RX ADMIN — Medication 1500 MG: at 08:12

## 2024-01-14 RX ADMIN — METHOCARBAMOL 500 MG: 500 TABLET ORAL at 23:03

## 2024-01-14 RX ADMIN — ACETAMINOPHEN 975 MG: 325 TABLET, FILM COATED ORAL at 21:32

## 2024-01-14 RX ADMIN — SENNOSIDES AND DOCUSATE SODIUM 1 TABLET: 8.6; 5 TABLET ORAL at 08:13

## 2024-01-14 RX ADMIN — MONTELUKAST 10 MG: 10 TABLET, FILM COATED ORAL at 21:36

## 2024-01-14 RX ADMIN — BISACODYL 10 MG: 10 SUPPOSITORY RECTAL at 21:34

## 2024-01-14 ASSESSMENT — ACTIVITIES OF DAILY LIVING (ADL)
ADLS_ACUITY_SCORE: 29

## 2024-01-14 NOTE — PROGRESS NOTES
[facility]    S:   No acute events overnight. Poor appetite. Patient has not any bowel movement and has not passed flatus. UOP is adequate. Discussed plan to discontinue fluids and encouraged PO intake and advancement of diet if tolerating well. Patient receiving PTA nebs.      O:  Vital Signs: Temp: 97.3  F (36.3  C) Temp src: Oral BP: 134/63 Pulse: 79   Resp: 16 SpO2: 97 % O2 Device: Nasal cannula Oxygen Delivery: 2 LPM  Weight: 164 lbs 10.94 oz     Gen: Alert and oriented, NAD  Chest: skin incisions without tenderness or erythema  CV: RRR  Resp: non labored breathing.   Abdomen: non-tender, non-distended, G-tube to gravity  Ext: moving extremities, able to ambulate    Occupational Therapy recommendations              OT Discharge Recommendation (DC Rec): home with assist  Physical Therapy recommendations              PT Discharge Recommendation (DC Rec): deferred to OT      A/P:     Victoria Montalvo is a 83 year old female admitted on 1/12/2024. She has a history of HFwPEF, HTN, and asthma is admitted for Hiatal hernia type 3 repair. S/p hiatal hernia repair 1/12 with EGD, G-tube and bilateral chest tube placement. Chest tubes removed 01/13. Progressing well.     - Chest tubes removed 1/13, post-pull CXR showed no evidence of pneumothorax or worsening pleural effusion.  - Multimodal pain control  - Clear Liquid diet, encourage PO intake  - Discontinue IVF  - Clamp G-tube today  - Strict I/Os  - Wean O2 as able  - Continue PTA inhalers, amlodipine, lasix, statin  - Ppx: lovenox   - Dispo: pending diet advancement, likely discharge tomorrow to home with assist.         Seen with Fellow, will discuss with staff surgeon.     CARLOS ONEAL  Medical Student  [facility]        Resident attestation:  Patient was seen and evaluated with the medical student. I agree with the assessment and plan in th edited note above.    Marycarmen Messer,  MD PGY-1  General Surgery Resident    ___

## 2024-01-14 NOTE — PLAN OF CARE
Goal Outcome Evaluation:      Plan of Care Reviewed With: patient    Overall Patient Progress: no change    Neuro:A/O x 4  Respiratory:on 2 L NC sats mid 90s   Cardiac:WDL. Denies chest pain  Diet: clear liquid  GI/:voiding spontaneously. No bm this shft. Pt is passing gas  Incision/Drains:g tube to gravity-clear drainage 5 lap sites with steri strips- minimal dried drainage   IV Access:L PIV infusing D5 0.45 NACL KCL 50 ml/hr.   Pain:managed with PRN dilaudid and oxy  Labs:reviewed   VS:Temp: 97.3  F (36.3  C) Temp src: Oral BP: 134/63 Pulse: 79   Resp: 16 SpO2: 97 % O2 Device: Nasal cannula Oxygen Delivery: 2 LPM   Activity:ax1     New Changes this shift:none   Plan: continue POC

## 2024-01-14 NOTE — PROGRESS NOTES
A&O x4. On 2L of NC. Reports SOB at times. PRN neb given x1 with relief. Pain managed with Oxycodone. Assist x1, ambulated x2.  CT x2 pulled out today. G tube to gravity bag. Voiding without difficulty on the commode. Not passing gas yet. Diet was advanced to clears, but not tolerating well. IV fluid running at 50 mL/hr. Continue with POC.

## 2024-01-14 NOTE — PLAN OF CARE
Goal Outcome Evaluation:  Time: 12 hour day shift 1/14/2024  Status: Stable.  NEURO: Alert and oriented x4.  RESPIRATORY: O2 discontinued this AM and O2 sats 93% on RA.  CARDIAC: HR 90s. Apical pulse regular.  GI/: Abdomen rounded. Positive for flatus. G-tube vented for abdominal discomfort. Laxatives this AM. MOM this afternoon. No BM yet. Voiding without difficulty.  DIET: Tolerating clear liquids but continues to have poor appetite she blames on having COVID previously.  PAIN/NAUSEA: Denied pain this morning, 3/10 this afternoon. Denies nausea.  INCISION/DRAIN: Lap sites CDI. G-tube site cleaned and re-dressed. G-tube vented for discomfort.  CT sites cleaned and redressed.   IV ACCESS: PIV SL'd  ACTIVITY: Up in room independently. Standby assist for ambulation in guevara.  PLAN: CXR to be done this evening.

## 2024-01-15 ENCOUNTER — NURSE TRIAGE (OUTPATIENT)
Dept: NURSING | Facility: CLINIC | Age: 84
End: 2024-01-15

## 2024-01-15 ENCOUNTER — APPOINTMENT (OUTPATIENT)
Dept: GENERAL RADIOLOGY | Facility: CLINIC | Age: 84
DRG: 327 | End: 2024-01-15
Payer: COMMERCIAL

## 2024-01-15 ENCOUNTER — APPOINTMENT (OUTPATIENT)
Dept: OCCUPATIONAL THERAPY | Facility: CLINIC | Age: 84
DRG: 327 | End: 2024-01-15
Attending: THORACIC SURGERY (CARDIOTHORACIC VASCULAR SURGERY)
Payer: COMMERCIAL

## 2024-01-15 VITALS
TEMPERATURE: 97.5 F | HEIGHT: 62 IN | RESPIRATION RATE: 16 BRPM | BODY MASS INDEX: 30.31 KG/M2 | HEART RATE: 85 BPM | DIASTOLIC BLOOD PRESSURE: 69 MMHG | SYSTOLIC BLOOD PRESSURE: 139 MMHG | WEIGHT: 164.68 LBS | OXYGEN SATURATION: 94 %

## 2024-01-15 LAB
ANION GAP SERPL CALCULATED.3IONS-SCNC: 9 MMOL/L (ref 7–15)
BUN SERPL-MCNC: 10.3 MG/DL (ref 8–23)
CALCIUM SERPL-MCNC: 8.9 MG/DL (ref 8.8–10.2)
CHLORIDE SERPL-SCNC: 105 MMOL/L (ref 98–107)
CREAT SERPL-MCNC: 0.57 MG/DL (ref 0.51–0.95)
DEPRECATED HCO3 PLAS-SCNC: 26 MMOL/L (ref 22–29)
EGFRCR SERPLBLD CKD-EPI 2021: 89 ML/MIN/1.73M2
ERYTHROCYTE [DISTWIDTH] IN BLOOD BY AUTOMATED COUNT: 14.4 % (ref 10–15)
GLUCOSE SERPL-MCNC: 114 MG/DL (ref 70–99)
HCT VFR BLD AUTO: 37.5 % (ref 35–47)
HGB BLD-MCNC: 12.3 G/DL (ref 11.7–15.7)
MCH RBC QN AUTO: 29.2 PG (ref 26.5–33)
MCHC RBC AUTO-ENTMCNC: 32.8 G/DL (ref 31.5–36.5)
MCV RBC AUTO: 89 FL (ref 78–100)
PLATELET # BLD AUTO: 214 10E3/UL (ref 150–450)
POTASSIUM SERPL-SCNC: 4.1 MMOL/L (ref 3.4–5.3)
RBC # BLD AUTO: 4.21 10E6/UL (ref 3.8–5.2)
SODIUM SERPL-SCNC: 140 MMOL/L (ref 135–145)
WBC # BLD AUTO: 6.6 10E3/UL (ref 4–11)

## 2024-01-15 PROCEDURE — 36415 COLL VENOUS BLD VENIPUNCTURE: CPT

## 2024-01-15 PROCEDURE — 71045 X-RAY EXAM CHEST 1 VIEW: CPT | Mod: 26 | Performed by: STUDENT IN AN ORGANIZED HEALTH CARE EDUCATION/TRAINING PROGRAM

## 2024-01-15 PROCEDURE — 250N000011 HC RX IP 250 OP 636

## 2024-01-15 PROCEDURE — 71045 X-RAY EXAM CHEST 1 VIEW: CPT

## 2024-01-15 PROCEDURE — 97535 SELF CARE MNGMENT TRAINING: CPT | Mod: GO

## 2024-01-15 PROCEDURE — 250N000013 HC RX MED GY IP 250 OP 250 PS 637: Performed by: THORACIC SURGERY (CARDIOTHORACIC VASCULAR SURGERY)

## 2024-01-15 PROCEDURE — 85027 COMPLETE CBC AUTOMATED: CPT

## 2024-01-15 PROCEDURE — 250N000013 HC RX MED GY IP 250 OP 250 PS 637

## 2024-01-15 PROCEDURE — 80048 BASIC METABOLIC PNL TOTAL CA: CPT

## 2024-01-15 RX ORDER — POLYETHYLENE GLYCOL 3350 17 G/17G
17 POWDER, FOR SOLUTION ORAL DAILY
Qty: 510 G | Refills: 0 | Status: SHIPPED | OUTPATIENT
Start: 2024-01-15 | End: 2024-07-25

## 2024-01-15 RX ORDER — OXYCODONE HYDROCHLORIDE 5 MG/1
2.5-5 TABLET ORAL EVERY 4 HOURS PRN
Qty: 20 TABLET | Refills: 0 | Status: SHIPPED | OUTPATIENT
Start: 2024-01-15 | End: 2024-05-08

## 2024-01-15 RX ORDER — ACETAMINOPHEN 325 MG/1
650 TABLET ORAL EVERY 4 HOURS PRN
COMMUNITY
Start: 2024-01-15 | End: 2024-05-08

## 2024-01-15 RX ORDER — AMOXICILLIN 250 MG
1 CAPSULE ORAL 2 TIMES DAILY
Qty: 60 TABLET | Refills: 0 | Status: SHIPPED | OUTPATIENT
Start: 2024-01-15 | End: 2024-02-14

## 2024-01-15 RX ADMIN — FLUTICASONE PROPIONATE 1 SPRAY: 50 SPRAY, METERED NASAL at 08:57

## 2024-01-15 RX ADMIN — ENOXAPARIN SODIUM 40 MG: 40 INJECTION SUBCUTANEOUS at 08:59

## 2024-01-15 RX ADMIN — SPIRONOLACTONE 12.5 MG: 25 TABLET, FILM COATED ORAL at 08:58

## 2024-01-15 RX ADMIN — FUROSEMIDE 20 MG: 20 TABLET ORAL at 08:58

## 2024-01-15 RX ADMIN — ISOSORBIDE MONONITRATE 60 MG: 30 TABLET, EXTENDED RELEASE ORAL at 08:58

## 2024-01-15 RX ADMIN — FLUTICASONE FUROATE AND VILANTEROL TRIFENATATE 1 PUFF: 100; 25 POWDER RESPIRATORY (INHALATION) at 08:57

## 2024-01-15 RX ADMIN — PANTOPRAZOLE SODIUM 40 MG: 40 TABLET, DELAYED RELEASE ORAL at 08:58

## 2024-01-15 RX ADMIN — Medication 1500 MG: at 08:58

## 2024-01-15 RX ADMIN — CHOLECALCIFEROL (VITAMIN D3) 10 MCG (400 UNIT) TABLET 10 MCG: at 08:58

## 2024-01-15 ASSESSMENT — ACTIVITIES OF DAILY LIVING (ADL)
ADLS_ACUITY_SCORE: 29

## 2024-01-15 NOTE — DISCHARGE SUMMARY
NAME: Victoria Montalvo   MRN: [id number]   : 1940     DATE OF ADMISSION: 2024     Preoperative diagnosis: Hiatal hernia type 3   Postoperative diagnosis: Hiatal hernia type 3      Procedure:   Esophagogastroscopy  Laparoscopic repair of hiatal hernia   Gastrostomy tube placement (20 Fr)  Bilateral tube thoracostomy (19 Fr Duncan)    INTRAOPERATIVE COMPLICATIONS: None     POSTOPERATIVE MEDICAL ISSUES: None     DRAINS/TUBES PRESENT AT DISCHARGE: PEG tube for gastropexy and PRN decompression     DATE OF DISCHARGE:  January 15, 2024     HOSPITAL COURSE: Victoria Montalvo is a 83 year old female who on 2024 underwent the above-named procedures.  She tolerated the operation well and postoperatively was transferred to the general post-surgical unit.  The remainder of her course was essentially uncomplicated.  Prior to discharge, her pain was controlled well, she was able to perform ADLs and ambulate independently without difficulty, and had full return of bowel and bladder function.  On January 15, 2024, she was discharged to home in stable condition with scheduled follow up in place.    DISCHARGE EXAM:   A&O, NAD  Resp non-labored  Distal extremities warm    Incisions and G tube site healthy appearing     DISCHARGE INSTRUCTIONS:  Discharge Procedure Orders   Reason for your hospital stay   Order Comments: surgery     Activity   Order Comments: As in discharge instruction section     Order Specific Question Answer Comments   Is discharge order? Yes      Adult [facility] Follow-up and recommended labs and tests   Order Comments:   XR ESOPHOGRAM W UPPER GI  7:30 AM  (45 min.)  [id number]  [facility]  Imaging      RETURN CCSL  11:00 AM  (45 min.)  Deisi Mills APRN [facility]  Cancer Clinic            Appointments on [place] and/or [facility] (with [facility] or [facility] provider or service). Call [phone] if you haven't heard  regarding these appointments within 7 days of discharge.     Discharge Instructions   Order Comments: THORACIC SURGERY DISCHARGE INSTRUCTIONS    DIET: Full diet at time of discharge.  Follow post Nissen diet recommendations as discussed with dietician and detailed in provided handout    Take stool softeners as prescribed at discharge (Miralax, docusate, and/or senna).  CALL THE THORACIC SURGERY TEAM IF YOU HAVE NOT HAD A BOWEL MOVEMENT BY 48HRS AFTER DISCHARGE.     If your plans upon discharge include prolonged periods of sitting (i.e a lengthy car or plane ride), it is highly beneficial to get up and walk at least once per hour to help prevent swelling and blood clots.     You may remove chest tube dressing 48 hours after tube removal and bandage the site at your own discretion thereafter.  Small amounts of leakage are normal for 2-3 days after removal.  Feel free to call with questions.    You may get incision wet 2 days after operation. Do not submerge, soak, or scrub incision or swim until seen in follow-up.    Take incentive spirometer home for continued frequent use    Activity as tolerated, no strenous activity until seen in follow-up, no lifting greater than 10 pounds for the next 8-10 weeks.    Call for fever greater than 101.5, chills, increased size of incision, red skin around incision, vision changes, muscle strength changes, sensation changes, shortness of breath, or other concerns.    No driving while taking narcotic pain medication.    Transition to ibuprofen or tylenol/acetaminophen for pain control. Do not take tylenol/acetaminophen and acetaminophen containing narcotic (e.g., percocet or vicodin) at the same time. If you have known ulcer problems, or kidney trouble (elevated creatinine) do not take the ibuprofen.    In emergencies, call 911    For other Questions or Concerns;   A.) During weekday working hours (Monday through Friday 8am to 4:30pm)   call [phone] and ask to speak to a  "clinical nurse specialist.     B.) At nights (after 4:30pm), on weekends, or if urgent call [phone] and   tell the  \"I would like to page job code [phone], the thoracic surgery   fellow on call, please.\"     Tubes   Order Comments: DISCHARGING RN- PLEASE REVIEW BELOW WITH PATIENT PRIOR TO DISCHARGE    PEG TUBE INSTRUCTIONS:  -Keep the tube to a Andrew bag for the first 8 hours after placement    Venting:  -You should vent or temporarily put your tube to gravity bag (or basin) drainage if you experience nausea or bloating.  This is important as it will help to protect the hernia repair.   If you are uncertain how to do this, please ask your nurse for instruction.    Skin care:  -Change the gauze dressing around your PEG tube daily.  -Check for redness and swelling in the area where the tube goes into your skin.    -Keep the skin around your tube dry and with a split gauze under the flange. If the hole where the tube enters your body is draining fluid, you may need to put barrier cream or antibiotic cream on the skin around your tube after you are done cleaning it. Cover the area with bandages, and call your caregiver.   -If there are no stitches holding your PEG tube in place on your skin, gently turn the tube. This may decrease pressure on your skin, and help prevent an infection. Ask your caregiver if you should turn your PEG tube, and how to do it correctly.     Flushes:  -Flush your feeding tube with 30cc of water twice daily to ensure it does not become plugged  -If administering medications or tube feedings via your tube, make sure to flush with water immediately after use to prevent the tube from plugging    DISCHARGING RN- PLEASE PROVIDE PEG SUPPLIES AT THE TIME OF DISCHARGE            -2 Andrew bags/drain bags            -2 Syringes 60ml (Cath tip or En-Fit as appropriate)            -2 Basins for PRN venting            -5 2x2 split gauze packets     Diet   Order Comments: As in discharge instruction " section     Order Specific Question Answer Comments   Is discharge order? Yes        DISCHARGE MEDICATIONS:   Current Discharge Medication List        START taking these medications    Details   acetaminophen (TYLENOL) 325 MG tablet Take 2 tablets (650 mg) by mouth every 4 hours as needed for other (For optimal non-opioid multimodal pain management to improve pain control.)    Associated Diagnoses: Hiatal hernia      oxyCODONE (ROXICODONE) 5 MG tablet Take 0.5-1 tablets (2.5-5 mg) by mouth every 4 hours as needed for moderate to severe pain  Qty: 20 tablet, Refills: 0    Associated Diagnoses: Hiatal hernia      polyethylene glycol (MIRALAX) 17 GM/Dose powder Take 17 g by mouth daily  Qty: 510 g, Refills: 0    Associated Diagnoses: Hiatal hernia      senna-docusate (SENOKOT-S/PERICOLACE) 8.6-50 MG tablet Take 1 tablet by mouth 2 times daily for 30 days  Qty: 60 tablet, Refills: 0    Associated Diagnoses: Hiatal hernia           CONTINUE these medications which have NOT CHANGED    Details   albuterol (PROAIR HFA/PROVENTIL HFA/VENTOLIN HFA) 108 (90 Base) MCG/ACT inhaler USE 2 INHALATIONS EVERY 6 HOURS AS NEEDED FOR SHORTNESS OF BREATH, DYSPNEA, OR WHEEZING  Qty: 25.5 g, Refills: 1    Comments: Pharmacy may dispense brand covered by insurance (Proair, or proventil or ventolin or generic albuterol inhaler)  Associated Diagnoses: Moderate persistent asthma, uncomplicated      amLODIPine (NORVASC) 5 MG tablet Take 1 tablet (5 mg) by mouth every evening  Qty: 90 tablet, Refills: 3    Associated Diagnoses: Vasospastic angina (H24)      aspirin 81 MG tablet Take 81 mg by mouth every morning      Boswellia-Glucosamine-Vit D (OSTEO BI-FLEX/5-LOXIN ADVANCED) TABS Take 1,500 mg by mouth every morning      Calcium 5787-5710 MG-UNIT CHEW Take 1 tablet by mouth every morning      calcium carbonate (TUMS) 500 MG chewable tablet Take 1 tablet (500 mg) by mouth as needed for heartburn      Cholecalciferol (VITAMIN D3 PO) Take 4,000  Units by mouth every morning      CRANBERRY PO Take 1 capsule by mouth every morning      diclofenac (VOLTAREN) 1 % topical gel Apply 2 g topically 4 times daily as needed for moderate pain  Qty: 350 g, Refills: 11    Associated Diagnoses: Arthritis      fluticasone (FLONASE) 50 MCG/ACT nasal spray USE 1 TO 2 SPRAYS IN EACH NOSTRIL DAILY  Qty: 48 g, Refills: 11    Associated Diagnoses: Chronic rhinitis      fluticasone-salmeterol (ADVAIR) 250-50 MCG/ACT inhaler Inhale 1 puff into the lungs 2 times daily  Qty: 60 each, Refills: 11    Associated Diagnoses: Moderate persistent asthma, uncomplicated      furosemide (LASIX) 20 MG tablet Take 1 tablet (20 mg) by mouth daily  Qty: 90 tablet, Refills: 3    Associated Diagnoses: Diastolic CHF, chronic (H); Coronary artery disease involving native coronary artery of native heart without angina pectoris; Venous reflux      ipratropium - albuterol 0.5 mg/2.5 mg/3 mL (DUONEB) 0.5-2.5 (3) MG/3ML neb solution Take 1 vial (3 mLs) by nebulization every 6 hours as needed for shortness of breath, wheezing or cough  Qty: 90 mL, Refills: 11    Associated Diagnoses: Moderate persistent asthma, uncomplicated      isosorbide mononitrate (IMDUR) 60 MG 24 hr tablet TAKE 1 TABLET EVERY MORNING HOLD IF SYSTOLIC BLOOD PRESSURE IS LESS THAN 85  Qty: 90 tablet, Refills: 3    Associated Diagnoses: Unstable angina pectoris (H)      LANsoprazole (PREVACID) 30 MG DR capsule Take 1 capsule (30 mg) by mouth daily  Qty: 90 capsule, Refills: 3    Associated Diagnoses: Dysphagia, unspecified type      montelukast (SINGULAIR) 10 MG tablet Take 1 tablet (10 mg) by mouth at bedtime  Qty: 90 tablet, Refills: 3    Associated Diagnoses: Moderate persistent asthma, uncomplicated      multivitamin w/minerals (MULTI-VITAMIN) tablet Take 1 tablet by mouth every morning      nitroGLYcerin (NITROSTAT) 0.4 MG sublingual tablet One tablet under the tongue every 5 minutes if needed for chest pain. May repeat every 5  "minutes for a maximum of 3 doses in 15 minutes\"  Qty: 25 tablet, Refills: 3    Associated Diagnoses: Unstable angina pectoris (H)      omega-3 fatty acids 1200 MG capsule Take 1 capsule by mouth every morning      rosuvastatin (CRESTOR) 40 MG tablet Take 1 tablet (40 mg) by mouth daily  Qty: 90 tablet, Refills: 3    Associated Diagnoses: Mild coronary artery disease      spironolactone (ALDACTONE) 25 MG tablet Take 12.5 mg by mouth every morning  Qty: 90 tablet, Refills: 3    Associated Diagnoses: Diastolic CHF, chronic (H)                       "

## 2024-01-15 NOTE — PROGRESS NOTES
Care Management Follow Up    Length of Stay (days): 3    Expected Discharge Date: 01/15/2024     Concerns to be Addressed:  Discharge planning  Patient plan of care discussed at interdisciplinary rounds: Yes    Anticipated Discharge Disposition:  Home     Anticipated Discharge Services:    Anticipated Discharge DME:      Patient/family educated on Medicare website which has current facility and service quality ratings:    Education Provided on the Discharge Plan:    Patient/Family in Agreement with the Plan:      Referrals Placed by CM/SW: Internal Clinic Care Coordination  Private pay costs discussed: Not applicable    Additional Information:  In 7B Discharge Rounds it was reported plan is discharge today. G-tube is clamped. On room air. Had a BM.   Per OT note pt lives alone, daughter will be staying with her after discharge.   Noted G-tube was placed this admission. Spoke with pt's nurseCarmen about G-tube teaching prior to discharge and requested she give pt syringes for flushing the tube.       Urmila Solomon, RN Care Coordinator  [facility]  On 1- RNCC coverage for Unit [address], [place]. Call [phone].

## 2024-01-15 NOTE — PLAN OF CARE
Goal Outcome Evaluation:      Plan of Care Reviewed With: patient    Overall Patient Progress: improving    Neuro:A/O x 4  Respiratory:on RA sats low 90s   Cardiac:WDL. Denies chest pain  Diet: full liquid  GI/:voiding spontaneously. BM x1 during this shift.  Incision/Drains:g tube clamped; 5 abd lap sites with steri strips- minimal dried drainage. Previous CT site x2 CDI  IV Access:L PIV SL  Pain:managed with PRN Robaxin and scheduled Tylenol  Labs:reviewed   VS:Temp: 98  F (36.7  C) Temp src: Oral BP: 128/63 Pulse: 96   Resp: 18 SpO2: 93 % O2 Device: None (Room air) Oxygen Delivery: 2 LPM   Activity:ind     New Changes this shift:none   Plan: pending discharge today

## 2024-01-15 NOTE — TELEPHONE ENCOUNTER
Pt discharged from [facility] today 1/15/24 and calling with question re:  how to flush G tube.  Reviewed AVS discharge instructions for tube care with pt.  Maria R Raya RN  [facility] Nurse Advisor

## 2024-01-15 NOTE — PLAN OF CARE
Goal Outcome Evaluation:   Plan of Care Reviewed With: patient  Overall Patient Progress: improvingOverall Patient Progress: improving  AVSS.  No c/o pain.  On full liquid diet with good po intake - no c/o nausea.   GT clamped. Dressing changed. GT cares teaching done with pt.   Print out of teaching material for GT cars at home given to pt.   Flushed GT before discharging home. Supplies for GR cares at home given.   Rx pickd up from discharge pharmacy by pt.  Assisted off unit via WC at 1345. Discharged home.

## 2024-01-15 NOTE — PROGRESS NOTES
Ms. Montalvo is ready for discharge. She no longer feels bloated, she had a bowel movement yesterday, and she is tolerating a liquid diet with the g-tube clamped. No gastric bubble on CXR.

## 2024-01-16 ENCOUNTER — PATIENT OUTREACH (OUTPATIENT)
Dept: CARE COORDINATION | Facility: CLINIC | Age: 84
End: 2024-01-16
Payer: COMMERCIAL

## 2024-01-16 NOTE — PROGRESS NOTES
[facility]: [facility]: Post-Discharge Note  SITUATION                                                      Admission:    Admission Date: 01/12/24   Reason for Admission: surgery  Discharge:   Discharge Date: 01/15/24  Discharge Diagnosis: surgery    BACKGROUND                                                      Per hospital discharge summary and inpatient provider notes:    Victoria Montalvo is a 83 year old female who on 1/12/2024 underwent the above-named procedures.  She tolerated the operation well and postoperatively was transferred to the general post-surgical unit.  The remainder of her course was essentially uncomplicated.  Prior to discharge, her pain was controlled well, she was able to perform ADLs and ambulate independently without difficulty, and had full return of bowel and bladder function.  On January 15, 2024, she was discharged to home in stable condition with scheduled follow up in place.     ASSESSMENT           Discharge Assessment  How are you doing now that you are home?: I am doing pretty good. I am taking it easy.  How are your symptoms? (Red Flag symptoms escalate to triage hotline per guidelines): Improved  Do you feel your condition is stable enough to be safe at home until your provider visit?: Yes  Does the patient have their discharge instructions? : Yes  Does the patient have questions regarding their discharge instructions? : No  Were you started on any new medications or were there changes to any of your previous medications? : Yes  Does the patient have all of their medications?: Yes  Do you have questions regarding any of your medications? : No  Discharge follow-up appointment scheduled within 14 calendar days? : Yes  Discharge Follow Up Appointment Date: 01/31/24  Discharge Follow Up Appointment Scheduled with?: Primary Care Provider    Post-op (CHW CTA Only)  If the patient had a surgery or procedure, do they have any questions for a nurse?: No            PLAN                                                      Outpatient Plan: Feb 5th  XR ESOPHOGRAM W UPPER GI  7:30 AM  (45 min.)  [email or web address]  [facility]  Imaging     Feb 12th  RETURN CCSL  11:00 AM  (45 min.)  Deisi Mills APRN CNS  [facility]  Cancer Clinic           Future Appointments   Date Time Provider Department Center   1/31/2024 10:00 AM Cesilia Alonso PA-C EA[facility] EA   2/5/2024  7:45 AM [email or web address] [facility]   2/12/2024 11:15 AM Deisi Mills APRN CNS [facility]   6/4/2024 11:00 AM Suzette Crawford PA-C [facility]   12/5/2024  8:30 AM Elsy Bah MD EAFP EA         For any urgent concerns, please contact our 24 hour nurse triage line: [phone] ([phone])       BEE Tan  [phone]  [facility]  [facility]

## 2024-01-20 ENCOUNTER — MYC MEDICAL ADVICE (OUTPATIENT)
Dept: PEDIATRICS | Facility: CLINIC | Age: 84
End: 2024-01-20
Payer: COMMERCIAL

## 2024-01-22 NOTE — TELEPHONE ENCOUNTER
Patient has an appointment scheduled  Next 5 appointments (look out 90 days)      Jan 24, 2024  8:00 AM  (Arrive by 7:40 AM)  [facility] [address] [place] [id number] [phone]          Shea Chen RN

## 2024-01-22 NOTE — TELEPHONE ENCOUNTER
Elsy Bah MD P Ea Sb3/5 Bin ESCAMILLA (Rn)  Phone Number: [phone]     Looks like patient has upcoming appt with Cesilia CLAIRE I believe an in person appt is needed to initiate home care for medicare?  If not, then ok to send in home care evaluation.  If appt needed, then see if appt can be moved up to this week with cesilia CLAIRE    Changing patient to SB3 as I anticipate higher needs.    Elsy Bah MD  Internal Medicine/Pediatrics  [facility]      Patient has an appointment scheduled for 1/31/24.  Will reschedule for sooner.  Message to patient to check her availability for an appointment sooner can do F2F for len care at this appointment and then referral to care coordination for transportation.   Waiting reply from the patient.    Next 5 appointments (look out 90 days)      Jan 31, 2024 10:00 AM  (Arrive by 9:40 AM)  Provider Visit with Cesilia Alonso PA-C  [facility] ([facility] ) [address] [place] [address]  [address] [place] [id number] [phone]          Shea Chen, RN

## 2024-01-23 ASSESSMENT — PATIENT HEALTH QUESTIONNAIRE - PHQ9
SUM OF ALL RESPONSES TO PHQ QUESTIONS 1-9: 12
10. IF YOU CHECKED OFF ANY PROBLEMS, HOW DIFFICULT HAVE THESE PROBLEMS MADE IT FOR YOU TO DO YOUR WORK, TAKE CARE OF THINGS AT HOME, OR GET ALONG WITH OTHER PEOPLE: NOT DIFFICULT AT ALL
SUM OF ALL RESPONSES TO PHQ QUESTIONS 1-9: 12

## 2024-01-24 ENCOUNTER — ANCILLARY PROCEDURE (OUTPATIENT)
Dept: GENERAL RADIOLOGY | Facility: CLINIC | Age: 84
End: 2024-01-24
Attending: PHYSICIAN ASSISTANT
Payer: COMMERCIAL

## 2024-01-24 ENCOUNTER — OFFICE VISIT (OUTPATIENT)
Dept: PEDIATRICS | Facility: CLINIC | Age: 84
End: 2024-01-24
Payer: COMMERCIAL

## 2024-01-24 VITALS
OXYGEN SATURATION: 95 % | SYSTOLIC BLOOD PRESSURE: 109 MMHG | DIASTOLIC BLOOD PRESSURE: 71 MMHG | WEIGHT: 158 LBS | HEART RATE: 61 BPM | RESPIRATION RATE: 12 BRPM | BODY MASS INDEX: 29.37 KG/M2 | TEMPERATURE: 97.8 F

## 2024-01-24 DIAGNOSIS — R91.8 PULMONARY NODULES: Primary | ICD-10-CM

## 2024-01-24 DIAGNOSIS — Z98.890 STATUS POST SURGERY: ICD-10-CM

## 2024-01-24 DIAGNOSIS — R91.8 PULMONARY NODULES: ICD-10-CM

## 2024-01-24 DIAGNOSIS — I50.32 DIASTOLIC CHF, CHRONIC (H): ICD-10-CM

## 2024-01-24 PROCEDURE — 71046 X-RAY EXAM CHEST 2 VIEWS: CPT | Mod: TC | Performed by: RADIOLOGY

## 2024-01-24 PROCEDURE — 99214 OFFICE O/P EST MOD 30 MIN: CPT | Mod: 24 | Performed by: PHYSICIAN ASSISTANT

## 2024-01-24 RX ORDER — CEFDINIR 300 MG/1
CAPSULE ORAL
COMMUNITY
Start: 2023-11-24 | End: 2024-05-08

## 2024-01-24 RX ORDER — CODEINE PHOSPHATE AND GUAIFENESIN 10; 100 MG/5ML; MG/5ML
5 SOLUTION ORAL
COMMUNITY
Start: 2023-11-24 | End: 2024-05-08

## 2024-01-24 RX ORDER — DOXYCYCLINE 100 MG/1
CAPSULE ORAL
COMMUNITY
Start: 2024-01-21 | End: 2024-01-31

## 2024-01-24 RX ORDER — BENZONATATE 200 MG/1
200 CAPSULE ORAL
COMMUNITY
Start: 2023-11-24 | End: 2024-05-08

## 2024-01-24 RX ORDER — PREDNISONE 20 MG/1
TABLET ORAL
COMMUNITY
Start: 2023-11-24 | End: 2024-07-25

## 2024-01-24 RX ORDER — RESPIRATORY SYNCYTIAL VIRUS VACCINE 120MCG/0.5
0.5 KIT INTRAMUSCULAR ONCE
Qty: 1 EACH | Refills: 0 | Status: CANCELLED | OUTPATIENT
Start: 2024-01-24 | End: 2024-01-24

## 2024-01-24 ASSESSMENT — PAIN SCALES - GENERAL: PAINLEVEL: SEVERE PAIN (6)

## 2024-01-24 NOTE — PROGRESS NOTES
"  Assessment & Plan     Pulmonary nodules    - XR Chest 2 Views; Future    Status post surgery    - Home Care Referral  - Primary Care - Care Coordination Referral; Future    Diastolic CHF, chronic (H)    - Home Care Referral  - Primary Care - Care Coordination Referral; Future      Patient is here for home health orders.  She is interested in a PCA as she is needing daily help with ADLs at home.  She is recovering from GI surgery and does have a gastrostomy tube placed that needs daily monitoring as well.  Orders for wound care and home health were placed today.      Patient needs a followup chest XR done as well. This was ordered and is pending today as well.      MED REC REQUIRED  Post Medication Reconciliation Status:   BMI  Estimated body mass index is 29.37 kg/m  as calculated from the following:    Height as of 1/12/24: 1.562 m (5' 1.5\").    Weight as of this encounter: 71.7 kg (158 lb).     Cordell Kessler is a 83 year old, presenting for the following health issues:  RECHECK        1/24/2024     7:49 AM   Additional Questions   Roomed by LILIAN Easton   Accompanied by Avani Daughter         1/24/2024     7:49 AM   Patient Reported Additional Medications   Patient reports taking the following new medications n/a     CONCERNS: None    History of Present Illness       Reason for visit:  To get at home help during recovery.  Nurse to help change dressings.    She eats 0-1 servings of fruits and vegetables daily.She consumes 0 sweetened beverage(s) daily.She exercises with enough effort to increase her heart rate 9 or less minutes per day.  She exercises with enough effort to increase her heart rate 3 or less days per week.   She is taking medications regularly.           1/16/2024     2:19 PM   Post Discharge Outreach   Admission Date 1/12/2024   Reason for Admission surgery   Discharge Date 1/15/2024   Discharge Diagnosis surgery   How are you doing now that you are home? I am doing pretty good. I am " taking it easy.   How are your symptoms? (Red Flag symptoms escalate to triage hotline per guidelines) Improved   Do you feel your condition is stable enough to be safe at home until your provider visit? Yes   Does the patient have their discharge instructions?  Yes   Does the patient have questions regarding their discharge instructions?  No   Were you started on any new medications or were there changes to any of your previous medications?  Yes   Does the patient have all of their medications? Yes   Do you have questions regarding any of your medications?  No   Discharge follow-up appointment scheduled within 14 calendar days?  Yes   Discharge Follow Up Appointment Date 1/31/2024   Discharge Follow Up Appointment Scheduled with? Primary Care Provider       Plan of care communicated with patient           Patient is here for home health care orders.  She had surgery on January 12th and has a tube in her GI tract.  She is looking for services at home to help her navigate activities of daily living as well as monitor her wounds from surgery.  She does have a gastrostomy tube placed that she needs to keep clean.  She is having a hard time managing this on her own.  In the clinic she is wheelchair bound, but does try to use a walker at home.  Currently she has family that is cooking for her and helping do anything around the house, but family needs to leave and she therefore needs PCA services to help her at home while she is recovering from surgery.           Review of Systems  Constitutional, HEENT, cardiovascular, pulmonary, gi and gu systems are negative, except as otherwise noted.      Objective    /71 (BP Location: Right arm, Patient Position: Sitting, Cuff Size: Adult Large)   Pulse 61   Temp 97.8  F (36.6  C) (Oral)   Resp 12   Wt 71.7 kg (158 lb)   LMP  (LMP Unknown)   SpO2 95%   BMI 29.37 kg/m    Body mass index is 29.37 kg/m .  Physical Exam   GENERAL: alert and no distress  NECK: no adenopathy,  no asymmetry, masses, or scars  RESP: lungs clear to auscultation - no rales, rhonchi or wheezes  CV: regular rate and rhythm, normal S1 S2, no S3 or S4, no murmur, click or rub, no peripheral edema  ABDOMEN: soft, nontender, no hepatosplenomegaly, no masses and bowel sounds normal  MS: no gross musculoskeletal defects noted, no edema        Signed Electronically by: Cesilia Alonso PA-C

## 2024-01-25 ENCOUNTER — PATIENT OUTREACH (OUTPATIENT)
Dept: CARE COORDINATION | Facility: CLINIC | Age: 84
End: 2024-01-25
Payer: COMMERCIAL

## 2024-01-25 NOTE — PROGRESS NOTES
Clinic Care Coordination Contact  Community Health Worker Initial Outreach    Chart Review: Referral from provider, patient is interested in having someone at her home helping her with daily tasks.  Patient interested in a PCA.    CHW Initial Information Gathering:  Referral Source: PCP  Preferred Hospital: [facility]  [phone]  Preferred Urgent Care: [facility] [place], [phone]  Current living arrangement:: I live alone  Supplies Currently Used at Home: Wound Care Supplies  Informal Support system:: Children  No PCP office visit in Past Year: No  Transportation means:: Regular car  CHW Additional Questions  If ED/Hospital discharge, follow-up appointment scheduled as recommended?: N/A  Medication changes made following ED/Hospital discharge?: N/A  MyChart active?: Yes    CHW introduced self and role with CC  Patient states that her daughter has been staying with her and helping since her surgery however, dtr has to go back home due to her  traveling and having a son with autism.   Pt states that she can flush the tube but can't do the cleaning or bandage change.   Home care referral was placed yesterday, she has not heard anything. Needing the tube to be cleaned daily.   CHW discussed process of getting a PCA, this takes months and months to do. Home care is her best option. CC team will follow to make sure patient begins home care ASAP.    CHW called [facility] #[phone] they declined patients referral due to staffing.  Faxed referral through [employer] to:  -[place] can't do daily wound care  -[place] [facility]   -[facility]  -[facility]  -[facility]    Will wait for responses.     Patient called CHW back on 1/26 and shares that she has talked to her children and has been working on doing the dressing changes herself. Pt feels much better, stronger, and does not need home care assistance any longer.   RNCC will check in with patient  after the weekend as scheduled.    Patient accepts CC: Yes. Patient scheduled for assessment with RNCC on 1/30/23 at 10:00am. Patient noted desire to discuss ensuring home care support.     BEE Correia, B.S. [facility] Care Coordination  [facility]:  [place], [place] and [place]  [phone]  [email or web address]

## 2024-01-30 ENCOUNTER — PATIENT OUTREACH (OUTPATIENT)
Dept: NURSING | Facility: CLINIC | Age: 84
End: 2024-01-30
Payer: COMMERCIAL

## 2024-01-30 NOTE — LETTER
[facility]  Patient Centered Plan of Care  About Me:        Patient Name:  Victoria Phillips    YOB: 1940  Age:         83 year old   Feliciano MRN:    [id number] Telephone Information:  Home Phone [phone]   Mobile [phone]       Address:  [address] [address] Email address:  [email or web address]      Emergency Contact(s)    Name Relationship Lgl Grd Work Phone Home Phone Mobile Phone   1. TOBI GAR Daughter   [phone]   2. SAQIB PHILLIPS Son    [phone]   3. JER HEREDIA Grandchild    [phone]   4. SHARITA ARORA Grandchild    [phone]   5. EMERITA GAR* Grandchild    [phone]           Primary language:  English     needed? No   [facility] [employer]:  [phone] op. 1  Other communication barriers:None    Preferred Method of Communication:  Mail  Current living arrangement: I live alone    Mobility Status/ Medical Equipment: Independent w/Device    Health Maintenance  Health Maintenance Reviewed: Due/Overdue (Discussed with patient.)    My Access Plan  Medical Emergency 911   Primary Clinic Line [facility] - [phone]   24 Hour Appointment Line [phone] or  [phone]) (toll-free)   24 Hour Nurse Line [phone] (toll-free)   Preferred Urgent Care [facility], [phone]     Preferred Hospital [facility]  [phone]     Preferred Pharmacy  - MAIL ORDER     Behavioral Health Crisis Line The National Suicide Prevention Lifeline at 1-465.775.8748 or Text/Call 338     My Care Team Members  Patient Care Team         Relationship Specialty Notifications Start End    Elsy Bah MD PCP - General Internal Medicine  12/29/18     Phone: [phone] Fax: [phone] [address] [place] [address]    Elsy Bah MD Assigned PCP   10/7/18     Phone: [phone] Fax:  [phone]         [address] [place] [address] [address] [place] [address]    Ashlee Soriano Personal Advocate & Liaison (PAL)  Admissions 4/18/23     Aron Pro MD Assigned Neuroscience Provider   6/24/23     Phone: [phone] [address] [place] [address]    Lilli Way APRN CNP Nurse Practitioner Anesthesiology  8/2/23     Phone: [phone] Fax: [phone] [address] [facility] [address]    Genesis Kuhn MD MD Gastroenterology  8/2/23     Phone: [phone]          [address] [place] [address]    Shaji Bello MD MD Cardiovascular & Thoracic Surgery  8/22/23     Phone: [phone] Fax: [phone]         [address] [facility] [address]    Elsy Bah MD Assigned Pain Medication Provider   9/16/23     Phone: [phone] Fax: [phone]         [address] [place] [address] [address] [place] [address]    Shaji Bello MD Assigned Heart and Vascular Provider   9/23/23     Phone: [phone] Fax: [phone]         [address] [place] [address]    Lynette Yancey RN Specialty Care Coordinator  Admissions 10/20/23     Linda Mayo PA-C Assigned Surgical Provider   1/13/24     Phone: [phone] Fax: [phone]         [address] [place] [address] [address] [facility] [address]    Lilli Rivera, RN Clinic Care Coordinator  Admissions 1/25/24     Phone: [phone]         Suzette Crawford PA-C Assigned Gastroenterology Provider   1/25/24     Phone: [phone] Fax: [phone]          [facility] [address]              My Care Plans  Self Management and Treatment Plan    Care Plan  Care Plan: Advance Care Plan       Problem: Patient does not have a valid Health Care Directive       Goal: Complete Health Care Directive       Start Date: 1/31/2024 Expected End Date: 7/31/2024    This Visit's Progress: 0%    Note:     Barriers: Limited driving; recovering from surgery; Provider availability - wait time to complete  appointments.   Strengths: Motivated; Independent; Agreeable to Care Coordination.   Patient expressed understanding of goal: Yes.   Action steps to achieve this goal:  1. I will review the resources for Honoring Choices.   2. I will contact Maryellen Rucker when I'm ready to complete my Advance Care Planning documents.   3. I will contact my care team with questions, concerns or support needs. I will use the clinic as a resource and I understand I can contact my clinic with 24/7 after hours services available. Care Coordinator will remain available as needed.                               Care Plan: Resources for Housekeeping/Chore services, Transportation       Problem: Resources for Housekeeping/Chore services & Transportation       Goal: Resources for Housekeeping/Chore services & Transportation       Start Date: 1/31/2024 Expected End Date: 7/31/2024    This Visit's Progress: 0%    Note:     Barriers: Limited driving; recovering from surgery; Provider availability - wait time to complete appointments.   Strengths: Motivated; Independent; Agreeable to Care Coordination.   Patient expressed understanding of goal: Yes.   Action steps to achieve this goal:  1. I will review the resources for Housekeeping/Chore services.    2. I will review the list of Transportation options and follow up with the ones that are of interest to me. I will make arrangements with the companies that I want to use so I have transportation when my family is unavailable.   3. I will contact my care team with questions, concerns or support needs. I will use the clinic as a resource and I understand I can contact my clinic with 24/7 after hours services available. Care Coordinator will remain available as needed.                             Action Plans on File:   Asthma    Heart Failure    Advance Care Plans/Directives:   Advanced Care Plan/Directives on file:   No    Discussed with patient/caregiver(s):   Referral to Maryellen Rucker            My Medical and Care Information  Problem List   Patient Active Problem List   Diagnosis    History of colonic polyps    Chronic rhinitis    Hiatal hernia with GERD    Female stress incontinence    LUMBOSACRAL NEURITIS , numbness left thigh    Osteopenia    Hyperlipidemia LDL goal <130    Diastolic CHF, chronic (H)    Subclavian artery stenosis, left (H24)    Severe persistent asthma (H28)    KEN (obstructive sleep apnea)    Hip osteoarthritis    Mild coronary artery disease    Subclinical hyperthyroidism    Chest pain    Unstable angina pectoris (H)    Status post coronary angiogram    Hypertension    Vasospastic angina (H24)    Exertional chest pain    Abnormal findings on diagnostic imaging of heart and coronary circulation    Venous reflux    Chest pain, unspecified type    History of CAD (coronary artery disease)    Lumbosacral spinal stenosis    Scoliosis of thoracolumbar spine, unspecified scoliosis type    Bertolotti's syndrome    Facet arthropathy, lumbosacral    DDD (degenerative disc disease), lumbosacral    Hiatal hernia      Current Medications and Allergies:    Current Outpatient Medications   Medication    acetaminophen (TYLENOL) 325 MG tablet    albuterol (PROAIR HFA/PROVENTIL HFA/VENTOLIN HFA) 108 (90 Base) MCG/ACT inhaler    amLODIPine (NORVASC) 5 MG tablet    aspirin 81 MG tablet    benzonatate (TESSALON) 200 MG capsule    Boswellia-Glucosamine-Vit D (OSTEO BI-FLEX/5-LOXIN ADVANCED) TABS    Calcium 2833-4712 MG-UNIT CHEW    calcium carbonate (TUMS) 500 MG chewable tablet    cefdinir (OMNICEF) 300 MG capsule    Cholecalciferol (VITAMIN D3 PO)    CRANBERRY PO    diclofenac (VOLTAREN) 1 % topical gel    doxycycline monohydrate (MONODOX) 100 MG capsule    fluticasone (FLONASE) 50 MCG/ACT nasal spray    fluticasone-salmeterol (ADVAIR) 250-50 MCG/ACT inhaler    furosemide (LASIX) 20 MG tablet    guaiFENesin-codeine (ROBITUSSIN AC) 100-10 MG/5ML solution    ipratropium - albuterol 0.5 mg/2.5 mg/3 mL  (DUONEB) 0.5-2.5 (3) MG/3ML neb solution    isosorbide mononitrate (IMDUR) 60 MG 24 hr tablet    LANsoprazole (PREVACID) 30 MG DR capsule    montelukast (SINGULAIR) 10 MG tablet    multivitamin w/minerals (MULTI-VITAMIN) tablet    nitroGLYcerin (NITROSTAT) 0.4 MG sublingual tablet    omega-3 fatty acids 1200 MG capsule    oxyCODONE (ROXICODONE) 5 MG tablet    polyethylene glycol (MIRALAX) 17 GM/Dose powder    predniSONE (DELTASONE) 20 MG tablet    rosuvastatin (CRESTOR) 40 MG tablet    senna-docusate (SENOKOT-S/PERICOLACE) 8.6-50 MG tablet    spironolactone (ALDACTONE) 25 MG tablet     No current facility-administered medications for this visit.      Allergies   Allergen Reactions    Animal Dander     Bee Pollen Other (See Comments)     Pollen,dust, trees, grass, mold-hayfever symptoms    Dust Mites     Kiwi Itching     Itching and red all over    Pollen Extract      Pollen,dust, trees, grass, mold-hayfever symptoms     Care Coordination Start Date: 1/24/2024   Frequency of Care Coordination: monthly, more frequently as needed     Form Last Updated: 01/31/2024

## 2024-01-30 NOTE — LETTER
[facility] CARE COORDINATION  [address] [place] [address]    January 31, 2024 June BALTA Montalvo  [address] [place] [address]      Dear Victoria,    I am a clinic care coordinator who works with Elsy Bah MD with the [facility]. I wanted to introduce myself and provide you with my contact information for you to be able to call me with any questions or concerns. I wanted to thank you for spending the time to talk with me.  Below is a description of clinic care coordination and how I can further assist you.       The clinic care coordination team is made up of a registered nurse, , financial resource worker and community health worker who understand the health care system. The goal of clinic care coordination is to help you manage your health and improve access to the health care system. Our team works alongside your provider to assist you in determining your health and social needs. We can help you obtain health care and community resources, providing you with necessary information and education. We can work with you through any barriers and develop a care plan that helps coordinate and strengthen the communication between you and your care team.  Our services are voluntary and are offered without charge to you personally.    Please feel free to contact me with any questions or concerns regarding care coordination and what we can offer.      We are focused on providing you with the highest-quality healthcare experience possible.    Sincerely,     Michelle Holliday RN, BSN, CPHN, CCM  [facility] Ambulatory Care Management  [facility]  (On behalf of Lilli Rivera RN CC)  Lilli Rivera RN Care Coordination  [facility]: [place], [place], [place]    Email: [email or web address]  Phone: [phone]    Enclosed: I have enclosed a copy of the Patient Centered Plan of Care. This has  helpful information and goals that we have talked about. Please keep this in an easy to access place to use as needed. Also included are resources for Honoring Choices, Housekeeping/Chore services and Transportation options.

## 2024-01-30 NOTE — PROGRESS NOTES
Clinic Care Coordination Contact  Clinic Care Coordination Contact  OUTREACH    Referral Information: RN CC contacted patient to complete assessment for enrollment in Care Coordination.   Referral Source: Care Team     Chief Complaint   Patient presents with    Clinic Care Coordination - Initial     Scheduled RNCC assessment       Universal Utilization: Risk of admission or ED visit 87.5%  Clinic Utilization  Difficulty keeping appointments:: No  Compliance Concerns: No  No-Show Concerns: No  No PCP office visit in Past Year: No  Utilization      No Show Count (past year)  0             ED Visits  3             Hospital Admissions  3                    Current as of: 1/30/2024 11:32 PM              Clinical Concerns:  Current Medical Concerns:  Still recovering from hernia surgery.     Current Behavioral Concerns: None    Education Provided to patient: Educated patient on Care Coordination: outreach schedule; resources available; shared responsibility of goals & goals to achieve.    Pain  Pain (GOAL):: No  Health Maintenance Reviewed: Due/Overdue (Discussed with patient.)  Clinical Pathway: None    Medication Management:  Medication review status: Medications reviewed on 1/24/24. Did not re-review during assessment.   Current Outpatient Medications   Medication    acetaminophen (TYLENOL) 325 MG tablet    albuterol (PROAIR HFA/PROVENTIL HFA/VENTOLIN HFA) 108 (90 Base) MCG/ACT inhaler    amLODIPine (NORVASC) 5 MG tablet    aspirin 81 MG tablet    benzonatate (TESSALON) 200 MG capsule    Boswellia-Glucosamine-Vit D (OSTEO BI-FLEX/5-LOXIN ADVANCED) TABS    Calcium 2609-0390 MG-UNIT CHEW    calcium carbonate (TUMS) 500 MG chewable tablet    cefdinir (OMNICEF) 300 MG capsule    Cholecalciferol (VITAMIN D3 PO)    CRANBERRY PO    diclofenac (VOLTAREN) 1 % topical gel    doxycycline monohydrate (MONODOX) 100 MG capsule    fluticasone (FLONASE) 50 MCG/ACT nasal spray    fluticasone-salmeterol (ADVAIR) 250-50 MCG/ACT inhaler     furosemide (LASIX) 20 MG tablet    guaiFENesin-codeine (ROBITUSSIN AC) 100-10 MG/5ML solution    ipratropium - albuterol 0.5 mg/2.5 mg/3 mL (DUONEB) 0.5-2.5 (3) MG/3ML neb solution    isosorbide mononitrate (IMDUR) 60 MG 24 hr tablet    LANsoprazole (PREVACID) 30 MG DR capsule    montelukast (SINGULAIR) 10 MG tablet    multivitamin w/minerals (MULTI-VITAMIN) tablet    nitroGLYcerin (NITROSTAT) 0.4 MG sublingual tablet    omega-3 fatty acids 1200 MG capsule    oxyCODONE (ROXICODONE) 5 MG tablet    polyethylene glycol (MIRALAX) 17 GM/Dose powder    predniSONE (DELTASONE) 20 MG tablet    rosuvastatin (CRESTOR) 40 MG tablet    senna-docusate (SENOKOT-S/PERICOLACE) 8.6-50 MG tablet    spironolactone (ALDACTONE) 25 MG tablet     No current facility-administered medications for this visit.      Allergies   Allergen Reactions    Animal Dander     Bee Pollen Other (See Comments)     Pollen,dust, trees, grass, mold-hayfever symptoms    Dust Mites     Kiwi Itching     Itching and red all over    Pollen Extract      Pollen,dust, trees, grass, mold-hayfever symptoms     Functional Status:  Dependent ADLs:: Independent  Dependent IADLs:: Independent  Bed or wheelchair confined:: No  Mobility Status: Independent w/Device  Fallen 2 or more times in the past year?: No  Any fall with injury in the past year?: No    Living Situation:  Current living arrangement:: I live alone  Type of residence:: Town home    Lifestyle & Psychosocial Needs:    Social Determinants of Health     Food Insecurity: Low Risk  (1/31/2024)    Food Insecurity     Within the past 12 months, did you worry that your food would run out before you got money to buy more?: No     Within the past 12 months, did the food you bought just not last and you didn t have money to get more?: No   Depression: At risk (1/24/2024)    PHQ-2     PHQ-2 Score: 6   Housing Stability: Low Risk  (1/31/2024)    Housing Stability     Do you have housing? : Yes     Are you worried  about losing your housing?: No   Tobacco Use: Medium Risk (1/24/2024)    Patient History     Smoking Tobacco Use: Former     Smokeless Tobacco Use: Never     Passive Exposure: Not on file   Financial Resource Strain: Low Risk  (1/31/2024)    Financial Resource Strain     Within the past 12 months, have you or your family members you live with been unable to get utilities (heat, electricity) when it was really needed?: No   Alcohol Use: Not on file   Transportation Needs: Low Risk  (1/31/2024)    Transportation Needs     Within the past 12 months, has lack of transportation kept you from medical appointments, getting your medicines, non-medical meetings or appointments, work, or from getting things that you need?: No   Physical Activity: Not on file   Interpersonal Safety: Low Risk  (11/30/2023)    Interpersonal Safety     Do you feel physically and emotionally safe where you currently live?: Yes     Within the past 12 months, have you been hit, slapped, kicked or otherwise physically hurt by someone?: No     Within the past 12 months, have you been humiliated or emotionally abused in other ways by your partner or ex-partner?: No   Stress: Not on file   Social Connections: Not on file     Diet::  (Soft foods)  Inadequate nutrition (GOAL):: No  Tube Feeding: No  Inadequate activity/exercise (GOAL):: No  Significant changes in sleep pattern (GOAL): No  Transportation means:: Regular car     Restorationist or spiritual beliefs that impact treatment:: No  Mental health DX:: No  Mental health management concern (GOAL):: No  Chemical Dependency Status: No Current Concerns  Chemical Dependency Management:  (N/A)  Informal Support system:: Children      Resources and Interventions:  Current Resources:      Community Resources: Lifeline  Supplies Currently Used at Home: Wound Care Supplies  Equipment Currently Used at Home: walker, standard, walker, rolling  Employment Status: retired     Advance Care Plan/Directive  Advanced Care  Plans/Directives on file:: No  Discussed with patient/caregiver:: Referral to Maryellen Rucker    Referrals Placed: Honoring Choices    Care Plan:  Care Plan: Advance Care Plan       Problem: Patient does not have a valid Health Care Directive       Goal: Complete Health Care Directive       Start Date: 1/31/2024 Expected End Date: 7/31/2024    This Visit's Progress: 0%    Note:     Barriers: Limited driving; recovering from surgery; Provider availability - wait time to complete appointments.   Strengths: Motivated; Independent; Agreeable to Care Coordination.   Patient expressed understanding of goal: Yes.   Action steps to achieve this goal:  1. I will review the resources for Honoring Choices.   2. I will contact [facility] when I'm ready to complete my Advance Care Planning documents.   3. I will contact my care team with questions, concerns or support needs. I will use the clinic as a resource and I understand I can contact my clinic with 24/7 after hours services available. Care Coordinator will remain available as needed.                               Care Plan: Resources for Housekeeping/Chore services, Transportation       Problem: Resources for Housekeeping/Chore services & Transportation       Goal: Resources for Housekeeping/Chore services & Transportation       Start Date: 1/31/2024 Expected End Date: 7/31/2024    This Visit's Progress: 0%    Note:     Barriers: Limited driving; recovering from surgery; Provider availability - wait time to complete appointments.   Strengths: Motivated; Independent; Agreeable to Care Coordination.   Patient expressed understanding of goal: Yes.   Action steps to achieve this goal:  1. I will review the resources for Housekeeping/Chore services.    2. I will review the list of Transportation options and follow up with the ones that are of interest to me. I will make arrangements with the companies that I want to use so I have transportation when my family is  unavailable.   3. I will contact my care team with questions, concerns or support needs. I will use the clinic as a resource and I understand I can contact my clinic with 24/7 after hours services available. Care Coordinator will remain available as needed.                             Patient/Caregiver understanding: Patient/caregiver verbalized understanding and denies any additional questions or concerns at this time. RNCC engaged in [employer] communications during encounter.     Outreach Frequency: monthly, more frequently as needed    Future Appointments                In 5 days [facility] RAD; [id number] [facility], [facility]    In 1 week Deisi Mills APRN [facility]    In 4 months Suzette Crawford PA-C [facility] Gastroenterology Clinic [facility]    In 10 months Elsy Bah MD [facility] MAGDALENA [place]ks          Plan: RN CC will send patient introduction letter with contact information along with printed resources for Housekeeping/Chore services, Transportation and Honoring Choices. Also included will be Patient-centered Plan of Care. Verified address in chart is valid.     Addendum: RN CC printed introduction letter, Housekeeping/Chore services resources & Transportation resources along with Patient-centered Plan of Care while in clinic on 2/2/24. Mailed via [employer] with additional handout resources of Honoring Choices [place] - Advance Care Planning Information General Guide and Honoring Choices - Your Rights: Making Your Own Health Care Treatment Decisions.    Michelle Holliday RN, BSN, CPHN, CCM  [facility] Ambulatory Care Management  [facility]  Phone: [phone]  Email: [email or web address]  (On behalf of Lilli Rivera RN CC)    Clinic Care Coordination Contact  [facility]    Clinical Data: Care Coordinator Outreach    Outreach Documentation  Number of Outreach Attempt   1/30/2024  10:01 AM 1       Left message on patient's voicemail with call back information and requested return call.    Plan: Care Coordinator will try to reach patient again in 1-2 business days.    Lilli Rivera RN Care Coordinator  [facility] [place]  Email: [email or web address]  Phone: [phone]

## 2024-01-31 ASSESSMENT — ACTIVITIES OF DAILY LIVING (ADL): DEPENDENT_IADLS:: INDEPENDENT

## 2024-02-05 ENCOUNTER — HOSPITAL ENCOUNTER (OUTPATIENT)
Dept: GENERAL RADIOLOGY | Facility: CLINIC | Age: 84
Discharge: HOME | End: 2024-02-05
Attending: CLINICAL NURSE SPECIALIST | Admitting: CLINICAL NURSE SPECIALIST
Payer: COMMERCIAL

## 2024-02-05 DIAGNOSIS — K21.9 HIATAL HERNIA WITH GERD: ICD-10-CM

## 2024-02-05 DIAGNOSIS — K44.9 HIATAL HERNIA WITH GERD: ICD-10-CM

## 2024-02-05 PROCEDURE — 74240 X-RAY XM UPR GI TRC 1CNTRST: CPT

## 2024-02-05 RX ADMIN — DIATRIZOATE MEGLUMINE AND DIATRIZOATE SODIUM 70 ML: 660; 100 SOLUTION ORAL; RECTAL at 08:49

## 2024-02-12 ENCOUNTER — ONCOLOGY VISIT (OUTPATIENT)
Dept: SURGERY | Facility: CLINIC | Age: 84
End: 2024-02-12
Attending: THORACIC SURGERY (CARDIOTHORACIC VASCULAR SURGERY)
Payer: COMMERCIAL

## 2024-02-12 VITALS
HEART RATE: 76 BPM | HEIGHT: 61 IN | BODY MASS INDEX: 30 KG/M2 | TEMPERATURE: 97.8 F | RESPIRATION RATE: 16 BRPM | SYSTOLIC BLOOD PRESSURE: 122 MMHG | WEIGHT: 158.9 LBS | DIASTOLIC BLOOD PRESSURE: 75 MMHG | OXYGEN SATURATION: 100 %

## 2024-02-12 DIAGNOSIS — K21.9 HIATAL HERNIA WITH GERD: Primary | ICD-10-CM

## 2024-02-12 DIAGNOSIS — K44.9 HIATAL HERNIA WITH GERD: Primary | ICD-10-CM

## 2024-02-12 PROCEDURE — G0463 HOSPITAL OUTPT CLINIC VISIT: HCPCS | Performed by: CLINICAL NURSE SPECIALIST

## 2024-02-12 PROCEDURE — 99024 POSTOP FOLLOW-UP VISIT: CPT | Performed by: CLINICAL NURSE SPECIALIST

## 2024-02-12 ASSESSMENT — PAIN SCALES - GENERAL: PAINLEVEL: NO PAIN (0)

## 2024-02-12 NOTE — NURSING NOTE
"Oncology Rooming Note    February 12, 2024 11:06 AM   Victoria Montalvo is a 83 year old female who presents for:    Chief Complaint   Patient presents with    Oncology Clinic Visit     [facility] RETURN - HIATAL HERNIA     Initial Vitals: /75 (BP Location: Right arm, Patient Position: Chair, Cuff Size: Adult Large)   Pulse 76   Temp 97.8  F (36.6  C) (Oral)   Resp 16   Ht 1.562 m (5' 1.5\")   Wt 72.1 kg (158 lb 14.4 oz)   LMP  (LMP Unknown)   SpO2 100%   BMI 29.54 kg/m   Estimated body mass index is 29.54 kg/m  as calculated from the following:    Height as of this encounter: 1.562 m (5' 1.5\").    Weight as of this encounter: 72.1 kg (158 lb 14.4 oz). Body surface area is 1.77 meters squared.  No Pain (0) Comment: Data Unavailable   No LMP recorded (lmp unknown). Patient is postmenopausal.  Allergies reviewed: Yes  Medications reviewed: Yes    Medications: Medication refills not needed today.  Pharmacy name entered into EPIC:    [facility] - [facility], [place] - [address] [id number] - [place], [place] - [address] AT [place] [address]    Frailty Screening:   Is the patient here for a new oncology consult visit in cancer care? 2. No    Massimo Pardo LPN              "

## 2024-02-12 NOTE — LETTER
2/12/2024         RE: Victoria Montalvo  [address] [place] [address]        Dear Colleague,    Thank you for referring your patient, Victoria Montalvo, to the [facility]. Please see a copy of my visit note below.    THORACIC SURGERY FOLLOW UP VISIT      I saw MsRafita Montalvo in follow-up today. The clinical summary follows:     PREOP DIAGNOSIS   Hiatal hernia type 3  PROCEDURE   Laparoscopic repair of hiatal hernia, gastrostomy tube placement  DATE OF PROCEDURE  01/12/2024    COMPLICATIONS  None    INTERVAL STUDIES  Esophagram 02/05/2024:  1. Postoperative changes of hiatal hernia repair. No evidence for a contrast leak.  2. Mild esophageal dysmotility may be related to presbyesophagus.  3. Moderate gastroesophageal reflux was observed.     Past Medical History:   Diagnosis Date    Abnormal Papanicolaou smear of cervix and cervical HPV     colposcopy 1/97    Aortic valve sclerosis     Arthritis     Asthma     on preventative meds and has nebulizer    Chronic rhinitis     COPD (chronic obstructive pulmonary disease)     Coronary artery disease     4/4/17 SHERLEY--PDA    Coronary artery spasm (H24)     Diastolic CHF, chronic 02/22/2012    Gastro-oesophageal reflux disease     Hiatal hernia     Hyperlipidemia 10/31/2011    Obesity     KEN (obstructive sleep apnea)     CPAP    Personal history of colonic polyps     colonoscopy 9/97, 2002 (due in 2007)    Pulmonary HTN     Seasonal allergies     Status post coronary angiogram 04/22/2020    Subclavian artery stenosis, left 08/07/2013    S/p stent in 2013     Subclinical hyperthyroidism 10/17/2016      Past Surgical History:   Procedure Laterality Date    ARTHROPLASTY HIP Right 10/19/2015    Procedure: ARTHROPLASTY HIP;  Surgeon: Aron Torres MD;  Location: [facility] OR    COLONOSCOPY  01/01/2008    Polyp removed, bx.    COLONOSCOPY  01/12/2010    COLONOSCOPY N/A 02/17/2015    Procedure: COLONOSCOPY;  Surgeon: Gato Quiles  MD ROLY;  Location: [facility]    CT CORONARY ANGIOGRAM  2017    SHERLEY to PDA    CV CORONARY ANGIOGRAM N/A 2020    Procedure: Coronary Angiogram;  Surgeon: Handy Deinse MD;  Location:  HEART CARDIAC CATH LAB    CV CORONARY ANGIOGRAM N/A 2021    Procedure: Coronary Angiogram;  Surgeon: Handy Denise MD;  Location:  HEART CARDIAC CATH LAB    CV INSTANTANEOUS WAVE-FREE RATIO N/A 2020    Procedure: Instantaneous Wave-Free Ratio;  Surgeon: Handy Denise MD;  Location:  HEART CARDIAC CATH LAB    CV LEFT VENTRICULOGRAM N/A 2020    Procedure: Left Ventriculogram;  Surgeon: Handy Denise MD;  Location:  HEART CARDIAC CATH LAB    ESOPHAGOSCOPY, GASTROSCOPY, DUODENOSCOPY (EGD), COMBINED N/A 2023    Procedure: Esophagoscopy, gastroscopy, duodenoscopy (EGD), combined;  Surgeon: Genesis Kuhn MD;  Location: [facility]    LAPAROSCOPIC HERNIORRHAPHY HIATAL N/A 2024    Procedure: Laparoscopic Hiatal Hernia Repair, Esophagogastroduodenscopy, Gastrostomy Tube Placement, Bilateral Chest Tube Placement;  Surgeon: Oscar Stern MD;  Location: [facility] OR    Liposuction of legs and stomach      MAMMOPLASTY REDUCTION  1989    OPEN REDUCTION INTERNAL FIXATION ANKLE  1987    Removal of ankle hardware NOS      SOFT TISSUE SURGERY   &     Liposuction    VASCULAR SURGERY  2013    angiogram & left subclavical artery stent      Social History     Socioeconomic History    Marital status:      Spouse name: Trenton    Number of children: 2    Years of education: 12    Highest education level: Not on file   Occupational History    Occupation: retired   Tobacco Use    Smoking status: Former     Packs/day: 1.00     Years: 30.00     Additional pack years: 0.00     Total pack years: 30.00     Types: Cigarettes     Start date: 7/15/1973     Quit date: 1993     Years since quittin.7    Smokeless tobacco: Never   Vaping Use    Vaping Use: Never used    Substance and Sexual Activity    Alcohol use: Yes     Comment: Not very often    Drug use: No    Sexual activity: Not Currently     Partners: Male   Other Topics Concern     Service Not Asked    Blood Transfusions Not Asked    Caffeine Concern No    Occupational Exposure Not Asked    Hobby Hazards Not Asked    Sleep Concern Not Asked    Stress Concern Not Asked    Weight Concern Not Asked    Special Diet No     Comment: regular diet     Back Care Not Asked    Exercise No     Comment: shopping a lot so walikg     Bike Helmet Not Asked    Seat Belt Not Asked    Self-Exams Not Asked    Parent/sibling w/ CABG, MI or angioplasty before 65F 55M? Yes     Comment: Brother and Sister, Daughter and Father   Social History Narrative    Not on file     Social Determinants of Health     Financial Resource Strain: Low Risk  (1/31/2024)    Financial Resource Strain     Within the past 12 months, have you or your family members you live with been unable to get utilities (heat, electricity) when it was really needed?: No   Food Insecurity: Low Risk  (1/31/2024)    Food Insecurity     Within the past 12 months, did you worry that your food would run out before you got money to buy more?: No     Within the past 12 months, did the food you bought just not last and you didn t have money to get more?: No   Transportation Needs: Low Risk  (1/31/2024)    Transportation Needs     Within the past 12 months, has lack of transportation kept you from medical appointments, getting your medicines, non-medical meetings or appointments, work, or from getting things that you need?: No   Physical Activity: Not on file   Stress: Not on file   Social Connections: Not on file   Interpersonal Safety: Low Risk  (11/30/2023)    Interpersonal Safety     Do you feel physically and emotionally safe where you currently live?: Yes     Within the past 12 months, have you been hit, slapped, kicked or otherwise physically hurt by someone?: No     Within  "the past 12 months, have you been humiliated or emotionally abused in other ways by your partner or ex-partner?: No   Housing Stability: Low Risk  (1/31/2024)    Housing Stability     Do you have housing? : Yes     Are you worried about losing your housing?: No      SUBJECTIVE  Victoria is doing ok. She does not have any trouble with food sticking. She does not have heartburn or reflux any more. Her appetite is slow to return. She most she can eat at one time is ~3/4 cup of soup and 1/4 of a sandwich. She has not had nausea or bloating. The feeding tube is quite painful and she would like it removed.    OBJECTIVE  /75 (BP Location: Right arm, Patient Position: Chair, Cuff Size: Adult Large)   Pulse 76   Temp 97.8  F (36.6  C) (Oral)   Resp 16   Ht 1.562 m (5' 1.5\")   Wt 72.1 kg (158 lb 14.4 oz)   LMP  (LMP Unknown)   SpO2 100%   BMI 29.54 kg/m       Incisions are well healed and without erythema. The gastrostomy tube site is clean and dry. I deflated the retention balloon and removed the gastrostomy tube without difficulty. There was no bleeding. I applied a clean dressing and gave Victoria the following site care instructions:    It may take 10-14 days for this to heal completely.    Keep a gauze dressing on and change it daily and as needed.    There are no dietary restrictions.    You may hear air gurgling noises coming from this site until it closes.    It is OK to shower but no tubs, pools, etc until healed.    If, after 3-4 weeks, you still have a little drainage and it hasn't closed, let us know.    From a personal perspective, she is here with her daughter, Avani. Victoria has a Cat named Laz who is quite particular when it comes to his food-he prefers cat food.    IMPRESSION  83 year-old female status post laparoscopic repair of hiatal hernia and gastrostomy tube placement. She is here for post operative follow up and removal of the gastrostomy tube.    I encouraged her to continue with small meals " frequently throughout the day rather than trying to get back to 3 large meals a day. I reviewed her esophagram results with her.    PLAN  I spent 25 min on the date of the encounter in chart review, patient visit, review of tests, documentation and/or discussion with other providers about the issues documented above. I reviewed the plan as follows:  Site care instructions as above. Follow up with me in 1 year with an esophagram prior.  Necessary Tests & Appointments: esophagram  All questions were answered and the patient and present family were in agreement with the plan.  I appreciate the opportunity to participate in the care of your patient and will keep you updated.  Sincerely,      Deisi Mills, NIMESH CNS

## 2024-02-12 NOTE — PROGRESS NOTES
THORACIC SURGERY FOLLOW UP VISIT      I saw Ms. Victoria Montalvo in follow-up today. The clinical summary follows:     PREOP DIAGNOSIS   Hiatal hernia type 3  PROCEDURE   Laparoscopic repair of hiatal hernia, gastrostomy tube placement  DATE OF PROCEDURE  01/12/2024    COMPLICATIONS  None    INTERVAL STUDIES  Esophagram 02/05/2024:  1. Postoperative changes of hiatal hernia repair. No evidence for a contrast leak.  2. Mild esophageal dysmotility may be related to presbyesophagus.  3. Moderate gastroesophageal reflux was observed.     Past Medical History:   Diagnosis Date    Abnormal Papanicolaou smear of cervix and cervical HPV     colposcopy 1/97    Aortic valve sclerosis     Arthritis     Asthma     on preventative meds and has nebulizer    Chronic rhinitis     COPD (chronic obstructive pulmonary disease)     Coronary artery disease     4/4/17 SHERLEY--PDA    Coronary artery spasm (H24)     Diastolic CHF, chronic 02/22/2012    Gastro-oesophageal reflux disease     Hiatal hernia     Hyperlipidemia 10/31/2011    Obesity     KEN (obstructive sleep apnea)     CPAP    Personal history of colonic polyps     colonoscopy 9/97, 2002 (due in 2007)    Pulmonary HTN     Seasonal allergies     Status post coronary angiogram 04/22/2020    Subclavian artery stenosis, left 08/07/2013    S/p stent in 2013     Subclinical hyperthyroidism 10/17/2016      Past Surgical History:   Procedure Laterality Date    ARTHROPLASTY HIP Right 10/19/2015    Procedure: ARTHROPLASTY HIP;  Surgeon: Aron Torres MD;  Location:  OR    COLONOSCOPY  01/01/2008    Polyp removed, bx.    COLONOSCOPY  01/12/2010    COLONOSCOPY N/A 02/17/2015    Procedure: COLONOSCOPY;  Surgeon: Gato Quiles MD;  Location:  GI    CT CORONARY ANGIOGRAM  04/04/2017    SHERLEY to PDA    CV CORONARY ANGIOGRAM N/A 04/22/2020    Procedure: Coronary Angiogram;  Surgeon: Handy Denise MD;  Location:  HEART CARDIAC CATH LAB    CV CORONARY ANGIOGRAM N/A  2021    Procedure: Coronary Angiogram;  Surgeon: Handy Denise MD;  Location:  HEART CARDIAC CATH LAB    CV INSTANTANEOUS WAVE-FREE RATIO N/A 2020    Procedure: Instantaneous Wave-Free Ratio;  Surgeon: Handy Denise MD;  Location:  HEART CARDIAC CATH LAB    CV LEFT VENTRICULOGRAM N/A 2020    Procedure: Left Ventriculogram;  Surgeon: Handy Denise MD;  Location:  HE[facility] CARDIAC CATH LAB    ESOPHAGOSCOPY, GASTROSCOPY, DUODENOSCOPY (EGD), COMBINED N/A 2023    Procedure: Esophagoscopy, gastroscopy, duodenoscopy (EGD), combined;  Surgeon: Genesis Kuhn MD;  Location: [facility]    LAPAROSCOPIC HERNIORRHAPHY HIATAL N/A 2024    Procedure: Laparoscopic Hiatal Hernia Repair, Esophagogastroduodenscopy, Gastrostomy Tube Placement, Bilateral Chest Tube Placement;  Surgeon: Oscar Stern MD;  Location: [facility]    Liposuction of legs and stomach      MAMMOPLASTY REDUCTION  1989    OPEN REDUCTION INTERNAL FIXATION ANKLE  1987    Removal of ankle hardware NOS      SOFT TISSUE SURGERY   &     Liposuction    VASCULAR SURGERY  2013    angiogram & left subclavical artery stent      Social History     Socioeconomic History    Marital status:      Spouse name: Trenton    Number of children: 2    Years of education: 12    Highest education level: Not on file   Occupational History    Occupation: retired   Tobacco Use    Smoking status: Former     Packs/day: 1.00     Years: 30.00     Additional pack years: 0.00     Total pack years: 30.00     Types: Cigarettes     Start date: 7/15/1973     Quit date: 1993     Years since quittin.7    Smokeless tobacco: Never   Vaping Use    Vaping Use: Never used   Substance and Sexual Activity    Alcohol use: Yes     Comment: Not very often    Drug use: No    Sexual activity: Not Currently     Partners: Male   Other Topics Concern     Service Not Asked    Blood Transfusions Not Asked    Caffeine  Concern No    Occupational Exposure Not Asked    Hobby Hazards Not Asked    Sleep Concern Not Asked    Stress Concern Not Asked    Weight Concern Not Asked    Special Diet No     Comment: regular diet     Back Care Not Asked    Exercise No     Comment: shopping a lot so [id number]     Bike Helmet Not Asked    Seat Belt Not Asked    Self-Exams Not Asked    Parent/sibling w/ CABG, MI or angioplasty before 65F 55M? Yes     Comment: Brother and Sister, Daughter and Father   Social History Narrative    Not on file     Social Determinants of Health     Financial Resource Strain: Low Risk  (1/31/2024)    Financial Resource Strain     Within the past 12 months, have you or your family members you live with been unable to get utilities (heat, electricity) when it was really needed?: No   Food Insecurity: Low Risk  (1/31/2024)    Food Insecurity     Within the past 12 months, did you worry that your food would run out before you got money to buy more?: No     Within the past 12 months, did the food you bought just not last and you didn t have money to get more?: No   Transportation Needs: Low Risk  (1/31/2024)    Transportation Needs     Within the past 12 months, has lack of transportation kept you from medical appointments, getting your medicines, non-medical meetings or appointments, work, or from getting things that you need?: No   Physical Activity: Not on file   Stress: Not on file   Social Connections: Not on file   Interpersonal Safety: Low Risk  (11/30/2023)    Interpersonal Safety     Do you feel physically and emotionally safe where you currently live?: Yes     Within the past 12 months, have you been hit, slapped, kicked or otherwise physically hurt by someone?: No     Within the past 12 months, have you been humiliated or emotionally abused in other ways by your partner or ex-partner?: No   Housing Stability: Low Risk  (1/31/2024)    Housing Stability     Do you have housing? : Yes     Are you worried about losing  "your housing?: No      SUBJECTIVE  Victoria is doing ok. She does not have any trouble with food sticking. She does not have heartburn or reflux any more. Her appetite is slow to return. She most she can eat at one time is ~3/4 cup of soup and 1/4 of a sandwich. She has not had nausea or bloating. The feeding tube is quite painful and she would like it removed.    OBJECTIVE  /75 (BP Location: Right arm, Patient Position: Chair, Cuff Size: Adult Large)   Pulse 76   Temp 97.8  F (36.6  C) (Oral)   Resp 16   Ht 1.562 m (5' 1.5\")   Wt 72.1 kg (158 lb 14.4 oz)   LMP  (LMP Unknown)   SpO2 100%   BMI 29.54 kg/m       Incisions are well healed and without erythema. The gastrostomy tube site is clean and dry. I deflated the retention balloon and removed the gastrostomy tube without difficulty. There was no bleeding. I applied a clean dressing and gave Victoria the following site care instructions:    It may take 10-14 days for this to heal completely.    Keep a gauze dressing on and change it daily and as needed.    There are no dietary restrictions.    You may hear air gurgling noises coming from this site until it closes.    It is OK to shower but no tubs, pools, etc until healed.    If, after 3-4 weeks, you still have a little drainage and it hasn't closed, let us know.    From a personal perspective, she is here with her daughter, Avani. Victoria has a Cat named Lza who is quite particular when it comes to his food-he prefers cat food.    IMPRESSION  83 year-old female status post laparoscopic repair of hiatal hernia and gastrostomy tube placement. She is here for post operative follow up and removal of the gastrostomy tube.    I encouraged her to continue with small meals frequently throughout the day rather than trying to get back to 3 large meals a day. I reviewed her esophagram results with her.    PLAN  I spent 25 min on the date of the encounter in chart review, patient visit, review of tests, documentation " and/or discussion with other providers about the issues documented above. I reviewed the plan as follows:  Site care instructions as above. Follow up with me in 1 year with an esophagram prior.  Necessary Tests & Appointments: esophagram  All questions were answered and the patient and present family were in agreement with the plan.  I appreciate the opportunity to participate in the care of your patient and will keep you updated.  Sincerely,

## 2024-02-27 ENCOUNTER — NURSE TRIAGE (OUTPATIENT)
Dept: ONCOLOGY | Facility: CLINIC | Age: 84
End: 2024-02-27
Payer: COMMERCIAL

## 2024-02-27 NOTE — TELEPHONE ENCOUNTER
Oncology Nurse Triage - Reporting Symptoms  Situation:   June reporting the following symptoms: infection at GT    Background:   Treating Provider: Dr. Stern    Date of last office visit: 2/12/24 w/ Deisi Mills    Recent treatments: Yes: 1/12/24 Laparoscopic repair of hiatal hernia, gastrostomy tube placement   Tube removed 2/12/24.     Assessment  Pt was in UC yesterday and is being treated for infection where tube was- started Doxycycline. She noticed purulent drainage and discomfort at the GT site while cleaning it, which triggered the UC visit. Pt states this is her 2nd time being treated for infection at this site.  Today, she is tired and does feel some pain where tube was/infection is. She said she had thought the area was closed, so did start bending some to clean her home, but now is going back to not bending and using her reacher.   See addition UC notes in Care Everywhere.     Recommendations:   Writer reviewed signs of infection to monitor for, encouraged rest and hydration, advised abx can take 24 hours to start taking effect, informed will send message to care team to update on area and infection, encouraged pt to call triage if symptoms worsen or with questions. Pt voiced understanding.

## 2024-02-28 ENCOUNTER — PATIENT OUTREACH (OUTPATIENT)
Dept: CARE COORDINATION | Facility: CLINIC | Age: 84
End: 2024-02-28
Payer: COMMERCIAL

## 2024-02-28 NOTE — TELEPHONE ENCOUNTER
"[facility]: Thoracic Surgery                                                                                Called patient to follow up on triage call regarding patient being seen at Urgent Care for G-tube site infection and started on antibiotics (Doxycycline BID x 7days). Spoke with patient.  Informed patient that writer had updated NIMESH Mills and Deisi would like her to keep the area clean and dry. Further explained if it doesn't close after the course of antibiotics, Deisi would like her to be seen by one of the surgeons to evaluate and discuss other options. Patient verbalized her understanding.    Patient verbalized that she is currently feeling \"ok:\". Was feeling fatigued the last few days before going to Urgent Care. Reports no fevers, but pain and \"lots of stuff coming out\".  Reports still not eating much as she feels full quicker. Is drinking [employer]iere protein supplement drinks. Encouraged small frequent meals,  fluids from solids and avoid straws.     Writer will follow-up with patient on Monday (last day of Antibiotics) to check status. Patient appreciated writer's call.    Renee Doss RN, BSN  Thoracic Surgery RN Care Coordinator     "

## 2024-02-28 NOTE — PROGRESS NOTES
Clinic Care Coordination Contact  Follow Up Progress Note      Assessment: Patient confirmed receipt of resources and healthcare directive provided by mail. Patient states she hasn't had a chance to look through them as she has been battling infections and currently has an infection. She verbalizes plan to review resources next week after antibiotics are completed and hopefully infection is cleared.     Care Gaps:    Health Maintenance Due   Topic Date Due    ZOSTER IMMUNIZATION (1 of 2) Never done    RSV VACCINE (Pregnancy & 60+) (1 - 1-dose 60+ series) Never done    DTAP/TDAP/TD IMMUNIZATION (1 - Tdap) 04/15/2007    Pneumococcal Vaccine: 65+ Years (2 of 2 - PCV) 12/08/2011    COLORECTAL CANCER SCREENING  02/17/2020    HF ACTION PLAN  08/04/2020    ASTHMA ACTION PLAN  04/28/2022    INFLUENZA VACCINE (1) 09/01/2023    COVID-19 Vaccine (6 - 2023-24 season) 09/01/2023     Care Gap Goal set: Yes    Care Plans  Care Plan: Advance Care Plan       Problem: Patient does not have a valid Health Care Directive       Long-Range Goal: Complete Health Care Directive       Start Date: 1/31/2024 Expected End Date: 7/31/2024    This Visit's Progress: 10% Recent Progress: 0%    Note:     Barriers: Limited driving; recovering from surgery; Provider availability - wait time to complete appointments.   Strengths: Motivated; Independent; Agreeable to Care Coordination.   Patient expressed understanding of goal: Yes.   Action steps to achieve this goal:  1. I will review the resources for [facility].   2. I will contact [facility] when I'm ready to complete my Advance Care Planning documents.   3. I will contact my care team with questions, concerns or support needs. I will use the clinic as a resource and I understand I can contact my clinic with 24/7 after hours services available. Care Coordinator will remain available as needed.                               Care Plan: Resources for Housekeeping/Chore services,  Transportation       Problem: Resources for Housekeeping/Chore services & Transportation       Long-Range Goal: Resources for Housekeeping/Chore services & Transportation       Start Date: 1/31/2024 Expected End Date: 7/31/2024    This Visit's Progress: 10% Recent Progress: 0%    Note:     Barriers: Limited driving; recovering from surgery; Provider availability - wait time to complete appointments.   Strengths: Motivated; Independent; Agreeable to Care Coordination.   Patient expressed understanding of goal: Yes.   Action steps to achieve this goal:  1. I will review the resources for Housekeeping/Chore services.    2. I will review the list of Transportation options and follow up with the ones that are of interest to me. I will make arrangements with the companies that I want to use so I have transportation when my family is unavailable.   3. I will discuss, review, schedule and complete recommended overdue health maintenance with my Primary Care Provider.    4. I will contact my care team with questions, concerns or support needs. I will use the clinic as a resource and I understand I can contact my clinic with 24/7 after hours services available. Care Coordinator will remain available as needed.                             Intervention/Education provided during outreach: As above. CC role, goal(s), clinic after hours, appointments, discussed/reviewed. Support provided.      Outreach Frequency: monthly, more frequently as needed    Plan:   Patient/caregiver will call RNCC with questions, concerns, support needs. RNCC will be available as needed.    Care Coordinator will follow up in 1 month.     Lilli Rivera RN Care Coordinator  [facility] [place]  Email: [email or web address]  Phone: [phone]

## 2024-03-05 ENCOUNTER — PATIENT OUTREACH (OUTPATIENT)
Dept: SURGERY | Facility: CLINIC | Age: 84
End: 2024-03-05
Payer: COMMERCIAL

## 2024-03-05 NOTE — TELEPHONE ENCOUNTER
"[facility]: Thoracic Surgeyr                                                                                      Called patient to follow up on status of previous G-tube site. Patient reports that \"things are doing much better\". Verbalizes that the G-tube site appears to be closing up and healing well. No drainage. Last day of antibiotics was Sunday.   Instructed patient to keep a clean dry dressing over the site and refrain from baths, soaking or swimming for 1 more week until fully healed and to reach out to Thoracic Surgery if anything changes or concerns arise.   Patient verbalized her understanding and appreciated writer's call.    Epic message sent to NIMESH Mills with update.    Renee Doss RN, BSN  Thoracic Surgery RN Care Coordinator    "

## 2024-03-28 ENCOUNTER — PATIENT OUTREACH (OUTPATIENT)
Dept: CARE COORDINATION | Facility: CLINIC | Age: 84
End: 2024-03-28
Payer: COMMERCIAL

## 2024-03-28 NOTE — PROGRESS NOTES
Clinic Care Coordination Contact  [facility]/Voicemail    Clinical Data: Care Coordinator Outreach    Outreach Documentation Number of Outreach Attempt   3/28/2024   1:50 PM 1       Left message on patient's voicemail with call back information and requested return call.    Plan: Care Coordinator will try to reach patient again in 10 business days.    BEE Correia, B.S. [facility] Care Coordination  [facility] Clinics:  [place], [place] and [place]  [phone]  [email or web address]

## 2024-04-15 ENCOUNTER — PATIENT OUTREACH (OUTPATIENT)
Dept: CARE COORDINATION | Facility: CLINIC | Age: 84
End: 2024-04-15
Payer: COMMERCIAL

## 2024-04-15 NOTE — PROGRESS NOTES
Clinic Care Coordination Contact  [facility]/Voicemail    Clinical Data: Care Coordinator Outreach    Outreach Documentation Number of Outreach Attempt   3/28/2024   1:50 PM 1   4/15/2024  11:44 AM 2       Left message on patient's voicemail with call back information and requested return call.    Plan: Care Coordinator will send a message via Medical Referral Source. Care Coordinator will chart review again in 10 business days. If no further engagement from patient will route to lead CC.    Cyndi Doe, BEE, B.S. [facility] Care Coordination  [facility]:  [place], [place] and [place]  [phone]  [email or web address]

## 2024-04-15 NOTE — PROGRESS NOTES
Clinic Care Coordination Contact    Situation: Patient chart reviewed by care coordinator.    Background: Care Coordination initial assessment and enrollment to Care Coordination was 1/24/2024. Patient centered goal(s) developed with participation from patient.  RN CC handed patient off to CHW for continued outreach every 30 days. Patient is due for an updated complex care plan. Annual Assessment will be due 01/2025  .  Assessment: CHW CC is in process of outreaching to patient, recently unable to contact patient x2. Next CHW CC outreach attempt scheduled for 04/29/2024.     Plan/Recommendations: CHW CC will disenroll per patient if next outreach attempt is unsuccessful. RNCC will extend and complete clinical review after CHW CC has made contact with patient. RNCC will remain available as needed.     Lilli Rivera RN Care Coordinator  [facility] [place]  Email: [email or web address]  Phone: [phone]

## 2024-04-17 ENCOUNTER — PATIENT OUTREACH (OUTPATIENT)
Dept: CARE COORDINATION | Facility: CLINIC | Age: 84
End: 2024-04-17
Payer: COMMERCIAL

## 2024-04-17 NOTE — PROGRESS NOTES
Clinic Care Coordination Contact  Community Health Worker Follow Up    Care Gaps:     Health Maintenance Due   Topic Date Due    ZOSTER IMMUNIZATION (1 of 2) Never done    RSV VACCINE (Pregnancy & 60+) (1 - 1-dose 60+ series) Never done    DTAP/TDAP/TD IMMUNIZATION (1 - Tdap) 04/15/2007    Pneumococcal Vaccine: 65+ Years (2 of 2 - PCV) 12/08/2011    COLORECTAL CANCER SCREENING  02/17/2020    HF ACTION PLAN  08/04/2020    ASTHMA ACTION PLAN  04/28/2022    INFLUENZA VACCINE (1) 09/01/2023    COVID-19 Vaccine (6 - 2023-24 season) 09/01/2023       Care Plan:   Care Plan: Advance Care Plan       Problem: Patient does not have a valid Health Care Directive       Long-Range Goal: Complete Health Care Directive       Start Date: 1/31/2024 Expected End Date: 7/31/2024    This Visit's Progress: 10% Recent Progress: 10%    Note:     Barriers: Limited driving; recovering from surgery; Provider availability - wait time to complete appointments.   Strengths: Motivated; Independent; Agreeable to Care Coordination.   Patient expressed understanding of goal: Yes.   Action steps to achieve this goal:  1. I will review the resources for [facility] [employer].   2. I will contact [facility] [employer] when I'm ready to complete my Advance Care Planning documents.   3. I will contact my care team with questions, concerns or support needs. I will use the clinic as a resource and I understand I can contact my clinic with 24/7 after hours services available. Care Coordinator will remain available as needed.                               Care Plan: Resources for Housekeeping/Chore services, Transportation       Problem: Resources for Housekeeping/Chore services & Transportation       Long-Range Goal: Resources for Housekeeping/Chore services & Transportation       Start Date: 1/31/2024 Expected End Date: 7/31/2024    This Visit's Progress: 10% Recent Progress: 10%    Note:     Barriers: Limited driving; recovering from surgery; Provider availability  - wait time to complete appointments.   Strengths: Motivated; Independent; Agreeable to Care Coordination.   Patient expressed understanding of goal: Yes.   Action steps to achieve this goal:  1. I will review the resources for Housekeeping/Chore services.    2. I will review the list of Transportation options and follow up with the ones that are of interest to me. I will make arrangements with the companies that I want to use so I have transportation when my family is unavailable.   3. I will discuss, review, schedule and complete recommended overdue health maintenance with my Primary Care Provider.    4. I will contact my care team with questions, concerns or support needs. I will use the clinic as a resource and I understand I can contact my clinic with 24/7 after hours services available. Care Coordinator will remain available as needed.                               Intervention and Education during outreach: Patient states that she has been struggling with pain after surgery and thinking/ feeling like there is something still stuck in her stomach. Does NOT want to go back to thoracic surgeon who she saw on 2/12, stating that she ripped her gastrostomy tube out so hard and it was the most painful thing she has ever had done. Visit note says: Follow up with me in 1 year with an esophagram prior.   Patient has appointment follow up with GI on 4/6, CHW encouraged her to call and leave a message with provider with her concerns.   She shares that sometimes she is worried about being alone, she does have a life alert pendant. Family calls to check in, granddaughter visited last week, etc.  She is still wanting resources for help at home, will to try gather what was sent from RNCC and let CHW know if it needs to be resent.   Pt states that she is living in a townhouse and not willing to move.     CHW Plan: Next outreach in one month.     BEE Correia, B.S. [facility] Care Coordination  [facility]  [place] Clinics:  [place], [place] and [place]  [phone]  [email or web address]

## 2024-04-22 ENCOUNTER — NURSE TRIAGE (OUTPATIENT)
Dept: NURSING | Facility: CLINIC | Age: 84
End: 2024-04-22
Payer: COMMERCIAL

## 2024-04-22 ENCOUNTER — HOSPITAL ENCOUNTER (EMERGENCY)
Facility: CLINIC | Age: 84
Discharge: HOME | End: 2024-04-22
Attending: EMERGENCY MEDICINE | Admitting: EMERGENCY MEDICINE
Payer: COMMERCIAL

## 2024-04-22 VITALS
SYSTOLIC BLOOD PRESSURE: 124 MMHG | HEIGHT: 61 IN | DIASTOLIC BLOOD PRESSURE: 55 MMHG | TEMPERATURE: 97.8 F | HEART RATE: 76 BPM | RESPIRATION RATE: 20 BRPM | WEIGHT: 165.57 LBS | BODY MASS INDEX: 31.26 KG/M2 | OXYGEN SATURATION: 95 %

## 2024-04-22 DIAGNOSIS — R07.9 ACUTE CHEST PAIN: ICD-10-CM

## 2024-04-22 LAB
ANION GAP SERPL CALCULATED.3IONS-SCNC: 12 MMOL/L (ref 7–15)
BASOPHILS # BLD AUTO: 0 10E3/UL (ref 0–0.2)
BASOPHILS NFR BLD AUTO: 0 %
BUN SERPL-MCNC: 18.3 MG/DL (ref 8–23)
CALCIUM SERPL-MCNC: 9.9 MG/DL (ref 8.8–10.2)
CHLORIDE SERPL-SCNC: 102 MMOL/L (ref 98–107)
CREAT SERPL-MCNC: 0.71 MG/DL (ref 0.51–0.95)
DEPRECATED HCO3 PLAS-SCNC: 26 MMOL/L (ref 22–29)
EGFRCR SERPLBLD CKD-EPI 2021: 83 ML/MIN/1.73M2
EOSINOPHIL # BLD AUTO: 0.1 10E3/UL (ref 0–0.7)
EOSINOPHIL NFR BLD AUTO: 2 %
ERYTHROCYTE [DISTWIDTH] IN BLOOD BY AUTOMATED COUNT: 14.5 % (ref 10–15)
GLUCOSE SERPL-MCNC: 101 MG/DL (ref 70–99)
HCT VFR BLD AUTO: 40.6 % (ref 35–47)
HGB BLD-MCNC: 13.3 G/DL (ref 11.7–15.7)
HOLD SPECIMEN: NORMAL
HOLD SPECIMEN: NORMAL
IMM GRANULOCYTES # BLD: 0 10E3/UL
IMM GRANULOCYTES NFR BLD: 0 %
LYMPHOCYTES # BLD AUTO: 2 10E3/UL (ref 0.8–5.3)
LYMPHOCYTES NFR BLD AUTO: 26 %
MCH RBC QN AUTO: 28.6 PG (ref 26.5–33)
MCHC RBC AUTO-ENTMCNC: 32.8 G/DL (ref 31.5–36.5)
MCV RBC AUTO: 87 FL (ref 78–100)
MONOCYTES # BLD AUTO: 0.5 10E3/UL (ref 0–1.3)
MONOCYTES NFR BLD AUTO: 6 %
NEUTROPHILS # BLD AUTO: 5.1 10E3/UL (ref 1.6–8.3)
NEUTROPHILS NFR BLD AUTO: 66 %
NRBC # BLD AUTO: 0 10E3/UL
NRBC BLD AUTO-RTO: 0 /100
PLATELET # BLD AUTO: 221 10E3/UL (ref 150–450)
POTASSIUM SERPL-SCNC: 3.8 MMOL/L (ref 3.4–5.3)
RBC # BLD AUTO: 4.65 10E6/UL (ref 3.8–5.2)
SODIUM SERPL-SCNC: 140 MMOL/L (ref 135–145)
TROPONIN T SERPL HS-MCNC: 7 NG/L
TROPONIN T SERPL HS-MCNC: 8 NG/L
WBC # BLD AUTO: 7.8 10E3/UL (ref 4–11)

## 2024-04-22 PROCEDURE — 85025 COMPLETE CBC W/AUTO DIFF WBC: CPT | Performed by: EMERGENCY MEDICINE

## 2024-04-22 PROCEDURE — 36415 COLL VENOUS BLD VENIPUNCTURE: CPT | Performed by: EMERGENCY MEDICINE

## 2024-04-22 PROCEDURE — 84484 ASSAY OF TROPONIN QUANT: CPT | Mod: 91 | Performed by: EMERGENCY MEDICINE

## 2024-04-22 PROCEDURE — 80048 BASIC METABOLIC PNL TOTAL CA: CPT | Performed by: EMERGENCY MEDICINE

## 2024-04-22 PROCEDURE — 99284 EMERGENCY DEPT VISIT MOD MDM: CPT

## 2024-04-22 PROCEDURE — 93005 ELECTROCARDIOGRAM TRACING: CPT

## 2024-04-22 ASSESSMENT — COLUMBIA-SUICIDE SEVERITY RATING SCALE - C-SSRS
1. IN THE PAST MONTH, HAVE YOU WISHED YOU WERE DEAD OR WISHED YOU COULD GO TO SLEEP AND NOT WAKE UP?: NO
6. HAVE YOU EVER DONE ANYTHING, STARTED TO DO ANYTHING, OR PREPARED TO DO ANYTHING TO END YOUR LIFE?: NO
2. HAVE YOU ACTUALLY HAD ANY THOUGHTS OF KILLING YOURSELF IN THE PAST MONTH?: NO

## 2024-04-22 ASSESSMENT — ACTIVITIES OF DAILY LIVING (ADL)
ADLS_ACUITY_SCORE: 35
ADLS_ACUITY_SCORE: 37

## 2024-04-23 LAB
ATRIAL RATE - MUSE: 77 BPM
DIASTOLIC BLOOD PRESSURE - MUSE: NORMAL MMHG
INTERPRETATION ECG - MUSE: NORMAL
P AXIS - MUSE: 48 DEGREES
PR INTERVAL - MUSE: 138 MS
QRS DURATION - MUSE: 74 MS
QT - MUSE: 384 MS
QTC - MUSE: 434 MS
R AXIS - MUSE: -16 DEGREES
SYSTOLIC BLOOD PRESSURE - MUSE: NORMAL MMHG
T AXIS - MUSE: 44 DEGREES
VENTRICULAR RATE- MUSE: 77 BPM

## 2024-04-23 NOTE — ED PROVIDER NOTES
History     Chief Complaint:  Chest Pain       HPI   June V Selvin is a 83 year old female who is referred to the emergency room for an episode of chest pain.  Patient is 83-year-old female with a history of coronary artery disease.  Last angiogram showed 80% stenosis of her right coronary artery that was a nondominant vessel and difficulty with stent.  Patient has had intermittent episodes of chest pain.  Patient does take a nitroglycerin and seems to go away.  Patient had another spell this morning.  Told her daughter about it and was referred to the emergency room for assessment.  On arrival patient is chest pain-free.  Denies chest pain with exertion cough fever or shortness of breath.      Independent Historian:   None - Patient Only    Review of External Notes:          Medications:    acetaminophen (TYLENOL) 325 MG tablet  albuterol (PROAIR HFA/PROVENTIL HFA/VENTOLIN HFA) 108 (90 Base) MCG/ACT inhaler  amLODIPine (NORVASC) 5 MG tablet  aspirin 81 MG tablet  benzonatate (TESSALON) 200 MG capsule  Boswellia-Glucosamine-Vit D (OSTEO BI-FLEX/5-LOXIN ADVANCED) TABS  Calcium 2845-2813 MG-UNIT CHEW  calcium carbonate (TUMS) 500 MG chewable tablet  cefdinir (OMNICEF) 300 MG capsule  Cholecalciferol (VITAMIN D3 PO)  CRANBERRY PO  diclofenac (VOLTAREN) 1 % topical gel  fluticasone (FLONASE) 50 MCG/ACT nasal spray  fluticasone-salmeterol (ADVAIR) 250-50 MCG/ACT inhaler  furosemide (LASIX) 20 MG tablet  guaiFENesin-codeine (ROBITUSSIN AC) 100-10 MG/5ML solution  ipratropium - albuterol 0.5 mg/2.5 mg/3 mL (DUONEB) 0.5-2.5 (3) MG/3ML neb solution  isosorbide mononitrate (IMDUR) 60 MG 24 hr tablet  LANsoprazole (PREVACID) 30 MG DR capsule  montelukast (SINGULAIR) 10 MG tablet  multivitamin w/minerals (MULTI-VITAMIN) tablet  nitroGLYcerin (NITROSTAT) 0.4 MG sublingual tablet  omega-3 fatty acids 1200 MG capsule  oxyCODONE (ROXICODONE) 5 MG tablet  polyethylene glycol (MIRALAX) 17 GM/Dose powder  predniSONE (DELTASONE) 20  MG tablet  rosuvastatin (CRESTOR) 40 MG tablet  spironolactone (ALDACTONE) 25 MG tablet        Past Medical History:    Past Medical History:   Diagnosis Date    Abnormal Papanicolaou smear of cervix and cervical HPV     Aortic valve sclerosis     Arthritis     Asthma     Chronic rhinitis     COPD (chronic obstructive pulmonary disease)     Coronary artery disease     Coronary artery spasm (H24)     Diastolic CHF, chronic 02/22/2012    Gastro-oesophageal reflux disease     Hiatal hernia     Hyperlipidemia 10/31/2011    Obesity     KEN (obstructive sleep apnea)     Personal history of colonic polyps     Pulmonary HTN     Seasonal allergies     Status post coronary angiogram 04/22/2020    Subclavian artery stenosis, left 08/07/2013    Subclinical hyperthyroidism 10/17/2016       Past Surgical History:    Past Surgical History:   Procedure Laterality Date    ARTHROPLASTY HIP Right 10/19/2015    Procedure: ARTHROPLASTY HIP;  Surgeon: Aron Torres MD;  Location: [facility] OR    COLONOSCOPY  01/01/2008    Polyp removed, bx.    COLONOSCOPY  01/12/2010    COLONOSCOPY N/A 02/17/2015    Procedure: COLONOSCOPY;  Surgeon: Gato Quiles MD;  Location:  GI    CT CORONARY ANGIOGRAM  04/04/2017    SHERLEY to PDA    CV CORONARY ANGIOGRAM N/A 04/22/2020    Procedure: Coronary Angiogram;  Surgeon: Handy Denise MD;  Location: [facility] CARDIAC CATH LAB    CV CORONARY ANGIOGRAM N/A 07/01/2021    Procedure: Coronary Angiogram;  Surgeon: Handy Denise MD;  Location: [facility] CARDIAC CATH LAB    CV INSTANTANEOUS WAVE-FREE RATIO N/A 04/22/2020    Procedure: Instantaneous Wave-Free Ratio;  Surgeon: Handy Denise MD;  Location: [facility] CARDIAC CATH LAB    CV LEFT VENTRICULOGRAM N/A 04/22/2020    Procedure: Left Ventriculogram;  Surgeon: Handy Denise MD;  Location: [facility] CARDIAC CATH LAB    ESOPHAGOSCOPY, GASTROSCOPY, DUODENOSCOPY (EGD), COMBINED N/A 8/17/2023    Procedure: Esophagoscopy, gastroscopy,  "duodenoscopy (EGD), combined;  Surgeon: Genesis Kuhn MD;  Location: [facility] GI    LAPAROSCOPIC HERNIORRHAPHY HIATAL N/A 1/12/2024    Procedure: Laparoscopic Hiatal Hernia Repair, Esophagogastroduodenscopy, Gastrostomy Tube Placement, Bilateral Chest Tube Placement;  Surgeon: Oscar Stern MD;  Location: [facility] OR    Liposuction of legs and stomach  1990    MAMMOPLASTY REDUCTION  02/1989    OPEN REDUCTION INTERNAL FIXATION ANKLE  04/1987    Removal of ankle hardware NOS  1990    SOFT TISSUE SURGERY  1989 & 1990    Liposuction    VASCULAR SURGERY  2013    angiogram & left subclavical artery stent        Physical Exam   Patient Vitals for the past 24 hrs:   BP Temp Temp src Pulse Resp SpO2 Height Weight   04/22/24 2209 -- -- -- 71 10 -- -- --   04/22/24 2132 (!) 147/94 97.8  F (36.6  C) Temporal 83 20 95 % 1.549 m (5' 1\") 75.1 kg (165 lb 9.1 oz)        Physical Exam  Vitals and nursing note reviewed.   Cardiovascular:      Rate and Rhythm: Normal rate and regular rhythm.      Heart sounds: Normal heart sounds.   Pulmonary:      Effort: Pulmonary effort is normal.      Breath sounds: Normal breath sounds.   Abdominal:      General: Bowel sounds are normal.      Palpations: Abdomen is soft.   Musculoskeletal:         General: Normal range of motion.   Skin:     General: Skin is warm.      Capillary Refill: Capillary refill takes less than 2 seconds.   Neurological:      General: No focal deficit present.      Mental Status: She is alert and oriented to person, place, and time.   Psychiatric:         Mood and Affect: Mood normal.           Emergency Department Course   ECG  ECG taken at 2147, ECG read at 2150  Rate 77 bpm. CO interval 138 ms. QRS duration 74 ms. QT/QTc 384/434 ms. normal sinus rhythm no ST or T wave abnormalities    Imaging:  No orders to display          Laboratory:  Labs Ordered and Resulted from Time of ED Arrival to Time of ED Departure   BASIC METABOLIC PANEL - Abnormal       Result " Value    Sodium 140      Potassium 3.8      Chloride 102      Carbon Dioxide (CO2) 26      Anion Gap 12      Urea Nitrogen 18.3      Creatinine 0.71      GFR Estimate 83      Calcium 9.9      Glucose 101 (*)    TROPONIN T, HIGH SENSITIVITY - Normal    Troponin T, High Sensitivity 7     TROPONIN T, HIGH SENSITIVITY - Normal    Troponin T, High Sensitivity 8     CBC WITH PLATELETS AND DIFFERENTIAL    WBC Count 7.8      RBC Count 4.65      Hemoglobin 13.3      Hematocrit 40.6      MCV 87      MCH 28.6      MCHC 32.8      RDW 14.5      Platelet Count 221      % Neutrophils 66      % Lymphocytes 26      % Monocytes 6      % Eosinophils 2      % Basophils 0      % Immature Granulocytes 0      NRBCs per 100 WBC 0      Absolute Neutrophils 5.1      Absolute Lymphocytes 2.0      Absolute Monocytes 0.5      Absolute Eosinophils 0.1      Absolute Basophils 0.0      Absolute Immature Granulocytes 0.0      Absolute NRBCs 0.0            Emergency Department Course & Assessments:    Interventions:  Medications - No data to display     Assessments:      Independent Interpretation (X-rays, CTs, rhythm strip):  None    Consultations/Discussion of Management or Tests:  None        Social Determinants of Health affecting care:   None    Disposition:  The patient was discharged.     Impression & Plan           Medical Decision Making:  Patient presents with intermittent chest pain nonexertional.  Patient seems to resolve after his chest nitroglycerin patient is on Prevacid is on albuterol.  Is well-appearing without active chest pain.  He did have an episode today.  Delta troponins are negative EKGs are normal.  Did review patient's prior cardiac workups including angiography.  Care was discussed with the patient.  Did consider PE but due to intermittent symptoms and symptom-free in the emergency room did not feel a PE workup was appropriate.  For now I did consider admission but due to patient's lack of troponin anemia on changed EKGs  response to nitroglycerin encouraged to follow-up with cardiology as an outpatient and return with constant unrelenting pain or chest pain with exertion or pain that cannot be resolved.  Patient agreed to the plan and was discharged home in stable condition.      Diagnosis:    ICD-10-CM    1. Acute chest pain  R07.9            Discharge Medications:  New Prescriptions    No medications on file          Miguel Berg MD  4/22/2024   Miguel Berg MD Goodman, Brian Samuel, MD  04/24/24 1610

## 2024-04-23 NOTE — TELEPHONE ENCOUNTER
"Nurse Triage SBAR    Is this a 2nd Level Triage? NO    Situation: chest pain    Background: History of heart failure, has had 4 stents about 10 years ago.  History of heart fluttering. History of asthma.    Assessment: Patient is calling stating that this morning she had chest pain on the left side and she took one nitroglycerin tablet.  She had been on the phone with her granddaughter and she also had double vision.  All symptoms went away once the nitroglycerin had dissolved.  She had another episode of chest pain around 7:15 pm and she took another nitroglycerin tablet and the pain was gone once the tablet had dissolved.  Vitals: left arm, sitting 155/78, 78 pulse.  Had these same pains about a week ago but she states she has never had more than one in a day and the \"uncomfortableness\" is worse than last week.    Protocol Recommended Disposition:   Go to ED Now    Recommendation:   Recommend patient contacting her daughter and have her take her to the Emergency Room.  Patient verbalized understanding and asked if it would be okay to wait until her tv show was over.  Explained to patient that she is her own decision maker and that the recommendation was that she go to the ED now.  Patient stated that she will call her daughter now and have her come to her.         Does the patient meet one of the following criteria for ADS visit consideration? 16+ years old, with an [facility] PCP     TIP  Providers, please consider if this condition is appropriate for management at one of our Acute and Diagnostic Services sites.     If patient is a good candidate, please use dotphrase <dot>triageresponse and select Refer to ADS to document.    Reason for Disposition   [1] Chest pain (or \"angina\") comes and goes AND [2] is happening more often (increasing in frequency) or getting worse (increasing in severity)  (Exception: Chest pains that last only a few seconds.)    Additional Information   Negative: SEVERE difficulty breathing " (e.g., struggling for each breath, speaks in single words)   Negative: Difficult to awaken or acting confused (e.g., disoriented, slurred speech)   Negative: Shock suspected (e.g., cold/pale/clammy skin, too weak to stand, low BP, rapid pulse)   Negative: Passed out (i.e., lost consciousness, collapsed and was not responding)   Negative: Chest pain lasting longer than 5 minutes and ANY of the following:    history of heart disease  (i.e., heart attack, bypass surgery, angina, angioplasty, CHF; not just a heart murmur)    described as crushing, pressure-like, or heavy    age > 50    age > 30 AND at least one cardiac risk factor (i.e., hypertension, diabetes, obesity, smoker or strong family history of heart disease)    not relieved with nitroglycerin   Negative: Heart beating < 50 beats per minute OR > 140 beats per minute   Negative: Visible sweat on face or sweat dripping down face   Negative: Sounds like a life-threatening emergency to the triager   Negative: SEVERE chest pain    Protocols used: Chest Pain-A-AH

## 2024-04-23 NOTE — DISCHARGE INSTRUCTIONS
We have done heart attack test and EKGs which were normal.  Episodes of chest pain that come and go are challenging to understand we have discussed admission for further workup due to your history of stents but due to chest pain resolved with 1 nitroglycerin please follow-up with your cardiologist return to the emergency room with constant unrelenting chest pain or chest pain that is lasting longer on manageable with nitroglycerin.  Thanks for your patience tonight.

## 2024-04-23 NOTE — ED TRIAGE NOTES
Pt arrives with family for L chest pain, double vision. Pt took nitroglycerin this AM and pain went away, took a second nitroglycerin around 1900 tonight. Pt reports the past few nights she has been taking her nitroglycerins for pain. Denies SOB/nausea/dizziness. Hx stent placement and heart failure, A fib. AVSS on RA.

## 2024-05-06 ENCOUNTER — PATIENT OUTREACH (OUTPATIENT)
Dept: CARE COORDINATION | Facility: CLINIC | Age: 84
End: 2024-05-06
Payer: COMMERCIAL

## 2024-05-06 NOTE — PROGRESS NOTES
Clinic Care Coordination Contact  Care Coordination Clinician Chart review    Situation: Patient chart reviewed by care coordinator.       Background: Care Coordination initial assessment and enrollment took place 1/24/2024.   Upon initial assessment patient-centered goals were discussed and developed with participation from patient.  RNCC handed patient follow up and monitoring of goal progression off to [facility] for continued outreach every 30 days.        Assessment: Per chart review, patient outreach completed by CC CHW on 04/17/24.  Patient is actively working to accomplish goal(s) and patient's goal(s) remain(s) appropriate and relevant at this time.       Care Plan: Advance Care Plan       Problem: Patient does not have a valid Health Care Directive       Long-Range Goal: Complete Health Care Directive       Start Date: 1/31/2024 Expected End Date: 12/31/2024    Recent Progress: 10%    Note:     Barriers: Limited driving; recovering from surgery; Provider availability - wait time to complete appointments.   Strengths: Motivated; Independent; Agreeable to Care Coordination.   Patient expressed understanding of goal: Yes.   Action steps to achieve this goal:  1. I will review the resources for [employer] [employer].   2. I will contact [facility] when I'm ready to complete my Advance Care Planning documents.   3. I will contact my care team with questions, concerns or support needs. I will use the clinic as a resource and I understand I can contact my clinic with 24/7 after hours services available. Care Coordinator will remain available as needed.                               Care Plan: Resources for Housekeeping/Chore services, Transportation       Problem: Resources for Housekeeping/Chore services & Transportation       Long-Range Goal: Resources for Housekeeping/Chore services & Transportation       Start Date: 1/31/2024 Expected End Date: 12/31/2024    Recent Progress: 10%    Note:     Barriers: Limited  driving; recovering from surgery; Provider availability - wait time to complete appointments.   Strengths: Motivated; Independent; Agreeable to Care Coordination.   Patient expressed understanding of goal: Yes.   Action steps to achieve this goal:  1. I will review the resources for Housekeeping/Chore services.    2. I will review the list of Transportation options and follow up with the ones that are of interest to me. I will make arrangements with the companies that I want to use so I have transportation when my family is unavailable.   3. I will discuss, review, schedule and complete recommended overdue health maintenance with my Primary Care Provider.    4. I will contact my care team with questions, concerns or support needs. I will use the clinic as a resource and I understand I can contact my clinic with 24/7 after hours services available. Care Coordinator will remain available as needed.                               Plan/Recommendations: RNCC Clinical Assessments to be completed annually or as needed. Annual Assessment will be due 01/2025. The patient will continue working with Care Coordination to achieve goal(s) as above.  CHWCC will involve RNCC as needed or if patient is ready to transition to goal status of Maintenance.  RNCC will continue to review progress to goal(s) and CHWCC outreaches every 6 weeks.     Complex Care Plan:  Patient is due for updated Plan of Care.  Care Plan updated and sent to patient: Yes, via Ez Rivera RN Care Coordinator  [facility] [place]  Email: [email or web address]  Phone: [phone]

## 2024-05-06 NOTE — LETTER
[facility] CARE COORDINATION  [address] [place] [address]     May 6, 2024        Victoria Phillips  [address] [place] [address]          Dear June, Attached is an updated Patient Centered Plan of Care for your continued enrollment in Care Coordination. Please let us know if you have additional questions, concerns, or goals that we can assist with.    Sincerely,    Lilli Rivera RN Care Coordinator  [facility], [place], [place]  Email: [email or web address]  Phone: [phone]              [facility]  Patient Centered Plan of Care  About Me:      Patient Name:  Victoria Phillips    YOB: 1940  Age:         83 year old   [place] MRN:    [id number] Telephone Information:  Home Phone [phone]   Mobile [phone]       Address:  [address] [place] [address] Email address:  [email or web address]      Emergency Contact(s)    Name Relationship Lgl Grd Work Phone Home Phone Mobile Phone   1. TOBI GAR Daughter   [phone]   2. EMERITA GAR* Grandchild    [phone]   3. SAQIB PHILLIPS Son    [phone]   4. HELDERJER AWAD Grandchild    [phone]   5. JANIYA ARORA Grandchild    [phone]           Primary language:  English     needed? No   [place] [employer]:  [phone] op. 1  Other communication barriers:None    Preferred Method of Communication:  Mail  Current living arrangement: I live alone    Mobility Status/ Medical Equipment: Independent w/Device    Health Maintenance  Health Maintenance Reviewed: Due/Overdue   Health Maintenance Due   Topic Date Due    ZOSTER IMMUNIZATION (1 of 2) Never done    RSV VACCINE (Pregnancy & 60+) (1 - 1-dose 60+ series) Never done    DTAP/TDAP/TD IMMUNIZATION (1 - Tdap) 04/15/2007    Pneumococcal Vaccine: 65+ Years (2 of 2 - PCV) 12/08/2011    COLORECTAL CANCER SCREENING  02/17/2020    HF ACTION PLAN  08/04/2020     ASTHMA ACTION PLAN  04/28/2022    COVID-19 Vaccine (6 - 2023-24 season) 09/01/2023    ASTHMA CONTROL TEST  05/30/2024     (Discussed with patient.)  My Access Plan  Medical Emergency 911   Primary Clinic Line [facility] [place] - [phone]   24 Hour Appointment Line [phone] or  [phone]) (toll-free)   24 Hour Nurse Line [phone] (toll-free)   Preferred Urgent Care [facility] [place], [phone]     Preferred Hospital [facility]  [phone]     Preferred Pharmacy  - MAIL ORDER     Behavioral Health Crisis Line The National Suicide Prevention Lifeline at 1-810.818.8895 or Text/Call 698     My Care Team Members  Patient Care Team         Relationship Specialty Notifications Start End    Elsy Bah MD PCP - General Internal Medicine  12/29/18     Phone: [phone] Fax: [phone]         [facility] [place] [address]    Elsy Bah MD Assigned PCP   10/7/18     Phone: [phone] Fax: [phone]         [facility] [place] [address]    Aron Pro MD Assigned Neuroscience Provider   6/24/23     Phone: [phone] Fax: [phone] [address] [place] [address]    Lilli Way APRN CNP Nurse Practitioner Anesthesiology  8/2/23     Phone: [phone] Fax: [phone] [address] [address]    Genesis Kuhn MD MD Gastroenterology  8/2/23     Phone: [phone] [address] [address]    Shaji Bello MD MD Cardiovascular & Thoracic Surgery  8/22/23     Phone: [phone] Fax: [phone] [address] [facility] [address]    Shaji Bello MD Assigned Heart and Vascular Provider   9/23/23     Phone: [phone] Fax: [phone] [address] [facility] [address]    Linda Mayo PA-C Assigned Surgical Provider   1/13/24     Phone: [phone] Fax:  [phone]         [address] [facility] [address]    Suzette Crawford PA-C Assigned Gastroenterology Provider   1/25/24     Phone: [phone] Fax: [phone]         [address] [facility] [address]    Lilli Rivera, RN Lead Care Coordinator  Admissions 1/25/24     Phone: [phone]         Cyndi Doe CHW Community Health Worker Primary Care - CC Admissions 2/28/24     Phone: [phone]         Marleni Raza APRN CNP Nurse Practitioner Cardiovascular Disease  4/23/24     Phone: [phone] Fax: [phone] [address] [facility] [address]              My Care Plans  Self Management and Treatment Plan    Care Plan  Care Plan: Advance Care Plan       Problem: Patient does not have a valid Health Care Directive       Long-Range Goal: Complete Health Care Directive       Start Date: 1/31/2024 Expected End Date: 12/31/2024    Recent Progress: 10%    Note:     Barriers: Limited driving; recovering from surgery; Provider availability - wait time to complete appointments.   Strengths: Motivated; Independent; Agreeable to Care Coordination.   Patient expressed understanding of goal: Yes.   Action steps to achieve this goal:  1. I will review the resources for [employer].   2. I will contact [employer] when I'm ready to complete my Advance Care Planning documents.   3. I will contact my care team with questions, concerns or support needs. I will use the clinic as a resource and I understand I can contact my clinic with 24/7 after hours services available. Care Coordinator will remain available as needed.                               Care Plan: Resources for Housekeeping/Chore services, Transportation       Problem: Resources for Housekeeping/Chore services & Transportation       Long-Range Goal: Resources for Housekeeping/Chore services & Transportation       Start Date: 1/31/2024 Expected End Date: 12/31/2024    Recent Progress: 10%    Note:     Barriers:  Limited driving; recovering from surgery; Provider availability - wait time to complete appointments.   Strengths: Motivated; Independent; Agreeable to Care Coordination.   Patient expressed understanding of goal: Yes.   Action steps to achieve this goal:  1. I will review the resources for Housekeeping/Chore services.    2. I will review the list of Transportation options and follow up with the ones that are of interest to me. I will make arrangements with the companies that I want to use so I have transportation when my family is unavailable.   3. I will discuss, review, schedule and complete recommended overdue health maintenance with my Primary Care Provider.    4. I will contact my care team with questions, concerns or support needs. I will use the clinic as a resource and I understand I can contact my clinic with 24/7 after hours services available. Care Coordinator will remain available as needed.                               Action Plans on File:   Asthma           Heart Failure       Advance Care Plans/Directives:   Advanced Care Plan/Directives on file:   No    Discussed with patient/caregiver(s):   Referral to [facility]           My Medical and Care Information  Problem List   Patient Active Problem List   Diagnosis    History of colonic polyps    Chronic rhinitis    Hiatal hernia with GERD    Female stress incontinence    LUMBOSACRAL NEURITIS , numbness left thigh    Osteopenia    Hyperlipidemia LDL goal <130    Diastolic CHF, chronic (H)    Subclavian artery stenosis, left (H24)    Severe persistent asthma (H28)    KEN (obstructive sleep apnea)    Hip osteoarthritis    Mild coronary artery disease    Subclinical hyperthyroidism    Chest pain    Unstable angina pectoris (H)    Status post coronary angiogram    Hypertension    Vasospastic angina (H24)    Exertional chest pain    Abnormal findings on diagnostic imaging of heart and coronary circulation    Venous reflux    Chest pain, unspecified  type    History of CAD (coronary artery disease)    Lumbosacral spinal stenosis    Scoliosis of thoracolumbar spine, unspecified scoliosis type    Bertolotti's syndrome    Facet arthropathy, lumbosacral    DDD (degenerative disc disease), lumbosacral    Hiatal hernia      Current Medications and Allergies:    Allergies   Allergen Reactions    Animal Dander     Bee Pollen Other (See Comments)     Pollen,dust, trees, grass, mold-hayfever symptoms    Dust Mites     Kiwi Itching     Itching and red all over    Pollen Extract      Pollen,dust, trees, grass, mold-hayfever symptoms      Current Outpatient Medications:     acetaminophen (TYLENOL) 325 MG tablet, Take 2 tablets (650 mg) by mouth every 4 hours as needed for other (For optimal non-opioid multimodal pain management to improve pain control.), Disp: , Rfl:     albuterol (PROAIR HFA/PROVENTIL HFA/VENTOLIN HFA) 108 (90 Base) MCG/ACT inhaler, USE 2 INHALATIONS EVERY 6 HOURS AS NEEDED FOR SHORTNESS OF BREATH, DYSPNEA, OR WHEEZING (Patient taking differently: 2 puffs every 6 hours as needed USE 2 INHALATIONS EVERY 6 HOURS AS NEEDED FOR SHORTNESS OF BREATH, DYSPNEA, OR WHEEZING), Disp: 25.5 g, Rfl: 1    amLODIPine (NORVASC) 5 MG tablet, Take 1 tablet (5 mg) by mouth every evening, Disp: 90 tablet, Rfl: 3    aspirin 81 MG tablet, Take 81 mg by mouth every morning, Disp: , Rfl:     benzonatate (TESSALON) 200 MG capsule, Take 200 mg by mouth (Patient not taking: Reported on 2/12/2024), Disp: , Rfl:     Boswellia-Glucosamine-Vit D (OSTEO BI-FLEX/5-LOXIN ADVANCED) TABS, Take 1,500 mg by mouth every morning, Disp: , Rfl:     Calcium 1501-5560 MG-UNIT CHEW, Take 1 tablet by mouth every morning, Disp: , Rfl:     calcium carbonate (TUMS) 500 MG chewable tablet, Take 1 tablet (500 mg) by mouth as needed for heartburn, Disp: , Rfl:     cefdinir (OMNICEF) 300 MG capsule, , Disp: , Rfl:     Cholecalciferol (VITAMIN D3 PO), Take 4,000 Units by mouth every morning, Disp: , Rfl:      "CRANBERRY PO, Take 1 capsule by mouth every morning, Disp: , Rfl:     diclofenac (VOLTAREN) 1 % topical gel, Apply 2 g topically 4 times daily as needed for moderate pain, Disp: 350 g, Rfl: 11    fluticasone (FLONASE) 50 MCG/ACT nasal spray, USE 1 TO 2 SPRAYS IN EACH NOSTRIL DAILY (Patient taking differently: Spray 1 spray into both nostrils every morning USE 1 TO 2 SPRAYS IN EACH NOSTRIL DAILY), Disp: 48 g, Rfl: 11    fluticasone-salmeterol (ADVAIR) 250-50 MCG/ACT inhaler, Inhale 1 puff into the lungs 2 times daily, Disp: 60 each, Rfl: 11    furosemide (LASIX) 20 MG tablet, Take 1 tablet (20 mg) by mouth daily (Patient taking differently: Take 20 mg by mouth every morning), Disp: 90 tablet, Rfl: 3    guaiFENesin-codeine (ROBITUSSIN AC) 100-10 MG/5ML solution, Take 5 mLs by mouth (Patient not taking: Reported on 2/12/2024), Disp: , Rfl:     ipratropium - albuterol 0.5 mg/2.5 mg/3 mL (DUONEB) 0.5-2.5 (3) MG/3ML neb solution, Take 1 vial (3 mLs) by nebulization every 6 hours as needed for shortness of breath, wheezing or cough, Disp: 90 mL, Rfl: 11    isosorbide mononitrate (IMDUR) 60 MG 24 hr tablet, TAKE 1 TABLET EVERY MORNING HOLD IF SYSTOLIC BLOOD PRESSURE IS LESS THAN 85 (Patient taking differently: 60 mg every morning TAKE 1 TABLET EVERY MORNING HOLD IF SYSTOLIC BLOOD PRESSURE IS LESS THAN 85), Disp: 90 tablet, Rfl: 3    LANsoprazole (PREVACID) 30 MG DR capsule, Take 1 capsule (30 mg) by mouth daily (Patient taking differently: Take 30 mg by mouth every morning (before breakfast)), Disp: 90 capsule, Rfl: 3    montelukast (SINGULAIR) 10 MG tablet, Take 1 tablet (10 mg) by mouth at bedtime, Disp: 90 tablet, Rfl: 3    multivitamin w/minerals (MULTI-VITAMIN) tablet, Take 1 tablet by mouth every morning, Disp: , Rfl:     nitroGLYcerin (NITROSTAT) 0.4 MG sublingual tablet, One tablet under the tongue every 5 minutes if needed for chest pain. May repeat every 5 minutes for a maximum of 3 doses in 15 minutes\" (Patient " not taking: Reported on 2/12/2024), Disp: 25 tablet, Rfl: 3    omega-3 fatty acids 1200 MG capsule, Take 1 capsule by mouth every morning, Disp: , Rfl:     oxyCODONE (ROXICODONE) 5 MG tablet, Take 0.5-1 tablets (2.5-5 mg) by mouth every 4 hours as needed for moderate to severe pain (Patient not taking: Reported on 2/12/2024), Disp: 20 tablet, Rfl: 0    polyethylene glycol (MIRALAX) 17 GM/Dose powder, Take 17 g by mouth daily, Disp: 510 g, Rfl: 0    predniSONE (DELTASONE) 20 MG tablet, , Disp: , Rfl:     rosuvastatin (CRESTOR) 40 MG tablet, Take 1 tablet (40 mg) by mouth daily (Patient taking differently: Take 40 mg by mouth every evening), Disp: 90 tablet, Rfl: 3    spironolactone (ALDACTONE) 25 MG tablet, Take 12.5 mg by mouth every morning, Disp: 90 tablet, Rfl: 3     Care Coordination Start Date: 1/24/2024   Frequency of Care Coordination: monthly, more frequently as needed     Form Last Updated: 05/06/2024

## 2024-05-08 ENCOUNTER — OFFICE VISIT (OUTPATIENT)
Dept: PEDIATRICS | Facility: CLINIC | Age: 84
End: 2024-05-08
Payer: COMMERCIAL

## 2024-05-08 VITALS
HEART RATE: 76 BPM | WEIGHT: 163.9 LBS | SYSTOLIC BLOOD PRESSURE: 114 MMHG | TEMPERATURE: 97.4 F | HEIGHT: 62 IN | DIASTOLIC BLOOD PRESSURE: 77 MMHG | RESPIRATION RATE: 20 BRPM | OXYGEN SATURATION: 96 % | BODY MASS INDEX: 30.16 KG/M2

## 2024-05-08 DIAGNOSIS — I50.32 DIASTOLIC CHF, CHRONIC (H): Primary | ICD-10-CM

## 2024-05-08 PROCEDURE — G2211 COMPLEX E/M VISIT ADD ON: HCPCS | Performed by: PEDIATRICS

## 2024-05-08 PROCEDURE — 99214 OFFICE O/P EST MOD 30 MIN: CPT | Performed by: PEDIATRICS

## 2024-05-08 RX ORDER — SPIRONOLACTONE 25 MG/1
25 TABLET ORAL EVERY MORNING
Qty: 90 TABLET | Refills: 3 | Status: SHIPPED | OUTPATIENT
Start: 2024-05-08

## 2024-05-08 RX ORDER — RESPIRATORY SYNCYTIAL VIRUS VACCINE 120MCG/0.5
0.5 KIT INTRAMUSCULAR ONCE
Qty: 1 EACH | Refills: 0 | Status: CANCELLED | OUTPATIENT
Start: 2024-05-08 | End: 2024-05-08

## 2024-05-08 NOTE — PATIENT INSTRUCTIONS
Increase spironolactone to 25mg daily.  Come in for repeat labs in 2 weeks. Please let cardiology know if your swelling is getting any better or not.     You will be called to schedule an echocardiogram.

## 2024-05-08 NOTE — PROGRESS NOTES
"  Assessment & Plan     (I50.32) Diastolic CHF, chronic (H)  (primary encounter diagnosis)  Comment: will slightly increase spironolactone and follow up labs in 2 weeks, recheck echo- compare to last year. Patient has cardiology follow up in 1 month and can see if the lower extremity edema is improved at that visit.   Plan: spironolactone (ALDACTONE) 25 MG tablet,         Echocardiogram Complete, Basic metabolic panel         (Ca, Cl, CO2, Creat, Gluc, K, Na, BUN)            The longitudinal plan of care for the diagnosis(es)/condition(s) as documented were addressed during this visit. Due to the added complexity in care, I will continue to support Victoria in the subsequent management and with ongoing continuity of care.        MED REC REQUIRED  Post Medication Reconciliation Status: discharge medications reconciled and changed, per note/orders  BMI  Estimated body mass index is 29.98 kg/m  as calculated from the following:    Height as of this encounter: 1.575 m (5' 2\").    Weight as of this encounter: 74.3 kg (163 lb 14.4 oz).         Patient Instructions   Increase spironolactone to 25mg daily.  Come in for repeat labs in 2 weeks. Please let cardiology know if your swelling is getting any better or not.     You will be called to schedule an echocardiogram.    Subjective   Victoria is a 83 year old, presenting for the following health issues:  Hospital F/U        5/8/2024     1:27 PM   Additional Questions   Roomed by    Accompanied by agapito         5/8/2024     1:27 PM   Patient Reported Additional Medications   Patient reports taking the following new medications no     HPI       ED/UC Followup:    Facility:  [facility]   Date of visit: 4/22/2024  Reason for visit: chest pain  Current Status: chest pain better but still bloating and leg swelling    Reviewed EMERGENCY DEPARTMENT notes    EKG ok, trop neg, BMP ok, CBC ok    No chest pain during EMERGENCY DEPARTMENT visit - advised to " "follow up with cardiology.    No further episodes.       Today     -Since the EMERGENCY DEPARTMENT visit  - leg swelling. Taking lasix 20mg daily and spironolactone 12.5mg. Patient     Wt Readings from Last 3 Encounters:   05/08/24 74.3 kg (163 lb 14.4 oz)   04/22/24 75.1 kg (165 lb 9.1 oz)   02/12/24 72.1 kg (158 lb 14.4 oz)         Next AWV already scheduled.             Objective    /77 (BP Location: Right arm, Patient Position: Sitting, Cuff Size: Adult Large)   Pulse 76   Temp 97.4  F (36.3  C) (Tympanic)   Resp 20   Ht 1.575 m (5' 2\")   Wt 74.3 kg (163 lb 14.4 oz)   LMP  (LMP Unknown)   SpO2 96%   BMI 29.98 kg/m    Body mass index is 29.98 kg/m .  Physical Exam   GENERAL: alert and no distress  RESP: lungs clear to auscultation - no rales, rhonchi or wheezes  CV: regular rate and rhythm, normal S1 S2, no S3 or S4, no murmur, click or rub, no peripheral edema   EXT: wearing compression stockings            Signed Electronically by: Elsy Bah MD    "

## 2024-05-19 DIAGNOSIS — J31.0 CHRONIC RHINITIS: ICD-10-CM

## 2024-05-20 RX ORDER — FLUTICASONE PROPIONATE 50 MCG
SPRAY, SUSPENSION (ML) NASAL
Qty: 48 G | Refills: 5 | Status: SHIPPED | OUTPATIENT
Start: 2024-05-20

## 2024-05-21 ENCOUNTER — PATIENT OUTREACH (OUTPATIENT)
Dept: CARE COORDINATION | Facility: CLINIC | Age: 84
End: 2024-05-21
Payer: COMMERCIAL

## 2024-05-21 ENCOUNTER — MYC MEDICAL ADVICE (OUTPATIENT)
Dept: GASTROENTEROLOGY | Facility: CLINIC | Age: 84
End: 2024-05-21
Payer: COMMERCIAL

## 2024-05-21 NOTE — PROGRESS NOTES
Clinic Care Coordination Contact  Community Health Worker Follow Up    Care Gaps:     Health Maintenance Due   Topic Date Due    ZOSTER IMMUNIZATION (1 of 2) Never done    RSV VACCINE (Pregnancy & 60+) (1 - 1-dose 60+ series) Never done    DTAP/TDAP/TD IMMUNIZATION (1 - Tdap) 04/15/2007    Pneumococcal Vaccine: 65+ Years (2 of 2 - PCV) 12/08/2011    COLORECTAL CANCER SCREENING  02/17/2020    HF ACTION PLAN  08/04/2020    ASTHMA ACTION PLAN  04/28/2022    COVID-19 Vaccine (6 - 2023-24 season) 09/01/2023    ASTHMA CONTROL TEST  05/30/2024     Not discussed    Care Plan:   Care Plan: Advance Care Plan       Problem: Patient does not have a valid Health Care Directive       Long-Range Goal: Complete Health Care Directive       Start Date: 1/31/2024 Expected End Date: 12/31/2024    This Visit's Progress: 20% Recent Progress: 10%    Note:     Barriers: Limited driving; recovering from surgery; Provider availability - wait time to complete appointments.   Strengths: Motivated; Independent; Agreeable to Care Coordination.   Patient expressed understanding of goal: Yes.   Action steps to achieve this goal:  1. I will review the resources for [employer].   2. I will contact [employer] when I'm ready to complete my Advance Care Planning documents.   3. I will contact my care team with questions, concerns or support needs. I will use the clinic as a resource and I understand I can contact my clinic with 24/7 after hours services available. Care Coordinator will remain available as needed.                               Care Plan: Resources for Housekeeping/Chore services, Transportation       Problem: Resources for Housekeeping/Chore services & Transportation       Long-Range Goal: Resources for Housekeeping/Chore services & Transportation       Start Date: 1/31/2024 Expected End Date: 12/31/2024    This Visit's Progress: 20% Recent Progress: 10%    Note:     Barriers: Limited driving; recovering from surgery;  Provider availability - wait time to complete appointments.   Strengths: Motivated; Independent; Agreeable to Care Coordination.   Patient expressed understanding of goal: Yes.   Action steps to achieve this goal:  1. I will review the resources for Housekeeping/Chore services.    2. I will review the list of Transportation options and follow up with the ones that are of interest to me. I will make arrangements with the companies that I want to use so I have transportation when my family is unavailable.   3. I will discuss, review, schedule and complete recommended overdue health maintenance with my Primary Care Provider.    4. I will contact my care team with questions, concerns or support needs. I will use the clinic as a resource and I understand I can contact my clinic with 24/7 after hours services available. Care Coordinator will remain available as needed.                               Intervention and Education during outreach: Patient wondering about SNAP, likely would not qualify. Referred to [employer], send information via [facility]. Patient is familiar with Fare For All. Struggling with prices of groceries.   She has been having a lot of company recently, son is coming soon to visit and will help with the HCD. Son is in [place], she will be going back with him and then coming back up together. Will be celebrating birthday there. He works from [employer] and can work remotely.   She will be leaving after her appt on 6/4. Pt is appreciative of CHW outreach and will reach out with any needs in the interim.    CHW Plan: Next outreach in one month.     Cyndi Doe, BEE, B.S. RUST Care Coordination  [facility]:  [place], [place] and [place]  [phone]  [email or web address]

## 2024-05-22 ENCOUNTER — LAB (OUTPATIENT)
Dept: LAB | Facility: CLINIC | Age: 84
End: 2024-05-22
Payer: COMMERCIAL

## 2024-05-22 DIAGNOSIS — I50.32 DIASTOLIC CHF, CHRONIC (H): ICD-10-CM

## 2024-05-22 DIAGNOSIS — E78.5 HYPERLIPIDEMIA LDL GOAL <130: Primary | ICD-10-CM

## 2024-05-22 LAB
ALT SERPL W P-5'-P-CCNC: 20 U/L (ref 0–50)
ANION GAP SERPL CALCULATED.3IONS-SCNC: 11 MMOL/L (ref 7–15)
BUN SERPL-MCNC: 13.6 MG/DL (ref 8–23)
CALCIUM SERPL-MCNC: 9.1 MG/DL (ref 8.8–10.2)
CHLORIDE SERPL-SCNC: 109 MMOL/L (ref 98–107)
CREAT SERPL-MCNC: 0.78 MG/DL (ref 0.51–0.95)
DEPRECATED HCO3 PLAS-SCNC: 22 MMOL/L (ref 22–29)
EGFRCR SERPLBLD CKD-EPI 2021: 74 ML/MIN/1.73M2
GLUCOSE SERPL-MCNC: 98 MG/DL (ref 70–99)
POTASSIUM SERPL-SCNC: 3.6 MMOL/L (ref 3.4–5.3)
SODIUM SERPL-SCNC: 142 MMOL/L (ref 135–145)

## 2024-05-22 PROCEDURE — 84460 ALANINE AMINO (ALT) (SGPT): CPT

## 2024-05-22 PROCEDURE — 36415 COLL VENOUS BLD VENIPUNCTURE: CPT

## 2024-05-22 PROCEDURE — 80048 BASIC METABOLIC PNL TOTAL CA: CPT

## 2024-05-28 NOTE — TELEPHONE ENCOUNTER
Spoke w/ PT confirmed upcoming appt 6/4/24 @ 11am with Suzette Crawford - via video. PT confirmed .

## 2024-06-04 ENCOUNTER — VIRTUAL VISIT (OUTPATIENT)
Dept: GASTROENTEROLOGY | Facility: CLINIC | Age: 84
End: 2024-06-04
Attending: PHYSICIAN ASSISTANT
Payer: COMMERCIAL

## 2024-06-04 VITALS — BODY MASS INDEX: 28.52 KG/M2 | HEIGHT: 62 IN | WEIGHT: 155 LBS

## 2024-06-04 DIAGNOSIS — Z98.890 HISTORY OF REPAIR OF HIATAL HERNIA: ICD-10-CM

## 2024-06-04 DIAGNOSIS — K44.9 HIATAL HERNIA: Primary | ICD-10-CM

## 2024-06-04 DIAGNOSIS — Z87.19 HISTORY OF REPAIR OF HIATAL HERNIA: ICD-10-CM

## 2024-06-04 DIAGNOSIS — R19.8 IRREGULAR BOWEL HABITS: ICD-10-CM

## 2024-06-04 PROCEDURE — 99215 OFFICE O/P EST HI 40 MIN: CPT | Mod: 95 | Performed by: PHYSICIAN ASSISTANT

## 2024-06-04 ASSESSMENT — PAIN SCALES - GENERAL: PAINLEVEL: MODERATE PAIN (5)

## 2024-06-04 NOTE — NURSING NOTE
Is the patient currently in the state of [place]? YES    Visit mode:VIDEO    If the visit is dropped, the patient can be reconnected by: VIDEO VISIT: Send to e-mail at: [email or web address]    Will anyone else be joining the visit? NO  (If patient encounters technical issues they should call [phone]    How would you like to obtain your AVS? MyChart    Are changes needed to the allergy or medication list? No    Are refills needed on medications prescribed by this physician? NO    Reason for visit: GURU VARGAS

## 2024-06-04 NOTE — PROGRESS NOTES
Virtual Visit Details    Type of service:  Video Visit   Video Start Time: 11:04 AM  Video End Time: 1126    Originating Location (pt. Location): Home    Distant Location (provider location):  Off-site  Platform used for Video Visit: [facility]     Gastroenterology Visit for: Victoria Montalvo 1940   MRN: [id number]     Reason for Visit:  chief complaint    Referred by: Ni[place]  / [facility]  / [place] [address]  Patient Care Team:  Elsy Bah MD as PCP - General (Internal Medicine)  Elsy Bah MD as Assigned PCP  Aron Pro MD as Assigned Neuroscience Provider  Lilli Way APRN CNP as Nurse Practitioner (Anesthesiology)  Genesis Kuhn MD as MD (Gastroenterology)  Shaji Bello MD as MD (Cardiovascular & Thoracic Surgery)  Shaji Bello MD as Assigned Heart and Vascular Provider  Linda Mayo PA-C as Assigned Surgical Provider  Suzette Crawford PA-C as Assigned Gastroenterology Provider  Lilli Rivera RN as Lead Care Coordinator  Cyndi Doe CHW as Community Health Worker (Primary Care - CC)  Marleni Raza APRN CNP as Nurse Practitioner (Cardiovascular Disease)    History of Present Illness:   Victoria Montalvo is a 83 year old female with significant past medical history pertinent for asthma, HTN, HLD, COPD, KEN, CHF, CAD, subclavian artery stenosis who is now status post laparoscopic hiatal hernia repair with bilateral tube thoracostomy/gastrostomy tube placement 1/12/2024 by Dr. Stern who is presenting as a follow-up patient with a chief complaint of irregular bowel patterns.    -----------------------------------------------------------------------------------------------------------------------------------------------------------------------------------------------------------------------------------  Interval History June 4, 2024:    Continues to have pain daily at the location of the gastrostomy tube site. The tube  "was never used for feeds. No erythema edema or granulation tissue. Noting a small scar at the area. Aggravated with postural changes and physical activity.     Bowel Patterns: For the past week has been stooling daily consistent with Washington Stool Scale Type 4. This is not a new change in bowel patterns symptom onset was > 1 year prior.     Weight is now stable.     Denies nausea, emesis, dysphagia, odynophagia, heartburn, regurgitation, nocturnal stooling, melena, hematochezia and BRBR.     Traveling with son to [place]. Driving with son.     ---------------------------------------------------------------------------------------------------------------------------------------------------------------------------  7/2023 Interval History:     Prior to going to the ER took a small bite of \"rice hot-dish\" which then resulted in intractable emesis. Reports history of dysphagia for 10+ years. Avoids meats as this is a trigger for her. Denies dysphagia to semi solids, liquids and pills.     Since being seen in the ER has not had symptoms of dysphagia however made significant lifestyle modifications and dietary alterations.      Currently takes Prevacid 30mg with break through symptoms occurring multiple times per month which is relieved with the use of TUMs.     Associated with 20 lb weight loss in the past 12 months with stability over the past 3-6 months. Notes she does not eat when alone secondary to lack of desire to eat. Notes \"If I were to make myself a sandwich I would only eat half.\" Not currently eating 3 meals per day. Will supplement with Boost/Ensure x2 daily.    Will have a BM daily that fluctuates between Washington Stool Scale Type 1 - 4.      Denies weight loss,odynophagia, dysphonia/hoarseness, chronic cough/throat clearing, abdominal pain, diarrhea, constipation (< 3 stools per week), nocturnal stooling, incontinence of feces, melena, hematochezia and BRBR.     No prior intraabdominal surgeries. No history " of prior vaginal births/ section.     Very rare/seldom use of ETOH products one serving per month.     History of 15 - 20 pack year history.      No family history or GI related malignancy (esophageal, gastric, pancreatic, liver or colon).      past away 2023.     Please also see questionnaires below when reviewing subjective history.       Esophageal Questionnaire(s)    BEDQ Questionnaire      2023    10:33 AM 2023     2:31 PM 2023     1:51 PM 2024    12:34 PM   BEDQ Questionnaire: How Often Have You Had the Following?   Trouble eating solid food (meat, bread, vegetables) 5 5 5 0   Trouble eating soft foods (yogurt, jello, pudding) 3 4 0 0   Trouble swallowing liquids 0 5 0 0   Pain while swallowing 1 0 0 0   Coughing or choking while swallowing foods or liquids 1 1 0 0   Total Score: 10 15 5 0         2023    10:33 AM 2023     2:31 PM 2023     1:51 PM 2024    12:34 PM   BEDQ Questionnaire: Discomfort/Pain Ratings   Eating solid food (meat, bread, vegetables) 5 5 2 0   Eating soft foods (yogurt, jello, pudding) 0 0 0 0   Drinking liquid 0 0 0 0   Total Score: 5 5 2 0       Eckardt Questionnaire      2023    10:34 AM 2023     2:32 PM 2023     1:52 PM 2024    12:35 PM   Eckardt Questionnaire   Dysphagia 1 1 1 0   Regurgitation 1 1 1 0   Retrosternal Pain 0 1 0 2   Weight Loss (kg) 3 3 0 3   Total Score:  5 6 2 5       Promis 10 Questionnaire      2023    10:36 AM 2023     1:54 PM 2024    12:37 PM   PROMIS 10 FLOWSHEET DATA   In general, would you say your health is: 3 4 4   In general, would you say your quality of life is: 3 4 4   In general, how would you rate your physical health? 3 4 4   In general, how would you rate your mental health, including your mood and your ability to think? 4 4 4   In general, how would you rate your satisfaction with your social activities and relationships? 4 4 4   In general, please  rate how well you carry out your usual social activities and roles. (This includes activities at home, at work and in your community, and responsibilities as a parent, child, spouse, employee, friend, etc.) 4 4 4   To what extent are you able to carry out your everyday physical activities such as walking, climbing stairs, carrying groceries, or moving a chair? 4 5 5   In the past 7 days, how often have you been bothered by emotional problems such as feeling anxious, depressed, or irritable? 4 3 2   In the past 7 days, how would you rate your fatigue on average? 3 3 2   In the past 7 days, how would you rate your pain on average, where 0 means no pain, and 10 means worst imaginable pain? 0 0 6   Mental health question re-calculation - no clinical value 2    2 3 4   Physical health question re-calculation - no clinical value 3    3 3 4   Pain question re-calculation - no clinical value 5    5 5 3   Global Mental Health Score 13    13 15 16   Global Physical Health Score 15    15 17 16   PROMIS TOTAL - SUBSCORES 28    28 32 32           STUDIES & PROCEDURES:    EGD:       Colonoscopy:    2015   Findings:        The perianal and digital rectal examinations were normal. There is        decreased sphincter tone.        A few small-mouthed diverticula were found in the cecum.        Multiple medium-mouthed diverticula were found in the sigmoid colon.                                                                                     Impression:          - Diverticulosis in the cecum.                        - Diverticulosis in the sigmoid colon.     EndoFLIP directed at the UES or LES (8cm (EF-325) balloon length or 16cm (EF-322) balloon length):   Date:  8cm balloon  Balloon inflation Balloon pressure CSA (mm^2) DI (mm^2/mmHg) Dmin (mm) Compliance   20 (ladmark ID)        30        40        50           16cm balloon  Balloon inflation Balloon pressure CSA (mm^2) DI (mm^2/mmHg) Dmin (mm) Compliance   30 (ladmark ID)         40        50        60        70           High Resolution Manometry:    PH/Impedance:     Rizo:    CT:    6/22/2023 CT CAP W Contrast   FINDINGS:   LUNGS AND PLEURA: No pulmonary mass, consolidation, or suspicious pulmonary nodule. No pleural effusion or pneumothorax.     MEDIASTINUM/AXILLAE: No abnormally enlarged lymph nodes. Great vessels normal in caliber. There is a patent stent in the proximal left subclavian artery. No abnormally enlarged lymph nodes. Moderate sized hiatal hernia. There is marked fluid distention   of the esophagus.  No esophageal wall thickening. Possible impacted ingested material measuring approximately 3 cm, best seen on coronal 56.     CORONARY ARTERY CALCIFICATION: Moderate.     HEPATOBILIARY: Normal.     PANCREAS: Normal.     SPLEEN: Normal.     ADRENAL GLANDS: Normal.     KIDNEYS/BLADDER: Normal.     BOWEL: Severe distal colonic diverticulosis. No evidence for acute diverticulitis. No free air, free fluid, or inflammatory change. Normal caliber appendix.     LYMPH NODES: Normal.     VASCULATURE: Diffuse atherosclerotic calcification of the abdominal aorta, but no aneurysm.     PELVIC ORGANS: Normal.     MUSCULOSKELETAL: Prior right hip replacement. Osteopenia. Severe degenerative change in the lower lumbar spine. Severe degenerative disc disease in the lower thoracic spine.                                                                      IMPRESSION:  1.  Moderate hiatal hernia with marked fluid distention of the esophagus. Possible obstruction secondary to impacted ingested material, distal esophageal stricture also considered. Consider follow-up GI consultation.  2.  Severe distal colonic diverticulosis.    Esophagram:    2/5/2024  FINDINGS: Esophagus is normal in its caliber, course and mucosal  pattern. No esophageal strictures or masses are evident. Mild  esophageal dysmotility.     Postoperative changes of hiatal hernia repair. No evidence for  contrast leakage in the  region of the postoperative changes. No  evidence for a recurrent hiatal hernia. Water soluble contrast flowed  readily from the esophagus through the region of the hiatal hernia  repair into the stomach. A gastrostomy tube is in place. The stomach  is otherwise unremarkable, without evidence for mass or erosions.  Gastric mucosal pattern is unremarkable. Normal duodenum and proximal  jejunum.     Moderate gastroesophageal reflux was observed during the exam.                                                     IMPRESSION:   1. Postoperative changes of hiatal hernia repair. No evidence for a  contrast leak.  2. Mild esophageal dysmotility may be related to presbyesophagus.  3. Moderate gastroesophageal reflux was observed.    FL VSS:     GES:    U/S:     XRAY:    Other:       Prior medical records were reviewed including, but not limited to, notes from referring providers, lab work, radiographic tests, and other diagnostic tests. Pertinent results were summarized above.     History     Past Medical History:   Diagnosis Date    Abnormal Papanicolaou smear of cervix and cervical HPV     colposcopy 1/97    Aortic valve sclerosis     Arthritis     Asthma     on preventative meds and has nebulizer    Chronic rhinitis     COPD (chronic obstructive pulmonary disease)     Coronary artery disease     4/4/17 SHERLEY--PDA    Coronary artery spasm (H24)     Diastolic CHF, chronic 02/22/2012    Gastro-oesophageal reflux disease     Hiatal hernia     Hyperlipidemia 10/31/2011    Obesity     KEN (obstructive sleep apnea)     CPAP    Personal history of colonic polyps     colonoscopy 9/97, 2002 (due in 2007)    Pulmonary HTN     Seasonal allergies     Status post coronary angiogram 04/22/2020    Subclavian artery stenosis, left 08/07/2013    S/p stent in 2013     Subclinical hyperthyroidism 10/17/2016       Past Surgical History:   Procedure Laterality Date    ARTHROPLASTY HIP Right 10/19/2015    Procedure: ARTHROPLASTY HIP;  Surgeon:  Aron Torres MD;  Location: [facility] OR    COLONOSCOPY  01/01/2008    Polyp removed, bx.    COLONOSCOPY  01/12/2010    COLONOSCOPY N/A 02/17/2015    Procedure: COLONOSCOPY;  Surgeon: Gato Quiles MD;  Location: [facility]    CT CORONARY ANGIOGRAM  04/04/2017    SHERLEY to PDA    CV CORONARY ANGIOGRAM N/A 04/22/2020    Procedure: Coronary Angiogram;  Surgeon: Handy Denise MD;  Location:  [facility]    CV CORONARY ANGIOGRAM N/A 07/01/2021    Procedure: Coronary Angiogram;  Surgeon: Handy Denise MD;  Location:  [facility]    CV INSTANTANEOUS WAVE-FREE RATIO N/A 04/22/2020    Procedure: Instantaneous Wave-Free Ratio;  Surgeon: Handy Denise MD;  Location:  [facility]    CV LEFT VENTRICULOGRAM N/A 04/22/2020    Procedure: Left Ventriculogram;  Surgeon: Handy Denise MD;  Location:  [facility]    ESOPHAGOSCOPY, GASTROSCOPY, DUODENOSCOPY (EGD), COMBINED N/A 8/17/2023    Procedure: Esophagoscopy, gastroscopy, duodenoscopy (EGD), combined;  Surgeon: Genesis Kuhn MD;  Location: [facility]    LAPAROSCOPIC HERNIORRHAPHY HIATAL N/A 1/12/2024    Procedure: Laparoscopic Hiatal Hernia Repair, Esophagogastroduodenscopy, Gastrostomy Tube Placement, Bilateral Chest Tube Placement;  Surgeon: Oscar Stern MD;  Location: [facility] OR    Liposuction of legs and stomach  1990    MAMMOPLASTY REDUCTION  02/1989    OPEN REDUCTION INTERNAL FIXATION ANKLE  04/1987    Removal of ankle hardware NOS  1990    SOFT TISSUE SURGERY  1989 & 1990    Liposuction    VASCULAR SURGERY  2013    angiogram & left subclavical artery stent       Social History     Socioeconomic History    Marital status:      Spouse name: Trenton    Number of children: 2    Years of education: 12    Highest education level: Not on file   Occupational History    Occupation: retired   Tobacco Use    Smoking status: Former     Current packs/day: 0.00     Average packs/day: 1  pack/day for 30.0 years (30.0 ttl pk-yrs)     Types: Cigarettes     Start date: 7/15/1973     Quit date: 1993     Years since quittin.0    Smokeless tobacco: Never   Vaping Use    Vaping status: Never Used   Substance and Sexual Activity    Alcohol use: Yes     Comment: Not very often    Drug use: No    Sexual activity: Not Currently     Partners: Male   Other Topics Concern     Service Not Asked    Blood Transfusions Not Asked    Caffeine Concern No    Occupational Exposure Not Asked    Hobby Hazards Not Asked    Sleep Concern Not Asked    Stress Concern Not Asked    Weight Concern Not Asked    Special Diet No     Comment: regular diet     Back Care Not Asked    Exercise No     Comment: shopping a lot so walikg     Bike Helmet Not Asked    Seat Belt Not Asked    Self-Exams Not Asked    Parent/sibling w/ CABG, MI or angioplasty before 65F 55M? Yes     Comment: Brother and Sister, Daughter and Father   Social History Narrative    Not on file     Social Determinants of Health     Financial Resource Strain: Low Risk  (2024)    Financial Resource Strain     Within the past 12 months, have you or your family members you live with been unable to get utilities (heat, electricity) when it was really needed?: No   Food Insecurity: Low Risk  (2024)    Food Insecurity     Within the past 12 months, did you worry that your food would run out before you got money to buy more?: No     Within the past 12 months, did the food you bought just not last and you didn t have money to get more?: No   Transportation Needs: Low Risk  (2024)    Transportation Needs     Within the past 12 months, has lack of transportation kept you from medical appointments, getting your medicines, non-medical meetings or appointments, work, or from getting things that you need?: No   Physical Activity: Not on file   Stress: Not on file   Social Connections: Socially Integrated (2023)    Received from [employer]  [facility] [facility], [facility]    Social Connections     Frequency of Communication with Friends and Family: 0   Interpersonal Safety: Low Risk  (11/30/2023)    Interpersonal Safety     Do you feel physically and emotionally safe where you currently live?: Yes     Within the past 12 months, have you been hit, slapped, kicked or otherwise physically hurt by someone?: No     Within the past 12 months, have you been humiliated or emotionally abused in other ways by your partner or ex-partner?: No   Housing Stability: Low Risk  (1/31/2024)    Housing Stability     Do you have housing? : Yes     Are you worried about losing your housing?: No       Family History   Problem Relation Age of Onset    Alzheimer Disease Mother     Gastrointestinal Disease Mother     Cancer Father         throat and lungs, liver,    Heart Disease Father 57        CABG    Hyperlipidemia Father     Other Cancer Father         Throat, Lung and Liver    Coronary Artery Disease Father     Coronary Artery Disease Sister     Coronary Artery Disease Sister     Heart Disease Sister     Heart Disease Brother         suicidal    Pacemaker Brother     Other Cancer Brother         Prostrate    Depression Brother     Coronary Artery Disease Brother     Hypertension Maternal Grandmother     Lipids Maternal Grandmother     Cancer Maternal Grandmother         stomach    Other Cancer Maternal Grandmother         Stomach Cancer    Cancer Paternal Grandmother         stomach    Other Cancer Paternal Grandmother         Stomach Cancer    Allergies Daughter     Asthma Daughter     Coronary Artery Disease Daughter     Allergies Son     Asthma Son     Anesthesia Reaction No family hx of     Thrombosis No family hx of      Family history reviewed and edited as appropriate    Medications and Allergies:     Outpatient Encounter Medications as of 6/4/2024   Medication Sig Dispense Refill    albuterol (PROAIR HFA/PROVENTIL  HFA/VENTOLIN HFA) 108 (90 Base) MCG/ACT inhaler USE 2 INHALATIONS EVERY 6 HOURS AS NEEDED FOR SHORTNESS OF BREATH, DYSPNEA, OR WHEEZING (Patient taking differently: 2 puffs every 6 hours as needed USE 2 INHALATIONS EVERY 6 HOURS AS NEEDED FOR SHORTNESS OF BREATH, DYSPNEA, OR WHEEZING) 25.5 g 1    amLODIPine (NORVASC) 5 MG tablet Take 1 tablet (5 mg) by mouth every evening 90 tablet 3    aspirin 81 MG tablet Take 81 mg by mouth every morning      Boswellia-Glucosamine-Vit D (OSTEO BI-FLEX/5-LOXIN ADVANCED) TABS Take 1,500 mg by mouth every morning      Calcium 1724-6889 MG-UNIT CHEW Take 1 tablet by mouth every morning      Cholecalciferol (VITAMIN D3 PO) Take 4,000 Units by mouth every morning      CRANBERRY PO Take 1 capsule by mouth every morning      fluticasone (FLONASE) 50 MCG/ACT nasal spray USE 1 TO 2 SPRAYS IN EACH NOSTRIL DAILY 48 g 5    fluticasone-salmeterol (ADVAIR) 250-50 MCG/ACT inhaler Inhale 1 puff into the lungs 2 times daily 60 each 11    furosemide (LASIX) 20 MG tablet Take 1 tablet (20 mg) by mouth daily (Patient taking differently: Take 20 mg by mouth every morning) 90 tablet 3    ipratropium - albuterol 0.5 mg/2.5 mg/3 mL (DUONEB) 0.5-2.5 (3) MG/3ML neb solution Take 1 vial (3 mLs) by nebulization every 6 hours as needed for shortness of breath, wheezing or cough 90 mL 11    isosorbide mononitrate (IMDUR) 60 MG 24 hr tablet TAKE 1 TABLET EVERY MORNING HOLD IF SYSTOLIC BLOOD PRESSURE IS LESS THAN 85 (Patient taking differently: 60 mg every morning TAKE 1 TABLET EVERY MORNING HOLD IF SYSTOLIC BLOOD PRESSURE IS LESS THAN 85) 90 tablet 3    montelukast (SINGULAIR) 10 MG tablet Take 1 tablet (10 mg) by mouth at bedtime 90 tablet 3    multivitamin w/minerals (MULTI-VITAMIN) tablet Take 1 tablet by mouth every morning      nitroGLYcerin (NITROSTAT) 0.4 MG sublingual tablet One tablet under the tongue every 5 minutes if needed for chest pain. May repeat every 5 minutes for a maximum of 3 doses in 15  "minutes\" 25 tablet 3    omega-3 fatty acids 1200 MG capsule Take 1 capsule by mouth every morning      rosuvastatin (CRESTOR) 40 MG tablet Take 1 tablet (40 mg) by mouth daily (Patient taking differently: Take 40 mg by mouth every evening) 90 tablet 3    spironolactone (ALDACTONE) 25 MG tablet Take 1 tablet (25 mg) by mouth every morning 90 tablet 3    diclofenac (VOLTAREN) 1 % topical gel Apply 2 g topically 4 times daily as needed for moderate pain (Patient not taking: Reported on 6/4/2024) 350 g 11    LANsoprazole (PREVACID) 30 MG DR capsule Take 1 capsule (30 mg) by mouth daily (Patient taking differently: Take 30 mg by mouth every morning (before breakfast)) 90 capsule 3    polyethylene glycol (MIRALAX) 17 GM/Dose powder Take 17 g by mouth daily (Patient not taking: Reported on 5/8/2024) 510 g 0    predniSONE (DELTASONE) 20 MG tablet  (Patient not taking: Reported on 2/12/2024)       No facility-administered encounter medications on file as of 6/4/2024.        Allergies   Allergen Reactions    Animal Dander     Bee Pollen Other (See Comments)     Pollen,dust, trees, grass, mold-hayfever symptoms    Dust Mites     Kiwi Itching     Itching and red all over    Pollen Extract      Pollen,dust, trees, grass, mold-hayfever symptoms        Review of systems:  A full 10 point review of systems was obtained and was negative except for the pertinent positives and negatives stated within the HPI.    Objective Findings:   Physical Exam:    Constitutional: Ht 1.562 m (5' 1.5\")   Wt 70.3 kg (155 lb)   LMP  (LMP Unknown)   BMI 28.81 kg/m    General: Alert, cooperative, no distress, well-appearing  Head: Atraumatic, normocephalic, no obvious abnormalities   Eyes: Sclera anicteric, no obvious conjunctival hemorrhage   Nose: Nares normal, no obvious malformation, no obvious rhinorrhea   Skin: No jaundice, no obvious rash  Neurologic: AAOx3, no obvious neurologic abnormality  Psychiatric: Normal Affect, appropriate " mood  Extremities: No obvious edema, no obvious malformation     Labs, Radiology, Pathology     Lab Results   Component Value Date    WBC 7.8 04/22/2024    WBC 6.6 01/15/2024    WBC 8.7 01/14/2024    HGB 13.3 04/22/2024    HGB 12.3 01/15/2024    HGB 12.8 01/14/2024     04/22/2024     01/15/2024     01/14/2024    CHOL 158 11/30/2023    CHOL 155 11/25/2022    CHOL 157 07/22/2021    TRIG 76 11/30/2023    TRIG 84 11/25/2022    TRIG 52 07/22/2021    HDL 83 11/30/2023    HDL 78 11/25/2022    HDL 88 07/22/2021    ALT 20 05/22/2024    ALT 19 06/22/2023    ALT 19 11/25/2022    AST 26 06/22/2023    AST 18 07/22/2021    AST 19 07/17/2020     05/22/2024     04/22/2024     01/15/2024    BUN 13.6 05/22/2024    BUN 18.3 04/22/2024    BUN 10.3 01/15/2024    CO2 22 05/22/2024    CO2 26 04/22/2024    CO2 26 01/15/2024    TSH 1.27 07/22/2021    TSH 0.84 07/17/2020    TSH 2.29 07/18/2019    INR 1.13 07/01/2021    INR 1.00 04/22/2020    INR 1.15 (H) 10/01/2018        Liver Function Studies -   Recent Labs   Lab Test 06/22/23 1947   PROTTOTAL 7.5   ALBUMIN 4.4   BILITOTAL 0.4   ALKPHOS 60   AST 26   ALT 19          Patient Active Problem List    Diagnosis Date Noted    Hiatal hernia 01/12/2024     Priority: Medium    Lumbosacral spinal stenosis 06/20/2023     Priority: Medium    Scoliosis of thoracolumbar spine, unspecified scoliosis type 06/20/2023     Priority: Medium    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     Priority: Medium    Facet arthropathy, lumbosacral 06/20/2023     Priority: Medium    DDD (degenerative disc disease), lumbosacral 06/20/2023     Priority: Medium    Chest pain, unspecified type 06/07/2023     Priority: Medium    History of CAD (coronary artery disease) 06/07/2023     Priority: Medium    Venous reflux 07/12/2021     Priority: Medium    Vasospastic angina (H24) 06/29/2021     Priority: Medium     Added automatically from request for surgery [id number]      Exertional chest pain  06/29/2021     Priority: Medium     Added automatically from request for surgery [id number]      Abnormal findings on diagnostic imaging of heart and coronary circulation 06/29/2021     Priority: Medium     Added automatically from request for surgery [id number]      Status post coronary angiogram 04/22/2020     Priority: Medium    Unstable angina pectoris (H) 04/17/2020     Priority: Medium     Added automatically from request for surgery [id number]      Chest pain 02/26/2018     Priority: Medium    Hypertension 09/22/2017     Priority: Medium    Subclinical hyperthyroidism 10/17/2016     Priority: Medium    Mild coronary artery disease 01/30/2016     Priority: Medium     On ASA      Hip osteoarthritis 10/19/2015     Priority: Medium    KEN (obstructive sleep apnea) 08/20/2015     Priority: Medium    Severe persistent asthma (H28) 08/25/2014     Priority: Medium    Subclavian artery stenosis, left (H24) 08/07/2013     Priority: Medium     S/p stent in 2013      Diastolic CHF, chronic (H) 02/22/2012     Priority: Medium    Hyperlipidemia LDL goal <130 10/31/2011     Priority: Medium    Osteopenia 12/21/2010     Priority: Medium    Hiatal hernia with GERD 09/03/2004     Priority: Medium    Female stress incontinence 09/03/2004     Priority: Medium    LUMBOSACRAL NEURITIS , numbness left thigh 09/03/2004     Priority: Medium    History of colonic polyps 06/19/2003     Priority: Medium     Problem list name updated by automated process. Provider to review      Chronic rhinitis 06/19/2003     Priority: Medium      Assessment and Plan   Assessment/Plan:    Victoria Montalvo is a 83 year old female with significant past medical history pertinent for asthma, HTN, HLD, COPD, KEN, CHF, CAD, subclavian artery stenosis who is now status post laparoscopic hiatal hernia repair with bilateral tube thoracostomy/gastrostomy tube placement 1/12/2024 by Dr. Stern who is presenting as a follow-up patient with a chief complaint of irregular bowel  patterns.    #Dysphagia - Resolved  #GERD - Heartburn/Regurgitation - Resolved   #Hiatal Hernia S/P Laparoscopic Repair   Now status post hiatal hernia repair 1/12/2024 with resolution of reflux symptoms and dysphagia.  Victoria does report continued abdominal discomfort which is aggravated with physical activity/postural changes at the site of her prior gastrostomy tube.  Message will be sent to surgical team in regards to her concerns.  Future considerations would be consultation to physical therapy for abdominal wall massage/myofascial release for possible adhesions/scar tissue.    - Message to be sent to surgical team regarding continued discomfort at prior gastrostomy tube site    #Irregular Bowel Patterns   Pattern is most consistent with obstipation with overflow diarrhea which is likely exacerbated with intermittent use of Imodium.    - If you have a day without stooling please take MiraLAX 17g (1 capful) prior to bed   - Please start taking daily fiber supplementation. Start with 1 - 2 teaspoons daily which can be slowly uptitrated to 2 - 3 tablespoons as needed    Follow up plan:   Return to clinic 6 months and as needed.    The risks and benefits of my recommendations, as well as other treatment options were discussed with the patient and any available family today. All questions were answered.     Follow up: As planned above. Today, I personally spent 22 minutes in direct face to face time with the patient, of which greater than 50% of the time was spent in patient education and counseling as described above. Approximately 18 minutes were spent on indirect care associated with the patient's consultation including but not limited to review of: patient medical records to date, clinic visits, hospital records, lab results, imaging studies, procedural documentation, and coordinating care with other providers. The findings from this review are summarized in the above note. All of the above accounted for a  cumulative time of 40 minutes and was performed on the date of service.     The patient verbalized understanding of the plan and was appreciative for the time spent and information provided during the office visit.           Suzette Crawford PA-C  Division of Gastroenterology, Hepatology, and Nutrition  [facility]       Documentation assisted by voice recognition and documentation system.

## 2024-06-04 NOTE — LETTER
6/4/2024      Victoria Montalvo  [address] [place] [address]      Dear Colleague,    Thank you for referring your patient, Victoria Montalvo, to the [facility] [place]. Please see a copy of my visit note below.    Gastroenterology Visit for: Victoria Montalvo 1940   MRN: [id number]     Reason for Visit:  chief complaint    Referred by: Niurka  / [facility]  / [place] [id number]  Patient Care Team:  Elsy Bah MD as PCP - General (Internal Medicine)  Elsy Bah MD as Assigned PCP  Aron Pro MD as Assigned Neuroscience Provider  Lilli Way APRN CNP as Nurse Practitioner (Anesthesiology)  Genesis Kuhn MD as MD (Gastroenterology)  Shaji Bello MD as MD (Cardiovascular & Thoracic Surgery)  Shaji Bello MD as Assigned Heart and Vascular Provider  Linda aMyo PA-C as Assigned Surgical Provider  Suzette Crawford PA-C as Assigned Gastroenterology Provider  Lilli Rivera, RN as Lead Care Coordinator  Cyndi Doe PANFILO as Community Health Worker (Primary Care - CC)  Marleni Raza APRN CNP as Nurse Practitioner (Cardiovascular Disease)    History of Present Illness:   Victoria Montalvo is a 83 year old female with significant past medical history pertinent for asthma, HTN, HLD, COPD, KEN, CHF, CAD, subclavian artery stenosis who is now status post laparoscopic hiatal hernia repair with bilateral tube thoracostomy/gastrostomy tube placement 1/12/2024 by Dr. Stern who is presenting as a follow-up patient with a chief complaint of irregular bowel patterns.    -----------------------------------------------------------------------------------------------------------------------------------------------------------------------------------------------------------------------------------  Interval History June 4, 2024:    Continues to have pain daily at the location of the gastrostomy tube site.  "The tube was never used for feeds. No erythema edema or granulation tissue. Noting a small scar at the area. Aggravated with postural changes and physical activity.     Bowel Patterns: For the past week has been stooling daily consistent with [place] Stool Scale Type 4. This is not a new change in bowel patterns symptom onset was > 1 year prior.     Weight is now stable.     Denies nausea, emesis, dysphagia, odynophagia, heartburn, regurgitation, nocturnal stooling, melena, hematochezia and BRBR.     Traveling with son to [place]. Driving with son.     ---------------------------------------------------------------------------------------------------------------------------------------------------------------------------  7/2023 Interval History:     Prior to going to the ER took a small bite of \"rice hot-dish\" which then resulted in intractable emesis. Reports history of dysphagia for 10+ years. Avoids meats as this is a trigger for her. Denies dysphagia to semi solids, liquids and pills.     Since being seen in the ER has not had symptoms of dysphagia however made significant lifestyle modifications and dietary alterations.      Currently takes Prevacid 30mg with break through symptoms occurring multiple times per month which is relieved with the use of TUMs.     Associated with 20 lb weight loss in the past 12 months with stability over the past 3-6 months. Notes she does not eat when alone secondary to lack of desire to eat. Notes \"If I were to make myself a sandwich I would only eat half.\" Not currently eating 3 meals per day. Will supplement with Boost/Ensure x2 daily.    Will have a BM daily that fluctuates between [place] Stool Scale Type 1 - 4.      Denies weight loss,odynophagia, dysphonia/hoarseness, chronic cough/throat clearing, abdominal pain, diarrhea, constipation (< 3 stools per week), nocturnal stooling, incontinence of feces, melena, hematochezia and BRBR.     No prior intraabdominal surgeries. No " history of prior vaginal births/ section.     Very rare/seldom use of ETOH products one serving per month.     History of 15 - 20 pack year history.      No family history or GI related malignancy (esophageal, gastric, pancreatic, liver or colon).      past away 2023.     Please also see questionnaires below when reviewing subjective history.       Esophageal Questionnaire(s)    BEDQ Questionnaire      2023    10:33 AM 2023     2:31 PM 2023     1:51 PM 2024    12:34 PM   BEDQ Questionnaire: How Often Have You Had the Following?   Trouble eating solid food (meat, bread, vegetables) 5 5 5 0   Trouble eating soft foods (yogurt, jello, pudding) 3 4 0 0   Trouble swallowing liquids 0 5 0 0   Pain while swallowing 1 0 0 0   Coughing or choking while swallowing foods or liquids 1 1 0 0   Total Score: 10 15 5 0         2023    10:33 AM 2023     2:31 PM 2023     1:51 PM 2024    12:34 PM   BEDQ Questionnaire: Discomfort/Pain Ratings   Eating solid food (meat, bread, vegetables) 5 5 2 0   Eating soft foods (yogurt, jello, pudding) 0 0 0 0   Drinking liquid 0 0 0 0   Total Score: 5 5 2 0       Eckardt Questionnaire      2023    10:34 AM 2023     2:32 PM 2023     1:52 PM 2024    12:35 PM   Eckardt Questionnaire   Dysphagia 1 1 1 0   Regurgitation 1 1 1 0   Retrosternal Pain 0 1 0 2   Weight Loss (kg) 3 3 0 3   Total Score:  5 6 2 5       Promis 10 Questionnaire      2023    10:36 AM 2023     1:54 PM 2024    12:37 PM   PROMIS 10 FLOWSHEET DATA   In general, would you say your health is: 3 4 4   In general, would you say your quality of life is: 3 4 4   In general, how would you rate your physical health? 3 4 4   In general, how would you rate your mental health, including your mood and your ability to think? 4 4 4   In general, how would you rate your satisfaction with your social activities and relationships? 4 4 4   In  general, please rate how well you carry out your usual social activities and roles. (This includes activities at home, at work and in your community, and responsibilities as a parent, child, spouse, employee, friend, etc.) 4 4 4   To what extent are you able to carry out your everyday physical activities such as walking, climbing stairs, carrying groceries, or moving a chair? 4 5 5   In the past 7 days, how often have you been bothered by emotional problems such as feeling anxious, depressed, or irritable? 4 3 2   In the past 7 days, how would you rate your fatigue on average? 3 3 2   In the past 7 days, how would you rate your pain on average, where 0 means no pain, and 10 means worst imaginable pain? 0 0 6   Mental health question re-calculation - no clinical value 2    2 3 4   Physical health question re-calculation - no clinical value 3    3 3 4   Pain question re-calculation - no clinical value 5    5 5 3   Global Mental Health Score 13    13 15 16   Global Physical Health Score 15    15 17 16   PROMIS TOTAL - SUBSCORES 28    28 32 32           STUDIES & PROCEDURES:    EGD:       Colonoscopy:    2015   Findings:        The perianal and digital rectal examinations were normal. There is        decreased sphincter tone.        A few small-mouthed diverticula were found in the cecum.        Multiple medium-mouthed diverticula were found in the sigmoid colon.                                                                                     Impression:          - Diverticulosis in the cecum.                        - Diverticulosis in the sigmoid colon.     EndoFLIP directed at the UES or LES (8cm (EF-325) balloon length or 16cm (EF-322) balloon length):   Date:  8cm balloon  Balloon inflation Balloon pressure CSA (mm^2) DI (mm^2/mmHg) Dmin (mm) Compliance   20 (ladmark ID)        30        40        50           16cm balloon  Balloon inflation Balloon pressure CSA (mm^2) DI (mm^2/mmHg) Dmin (mm) Compliance   30  (ladmark ID)        40        50        60        70           High Resolution Manometry:    PH/Impedance:     Rizo:    CT:    6/22/2023 CT CAP W Contrast   FINDINGS:   LUNGS AND PLEURA: No pulmonary mass, consolidation, or suspicious pulmonary nodule. No pleural effusion or pneumothorax.     MEDIASTINUM/AXILLAE: No abnormally enlarged lymph nodes. Great vessels normal in caliber. There is a patent stent in the proximal left subclavian artery. No abnormally enlarged lymph nodes. Moderate sized hiatal hernia. There is marked fluid distention   of the esophagus.  No esophageal wall thickening. Possible impacted ingested material measuring approximately 3 cm, best seen on coronal 56.     CORONARY ARTERY CALCIFICATION: Moderate.     HEPATOBILIARY: Normal.     PANCREAS: Normal.     SPLEEN: Normal.     ADRENAL GLANDS: Normal.     KIDNEYS/BLADDER: Normal.     BOWEL: Severe distal colonic diverticulosis. No evidence for acute diverticulitis. No free air, free fluid, or inflammatory change. Normal caliber appendix.     LYMPH NODES: Normal.     VASCULATURE: Diffuse atherosclerotic calcification of the abdominal aorta, but no aneurysm.     PELVIC ORGANS: Normal.     MUSCULOSKELETAL: Prior right hip replacement. Osteopenia. Severe degenerative change in the lower lumbar spine. Severe degenerative disc disease in the lower thoracic spine.                                                                      IMPRESSION:  1.  Moderate hiatal hernia with marked fluid distention of the esophagus. Possible obstruction secondary to impacted ingested material, distal esophageal stricture also considered. Consider follow-up GI consultation.  2.  Severe distal colonic diverticulosis.    Esophagram:    2/5/2024  FINDINGS: Esophagus is normal in its caliber, course and mucosal  pattern. No esophageal strictures or masses are evident. Mild  esophageal dysmotility.     Postoperative changes of hiatal hernia repair. No evidence for  contrast  leakage in the region of the postoperative changes. No  evidence for a recurrent hiatal hernia. Water soluble contrast flowed  readily from the esophagus through the region of the hiatal hernia  repair into the stomach. A gastrostomy tube is in place. The stomach  is otherwise unremarkable, without evidence for mass or erosions.  Gastric mucosal pattern is unremarkable. Normal duodenum and proximal  jejunum.     Moderate gastroesophageal reflux was observed during the exam.                                                     IMPRESSION:   1. Postoperative changes of hiatal hernia repair. No evidence for a  contrast leak.  2. Mild esophageal dysmotility may be related to presbyesophagus.  3. Moderate gastroesophageal reflux was observed.    FL VSS:     GES:    U/S:     XRAY:    Other:       Prior medical records were reviewed including, but not limited to, notes from referring providers, lab work, radiographic tests, and other diagnostic tests. Pertinent results were summarized above.     History     Past Medical History:   Diagnosis Date    Abnormal Papanicolaou smear of cervix and cervical HPV     colposcopy 1/97    Aortic valve sclerosis     Arthritis     Asthma     on preventative meds and has nebulizer    Chronic rhinitis     COPD (chronic obstructive pulmonary disease)     Coronary artery disease     4/4/17 SHERLEY--PDA    Coronary artery spasm (H24)     Diastolic CHF, chronic 02/22/2012    Gastro-oesophageal reflux disease     Hiatal hernia     Hyperlipidemia 10/31/2011    Obesity     KEN (obstructive sleep apnea)     CPAP    Personal history of colonic polyps     colonoscopy 9/97, 2002 (due in 2007)    Pulmonary HTN     Seasonal allergies     Status post coronary angiogram 04/22/2020    Subclavian artery stenosis, left 08/07/2013    S/p stent in 2013     Subclinical hyperthyroidism 10/17/2016       Past Surgical History:   Procedure Laterality Date    ARTHROPLASTY HIP Right 10/19/2015    Procedure: ARTHROPLASTY  HIP;  Surgeon: Aron Torres MD;  Location: [facility] OR    COLONOSCOPY  01/01/2008    Polyp removed, bx.    COLONOSCOPY  01/12/2010    COLONOSCOPY N/A 02/17/2015    Procedure: COLONOSCOPY;  Surgeon: Gato Quiles MD;  Location: [facility]    CT CORONARY ANGIOGRAM  04/04/2017    SHERLEY to PDA    CV CORONARY ANGIOGRAM N/A 04/22/2020    Procedure: Coronary Angiogram;  Surgeon: Handy Denise MD;  Location:  [facility]ARD[facility] C[facility] LAB    CV CORONARY ANGIOGRAM N/A 07/01/2021    Procedure: Coronary Angiogram;  Surgeon: Handy Denise MD;  Location:  [facility] LAB    CV INSTANTANEOUS WAVE-FREE RATIO N/A 04/22/2020    Procedure: Instantaneous Wave-Free Ratio;  Surgeon: Handy Denise MD;  Location:  [facility] CARD[facility] CATH LAB    CV LEFT VENTRICULOGRAM N/A 04/22/2020    Procedure: Left Ventriculogram;  Surgeon: Handy Denise MD;  Location:  HEART CARDIAC CATH LAB    ESOPHAGOSCOPY, GASTROSCOPY, DUODENOSCOPY (EGD), COMBINED N/A 8/17/2023    Procedure: Esophagoscopy, gastroscopy, duodenoscopy (EGD), combined;  Surgeon: Genesis Kuhn MD;  Location: [facility]    LAPAROSCOPIC HERNIORRHAPHY HIATAL N/A 1/12/2024    Procedure: Laparoscopic Hiatal Hernia Repair, Esophagogastroduodenscopy, Gastrostomy Tube Placement, Bilateral Chest Tube Placement;  Surgeon: Oscar Stern MD;  Location: [facility] OR    Liposuction of legs and stomach  1990    MAMMOPLASTY REDUCTION  02/1989    OPEN REDUCTION INTERNAL FIXATION ANKLE  04/1987    Removal of ankle hardware NOS  1990    SOFT TISSUE SURGERY  1989 & 1990    Liposuction    VASCULAR SURGERY  2013    angiogram & left subclavical artery stent       Social History     Socioeconomic History    Marital status:      Spouse name: Trneton    Number of children: 2    Years of education: 12    Highest education level: Not on file   Occupational History    Occupation: retired   Tobacco Use    Smoking status: Former     Current packs/day: 0.00     Average  packs/day: 1 pack/day for 30.0 years (30.0 ttl pk-yrs)     Types: Cigarettes     Start date: 7/15/1973     Quit date: 1993     Years since quittin.0    Smokeless tobacco: Never   Vaping Use    Vaping status: Never Used   Substance and Sexual Activity    Alcohol use: Yes     Comment: Not very often    Drug use: No    Sexual activity: Not Currently     Partners: Male   Other Topics Concern     Service Not Asked    Blood Transfusions Not Asked    Caffeine Concern No    Occupational Exposure Not Asked    Hobby Hazards Not Asked    Sleep Concern Not Asked    Stress Concern Not Asked    Weight Concern Not Asked    Special Diet No     Comment: regular diet     Back Care Not Asked    Exercise No     Comment: shopping a lot so [employer]     Bike Helmet Not Asked    Seat Belt Not Asked    Self-Exams Not Asked    Parent/sibling w/ CABG, MI or angioplasty before 65F 55M? Yes     Comment: Brother and Sister, Daughter and Father   Social History Narrative    Not on file     Social Determinants of Health     Financial Resource Strain: Low Risk  (2024)    Financial Resource Strain     Within the past 12 months, have you or your family members you live with been unable to get utilities (heat, electricity) when it was really needed?: No   Food Insecurity: Low Risk  (2024)    Food Insecurity     Within the past 12 months, did you worry that your food would run out before you got money to buy more?: No     Within the past 12 months, did the food you bought just not last and you didn t have money to get more?: No   Transportation Needs: Low Risk  (2024)    Transportation Needs     Within the past 12 months, has lack of transportation kept you from medical appointments, getting your medicines, non-medical meetings or appointments, work, or from getting things that you need?: No   Physical Activity: Not on file   Stress: Not on file   Social Connections: Socially Integrated (2023)    Received from  [facility], [facility]    Social Connections     Frequency of Communication with Friends and Family: 0   Interpersonal Safety: Low Risk  (11/30/2023)    Interpersonal Safety     Do you feel physically and emotionally safe where you currently live?: Yes     Within the past 12 months, have you been hit, slapped, kicked or otherwise physically hurt by someone?: No     Within the past 12 months, have you been humiliated or emotionally abused in other ways by your partner or ex-partner?: No   Housing Stability: Low Risk  (1/31/2024)    Housing Stability     Do you have housing? : Yes     Are you worried about losing your housing?: No       Family History   Problem Relation Age of Onset    Alzheimer Disease Mother     Gastrointestinal Disease Mother     Cancer Father         throat and lungs, liver,    Heart Disease Father 57        CABG    Hyperlipidemia Father     Other Cancer Father         Throat, Lung and Liver    Coronary Artery Disease Father     Coronary Artery Disease Sister     Coronary Artery Disease Sister     Heart Disease Sister     Heart Disease Brother         suicidal    Pacemaker Brother     Other Cancer Brother         Prostrate    Depression Brother     Coronary Artery Disease Brother     Hypertension Maternal Grandmother     Lipids Maternal Grandmother     Cancer Maternal Grandmother         stomach    Other Cancer Maternal Grandmother         Stomach Cancer    Cancer Paternal Grandmother         stomach    Other Cancer Paternal Grandmother         Stomach Cancer    Allergies Daughter     Asthma Daughter     Coronary Artery Disease Daughter     Allergies Son     Asthma Son     Anesthesia Reaction No family hx of     Thrombosis No family hx of      Family history reviewed and edited as appropriate    Medications and Allergies:     Outpatient Encounter Medications as of 6/4/2024   Medication Sig Dispense Refill    albuterol (PROAIR  HFA/PROVENTIL HFA/VENTOLIN HFA) 108 (90 Base) MCG/ACT inhaler USE 2 INHALATIONS EVERY 6 HOURS AS NEEDED FOR SHORTNESS OF BREATH, DYSPNEA, OR WHEEZING (Patient taking differently: 2 puffs every 6 hours as needed USE 2 INHALATIONS EVERY 6 HOURS AS NEEDED FOR SHORTNESS OF BREATH, DYSPNEA, OR WHEEZING) 25.5 g 1    amLODIPine (NORVASC) 5 MG tablet Take 1 tablet (5 mg) by mouth every evening 90 tablet 3    aspirin 81 MG tablet Take 81 mg by mouth every morning      Boswellia-Glucosamine-Vit D (OSTEO BI-FLEX/5-LOXIN ADVANCED) TABS Take 1,500 mg by mouth every morning      Calcium 9800-8841 MG-UNIT CHEW Take 1 tablet by mouth every morning      Cholecalciferol (VITAMIN D3 PO) Take 4,000 Units by mouth every morning      CRANBERRY PO Take 1 capsule by mouth every morning      fluticasone (FLONASE) 50 MCG/ACT nasal spray USE 1 TO 2 SPRAYS IN EACH NOSTRIL DAILY 48 g 5    fluticasone-salmeterol (ADVAIR) 250-50 MCG/ACT inhaler Inhale 1 puff into the lungs 2 times daily 60 each 11    furosemide (LASIX) 20 MG tablet Take 1 tablet (20 mg) by mouth daily (Patient taking differently: Take 20 mg by mouth every morning) 90 tablet 3    ipratropium - albuterol 0.5 mg/2.5 mg/3 mL (DUONEB) 0.5-2.5 (3) MG/3ML neb solution Take 1 vial (3 mLs) by nebulization every 6 hours as needed for shortness of breath, wheezing or cough 90 mL 11    isosorbide mononitrate (IMDUR) 60 MG 24 hr tablet TAKE 1 TABLET EVERY MORNING HOLD IF SYSTOLIC BLOOD PRESSURE IS LESS THAN 85 (Patient taking differently: 60 mg every morning TAKE 1 TABLET EVERY MORNING HOLD IF SYSTOLIC BLOOD PRESSURE IS LESS THAN 85) 90 tablet 3    montelukast (SINGULAIR) 10 MG tablet Take 1 tablet (10 mg) by mouth at bedtime 90 tablet 3    multivitamin w/minerals (MULTI-VITAMIN) tablet Take 1 tablet by mouth every morning      nitroGLYcerin (NITROSTAT) 0.4 MG sublingual tablet One tablet under the tongue every 5 minutes if needed for chest pain. May repeat every 5 minutes for a maximum of 3  "doses in 15 minutes\" 25 tablet 3    omega-3 fatty acids 1200 MG capsule Take 1 capsule by mouth every morning      rosuvastatin (CRESTOR) 40 MG tablet Take 1 tablet (40 mg) by mouth daily (Patient taking differently: Take 40 mg by mouth every evening) 90 tablet 3    spironolactone (ALDACTONE) 25 MG tablet Take 1 tablet (25 mg) by mouth every morning 90 tablet 3    diclofenac (VOLTAREN) 1 % topical gel Apply 2 g topically 4 times daily as needed for moderate pain (Patient not taking: Reported on 6/4/2024) 350 g 11    LANsoprazole (PREVACID) 30 MG DR capsule Take 1 capsule (30 mg) by mouth daily (Patient taking differently: Take 30 mg by mouth every morning (before breakfast)) 90 capsule 3    polyethylene glycol (MIRALAX) 17 GM/Dose powder Take 17 g by mouth daily (Patient not taking: Reported on 5/8/2024) 510 g 0    predniSONE (DELTASONE) 20 MG tablet  (Patient not taking: Reported on 2/12/2024)       No facility-administered encounter medications on file as of 6/4/2024.        Allergies   Allergen Reactions    Animal Dander     Bee Pollen Other (See Comments)     Pollen,dust, trees, grass, mold-hayfever symptoms    Dust Mites     Kiwi Itching     Itching and red all over    Pollen Extract      Pollen,dust, trees, grass, mold-hayfever symptoms        Review of systems:  A full 10 point review of systems was obtained and was negative except for the pertinent positives and negatives stated within the HPI.    Objective Findings:   Physical Exam:    Constitutional: Ht 1.562 m (5' 1.5\")   Wt 70.3 kg (155 lb)   LMP  (LMP Unknown)   BMI 28.81 kg/m    General: Alert, cooperative, no distress, well-appearing  Head: Atraumatic, normocephalic, no obvious abnormalities   Eyes: Sclera anicteric, no obvious conjunctival hemorrhage   Nose: Nares normal, no obvious malformation, no obvious rhinorrhea   Skin: No jaundice, no obvious rash  Neurologic: AAOx3, no obvious neurologic abnormality  Psychiatric: Normal Affect, appropriate " mood  Extremities: No obvious edema, no obvious malformation     Labs, Radiology, Pathology     Lab Results   Component Value Date    WBC 7.8 04/22/2024    WBC 6.6 01/15/2024    WBC 8.7 01/14/2024    HGB 13.3 04/22/2024    HGB 12.3 01/15/2024    HGB 12.8 01/14/2024     04/22/2024     01/15/2024     01/14/2024    CHOL 158 11/30/2023    CHOL 155 11/25/2022    CHOL 157 07/22/2021    TRIG 76 11/30/2023    TRIG 84 11/25/2022    TRIG 52 07/22/2021    HDL 83 11/30/2023    HDL 78 11/25/2022    HDL 88 07/22/2021    ALT 20 05/22/2024    ALT 19 06/22/2023    ALT 19 11/25/2022    AST 26 06/22/2023    AST 18 07/22/2021    AST 19 07/17/2020     05/22/2024     04/22/2024     01/15/2024    BUN 13.6 05/22/2024    BUN 18.3 04/22/2024    BUN 10.3 01/15/2024    CO2 22 05/22/2024    CO2 26 04/22/2024    CO2 26 01/15/2024    TSH 1.27 07/22/2021    TSH 0.84 07/17/2020    TSH 2.29 07/18/2019    INR 1.13 07/01/2021    INR 1.00 04/22/2020    INR 1.15 (H) 10/01/2018        Liver Function Studies -   Recent Labs   Lab Test 06/22/23 1947   PROTTOTAL 7.5   ALBUMIN 4.4   BILITOTAL 0.4   ALKPHOS 60   AST 26   ALT 19          Patient Active Problem List    Diagnosis Date Noted    Hiatal hernia 01/12/2024     Priority: Medium    Lumbosacral spinal stenosis 06/20/2023     Priority: Medium    Scoliosis of thoracolumbar spine, unspecified scoliosis type 06/20/2023     Priority: Medium    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     Priority: Medium    Facet arthropathy, lumbosacral 06/20/2023     Priority: Medium    DDD (degenerative disc disease), lumbosacral 06/20/2023     Priority: Medium    Chest pain, unspecified type 06/07/2023     Priority: Medium    History of CAD (coronary artery disease) 06/07/2023     Priority: Medium    Venous reflux 07/12/2021     Priority: Medium    Vasospastic angina (H24) 06/29/2021     Priority: Medium     Added automatically from request for surgery [id number]      Exertional chest pain  06/29/2021     Priority: Medium     Added automatically from request for surgery [id number]      Abnormal findings on diagnostic imaging of heart and coronary circulation 06/29/2021     Priority: Medium     Added automatically from request for surgery [id number]      Status post coronary angiogram 04/22/2020     Priority: Medium    Unstable angina pectoris (H) 04/17/2020     Priority: Medium     Added automatically from request for surgery [id number]      Chest pain 02/26/2018     Priority: Medium    Hypertension 09/22/2017     Priority: Medium    Subclinical hyperthyroidism 10/17/2016     Priority: Medium    Mild coronary artery disease 01/30/2016     Priority: Medium     On ASA      Hip osteoarthritis 10/19/2015     Priority: Medium    KEN (obstructive sleep apnea) 08/20/2015     Priority: Medium    Severe persistent asthma (H28) 08/25/2014     Priority: Medium    Subclavian artery stenosis, left (H24) 08/07/2013     Priority: Medium     S/p stent in 2013      Diastolic CHF, chronic (H) 02/22/2012     Priority: Medium    Hyperlipidemia LDL goal <130 10/31/2011     Priority: Medium    Osteopenia 12/21/2010     Priority: Medium    Hiatal hernia with GERD 09/03/2004     Priority: Medium    Female stress incontinence 09/03/2004     Priority: Medium    LUMBOSACRAL NEURITIS , numbness left thigh 09/03/2004     Priority: Medium    History of colonic polyps 06/19/2003     Priority: Medium     Problem list name updated by automated process. Provider to review      Chronic rhinitis 06/19/2003     Priority: Medium      Assessment and Plan   Assessment/Plan:    Victoria Montalvo is a 83 year old female with significant past medical history pertinent for asthma, HTN, HLD, COPD, KEN, CHF, CAD, subclavian artery stenosis who is now status post laparoscopic hiatal hernia repair with bilateral tube thoracostomy/gastrostomy tube placement 1/12/2024 by Dr. Stern who is presenting as a follow-up patient with a chief complaint of irregular bowel  patterns.    #Dysphagia - Resolved  #GERD - Heartburn/Regurgitation - Resolved   #Hiatal Hernia S/P Laparoscopic Repair   Now status post hiatal hernia repair 1/12/2024 with resolution of reflux symptoms and dysphagia.  Victoria does report continued abdominal discomfort which is aggravated with physical activity/postural changes at the site of her prior gastrostomy tube.  Message will be sent to surgical team in regards to her concerns.  Future considerations would be consultation to physical therapy for abdominal wall massage/myofascial release for possible adhesions/scar tissue.    - Message to be sent to surgical team regarding continued discomfort at prior gastrostomy tube site    #Irregular Bowel Patterns   Pattern is most consistent with obstipation with overflow diarrhea which is likely exacerbated with intermittent use of Imodium.    - If you have a day without stooling please take MiraLAX 17g (1 capful) prior to bed   - Please start taking daily fiber supplementation. Start with 1 - 2 teaspoons daily which can be slowly uptitrated to 2 - 3 tablespoons as needed    Follow up plan:   Return to clinic 6 months and as needed.    The risks and benefits of my recommendations, as well as other treatment options were discussed with the patient and any available family today. All questions were answered.     Follow up: As planned above. Today, I personally spent 22 minutes in direct face to face time with the patient, of which greater than 50% of the time was spent in patient education and counseling as described above. Approximately 18 minutes were spent on indirect care associated with the patient's consultation including but not limited to review of: patient medical records to date, clinic visits, hospital records, lab results, imaging studies, procedural documentation, and coordinating care with other providers. The findings from this review are summarized in the above note. All of the above accounted for a  cumulative time of 40 minutes and was performed on the date of service.     The patient verbalized understanding of the plan and was appreciative for the time spent and information provided during the office visit.     Documentation assisted by voice recognition and documentation system.    Again, thank you for allowing me to participate in the care of your patient.      Sincerely,    Suzette Crawford PA-C

## 2024-06-18 ENCOUNTER — PATIENT OUTREACH (OUTPATIENT)
Dept: CARE COORDINATION | Facility: CLINIC | Age: 84
End: 2024-06-18
Payer: COMMERCIAL

## 2024-06-18 NOTE — PROGRESS NOTES
Clinic Care Coordination Contact  Care Coordination Clinician Chart review    Situation: Patient chart reviewed by care coordinator.       Background: Care Coordination initial assessment and enrollment took place 1/24/2024.   Upon initial assessment patient-centered goals were discussed and developed with participation from patient.  RNCC handed patient follow up and monitoring of goal progression off to [facility] for continued outreach every 30 days.        Assessment: Per chart review, patient outreach completed by CC CHW on 05/21/24.  Patient is actively working to accomplish goal(s) and patient's goal(s) remain(s) appropriate and relevant at this time.       Care Plan: Advance Care Plan       Problem: Patient does not have a valid Health Care Directive       Long-Range Goal: Complete Health Care Directive       Start Date: 1/31/2024 Expected End Date: 12/31/2024    This Visit's Progress: 20% Recent Progress: 10%    Note:     Barriers: Limited driving; recovering from surgery; Provider availability - wait time to complete appointments.   Strengths: Motivated; Independent; Agreeable to Care Coordination.   Patient expressed understanding of goal: Yes.   Action steps to achieve this goal:  1. I will review the resources for [employer].   2. I will contact [facility] when I'm ready to complete my Advance Care Planning documents.   3. I will contact my care team with questions, concerns or support needs. I will use the clinic as a resource and I understand I can contact my clinic with 24/7 after hours services available. Care Coordinator will remain available as needed.                               Care Plan: Resources for Housekeeping/Chore services, Transportation       Problem: Resources for Housekeeping/Chore services & Transportation       Long-Range Goal: Resources for Housekeeping/Chore services & Transportation       Start Date: 1/31/2024 Expected End Date: 12/31/2024    This Visit's Progress: 20%  Recent Progress: 10%    Note:     Barriers: Limited driving; recovering from surgery; Provider availability - wait time to complete appointments.   Strengths: Motivated; Independent; Agreeable to Care Coordination.   Patient expressed understanding of goal: Yes.   Action steps to achieve this goal:  1. I will review the resources for Housekeeping/Chore services.    2. I will review the list of Transportation options and follow up with the ones that are of interest to me. I will make arrangements with the companies that I want to use so I have transportation when my family is unavailable.   3. I will discuss, review, schedule and complete recommended overdue health maintenance with my Primary Care Provider.    4. I will contact my care team with questions, concerns or support needs. I will use the clinic as a resource and I understand I can contact my clinic with 24/7 after hours services available. Care Coordinator will remain available as needed.                               Plan/Recommendations: RNCC Clinical Assessments to be completed annually or as needed. Annual Assessment will be due 01/2025. The patient will continue working with Care Coordination to achieve goal(s) as above.  CHWCC will involve RNCC as needed or if patient is ready to transition to goal status of Maintenance.  RNCC will continue to review progress to goal(s) and CHWCC outreaches every 6 weeks.     Complex Care Plan:  Patient is not due for updated Plan of Care.  Care Plan updated and sent to patient: No    Lilli Rivera RN Care Coordinator  [facility] [place]  Email: [email or web address]  Phone: [phone]

## 2024-06-21 ENCOUNTER — PATIENT OUTREACH (OUTPATIENT)
Dept: CARE COORDINATION | Facility: CLINIC | Age: 84
End: 2024-06-21
Payer: COMMERCIAL

## 2024-06-21 NOTE — PROGRESS NOTES
Clinic Care Coordination Contact  [facility]/Voicemail    Clinical Data: Care Coordinator Outreach    Outreach Documentation Number of Outreach Attempt   6/21/2024   2:10 PM 1       Left message on patient's voicemail with call back information and requested return call.    Plan: Care Coordinator will try to reach patient again in 10 business days.    BEE Correia, B.S. [facility] Care Coordination  [facility] Clinics:  [place], [place] and [facility]  [phone]  [email or web address]

## 2024-07-09 ENCOUNTER — PATIENT OUTREACH (OUTPATIENT)
Dept: CARE COORDINATION | Facility: CLINIC | Age: 84
End: 2024-07-09
Payer: COMMERCIAL

## 2024-07-09 NOTE — PROGRESS NOTES
Clinic Care Coordination Contact  Community Health Worker Follow Up    Care Gaps:     Health Maintenance Due   Topic Date Due    ZOSTER IMMUNIZATION (1 of 2) Never done    RSV VACCINE (Pregnancy & 60+) (1 - 1-dose 60+ series) Never done    DTAP/TDAP/TD IMMUNIZATION (1 - Tdap) 04/15/2007    Pneumococcal Vaccine: 65+ Years (2 of 2 - PCV) 12/08/2011    COLORECTAL CANCER SCREENING  02/17/2020    HF ACTION PLAN  08/04/2020    ASTHMA ACTION PLAN  04/28/2022    COVID-19 Vaccine (6 - 2023-24 season) 09/01/2023    ASTHMA CONTROL TEST  05/30/2024     Not discussed at this time.     Care Plan:   Care Plan: Advance Care Plan       Problem: Patient does not have a valid Health Care Directive       Long-Range Goal: Complete Health Care Directive       Start Date: 1/31/2024 Expected End Date: 12/31/2024    This Visit's Progress: 20% Recent Progress: 20%    Note:     Barriers: Limited driving; recovering from surgery; Provider availability - wait time to complete appointments.   Strengths: Motivated; Independent; Agreeable to Care Coordination.   Patient expressed understanding of goal: Yes.   Action steps to achieve this goal:  1. I will review the resources for [employer].   2. I will contact [employer] when I'm ready to complete my Advance Care Planning documents.   3. I will contact my care team with questions, concerns or support needs. I will use the clinic as a resource and I understand I can contact my clinic with 24/7 after hours services available. Care Coordinator will remain available as needed.                                 Intervention and Education during outreach: Patient states that she has received food resources and is planning to go to an upcoming fair for all on this coming Tuesday. She has no transportation issue with accessing the fair for all. She will also look into more food resources around her neighborhood where she can drive her own.     CHW confirmed with patient regarding her upcoming  appointment on 7/25/24 at [facility] and patient states that she doesn't have any transportation issue to attend the appointment.     Patient has been out of state for a while and been busy; however, plans to look into [employer] documents and contact [employer] once she is ready to complete her Advance Care Planning documents.    Patient states that she doesn't have any more issues or concerns to discuss with CC at this point and agrees to touch base during our next outreach.     CHW Plan: Next outreach in one month.     Bette Villalobos  Community Health Worker   [email or web address]   Clinic Care Coordination / Ambulatory Care Management  [facility]  [email or web address]  Office: [phone]  Gender Pronouns: She/her/hers  Employed by [facility]   (On behalf of Cyndi Doe)

## 2024-07-25 ENCOUNTER — OFFICE VISIT (OUTPATIENT)
Dept: CARDIOLOGY | Facility: CLINIC | Age: 84
End: 2024-07-25
Payer: COMMERCIAL

## 2024-07-25 VITALS
WEIGHT: 159.9 LBS | DIASTOLIC BLOOD PRESSURE: 60 MMHG | HEART RATE: 84 BPM | SYSTOLIC BLOOD PRESSURE: 106 MMHG | HEIGHT: 62 IN | BODY MASS INDEX: 29.43 KG/M2 | OXYGEN SATURATION: 95 %

## 2024-07-25 DIAGNOSIS — I10 HYPERTENSION, UNSPECIFIED TYPE: ICD-10-CM

## 2024-07-25 DIAGNOSIS — I50.32 DIASTOLIC CHF, CHRONIC (H): ICD-10-CM

## 2024-07-25 DIAGNOSIS — I25.10 CORONARY ARTERY DISEASE INVOLVING NATIVE CORONARY ARTERY OF NATIVE HEART WITHOUT ANGINA PECTORIS: Primary | ICD-10-CM

## 2024-07-25 DIAGNOSIS — E78.5 HYPERLIPIDEMIA LDL GOAL <130: ICD-10-CM

## 2024-07-25 DIAGNOSIS — I77.1 SUBCLAVIAN ARTERY STENOSIS, LEFT (H): ICD-10-CM

## 2024-07-25 PROCEDURE — 99214 OFFICE O/P EST MOD 30 MIN: CPT | Performed by: NURSE PRACTITIONER

## 2024-07-25 NOTE — PROGRESS NOTES
"CARDIOLOGY CLINIC NOTE    PRIMARY CARDIOLOGIST  Dr. Yepez     PRIMARY CARE PHYSICIAN:  Elsy Bah    HISTORY OF PRESENT ILLNESS:  Victoria Montalvo is a very pleasant 84-year-old female with a past medical history significant for CAD status post PCI to the left PDA in 2017 with recurrent chest pain in 2021 followed by coronary angiogram showing severe spasm of the proximal RCA.  Stent was widely patent.  Subclavian stenosis with intervention in 2013, hypertension, pulmonary hypertension, hyperlipidemia, diastolic heart failure, venous insufficiency, obstructive sleep apnea intolerant to CPAP and COPD.    In review, she underwent stenting of the left subclavian in 2013 after angiogram showed 85% stenosis.  In 2017 she underwent a nuclear stress test that showed reversible anterior apical defect consistent with mild to moderate ischemia in the distribution of the LAD.  Coronary angiogram followed showing mild to moderate disease in the LAD.  There was 80% left PDA narrowing followed by a 2.25 x 18 mm SHERLEY.  There was severe disease in a very tiny nondominant RCA.  Echocardiogram in 2017 showed a normal ejection fraction estimated at 55 to 60%, elevated RV systolic pressure, consistent with mild pulmonary hypertension, mild mitral regurgitation, mild to moderate tricuspid regurgitation and mild pulmonic regurgitation.    Nuclear stress test in 2018 was negative for ischemia.  Coronary angiogram in 2020 showed moderate disease after the distal PDA stent, consistent with spasm.   She had recurrent CP in 2021 and underwent coronary angiogram showing again severe spasm. She underwent a \"cold pressor \" test and IC NTG showing improvement.     Nuclear stress test in 2022 and 2023 were negative for inducible ischemia.     On 4/22/2024, she was evaluated in the ER for chest pain. Work up was reassuring, negative troponin, no acute EKG changes and complete resolution of chest pain.     She returns to the office today for follow " up.  Overall, she is feeling well and cardiac standpoint with no recurrent chest pain over the last 2 months.  [place] and overall feels better.  She is down approximately 20 pounds.  She denies shortness of breath, palpitations, PND, orthopnea, presyncope, syncope, or lower extremity edema.  She underwent hernia surgery in January of this year.  She continues to have mild abdominal pain.     Blood pressure is well-controlled at 106/60, managed on amlodipine, isosorbide, spironolactone and furosemide  Last BMP and CBC were unremarkable  Lipid panel showed a total cholesterol 158, HDL 83, LDL 60 and triglycerides 76    Compliant with all medications.    PAST MEDICAL HISTORY:  Past Medical History:   Diagnosis Date    Abnormal Papanicolaou smear of cervix and cervical HPV     colposcopy 1/97    Aortic valve sclerosis     Arthritis     Asthma     on preventative meds and has nebulizer    Chronic rhinitis     COPD (chronic obstructive pulmonary disease)     Coronary artery disease     4/4/17 SHERLEY--PDA    Coronary artery spasm (H24)     Diastolic CHF, chronic 02/22/2012    Gastro-oesophageal reflux disease     Hiatal hernia     Hyperlipidemia 10/31/2011    Obesity     KEN (obstructive sleep apnea)     CPAP    Personal history of colonic polyps     colonoscopy 9/97, 2002 (due in 2007)    Pulmonary HTN     Seasonal allergies     Status post coronary angiogram 04/22/2020    Subclavian artery stenosis, left 08/07/2013    S/p stent in 2013     Subclinical hyperthyroidism 10/17/2016       MEDICATIONS:  Current Outpatient Medications   Medication Sig Dispense Refill    albuterol (PROAIR HFA/PROVENTIL HFA/VENTOLIN HFA) 108 (90 Base) MCG/ACT inhaler USE 2 INHALATIONS EVERY 6 HOURS AS NEEDED FOR SHORTNESS OF BREATH, DYSPNEA, OR WHEEZING (Patient taking differently: 2 puffs every 6 hours as needed USE 2 INHALATIONS EVERY 6 HOURS AS NEEDED FOR SHORTNESS OF BREATH,  "DYSPNEA, OR WHEEZING) 25.5 g 1    amLODIPine (NORVASC) 5 MG tablet Take 1 tablet (5 mg) by mouth every evening 90 tablet 3    aspirin 81 MG tablet Take 81 mg by mouth every morning      Boswellia-Glucosamine-Vit D (OSTEO BI-FLEX/5-LOXIN ADVANCED) TABS Take 1,500 mg by mouth daily as needed      Calcium 7568-0349 MG-UNIT CHEW Take 1 tablet by mouth every morning      Cholecalciferol (VITAMIN D3 PO) Take 4,000 Units by mouth every morning      CRANBERRY PO Take 1 capsule by mouth every morning      diclofenac (VOLTAREN) 1 % topical gel Apply 2 g topically 4 times daily as needed for moderate pain 350 g 11    fluticasone (FLONASE) 50 MCG/ACT nasal spray USE 1 TO 2 SPRAYS IN EACH NOSTRIL DAILY 48 g 5    fluticasone-salmeterol (ADVAIR) 250-50 MCG/ACT inhaler Inhale 1 puff into the lungs 2 times daily 60 each 11    furosemide (LASIX) 20 MG tablet Take 1 tablet (20 mg) by mouth daily (Patient taking differently: Take 20 mg by mouth every morning) 90 tablet 3    ipratropium - albuterol 0.5 mg/2.5 mg/3 mL (DUONEB) 0.5-2.5 (3) MG/3ML neb solution Take 1 vial (3 mLs) by nebulization every 6 hours as needed for shortness of breath, wheezing or cough 90 mL 11    isosorbide mononitrate (IMDUR) 60 MG 24 hr tablet TAKE 1 TABLET EVERY MORNING HOLD IF SYSTOLIC BLOOD PRESSURE IS LESS THAN 85 (Patient taking differently: 60 mg every morning TAKE 1 TABLET EVERY MORNING HOLD IF SYSTOLIC BLOOD PRESSURE IS LESS THAN 85) 90 tablet 3    montelukast (SINGULAIR) 10 MG tablet Take 1 tablet (10 mg) by mouth at bedtime 90 tablet 3    multivitamin w/minerals (MULTI-VITAMIN) tablet Take 1 tablet by mouth every morning      nitroGLYcerin (NITROSTAT) 0.4 MG sublingual tablet One tablet under the tongue every 5 minutes if needed for chest pain. May repeat every 5 minutes for a maximum of 3 doses in 15 minutes\" 25 tablet 3    omega-3 fatty acids 1200 MG capsule Take 1 capsule by mouth every morning      rosuvastatin (CRESTOR) 40 MG tablet Take 1 tablet " (40 mg) by mouth daily (Patient taking differently: Take 40 mg by mouth every evening) 90 tablet 3    spironolactone (ALDACTONE) 25 MG tablet Take 1 tablet (25 mg) by mouth every morning 90 tablet 3     No current facility-administered medications for this visit.       SOCIAL HISTORY:  I have reviewed this patient's social history and updated it with pertinent information if needed. Victoria Montalvo  reports that she quit smoking about 31 years ago. Her smoking use included cigarettes. She started smoking about 51 years ago. She has a 30 pack-year smoking history. She has never used smokeless tobacco. She reports current alcohol use. She reports that she does not use drugs.    PHYSICAL EXAM:  Pulse:  [84] 84  BP: (106)/(60) 106/60  SpO2:  [95 %] 95 %  159 lbs 14.4 oz    Constitutional: alert, no distress  Respiratory: Good bilateral air entry  Cardiovascular: Regular rate and rhythm  GI: nondistended  Neuropsychiatric: appropriate affact    ASSESSMENT/PLAN:  Pertinent issues addressed/ reviewed during this cardiology visit  Coronary artery disease - status post PCI to the left PDA in 2017.  Follow-up coronary angiogram showed severe spasm of the proximal RCA.  No recurrent symptoms of ischemia.  Continue aspirin, isosorbide, amlodipine, and statin.  Encourage continued exercise.  Subclavian stenosis in 2013 -status post stent placement  Diastolic heart failure -euvolemic upon exam, down approximately 20 pounds over the last year.  Due for echocardiogram next week.  Will call with results. Continue current medical therapy.  Hypertension -well-controlled  Hyperlipidemia -LDL at goal, due for fasting lipid panel in November.  Continue rosuvastatin  Venous insufficiency - Stable.     Follow up in 1 year with Dr. Yepez or sooner if needed.       It was a pleasure seeing this patient in clinic today. Please do not hesitate to contact me with any future questions.     Marleni Raza, NIMESH, CNP  Cardiology - [facility]  Heart  07/25/2024       The level of medical decision making during this visit was of moderate complexity.    This note was completed in part using dictation via the Dragon voice recognition software. Some word and grammatical errors may occur and must be interpreted in the appropriate clinical context.  If there are any questions pertaining to this issue, please contact me for further clarification.

## 2024-07-25 NOTE — PATIENT INSTRUCTIONS
Thanks for participating in a office visit with the [facility] clinic today.    Doing well on a cardiac standpoint  No recurrent chest pain, reviewed results of last nuclear stress test and angiogram   Due for echocardiogram next week. Will call with results  Continue current medical therapy.   Due for lipid panel in November.     Follow up in 1 year with Dr. Yepez     Please call my nurse at  [phone] with any questions or concerns.    Scheduling phone number: [phone]  Reminder: Please bring in all current medications, over the counter supplements and vitamin bottles to your next appointment.

## 2024-07-25 NOTE — LETTER
"7/25/2024    Elsy Bah MD  [address] [place] [address]    RE: Victoria Montalvo       Dear Colleague,     I had the pleasure of seeing Victoria Montalvo in the [facility].  CARDIOLOGY CLINIC NOTE    PRIMARY CARDIOLOGIST  Dr. Yepez     PRIMARY CARE PHYSICIAN:  Elsy Bah    HISTORY OF PRESENT ILLNESS:  Victoria Montalvo is a very pleasant 84-year-old female with a past medical history significant for CAD status post PCI to the left PDA in 2017 with recurrent chest pain in 2021 followed by coronary angiogram showing severe spasm of the proximal RCA.  Stent was widely patent.  Subclavian stenosis with intervention in 2013, hypertension, pulmonary hypertension, hyperlipidemia, diastolic heart failure, venous insufficiency, obstructive sleep apnea intolerant to CPAP and COPD.    In review, she underwent stenting of the left subclavian in 2013 after angiogram showed 85% stenosis.  In 2017 she underwent a nuclear stress test that showed reversible anterior apical defect consistent with mild to moderate ischemia in the distribution of the LAD.  Coronary angiogram followed showing mild to moderate disease in the LAD.  There was 80% left PDA narrowing followed by a 2.25 x 18 mm SHERLEY.  There was severe disease in a very tiny nondominant RCA.  Echocardiogram in 2017 showed a normal ejection fraction estimated at 55 to 60%, elevated RV systolic pressure, consistent with mild pulmonary hypertension, mild mitral regurgitation, mild to moderate tricuspid regurgitation and mild pulmonic regurgitation.    Nuclear stress test in 2018 was negative for ischemia.  Coronary angiogram in 2020 showed moderate disease after the distal PDA stent, consistent with spasm.   She had recurrent CP in 2021 and underwent coronary angiogram showing again severe spasm. She underwent a \"cold pressor \" test and IC NTG showing improvement.     Nuclear stress test in 2022 and 2023 were negative for inducible ischemia. "     On 4/22/2024, she was evaluated in the ER for chest pain. Work up was reassuring, negative troponin, no acute EKG changes and complete resolution of chest pain.     She returns to the office today for follow up.  Overall, she is feeling well and cardiac standpoint with no recurrent chest pain over the last 2 months.  [place] and overall feels better.  She is down approximately 20 pounds.  She denies shortness of breath, palpitations, PND, orthopnea, presyncope, syncope, or lower extremity edema.  She underwent hernia surgery in January of this year.  She continues to have mild abdominal pain.     Blood pressure is well-controlled at 106/60, managed on amlodipine, isosorbide, spironolactone and furosemide  Last BMP and CBC were unremarkable  Lipid panel showed a total cholesterol 158, HDL 83, LDL 60 and triglycerides 76    Compliant with all medications.    PAST MEDICAL HISTORY:  Past Medical History:   Diagnosis Date    Abnormal Papanicolaou smear of cervix and cervical HPV     colposcopy 1/97    Aortic valve sclerosis     Arthritis     Asthma     on preventative meds and has nebulizer    Chronic rhinitis     COPD (chronic obstructive pulmonary disease)     Coronary artery disease     4/4/17 SHERLEY--PDA    Coronary artery spasm (H24)     Diastolic CHF, chronic 02/22/2012    Gastro-oesophageal reflux disease     Hiatal hernia     Hyperlipidemia 10/31/2011    Obesity     KEN (obstructive sleep apnea)     CPAP    Personal history of colonic polyps     colonoscopy 9/97, 2002 (due in 2007)    Pulmonary HTN     Seasonal allergies     Status post coronary angiogram 04/22/2020    Subclavian artery stenosis, left 08/07/2013    S/p stent in 2013     Subclinical hyperthyroidism 10/17/2016       MEDICATIONS:  Current Outpatient Medications   Medication Sig Dispense Refill    albuterol (PROAIR HFA/PROVENTIL HFA/VENTOLIN HFA) 108 (90 Base) MCG/ACT inhaler USE  2 INHALATIONS EVERY 6 HOURS AS NEEDED FOR SHORTNESS OF BREATH, DYSPNEA, OR WHEEZING (Patient taking differently: 2 puffs every 6 hours as needed USE 2 INHALATIONS EVERY 6 HOURS AS NEEDED FOR SHORTNESS OF BREATH, DYSPNEA, OR WHEEZING) 25.5 g 1    amLODIPine (NORVASC) 5 MG tablet Take 1 tablet (5 mg) by mouth every evening 90 tablet 3    aspirin 81 MG tablet Take 81 mg by mouth every morning      Boswellia-Glucosamine-Vit D (OSTEO BI-FLEX/5-LOXIN ADVANCED) TABS Take 1,500 mg by mouth daily as needed      Calcium 9821-0910 MG-UNIT CHEW Take 1 tablet by mouth every morning      Cholecalciferol (VITAMIN D3 PO) Take 4,000 Units by mouth every morning      CRANBERRY PO Take 1 capsule by mouth every morning      diclofenac (VOLTAREN) 1 % topical gel Apply 2 g topically 4 times daily as needed for moderate pain 350 g 11    fluticasone (FLONASE) 50 MCG/ACT nasal spray USE 1 TO 2 SPRAYS IN EACH NOSTRIL DAILY 48 g 5    fluticasone-salmeterol (ADVAIR) 250-50 MCG/ACT inhaler Inhale 1 puff into the lungs 2 times daily 60 each 11    furosemide (LASIX) 20 MG tablet Take 1 tablet (20 mg) by mouth daily (Patient taking differently: Take 20 mg by mouth every morning) 90 tablet 3    ipratropium - albuterol 0.5 mg/2.5 mg/3 mL (DUONEB) 0.5-2.5 (3) MG/3ML neb solution Take 1 vial (3 mLs) by nebulization every 6 hours as needed for shortness of breath, wheezing or cough 90 mL 11    isosorbide mononitrate (IMDUR) 60 MG 24 hr tablet TAKE 1 TABLET EVERY MORNING HOLD IF SYSTOLIC BLOOD PRESSURE IS LESS THAN 85 (Patient taking differently: 60 mg every morning TAKE 1 TABLET EVERY MORNING HOLD IF SYSTOLIC BLOOD PRESSURE IS LESS THAN 85) 90 tablet 3    montelukast (SINGULAIR) 10 MG tablet Take 1 tablet (10 mg) by mouth at bedtime 90 tablet 3    multivitamin w/minerals (MULTI-VITAMIN) tablet Take 1 tablet by mouth every morning      nitroGLYcerin (NITROSTAT) 0.4 MG sublingual tablet One tablet under the tongue every 5 minutes if needed for chest  "pain. May repeat every 5 minutes for a maximum of 3 doses in 15 minutes\" 25 tablet 3    omega-3 fatty acids 1200 MG capsule Take 1 capsule by mouth every morning      rosuvastatin (CRESTOR) 40 MG tablet Take 1 tablet (40 mg) by mouth daily (Patient taking differently: Take 40 mg by mouth every evening) 90 tablet 3    spironolactone (ALDACTONE) 25 MG tablet Take 1 tablet (25 mg) by mouth every morning 90 tablet 3     No current facility-administered medications for this visit.       SOCIAL HISTORY:  I have reviewed this patient's social history and updated it with pertinent information if needed. Victoria Montalvo  reports that she quit smoking about 31 years ago. Her smoking use included cigarettes. She started smoking about 51 years ago. She has a 30 pack-year smoking history. She has never used smokeless tobacco. She reports current alcohol use. She reports that she does not use drugs.    PHYSICAL EXAM:  Pulse:  [84] 84  BP: (106)/(60) 106/60  SpO2:  [95 %] 95 %  159 lbs 14.4 oz    Constitutional: alert, no distress  Respiratory: Good bilateral air entry  Cardiovascular: Regular rate and rhythm  GI: nondistended  Neuropsychiatric: appropriate affact    ASSESSMENT/PLAN:  Pertinent issues addressed/ reviewed during this cardiology visit  Coronary artery disease - status post PCI to the left PDA in 2017.  Follow-up coronary angiogram showed severe spasm of the proximal RCA.  No recurrent symptoms of ischemia.  Continue aspirin, isosorbide, amlodipine, and statin.  Encourage continued exercise.  Subclavian stenosis in 2013 -status post stent placement  Diastolic heart failure -euvolemic upon exam, down approximately 20 pounds over the last year.  Due for echocardiogram next week.  Will call with results. Continue current medical therapy.  Hypertension -well-controlled  Hyperlipidemia -LDL at goal, due for fasting lipid panel in November.  Continue rosuvastatin  Venous insufficiency - Stable.     Follow up in 1 year with " Dr. Yepez or sooner if needed.       It was a pleasure seeing this patient in clinic today. Please do not hesitate to contact me with any future questions.     NIMESH Marquis, CNP  Cardiology - [facility]  07/25/2024     The level of medical decision making during this visit was of moderate complexity.    This note was completed in part using dictation via the Dragon voice recognition software. Some word and grammatical errors may occur and must be interpreted in the appropriate clinical context.  If there are any questions pertaining to this issue, please contact me for further clarification.  Thank you for allowing me to participate in the care of your patient.      Sincerely,   NIMESH Marquis [facility]  cc:   Yogi Yepez [place]  [address] [address] [address]

## 2024-07-30 ENCOUNTER — PATIENT OUTREACH (OUTPATIENT)
Dept: CARE COORDINATION | Facility: CLINIC | Age: 84
End: 2024-07-30
Payer: COMMERCIAL

## 2024-07-30 NOTE — PROGRESS NOTES
Clinic Care Coordination Contact  Care Coordination Clinician Chart review    Situation: Patient chart reviewed by care coordinator.       Background: Care Coordination initial assessment and enrollment took place 1/24/2024.   Upon initial assessment patient-centered goals were discussed and developed with participation from patient.  RNCC handed patient follow up and monitoring of goal progression off to [facility] for continued outreach every 30 days.        Assessment: Per chart review, patient outreach completed by CC W on 07/09/2024.  Patient is actively working to accomplish goal(s) and patient's goal(s) remain(s) appropriate and relevant at this time.       Care Plan: Advance Care Plan       Problem: Patient does not have a valid Health Care Directive       Long-Range Goal: Complete Health Care Directive       Start Date: 1/31/2024 Expected End Date: 12/31/2024    This Visit's Progress: 20% Recent Progress: 20%    Note:     Barriers: Limited driving; recovering from surgery; Provider availability - wait time to complete appointments.   Strengths: Motivated; Independent; Agreeable to Care Coordination.   Patient expressed understanding of goal: Yes.   Action steps to achieve this goal:  1. I will review the resources for [employer].   2. I will contact [facility] [employer] when I'm ready to complete my Advance Care Planning documents.   3. I will contact my care team with questions, concerns or support needs. I will use the clinic as a resource and I understand I can contact my clinic with 24/7 after hours services available. Care Coordinator will remain available as needed.                               Care Plan: Resources for Housekeeping/Chore services, Transportation Completed 7/9/2024      Problem: Resources for Housekeeping/Chore services & Transportation  Resolved 7/9/2024      Long-Range Goal: Resources for Housekeeping/Chore services & Transportation  Completed 7/9/2024      Start Date: 1/31/2024  Expected End Date: 12/31/2024    This Visit's Progress: 100% Recent Progress: 20%    Note:     Barriers: Limited driving; recovering from surgery; Provider availability - wait time to complete appointments.   Strengths: Motivated; Independent; Agreeable to Care Coordination.   Patient expressed understanding of goal: Yes.   Action steps to achieve this goal:  1. I will review the resources for Housekeeping/Chore services.    2. I will review the list of Transportation options and follow up with the ones that are of interest to me. I will make arrangements with the companies that I want to use so I have transportation when my family is unavailable.   3. I will discuss, review, schedule and complete recommended overdue health maintenance with my Primary Care Provider.    4. I will contact my care team with questions, concerns or support needs. I will use the clinic as a resource and I understand I can contact my clinic with 24/7 after hours services available. Care Coordinator will remain available as needed.                               Plan/Recommendations: RNCC Clinical Assessments to be completed annually or as needed. Annual Assessment will be due 01/2025. The patient will continue working with Care Coordination to achieve goal(s) as above.  CHWCC will involve RNCC as needed or if patient is ready to transition to goal status of Maintenance.  RNCC will continue to review progress to goal(s) and CHWCC outreaches every 6 weeks.     Complex Care Plan:  Patient is due for updated Plan of Care.  Care Plan updated and sent to patient: Yes, via Ez Rivera RN Care Coordinator  [facility] [place]  Email: [email or web address]  Phone: [phone]

## 2024-07-30 NOTE — LETTER
[facility] CARE COORDINATION  [address] [place] [address]     July 30, 2024        Victoria Phillips  [address] [place] [address]          Dear June, Attached is an updated Patient Centered Plan of Care for your continued enrollment in Care Coordination. Please let us know if you have additional questions, concerns, or goals that we can assist with.    Sincerely,    Lilli Rivera RN Care Coordinator  [facility] [place] [place], [place]  Email: [email or web address]  Phone: [phone]              [facility]  Patient Centered Plan of Care  About Me:      Patient Name:  Victoria Phillips    YOB: 1940  Age:         84 year old   [place] [employer] MRN:    [id number] Telephone Information:  Home Phone [phone]   Mobile [phone]       Address:  [address] [place] [address] Email address:  [email or web address]      Emergency Contact(s)    Name Relationship Lgl Grd Work Phone Home Phone Mobile Phone   1. PAULETTETOBI PHILIP Daughter   [phone]   2. EMERITA GAR* Grandchild    [phone]   3. SAQIB PHILLIPS Son    [phone]   4. YANJER Grandchild    [phone]   5. JANIYA ARORA Grandchild    [phone]           Primary language:  English     needed? No   [employer]:  [phone] op. 1  Other communication barriers:None    Preferred Method of Communication:  Mail  Current living arrangement: I live alone    Mobility Status/ Medical Equipment: Independent w/Device    Health Maintenance  Health Maintenance Reviewed: Due/Overdue   Health Maintenance Due   Topic Date Due    ZOSTER IMMUNIZATION (1 of 2) Never done    RSV VACCINE (Pregnancy & 60+) (1 - 1-dose 60+ series) Never done    DTAP/TDAP/TD IMMUNIZATION (1 - Tdap) 04/15/2007    Pneumococcal Vaccine: 65+ Years (2 of 2 - PCV) 12/08/2011    COLORECTAL CANCER SCREENING  02/17/2020    HF ACTION PLAN  08/04/2020     ASTHMA ACTION PLAN  04/28/2022    COVID-19 Vaccine (6 - 2023-24 season) 09/01/2023    ASTHMA CONTROL TEST  05/30/2024      My Access Plan  Medical Emergency 911   Primary Clinic Line [facility] [place] - [phone]   24 Hour Appointment Line [phone] or  [phone]) (toll-free)   24 Hour Nurse Line [phone] (toll-free)   Preferred Urgent Care [facility] [place], [phone]     Preferred Hospital [facility]  [phone]     Preferred Pharmacy  - MAIL ORDER     Behavioral Health Crisis Line The National Suicide Prevention Lifeline at 1-600.237.9397 or Text/Call 498     My Care Team Members  Patient Care Team         Relationship Specialty Notifications Start End    Elsy Bah MD PCP - General Internal Medicine  12/29/18     Phone: [phone] Fax: [phone]         [facility] [address] [place] [address]    Elsy Bah MD Assigned PCP   10/7/18     Phone: [phone] Fax: [phone]         [facility] [place] [address]    Aron Pro MD Assigned Neuroscience Provider   6/24/23     Phone: [phone] Fax: [phone] [address] [place] [address]    Lilli Way APRN CNP Nurse Practitioner Anesthesiology  8/2/23     Phone: [phone] Fax: [phone] [address] [address]    Genesis Kuhn MD MD Gastroenterology  8/2/23     Phone: [phone] [address] [address]    Shaji Bello MD MD Cardiovascular & Thoracic Surgery  8/22/23     Phone: [phone] Fax: [phone] [address] [facility] [address]    Shaji Bello MD Assigned Heart and Vascular Provider   9/23/23     Phone: [phone] Fax: [phone] [address] [facility] [address]    Linda Mayo PA-C Assigned Surgical Provider   1/13/24     Phone: [phone] Fax: [phone] [address]  [facility] [address] [facility] [address]    Suzette Crawford PA-C Assigned Gastroenterology Provider   1/25/24     Phone: [phone] Fax: [phone]         [address] [facility] [address]    Lilli Rivera, RN Lead Care Coordinator  Admissions 1/25/24     Phone: [phone]         Cyndi Doe, CHW Community Health Worker Primary Care - CC Admissions 2/28/24     Phone: [phone]         Marleni Raza APRN CNP Nurse Practitioner Cardiovascular Disease  4/23/24     Phone: [phone] Fax: [phone] [address] [facility] [address]              My Care Plans  Self Management and Treatment Plan    Care Plan  Care Plan: Advance Care Plan       Problem: Patient does not have a valid Health Care Directive       Long-Range Goal: Complete Health Care Directive       Start Date: 1/31/2024 Expected End Date: 12/31/2024    This Visit's Progress: 20% Recent Progress: 20%    Note:     Barriers: Limited driving; recovering from surgery; Provider availability - wait time to complete appointments.   Strengths: Motivated; Independent; Agreeable to Care Coordination.   Patient expressed understanding of goal: Yes.   Action steps to achieve this goal:  1. I will review the resources for [facility].   2. I will contact [facility] Rucker when I'm ready to complete my Advance Care Planning documents.   3. I will contact my care team with questions, concerns or support needs. I will use the clinic as a resource and I understand I can contact my clinic with 24/7 after hours services available. Care Coordinator will remain available as needed.                                 Action Plans on File:   Asthma           Heart Failure       Advance Care Plans/Directives:   Advanced Care Plan/Directives on file:   No    Discussed with patient/caregiver(s):   Referral to Maryellen Rucker         My Medical and Care Information  Problem List   Patient Active Problem List   Diagnosis    History of colonic  polyps    Chronic rhinitis    Hiatal hernia with GERD    Female stress incontinence    LUMBOSACRAL NEURITIS , numbness left thigh    Osteopenia    Hyperlipidemia LDL goal <130    Diastolic CHF, chronic (H)    Subclavian artery stenosis, left (H24)    Severe persistent asthma (H28)    KEN (obstructive sleep apnea)    Hip osteoarthritis    Mild coronary artery disease    Subclinical hyperthyroidism    Chest pain    Unstable angina pectoris (H)    Status post coronary angiogram    Hypertension    Vasospastic angina (H24)    Exertional chest pain    Abnormal findings on diagnostic imaging of heart and coronary circulation    Venous reflux    Chest pain, unspecified type    History of CAD (coronary artery disease)    Lumbosacral spinal stenosis    Scoliosis of thoracolumbar spine, unspecified scoliosis type    Bertolotti's syndrome    Facet arthropathy, lumbosacral    DDD (degenerative disc disease), lumbosacral    Hiatal hernia      Current Medications and Allergies:    Allergies   Allergen Reactions    Animal Dander     Bee Pollen Other (See Comments)     Pollen,dust, trees, grass, mold-hayfever symptoms    Dust Mites     Kiwi Itching     Itching and red all over    Pollen Extract      Pollen,dust, trees, grass, mold-hayfever symptoms      Current Outpatient Medications:     albuterol (PROAIR HFA/PROVENTIL HFA/VENTOLIN HFA) 108 (90 Base) MCG/ACT inhaler, USE 2 INHALATIONS EVERY 6 HOURS AS NEEDED FOR SHORTNESS OF BREATH, DYSPNEA, OR WHEEZING (Patient taking differently: 2 puffs every 6 hours as needed USE 2 INHALATIONS EVERY 6 HOURS AS NEEDED FOR SHORTNESS OF BREATH, DYSPNEA, OR WHEEZING), Disp: 25.5 g, Rfl: 1    amLODIPine (NORVASC) 5 MG tablet, Take 1 tablet (5 mg) by mouth every evening, Disp: 90 tablet, Rfl: 3    aspirin 81 MG tablet, Take 81 mg by mouth every morning, Disp: , Rfl:     Boswellia-Glucosamine-Vit D (OSTEO BI-FLEX/5-LOXIN ADVANCED) TABS, Take 1,500 mg by mouth daily as needed, Disp: , Rfl:     Calcium  "0934-0964 MG-UNIT CHEW, Take 1 tablet by mouth every morning, Disp: , Rfl:     Cholecalciferol (VITAMIN D3 PO), Take 4,000 Units by mouth every morning, Disp: , Rfl:     CRANBERRY PO, Take 1 capsule by mouth every morning, Disp: , Rfl:     diclofenac (VOLTAREN) 1 % topical gel, Apply 2 g topically 4 times daily as needed for moderate pain, Disp: 350 g, Rfl: 11    fluticasone (FLONASE) 50 MCG/ACT nasal spray, USE 1 TO 2 SPRAYS IN EACH NOSTRIL DAILY, Disp: 48 g, Rfl: 5    fluticasone-salmeterol (ADVAIR) 250-50 MCG/ACT inhaler, Inhale 1 puff into the lungs 2 times daily, Disp: 60 each, Rfl: 11    furosemide (LASIX) 20 MG tablet, Take 1 tablet (20 mg) by mouth daily (Patient taking differently: Take 20 mg by mouth every morning), Disp: 90 tablet, Rfl: 3    ipratropium - albuterol 0.5 mg/2.5 mg/3 mL (DUONEB) 0.5-2.5 (3) MG/3ML neb solution, Take 1 vial (3 mLs) by nebulization every 6 hours as needed for shortness of breath, wheezing or cough, Disp: 90 mL, Rfl: 11    isosorbide mononitrate (IMDUR) 60 MG 24 hr tablet, TAKE 1 TABLET EVERY MORNING HOLD IF SYSTOLIC BLOOD PRESSURE IS LESS THAN 85 (Patient taking differently: 60 mg every morning TAKE 1 TABLET EVERY MORNING HOLD IF SYSTOLIC BLOOD PRESSURE IS LESS THAN 85), Disp: 90 tablet, Rfl: 3    montelukast (SINGULAIR) 10 MG tablet, Take 1 tablet (10 mg) by mouth at bedtime, Disp: 90 tablet, Rfl: 3    multivitamin w/minerals (MULTI-VITAMIN) tablet, Take 1 tablet by mouth every morning, Disp: , Rfl:     nitroGLYcerin (NITROSTAT) 0.4 MG sublingual tablet, One tablet under the tongue every 5 minutes if needed for chest pain. May repeat every 5 minutes for a maximum of 3 doses in 15 minutes\", Disp: 25 tablet, Rfl: 3    omega-3 fatty acids 1200 MG capsule, Take 1 capsule by mouth every morning, Disp: , Rfl:     rosuvastatin (CRESTOR) 40 MG tablet, Take 1 tablet (40 mg) by mouth daily (Patient taking differently: Take 40 mg by mouth every evening), Disp: 90 tablet, Rfl: 3    " spironolactone (ALDACTONE) 25 MG tablet, Take 1 tablet (25 mg) by mouth every morning, Disp: 90 tablet, Rfl: 3     Care Coordination Start Date: 1/24/2024   Frequency of Care Coordination: monthly, more frequently as needed     Form Last Updated: 07/30/2024

## 2024-08-08 ENCOUNTER — HOSPITAL ENCOUNTER (OUTPATIENT)
Dept: CARDIOLOGY | Facility: CLINIC | Age: 84
Discharge: HOME | End: 2024-08-08
Attending: PEDIATRICS | Admitting: PEDIATRICS
Payer: COMMERCIAL

## 2024-08-08 DIAGNOSIS — I50.32 DIASTOLIC CHF, CHRONIC (H): ICD-10-CM

## 2024-08-08 LAB — LVEF ECHO: NORMAL

## 2024-08-08 PROCEDURE — 93306 TTE W/DOPPLER COMPLETE: CPT

## 2024-08-08 PROCEDURE — 93306 TTE W/DOPPLER COMPLETE: CPT | Mod: 26 | Performed by: INTERNAL MEDICINE

## 2024-08-09 ENCOUNTER — PATIENT OUTREACH (OUTPATIENT)
Dept: CARE COORDINATION | Facility: CLINIC | Age: 84
End: 2024-08-09
Payer: COMMERCIAL

## 2024-08-09 NOTE — PROGRESS NOTES
Clinic Care Coordination Contact  [facility]/Voicemail    Clinical Data: Care Coordinator Outreach    Outreach Documentation Number of Outreach Attempt   8/9/2024   3:28 PM 1       Left message on patient's voicemail with call back information and requested return call.    Plan: Care Coordinator will try to reach patient again in 10 business days.    BEE Correia, B.S. [facility] Care Coordination  [facility] Clinics:  [place], [place] and [place]  [phone]  [email or web address]

## 2024-08-09 NOTE — RESULT ENCOUNTER NOTE
Hello Victoria ,    The results from your recent heart echo appears to be within normal limits.  Please contact your primary doctor if you have any questions or concerns.        Thank you for choosing [facility] for your health care needs,      Cesilia Alonso PA-C

## 2024-08-18 DIAGNOSIS — J45.40 MODERATE PERSISTENT ASTHMA, UNCOMPLICATED: ICD-10-CM

## 2024-08-19 RX ORDER — FLUTICASONE PROPIONATE AND SALMETEROL 250; 50 UG/1; UG/1
1 POWDER RESPIRATORY (INHALATION) 2 TIMES DAILY
Qty: 90 EACH | Refills: 3 | Status: SHIPPED | OUTPATIENT
Start: 2024-08-19

## 2024-08-27 ENCOUNTER — PATIENT OUTREACH (OUTPATIENT)
Dept: CARE COORDINATION | Facility: CLINIC | Age: 84
End: 2024-08-27
Payer: COMMERCIAL

## 2024-08-27 NOTE — PROGRESS NOTES
Clinic Care Coordination Contact  [facility]/Voicemail    Clinical Data: Care Coordinator Outreach    Outreach Documentation Number of Outreach Attempt   8/9/2024   3:28 PM 1   8/27/2024  11:29 AM 2       Left message on patient's voicemail with call back information and requested return call.    Plan: Care Coordinator will send a message via 2threads. Care Coordinator will chart review again in 10 business days, if no further engagement from patient will route to lead CC.     Cyndi Doe, BEE, B.S. [facility] Care Coordination  [facility]:  [place], [place] and [place]  [phone]  [email or web address]

## 2024-08-28 NOTE — PROGRESS NOTES
Clinic Care Coordination Contact  Community Health Worker Follow Up    Care Gaps:     Health Maintenance Due   Topic Date Due    ZOSTER IMMUNIZATION (1 of 2) Never done    RSV VACCINE (Pregnancy & 60+) (1 - 1-dose 60+ series) Never done    DTAP/TDAP/TD IMMUNIZATION (1 - Tdap) 04/15/2007    Pneumococcal Vaccine: 65+ Years (2 of 2 - PCV) 12/08/2011    COLORECTAL CANCER SCREENING  02/17/2020    HF ACTION PLAN  08/04/2020    ASTHMA ACTION PLAN  04/28/2022    COVID-19 Vaccine (6 - 2023-24 season) 09/01/2023    ASTHMA CONTROL TEST  05/30/2024     Will discuss at next outreach    Care Plan:   Care Plan: Advance Care Plan       Problem: Patient does not have a valid Health Care Directive       Long-Range Goal: Complete Health Care Directive       Start Date: 1/31/2024 Expected End Date: 12/31/2024    This Visit's Progress: 50% Recent Progress: 20%    Note:     Barriers: Limited driving; recovering from surgery; Provider availability - wait time to complete appointments.   Strengths: Motivated; Independent; Agreeable to Care Coordination.   Patient expressed understanding of goal: Yes.   Action steps to achieve this goal:  1. I will review the resources for [employer].   2. I will contact [employer] when I'm ready to complete my Advance Care Planning documents.   3. I will contact my care team with questions, concerns or support needs. I will use the clinic as a resource and I understand I can contact my clinic with 24/7 after hours services available. Care Coordinator will remain available as needed.                                 Intervention and Education during outreach: Patient called CHW back, states that she has been going to [employer] for groceries once a month that has been very helpful.   She is asking about transportation [facility] as she does not like driving on the freeway. Wondering if Medicare pays for Lyft/[employer]. CHW explains that it does not, will email a list of  transportation options to patient via email. Given [facility] transportation info.   Patient explains that she has been having financial difficulties after they helped kids out when they had a hard time with credit card debt. This ended up using their money from selling their house and needing to take out a mortgage for their new home. Now paying $425/month, kids are not helping pay back their debt. Patient is also receiving $1000/month less after  passed away last year.   Pt also states that she will be leaving again around the end of October to go to [place] for another month to stay with her son over Thanksgiving.     CHW Plan: Next outreach in one month.     BEE Correia, B.S. [facility]  Clinic Care Coordination  [facility] Clinics:  [place], [place] and [place]  [phone]  [email or web address]

## 2024-09-11 ENCOUNTER — PATIENT OUTREACH (OUTPATIENT)
Dept: CARE COORDINATION | Facility: CLINIC | Age: 84
End: 2024-09-11
Payer: COMMERCIAL

## 2024-09-11 NOTE — PROGRESS NOTES
Clinic Care Coordination Contact  Care Coordination Clinician Chart review    Situation: Patient chart reviewed by care coordinator.       Background: Care Coordination initial assessment and enrollment took place 1/24/2024.   Upon initial assessment patient-centered goals were discussed and developed with participation from patient.  RNCC handed patient follow up and monitoring of goal progression off to [facility] for continued outreach every 30 days.        Assessment: Per chart review, patient outreach completed by CC CHW on 08/28/2024.  Patient is actively working to accomplish goal(s) and patient's goal(s) remain(s) appropriate and relevant at this time.       Care Plan: Advance Care Plan       Problem: Patient does not have a valid Health Care Directive       Long-Range Goal: Complete Health Care Directive       Start Date: 1/31/2024 Expected End Date: 12/31/2024    This Visit's Progress: 50% Recent Progress: 20%    Note:     Barriers: Limited driving; recovering from surgery; Provider availability - wait time to complete appointments.   Strengths: Motivated; Independent; Agreeable to Care Coordination.   Patient expressed understanding of goal: Yes.   Action steps to achieve this goal:  1. I will review the resources for [employer].   2. I will contact [facility] when I'm ready to complete my Advance Care Planning documents.   3. I will contact my care team with questions, concerns or support needs. I will use the clinic as a resource and I understand I can contact my clinic with 24/7 after hours services available. Care Coordinator will remain available as needed.                               Care Plan: Resources for Housekeeping/Chore services, Transportation Completed 7/9/2024      Problem: Resources for Housekeeping/Chore services & Transportation  Resolved 7/9/2024      Long-Range Goal: Resources for Housekeeping/Chore services & Transportation  Completed 7/9/2024      Start Date: 1/31/2024  Expected End Date: 12/31/2024    This Visit's Progress: 100% Recent Progress: 20%    Note:     Barriers: Limited driving; recovering from surgery; Provider availability - wait time to complete appointments.   Strengths: Motivated; Independent; Agreeable to Care Coordination.   Patient expressed understanding of goal: Yes.   Action steps to achieve this goal:  1. I will review the resources for Housekeeping/Chore services.    2. I will review the list of Transportation options and follow up with the ones that are of interest to me. I will make arrangements with the companies that I want to use so I have transportation when my family is unavailable.   3. I will discuss, review, schedule and complete recommended overdue health maintenance with my Primary Care Provider.    4. I will contact my care team with questions, concerns or support needs. I will use the clinic as a resource and I understand I can contact my clinic with 24/7 after hours services available. Care Coordinator will remain available as needed.                               Plan/Recommendations: RNCC Clinical Assessments to be completed annually or as needed. Annual Assessment will be due 01/2025. The patient will continue working with Care Coordination to achieve goal(s) as above.  CHWCC will involve RNCC as needed or if patient is ready to transition to goal status of Maintenance.  RNCC will continue to review progress to goal(s) and CHWCC outreaches every 6 weeks.     Complex Care Plan:  Patient is not due for updated Plan of Care.  Care Plan updated and sent to patient: Leydi Rivera RN Care Coordinator  [facility] [place]  Email: [email or web address]  Phone: [phone]

## 2024-09-30 ENCOUNTER — PATIENT OUTREACH (OUTPATIENT)
Dept: CARE COORDINATION | Facility: CLINIC | Age: 84
End: 2024-09-30
Payer: COMMERCIAL

## 2024-09-30 NOTE — PROGRESS NOTES
Clinic Care Coordination Contact    Situation: RNCC received request from W CC to review changing clinic care coordination enrollment to maintenance.     Background: Care Coordination initial assessment and enrollment took place 1/24/2024. WCC outreach completed with patient on 09/30/2024. Patient expressed no new goals, needs, resources, questions, concerns, support needs from clinic care coordination team at this time.     Assessment: Maintenance review request approved.     Plan/Recommendations: Care coordinator will follow up with patient in 2 months to identify any outstanding questions, concerns, resource and/or support needs and review if appropriate to transition to clinic care coordination graduation at this time. Care coordinator will remain available if/as needed.     Lilli Rivera RN Care Coordinator  [facility] [place]  Email: [email or web address]  Phone: [phone]

## 2024-09-30 NOTE — PROGRESS NOTES
Clinic Care Coordination Contact  Community Health Worker Follow Up    Care Gaps:     Health Maintenance Due   Topic Date Due    ZOSTER IMMUNIZATION (1 of 2) Never done    DTAP/TDAP/TD IMMUNIZATION (1 - Tdap) 04/15/2007    Pneumococcal Vaccine: 65+ Years (2 of 2 - PCV) 12/08/2011    RSV VACCINE (1 - 1-dose 75+ series) Never done    COLORECTAL CANCER SCREENING  02/17/2020    HF ACTION PLAN  08/04/2020    ASTHMA ACTION PLAN  04/28/2022    ASTHMA CONTROL TEST  05/30/2024    INFLUENZA VACCINE (1) 09/01/2024    COVID-19 Vaccine (6 - 2024-25 season) 09/01/2024     Discussed with patient who will address at next PCP appt on 12/5.    Care Plan:   Care Plan: Advance Care Plan Completed 9/30/2024      Problem: Patient does not have a valid Health Care Directive  Resolved 9/30/2024      Long-Range Goal: Complete Health Care Directive  Completed 9/30/2024      Start Date: 1/31/2024 Expected End Date: 12/31/2024    Recent Progress: 50%    Note:     Barriers: Limited driving; recovering from surgery; Provider availability - wait time to complete appointments.   Strengths: Motivated; Independent; Agreeable to Care Coordination.   Patient expressed understanding of goal: Yes.   Action steps to achieve this goal:  1. I will review the resources for [employer].   2. I will contact [employer] when I'm ready to complete my Advance Care Planning documents.   3. I will contact my care team with questions, concerns or support needs. I will use the clinic as a resource and I understand I can contact my clinic with 24/7 after hours services available. Care Coordinator will remain available as needed.                               Care Plan: Resources for Housekeeping/Chore services, Transportation Completed 7/9/2024      Problem: Resources for Housekeeping/Chore services & Transportation  Resolved 7/9/2024      Long-Range Goal: Resources for Housekeeping/Chore services & Transportation  Completed 7/9/2024      Start Date:  1/31/2024 Expected End Date: 12/31/2024    This Visit's Progress: 100% Recent Progress: 20%    Note:     Barriers: Limited driving; recovering from surgery; Provider availability - wait time to complete appointments.   Strengths: Motivated; Independent; Agreeable to Care Coordination.   Patient expressed understanding of goal: Yes.   Action steps to achieve this goal:  1. I will review the resources for Housekeeping/Chore services.    2. I will review the list of Transportation options and follow up with the ones that are of interest to me. I will make arrangements with the companies that I want to use so I have transportation when my family is unavailable.   3. I will discuss, review, schedule and complete recommended overdue health maintenance with my Primary Care Provider.    4. I will contact my care team with questions, concerns or support needs. I will use the clinic as a resource and I understand I can contact my clinic with 24/7 after hours services available. Care Coordinator will remain available as needed.                               Intervention and Education during outreach: Patient states that things are going well, her daughter taught her how to use Lyft which she will use for appts that are further away.   She does not wish to work on the [place] anymore. Pt will be visiting her son again in [place] for awhile. She agrees to maintenance and will call CHW if any other needs arise.     CHW Plan: Routing to lead CC to review for maintenance, if accepted next outreach will be in two months.    BEE Correia, B.S. [facility] Care Coordination  [facility] Clinics:  [place], [place] and [place]  [phone]  [email or web address]

## 2024-10-11 DIAGNOSIS — J45.40 MODERATE PERSISTENT ASTHMA, UNCOMPLICATED: ICD-10-CM

## 2024-10-11 RX ORDER — ALBUTEROL SULFATE 90 UG/1
INHALANT RESPIRATORY (INHALATION)
Qty: 25.5 G | Refills: 3 | Status: SHIPPED | OUTPATIENT
Start: 2024-10-11

## 2024-11-04 NOTE — PLAN OF CARE
"Problem: Patient Care Overview  Goal: Plan of Care/Patient Progress Review  Outcome: Improving  Orientation: Alert and oriented x4. Awake, alert, cooperative, no apparent distress.    VSS: /57  Pulse 65  Temp 96.7  F (35.9  C) (Oral)  Resp 18  Ht 1.549 m (5' 1\")  Wt 80.9 kg (178 lb 4.8 oz)  SpO2 96%  BMI 33.69 kg/m2.   IVF: Hep gtt infusing 7.0 mg/hr  Tele: SR. HR 60s. Regular rate and rhythm, no murmur noted  LS: clear and equal bilaterally. Clear to auscultation bilaterally.  No wheezing  GI: Passing gas. No BM. Denies N/V.  Normal bowel sounds, soft, non-distended, non-tender  : Adequate urine output. Clear yellow.   Skin: bruising noted, Skin otherwise intact. No rashes, no cyanosis, dry to touch  Activity: SBA to assist of 1. Pt slept comfortably throughout shift.   Pain: no c/o pain. No PRN interventions utilized. Pt sleeping.  DC Plan: Continue with current cares.         " Bedside report received and patient care assumed. Pt is resting in bed, A&O4, with no complaints of pain, and is on room air. Tele box on. All fall precautions are in place, belongings at bedside table.  Pt was updated on POC, no questions or concerns. Pt educated on use of call light for assistance.

## 2024-11-25 DIAGNOSIS — I50.32 DIASTOLIC CHF, CHRONIC (H): ICD-10-CM

## 2024-11-25 DIAGNOSIS — I25.10 CORONARY ARTERY DISEASE INVOLVING NATIVE CORONARY ARTERY OF NATIVE HEART WITHOUT ANGINA PECTORIS: ICD-10-CM

## 2024-11-25 DIAGNOSIS — I87.2 VENOUS REFLUX: ICD-10-CM

## 2024-11-25 RX ORDER — FUROSEMIDE 20 MG/1
20 TABLET ORAL DAILY
Qty: 90 TABLET | Refills: 3 | Status: SHIPPED | OUTPATIENT
Start: 2024-11-25

## 2024-12-02 ENCOUNTER — PATIENT OUTREACH (OUTPATIENT)
Dept: CARE COORDINATION | Facility: CLINIC | Age: 84
End: 2024-12-02
Payer: COMMERCIAL

## 2024-12-02 SDOH — HEALTH STABILITY: PHYSICAL HEALTH: ON AVERAGE, HOW MANY MINUTES DO YOU ENGAGE IN EXERCISE AT THIS LEVEL?: 30 MIN

## 2024-12-02 SDOH — HEALTH STABILITY: PHYSICAL HEALTH: ON AVERAGE, HOW MANY DAYS PER WEEK DO YOU ENGAGE IN MODERATE TO STRENUOUS EXERCISE (LIKE A BRISK WALK)?: 3 DAYS

## 2024-12-02 ASSESSMENT — ASTHMA QUESTIONNAIRES
QUESTION_4 LAST FOUR WEEKS HOW OFTEN HAVE YOU USED YOUR RESCUE INHALER OR NEBULIZER MEDICATION (SUCH AS ALBUTEROL): ONCE A WEEK OR LESS
QUESTION_5 LAST FOUR WEEKS HOW WOULD YOU RATE YOUR ASTHMA CONTROL: COMPLETELY CONTROLLED
QUESTION_3 LAST FOUR WEEKS HOW OFTEN DID YOUR ASTHMA SYMPTOMS (WHEEZING, COUGHING, SHORTNESS OF BREATH, CHEST TIGHTNESS OR PAIN) WAKE YOU UP AT NIGHT OR EARLIER THAN USUAL IN THE MORNING: ONCE OR TWICE
QUESTION_1 LAST FOUR WEEKS HOW MUCH OF THE TIME DID YOUR ASTHMA KEEP YOU FROM GETTING AS MUCH DONE AT WORK, SCHOOL OR AT HOME: NONE OF THE TIME
ACT_TOTALSCORE: 22
ACT_TOTALSCORE: 22
QUESTION_2 LAST FOUR WEEKS HOW OFTEN HAVE YOU HAD SHORTNESS OF BREATH: ONCE OR TWICE A WEEK

## 2024-12-02 ASSESSMENT — SOCIAL DETERMINANTS OF HEALTH (SDOH): HOW OFTEN DO YOU GET TOGETHER WITH FRIENDS OR RELATIVES?: ONCE A WEEK

## 2024-12-02 NOTE — PROGRESS NOTES
Clinic Care Coordination Contact  [facility]/Voicemail    Clinical Data: Care Coordinator Outreach    Outreach Documentation Number of Outreach Attempt   12/2/2024   1:36 PM 1       Left message on patient's voicemail with call back information and requested return call.      Plan: Care Coordinator will try to reach patient again in 10 business days.    BEE Correia, B.S. [facility] Care Coordination  [facility] Clinics:  [place], [place] and [place]  [phone]  [email or web address]

## 2024-12-05 ENCOUNTER — OFFICE VISIT (OUTPATIENT)
Dept: PEDIATRICS | Facility: CLINIC | Age: 84
End: 2024-12-05
Payer: COMMERCIAL

## 2024-12-05 VITALS
HEIGHT: 61 IN | HEART RATE: 79 BPM | OXYGEN SATURATION: 97 % | WEIGHT: 160.9 LBS | SYSTOLIC BLOOD PRESSURE: 119 MMHG | BODY MASS INDEX: 30.38 KG/M2 | DIASTOLIC BLOOD PRESSURE: 72 MMHG | TEMPERATURE: 97.8 F | RESPIRATION RATE: 18 BRPM

## 2024-12-05 DIAGNOSIS — J45.40 MODERATE PERSISTENT ASTHMA, UNCOMPLICATED: ICD-10-CM

## 2024-12-05 DIAGNOSIS — I20.1 VASOSPASTIC ANGINA (H): ICD-10-CM

## 2024-12-05 DIAGNOSIS — J31.0 CHRONIC RHINITIS: ICD-10-CM

## 2024-12-05 DIAGNOSIS — Z00.00 ENCOUNTER FOR MEDICARE ANNUAL WELLNESS EXAM: Primary | ICD-10-CM

## 2024-12-05 DIAGNOSIS — G25.81 RESTLESS LEGS SYNDROME (RLS): ICD-10-CM

## 2024-12-05 DIAGNOSIS — I50.32 DIASTOLIC CHF, CHRONIC (H): ICD-10-CM

## 2024-12-05 DIAGNOSIS — J45.50 UNCOMPLICATED SEVERE PERSISTENT ASTHMA (H): ICD-10-CM

## 2024-12-05 DIAGNOSIS — I20.0 UNSTABLE ANGINA PECTORIS (H): ICD-10-CM

## 2024-12-05 DIAGNOSIS — I25.10 CORONARY ARTERY DISEASE INVOLVING NATIVE CORONARY ARTERY OF NATIVE HEART WITHOUT ANGINA PECTORIS: ICD-10-CM

## 2024-12-05 DIAGNOSIS — I10 HYPERTENSION, UNSPECIFIED TYPE: ICD-10-CM

## 2024-12-05 DIAGNOSIS — R79.9 ABNORMAL FINDING OF BLOOD CHEMISTRY, UNSPECIFIED: ICD-10-CM

## 2024-12-05 DIAGNOSIS — I25.10 MILD CORONARY ARTERY DISEASE: ICD-10-CM

## 2024-12-05 DIAGNOSIS — I87.2 VENOUS REFLUX: ICD-10-CM

## 2024-12-05 DIAGNOSIS — J44.9 CHRONIC OBSTRUCTIVE PULMONARY DISEASE, UNSPECIFIED COPD TYPE (H): ICD-10-CM

## 2024-12-05 LAB
ANION GAP SERPL CALCULATED.3IONS-SCNC: 9 MMOL/L (ref 7–15)
BUN SERPL-MCNC: 17.4 MG/DL (ref 8–23)
CALCIUM SERPL-MCNC: 9.3 MG/DL (ref 8.8–10.4)
CHLORIDE SERPL-SCNC: 104 MMOL/L (ref 98–107)
CHOLEST SERPL-MCNC: 161 MG/DL
CREAT SERPL-MCNC: 0.76 MG/DL (ref 0.51–0.95)
EGFRCR SERPLBLD CKD-EPI 2021: 76 ML/MIN/1.73M2
FASTING STATUS PATIENT QL REPORTED: NO
FASTING STATUS PATIENT QL REPORTED: NO
FERRITIN SERPL-MCNC: 95 NG/ML (ref 11–328)
GLUCOSE SERPL-MCNC: 94 MG/DL (ref 70–99)
HCO3 SERPL-SCNC: 27 MMOL/L (ref 22–29)
HDLC SERPL-MCNC: 83 MG/DL
LDLC SERPL CALC-MCNC: 64 MG/DL
NONHDLC SERPL-MCNC: 78 MG/DL
POTASSIUM SERPL-SCNC: 3.9 MMOL/L (ref 3.4–5.3)
SODIUM SERPL-SCNC: 140 MMOL/L (ref 135–145)
TRIGL SERPL-MCNC: 70 MG/DL

## 2024-12-05 RX ORDER — ROSUVASTATIN CALCIUM 40 MG/1
40 TABLET, COATED ORAL DAILY
Qty: 90 TABLET | Refills: 3 | Status: SHIPPED | OUTPATIENT
Start: 2024-12-05

## 2024-12-05 RX ORDER — FUROSEMIDE 20 MG/1
20 TABLET ORAL DAILY
Qty: 90 TABLET | Refills: 3 | Status: SHIPPED | OUTPATIENT
Start: 2024-12-05

## 2024-12-05 RX ORDER — ISOSORBIDE MONONITRATE 60 MG/1
TABLET, EXTENDED RELEASE ORAL
Qty: 90 TABLET | Refills: 3 | Status: SHIPPED | OUTPATIENT
Start: 2024-12-05

## 2024-12-05 RX ORDER — FLUTICASONE PROPIONATE AND SALMETEROL 250; 50 UG/1; UG/1
1 POWDER RESPIRATORY (INHALATION) 2 TIMES DAILY
Qty: 90 EACH | Refills: 3 | Status: SHIPPED | OUTPATIENT
Start: 2024-12-05

## 2024-12-05 RX ORDER — FLUTICASONE PROPIONATE 50 MCG
SPRAY, SUSPENSION (ML) NASAL
Qty: 48 G | Refills: 5 | Status: SHIPPED | OUTPATIENT
Start: 2024-12-05

## 2024-12-05 RX ORDER — ALBUTEROL SULFATE 90 UG/1
INHALANT RESPIRATORY (INHALATION)
Qty: 25.5 G | Refills: 3 | Status: SHIPPED | OUTPATIENT
Start: 2024-12-05

## 2024-12-05 RX ORDER — GABAPENTIN 100 MG/1
100-300 CAPSULE ORAL AT BEDTIME
Qty: 90 CAPSULE | Refills: 11 | Status: SHIPPED | OUTPATIENT
Start: 2024-12-05

## 2024-12-05 RX ORDER — SPIRONOLACTONE 25 MG/1
25 TABLET ORAL EVERY MORNING
Qty: 90 TABLET | Refills: 3 | Status: SHIPPED | OUTPATIENT
Start: 2024-12-05

## 2024-12-05 RX ORDER — MONTELUKAST SODIUM 10 MG/1
1 TABLET ORAL AT BEDTIME
Qty: 90 TABLET | Refills: 3 | Status: SHIPPED | OUTPATIENT
Start: 2024-12-05

## 2024-12-05 RX ORDER — NITROGLYCERIN 0.4 MG/1
TABLET SUBLINGUAL
Qty: 25 TABLET | Refills: 3 | Status: SHIPPED | OUTPATIENT
Start: 2024-12-05

## 2024-12-05 RX ORDER — AMLODIPINE BESYLATE 5 MG/1
5 TABLET ORAL EVERY EVENING
Qty: 90 TABLET | Refills: 3 | Status: SHIPPED | OUTPATIENT
Start: 2024-12-05

## 2024-12-05 RX ORDER — IPRATROPIUM BROMIDE AND ALBUTEROL SULFATE 2.5; .5 MG/3ML; MG/3ML
1 SOLUTION RESPIRATORY (INHALATION) EVERY 6 HOURS PRN
Qty: 90 ML | Refills: 11 | Status: SHIPPED | OUTPATIENT
Start: 2024-12-05

## 2024-12-05 ASSESSMENT — PAIN SCALES - GENERAL: PAINLEVEL_OUTOF10: NO PAIN (0)

## 2024-12-05 NOTE — Clinical Note
Benny Yepez - patient having increased use of her nitroglycerin. EKG today normal. She isn't due to see cardiology again until this summer, but I would like you to see her sooner. Please contact her directly to schedule. Thanks! Elsy Bah MD Internal Medicine/Pediatrics [facility]

## 2024-12-05 NOTE — PROGRESS NOTES
Preventive Care Visit  [facility] Internal Medicine - Pediatrics  Dec 5, 2024      Assessment & Plan     (Z00.00) Encounter for Medicare annual wellness exam  (primary encounter diagnosis)  Comment: declines vaccines  Plan:     (I10) Hypertension, unspecified type  Comment: well controlled  Plan: BASIC METABOLIC PANEL            (I25.10) Mild coronary artery disease  Comment: stable  Plan: Lipid panel reflex to direct LDL Non-fasting,         rosuvastatin (CRESTOR) 40 MG tablet            (I20.0) Unstable angina pectoris (H)  Comment: Added automatically from request for surgery [id number]  Plan: nitroGLYcerin (NITROSTAT) 0.4 MG sublingual         tablet, isosorbide mononitrate (IMDUR) 60 MG 24        hr tablet, EKG 12-lead complete w/read -         Clinics            Patient using nitroglycerin 4 times per week - this is an increase for her. EKG ok. Yearly visits with cardiology - not due until this summer, but I would like her seen sooner due to increasing angina. Will route chart to cardiology.     (J44.9) Chronic obstructive pulmonary disease, unspecified COPD type (H)  Comment: stable  Plan: continue current meds    (J45.50) Uncomplicated severe persistent asthma (H)  Comment: as above  Plan:     (G25.81) Restless legs syndrome (RLS)  Comment: will ensure ferritin >75, continue exercise, massage, magnesium - start low does gabapentin 2 hours before beditme - see pi  Plan: Ferritin, gabapentin (NEURONTIN) 100 MG capsule            (J45.40) Moderate persistent asthma, uncomplicated  Comment: stable as above  Plan: fluticasone-salmeterol (WIXELA INHUB) 250-50         MCG/ACT inhaler, montelukast (SINGULAIR) 10 MG         tablet, albuterol (PROAIR HFA/PROVENTIL         HFA/VENTOLIN HFA) 108 (90 Base) MCG/ACT         inhaler, ipratropium - albuterol 0.5 mg/2.5         mg/3 mL (DUONEB) 0.5-2.5 (3) MG/3ML neb         solution            (I20.1) Vasospastic angina (H)  Comment:  "see above  Plan: amLODIPine (NORVASC) 5 MG tablet            (J31.0) Chronic rhinitis  Comment: stable  Plan: fluticasone (FLONASE) 50 MCG/ACT nasal spray        refill    (I50.32) Diastolic CHF, chronic (H)  Comment: stable  Plan: furosemide (LASIX) 20 MG tablet, spironolactone        (ALDACTONE) 25 MG tablet            (I25.10) Coronary artery disease involving native coronary artery of native heart without angina pectoris  Comment: see above - would like cardiology follow up sooner  Plan: furosemide (LASIX) 20 MG tablet        s    (I87.2) Venous reflux  Comment: stable  Plan: furosemide (LASIX) 20 MG tablet            (R79.9) Abnormal finding of blood chemistry, unspecified  Comment:   Plan: Ferritin    Plan for q6m visits with patient            Patient has been advised of split billing requirements and indicates understanding: Yes        BMI  Estimated body mass index is 30.77 kg/m  as calculated from the following:    Height as of this encounter: 1.54 m (5' 0.63\").    Weight as of this encounter: 73 kg (160 lb 14.4 oz).       Counseling  Appropriate preventive services were addressed with this patient via screening, questionnaire, or discussion as appropriate for fall prevention, nutrition, physical activity, Tobacco-use cessation, social engagement, weight loss and cognition.  Checklist reviewing preventive services available has been given to the patient.  Reviewed patient's diet, addressing concerns and/or questions.   She is at risk for lack of exercise and has been provided with information to increase physical activity for the benefit of her well-being.       See Patient Instructions    Cordell Kessler is a 84 year old, presenting for the following:  Physical        12/5/2024     8:38 AM   Additional Questions   Roomed by BB VF   Accompanied by none         12/5/2024     8:38 AM   Patient Reported Additional Medications   Patient reports taking the following new medications none "           HPI    Asthma    -albuterol  -Wixela  -duoneb prn  -singulair    BP  -amolodipine 5    CHF  -lasix  -spironolactone    CAD  -statin    Unstable angina  -isosorbide    Chronic rhinitis  -flonase    Other issues:  --increasing RLS and cramping    Health Care Directive  Patient does not have a Health Care Directive: Discussed advance care planning with patient; information given to patient to review.      12/2/2024   General Health   How would you rate your overall physical health? Excellent   Feel stress (tense, anxious, or unable to sleep) Only a little      (!) STRESS CONCERN      12/2/2024   Nutrition   Diet: Low salt            12/2/2024   Exercise   Days per week of moderate/strenous exercise 3 days   Average minutes spent exercising at this level 30 min            12/2/2024   Social Factors   Frequency of gathering with friends or relatives Once a week   Worry food won't last until get money to buy more No   Food not last or not have enough money for food? No   Do you have housing? (Housing is defined as stable permanent housing and does not include staying ouside in a car, in a tent, in an abandoned building, in an overnight shelter, or couch-surfing.) Yes   Are you worried about losing your housing? No   Lack of transportation? No   Unable to get utilities (heat,electricity)? No            12/2/2024   Fall Risk   Fallen 2 or more times in the past year? No    Trouble with walking or balance? No        Patient-reported          12/2/2024   Activities of Daily Living- Home Safety   Needs help with the following daily activites None of the above   Safety concerns in the home None of the above            12/2/2024   Dental   Dentist two times every year? Yes            12/2/2024   Hearing Screening   Hearing concerns? None of the above            12/2/2024   Driving Risk Screening   Patient/family members have concerns about driving No            12/2/2024   General Alertness/Fatigue Screening   Have you  been more tired than usual lately? No            2024   Urinary Incontinence Screening   Bothered by leaking urine in past 6 months No            2024   TB Screening   Were you born outside of the [place]? No            Today's PHQ-2 Score:       2024     9:43 AM   PHQ-2 (  [employer])   Q1: Little interest or pleasure in doing things 0    Q2: Feeling down, depressed or hopeless 0    PHQ-2 Score 0    Q1: Little interest or pleasure in doing things Not at all   Q2: Feeling down, depressed or hopeless Not at all   PHQ-2 Score 0       Patient-reported           2024   Substance Use   Alcohol more than 3/day or more than 7/wk No   Do you have a current opioid prescription? No   How severe/bad is pain from 1 to 10? 10/10   Do you use any other substances recreationally? No        Social History     Tobacco Use    Smoking status: Former     Current packs/day: 0.00     Average packs/day: 1 pack/day for 30.0 years (30.0 ttl pk-yrs)     Types: Cigarettes     Start date: 7/15/1973     Quit date: 1993     Years since quittin.5     Passive exposure: Past    Smokeless tobacco: Never   Vaping Use    Vaping status: Never Used   Substance Use Topics    Alcohol use: Yes     Comment: Not very often    Drug use: No          Mammogram Screening - After age 74- determine frequency with patient based on health status, life expectancy and patient goals              Reviewed and updated as needed this visit by Provider                      Current providers sharing in care for this patient include:  Patient Care Team:  Elsy Bah MD as PCP - General (Internal Medicine)  Elsy Bah MD as Assigned PCP  Aron Pro MD as Assigned Neuroscience Provider  Lilli Way APRN CNP as Nurse Practitioner (Anesthesiology)  Genesis Kuhn MD as MD (Gastroenterology)  Shaji Bello MD as MD (Cardiovascular & Thoracic Surgery)  Linda Mayo PA-C as Assigned Surgical  "Provider  Suzette Crawford PA-C as Assigned Gastroenterology Provider  Lilli Rivera, RN as Lead Care Coordinator  Cyndi Doe CHW as Community Health Worker (Primary Care - CC)  Marleni Raza APRN CNP as Nurse Practitioner (Cardiovascular Disease)  Marleni Raza APRN CNP as Assigned Heart and Vascular Provider    The following health maintenance items are reviewed in Epic and correct as of today:  Health Maintenance   Topic Date Due    ZOSTER IMMUNIZATION (1 of 2) Never done    DTAP/TDAP/TD IMMUNIZATION (1 - Tdap) 04/15/2007    Pneumococcal Vaccine: 65+ Years (2 of 2 - PCV) 12/08/2011    RSV VACCINE (1 - 1-dose 75+ series) Never done    COLORECTAL CANCER SCREENING  02/17/2020    HF ACTION PLAN  08/04/2020    ASTHMA ACTION PLAN  04/28/2022    INFLUENZA VACCINE (1) 09/01/2024    BMP  11/22/2024    LIPID  11/30/2024    ANNUAL REVIEW OF HM ORDERS  11/30/2024    MEDICARE ANNUAL WELLNESS VISIT  11/30/2024    CBC  04/22/2025    ALT  05/22/2025    ASTHMA CONTROL TEST  06/05/2025    FALL RISK ASSESSMENT  12/05/2025    ADVANCE CARE PLANNING  11/30/2028    DEXA  08/09/2032    TSH W/FREE T4 REFLEX  Completed    PHQ-2 (once per calendar year)  Completed    COVID-19 Vaccine  Completed    HPV IMMUNIZATION  Aged Out    MENINGITIS IMMUNIZATION  Aged Out    RSV MONOCLONAL ANTIBODY  Aged Out            Objective    Exam  /72 (BP Location: Right arm, Patient Position: Sitting, Cuff Size: Adult Regular)   Pulse 79   Temp 97.8  F (36.6  C) (Tympanic)   Resp 18   Ht 1.54 m (5' 0.63\")   Wt 73 kg (160 lb 14.4 oz)   LMP  (LMP Unknown)   SpO2 97%   BMI 30.77 kg/m     Estimated body mass index is 30.77 kg/m  as calculated from the following:    Height as of this encounter: 1.54 m (5' 0.63\").    Weight as of this encounter: 73 kg (160 lb 14.4 oz).    Physical Exam  GENERAL: alert and no distress  EYES: Eyes grossly normal to inspection, PERRL and conjunctivae and sclerae normal  HENT: ear canals and TM's " normal, nose and mouth without ulcers or lesions  NECK: no adenopathy, no asymmetry, masses, or scars  RESP: lungs clear to auscultation - no rales, rhonchi or wheezes  CV: regular rate and rhythm, normal S1 S2, no S3 or S4, no murmur, click or rub, no peripheral edema  ABDOMEN: soft, nontender, no hepatosplenomegaly, no masses and bowel sounds normal  MS: no gross musculoskeletal defects noted, no edema  SKIN: no suspicious lesions or rashes  NEURO: Normal strength and tone, mentation intact and speech normal  PSYCH: mentation appears normal, affect normal/bright        12/5/2024   Mini Cog   Clock Draw Score 2 Normal   3 Item Recall 2 objects recalled   Mini Cog Total Score 4                 Signed Electronically by: Elsy Bah MD

## 2024-12-05 NOTE — PATIENT INSTRUCTIONS
Restless legs - continue with walking, massage, heat. We will check your iron levels to make sure these are optimal.    Please start gabapentin (restless legs) 100mg - after a week you can increase to 200mg if needed and then after a week, you can increase to 300mg if needed.    Patient Education   Preventive Care Advice   This is general advice given by our system to help you stay healthy. However, your care team may have specific advice just for you. Please talk to your care team about your preventive care needs.  Nutrition  Eat 5 or more servings of fruits and vegetables each day.  Try wheat bread, brown rice and whole grain pasta (instead of white bread, rice, and pasta).  Get enough calcium and vitamin D. Check the label on foods and aim for 100% of the RDA (recommended daily allowance).  Lifestyle  Exercise at least 150 minutes each week  (30 minutes a day, 5 days a week).  Do muscle strengthening activities 2 days a week. These help control your weight and prevent disease.  No smoking.  Wear sunscreen to prevent skin cancer.  Have a dental exam and cleaning every 6 months.  Yearly exams  See your health care team every year to talk about:  Any changes in your health.  Any medicines your care team has prescribed.  Preventive care, family planning, and ways to prevent chronic diseases.  Shots (vaccines)   HPV shots (up to age 26), if you've never had them before.  Hepatitis B shots (up to age 59), if you've never had them before.  COVID-19 shot: Get this shot when it's due.  Flu shot: Get a flu shot every year.  Tetanus shot: Get a tetanus shot every 10 years.  Pneumococcal, hepatitis A, and RSV shots: Ask your care team if you need these based on your risk.  Shingles shot (for age 50 and up)  General health tests  Diabetes screening:  Starting at age 35, Get screened for diabetes at least every 3 years.  If you are younger than age 35, ask your care team if you should be screened for diabetes.  Cholesterol  test: At age 39, start having a cholesterol test every 5 years, or more often if advised.  Bone density scan (DEXA): At age 50, ask your care team if you should have this scan for osteoporosis (brittle bones).  Hepatitis C: Get tested at least once in your life.  STIs (sexually transmitted infections)  Before age 24: Ask your care team if you should be screened for STIs.  After age 24: Get screened for STIs if you're at risk. You are at risk for STIs (including HIV) if:  You are sexually active with more than one person.  You don't use condoms every time.  You or a partner was diagnosed with a sexually transmitted infection.  If you are at risk for HIV, ask about PrEP medicine to prevent HIV.  Get tested for HIV at least once in your life, whether you are at risk for HIV or not.  Cancer screening tests  Cervical cancer screening: If you have a cervix, begin getting regular cervical cancer screening tests starting at age 21.  Breast cancer scan (mammogram): If you've ever had breasts, begin having regular mammograms starting at age 40. This is a scan to check for breast cancer.  Colon cancer screening: It is important to start screening for colon cancer at age 45.  Have a colonoscopy test every 10 years (or more often if you're at risk) Or, ask your provider about stool tests like a FIT test every year or Cologuard test every 3 years.  To learn more about your testing options, visit:   .  For help making a decision, visit:   https://bit.ly/uc23082.  Prostate cancer screening test: If you have a prostate, ask your care team if a prostate cancer screening test (PSA) at age 55 is right for you.  Lung cancer screening: If you are a current or former smoker ages 50 to 80, ask your care team if ongoing lung cancer screenings are right for you.  For informational purposes only. Not to replace the advice of your health care provider. Copyright   2023 [employer]. All rights reserved. Clinically reviewed by the [facility]. [employer] [id number] - REV 01/24.

## 2024-12-09 ENCOUNTER — DOCUMENTATION ONLY (OUTPATIENT)
Dept: CARDIOLOGY | Facility: CLINIC | Age: 84
End: 2024-12-09
Payer: COMMERCIAL

## 2024-12-09 DIAGNOSIS — I20.0 UNSTABLE ANGINA PECTORIS (H): Primary | ICD-10-CM

## 2024-12-09 RX ORDER — ISOSORBIDE MONONITRATE 30 MG/1
30 TABLET, EXTENDED RELEASE ORAL DAILY
Qty: 90 TABLET | Refills: 1 | Status: SHIPPED | OUTPATIENT
Start: 2024-12-09

## 2024-12-09 RX ORDER — ISOSORBIDE MONONITRATE 60 MG/1
60 TABLET, EXTENDED RELEASE ORAL DAILY
Qty: 90 TABLET | Refills: 1 | Status: SHIPPED | OUTPATIENT
Start: 2024-12-09

## 2024-12-09 NOTE — PROGRESS NOTES
Patient was seen by her PCP with complaints of increased CP and increased use of nitroglycerin. If blood pressures allow, increase isosorbide to 90 mg daily. PCP is requesting patient have sooner appointment. Can we please reach out to her and see if her symptoms are worsening and if earlier appointment is needed?     Thanks  Marleni

## 2024-12-09 NOTE — PROGRESS NOTES
Spoke with patient about Marleni's recommendations. Patient reports her symptoms not being as frequent since her PCP visit. Patient reports that her BP has been in the 120s-130s systolic. Advised patient to increase Imdur to 90 mg daily. Patient verbalized understanding and agreed with plan of care.  Advised patient to update us in 2-3 weeks to see how the increased dose is going and if her symptoms have improved. Patient verbalized understanding and agreed with plan of care. Reminder set.

## 2024-12-17 ENCOUNTER — VIRTUAL VISIT (OUTPATIENT)
Dept: GASTROENTEROLOGY | Facility: CLINIC | Age: 84
End: 2024-12-17
Attending: PHYSICIAN ASSISTANT
Payer: COMMERCIAL

## 2024-12-17 DIAGNOSIS — Z87.19 HISTORY OF REPAIR OF HIATAL HERNIA: Primary | ICD-10-CM

## 2024-12-17 DIAGNOSIS — R19.8 IRREGULAR BOWEL HABITS: ICD-10-CM

## 2024-12-17 DIAGNOSIS — Z98.890 HISTORY OF REPAIR OF HIATAL HERNIA: Primary | ICD-10-CM

## 2024-12-17 PROCEDURE — 99212 OFFICE O/P EST SF 10 MIN: CPT | Mod: 95 | Performed by: PHYSICIAN ASSISTANT

## 2024-12-17 NOTE — NURSING NOTE
Current patient location: [facility] [place] [facility] [address]    Is the patient currently in the state of [place]? YES    Visit mode:VIDEO    If the visit is dropped, the patient can be reconnected by:VIDEO VISIT: Send to e-mail at: [email or web address]    Will anyone else be joining the visit? NO  (If patient encounters technical issues they should call [phone]    Are changes needed to the allergy or medication list? No    Are refills needed on medications prescribed by this physician? NO    Rooming Documentation:  Questionnaire(s) completed    Reason for visit: RECHECK    Karol VARGAS

## 2024-12-17 NOTE — PATIENT INSTRUCTIONS
It was a pleasure meeting with you today and discussing your healthcare plan. Below is a summary of what we covered:    - If you have not had a BM for 2 days it is okay to use Docusate 100 mg x1 as needed. The goal is to experience a satisfactory bowel movement 3-4 times per week  - Please start taking daily fiber supplementation. Start with 1 - 2 teaspoons daily which can be slowly uptitrated to 2 - 3 tablespoons as needed    Fiber Recommendations:  Recommend starting supplementation with a powdered soluble fiber. When used on a daily basis, this can help regulate the consistency of your stools. Metamucil (psyllium) and Citrucel are preferred examples as these are gel forming fibers. Alternatives which are non gel forming include Benefiber, Samia Powder and Inulin fiber. For males the goal is to obtain 30-35g of fiber daily and for females 20-25 between supplementation and dietary intake. You can start with 1-2 teaspoons per day, with goal to gradually increase to 1 tablespoon daily. You can increase up to 1 tablespoon three times daily if needed. It is important to stay well-hydrated with use of fiber supplementation and to make sure that the fiber powder is well mixed with water as directed on the label. You may experience some bloating with initiation of fiber, which will improve over the first few weeks. A good trial to evaluate the effect is 3-6 months. Of note, many of the fiber products contain artificial sweeteners, which can cause bloating, gas, and diarrhea in those who may be sensitive to artificial sweeteners. If this is the case, recommend trying Metamucil Premium Blend (with Stevia), Metamucil 4-in-1 without added Sweeteners, or Bellway (sweetened with Monk fruit extract).        Please call my nurse Kerwin ([phone]) or Maryanne ([phone]) with any questions or concerns.        See below for any additional questions and scheduling guidelines.    Sign up for [employer]: [employer] patient portal  serves as a secure platform for accessing your medical records from the [facility]. Additionally, [employer] facilitates easy, timely, and secure messaging with your care team. If you have not signed up, you may do so by using the provided code or calling [phone].    Coordinating your care after your visit:  There are multiple options for scheduling your follow-up care based on your provider's recommendation.    How do I schedule a follow-up clinic appointment:   After your appointment, you may receive scheduling assistance with the Clinic Coordinators by having a seat in the waiting room and a Clinic Coordinator will call you up to schedule.  Virtual visits or after you leave the clinic:  Your provider has placed a follow-up order in the [employer] portal for scheduling your return appointment. A member of the scheduling team will contact you to schedule.  [employer] Scheduling: Timely scheduling through [employer] is advised to ensure appointment availability.   Call to schedule: You may schedule your follow-up appointment(s) by calling [phone], option 1.    How do I schedule my endoscopy or colonoscopy procedure:  If a procedure, such as a colonoscopy or upper endoscopy was ordered by your provider, the scheduling team will contact you to schedule this procedure. Or you may choose to call to schedule at   [phone], option 2.  Please allow 20-30 minutes when scheduling a procedure.    How do I get my blood work done? To get your blood work done, you need to schedule a lab appointment at an [facility]. There are multiple ways to schedule:   At the clinic: The Clinic Coordinator you meet after your visit can help you schedule a lab appointment.   [employer] scheduling: [employer] offers online lab scheduling at all [facility] laboratory locations.   Call to schedule: You can call [phone] to schedule your lab appointment.    How do I schedule my imaging study: To schedule  imaging studies, such as CT scans, ultrasounds, MRIs, or X-rays, contact Imaging Services at [phone].    How do I schedule a referral to another doctor: If your provider recommended a referral to another specialist(s), the referral order was placed by your provider. You will receive a phone call to schedule this referral, or you may choose to call the number attached to the referral to self-schedule.    For Post-Visit Question(s):  For any inquiries following today's visit:  Please utilize [employer] messaging and allow 48 hours for reply or contact the Call Center during normal business hours at [phone], option 3.  For Emergent After-hours questions, contact the On-Call GI Fellow through the [facility]  at [phone].  In addition, you may contact your Nurse directly using the provided contact information.    Test Results:  Test results will be accessible via [employer] in compliance with the 21st Century Cures Act. This means that your results will be available to you at the same time as your provider. Often you may see your results before your provider does. Results are reviewed by staff within two weeks with communication follow-up. Results may be released in the patient portal prior to your care team review.    Prescription Refill(s):  Medication prescribed by your provider will be addressed during your visit. For future refills, please coordinate with your pharmacy. If you have not had a recent clinic visit or routine labs, for your safety, your provider may not be able to refill your prescription.         Sincerely,    Suzette Crawford PA-C  Division of Gastroenterology, Hepatology, and Nutrition  [facility]

## 2024-12-17 NOTE — LETTER
12/17/2024      Victoria Montalvo  [address] [place] [address]      Dear Colleague,    Thank you for referring your patient, Victoria Montalvo, to the [facility] [place]. Please see a copy of my visit note below.    Virtual Visit Details    Type of service:  Video Visit   Video Start Time: 1:40 PM  Video End Time:1:48 PM    Originating Location (pt. Location): Home    Distant Location (provider location):  Off-site  Platform used for Video Visit: [facility]      Gastroenterology Visit for: Victoria Montalvo 1940   MRN: [id number]     Reason for Visit:  chief complaint    Referred by: Niurka  / [facility]  / [place] [address]  Patient Care Team:  Elsy Bah MD as PCP - General (Internal Medicine)  Elsy Bah MD as Assigned PCP  Aron Pro MD as Assigned Neuroscience Provider  Lilli Way APRN CNP as Nurse Practitioner (Anesthesiology)  Genesis Kuhn MD as MD (Gastroenterology)  Shaji Bello MD as MD (Cardiovascular & Thoracic Surgery)  Linda Mayo PA-C as Assigned Surgical Provider  Suzette Crawford PA-C as Assigned Gastroenterology Provider  Lilli Rivera RN as Lead Care Coordinator  Cyndi Doe CHW as Community Health Worker (Primary Care - CC)  Marleni Raza APRN CNP as Nurse Practitioner (Cardiovascular Disease)  Marleni Raza APRN CNP as Assigned Heart and Vascular Provider    History of Present Illness:   Victoria Montalvo is a 84 year old female with significant past medical history pertinent for asthma, HTN, HLD, COPD, KEN, CHF, CAD, subclavian artery stenosis who is now status post laparoscopic hiatal hernia repair with bilateral tube thoracostomy/gastrostomy tube placement 1/12/2024 by Dr. Stern who is presenting as a follow-up patient with a chief complaint of irregular bowel  "patterns.    -----------------------------------------------------------------------------------------------------------------------------------------------------------------------------------------------------------------------------------  Interval History December 17, 2024:    Stating \"I am healing so well I am just very happy.\"     No longer is experiencing pain at the site of the PEG tube.     Bowel Patterns - Stools are soft and formed and occurring every 2 days. After 2-3 days of not stooling will take soft softener however has not had to do so more than once per month. Explaining that she is quite happy with her bowel patterns.     Denies weight loss, nausea, emesis, dysphagia, odynophagia, heartburn, regurgitation, melena, hematochezia and BRBR.     -----------------------------------------------------------------------------------------------------------------------------------------------------------------------------------------------------------------------------------  Interval History June 4, 2024:    Continues to have pain daily at the location of the gastrostomy tube site. The tube was never used for feeds. No erythema edema or granulation tissue. Noting a small scar at the area. Aggravated with postural changes and physical activity.     Bowel Patterns: For the past week has been stooling daily consistent with [place] Stool Scale Type 4. This is not a new change in bowel patterns symptom onset was > 1 year prior.     Weight is now stable.     Denies nausea, emesis, dysphagia, odynophagia, heartburn, regurgitation, nocturnal stooling, melena, hematochezia and BRBR.     Traveling with son to [place]. Driving with son.     ---------------------------------------------------------------------------------------------------------------------------------------------------------------------------  7/2023 Interval History:     Prior to going to the  took a small bite of \"rice hot-dish\" which then resulted " "in intractable emesis. Reports history of dysphagia for 10+ years. Avoids meats as this is a trigger for her. Denies dysphagia to semi solids, liquids and pills.     Since being seen in the ER has not had symptoms of dysphagia however made significant lifestyle modifications and dietary alterations.      Currently takes Prevacid 30mg with break through symptoms occurring multiple times per month which is relieved with the use of TUMs.     Associated with 20 lb weight loss in the past 12 months with stability over the past 3-6 months. Notes she does not eat when alone secondary to lack of desire to eat. Notes \"If I were to make myself a sandwich I would only eat half.\" Not currently eating 3 meals per day. Will supplement with Boost/Ensure x2 daily.    Will have a BM daily that fluctuates between [place] Stool Scale Type 1 - 4.      Denies weight loss,odynophagia, dysphonia/hoarseness, chronic cough/throat clearing, abdominal pain, diarrhea, constipation (< 3 stools per week), nocturnal stooling, incontinence of feces, melena, hematochezia and BRBR.     No prior intraabdominal surgeries. No history of prior vaginal births/ section.     Very rare/seldom use of ETOH products one serving per month.     History of 15 - 20 pack year history.      No family history or GI related malignancy (esophageal, gastric, pancreatic, liver or colon).      past away 2023.     Please also see questionnaires below when reviewing subjective history.       Esophageal Questionnaire(s)    BEDQ Questionnaire      2023    10:33 AM 2023     2:31 PM 2023     1:51 PM 2024    12:34 PM 2024    10:19 AM   BEDQ Questionnaire: How Often Have You Had the Following?   Trouble eating solid food (meat, bread, vegetables) 5  5  5  0  0    Trouble eating soft foods (yogurt, jello, pudding) 3  4  0  0  0    Trouble swallowing liquids 0  5  0  0  0    Pain while swallowing 1  0  0  0  0    Coughing or " choking while swallowing foods or liquids 1  1  0  0  0    Total Score: 10 15 5 0 0        Patient-reported         7/12/2023    10:33 AM 7/19/2023     2:31 PM 11/28/2023     1:51 PM 5/31/2024    12:34 PM 12/12/2024    10:19 AM   BEDQ Questionnaire: Discomfort/Pain Ratings   Eating solid food (meat, bread, vegetables) 5  5  2  0  0    Eating soft foods (yogurt, jello, pudding) 0  0  0  0  0    Drinking liquid 0  0  0  0  0    Total Score: 5 5 2 0 0        Patient-reported       Eckardt Questionnaire      7/12/2023    10:34 AM 7/19/2023     2:32 PM 11/28/2023     1:52 PM 5/31/2024    12:35 PM 12/12/2024    10:21 AM   Eckardt Questionnaire   Dysphagia 1  1  1  0  0    Regurgitation 1  1  1  0  0    Retrosternal Pain 0  1  0  2     Weight Loss (kg) 3  3  0  3  0    Total Score:  5 6 2 5        Patient-reported       Promis 10 Questionnaire      7/12/2023    10:36 AM 11/28/2023     1:54 PM 5/31/2024    12:37 PM 12/12/2024    10:24 AM   PROMIS 10 FLOWSHEET DATA   In general, would you say your health is: 3  4  4  4    In general, would you say your quality of life is: 3  4  4  4    In general, how would you rate your physical health? 3  4  4  4    In general, how would you rate your mental health, including your mood and your ability to think? 4  4  4  4    In general, how would you rate your satisfaction with your social activities and relationships? 4  4  4  5    In general, please rate how well you carry out your usual social activities and roles. (This includes activities at home, at work and in your community, and responsibilities as a parent, child, spouse, employee, friend, etc.) 4  4  4  4    To what extent are you able to carry out your everyday physical activities such as walking, climbing stairs, carrying groceries, or moving a chair? 4  5  5  5    In the past 7 days, how often have you been bothered by emotional problems such as feeling anxious, depressed, or irritable? 4  3  2  1    In the past 7 days, how  would you rate your fatigue on average? 3  3  2  1    In the past 7 days, how would you rate your pain on average, where 0 means no pain, and 10 means worst imaginable pain? 0  0  6  0    Mental health question re-calculation - no clinical value 2    2 3 4 5    Physical health question re-calculation - no clinical value 3    3 3 4 5    Pain question re-calculation - no clinical value 5    5 5 3 5    Global Mental Health Score 13    13 15 16 18    Global Physical Health Score 15    15 17 16 19    PROMIS TOTAL - SUBSCORES 28    28 32 32 37        Patient-reported    Multiple values from one day are sorted in reverse-chronological order           STUDIES & PROCEDURES:    EGD:       Colonoscopy:    2015   Findings:        The perianal and digital rectal examinations were normal. There is        decreased sphincter tone.        A few small-mouthed diverticula were found in the cecum.        Multiple medium-mouthed diverticula were found in the sigmoid colon.                                                                                     Impression:          - Diverticulosis in the cecum.                        - Diverticulosis in the sigmoid colon.     EndoFLIP directed at the UES or LES (8cm (EF-325) balloon length or 16cm (EF-322) balloon length):   Date:  8cm balloon  Balloon inflation Balloon pressure CSA (mm^2) DI (mm^2/mmHg) Dmin (mm) Compliance   20 (ladmark ID)        30        40        50           16cm balloon  Balloon inflation Balloon pressure CSA (mm^2) DI (mm^2/mmHg) Dmin (mm) Compliance   30 (ladmark ID)        40        50        60        70           High Resolution Manometry:    PH/Impedance:     Rizo:    CT:    6/22/2023 CT CAP W Contrast   FINDINGS:   LUNGS AND PLEURA: No pulmonary mass, consolidation, or suspicious pulmonary nodule. No pleural effusion or pneumothorax.     MEDIASTINUM/AXILLAE: No abnormally enlarged lymph nodes. Great vessels normal in caliber. There is a patent stent in the  proximal left subclavian artery. No abnormally enlarged lymph nodes. Moderate sized hiatal hernia. There is marked fluid distention   of the esophagus.  No esophageal wall thickening. Possible impacted ingested material measuring approximately 3 cm, best seen on coronal 56.     CORONARY ARTERY CALCIFICATION: Moderate.     HEPATOBILIARY: Normal.     PANCREAS: Normal.     SPLEEN: Normal.     ADRENAL GLANDS: Normal.     KIDNEYS/BLADDER: Normal.     BOWEL: Severe distal colonic diverticulosis. No evidence for acute diverticulitis. No free air, free fluid, or inflammatory change. Normal caliber appendix.     LYMPH NODES: Normal.     VASCULATURE: Diffuse atherosclerotic calcification of the abdominal aorta, but no aneurysm.     PELVIC ORGANS: Normal.     MUSCULOSKELETAL: Prior right hip replacement. Osteopenia. Severe degenerative change in the lower lumbar spine. Severe degenerative disc disease in the lower thoracic spine.                                                                      IMPRESSION:  1.  Moderate hiatal hernia with marked fluid distention of the esophagus. Possible obstruction secondary to impacted ingested material, distal esophageal stricture also considered. Consider follow-up GI consultation.  2.  Severe distal colonic diverticulosis.    Esophagram:    2/5/2024  FINDINGS: Esophagus is normal in its caliber, course and mucosal  pattern. No esophageal strictures or masses are evident. Mild  esophageal dysmotility.     Postoperative changes of hiatal hernia repair. No evidence for  contrast leakage in the region of the postoperative changes. No  evidence for a recurrent hiatal hernia. Water soluble contrast flowed  readily from the esophagus through the region of the hiatal hernia  repair into the stomach. A gastrostomy tube is in place. The stomach  is otherwise unremarkable, without evidence for mass or erosions.  Gastric mucosal pattern is unremarkable. Normal duodenum and proximal  jejunum.      Moderate gastroesophageal reflux was observed during the exam.                                                     IMPRESSION:   1. Postoperative changes of hiatal hernia repair. No evidence for a  contrast leak.  2. Mild esophageal dysmotility may be related to presbyesophagus.  3. Moderate gastroesophageal reflux was observed.    FL VSS:     GES:    U/S:     XRAY:    Other:       Prior medical records were reviewed including, but not limited to, notes from referring providers, lab work, radiographic tests, and other diagnostic tests. Pertinent results were summarized above.     History     Past Medical History:   Diagnosis Date     Abnormal Papanicolaou smear of cervix and cervical HPV     colposcopy 1/97     Aortic valve sclerosis      Arthritis      Asthma     on preventative meds and has nebulizer     Chronic rhinitis      COPD (chronic obstructive pulmonary disease)      Coronary artery disease     4/4/17 SHERLEY--PDA     Coronary artery spasm (H)      Diastolic CHF, chronic 02/22/2012     Gastro-oesophageal reflux disease      Hiatal hernia      Hyperlipidemia 10/31/2011     Obesity      KEN (obstructive sleep apnea)     CPAP     Personal history of colonic polyps     colonoscopy 9/97, 2002 (due in 2007)     Pulmonary HTN      Seasonal allergies      Status post coronary angiogram 04/22/2020     Subclavian artery stenosis, left 08/07/2013    S/p stent in 2013      Subclinical hyperthyroidism 10/17/2016     Uncomplicated asthma 1995       Past Surgical History:   Procedure Laterality Date     ARTHROPLASTY HIP Right 10/19/2015    Procedure: ARTHROPLASTY HIP;  Surgeon: Aron Torres MD;  Location: [facility] OR     COLONOSCOPY  01/01/2008    Polyp removed, bx.     COLONOSCOPY  01/12/2010     COLONOSCOPY N/A 02/17/2015    Procedure: COLONOSCOPY;  Surgeon: Gato Quiles MD;  Location: [facility]     CT CORONARY ANGIOGRAM  04/04/2017    SHERLEY to PDA     CV CORONARY ANGIOGRAM N/A 04/22/2020    Procedure: Coronary  Angiogram;  Surgeon: Handy Denise MD;  Location:  HE[facility] CARDIAC CATH LAB     CV CORONARY ANGIOGRAM N/A 2021    Procedure: Coronary Angiogram;  Surgeon: Handy Denise MD;  Location: [facility] CARDIAC CATH LAB     CV INSTANTANEOUS WAVE-FREE RATIO N/A 2020    Procedure: Instantaneous Wave-Free Ratio;  Surgeon: Handy Denise MD;  Location: [facility] CARDIAC C[facility] LAB     CV LEFT VENTRICULOGRAM N/A 2020    Procedure: Left Ventriculogram;  Surgeon: Handy Denise MD;  Location:  HEART CARDIAC CATH LAB     ESOPHAGOSCOPY, GASTROSCOPY, DUODENOSCOPY (EGD), COMBINED N/A 2023    Procedure: Esophagoscopy, gastroscopy, duodenoscopy (EGD), combined;  Surgeon: Genesis Kuhn MD;  Location: [facility] GI     LAPAROSCOPIC HERNIORRHAPHY HIATAL N/A 2024    Procedure: Laparoscopic Hiatal Hernia Repair, Esophagogastroduodenscopy, Gastrostomy Tube Placement, Bilateral Chest Tube Placement;  Surgeon: Oscar Stern MD;  Location: [facility] OR     Liposuction of legs and stomach       MAMMOPLASTY REDUCTION  1989     OPEN REDUCTION INTERNAL FIXATION ANKLE  1987     Removal of ankle hardware NOS       SOFT TISSUE SURGERY   &     Liposuction     VASCULAR SURGERY  2013    angiogram & left subclavical artery stent       Social History     Socioeconomic History     Marital status:      Spouse name: Trenton     Number of children: 2     Years of education: 12     Highest education level: Not on file   Occupational History     Occupation: retired   Tobacco Use     Smoking status: Former     Current packs/day: 0.00     Average packs/day: 1 pack/day for 30.0 years (30.0 ttl pk-yrs)     Types: Cigarettes     Start date: 7/15/1973     Quit date: 1993     Years since quittin.6     Passive exposure: Past     Smokeless tobacco: Never   Vaping Use     Vaping status: Never Used   Substance and Sexual Activity     Alcohol use: Yes     Comment: Not very often     Drug  use: No     Sexual activity: Not Currently     Partners: Male   Other Topics Concern      Service Not Asked     Blood Transfusions Not Asked     Caffeine Concern No     Occupational Exposure Not Asked     Hobby Hazards Not Asked     Sleep Concern Not Asked     Stress Concern Not Asked     Weight Concern Not Asked     Special Diet No     Comment: regular diet      Back Care Not Asked     Exercise No     Comment: shopping a lot so walikg      Bike Helmet Not Asked     Seat Belt Not Asked     Self-Exams Not Asked     Parent/sibling w/ CABG, MI or angioplasty before 65F 55M? Yes     Comment: Brother and Sister, Daughter and Father   Social History Narrative     Not on file     Social Drivers of Health     Financial Resource Strain: Low Risk  (12/2/2024)    Financial Resource Strain      Within the past 12 months, have you or your family members you live with been unable to get utilities (heat, electricity) when it was really needed?: No   Food Insecurity: Low Risk  (12/2/2024)    Food Insecurity      Within the past 12 months, did you worry that your food would run out before you got money to buy more?: No      Within the past 12 months, did the food you bought just not last and you didn t have money to get more?: No   Transportation Needs: Low Risk  (12/2/2024)    Transportation Needs      Within the past 12 months, has lack of transportation kept you from medical appointments, getting your medicines, non-medical meetings or appointments, work, or from getting things that you need?: No   Physical Activity: Insufficiently Active (12/2/2024)    Exercise Vital Sign      Days of Exercise per Week: 3 days      Minutes of Exercise per Session: 30 min   Stress: No Stress Concern Present (12/2/2024)    [employer] [place] [employer] - Occupational Stress Questionnaire      Feeling of Stress : Only a little   Social Connections: Unknown (12/2/2024)    Social Connection and Isolation Panel [NHANES]       Frequency of Communication with Friends and Family: Not on file      Frequency of Social Gatherings with Friends and Family: Once a week      Attends Shinto Services: Not on file      Active Member of Clubs or Organizations: Not on file      Attends Club or Organization Meetings: Not on file      Marital Status: Not on file   Interpersonal Safety: Low Risk  (12/5/2024)    Interpersonal Safety      Do you feel physically and emotionally safe where you currently live?: Yes      Within the past 12 months, have you been hit, slapped, kicked or otherwise physically hurt by someone?: No      Within the past 12 months, have you been humiliated or emotionally abused in other ways by your partner or ex-partner?: No   Housing Stability: Low Risk  (12/2/2024)    Housing Stability      Do you have housing? : Yes      Are you worried about losing your housing?: No       Family History   Problem Relation Age of Onset     Alzheimer Disease Mother      Gastrointestinal Disease Mother      Cancer Father         throat and lungs, liver,     Heart Disease Father 57        CABG     Hyperlipidemia Father      Other Cancer Father         Throat, Lung and Liver     Coronary Artery Disease Father      Coronary Artery Disease Sister      Coronary Artery Disease Sister      Heart Disease Sister      Heart Disease Brother         suicidal     Pacemaker Brother      Other Cancer Brother         Prostrate     Depression Brother      Coronary Artery Disease Brother      Hypertension Maternal Grandmother      Lipids Maternal Grandmother      Cancer Maternal Grandmother         stomach     Other Cancer Maternal Grandmother         Stomach Cancer     Cancer Paternal Grandmother         stomach     Other Cancer Paternal Grandmother         Stomach Cancer     Allergies Daughter      Asthma Daughter      Coronary Artery Disease Daughter      Allergies Son      Asthma Son      Anesthesia Reaction No family hx of      Thrombosis No family hx of       Family history reviewed and edited as appropriate    Medications and Allergies:     Outpatient Encounter Medications as of 12/17/2024   Medication Sig Dispense Refill     albuterol (PROAIR HFA/PROVENTIL HFA/VENTOLIN HFA) 108 (90 Base) MCG/ACT inhaler USE 2 INHALATIONS EVERY 6 HOURS AS NEEDED FOR SHORTNESS OF BREATH, DYSPNEA, OR WHEEZING 25.5 g 3     amLODIPine (NORVASC) 5 MG tablet Take 1 tablet (5 mg) by mouth every evening. 90 tablet 3     aspirin 81 MG tablet Take 81 mg by mouth every morning       Calcium 1864-7511 MG-UNIT CHEW Take 1 tablet by mouth every morning       Cholecalciferol (VITAMIN D3 PO) Take 4,000 Units by mouth every morning       CRANBERRY PO Take 1 capsule by mouth every morning       fluticasone (FLONASE) 50 MCG/ACT nasal spray USE 1 TO 2 SPRAYS IN EACH NOSTRIL DAILY 48 g 5     fluticasone-salmeterol (WIXELA INHUB) 250-50 MCG/ACT inhaler Inhale 1 puff into the lungs 2 times daily. 90 each 3     furosemide (LASIX) 20 MG tablet Take 1 tablet (20 mg) by mouth daily. 90 tablet 3     gabapentin (NEURONTIN) 100 MG capsule Take 1-3 capsules (100-300 mg) by mouth at bedtime. 90 capsule 11     ipratropium - albuterol 0.5 mg/2.5 mg/3 mL (DUONEB) 0.5-2.5 (3) MG/3ML neb solution Take 1 vial (3 mLs) by nebulization every 6 hours as needed for shortness of breath, wheezing or cough. 90 mL 11     isosorbide mononitrate (IMDUR) 30 MG 24 hr tablet Take 1 tablet (30 mg) by mouth daily. Take in addition to 60 mg daily to equal total of 90 mg daily. 90 tablet 1     isosorbide mononitrate (IMDUR) 60 MG 24 hr tablet Take 1 tablet (60 mg) by mouth daily. Take in addition to 30 mg daily to equal total of 90 mg daily. HOLD IF SYSTOLIC BLOOD PRESSURE IS LESS THAN 85 90 tablet 1     montelukast (SINGULAIR) 10 MG tablet Take 1 tablet (10 mg) by mouth at bedtime. 90 tablet 3     multivitamin w/minerals (MULTI-VITAMIN) tablet Take 1 tablet by mouth every morning       nitroGLYcerin (NITROSTAT) 0.4 MG sublingual tablet  "One tablet under the tongue every 5 minutes if needed for chest pain. May repeat every 5 minutes for a maximum of 3 doses in 15 minutes\" 25 tablet 3     omega-3 fatty acids 1200 MG capsule Take 1 capsule by mouth every morning       rosuvastatin (CRESTOR) 40 MG tablet Take 1 tablet (40 mg) by mouth daily. 90 tablet 3     spironolactone (ALDACTONE) 25 MG tablet Take 1 tablet (25 mg) by mouth every morning. 90 tablet 3     No facility-administered encounter medications on file as of 12/17/2024.        Allergies   Allergen Reactions     Animal Dander      Bee Pollen Other (See Comments)     Pollen,dust, trees, grass, mold-hayfever symptoms     Dust Mites      Kiwi Itching     Itching and red all over     Pollen Extract      Pollen,dust, trees, grass, mold-hayfever symptoms        Review of systems:  A full 10 point review of systems was obtained and was negative except for the pertinent positives and negatives stated within the HPI.    Objective Findings:   Physical Exam:    Constitutional: LMP  (LMP Unknown)   General: Alert, cooperative, no distress, well-appearing  Head: Atraumatic, normocephalic, no obvious abnormalities   Eyes: Sclera anicteric, no obvious conjunctival hemorrhage   Nose: Nares normal, no obvious malformation, no obvious rhinorrhea   Skin: No jaundice, no obvious rash  Neurologic: AAOx3, no obvious neurologic abnormality  Psychiatric: Normal Affect, appropriate mood  Extremities: No obvious edema, no obvious malformation     Labs, Radiology, Pathology     Lab Results   Component Value Date    WBC 7.8 04/22/2024    WBC 6.6 01/15/2024    WBC 8.7 01/14/2024    HGB 13.3 04/22/2024    HGB 12.3 01/15/2024    HGB 12.8 01/14/2024     04/22/2024     01/15/2024     01/14/2024    CHOL 161 12/05/2024    CHOL 158 11/30/2023    CHOL 155 11/25/2022    TRIG 70 12/05/2024    TRIG 76 11/30/2023    TRIG 84 11/25/2022    HDL 83 12/05/2024    HDL 83 11/30/2023    HDL 78 11/25/2022    ALT 20 " 05/22/2024    ALT 19 06/22/2023    ALT 19 11/25/2022    AST 26 06/22/2023    AST 18 07/22/2021    AST 19 07/17/2020     12/05/2024     05/22/2024     04/22/2024    BUN 17.4 12/05/2024    BUN 13.6 05/22/2024    BUN 18.3 04/22/2024    CO2 27 12/05/2024    CO2 22 05/22/2024    CO2 26 04/22/2024    TSH 1.27 07/22/2021    TSH 0.84 07/17/2020    TSH 2.29 07/18/2019    INR 1.13 07/01/2021    INR 1.00 04/22/2020    INR 1.15 (H) 10/01/2018        Liver Function Studies -   Recent Labs   Lab Test 06/22/23 1947   PROTTOTAL 7.5   ALBUMIN 4.4   BILITOTAL 0.4   ALKPHOS 60   AST 26   ALT 19          Patient Active Problem List    Diagnosis Date Noted     Chronic obstructive pulmonary disease, unspecified COPD type (H) 12/05/2024     Priority: Medium     Hiatal hernia 01/12/2024     Priority: Medium     Lumbosacral spinal stenosis 06/20/2023     Priority: Medium     Scoliosis of thoracolumbar spine, unspecified scoliosis type 06/20/2023     Priority: Medium     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     Priority: Medium     Facet arthropathy, lumbosacral 06/20/2023     Priority: Medium     DDD (degenerative disc disease), lumbosacral 06/20/2023     Priority: Medium     Chest pain, unspecified type 06/07/2023     Priority: Medium     History of CAD (coronary artery disease) 06/07/2023     Priority: Medium     Venous reflux 07/12/2021     Priority: Medium     Vasospastic angina (H) 06/29/2021     Priority: Medium     Added automatically from request for surgery [id number]       Exertional chest pain 06/29/2021     Priority: Medium     Added automatically from request for surgery [id number]       Abnormal findings on diagnostic imaging of heart and coronary circulation 06/29/2021     Priority: Medium     Added automatically from request for surgery [id number]       Status post coronary angiogram 04/22/2020     Priority: Medium     Unstable angina pectoris (H) 04/17/2020     Priority: Medium     Added automatically from request for  surgery [id number]       Chest pain 02/26/2018     Priority: Medium     Hypertension 09/22/2017     Priority: Medium     Subclinical hyperthyroidism 10/17/2016     Priority: Medium     Mild coronary artery disease 01/30/2016     Priority: Medium     On ASA       Hip osteoarthritis 10/19/2015     Priority: Medium     KEN (obstructive sleep apnea) 08/20/2015     Priority: Medium     Severe persistent asthma (H) 08/25/2014     Priority: Medium     Subclavian artery stenosis, left (H) 08/07/2013     Priority: Medium     S/p stent in 2013       Diastolic CHF, chronic (H) 02/22/2012     Priority: Medium     Hyperlipidemia LDL goal <130 10/31/2011     Priority: Medium     Osteopenia 12/21/2010     Priority: Medium     Hiatal hernia with GERD 09/03/2004     Priority: Medium     Female stress incontinence 09/03/2004     Priority: Medium     LUMBOSACRAL NEURITIS , numbness left thigh 09/03/2004     Priority: Medium     History of colonic polyps 06/19/2003     Priority: Medium     Problem list name updated by automated process. Provider to review       Chronic rhinitis 06/19/2003     Priority: Medium      Assessment and Plan   Assessment/Plan:    Victoria Montalvo is a 84 year old female with significant past medical history pertinent for asthma, HTN, HLD, COPD, KEN, CHF, CAD, subclavian artery stenosis who is now status post laparoscopic hiatal hernia repair with bilateral tube thoracostomy/gastrostomy tube placement 1/12/2024 by Dr. Stern who is presenting as a follow-up patient with a chief complaint of irregular bowel patterns.    #Dysphagia - Resolved  #GERD - Heartburn/Regurgitation - Resolved   #Hiatal Hernia S/P Laparoscopic Repair WO Fundoplication     Status post hiatal hernia repair 1/12/2024 with resolution of reflux symptoms and dysphagia. Victoria did report continued abdominal discomfort at the site of prior PEG tube which was aggravated with physical activity/postural changes however this has since resolved.    If pain  were to reoccur would then consider consultation to physical therapy for abdominal wall massage/myofascial release for possible adhesions/scar tissue.    #Irregular Bowel Patterns - Resolved  Previous pattern most consistent with obstipation with overflow diarrhea which is likely exacerbated with intermittent use of Imodium. Now with [place] 4 Type Stools occurring every 2 days with the need for Docusate 100 mg x 1 ~ once per month.      - If you have not had a BM for 2 days it is okay to use Docusate 100 mg x1 as needed. The goal is to experience a satisfactory bowel movement 3-4 times per week  - Please start taking daily fiber supplementation. Start with 1 - 2 teaspoons daily which can be slowly uptitrated to 2 - 3 tablespoons as needed    Follow up plan:   Return to clinic as needed.    The risks and benefits of my recommendations, as well as other treatment options were discussed with the patient and any available family today. All questions were answered.     Follow up: As planned above. Today, I personally spent 8 minutes in direct face to face time with the patient, of which greater than 50% of the time was spent in patient education and counseling as described above. Approximately 6 minutes were spent on indirect care associated with the patient's consultation including but not limited to review of: patient medical records to date, clinic visits, hospital records, lab results, imaging studies, procedural documentation, and coordinating care with other providers. The findings from this review are summarized in the above note. All of the above accounted for a cumulative time of 14 minutes and was performed on the date of service.     The patient verbalized understanding of the plan and was appreciative for the time spent and information provided during the office visit.           Suzette Crawford PA-C  Division of Gastroenterology, Hepatology, and Nutrition  [facility]       Documentation assisted  by voice recognition and documentation system.              Again, thank you for allowing me to participate in the care of your patient.        Sincerely,        Suzette Crawford PA-C

## 2024-12-17 NOTE — PROGRESS NOTES
"Virtual Visit Details    Type of service:  Video Visit   Video Start Time: 1:40 PM  Video End Time:1:48 PM    Originating Location (pt. Location): Home    Distant Location (provider location):  Off-site  Platform used for Video Visit: [facility]      Gastroenterology Visit for: Victoria Montalvo 1940   MRN: [id number]     Reason for Visit:  chief complaint    Referred by: Niurka  / [facility]  / [place] [id number]  Patient Care Team:  Elsy Bah MD as PCP - General (Internal Medicine)  Elsy Bah MD as Assigned PCP  Aron Pro MD as Assigned Neuroscience Provider  Lilli Way APRN CNP as Nurse Practitioner (Anesthesiology)  Genesis Kuhn MD as MD (Gastroenterology)  Shaji Bello MD as MD (Cardiovascular & Thoracic Surgery)  Linda Mayo PA-C as Assigned Surgical Provider  Suzette Crawford PA-C as Assigned Gastroenterology Provider  Lilli Rivera RN as Lead Care Coordinator  Cyndi Doe CHW as Community Health Worker (Primary Care - CC)  Marleni Raza APRN CNP as Nurse Practitioner (Cardiovascular Disease)  Marleni Raza APRN CNP as Assigned Heart and Vascular Provider    History of Present Illness:   Victoria Montalvo is a 84 year old female with significant past medical history pertinent for asthma, HTN, HLD, COPD, KEN, CHF, CAD, subclavian artery stenosis who is now status post laparoscopic hiatal hernia repair with bilateral tube thoracostomy/gastrostomy tube placement 1/12/2024 by Dr. Stern who is presenting as a follow-up patient with a chief complaint of irregular bowel patterns.    -----------------------------------------------------------------------------------------------------------------------------------------------------------------------------------------------------------------------------------  Interval History December 17, 2024:    Stating \"I am healing so well I am just very happy.\"     No longer is " "experiencing pain at the site of the PEG tube.     Bowel Patterns - Stools are soft and formed and occurring every 2 days. After 2-3 days of not stooling will take soft softener however has not had to do so more than once per month. Explaining that she is quite happy with her bowel patterns.     Denies weight loss, nausea, emesis, dysphagia, odynophagia, heartburn, regurgitation, melena, hematochezia and BRBR.     -----------------------------------------------------------------------------------------------------------------------------------------------------------------------------------------------------------------------------------  Interval History June 4, 2024:    Continues to have pain daily at the location of the gastrostomy tube site. The tube was never used for feeds. No erythema edema or granulation tissue. Noting a small scar at the area. Aggravated with postural changes and physical activity.     Bowel Patterns: For the past week has been stooling daily consistent with [place] Stool Scale Type 4. This is not a new change in bowel patterns symptom onset was > 1 year prior.     Weight is now stable.     Denies nausea, emesis, dysphagia, odynophagia, heartburn, regurgitation, nocturnal stooling, melena, hematochezia and BRBR.     Traveling with son to [place]. Driving with son.     ---------------------------------------------------------------------------------------------------------------------------------------------------------------------------  7/2023 Interval History:     Prior to going to the ER took a small bite of \"rice hot-dish\" which then resulted in intractable emesis. Reports history of dysphagia for 10+ years. Avoids meats as this is a trigger for her. Denies dysphagia to semi solids, liquids and pills.     Since being seen in the ER has not had symptoms of dysphagia however made significant lifestyle modifications and dietary alterations.      Currently takes Prevacid 30mg with break " "through symptoms occurring multiple times per month which is relieved with the use of TUMs.     Associated with 20 lb weight loss in the past 12 months with stability over the past 3-6 months. Notes she does not eat when alone secondary to lack of desire to eat. Notes \"If I were to make myself a sandwich I would only eat half.\" Not currently eating 3 meals per day. Will supplement with Boost/Ensure x2 daily.    Will have a BM daily that fluctuates between [place] Stool Scale Type 1 - 4.      Denies weight loss,odynophagia, dysphonia/hoarseness, chronic cough/throat clearing, abdominal pain, diarrhea, constipation (< 3 stools per week), nocturnal stooling, incontinence of feces, melena, hematochezia and BRBR.     No prior intraabdominal surgeries. No history of prior vaginal births/ section.     Very rare/seldom use of ETOH products one serving per month.     History of 15 - 20 pack year history.      No family history or GI related malignancy (esophageal, gastric, pancreatic, liver or colon).      past away 2023.     Please also see questionnaires below when reviewing subjective history.       Esophageal Questionnaire(s)    BEDQ Questionnaire      2023    10:33 AM 2023     2:31 PM 2023     1:51 PM 2024    12:34 PM 2024    10:19 AM   BEDQ Questionnaire: How Often Have You Had the Following?   Trouble eating solid food (meat, bread, vegetables) 5  5  5  0  0    Trouble eating soft foods (yogurt, jello, pudding) 3  4  0  0  0    Trouble swallowing liquids 0  5  0  0  0    Pain while swallowing 1  0  0  0  0    Coughing or choking while swallowing foods or liquids 1  1  0  0  0    Total Score: 10 15 5 0 0        Patient-reported         2023    10:33 AM 2023     2:31 PM 2023     1:51 PM 2024    12:34 PM 2024    10:19 AM   BEDQ Questionnaire: Discomfort/Pain Ratings   Eating solid food (meat, bread, vegetables) 5  5  2  0  0    Eating soft " foods (yogurt, jello, pudding) 0  0  0  0  0    Drinking liquid 0  0  0  0  0    Total Score: 5 5 2 0 0        Patient-reported       Eckardt Questionnaire      7/12/2023    10:34 AM 7/19/2023     2:32 PM 11/28/2023     1:52 PM 5/31/2024    12:35 PM 12/12/2024    10:21 AM   Eckardt Questionnaire   Dysphagia 1  1  1  0  0    Regurgitation 1  1  1  0  0    Retrosternal Pain 0  1  0  2     Weight Loss (kg) 3  3  0  3  0    Total Score:  5 6 2 5        Patient-reported       Promis 10 Questionnaire      7/12/2023    10:36 AM 11/28/2023     1:54 PM 5/31/2024    12:37 PM 12/12/2024    10:24 AM   PROMIS 10 FLOWSHEET DATA   In general, would you say your health is: 3  4  4  4    In general, would you say your quality of life is: 3  4  4  4    In general, how would you rate your physical health? 3  4  4  4    In general, how would you rate your mental health, including your mood and your ability to think? 4  4  4  4    In general, how would you rate your satisfaction with your social activities and relationships? 4  4  4  5    In general, please rate how well you carry out your usual social activities and roles. (This includes activities at home, at work and in your community, and responsibilities as a parent, child, spouse, employee, friend, etc.) 4  4  4  4    To what extent are you able to carry out your everyday physical activities such as walking, climbing stairs, carrying groceries, or moving a chair? 4  5  5  5    In the past 7 days, how often have you been bothered by emotional problems such as feeling anxious, depressed, or irritable? 4  3  2  1    In the past 7 days, how would you rate your fatigue on average? 3  3  2  1    In the past 7 days, how would you rate your pain on average, where 0 means no pain, and 10 means worst imaginable pain? 0  0  6  0    Mental health question re-calculation - no clinical value 2    2 3 4 5    Physical health question re-calculation - no clinical value 3    3 3 4 5    Pain  question re-calculation - no clinical value 5    5 5 3 5    Global Mental Health Score 13    13 15 16 18    Global Physical Health Score 15    15 17 16 19    PROMIS TOTAL - SUBSCORES 28    28 32 32 37        Patient-reported    Multiple values from one day are sorted in reverse-chronological order           STUDIES & PROCEDURES:    EGD:       Colonoscopy:    2015   Findings:        The perianal and digital rectal examinations were normal. There is        decreased sphincter tone.        A few small-mouthed diverticula were found in the cecum.        Multiple medium-mouthed diverticula were found in the sigmoid colon.                                                                                     Impression:          - Diverticulosis in the cecum.                        - Diverticulosis in the sigmoid colon.     EndoFLIP directed at the UES or LES (8cm (EF-325) balloon length or 16cm (EF-322) balloon length):   Date:  8cm balloon  Balloon inflation Balloon pressure CSA (mm^2) DI (mm^2/mmHg) Dmin (mm) Compliance   20 (ladmark ID)        30        40        50           16cm balloon  Balloon inflation Balloon pressure CSA (mm^2) DI (mm^2/mmHg) Dmin (mm) Compliance   30 (ladmark ID)        40        50        60        70           High Resolution Manometry:    PH/Impedance:     Rizo:    CT:    6/22/2023 CT CAP W Contrast   FINDINGS:   LUNGS AND PLEURA: No pulmonary mass, consolidation, or suspicious pulmonary nodule. No pleural effusion or pneumothorax.     MEDIASTINUM/AXILLAE: No abnormally enlarged lymph nodes. Great vessels normal in caliber. There is a patent stent in the proximal left subclavian artery. No abnormally enlarged lymph nodes. Moderate sized hiatal hernia. There is marked fluid distention   of the esophagus.  No esophageal wall thickening. Possible impacted ingested material measuring approximately 3 cm, best seen on coronal 56.     CORONARY ARTERY CALCIFICATION: Moderate.     HEPATOBILIARY:  Normal.     PANCREAS: Normal.     SPLEEN: Normal.     ADRENAL GLANDS: Normal.     KIDNEYS/BLADDER: Normal.     BOWEL: Severe distal colonic diverticulosis. No evidence for acute diverticulitis. No free air, free fluid, or inflammatory change. Normal caliber appendix.     LYMPH NODES: Normal.     VASCULATURE: Diffuse atherosclerotic calcification of the abdominal aorta, but no aneurysm.     PELVIC ORGANS: Normal.     MUSCULOSKELETAL: Prior right hip replacement. Osteopenia. Severe degenerative change in the lower lumbar spine. Severe degenerative disc disease in the lower thoracic spine.                                                                      IMPRESSION:  1.  Moderate hiatal hernia with marked fluid distention of the esophagus. Possible obstruction secondary to impacted ingested material, distal esophageal stricture also considered. Consider follow-up GI consultation.  2.  Severe distal colonic diverticulosis.    Esophagram:    2/5/2024  FINDINGS: Esophagus is normal in its caliber, course and mucosal  pattern. No esophageal strictures or masses are evident. Mild  esophageal dysmotility.     Postoperative changes of hiatal hernia repair. No evidence for  contrast leakage in the region of the postoperative changes. No  evidence for a recurrent hiatal hernia. Water soluble contrast flowed  readily from the esophagus through the region of the hiatal hernia  repair into the stomach. A gastrostomy tube is in place. The stomach  is otherwise unremarkable, without evidence for mass or erosions.  Gastric mucosal pattern is unremarkable. Normal duodenum and proximal  jejunum.     Moderate gastroesophageal reflux was observed during the exam.                                                     IMPRESSION:   1. Postoperative changes of hiatal hernia repair. No evidence for a  contrast leak.  2. Mild esophageal dysmotility may be related to presbyesophagus.  3. Moderate gastroesophageal reflux was observed.    FL  VSS:     GES:    U/S:     XRAY:    Other:       Prior medical records were reviewed including, but not limited to, notes from referring providers, lab work, radiographic tests, and other diagnostic tests. Pertinent results were summarized above.     History     Past Medical History:   Diagnosis Date    Abnormal Papanicolaou smear of cervix and cervical HPV     colposcopy 1/97    Aortic valve sclerosis     Arthritis     Asthma     on preventative meds and has nebulizer    Chronic rhinitis     COPD (chronic obstructive pulmonary disease)     Coronary artery disease     4/4/17 SHERLEY--PDA    Coronary artery spasm (H)     Diastolic CHF, chronic 02/22/2012    Gastro-oesophageal reflux disease     Hiatal hernia     Hyperlipidemia 10/31/2011    Obesity     KEN (obstructive sleep apnea)     CPAP    Personal history of colonic polyps     colonoscopy 9/97, 2002 (due in 2007)    Pulmonary HTN     Seasonal allergies     Status post coronary angiogram 04/22/2020    Subclavian artery stenosis, left 08/07/2013    S/p stent in 2013     Subclinical hyperthyroidism 10/17/2016    Uncomplicated asthma 1995       Past Surgical History:   Procedure Laterality Date    ARTHROPLASTY HIP Right 10/19/2015    Procedure: ARTHROPLASTY HIP;  Surgeon: Aron Torres MD;  Location: [facility] OR    COLONOSCOPY  01/01/2008    Polyp removed, bx.    COLONOSCOPY  01/12/2010    COLONOSCOPY N/A 02/17/2015    Procedure: COLONOSCOPY;  Surgeon: Gato Quiles MD;  Location:  GI    CT CORONARY ANGIOGRAM  04/04/2017    SHERLEY to PDA    CV CORONARY ANGIOGRAM N/A 04/22/2020    Procedure: Coronary Angiogram;  Surgeon: Handy Denise MD;  Location:  [facility]    CV CORONARY ANGIOGRAM N/A 07/01/2021    Procedure: Coronary Angiogram;  Surgeon: Handy Denise MD;  Location:  [facility]    CV INSTANTANEOUS WAVE-FREE RATIO N/A 04/22/2020    Procedure: Instantaneous Wave-Free Ratio;  Surgeon: Handy Denise MD;  Location:   [facility]    CV LEFT VENTRICULOGRAM N/A 2020    Procedure: Left Ventriculogram;  Surgeon: Handy Denise MD;  Location:  [facility]    ESOPHAGOSCOPY, GASTROSCOPY, DUODENOSCOPY (EGD), COMBINED N/A 2023    Procedure: Esophagoscopy, gastroscopy, duodenoscopy (EGD), combined;  Surgeon: Genesis Kuhn MD;  Location: [facility]    LAPAROSCOPIC HERNIORRHAPHY HIATAL N/A 2024    Procedure: Laparoscopic Hiatal Hernia Repair, Esophagogastroduodenscopy, Gastrostomy Tube Placement, Bilateral Chest Tube Placement;  Surgeon: Oscar Stern MD;  Location: [facility] OR    Liposuction of legs and stomach      MAMMOPLASTY REDUCTION  1989    OPEN REDUCTION INTERNAL FIXATION ANKLE  1987    Removal of ankle hardware NOS      SOFT TISSUE SURGERY   &     Liposuction    VASCULAR SURGERY      angiogram & left subclavical artery stent       Social History     Socioeconomic History    Marital status:      Spouse name: Trenton    Number of children: 2    Years of education: 12    Highest education level: Not on file   Occupational History    Occupation: retired   Tobacco Use    Smoking status: Former     Current packs/day: 0.00     Average packs/day: 1 pack/day for 30.0 years (30.0 ttl pk-yrs)     Types: Cigarettes     Start date: 7/15/1973     Quit date: 1993     Years since quittin.6     Passive exposure: Past    Smokeless tobacco: Never   Vaping Use    Vaping status: Never Used   Substance and Sexual Activity    Alcohol use: Yes     Comment: Not very often    Drug use: No    Sexual activity: Not Currently     Partners: Male   Other Topics Concern     Service Not Asked    Blood Transfusions Not Asked    Caffeine Concern No    Occupational Exposure Not Asked    Hobby Hazards Not Asked    Sleep Concern Not Asked    Stress Concern Not Asked    Weight Concern Not Asked    Special Diet No     Comment: regular diet     Back Care Not Asked     Exercise No     Comment: shopping a lot so walikg     Bike Helmet Not Asked    Seat Belt Not Asked    Self-Exams Not Asked    Parent/sibling w/ CABG, MI or angioplasty before 65F 55M? Yes     Comment: Brother and Sister, Daughter and Father   Social History Narrative    Not on file     Social Drivers of Health     Financial Resource Strain: Low Risk  (12/2/2024)    Financial Resource Strain     Within the past 12 months, have you or your family members you live with been unable to get utilities (heat, electricity) when it was really needed?: No   Food Insecurity: Low Risk  (12/2/2024)    Food Insecurity     Within the past 12 months, did you worry that your food would run out before you got money to buy more?: No     Within the past 12 months, did the food you bought just not last and you didn t have money to get more?: No   Transportation Needs: Low Risk  (12/2/2024)    Transportation Needs     Within the past 12 months, has lack of transportation kept you from medical appointments, getting your medicines, non-medical meetings or appointments, work, or from getting things that you need?: No   Physical Activity: Insufficiently Active (12/2/2024)    Exercise Vital Sign     Days of Exercise per Week: 3 days     Minutes of Exercise per Session: 30 min   Stress: No Stress Concern Present (12/2/2024)    [employer] - Occupational Stress Questionnaire     Feeling of Stress : Only a little   Social Connections: Unknown (12/2/2024)    Social Connection and Isolation Panel [NHANES]     Frequency of Communication with Friends and Family: Not on file     Frequency of Social Gatherings with Friends and Family: Once a week     Attends Cheondoism Services: Not on file     Active Member of Clubs or Organizations: Not on file     Attends Club or Organization Meetings: Not on file     Marital Status: Not on file   Interpersonal Safety: Low Risk  (12/5/2024)    Interpersonal Safety     Do you feel  physically and emotionally safe where you currently live?: Yes     Within the past 12 months, have you been hit, slapped, kicked or otherwise physically hurt by someone?: No     Within the past 12 months, have you been humiliated or emotionally abused in other ways by your partner or ex-partner?: No   Housing Stability: Low Risk  (12/2/2024)    Housing Stability     Do you have housing? : Yes     Are you worried about losing your housing?: No       Family History   Problem Relation Age of Onset    Alzheimer Disease Mother     Gastrointestinal Disease Mother     Cancer Father         throat and lungs, liver,    Heart Disease Father 57        CABG    Hyperlipidemia Father     Other Cancer Father         Throat, Lung and Liver    Coronary Artery Disease Father     Coronary Artery Disease Sister     Coronary Artery Disease Sister     Heart Disease Sister     Heart Disease Brother         suicidal    Pacemaker Brother     Other Cancer Brother         Prostrate    Depression Brother     Coronary Artery Disease Brother     Hypertension Maternal Grandmother     Lipids Maternal Grandmother     Cancer Maternal Grandmother         stomach    Other Cancer Maternal Grandmother         Stomach Cancer    Cancer Paternal Grandmother         stomach    Other Cancer Paternal Grandmother         Stomach Cancer    Allergies Daughter     Asthma Daughter     Coronary Artery Disease Daughter     Allergies Son     Asthma Son     Anesthesia Reaction No family hx of     Thrombosis No family hx of      Family history reviewed and edited as appropriate    Medications and Allergies:     Outpatient Encounter Medications as of 12/17/2024   Medication Sig Dispense Refill    albuterol (PROAIR HFA/PROVENTIL HFA/VENTOLIN HFA) 108 (90 Base) MCG/ACT inhaler USE 2 INHALATIONS EVERY 6 HOURS AS NEEDED FOR SHORTNESS OF BREATH, DYSPNEA, OR WHEEZING 25.5 g 3    amLODIPine (NORVASC) 5 MG tablet Take 1 tablet (5 mg) by mouth every evening. 90 tablet 3     "aspirin 81 MG tablet Take 81 mg by mouth every morning      Calcium 6109-7957 MG-UNIT CHEW Take 1 tablet by mouth every morning      Cholecalciferol (VITAMIN D3 PO) Take 4,000 Units by mouth every morning      CRANBERRY PO Take 1 capsule by mouth every morning      fluticasone (FLONASE) 50 MCG/ACT nasal spray USE 1 TO 2 SPRAYS IN EACH NOSTRIL DAILY 48 g 5    fluticasone-salmeterol (WIXELA INHUB) 250-50 MCG/ACT inhaler Inhale 1 puff into the lungs 2 times daily. 90 each 3    furosemide (LASIX) 20 MG tablet Take 1 tablet (20 mg) by mouth daily. 90 tablet 3    gabapentin (NEURONTIN) 100 MG capsule Take 1-3 capsules (100-300 mg) by mouth at bedtime. 90 capsule 11    ipratropium - albuterol 0.5 mg/2.5 mg/3 mL (DUONEB) 0.5-2.5 (3) MG/3ML neb solution Take 1 vial (3 mLs) by nebulization every 6 hours as needed for shortness of breath, wheezing or cough. 90 mL 11    isosorbide mononitrate (IMDUR) 30 MG 24 hr tablet Take 1 tablet (30 mg) by mouth daily. Take in addition to 60 mg daily to equal total of 90 mg daily. 90 tablet 1    isosorbide mononitrate (IMDUR) 60 MG 24 hr tablet Take 1 tablet (60 mg) by mouth daily. Take in addition to 30 mg daily to equal total of 90 mg daily. HOLD IF SYSTOLIC BLOOD PRESSURE IS LESS THAN 85 90 tablet 1    montelukast (SINGULAIR) 10 MG tablet Take 1 tablet (10 mg) by mouth at bedtime. 90 tablet 3    multivitamin w/minerals (MULTI-VITAMIN) tablet Take 1 tablet by mouth every morning      nitroGLYcerin (NITROSTAT) 0.4 MG sublingual tablet One tablet under the tongue every 5 minutes if needed for chest pain. May repeat every 5 minutes for a maximum of 3 doses in 15 minutes\" 25 tablet 3    omega-3 fatty acids 1200 MG capsule Take 1 capsule by mouth every morning      rosuvastatin (CRESTOR) 40 MG tablet Take 1 tablet (40 mg) by mouth daily. 90 tablet 3    spironolactone (ALDACTONE) 25 MG tablet Take 1 tablet (25 mg) by mouth every morning. 90 tablet 3     No facility-administered encounter " medications on file as of 12/17/2024.        Allergies   Allergen Reactions    Animal Dander     Bee Pollen Other (See Comments)     Pollen,dust, trees, grass, mold-hayfever symptoms    Dust Mites     Kiwi Itching     Itching and red all over    Pollen Extract      Pollen,dust, trees, grass, mold-hayfever symptoms        Review of systems:  A full 10 point review of systems was obtained and was negative except for the pertinent positives and negatives stated within the HPI.    Objective Findings:   Physical Exam:    Constitutional: LMP  (LMP Unknown)   General: Alert, cooperative, no distress, well-appearing  Head: Atraumatic, normocephalic, no obvious abnormalities   Eyes: Sclera anicteric, no obvious conjunctival hemorrhage   Nose: Nares normal, no obvious malformation, no obvious rhinorrhea   Skin: No jaundice, no obvious rash  Neurologic: AAOx3, no obvious neurologic abnormality  Psychiatric: Normal Affect, appropriate mood  Extremities: No obvious edema, no obvious malformation     Labs, Radiology, Pathology     Lab Results   Component Value Date    WBC 7.8 04/22/2024    WBC 6.6 01/15/2024    WBC 8.7 01/14/2024    HGB 13.3 04/22/2024    HGB 12.3 01/15/2024    HGB 12.8 01/14/2024     04/22/2024     01/15/2024     01/14/2024    CHOL 161 12/05/2024    CHOL 158 11/30/2023    CHOL 155 11/25/2022    TRIG 70 12/05/2024    TRIG 76 11/30/2023    TRIG 84 11/25/2022    HDL 83 12/05/2024    HDL 83 11/30/2023    HDL 78 11/25/2022    ALT 20 05/22/2024    ALT 19 06/22/2023    ALT 19 11/25/2022    AST 26 06/22/2023    AST 18 07/22/2021    AST 19 07/17/2020     12/05/2024     05/22/2024     04/22/2024    BUN 17.4 12/05/2024    BUN 13.6 05/22/2024    BUN 18.3 04/22/2024    CO2 27 12/05/2024    CO2 22 05/22/2024    CO2 26 04/22/2024    TSH 1.27 07/22/2021    TSH 0.84 07/17/2020    TSH 2.29 07/18/2019    INR 1.13 07/01/2021    INR 1.00 04/22/2020    INR 1.15 (H) 10/01/2018        Liver  Function Studies -   Recent Labs   Lab Test 06/22/23 1947   PROTTOTAL 7.5   ALBUMIN 4.4   BILITOTAL 0.4   ALKPHOS 60   AST 26   ALT 19          Patient Active Problem List    Diagnosis Date Noted    Chronic obstructive pulmonary disease, unspecified COPD type (H) 12/05/2024     Priority: Medium    Hiatal hernia 01/12/2024     Priority: Medium    Lumbosacral spinal stenosis 06/20/2023     Priority: Medium    Scoliosis of thoracolumbar spine, unspecified scoliosis type 06/20/2023     Priority: Medium    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     Priority: Medium    Facet arthropathy, lumbosacral 06/20/2023     Priority: Medium    DDD (degenerative disc disease), lumbosacral 06/20/2023     Priority: Medium    Chest pain, unspecified type 06/07/2023     Priority: Medium    History of CAD (coronary artery disease) 06/07/2023     Priority: Medium    Venous reflux 07/12/2021     Priority: Medium    Vasospastic angina (H) 06/29/2021     Priority: Medium     Added automatically from request for surgery [id number]      Exertional chest pain 06/29/2021     Priority: Medium     Added automatically from request for surgery [id number]      Abnormal findings on diagnostic imaging of heart and coronary circulation 06/29/2021     Priority: Medium     Added automatically from request for surgery [id number]      Status post coronary angiogram 04/22/2020     Priority: Medium    Unstable angina pectoris (H) 04/17/2020     Priority: Medium     Added automatically from request for surgery [id number]      Chest pain 02/26/2018     Priority: Medium    Hypertension 09/22/2017     Priority: Medium    Subclinical hyperthyroidism 10/17/2016     Priority: Medium    Mild coronary artery disease 01/30/2016     Priority: Medium     On ASA      Hip osteoarthritis 10/19/2015     Priority: Medium    KEN (obstructive sleep apnea) 08/20/2015     Priority: Medium    Severe persistent asthma (H) 08/25/2014     Priority: Medium    Subclavian artery stenosis, left (H)  08/07/2013     Priority: Medium     S/p stent in 2013      Diastolic CHF, chronic (H) 02/22/2012     Priority: Medium    Hyperlipidemia LDL goal <130 10/31/2011     Priority: Medium    Osteopenia 12/21/2010     Priority: Medium    Hiatal hernia with GERD 09/03/2004     Priority: Medium    Female stress incontinence 09/03/2004     Priority: Medium    LUMBOSACRAL NEURITIS , numbness left thigh 09/03/2004     Priority: Medium    History of colonic polyps 06/19/2003     Priority: Medium     Problem list name updated by automated process. Provider to review      Chronic rhinitis 06/19/2003     Priority: Medium      Assessment and Plan   Assessment/Plan:    Victoria Montalvo is a 84 year old female with significant past medical history pertinent for asthma, HTN, HLD, COPD, KEN, CHF, CAD, subclavian artery stenosis who is now status post laparoscopic hiatal hernia repair with bilateral tube thoracostomy/gastrostomy tube placement 1/12/2024 by Dr. Stern who is presenting as a follow-up patient with a chief complaint of irregular bowel patterns.    #Dysphagia - Resolved  #GERD - Heartburn/Regurgitation - Resolved   #Hiatal Hernia S/P Laparoscopic Repair WO Fundoplication     Status post hiatal hernia repair 1/12/2024 with resolution of reflux symptoms and dysphagia. Victoria did report continued abdominal discomfort at the site of prior PEG tube which was aggravated with physical activity/postural changes however this has since resolved.    If pain were to reoccur would then consider consultation to physical therapy for abdominal wall massage/myofascial release for possible adhesions/scar tissue.    #Irregular Bowel Patterns - Resolved  Previous pattern most consistent with obstipation with overflow diarrhea which is likely exacerbated with intermittent use of Imodium. Now with [place] 4 Type Stools occurring every 2 days with the need for Docusate 100 mg x 1 ~ once per month.      - If you have not had a BM for 2 days it is okay  to use Docusate 100 mg x1 as needed. The goal is to experience a satisfactory bowel movement 3-4 times per week  - Please start taking daily fiber supplementation. Start with 1 - 2 teaspoons daily which can be slowly uptitrated to 2 - 3 tablespoons as needed    Follow up plan:   Return to clinic as needed.    The risks and benefits of my recommendations, as well as other treatment options were discussed with the patient and any available family today. All questions were answered.     Follow up: As planned above. Today, I personally spent 8 minutes in direct face to face time with the patient, of which greater than 50% of the time was spent in patient education and counseling as described above. Approximately 6 minutes were spent on indirect care associated with the patient's consultation including but not limited to review of: patient medical records to date, clinic visits, hospital records, lab results, imaging studies, procedural documentation, and coordinating care with other providers. The findings from this review are summarized in the above note. All of the above accounted for a cumulative time of 14 minutes and was performed on the date of service.     The patient verbalized understanding of the plan and was appreciative for the time spent and information provided during the office visit.           Suzette Crawford PA-C  Division of Gastroenterology, Hepatology, and Nutrition  [facility]       Documentation assisted by voice recognition and documentation system.

## 2024-12-23 ENCOUNTER — PATIENT OUTREACH (OUTPATIENT)
Dept: CARE COORDINATION | Facility: CLINIC | Age: 84
End: 2024-12-23
Payer: COMMERCIAL

## 2024-12-23 NOTE — PROGRESS NOTES
Clinic Care Coordination Contact  [facility]/Voicemail    Clinical Data: Care Coordinator Outreach    Outreach Documentation Number of Outreach Attempt   12/2/2024   1:36 PM 1   12/23/2024  11:04 AM 2       Left message on patient's voicemail with call back information and requested return call.      Plan: Care Coordinator will try to reach patient again in 10 business days.    BEE Correia, B.S. [facility] Care Coordination  [facility] Clinics:  [place], [place] and [place]  [phone]  [email or web address]

## 2024-12-26 ENCOUNTER — PATIENT OUTREACH (OUTPATIENT)
Dept: CARE COORDINATION | Facility: CLINIC | Age: 84
End: 2024-12-26
Payer: COMMERCIAL

## 2024-12-26 NOTE — PROGRESS NOTES
Clinic Care Coordination Contact    Situation: Patient chart reviewed by care coordinator.    Background: Care Coordination initial assessment and enrollment to Care Coordination was 1/24/2024. Patient centered goal(s) developed with participation from patient.  RN CC handed patient off to CHW for continued outreach every 30 days. Patient completed all goals and enrollment status was changed to maintenance on 09/30/24.      Assessment: CHW CC is in process of outreaching to patient, recently unable to contact patient x 2. Next CHW CC outreach attempt scheduled for 01/06/25.     Plan/Recommendations: CHW CC will route request to RNCC to review for disenrollment per standard work if next outreach attempt is unsuccessful. RNCC will extend and complete clinical review after CHW CC has made contact with patient. RNCC will remain available as needed.     Lilli Rivera RN Care Coordinator  [facility]  Email: [email or web address]  Phone: [phone]

## 2025-01-06 ENCOUNTER — PATIENT OUTREACH (OUTPATIENT)
Dept: CARE COORDINATION | Facility: CLINIC | Age: 85
End: 2025-01-06
Payer: COMMERCIAL

## 2025-01-06 NOTE — LETTER
[facility] Care Coordination  [address] [place] [address]      June V Selvin  [address] [place] [address]       January 6, 2025      Hello,    Thank you for speaking with me. I have attached the resources that we discussed to this letter. I hope they are helpful to you.    - To register for [employer] Grand[employer] call [phone] ext [phone]  This is a service that uses [employer] however all you do is call versus using an latasha. They also do grocery delivery.     - [employer], I will attach an application.     - [employer] Connect [phone]       Please let me know if you have any questions or concerns!     BEE Correia, B.S. [facility]  Clinic Care Coordination  [facility]:  [place], [place] and [place]  Phone: [phone]

## 2025-01-06 NOTE — PROGRESS NOTES
Clinic Care Coordination Contact  Community Health Worker Follow Up    Care Gaps:     Health Maintenance Due   Topic Date Due    COPD ACTION PLAN  Never done    ZOSTER IMMUNIZATION (1 of 2) Never done    DTAP/TDAP/TD IMMUNIZATION (1 - Tdap) 04/15/2007    Pneumococcal Vaccine: 50+ Years (2 of 2 - PCV) 12/08/2011    RSV VACCINE (1 - 1-dose 75+ series) Never done    HF ACTION PLAN  08/04/2020    INFLUENZA VACCINE (1) 09/01/2024    PHQ-2 (once per calendar year)  01/01/2025     PCP visit on 2/10/25    Care Plan:   Care Plan: Transportation       Problem: Lack of transportation       Goal: Establish reliable transportation       Start Date: 1/6/2025 Expected End Date: 5/6/2025    Note:     Barriers: patient does not drive long distances  Strengths: engaged in CC  Patient expressed understanding of goal: Yes  Action steps to achieve this goal:  1. I will review list of transportation resources sent to me including [employer], [employer], [employer], etc.   2. I will reach out to my insurance provider to inquire if I have any benefits to afford over the counter vitamins like fish oil, calcium, etc.   3. I will find out what is covered for me for nail care and see podiatry for nail trimmings.                                 Intervention and Education during outreach: Patient asks if there is any assistance with affording over the counter medications for her vitamins like fish oil, calcium, multi-vitamin, etc. CHW encouraged her to call [employer] her Medicare Advantage plan to see what benefits they may have available.   Patient has Medicare and  insurance. She is also wondering what is covered or available for nail care, in [place] she went to a podiatrist to get her toe nails trimmed.   Patient mentions that she will also be needing transportation for when she has appointments at the . CHW discussed [employer]andy grandparent (dtr previously put Lyft on her phone), [employer], etc. Will send application.      CHW Plan: Mailed information to patient. Next outreach in one month.     BEE Correia, B.S. [facility] Care Coordination  [facility]:  [place], [place] and [place]  [phone]  [email or web address]

## 2025-01-13 ENCOUNTER — PATIENT OUTREACH (OUTPATIENT)
Dept: CARE COORDINATION | Facility: CLINIC | Age: 85
End: 2025-01-13
Payer: COMMERCIAL

## 2025-01-13 ASSESSMENT — ACTIVITIES OF DAILY LIVING (ADL): DEPENDENT_IADLS:: INDEPENDENT

## 2025-01-13 NOTE — LETTER
[facility] CARE COORDINATION  [address] [place] [address]     1/13/2025          Victoria Phillips   [address] [address]             Dear June,     Attached is an updated Patient Centered Plan of Care for your continued enrollment in Care Coordination. Please let us know if you have additional questions, concerns, or goals that we can assist with.     Sincerely,     Michelle Soliman RNCC  Primary Care Clinic Care Coordination  Tele: [phone]       [facility]  Patient Centered Plan of Care  About Me:        Patient Name:  Victoria Phillips    YOB: 1940  Age:         84 year old   Feliciano MRN:    [id number] Telephone Information:  Home Phone [phone]   Mobile [phone]       Address:  [address] [place] [address] Email address:  [email or web address]      Emergency Contact(s)    Name Relationship Lgl Grd Work Phone Home Phone Mobile Phone   1. PAULETTETOBI PHILIP Daughter   [phone]   2. CONGEMERITA* Grandchild    [phone]   3. SAQIB PHILLIPS Son    [phone]   4. YANJER Grandchild    [phone]   5. JANIYA ARORA Grandchild    [phone]           Primary language:  English     needed? No   [employer]:  [phone] op. 1  Other communication barriers:Glasses; Utilization    Preferred Method of Communication:  Mail  Current living arrangement: I live alone    Mobility Status/ Medical Equipment: Independent w/Device        Health Maintenance  Health Maintenance Reviewed: Due/Overdue   Health Maintenance Due   Topic Date Due    COPD ACTION PLAN  Never done    ZOSTER IMMUNIZATION (1 of 2) Never done    DTAP/TDAP/TD IMMUNIZATION (1 - Tdap) 04/15/2007    Pneumococcal Vaccine: 50+ Years (2 of 2 - PCV) 12/08/2011    RSV VACCINE (1 - 1-dose 75+ series) Never done    HF ACTION PLAN  08/04/2020    INFLUENZA VACCINE (1) 09/01/2024    PHQ-2 (once per calendar year)   01/01/2025          My Access Plan  Medical Emergency 911   Primary Clinic Line [facility] - [phone]   24 Hour Appointment Line [phone] or  [phone]) (toll-free)   24 Hour Nurse Line [phone] (toll-free)   Preferred Urgent Care [facility] [place], [phone]     Preferred Hospital [facility]  [phone]     Preferred Pharmacy  - MAIL ORDER     Behavioral Health Crisis Line The National Suicide Prevention Lifeline at 1-426.973.2376 or Text/Call 448           My Care Team Members  Patient Care Team         Relationship Specialty Notifications Start End    Elsy Bah MD PCP - General Internal Medicine  12/29/18     Phone: [phone] Fax: [phone]         [facility] [place] [address]    Elsy Bah MD Assigned PCP   10/7/18     Phone: [phone] Fax: [phone]         [address] [place] [address] [address] [place] [address]    Lilli Way APRN CNP Nurse Practitioner Anesthesiology  8/2/23     Phone: [phone] Fax: [phone] [address] [facility] [address]    Genesis Kuhn MD MD Gastroenterology  8/2/23     Phone: [phone] Fax: [phone]         [address] [place] [address]    Shaji Bello MD MD Cardiovascular & Thoracic Surgery  8/22/23     Phone: [phone] Fax: [phone] [address] [facility] [address]    Linda Mayo PA-C Assigned Surgical Provider   1/13/24     Phone: [phone] Fax: [phone]         [address] [address]    Suzette Crawford PA-C Assigned Gastroenterology Provider   1/25/24     Phone: [phone] Fax: [phone]         [address] [facility] [address]    Lilli Rivera, RN Lead Care Coordinator  Admissions 1/25/24     Phone: [phone]         Cyndi Leon, CHW Community Health Worker Primary Care - CC Admissions 2/28/24     Phone:  [phone]         Luz Marina, Marleni, APRN CNP Nurse Practitioner Cardiovascular Disease  4/23/24     Phone: [phone] Fax: [phone] [address] [address]    Marleni Raza APRN CNP Assigned Heart and Vascular Provider   8/23/24     Phone: [phone] Fax: [phone] [address] [address]                My Care Plans  Self Management and Treatment Plan    Care Plan  Care Plan: Transportation       Problem: Lack of transportation       Goal: Establish reliable transportation       Start Date: 1/6/2025 Expected End Date: 5/6/2025    Note:     Barriers: patient does not drive long distances  Strengths: engaged in CC  Patient expressed understanding of goal: Yes  Action steps to achieve this goal:  1. I will review list of transportation resources sent to me including [employer], [employer], [employer], etc.   2. I will reach out to my insurance provider to inquire if I have any benefits to afford over the counter vitamins like fish oil, calcium, etc.   3. I will find out what is covered for me for nail care and see podiatry for nail trimmings.                                 Action Plans on File:   Asthma           Heart Failure       Advance Care Plans/Directives:   Advanced Care Plan/Directives on file: No    Discussed with patient/caregiver(s): Referral to Honoring Choices               My Medical and Care Information  Problem List   Patient Active Problem List   Diagnosis    History of colonic polyps    Chronic rhinitis    Hiatal hernia with GERD    Female stress incontinence    LUMBOSACRAL NEURITIS , numbness left thigh    Osteopenia    Hyperlipidemia LDL goal <130    Diastolic CHF, chronic (H)    Subclavian artery stenosis, left    Severe persistent asthma (H)    KEN (obstructive sleep apnea)    Hip osteoarthritis    Mild coronary artery disease    Subclinical hyperthyroidism    Chest pain    Unstable angina pectoris (H)    Status post coronary angiogram     Hypertension    Vasospastic angina    Exertional chest pain    Abnormal findings on diagnostic imaging of heart and coronary circulation    Venous reflux    Chest pain, unspecified type    History of CAD (coronary artery disease)    Lumbosacral spinal stenosis    Scoliosis of thoracolumbar spine, unspecified scoliosis type    Bertolotti's syndrome    Facet arthropathy, lumbosacral    DDD (degenerative disc disease), lumbosacral    Hiatal hernia    Chronic obstructive pulmonary disease, unspecified COPD type (H)      Current Medications:  We reviewed your medications today. It will be important to review this with your provider at your next clinic visit.    Care Coordination Start Date: 1/24/2024   Frequency of Care Coordination: monthly, more frequently as needed     Form Last Updated: 01/13/2025

## 2025-01-13 NOTE — PROGRESS NOTES
Clinic Care Coordination Contact  Clinic Care Coordination Contact  OUTREACH    Referral Information:  Referral Source: Care Team         Chief Complaint   Patient presents with    Clinic Care Coordination - Follow-up     Annual assessment        Universal Utilization: 76.5% admission or ED risk  Clinic Utilization  Difficulty keeping appointments:: No  Compliance Concerns: No  No-Show Concerns: No  No PCP office visit in Past Year: No  Utilization      No Show Count (past year)  0             ED Visits  1             Hospital Admissions  1                    Current as of: 1/12/2025 12:59 PM                Clinical Concerns:  Current Medical Concerns:    Patient Active Problem List   Diagnosis    History of colonic polyps    Chronic rhinitis    Hiatal hernia with GERD    Female stress incontinence    LUMBOSACRAL NEURITIS , numbness left thigh    Osteopenia    Hyperlipidemia LDL goal <130    Diastolic CHF, chronic (H)    Subclavian artery stenosis, left    Severe persistent asthma (H)    KEN (obstructive sleep apnea)    Hip osteoarthritis    Mild coronary artery disease    Subclinical hyperthyroidism    Chest pain    Unstable angina pectoris (H)    Status post coronary angiogram    Hypertension    Vasospastic angina    Exertional chest pain    Abnormal findings on diagnostic imaging of heart and coronary circulation    Venous reflux    Chest pain, unspecified type    History of CAD (coronary artery disease)    Lumbosacral spinal stenosis    Scoliosis of thoracolumbar spine, unspecified scoliosis type    Bertolotti's syndrome    Facet arthropathy, lumbosacral    DDD (degenerative disc disease), lumbosacral    Hiatal hernia    Chronic obstructive pulmonary disease, unspecified COPD type (H)    Patient acknowledges receipt of transportation handout by CHW, states will be helpful if daughter is unavailable to accompany her to appointments or if having to travel long distances to appointments.  Current Behavioral  Concerns: none    Education Provided to patient: Care plan goal(s), medications, CC role, HCD discussed/reviewed,support provided  Pain  Pain (GOAL):: No  Health Maintenance Reviewed: Due/Overdue   Health Maintenance Due   Topic Date Due    COPD ACTION PLAN  Never done    ZOSTER IMMUNIZATION (1 of 2) Never done    DTAP/TDAP/TD IMMUNIZATION (1 - Tdap) 04/15/2007    Pneumococcal Vaccine: 50+ Years (2 of 2 - PCV) 12/08/2011    RSV VACCINE (1 - 1-dose 75+ series) Never done    HF ACTION PLAN  08/04/2020    INFLUENZA VACCINE (1) 09/01/2024    PHQ-2 (once per calendar year)  01/01/2025      Clinical Pathway: None    Medication Management:  Medication review status: Medications reviewed.  Changes noted per patient report.  Patient reports taking OTC probiotic and prevogen     Functional Status:  Dependent ADLs:: Independent  Dependent IADLs:: Independent  Bed or wheelchair confined:: No  Mobility Status: Independent w/Device  Fallen 2 or more times in the past year?: No  Any fall with injury in the past year?: No    Living Situation:  Current living arrangement:: I live alone  Type of residence:: Town home    Lifestyle & Psychosocial Needs:    Social Drivers of Health     Food Insecurity: Low Risk  (12/2/2024)    Food Insecurity     Within the past 12 months, did you worry that your food would run out before you got money to buy more?: No     Within the past 12 months, did the food you bought just not last and you didn t have money to get more?: No   Depression: Not at risk (12/17/2024)    PHQ-2     PHQ-2 Score: 2   Housing Stability: Low Risk  (12/2/2024)    Housing Stability     Do you have housing? : Yes     Are you worried about losing your housing?: No   Tobacco Use: Medium Risk (12/17/2024)    Patient History     Smoking Tobacco Use: Former     Smokeless Tobacco Use: Never     Passive Exposure: Past   Financial Resource Strain: Low Risk  (12/2/2024)    Financial Resource Strain     Within the past 12 months, have you  or your family members you live with been unable to get utilities (heat, electricity) when it was really needed?: No   Alcohol Use: Not on file   Transportation Needs: Low Risk  (12/2/2024)    Transportation Needs     Within the past 12 months, has lack of transportation kept you from medical appointments, getting your medicines, non-medical meetings or appointments, work, or from getting things that you need?: No   Physical Activity: Insufficiently Active (12/2/2024)    Exercise Vital Sign     Days of Exercise per Week: 3 days     Minutes of Exercise per Session: 30 min   Interpersonal Safety: Low Risk  (12/5/2024)    Interpersonal Safety     Do you feel physically and emotionally safe where you currently live?: Yes     Within the past 12 months, have you been hit, slapped, kicked or otherwise physically hurt by someone?: No     Within the past 12 months, have you been humiliated or emotionally abused in other ways by your partner or ex-partner?: No   Stress: No Stress Concern Present (12/2/2024)    [employer] - Occupational Stress Questionnaire     Feeling of Stress : Only a little   Social Connections: Unknown (12/2/2024)    Social Connection and Isolation Panel [NHANES]     Frequency of Communication with Friends and Family: Not on file     Frequency of Social Gatherings with Friends and Family: Once a week     Attends Gnosticist Services: Not on file     Active Member of Clubs or Organizations: Not on file     Attends Club or Organization Meetings: Not on file     Marital Status: Not on file   Health Literacy: Not on file     Diet:: Regular (Soft foods)  Inadequate nutrition (GOAL):: No  Tube Feeding: No  Inadequate activity/exercise (GOAL):: No  Significant changes in sleep pattern (GOAL): No  Transportation means:: Regular car     Gnosticist or spiritual beliefs that impact treatment:: No  Mental health DX:: No  Mental health management concern (GOAL):: No  Chemical Dependency  Status: No Current Concerns  Chemical Dependency Management:  (N/A)  Informal Support system:: Children      Resources and Interventions:  Current Resources:      Community Resources: DME, Lifeline  Supplies Currently Used at Home: Nebulizer tubing, Nutritional Supplements, Compression Stockings  Equipment Currently Used at Home: walker, standard, walker, rolling, grab bar, tub/shower, shower chair  Employment Status: retired         Advance Care Plan/Directive  Advanced Care Plans/Directives on file:: No    Referrals Placed: Transportation     Care Plan:  Care Plan: Transportation       Problem: Lack of transportation       Goal: Establish reliable transportation       Start Date: 1/6/2025 Expected End Date: 5/6/2025    Note:     Barriers: patient does not drive long distances  Strengths: engaged in CC  Patient expressed understanding of goal: Yes  Action steps to achieve this goal:  1. I will review list of transportation resources sent to me including [employer], [employer], [employer], etc.   2. I will reach out to my insurance provider to inquire if I have any benefits to afford over the counter vitamins like fish oil, calcium, etc.   3. I will find out what is covered for me for nail care and see podiatry for nail trimmings.                                 Patient/Caregiver understanding: Patient/caregiver verbalized understanding and denies any additional questions or concerns at this time. RNCC engaged in [employer] communications during encounter.      Outreach Frequency: monthly, more frequently as needed  Future Appointments                In 3 weeks [facility] RAD; [id number] [facility], [facility]    In 3 weeks Deisi Mills APRN CNS [facility]    In 4 weeks Elsy Bah MD [facility]ks    In 11 months Elsy Bah MD [facility] [place]ks            Plan: Patient/caregiver  will call RNCC with questions, concerns, support needs. RNCC will be available as needed. CHW CC will follow up with patient in 1 month. RNCC will review chart in 6 weeks.    Michelle Soliman RNCC  Primary Care Clinic Care Coordination  Tele: [phone]

## 2025-02-04 ENCOUNTER — HOSPITAL ENCOUNTER (OUTPATIENT)
Dept: GENERAL RADIOLOGY | Facility: CLINIC | Age: 85
Discharge: HOME | End: 2025-02-04
Attending: CLINICAL NURSE SPECIALIST
Payer: COMMERCIAL

## 2025-02-04 DIAGNOSIS — K21.9 HIATAL HERNIA WITH GERD: ICD-10-CM

## 2025-02-04 DIAGNOSIS — K44.9 HIATAL HERNIA WITH GERD: ICD-10-CM

## 2025-02-04 PROCEDURE — 74220 X-RAY XM ESOPHAGUS 1CNTRST: CPT

## 2025-02-13 ENCOUNTER — PATIENT OUTREACH (OUTPATIENT)
Dept: CARE COORDINATION | Facility: CLINIC | Age: 85
End: 2025-02-13
Payer: COMMERCIAL

## 2025-02-13 NOTE — PROGRESS NOTES
Clinic Care Coordination Contact  [facility] [[email or web address]]/Voicemail    Clinical Data: Care Coordinator Outreach    Outreach Documentation Number of Outreach Attempt   2/13/2025   3:31 PM 1       Left message on patient's voicemail with call back information and requested return call.      Plan: Care Coordinator will try to reach patient again in 10 business days.    Cyndi GAMBOA, B.S. [facility] Care Coordination  [facility] Clinics:  [place], [place] and [place]  [phone]  [email or web address]

## 2025-02-25 ENCOUNTER — PATIENT OUTREACH (OUTPATIENT)
Dept: CARE COORDINATION | Facility: CLINIC | Age: 85
End: 2025-02-25
Payer: COMMERCIAL

## 2025-02-25 NOTE — PROGRESS NOTES
Clinic Care Coordination Contact  Community Health Worker Follow Up    Care Gaps:     Health Maintenance Due   Topic Date Due    COPD ACTION PLAN  Never done    ZOSTER IMMUNIZATION (1 of 2) Never done    DTAP/TDAP/TD IMMUNIZATION (1 - Tdap) 04/15/2007    Pneumococcal Vaccine: 50+ Years (2 of 2 - PCV) 12/08/2011    RSV VACCINE (1 - 1-dose 75+ series) Never done    HF ACTION PLAN  08/04/2020    INFLUENZA VACCINE (1) 09/01/2024    PHQ-2 (once per calendar year)  01/01/2025     Not discussed    Care Plan:   Care Plan: Transportation       Problem: Lack of transportation       Goal: Establish reliable transportation       Start Date: 1/6/2025 Expected End Date: 5/6/2025    This Visit's Progress: 10%    Note:     Barriers: patient does not drive long distances  Strengths: engaged in CC  Patient expressed understanding of goal: Yes  Action steps to achieve this goal:  1. I will review list of transportation resources sent to me including [employer], [employer], [employer], etc.   2. I will reach out to my insurance provider to inquire if I have any benefits to afford over the counter vitamins like fish oil, calcium, etc.   3. I will find out what is covered for me for nail care and see podiatry for nail trimmings.                                 Intervention and Education during outreach: Patient states that she has been busy working on changing the name on the deed of her house to put it in a trust for her children. She explains with doing this then her house won't go into probate if she passes, it would go right to her children without them having to make payments and they could sell.   Pt states that since she has been busy with this she has yet to review resources sent by CHW, she has this on hand and plans to review it soon. She has no further questions or concerns for CC team at this time.     CHW Plan: Next outreach in one month.     Cyndi GAMBOA, B.S. [facility]  Clinic Care Coordination  [facility]  [place] Clinics:  [place], [place] and [place]  [phone]  [email or web address]

## 2025-03-26 ENCOUNTER — PATIENT OUTREACH (OUTPATIENT)
Dept: CARE COORDINATION | Facility: CLINIC | Age: 85
End: 2025-03-26
Payer: COMMERCIAL

## 2025-03-26 NOTE — PROGRESS NOTES
Clinic Care Coordination Contact  [facility]/Voicemail    Clinical Data: Care Coordinator Outreach    Outreach Documentation Number of Outreach Attempt   3/26/2025   3:35 PM 1       Left message on patient's voicemail with call back information and requested return call.      Plan: Care Coordinator will try to reach patient again in 10 business days.    Cyndi GMABOA, B.S. [facility] Care Coordination  [facility] Clinics:  [place], [place] and [place]  [phone]  [email or web address]

## 2025-04-08 ENCOUNTER — OFFICE VISIT (OUTPATIENT)
Dept: URGENT CARE | Facility: URGENT CARE | Age: 85
End: 2025-04-08
Payer: COMMERCIAL

## 2025-04-08 ENCOUNTER — APPOINTMENT (OUTPATIENT)
Dept: GENERAL RADIOLOGY | Facility: CLINIC | Age: 85
End: 2025-04-08
Attending: STUDENT IN AN ORGANIZED HEALTH CARE EDUCATION/TRAINING PROGRAM
Payer: COMMERCIAL

## 2025-04-08 ENCOUNTER — HOSPITAL ENCOUNTER (EMERGENCY)
Facility: CLINIC | Age: 85
Discharge: HOME | End: 2025-04-08
Attending: STUDENT IN AN ORGANIZED HEALTH CARE EDUCATION/TRAINING PROGRAM | Admitting: STUDENT IN AN ORGANIZED HEALTH CARE EDUCATION/TRAINING PROGRAM
Payer: COMMERCIAL

## 2025-04-08 VITALS
OXYGEN SATURATION: 92 % | BODY MASS INDEX: 30.78 KG/M2 | DIASTOLIC BLOOD PRESSURE: 60 MMHG | RESPIRATION RATE: 18 BRPM | WEIGHT: 163 LBS | HEIGHT: 61 IN | SYSTOLIC BLOOD PRESSURE: 122 MMHG | HEART RATE: 95 BPM | TEMPERATURE: 97.5 F

## 2025-04-08 VITALS
BODY MASS INDEX: 30.3 KG/M2 | OXYGEN SATURATION: 96 % | RESPIRATION RATE: 17 BRPM | SYSTOLIC BLOOD PRESSURE: 106 MMHG | WEIGHT: 163 LBS | TEMPERATURE: 98.3 F | DIASTOLIC BLOOD PRESSURE: 73 MMHG | HEART RATE: 87 BPM

## 2025-04-08 DIAGNOSIS — J44.9 CHRONIC OBSTRUCTIVE PULMONARY DISEASE, UNSPECIFIED COPD TYPE (H): ICD-10-CM

## 2025-04-08 DIAGNOSIS — R06.02 SHORTNESS OF BREATH: Primary | ICD-10-CM

## 2025-04-08 DIAGNOSIS — R06.00 DYSPNEA, UNSPECIFIED TYPE: ICD-10-CM

## 2025-04-08 DIAGNOSIS — R05.1 ACUTE COUGH: ICD-10-CM

## 2025-04-08 DIAGNOSIS — I50.32 DIASTOLIC CHF, CHRONIC (H): ICD-10-CM

## 2025-04-08 DIAGNOSIS — R06.02 SOB (SHORTNESS OF BREATH): Primary | ICD-10-CM

## 2025-04-08 LAB
ANION GAP SERPL CALCULATED.3IONS-SCNC: 12 MMOL/L (ref 7–15)
BASOPHILS # BLD AUTO: 0 10E3/UL (ref 0–0.2)
BASOPHILS NFR BLD AUTO: 1 %
BUN SERPL-MCNC: 24.4 MG/DL (ref 8–23)
CALCIUM SERPL-MCNC: 9.2 MG/DL (ref 8.8–10.4)
CHLORIDE SERPL-SCNC: 104 MMOL/L (ref 98–107)
CREAT SERPL-MCNC: 0.81 MG/DL (ref 0.51–0.95)
D DIMER PPP FEU-MCNC: 0.84 UG/ML FEU (ref 0–0.5)
EGFRCR SERPLBLD CKD-EPI 2021: 71 ML/MIN/1.73M2
EOSINOPHIL # BLD AUTO: 0.5 10E3/UL (ref 0–0.7)
EOSINOPHIL NFR BLD AUTO: 5 %
ERYTHROCYTE [DISTWIDTH] IN BLOOD BY AUTOMATED COUNT: 14.3 % (ref 10–15)
FLUAV RNA SPEC QL NAA+PROBE: NEGATIVE
FLUBV RNA RESP QL NAA+PROBE: NEGATIVE
GLUCOSE SERPL-MCNC: 100 MG/DL (ref 70–99)
HCO3 SERPL-SCNC: 24 MMOL/L (ref 22–29)
HCT VFR BLD AUTO: 39.8 % (ref 35–47)
HGB BLD-MCNC: 13.1 G/DL (ref 11.7–15.7)
IMM GRANULOCYTES # BLD: 0 10E3/UL
IMM GRANULOCYTES NFR BLD: 0 %
LYMPHOCYTES # BLD AUTO: 2.2 10E3/UL (ref 0.8–5.3)
LYMPHOCYTES NFR BLD AUTO: 25 %
MCH RBC QN AUTO: 29.1 PG (ref 26.5–33)
MCHC RBC AUTO-ENTMCNC: 32.9 G/DL (ref 31.5–36.5)
MCV RBC AUTO: 88 FL (ref 78–100)
MONOCYTES # BLD AUTO: 0.5 10E3/UL (ref 0–1.3)
MONOCYTES NFR BLD AUTO: 6 %
NEUTROPHILS # BLD AUTO: 5.5 10E3/UL (ref 1.6–8.3)
NEUTROPHILS NFR BLD AUTO: 63 %
NRBC # BLD AUTO: 0 10E3/UL
NRBC BLD AUTO-RTO: 0 /100
NT-PROBNP SERPL-MCNC: 232 PG/ML (ref 0–1800)
PLATELET # BLD AUTO: 220 10E3/UL (ref 150–450)
POTASSIUM SERPL-SCNC: 3.9 MMOL/L (ref 3.4–5.3)
RBC # BLD AUTO: 4.5 10E6/UL (ref 3.8–5.2)
RSV RNA SPEC NAA+PROBE: NEGATIVE
SARS-COV-2 RNA RESP QL NAA+PROBE: NEGATIVE
SODIUM SERPL-SCNC: 140 MMOL/L (ref 135–145)
TROPONIN T SERPL HS-MCNC: 7 NG/L
TROPONIN T SERPL HS-MCNC: 7 NG/L
WBC # BLD AUTO: 8.7 10E3/UL (ref 4–11)

## 2025-04-08 PROCEDURE — 83880 ASSAY OF NATRIURETIC PEPTIDE: CPT | Performed by: STUDENT IN AN ORGANIZED HEALTH CARE EDUCATION/TRAINING PROGRAM

## 2025-04-08 PROCEDURE — 85379 FIBRIN DEGRADATION QUANT: CPT | Performed by: STUDENT IN AN ORGANIZED HEALTH CARE EDUCATION/TRAINING PROGRAM

## 2025-04-08 PROCEDURE — 71046 X-RAY EXAM CHEST 2 VIEWS: CPT

## 2025-04-08 PROCEDURE — 82435 ASSAY OF BLOOD CHLORIDE: CPT | Performed by: STUDENT IN AN ORGANIZED HEALTH CARE EDUCATION/TRAINING PROGRAM

## 2025-04-08 PROCEDURE — 36415 COLL VENOUS BLD VENIPUNCTURE: CPT | Performed by: STUDENT IN AN ORGANIZED HEALTH CARE EDUCATION/TRAINING PROGRAM

## 2025-04-08 PROCEDURE — 96374 THER/PROPH/DIAG INJ IV PUSH: CPT

## 2025-04-08 PROCEDURE — 3074F SYST BP LT 130 MM HG: CPT | Performed by: PHYSICIAN ASSISTANT

## 2025-04-08 PROCEDURE — 87637 SARSCOV2&INF A&B&RSV AMP PRB: CPT | Performed by: STUDENT IN AN ORGANIZED HEALTH CARE EDUCATION/TRAINING PROGRAM

## 2025-04-08 PROCEDURE — 250N000011 HC RX IP 250 OP 636: Performed by: STUDENT IN AN ORGANIZED HEALTH CARE EDUCATION/TRAINING PROGRAM

## 2025-04-08 PROCEDURE — 3078F DIAST BP <80 MM HG: CPT | Performed by: PHYSICIAN ASSISTANT

## 2025-04-08 PROCEDURE — 80048 BASIC METABOLIC PNL TOTAL CA: CPT | Performed by: STUDENT IN AN ORGANIZED HEALTH CARE EDUCATION/TRAINING PROGRAM

## 2025-04-08 PROCEDURE — 85004 AUTOMATED DIFF WBC COUNT: CPT | Performed by: STUDENT IN AN ORGANIZED HEALTH CARE EDUCATION/TRAINING PROGRAM

## 2025-04-08 PROCEDURE — 93005 ELECTROCARDIOGRAM TRACING: CPT

## 2025-04-08 PROCEDURE — 99285 EMERGENCY DEPT VISIT HI MDM: CPT | Mod: 25

## 2025-04-08 PROCEDURE — 84484 ASSAY OF TROPONIN QUANT: CPT | Performed by: STUDENT IN AN ORGANIZED HEALTH CARE EDUCATION/TRAINING PROGRAM

## 2025-04-08 PROCEDURE — 99215 OFFICE O/P EST HI 40 MIN: CPT | Performed by: PHYSICIAN ASSISTANT

## 2025-04-08 RX ORDER — FUROSEMIDE 10 MG/ML
40 INJECTION INTRAMUSCULAR; INTRAVENOUS ONCE
Status: COMPLETED | OUTPATIENT
Start: 2025-04-08 | End: 2025-04-08

## 2025-04-08 RX ADMIN — FUROSEMIDE 40 MG: 10 INJECTION, SOLUTION INTRAVENOUS at 19:26

## 2025-04-08 ASSESSMENT — ACTIVITIES OF DAILY LIVING (ADL)
ADLS_ACUITY_SCORE: 47

## 2025-04-08 NOTE — PATIENT INSTRUCTIONS
Based on our discussion, I have outlined the following instructions for you:      - Go to the emergency department for further evaluation of what I suspect is an exacerbation of your congestive heart failure.     Thank you again for your visit, and we look forward to supporting you in your journey to better health.

## 2025-04-08 NOTE — ED PROVIDER NOTES
Emergency Department Note      History of Present Illness     Chief Complaint   Shortness of Breath and Cough      HPI   June V Selvin is a very pleasant 84 year old female with history of CHF, COPD, CAD, presenting with shortness of breath and a cough. Patient reports chest congestion, shortness of breath, and a productive cough for the past 4 days. She has also shortness of breath with exertion. She was feeling well prior to travel to [place] four days ago. Patient reports at home nebulizer loosens the mucous, but not completely.  Patient reports not taking her Lasix or spironolactone for approximately four days while in [place] with her daughter. Endorses onset of bilateral leg swelling during the trip to [facility] where they had been walking a lot. States she did not consume a large amount of salt. She had occasional alcoholic drinks. Patient's daughter notes the swelling was worst when they were returning home two days ago. Earlier today, patient was seen at Urgent Care for shortness of breath and worsening productive cough. She was referred to the ED. Currently endorses bilateral leg swelling, better than it was two days ago, and a cough. Denies chest pain, fever, nausea, diaphoresis, or leg pain. Patient reports gaining 5 lbs over the past few days. Notes history of pneumonia.      Independent Historian   Daughter as detailed above.    Review of External Notes   I reviewed Urgent Care note from earlier today to understand initial presentation at urgent care.    Past Medical History     Medical History and Problem List   Aortic valve sclerosis  Arthritis  Asthma  Chronic rhinitis  COPD  CAD  CHF  Coronary artery spasm  GERD  Hiatal hernia  Hyperlipidemia  Obstructive sleep apnea  Colon polyps  Hypertension  Hyperthyroidism  Bertolotti's syndrome  Degenerative disc disease  Lumbosacral facet arthropathy  Thoracolumbar spine scoliosis  Venous reflux  Vasospastic angina   Hip osteoarthritis     Medications  "  Albuterol  Amlodipine  Lasix  Gabapentin  Imdur  Singulair  Crestor  Spironolactone    Surgical History   Right hip arthroplasty   Colonoscopy, polyp removal  Laparoscopic hiatal hernia repair  Liposuction of legs and stomach  Mammoplasty reduction  Angiogram and left subclavical artery stent   Left ankle fracture treatment     Physical Exam     Patient Vitals for the past 24 hrs:   BP Temp Temp src Pulse Resp SpO2 Height Weight   04/08/25 2155 122/60 -- -- -- -- -- -- --   04/08/25 2018 -- -- -- 95 -- -- -- --   04/08/25 2004 -- -- -- 96 -- 92 % -- --   04/08/25 2003 -- -- -- 79 -- 93 % -- --   04/08/25 1949 -- -- -- 73 -- -- -- --   04/08/25 1948 -- -- -- 74 -- 96 % -- --   04/08/25 1934 -- -- -- 76 -- 97 % -- --   04/08/25 1933 -- -- -- 76 -- 98 % -- --   04/08/25 1919 -- -- -- 79 -- 98 % -- --   04/08/25 1918 -- -- -- 76 -- 98 % -- --   04/08/25 1903 -- -- -- 68 -- 96 % -- --   04/08/25 1900 -- -- -- 71 -- 97 % -- --   04/08/25 1848 -- -- -- 70 -- 97 % -- --   04/08/25 1845 122/60 -- -- 74 -- 98 % -- --   04/08/25 1817 (!) 168/89 97.5  F (36.4  C) Oral 80 18 100 % 1.549 m (5' 1\") 73.9 kg (163 lb)     Physical Exam  Vitals and nursing note reviewed.   Constitutional:       Appearance: She is well-developed. She is not ill-appearing or diaphoretic.   Eyes:      General: No scleral icterus.     Conjunctiva/sclera: Conjunctivae normal.   Cardiovascular:      Rate and Rhythm: Normal rate and regular rhythm.   Pulmonary:      Effort: Pulmonary effort is normal. No tachypnea or respiratory distress.      Breath sounds: Rhonchi (Scattered rhonchi.  No crackles.  No wheeze.) present. No wheezing or rales.   Abdominal:      Palpations: Abdomen is soft.      Tenderness: There is no abdominal tenderness.   Musculoskeletal:         General: No swelling or tenderness.      Right lower leg: Edema present.      Left lower leg: Edema present.      Comments: 1+ pitting edema in bilateral lower extremities.  Also some nonpitting " edema around bilateral ankles.   Skin:     General: Skin is warm and dry.   Neurological:      General: No focal deficit present.      Mental Status: She is alert and oriented to person, place, and time.         Diagnostics     Lab Results   Labs Ordered and Resulted from Time of ED Arrival to Time of ED Departure   BASIC METABOLIC PANEL - Abnormal       Result Value    Sodium 140      Potassium 3.9      Chloride 104      Carbon Dioxide (CO2) 24      Anion Gap 12      Urea Nitrogen 24.4 (*)     Creatinine 0.81      GFR Estimate 71      Calcium 9.2      Glucose 100 (*)    D DIMER QUANTITATIVE - Abnormal    D-Dimer Quantitative 0.84 (*)    TROPONIN T, HIGH SENSITIVITY - Normal    Troponin T, High Sensitivity 7     NT PROBNP INPATIENT - Normal    N terminal Pro BNP Inpatient 232     INFLUENZA A/B, RSV AND SARS-COV2 PCR - Normal    Influenza A PCR Negative      Influenza B PCR Negative      RSV PCR Negative      SARS CoV2 PCR Negative     TROPONIN T, HIGH SENSITIVITY - Normal    Troponin T, High Sensitivity 7     CBC WITH PLATELETS AND DIFFERENTIAL    WBC Count 8.7      RBC Count 4.50      Hemoglobin 13.1      Hematocrit 39.8      MCV 88      MCH 29.1      MCHC 32.9      RDW 14.3      Platelet Count 220      % Neutrophils 63      % Lymphocytes 25      % Monocytes 6      % Eosinophils 5      % Basophils 1      % Immature Granulocytes 0      NRBCs per 100 WBC 0      Absolute Neutrophils 5.5      Absolute Lymphocytes 2.2      Absolute Monocytes 0.5      Absolute Eosinophils 0.5      Absolute Basophils 0.0      Absolute Immature Granulocytes 0.0      Absolute NRBCs 0.0         Imaging   XR Chest 2 Views   Final Result   IMPRESSION: Hyperinflation. Aortic calcification. Left subclavian artery stent. Thoracic degenerative changes. No significant interval change.          EKG   ECG results from 04/08/25   EKG 12-lead, tracing only     Value    Systolic Blood Pressure     Diastolic Blood Pressure     Ventricular Rate 74     Atrial Rate 74    MD Interval 130    QRS Duration 74        QTc 441    P Axis 50    R AXIS -31    T Axis 26    Interpretation ECG      Sinus rhythm  Left axis deviation  Abnormal ECG  When compared with ECG of 22-Apr-2024 21:47,  No significant change was found  Read by me at 2050         Independent Interpretation   2032 XR Chest 2 Views  Independent interpretation: chest x-ray do not appreciate significant pulmonary edema       ED Course      Medications Administered   Medications   furosemide (LASIX) injection 40 mg (40 mg Intravenous $Given 4/8/25 1926)       Procedures   Procedures   None performed    Discussion of Management   None    ED Course   ED Course as of 04/08/25 2238   Tue Apr 08, 2025 1905 I obtained history and examined the patient as noted above.    2032 XR Chest 2 Views  Independent interpretation: chest x-ray do not appreciate significant pulmonary edema   2136 I reassessed the patient, discussed findings and plan of care.         Additional Documentation  None    Medical Decision Making / Diagnosis     CMS Diagnoses: None    MIPS       None    MDM   Patient presenting from urgent care with shortness of breath on exertion and cough.  Vital signs on arrival notable for moderately elevated blood pressure but otherwise reassuring.  My examination is not impressive for CHF exacerbation, with very slight pitting edema in bilateral lower extremities and no significant crackles on pulmonary exam.  In addition to CHF exacerbation, did consider other etiologies including pulmonary embolism, acute coronary syndrome, viral illness, pneumonia, among others.  I do not suspect COPD exacerbation given good air movement and lungs bilaterally and no wheeze.  EKG shows no acute appearing ischemic changes and troponin was within normal limits, reassuring against acute myocardial injury given duration of symptoms.  D-dimer was elevated but below age-adjusted threshold, reassuring against pulmonary embolism.   Viral testing was negative.  Chest x-ray did not reveal evidence of infiltrate.  Patient has no leukocytosis.  Low suspicion for pneumonia.  I did treat the patient empirically with IV Lasix given she is certainly been noncompliant over the last week, and this may be contributing to little to her symptoms.  She did actually experience improvement on reevaluation and said she was breathing more easily and her cough improved.  Somewhat surprising given BNP is very low and chest x-ray did not show pulmonary edema, but possible that she had some very mild degree of fluid overload.  Overall impression is likely bronchitis with small component of volume overload.  Recommend patient follow-up with her primary care clinician and increase compliance with diuretics.    Disposition   The patient was discharged.     Diagnosis     ICD-10-CM    1. Shortness of breath  R06.02       2. Acute cough  R05.1       3. Diastolic CHF, chronic (H)  I50.32       4. Chronic obstructive pulmonary disease, unspecified COPD type (H)  J44.9            Discharge Medications   Discharge Medication List as of 4/8/2025  9:46 PM        Scribe Disclosure:  ISandy, am serving as a scribe at 5:46 PM on 4/8/2025 to document services personally performed by Dereje Branch MD based on my observations and the provider's statements to me.      Dereje Branch MD  04/08/25 8808

## 2025-04-08 NOTE — PROGRESS NOTES
Urgent Care Clinic Visit    Chief Complaint   Patient presents with    Cough     85 yo F presents with the following complaint productive cough but unable to get it out of lungs, complaining of wheezing no other cold like sx's  onset 1 week +  tx- nebulizer                4/8/2025     5:18 PM   Additional Questions   Roomed by Liliam Du   Accompanied by Friend

## 2025-04-08 NOTE — CONFIDENTIAL NOTE
Urgent Care Clinic Visit    Chief Complaint   Patient presents with    Cough     85 yo F presents with the following complaint productive cough but unable to get it out of lungs, complaining of wheezing no other cold like sx's  onset 1 week +  tx- nebulizer                  Urgent Care Clinic Visit    Chief Complaint   Patient presents with    Cough     85 yo F presents with the following complaint productive cough but unable to get it out of lungs, complaining of wheezing no other cold like sx's  onset 1 week +  tx- nebulizer                    Urgent Care Clinic Visit    Chief Complaint   Patient presents with    Cough     85 yo F presents with the following complaint productive cough but unable to get it out of lungs, complaining of wheezing no other cold like sx's  onset 1 week +  tx- nebulizer                4/8/2025     5:18 PM   Additional Questions   Roomed by Liliam Du   Accompanied by Danilo

## 2025-04-08 NOTE — ED TRIAGE NOTES
Pt referred from . +cough and SOB. Hx of CHF. Endorses leg swelling. Pt states she missed 4 days of her at home lasix. Endorses neb tx at home. States she has recently taken a plane trip.

## 2025-04-08 NOTE — PROGRESS NOTES
[facility]  [address]  [address]  [place] [address]  Phone: [phone]  Fax: [phone]    Patient:  Victoria Montalvo, Date of birth 1940  Date of Visit:  04/08/2025  Referring Provider No ref. provider found    Patient presents with:  Cough: 83 yo F presents with the following complaint productive cough but unable to get it out of lungs, complaining of wheezing no other cold like sx's  onset 1 week +  tx- nebulizer     Spironolactone upped to 30 a long time.     ICD-10-CM    1. SOB (shortness of breath)  R06.02       2. Dyspnea, unspecified type  R06.00           Patient Instructions   Based on our discussion, I have outlined the following instructions for you:      - Go to the emergency department for further evaluation of what I suspect is an exacerbation of your congestive heart failure.     Thank you again for your visit, and we look forward to supporting you in your journey to better health.    Assessment & Plan      Assessment  - Low suspicion of asthma exacerbation.  - High suspicion of congestive heart failure (CHF) exacerbation, indicated by 5-pound weight gain and crackling in lung bases.  - Recommendation for evaluation at the emergency department for potential diuresis with IV diuretics.    Plan  - Recommend evaluation at the emergency department for potential heart failure exacerbation to manage fluid retention more effectively.  - Suggest the use of IV diuretics to alleviate symptoms associated with heart failure exacerbation.  - Plan to write and print a note detailing concerns for the emergency department.  - Anticipate that a fair amount of labs will likely be conducted at the emergency department.    Appointments  - Evaluation at [facility] Emergency Department for suspected heart failure exacerbation.  - Follow-up appointment with cardiologist Dr. Yepez scheduled for September.         History of Present Illness     Pertinent history  obtain from: patient    Victoria Montalvo, an 84-year-old female, has been feeling unwell for about a week, primarily experiencing respiratory symptoms. She reports a significant increase in coughing and has been using her nebulizer more frequently. She has a history of asthma and heart failure. Over the weekend, she noticed a weight gain of approximately 5 pounds and increased swelling in her lower legs, which she describes as worse than usual. She also experienced random chest pain, which she managed with nitroglycerin during a recent trip to [place] from 04/03 to 04/06, 2025. The chest pain occurred while walking through the airport and subsided after taking nitroglycerin. She has a history of stents placement. Despite these symptoms, she initially declined to seek medical attention but was encouraged to visit the urgent care by her daughter due to persistent tightness in her chest and difficulty breathing.    Problem List:  2024-12: Chronic obstructive pulmonary disease, unspecified COPD type   (H)  2024-01: Hiatal hernia  2023-06: Lumbosacral spinal stenosis  2023-06: Scoliosis of thoracolumbar spine, unspecified scoliosis type  2023-06: Bertolotti's syndrome  2023-06: Facet arthropathy, lumbosacral  2023-06: DDD (degenerative disc disease), lumbosacral  2023-06: Chest pain, unspecified type  2023-06: History of CAD (coronary artery disease)  2021-07: Venous reflux  2021-06: Vasospastic angina  2021-06: Exertional chest pain  2021-06: Abnormal findings on diagnostic imaging of heart and   coronary circulation  2020-04: Status post coronary angiogram  2020-04: Unstable angina pectoris (H)  2018-02: Chest pain  2017-09: Hypertension  2016-10: Subclinical hyperthyroidism  2016-01: Mild coronary artery disease  2015-10: Health Care Home  2015-10: Hip osteoarthritis  2015-08: Bilateral low back pain with sciatica, sciatica laterality   unspecified  2015-08: Hip pain, right  2015-08: KEN (obstructive sleep  apnea)  2014: Severe persistent asthma (H)  2013: Subclavian artery stenosis, left  2013: Mild major depression (H)  2012: Advanced directives, counseling/discussion  2012: Diastolic CHF, chronic (H)  2011-10: Hyperlipidemia LDL goal <130  2011: Hyperlipidemia LDL goal <100  2010: Osteopenia  2010-10: CARDIOVASCULAR SCREENING; LDL GOAL LESS THAN 160  2007-10: Acute reaction to stress  2006: ASTHMA - MODERATE PERSISTENT  2005: Mild intermittent asthma  2004: Hiatal hernia with GERD  2004: Female stress incontinence  2004: LUMBOSACRAL NEURITIS , numbness left thigh  2003: History of colonic polyps  2003: Chronic rhinitis  2002: Asthma      Past Medical History:   Diagnosis Date    Abnormal Papanicolaou smear of cervix and cervical HPV     colposcopy     Aortic valve sclerosis     Arthritis     Asthma     on preventative meds and has nebulizer    Chronic rhinitis     COPD (chronic obstructive pulmonary disease)     Coronary artery disease     17 SHERLEY--PDA    Coronary artery spasm     Diastolic CHF, chronic 2012    Gastro-oesophageal reflux disease     Hiatal hernia     Hyperlipidemia 10/31/2011    Obesity     KEN (obstructive sleep apnea)     CPAP    Personal history of colonic polyps     colonoscopy 2002 (due in )    Pulmonary HTN     Seasonal allergies     Status post coronary angiogram 2020    Subclavian artery stenosis, left 2013    S/p stent in      Subclinical hyperthyroidism 10/17/2016    Uncomplicated asthma        Social History     Tobacco Use    Smoking status: Former     Current packs/day: 0.00     Average packs/day: 1 pack/day for 30.0 years (30.0 ttl pk-yrs)     Types: Cigarettes     Start date: 7/15/1973     Quit date: 1993     Years since quittin.9     Passive exposure: Past    Smokeless tobacco: Never   Substance Use Topics    Alcohol use: Yes     Comment: Not very often       Physical Exam     Physical  Exam  Vitals and nursing note reviewed.   Constitutional:       General: She is not in acute distress.     Appearance: She is not toxic-appearing or diaphoretic.   HENT:      Head: Normocephalic and atraumatic.   Eyes:      Conjunctiva/sclera: Conjunctivae normal.   Cardiovascular:      Rate and Rhythm: Normal rate and regular rhythm.   Pulmonary:      Effort: Pulmonary effort is normal.      Breath sounds: Examination of the right-lower field reveals rales. Examination of the left-lower field reveals rales. Rales present. No wheezing.   Musculoskeletal:      Right lower leg: 3+ Edema present.      Left lower leg: 3+ Edema present.   Neurological:      Mental Status: She is alert.   Psychiatric:         Mood and Affect: Mood normal.         Behavior: Behavior normal.         Thought Content: Thought content normal.         Judgment: Judgment normal.         Vital signs:  /73   Pulse 87   Temp 98.3  F (36.8  C)   Resp 17   Wt 73.9 kg (163 lb)   LMP  (LMP Unknown)   SpO2 96%   BMI 30.30 kg/m                 Consent was obtained from the patient to use an AI documentation tool in the creation of  this note

## 2025-04-09 LAB
ATRIAL RATE - MUSE: 74 BPM
DIASTOLIC BLOOD PRESSURE - MUSE: NORMAL MMHG
INTERPRETATION ECG - MUSE: NORMAL
P AXIS - MUSE: 50 DEGREES
PR INTERVAL - MUSE: 130 MS
QRS DURATION - MUSE: 74 MS
QT - MUSE: 398 MS
QTC - MUSE: 441 MS
R AXIS - MUSE: -31 DEGREES
SYSTOLIC BLOOD PRESSURE - MUSE: NORMAL MMHG
T AXIS - MUSE: 26 DEGREES
VENTRICULAR RATE- MUSE: 74 BPM

## 2025-04-09 NOTE — DISCHARGE INSTRUCTIONS
Thank you for allowing us to evaluate you today.  Follow up with primary care clinician  in 1 week for reevaluation..  Resume taking your Lasix  Please read the guidance provided with your discharge instructions.  Immediately return to the emergency department with any concerns.

## 2025-04-09 NOTE — ED NOTES
Patient discharged, IV removed, AVS given, Health teaching done. Offered wheelchair patient declines.

## 2025-04-10 ENCOUNTER — PATIENT OUTREACH (OUTPATIENT)
Dept: CARE COORDINATION | Facility: CLINIC | Age: 85
End: 2025-04-10
Payer: COMMERCIAL

## 2025-04-10 NOTE — LETTER
[facility] CARE COORDINATION  [address] [place] [address]     April 10, 2025        Victoria Phillips  [address] [place] [address]          Dear June, Attached is an updated Patient Centered Plan of Care for your continued enrollment in Care Coordination. Please let us know if you have additional questions, concerns, or goals that we can assist with.    Sincerely,    Lilli Rivera, RN Care Coordinator  [facility] - [place], [place], [place]  Email: [email or web address]  Phone: [phone]              [facility]  Patient Centered Plan of Care  About Me:      Patient Name:  Victoria Phillips    YOB: 1940  Age:         84 year old   [place] MRN:    [id number] Telephone Information:  Home Phone [phone]   Mobile [phone]       Address:  [address] [address] Email address:  [email or web address]      Emergency Contact(s)    Name Relationship Lgl Grd Work Phone Home Phone Mobile Phone   1. TRUDYZORANTOBI PHILIP Daughter   [phone]   2. EMERITA GAR* Grandchild    [phone]   3. SAQIB PHILLIPS Son    [phone]   4. YANJER Grandchild    [phone]   5. JANIYA ARORA Grandchild    [phone]           Primary language:  English     needed? No   [place] [employer] [facility]:  [phone] op. 1  Other communication barriers:Glasses; Utilization    Preferred Method of Communication:  Mail  Current living arrangement: I live alone    Mobility Status/ Medical Equipment: Independent w/Device    Health Maintenance  Health Maintenance Reviewed: Due/Overdue   Health Maintenance Due   Topic Date Due    COPD ACTION PLAN  Never done    ZOSTER IMMUNIZATION (1 of 2) Never done    DTAP/TDAP/TD IMMUNIZATION (1 - Tdap) 04/15/2007    Pneumococcal Vaccine: 50+ Years (2 of 2 - PCV) 12/08/2011    RSV VACCINE (1 - 1-dose 75+ series) Never done    HF ACTION PLAN  08/04/2020    INFLUENZA  VACCINE (1) 09/01/2024    PHQ-2 (once per calendar year)  01/01/2025    COVID-19 Vaccine (7 - 2024-25 season) 03/17/2025      My Access Plan  Medical Emergency 911   Primary Clinic Line [facility] - [phone]   24 Hour Appointment Line [phone] or  [phone]) (toll-free)   24 Hour Nurse Line [phone] (toll-free)   Preferred Urgent Care [facility] [place], [phone]     Preferred Hospital [facility]  [phone]     Preferred Pharmacy  - MAIL ORDER     Behavioral Health Crisis Line The National Suicide Prevention Lifeline at 1-128.567.1334 or Text/Call 718     My Care Team Members  Patient Care Team         Relationship Specialty Notifications Start End    Elsy Bah MD PCP - General Internal Medicine  12/29/18     Phone: [phone] Fax: [phone]         [facility] [place] [address]    Elsy Bah MD Assigned PCP   10/7/18     Phone: [phone] Fax: [phone]         [address] [place] [address] [address] [place] [address]    Lilli Way APRN CNP Nurse Practitioner Anesthesiology  8/2/23     Phone: [phone] Fax: [phone] [address] [facility] [address]    Genesis Kuhn MD MD Gastroenterology  8/2/23     Phone: [phone] Fax: [phone]         [address] [place] [address]    Shaji Bello MD MD Cardiovascular & Thoracic Surgery  8/22/23     Phone: [phone] Fax: [phone]         [address] [facility] [address]    Linda Mayo PA-C Assigned Surgical Provider   1/13/24     Phone: [phone] Fax: [phone]         [address] [place] [address]    Suzette Crawford PA-C Assigned Gastroenterology Provider   1/25/24     Phone: [phone] Fax: [phone]         [address] [facility] [address]    Lilli Rivera, RN Lead Care Coordinator  Admissions 1/25/24     Phone: [phone]          Cyndi Leon, BEE Community Health Worker Primary Care - CC Admissions 2/28/24     Phone: [phone]         Luz Marina, Marleni, APRN CNP Nurse Practitioner Cardiovascular Disease  4/23/24     Phone: [phone] Fax: [phone] [address] [facility] [address]    Marleni Raza APRN CNP Assigned Heart and Vascular Provider   3/23/25     Phone: [phone] Fax: [phone] [address] [facility] [address]    Shaji Bello MD Assigned Heart and Vascular Surgical Provider   3/23/25     Phone: [phone] Fax: [phone] [address] [facility] [address]              My Care Plans  Self Management and Treatment Plan    Care Plan  Care Plan: Transportation       Problem: Lack of transportation       Long-Range Goal: Establish reliable transportation       Start Date: 1/6/2025 Expected End Date: 8/29/2025    This Visit's Progress: 20% Recent Progress: 10%    Note:     Barriers: patient does not drive long distances  Strengths: engaged in CC  Patient expressed understanding of goal: Yes  Action steps to achieve this goal:  1. I will review list of transportation resources sent to me including [employer], [employer], [employer], etc.   2. I will reach out to my insurance provider to inquire if I have any benefits to afford over the counter vitamins like fish oil, calcium, etc.   3. I will find out what is covered for me for nail care and see podiatry for nail trimmings.   4. I will take my medications as prescribed.   5. I will discuss, review, schedule & complete recommended overdue health maintenance with my Primary Care Provider.   5. I will contact my care team with questions, concerns, support needs. I will use the clinic as a resource and I understand I can contact my clinic with 24/7 after hours services available. Care Coordinator will remain available as needed.                                Action Plans on File:   Asthma           Heart Failure       Advance  Care Plans/Directives:   Advanced Care Plan/Directives on file: No    Discussed with patient/caregiver(s): No data recorded       My Medical and Care Information  Problem List   Patient Active Problem List   Diagnosis    History of colonic polyps    Chronic rhinitis    Hiatal hernia with GERD    Female stress incontinence    LUMBOSACRAL NEURITIS , numbness left thigh    Osteopenia    Hyperlipidemia LDL goal <130    Diastolic CHF, chronic (H)    Subclavian artery stenosis, left    Severe persistent asthma (H)    KEN (obstructive sleep apnea)    Hip osteoarthritis    Mild coronary artery disease    Subclinical hyperthyroidism    Chest pain    Unstable angina pectoris (H)    Status post coronary angiogram    Hypertension    Vasospastic angina    Exertional chest pain    Abnormal findings on diagnostic imaging of heart and coronary circulation    Venous reflux    Chest pain, unspecified type    History of CAD (coronary artery disease)    Lumbosacral spinal stenosis    Scoliosis of thoracolumbar spine, unspecified scoliosis type    Bertolotti's syndrome    Facet arthropathy, lumbosacral    DDD (degenerative disc disease), lumbosacral    Hiatal hernia    Chronic obstructive pulmonary disease, unspecified COPD type (H)      Current Medications:  Please refer to the most recent medication list provided to you by your medical team and reach out to your provider with any questions or to make any corrections.    Care Coordination Start Date: 1/24/2024   Frequency of Care Coordination: monthly, more frequently as needed     Form Last Updated: 04/10/2025

## 2025-04-10 NOTE — PROGRESS NOTES
Clinic Care Coordination Contact  Follow Up Progress Note      Assessment:   Patient states she is just working on shredding some papers. Reports she is feeling much better, no longer has any swelling, cough or shortness of breath and reports she has been taking her medications as prescribed. Additionally, patient shares that she is using her compression stockings regularly. Patient shares that she got everything settled with the deed for her home. Patient declines need for any additional transportation resources, support, assistance at this time explaining that she mostly could use assistance with transportation in the winter time, however her daughter just downloaded and showed her how to use [employer] for transportation so she is feeling pretty confident and reassured by this to satisfactorily meet her needs.   Patient states she still does need to find nail care provider as she cannot reach her toes to cut her nails and when she looked into a few places, they were very expensive and she could not afford to pay out of pocket.   She verbalizes that this task is next on her list to reach out to her health insurance plan to inquire if nail care services are a covered benefit through a podiatrist.     Care Gaps:  Health Maintenance Due   Topic Date Due    COPD ACTION PLAN  Never done    ZOSTER IMMUNIZATION (1 of 2) Never done    DTAP/TDAP/TD IMMUNIZATION (1 - Tdap) 04/15/2007    Pneumococcal Vaccine: 50+ Years (2 of 2 - PCV) 12/08/2011    RSV VACCINE (1 - 1-dose 75+ series) Never done    HF ACTION PLAN  08/04/2020    INFLUENZA VACCINE (1) 09/01/2024    PHQ-2 (once per calendar year)  01/01/2025    COVID-19 Vaccine (7 - 2024-25 season) 03/17/2025     Care Gap Goal set: Yes    Care Plans  Care Plan: Transportation       Problem: Lack of transportation       Long-Range Goal: Establish reliable transportation       Start Date: 1/6/2025 Expected End Date: 8/29/2025    This Visit's Progress: 20% Recent Progress: 10%    Note:      Barriers: patient does not drive long distances  Strengths: engaged in CC  Patient expressed understanding of goal: Yes  Action steps to achieve this goal:  1. I will review list of transportation resources sent to me including [employer], [employer], [employer], etc.   2. I will reach out to my insurance provider to inquire if I have any benefits to afford over the counter vitamins like fish oil, calcium, etc.   3. I will find out what is covered for me for nail care and see podiatry for nail trimmings.   4. I will take my medications as prescribed.   5. I will discuss, review, schedule & complete recommended overdue health maintenance with my Primary Care Provider.   5. I will contact my care team with questions, concerns, support needs. I will use the clinic as a resource and I understand I can contact my clinic with 24/7 after hours services available. Care Coordinator will remain available as needed.                                Intervention/Education provided during outreach: As above. CC role, goal(s), clinic after hours, appointments, discussed/reviewed. Support provided.      Outreach Frequency: monthly, more frequently as needed    Plan:   Patient/caregiver will call RNCC with questions, concerns, support needs. RNCC will be available as needed.    CHW Care Coordinator will follow up in 1 month. RNCC will review chart in 6 weeks.     Lilli Rivera RN Care Coordinator  [facility] - [place], [place], [place]  Email: [email or web address]  Phone: [phone]

## 2025-05-06 ENCOUNTER — PATIENT OUTREACH (OUTPATIENT)
Dept: CARE COORDINATION | Facility: CLINIC | Age: 85
End: 2025-05-06
Payer: COMMERCIAL

## 2025-05-06 NOTE — PROGRESS NOTES
Clinic Care Coordination Contact  Community Health Worker Follow Up    Care Gaps:     Health Maintenance Due   Topic Date Due    COPD ACTION PLAN  Never done    ZOSTER IMMUNIZATION (1 of 2) Never done    DTAP/TDAP/TD IMMUNIZATION (1 - Tdap) 04/15/2007    Pneumococcal Vaccine: 50+ Years (2 of 2 - PCV) 12/08/2011    RSV VACCINE (1 - 1-dose 75+ series) Never done    HF ACTION PLAN  08/04/2020    PHQ-2 (once per calendar year)  01/01/2025    COVID-19 Vaccine (7 - 2024-25 season) 03/17/2025    ALT  05/22/2025     Not discussed    Care Plan:   Care Plan: Transportation       Problem: Lack of transportation       Long-Range Goal: Establish reliable transportation       Start Date: 1/6/2025 Expected End Date: 8/29/2025    This Visit's Progress: 30% Recent Progress: 20%    Note:     Barriers: patient does not drive long distances  Strengths: engaged in CC  Patient expressed understanding of goal: Yes  Action steps to achieve this goal:  1. I will review list of transportation resources sent to me including [employer], [employer], [employer], etc.   2. I will reach out to my insurance provider to inquire if I have any benefits to afford over the counter vitamins like fish oil, calcium, etc.   3. I will find out what is covered for me for nail care and see podiatry for nail trimmings.   4. I will take my medications as prescribed.   5. I will discuss, review, schedule & complete recommended overdue health maintenance with my Primary Care Provider.   5. I will contact my care team with questions, concerns, support needs. I will use the clinic as a resource and I understand I can contact my clinic with 24/7 after hours services available. Care Coordinator will remain available as needed.                                  Intervention and Education during outreach: Patient states that she is doing well, she is leaving on 5/8 to fly to visit great grandchildren, then she will be driving back with son to make it to 5/21  cardiology appt. Her son would like to go with her. Because of this she did not want to schedule with cardiology sooner. CHW discussed safety while flying and in the car for awhile, pt states she will make sure get up often and wear compression stockings.   Unfortunately, patient shares that she had fallen in her garage on the cement floor after her dog tripped her with his leash. She hurt her left hand which has made it tough recently to put compression stockings on. She has been making sure to keep legs elevated, hand is feeling better recently.   Patient does still have transportation options and will utilize if/when needed. Right now she is focusing on preparing for upcoming trip.    CHW Plan: Next outreach in one month.     Cyndi GAMBOA, B.S. [facility]  Clinic Care Coordination  [facility]:  [place], [place] and [place]  [phone]  [email or web address]

## 2025-05-21 ENCOUNTER — OFFICE VISIT (OUTPATIENT)
Dept: CARDIOLOGY | Facility: CLINIC | Age: 85
End: 2025-05-21
Payer: COMMERCIAL

## 2025-05-21 VITALS
DIASTOLIC BLOOD PRESSURE: 76 MMHG | HEIGHT: 62 IN | SYSTOLIC BLOOD PRESSURE: 138 MMHG | BODY MASS INDEX: 30.31 KG/M2 | WEIGHT: 164.7 LBS | HEART RATE: 86 BPM | OXYGEN SATURATION: 95 %

## 2025-05-21 DIAGNOSIS — I20.0 UNSTABLE ANGINA PECTORIS (H): ICD-10-CM

## 2025-05-21 DIAGNOSIS — R06.09 DOE (DYSPNEA ON EXERTION): Primary | ICD-10-CM

## 2025-05-21 DIAGNOSIS — I25.10 CORONARY ARTERY DISEASE INVOLVING NATIVE CORONARY ARTERY OF NATIVE HEART WITHOUT ANGINA PECTORIS: ICD-10-CM

## 2025-05-21 RX ORDER — DILTIAZEM HYDROCHLORIDE 120 MG/1
120 CAPSULE, COATED, EXTENDED RELEASE ORAL DAILY
Qty: 90 CAPSULE | Refills: 3 | Status: SHIPPED | OUTPATIENT
Start: 2025-05-21

## 2025-05-21 RX ORDER — NITROGLYCERIN 0.4 MG/1
TABLET SUBLINGUAL
Qty: 25 TABLET | Refills: 3 | Status: SHIPPED | OUTPATIENT
Start: 2025-05-21

## 2025-05-21 NOTE — LETTER
5/21/2025    Elsy Bah MD  [address] [place] [address]    RE: Victoria Montalvo       Dear Colleague,     I had the pleasure of seeing Victoria Montalvo in the [facility].  Cardiology Progress Note          Assessment and Plan:       Dyspnea on exertion and atypical chest pain, history of coronary spasm 2021 and diastolic congestive heart failure exacerbations secondary to medication noncompliance  Repeat nuclear stress testing to rule out worsening ischemia as a contributing factor  Change amlodipine to diltiazem to help with diastolic dysfunction in case that is contributing to the dyspnea  Discussed fluid restriction during travel when she is not taking her diuretic and doubling up on her diuretic when feeling more short of breath from skipping doses.    Follow-up with Marleni Raza to review symptoms with the change from amlodipine to diltiazem and nuclear stress testing results.    40 minutes was spent with the patient, precharting and reviewing tests as well as post visit charting all done today..    This note was transcribed using electronic voice recognition software and there may be typographical errors present.     The longitudinal plan of care for the diagnosis(es)/condition(s) as documented were addressed during this visit. Due to the added complexity in care, I will continue to support Victoria in the subsequent management and with ongoing continuity of care.                    Interval History:     The patient is a very pleasant 84 year old whom I have followed for a number of years.  She has history of coronary artery disease with RCA spasm on cardiac catheterization 2021.  She has had negative stress testing in 2023.    She has atypical chest discomfort that she takes sublingual nitroglycerin for.  She has noticed more dyspnea on exertion especially when missing furosemide doses due to travel.  She did not know she could double up afterwards.    She had brief  "hospitalization in April 2025 for fluid overload and improved with IV diuresis.    She is not on a beta-blocker secondary to her potential coronary spasm and her pulmonary disease.  She is currently on amlodipine and has never been on diltiazem.    Last echocardiogram 2024 with normal function.    She is here with her son whom I am meeting for the first time.                     Review of Systems:     Review of Systems:  Skin:        Eyes:       ENT:       Respiratory:  Positive for sleep apnea, shortness of breath  Cardiovascular:    Positive for, edema, chest pain  Gastroenterology:      Genitourinary:       Musculoskeletal:       Neurologic:       Psychiatric:       Heme/Lymph/Imm:       Endocrine:                   Physical Exam:     Vitals: /76 (BP Location: Right arm, Patient Position: Sitting, Cuff Size: Adult Regular)   Pulse 86   Ht 1.575 m (5' 2\")   Wt 74.7 kg (164 lb 11.2 oz)   LMP  (LMP Unknown)   SpO2 95%   BMI 30.12 kg/m    Constitutional:  cooperative, alert and oriented, well developed, well nourished, in no acute distress        Skin:  warm and dry to the touch, no apparent skin lesions or masses noted        Head:  normocephalic, no masses or lesions   hair loss, wearing a wig    Eyes:  pupils equal and round, conjunctivae and lids unremarkable, sclera white, no xanthalasma, EOMS intact, no nystagmus        Chest:  normal symmetry        Cardiac: regular rhythm, normal S1 and S2       systolic murmur, grade 2          Extremities and Back:  normal muscle strength and tone, no deformities, clubbing, cyanosis, erythema observed        Neurological:  no gross motor deficits, affect appropriate                 Medications:     Current Outpatient Medications   Medication Sig Dispense Refill     albuterol (PROAIR HFA/PROVENTIL HFA/VENTOLIN HFA) 108 (90 Base) MCG/ACT inhaler USE 2 INHALATIONS EVERY 6 HOURS AS NEEDED FOR SHORTNESS OF BREATH, DYSPNEA, OR WHEEZING 25.5 g 3     aspirin 81 MG " "tablet Take 81 mg by mouth every morning       Calcium 9678-3298 MG-UNIT CHEW Take 1 tablet by mouth every morning       Cholecalciferol (VITAMIN D3 PO) Take 4,000 Units by mouth every morning       CRANBERRY PO Take 1 capsule by mouth every morning       diltiazem ER COATED BEADS (CARDIZEM CD/CARTIA XT) 120 MG 24 hr capsule Take 1 capsule (120 mg) by mouth daily. 90 capsule 3     fluticasone (FLONASE) 50 MCG/ACT nasal spray USE 1 TO 2 SPRAYS IN EACH NOSTRIL DAILY 48 g 5     fluticasone-salmeterol (WIXELA INHUB) 250-50 MCG/ACT inhaler Inhale 1 puff into the lungs 2 times daily. 90 each 3     furosemide (LASIX) 20 MG tablet Take 1 tablet (20 mg) by mouth daily. 90 tablet 3     gabapentin (NEURONTIN) 100 MG capsule Take 1-3 capsules (100-300 mg) by mouth at bedtime. 90 capsule 11     ipratropium - albuterol 0.5 mg/2.5 mg/3 mL (DUONEB) 0.5-2.5 (3) MG/3ML neb solution Take 1 vial (3 mLs) by nebulization every 6 hours as needed for shortness of breath, wheezing or cough. 90 mL 11     isosorbide mononitrate (IMDUR) 30 MG 24 hr tablet Take 1 tablet (30 mg) by mouth daily. Take in addition to 60 mg daily to equal total of 90 mg daily. 90 tablet 1     isosorbide mononitrate (IMDUR) 60 MG 24 hr tablet Take 1 tablet (60 mg) by mouth daily. Take in addition to 30 mg daily to equal total of 90 mg daily. HOLD IF SYSTOLIC BLOOD PRESSURE IS LESS THAN 85 90 tablet 1     montelukast (SINGULAIR) 10 MG tablet Take 1 tablet (10 mg) by mouth at bedtime. 90 tablet 3     multivitamin w/minerals (MULTI-VITAMIN) tablet Take 1 tablet by mouth every morning       nitroGLYcerin (NITROSTAT) 0.4 MG sublingual tablet One tablet under the tongue every 5 minutes if needed for chest pain. May repeat every 5 minutes for a maximum of 3 doses in 15 minutes\" 25 tablet 3     omega-3 fatty acids 1200 MG capsule Take 1 capsule by mouth every morning       rosuvastatin (CRESTOR) 40 MG tablet Take 1 tablet (40 mg) by mouth daily. 90 tablet 3     " spironolactone (ALDACTONE) 25 MG tablet Take 1 tablet (25 mg) by mouth every morning. 90 tablet 3                Data:   All laboratory data reviewed  No results found for this or any previous visit (from the past 24 hours).    All laboratory data reviewed  Lab Results   Component Value Date    CHOL 161 12/05/2024    CHOL 167 07/17/2020     Lab Results   Component Value Date    HDL 83 12/05/2024    HDL 72 07/17/2020     Lab Results   Component Value Date    LDL 64 12/05/2024    LDL 82 07/17/2020     Lab Results   Component Value Date    TRIG 70 12/05/2024    TRIG 65 07/17/2020     Lab Results   Component Value Date    CHOLHDLRATIO 2.5 08/20/2014     TSH   Date Value Ref Range Status   07/22/2021 1.27 0.40 - 4.00 mU/L Final   07/17/2020 0.84 0.40 - 4.00 mU/L Final     Last Basic Metabolic Panel:  Lab Results   Component Value Date     04/08/2025     07/01/2021      Lab Results   Component Value Date    POTASSIUM 3.9 04/08/2025    POTASSIUM 4.1 04/08/2022    POTASSIUM 3.6 07/01/2021     Lab Results   Component Value Date    CHLORIDE 104 04/08/2025    CHLORIDE 108 04/08/2022    CHLORIDE 111 07/01/2021     Lab Results   Component Value Date    PHILLIP 9.2 04/08/2025    PHILLIP 8.3 07/01/2021     Lab Results   Component Value Date    CO2 24 04/08/2025    CO2 28 04/08/2022    CO2 26 07/01/2021     Lab Results   Component Value Date    BUN 24.4 04/08/2025    BUN 18 04/08/2022    BUN 11 07/01/2021     Lab Results   Component Value Date    CR 0.81 04/08/2025    CR 0.76 07/01/2021     Lab Results   Component Value Date     04/08/2025     01/13/2024    GLC 83 04/08/2022    GLC 86 07/01/2021     Lab Results   Component Value Date    WBC 8.7 04/08/2025    WBC 5.4 07/01/2021     Lab Results   Component Value Date    RBC 4.50 04/08/2025    RBC 4.63 07/01/2021     Lab Results   Component Value Date    HGB 13.1 04/08/2025    HGB 13.3 07/01/2021     Lab Results   Component Value Date    HCT 39.8 04/08/2025    HCT  41.1 07/01/2021     Lab Results   Component Value Date    MCV 88 04/08/2025    MCV 89 07/01/2021     Lab Results   Component Value Date    MCH 29.1 04/08/2025    MCH 28.7 07/01/2021     Lab Results   Component Value Date    MCHC 32.9 04/08/2025    MCHC 32.4 07/01/2021     Lab Results   Component Value Date    RDW 14.3 04/08/2025    RDW 13.9 07/01/2021     Lab Results   Component Value Date     04/08/2025     07/01/2021             Thank you for allowing me to participate in the care of your patient.      Sincerely,     Yogi Yepez MD     [facility]  cc:   Yogi Yepez MD  [address]  [place] [address]

## 2025-05-21 NOTE — PROGRESS NOTES
Cardiology Progress Note          Assessment and Plan:       Dyspnea on exertion and atypical chest pain, history of coronary spasm 2021 and diastolic congestive heart failure exacerbations secondary to medication noncompliance  Repeat nuclear stress testing to rule out worsening ischemia as a contributing factor  Change amlodipine to diltiazem to help with diastolic dysfunction in case that is contributing to the dyspnea  Discussed fluid restriction during travel when she is not taking her diuretic and doubling up on her diuretic when feeling more short of breath from skipping doses.    Follow-up with Marleni Raza to review symptoms with the change from amlodipine to diltiazem and nuclear stress testing results.    40 minutes was spent with the patient, precharting and reviewing tests as well as post visit charting all done today..    This note was transcribed using electronic voice recognition software and there may be typographical errors present.     The longitudinal plan of care for the diagnosis(es)/condition(s) as documented were addressed during this visit. Due to the added complexity in care, I will continue to support Victoria in the subsequent management and with ongoing continuity of care.                    Interval History:     The patient is a very pleasant 84 year old whom I have followed for a number of years.  She has history of coronary artery disease with RCA spasm on cardiac catheterization 2021.  She has had negative stress testing in 2023.    She has atypical chest discomfort that she takes sublingual nitroglycerin for.  She has noticed more dyspnea on exertion especially when missing furosemide doses due to travel.  She did not know she could double up afterwards.    She had brief hospitalization in April 2025 for fluid overload and improved with IV diuresis.    She is not on a beta-blocker secondary to her potential coronary spasm and her pulmonary disease.  She is currently on amlodipine and  "has never been on diltiazem.    Last echocardiogram 2024 with normal function.    She is here with her son whom I am meeting for the first time.                     Review of Systems:     Review of Systems:  Skin:        Eyes:       ENT:       Respiratory:  Positive for sleep apnea, shortness of breath  Cardiovascular:    Positive for, edema, chest pain  Gastroenterology:      Genitourinary:       Musculoskeletal:       Neurologic:       Psychiatric:       Heme/Lymph/Imm:       Endocrine:                   Physical Exam:     Vitals: /76 (BP Location: Right arm, Patient Position: Sitting, Cuff Size: Adult Regular)   Pulse 86   Ht 1.575 m (5' 2\")   Wt 74.7 kg (164 lb 11.2 oz)   LMP  (LMP Unknown)   SpO2 95%   BMI 30.12 kg/m    Constitutional:  cooperative, alert and oriented, well developed, well nourished, in no acute distress        Skin:  warm and dry to the touch, no apparent skin lesions or masses noted        Head:  normocephalic, no masses or lesions   hair loss, wearing a wig    Eyes:  pupils equal and round, conjunctivae and lids unremarkable, sclera white, no xanthalasma, EOMS intact, no nystagmus        Chest:  normal symmetry        Cardiac: regular rhythm, normal S1 and S2       systolic murmur, grade 2          Extremities and Back:  normal muscle strength and tone, no deformities, clubbing, cyanosis, erythema observed        Neurological:  no gross motor deficits, affect appropriate                 Medications:     Current Outpatient Medications   Medication Sig Dispense Refill    albuterol (PROAIR HFA/PROVENTIL HFA/VENTOLIN HFA) 108 (90 Base) MCG/ACT inhaler USE 2 INHALATIONS EVERY 6 HOURS AS NEEDED FOR SHORTNESS OF BREATH, DYSPNEA, OR WHEEZING 25.5 g 3    aspirin 81 MG tablet Take 81 mg by mouth every morning      Calcium 8490-5730 MG-UNIT CHEW Take 1 tablet by mouth every morning      Cholecalciferol (VITAMIN D3 PO) Take 4,000 Units by mouth every morning      CRANBERRY PO Take 1 capsule " "by mouth every morning      diltiazem ER COATED BEADS (CARDIZEM CD/CARTIA XT) 120 MG 24 hr capsule Take 1 capsule (120 mg) by mouth daily. 90 capsule 3    fluticasone (FLONASE) 50 MCG/ACT nasal spray USE 1 TO 2 SPRAYS IN EACH NOSTRIL DAILY 48 g 5    fluticasone-salmeterol (WIXELA INHUB) 250-50 MCG/ACT inhaler Inhale 1 puff into the lungs 2 times daily. 90 each 3    furosemide (LASIX) 20 MG tablet Take 1 tablet (20 mg) by mouth daily. 90 tablet 3    gabapentin (NEURONTIN) 100 MG capsule Take 1-3 capsules (100-300 mg) by mouth at bedtime. 90 capsule 11    ipratropium - albuterol 0.5 mg/2.5 mg/3 mL (DUONEB) 0.5-2.5 (3) MG/3ML neb solution Take 1 vial (3 mLs) by nebulization every 6 hours as needed for shortness of breath, wheezing or cough. 90 mL 11    isosorbide mononitrate (IMDUR) 30 MG 24 hr tablet Take 1 tablet (30 mg) by mouth daily. Take in addition to 60 mg daily to equal total of 90 mg daily. 90 tablet 1    isosorbide mononitrate (IMDUR) 60 MG 24 hr tablet Take 1 tablet (60 mg) by mouth daily. Take in addition to 30 mg daily to equal total of 90 mg daily. HOLD IF SYSTOLIC BLOOD PRESSURE IS LESS THAN 85 90 tablet 1    montelukast (SINGULAIR) 10 MG tablet Take 1 tablet (10 mg) by mouth at bedtime. 90 tablet 3    multivitamin w/minerals (MULTI-VITAMIN) tablet Take 1 tablet by mouth every morning      nitroGLYcerin (NITROSTAT) 0.4 MG sublingual tablet One tablet under the tongue every 5 minutes if needed for chest pain. May repeat every 5 minutes for a maximum of 3 doses in 15 minutes\" 25 tablet 3    omega-3 fatty acids 1200 MG capsule Take 1 capsule by mouth every morning      rosuvastatin (CRESTOR) 40 MG tablet Take 1 tablet (40 mg) by mouth daily. 90 tablet 3    spironolactone (ALDACTONE) 25 MG tablet Take 1 tablet (25 mg) by mouth every morning. 90 tablet 3                Data:   All laboratory data reviewed  No results found for this or any previous visit (from the past 24 hours).    All laboratory data " reviewed  Lab Results   Component Value Date    CHOL 161 12/05/2024    CHOL 167 07/17/2020     Lab Results   Component Value Date    HDL 83 12/05/2024    HDL 72 07/17/2020     Lab Results   Component Value Date    LDL 64 12/05/2024    LDL 82 07/17/2020     Lab Results   Component Value Date    TRIG 70 12/05/2024    TRIG 65 07/17/2020     Lab Results   Component Value Date    CHOLHDLRATIO 2.5 08/20/2014     TSH   Date Value Ref Range Status   07/22/2021 1.27 0.40 - 4.00 mU/L Final   07/17/2020 0.84 0.40 - 4.00 mU/L Final     Last Basic Metabolic Panel:  Lab Results   Component Value Date     04/08/2025     07/01/2021      Lab Results   Component Value Date    POTASSIUM 3.9 04/08/2025    POTASSIUM 4.1 04/08/2022    POTASSIUM 3.6 07/01/2021     Lab Results   Component Value Date    CHLORIDE 104 04/08/2025    CHLORIDE 108 04/08/2022    CHLORIDE 111 07/01/2021     Lab Results   Component Value Date    PHILLIP 9.2 04/08/2025    PHILLIP 8.3 07/01/2021     Lab Results   Component Value Date    CO2 24 04/08/2025    CO2 28 04/08/2022    CO2 26 07/01/2021     Lab Results   Component Value Date    BUN 24.4 04/08/2025    BUN 18 04/08/2022    BUN 11 07/01/2021     Lab Results   Component Value Date    CR 0.81 04/08/2025    CR 0.76 07/01/2021     Lab Results   Component Value Date     04/08/2025     01/13/2024    GLC 83 04/08/2022    GLC 86 07/01/2021     Lab Results   Component Value Date    WBC 8.7 04/08/2025    WBC 5.4 07/01/2021     Lab Results   Component Value Date    RBC 4.50 04/08/2025    RBC 4.63 07/01/2021     Lab Results   Component Value Date    HGB 13.1 04/08/2025    HGB 13.3 07/01/2021     Lab Results   Component Value Date    HCT 39.8 04/08/2025    HCT 41.1 07/01/2021     Lab Results   Component Value Date    MCV 88 04/08/2025    MCV 89 07/01/2021     Lab Results   Component Value Date    MCH 29.1 04/08/2025    MCH 28.7 07/01/2021     Lab Results   Component Value Date    MCHC 32.9 04/08/2025     MCHC 32.4 07/01/2021     Lab Results   Component Value Date    RDW 14.3 04/08/2025    RDW 13.9 07/01/2021     Lab Results   Component Value Date     04/08/2025     07/01/2021

## 2025-05-21 NOTE — PATIENT INSTRUCTIONS
Really try and cut back on fluid intake when traveling and holding your lasix.    On days following missed doses, you may make it up and double up on the furosemide.

## 2025-05-22 ENCOUNTER — PATIENT OUTREACH (OUTPATIENT)
Dept: CARE COORDINATION | Facility: CLINIC | Age: 85
End: 2025-05-22
Payer: COMMERCIAL

## 2025-05-22 NOTE — PROGRESS NOTES
Clinic Care Coordination Contact  Care Coordination Clinician Chart review    Situation: Patient chart reviewed by care coordinator.       Background: Care Coordination initial assessment and enrollment took place 1/24/2024.   Upon initial assessment patient-centered goals were discussed and developed with participation from patient.  RNCC handed patient follow up and monitoring of goal progression off to [facility] for continued outreach every 30 days.        Assessment: Per chart review, patient outreach completed by CC W on 05/06/25.  Patient is actively working to accomplish goal(s) and patient's goal(s) remain(s) appropriate and relevant at this time.       Care Plan: Advance Care Plan Completed 9/30/2024      Problem: Patient does not have a valid Health Care Directive  Resolved 9/30/2024      Long-Range Goal: Complete Health Care Directive  Completed 9/30/2024      Start Date: 1/31/2024 Expected End Date: 12/31/2024    Recent Progress: 50%    Note:     Barriers: Limited driving; recovering from surgery; Provider availability - wait time to complete appointments.   Strengths: Motivated; Independent; Agreeable to Care Coordination.   Patient expressed understanding of goal: Yes.   Action steps to achieve this goal:  1. I will review the resources for [facility].   2. I will contact [facility] when I'm ready to complete my Advance Care Planning documents.   3. I will contact my care team with questions, concerns or support needs. I will use the clinic as a resource and I understand I can contact my clinic with 24/7 after hours services available. Care Coordinator will remain available as needed.                               Care Plan: Resources for Housekeeping/Chore services, Transportation Completed 7/9/2024      Problem: Resources for Housekeeping/Chore services & Transportation  Resolved 7/9/2024      Long-Range Goal: Resources for Housekeeping/Chore services & Transportation  Completed 7/9/2024       Start Date: 1/31/2024 Expected End Date: 12/31/2024    This Visit's Progress: 100% Recent Progress: 20%    Note:     Barriers: Limited driving; recovering from surgery; Provider availability - wait time to complete appointments.   Strengths: Motivated; Independent; Agreeable to Care Coordination.   Patient expressed understanding of goal: Yes.   Action steps to achieve this goal:  1. I will review the resources for Housekeeping/Chore services.    2. I will review the list of Transportation options and follow up with the ones that are of interest to me. I will make arrangements with the [employer] that I want to use so I have transportation when my family is unavailable.   3. I will discuss, review, schedule and complete recommended overdue health maintenance with my Primary Care Provider.    4. I will contact my care team with questions, concerns or support needs. I will use the clinic as a resource and I understand I can contact my clinic with 24/7 after hours services available. Care Coordinator will remain available as needed.                             Care Plan: Transportation       Problem: Lack of transportation       Long-Range Goal: Establish reliable transportation       Start Date: 1/6/2025 Expected End Date: 8/29/2025    This Visit's Progress: 30% Recent Progress: 20%    Note:     Barriers: patient does not drive long distances  Strengths: engaged in CC  Patient expressed understanding of goal: Yes  Action steps to achieve this goal:  1. I will review list of transportation resources sent to me including [employer], [employer], [employer], etc.   2. I will reach out to my insurance provider to inquire if I have any benefits to afford over the counter vitamins like fish oil, calcium, etc.   3. I will find out what is covered for me for nail care and see podiatry for nail trimmings.   4. I will take my medications as prescribed.   5. I will discuss, review, schedule & complete recommended overdue  health maintenance with my Primary Care Provider.   5. I will contact my care team with questions, concerns, support needs. I will use the clinic as a resource and I understand I can contact my clinic with 24/7 after hours services available. Care Coordinator will remain available as needed.                                      Plan/Recommendations: RNCC Clinical Assessments to be completed annually or as needed. Annual Assessment will be due 01/2006. The patient will continue working with Care Coordination to achieve goal(s) as above.  [facility] will involve RNCC as needed or if patient is ready to transition to goal status of Maintenance.  RNCC will continue to review progress to goal(s) and [facility] outreaches every 6 weeks.     Complex Care Plan:  Patient is not due for updated Plan of Care.  Care Plan updated and sent to patient: Leydi Turcios RN precepting with:  Lilli Rivera RN Care Coordinator  [facility] [place]  Email: [email or web address]  Phone: [phone]

## 2025-06-05 ENCOUNTER — HOSPITAL ENCOUNTER (OUTPATIENT)
Dept: CARDIOLOGY | Facility: CLINIC | Age: 85
End: 2025-06-05
Attending: INTERNAL MEDICINE
Payer: COMMERCIAL

## 2025-06-05 VITALS — SYSTOLIC BLOOD PRESSURE: 140 MMHG | DIASTOLIC BLOOD PRESSURE: 67 MMHG | HEART RATE: 68 BPM

## 2025-06-05 DIAGNOSIS — I25.10 CORONARY ARTERY DISEASE INVOLVING NATIVE CORONARY ARTERY OF NATIVE HEART WITHOUT ANGINA PECTORIS: ICD-10-CM

## 2025-06-05 DIAGNOSIS — R06.09 DOE (DYSPNEA ON EXERTION): ICD-10-CM

## 2025-06-05 LAB
CV BLOOD PRESSURE: 75 MMHG
CV STRESS MAX HR HE: 85
NUC STRESS EJECTION FRACTION: 70 %
RATE PRESSURE PRODUCT: NORMAL
STRESS ECHO BASELINE DIASTOLIC HE: 67
STRESS ECHO BASELINE HR: 71 BPM
STRESS ECHO BASELINE SYSTOLIC BP: 140
STRESS ECHO CALCULATED PERCENT HR: 63 %
STRESS ECHO LAST STRESS DIASTOLIC BP: 71
STRESS ECHO LAST STRESS SYSTOLIC BP: 143
STRESS ECHO TARGET HR: 135
STRESS/REST PERFUSION RATIO: 1.25

## 2025-06-05 PROCEDURE — 78452 HT MUSCLE IMAGE SPECT MULT: CPT

## 2025-06-05 PROCEDURE — 250N000011 HC RX IP 250 OP 636: Mod: JZ | Performed by: INTERNAL MEDICINE

## 2025-06-05 PROCEDURE — 93017 CV STRESS TEST TRACING ONLY: CPT

## 2025-06-05 RX ORDER — LIDOCAINE 40 MG/G
CREAM TOPICAL
Status: ACTIVE | OUTPATIENT
Start: 2025-06-05

## 2025-06-05 RX ORDER — REGADENOSON 0.08 MG/ML
0.4 INJECTION, SOLUTION INTRAVENOUS ONCE
Status: COMPLETED | OUTPATIENT
Start: 2025-06-05 | End: 2025-06-05

## 2025-06-05 RX ORDER — REGADENOSON 0.08 MG/ML
INJECTION, SOLUTION INTRAVENOUS
Status: COMPLETED
Start: 2025-06-05 | End: 2025-06-05

## 2025-06-05 RX ORDER — ALBUTEROL SULFATE 90 UG/1
2 INHALANT RESPIRATORY (INHALATION) EVERY 5 MIN PRN
Status: ACTIVE | OUTPATIENT
Start: 2025-06-05

## 2025-06-05 RX ORDER — AMINOPHYLLINE 25 MG/ML
50-100 INJECTION, SOLUTION INTRAVENOUS
Status: ACTIVE | OUTPATIENT
Start: 2025-06-05

## 2025-06-05 RX ADMIN — REGADENOSON 0.4 MG: 0.08 INJECTION, SOLUTION INTRAVENOUS at 12:10

## 2025-07-01 DIAGNOSIS — I20.0 UNSTABLE ANGINA PECTORIS (H): ICD-10-CM

## 2025-07-01 RX ORDER — ISOSORBIDE MONONITRATE 30 MG/1
30 TABLET, EXTENDED RELEASE ORAL DAILY
Qty: 90 TABLET | Refills: 3 | Status: SHIPPED | OUTPATIENT
Start: 2025-07-01

## 2025-07-03 ENCOUNTER — PATIENT OUTREACH (OUTPATIENT)
Dept: CARE COORDINATION | Facility: CLINIC | Age: 85
End: 2025-07-03
Payer: COMMERCIAL

## 2025-07-03 NOTE — PROGRESS NOTES
Clinic Care Coordination Contact  Care Coordination Clinician Chart review    Situation: Patient chart reviewed by care coordinator.       Background: Care Coordination initial assessment and enrollment took place 1/24/2024.   Upon initial assessment patient-centered goals were discussed and developed with participation from patient.  RNCC handed patient follow up and monitoring of goal progression off to [facility] for continued outreach every 30 days.        Assessment: Per chart review, patient outreach completed by CC W on 06/06/2025.  Patient is actively working to accomplish goal(s) and patient's goal(s) remain(s) appropriate and relevant at this time.       Care Plan: Advance Care Plan Completed 9/30/2024      Problem: Patient does not have a valid Health Care Directive  Resolved 9/30/2024      Long-Range Goal: Complete Health Care Directive  Completed 9/30/2024      Start Date: 1/31/2024 Expected End Date: 12/31/2024    Recent Progress: 50%    Note:     Barriers: Limited driving; recovering from surgery; Provider availability - wait time to complete appointments.   Strengths: Motivated; Independent; Agreeable to Care Coordination.   Patient expressed understanding of goal: Yes.   Action steps to achieve this goal:  1. I will review the resources for [facility].   2. I will contact [facility] when I'm ready to complete my Advance Care Planning documents.   3. I will contact my care team with questions, concerns or support needs. I will use the clinic as a resource and I understand I can contact my clinic with 24/7 after hours services available. Care Coordinator will remain available as needed.                               Care Plan: Resources for Housekeeping/Chore services, Transportation Completed 7/9/2024      Problem: Resources for Housekeeping/Chore services & Transportation  Resolved 7/9/2024      Long-Range Goal: Resources for Housekeeping/Chore services & Transportation  Completed 7/9/2024       Start Date: 1/31/2024 Expected End Date: 12/31/2024    This Visit's Progress: 100% Recent Progress: 20%    Note:     Barriers: Limited driving; recovering from surgery; Provider availability - wait time to complete appointments.   Strengths: Motivated; Independent; Agreeable to Care Coordination.   Patient expressed understanding of goal: Yes.   Action steps to achieve this goal:  1. I will review the resources for Housekeeping/Chore services.    2. I will review the list of Transportation options and follow up with the ones that are of interest to me. I will make arrangements with the [employer] that I want to use so I have transportation when my family is unavailable.   3. I will discuss, review, schedule and complete recommended overdue health maintenance with my Primary Care Provider.    4. I will contact my care team with questions, concerns or support needs. I will use the clinic as a resource and I understand I can contact my clinic with 24/7 after hours services available. Care Coordinator will remain available as needed.                             Care Plan: Transportation       Problem: Lack of transportation       Long-Range Goal: Establish reliable transportation       Start Date: 1/6/2025 Expected End Date: 1/13/2026    Recent Progress: 40%    Note:     Barriers: patient does not drive long distances  Strengths: engaged in CC  Patient expressed understanding of goal: Yes  Action steps to achieve this goal:  1. I will review list of transportation resources sent to me including [employer], [employer], [employer], etc.   2. I will reach out to my insurance provider to inquire if I have any benefits to afford over the counter vitamins like fish oil, calcium, etc.   3. I will find out what is covered for me for nail care and see podiatry for nail trimmings.   4. I will take my medications as prescribed.   5. I will discuss, review, schedule & complete recommended overdue health maintenance with my  Primary Care Provider.   5. I will contact my care team with questions, concerns, support needs. I will use the clinic as a resource and I understand I can contact my clinic with 24/7 after hours services available. Care Coordinator will remain available as needed.                                  Plan/Recommendations: RNCC Clinical Assessments to be completed annually or as needed. Annual Assessment will be due 01/2026. The patient will continue working with Care Coordination to achieve goal(s) as above.  [facility] will involve RNCC as needed or if patient is ready to transition to goal status of Maintenance.  RNCC will continue to review progress to goal(s) and [facility] outreaches every 6 weeks.     Complex Care Plan:  Patient is due for updated Plan of Care.  Care Plan updated and sent to patient: Yes, via Ez Rivera RN Care Coordinator  [facility] [place]  Email: [email or web address]  Phone: [phone]

## 2025-07-03 NOTE — LETTER
[facility] CARE COORDINATION  [address] [place] [address]     July 3, 2025        Victoria Phillips  [address] [address]          Dear June, Attached is an updated Patient Centered Plan of Care for your continued enrollment in Care Coordination. Please let us know if you have additional questions, concerns, or goals that we can assist with.    Sincerely,    Lilli Rivera, RN Care Coordinator  [facility] [place], [place], [place]  Email: [email or web address]  Phone: [phone]     [facility]  Patient Centered Plan of Care  About Me:      Patient Name:  Victoria Phillips    YOB: 1940  Age:         85 year old   [facility] MRN:    [id number] Telephone Information:  Home Phone [phone]   Mobile [phone]       Address:  [address] [address] Email address:  [email or web address]      Emergency Contact(s)    Name Relationship Lgl Grd Work Phone Home Phone Mobile Phone   1. PAULETTETOBI PHILIP Daughter   [phone]   2. EMERITA GAR* Grandchild    [phone]   3. SAQIB PHILLIPS Son    [phone]   4. YANJER Grandchild    [phone]   5. JANIYA ARORA Grandchild    [phone]           Primary language:  English     needed? No   [facility] [employer]:  [phone] op. 1  Other communication barriers:Glasses; Utilization  Preferred Method of Communication:  Mail  Current living arrangement: I live alone  Mobility Status/ Medical Equipment: Independent w/Device  Health Maintenance  Health Maintenance Reviewed: Due/Overdue   Health Maintenance Due   Topic Date Due    COPD ACTION PLAN  Never done    ZOSTER VACCINE (1 of 2) Never done    DTAP/TDAP/TD VACCINE (1 - Tdap) 04/15/2007    PNEUMOCOCCAL VACCINE 50+ YEARS (2 of 2 - PCV) 12/08/2011    RSV VACCINE (1 - 1-dose 75+ series) Never done    HF ACTION PLAN  08/04/2020    PHQ-2 (once per calendar year)   01/01/2025    COVID-19 VACCINE (7 - 2024-25 season) 03/17/2025    ALT  05/22/2025      My Access Plan  Medical Emergency 911   Primary Clinic Line [facility] [place] - [phone]   24 Hour Appointment Line [phone] or  [phone]) (toll-free)   24 Hour Nurse Line [phone] (toll-free)   Preferred Urgent Care [facility] [place], [phone]     Preferred Hospital [facility]  [phone]     Preferred Pharmacy  - MAIL ORDER     Behavioral Health Crisis Line The National Suicide Prevention Lifeline at 1-255.498.2355 or Text/Call 238     My Care Team Members  Patient Care Team         Relationship Specialty Notifications Start End    Elsy Bah MD PCP - General Internal Medicine  12/29/18     Phone: [phone] Fax: [phone]         [facility] [place] [address]    Elsy Bah MD Assigned PCP   10/7/18     Phone: [phone] Fax: [phone]         [address] [place] [address] [address] [place] [address]    Lilli Way APRN CNP Nurse Practitioner Anesthesiology  8/2/23     Phone: [phone] Fax: [phone] [address] [address]    Genesis Kuhn MD MD Gastroenterology  8/2/23     Phone: [phone] Fax: [phone]         [address] [place] [address]    Shaji Bello MD MD Cardiovascular & Thoracic Surgery  8/22/23     Phone: [phone] Fax: [phone]          [facility] [address]    Linda Mayo PA-C Assigned Surgical Provider   1/13/24     Phone: [phone] Fax: [phone]         [address] [address]    Suzette Crawford PA-C Assigned Gastroenterology Provider   1/25/24     Phone: [phone] Fax: [phone]          [facility] [address]    Lilli Rivera, RN Lead Care Coordinator  Admissions 1/25/24     Phone: [phone]         Cyndi Leon, W Community  Health Worker Primary Care - CC Admissions 2/28/24     Phone: [phone]         Luz Marina, Marleni, APRN CNP Nurse Practitioner Cardiovascular Disease  4/23/24     Phone: [phone] Fax: [phone] [address] [place] [address]    Yogi Yepez MD MD Cardiovascular Disease  4/18/25     Phone: [phone] Fax: [phone] [address] [place] [address]    Yogi Yepez MD Assigned Heart and Vascular Provider   5/23/25     Phone: [phone] Fax: [phone] [address] [place] [address]              My Care Plans  Self Management and Treatment Plan    Care Plan  Care Plan: Transportation       Problem: Lack of transportation       Long-Range Goal: Establish reliable transportation       Start Date: 1/6/2025 Expected End Date: 1/13/2026    Recent Progress: 40%    Note:     Barriers: patient does not drive long distances  Strengths: engaged in CC  Patient expressed understanding of goal: Yes  Action steps to achieve this goal:  1. I will review list of transportation resources sent to me including [employer], [employer], [employer], etc.   2. I will reach out to my insurance provider to inquire if I have any benefits to afford over the counter vitamins like fish oil, calcium, etc.   3. I will find out what is covered for me for nail care and see podiatry for nail trimmings.   4. I will take my medications as prescribed.   5. I will discuss, review, schedule & complete recommended overdue health maintenance with my Primary Care Provider.   5. I will contact my care team with questions, concerns, support needs. I will use the clinic as a resource and I understand I can contact my clinic with 24/7 after hours services available. Care Coordinator will remain available as needed.                                  Action Plans on File:   Asthma           Heart Failure       Advance Care Plans/Directives:   Advanced Care Plan/Directives on file:  No    Discussed with patient/caregiver(s): No data recorded         My Medical and Care Information  Problem List   Patient Active Problem List   Diagnosis    History of colonic polyps    Chronic rhinitis    Hiatal hernia with GERD    Female stress incontinence    LUMBOSACRAL NEURITIS , numbness left thigh    Osteopenia    Hyperlipidemia LDL goal <130    Diastolic CHF, chronic (H)    Subclavian artery stenosis, left    Severe persistent asthma (H)    KEN (obstructive sleep apnea)    Hip osteoarthritis    Mild coronary artery disease    Subclinical hyperthyroidism    Chest pain    Unstable angina pectoris (H)    Status post coronary angiogram    Hypertension    Vasospastic angina    Exertional chest pain    Abnormal findings on diagnostic imaging of heart and coronary circulation    Venous reflux    Chest pain, unspecified type    History of CAD (coronary artery disease)    Lumbosacral spinal stenosis    Scoliosis of thoracolumbar spine, unspecified scoliosis type    Bertolotti's syndrome    Facet arthropathy, lumbosacral    DDD (degenerative disc disease), lumbosacral    Hiatal hernia    Chronic obstructive pulmonary disease, unspecified COPD type (H)      Current Medications:  Please refer to the most recent medication list provided to you by your medical team and reach out to your provider with any questions or to make any corrections.    Care Coordination Start Date: 1/24/2024   Frequency of Care Coordination: monthly, more frequently as needed     Form Last Updated: 07/03/2025

## 2025-07-10 ENCOUNTER — PATIENT OUTREACH (OUTPATIENT)
Dept: CARE COORDINATION | Facility: CLINIC | Age: 85
End: 2025-07-10

## 2025-07-10 ENCOUNTER — OFFICE VISIT (OUTPATIENT)
Dept: CARDIOLOGY | Facility: CLINIC | Age: 85
End: 2025-07-10
Attending: INTERNAL MEDICINE
Payer: COMMERCIAL

## 2025-07-10 VITALS
OXYGEN SATURATION: 98 % | BODY MASS INDEX: 29.11 KG/M2 | HEART RATE: 83 BPM | WEIGHT: 158.2 LBS | DIASTOLIC BLOOD PRESSURE: 66 MMHG | HEIGHT: 62 IN | SYSTOLIC BLOOD PRESSURE: 122 MMHG

## 2025-07-10 DIAGNOSIS — I87.2 VENOUS REFLUX: ICD-10-CM

## 2025-07-10 DIAGNOSIS — G47.33 OSA (OBSTRUCTIVE SLEEP APNEA): ICD-10-CM

## 2025-07-10 DIAGNOSIS — I77.1 SUBCLAVIAN ARTERY STENOSIS, LEFT: ICD-10-CM

## 2025-07-10 DIAGNOSIS — R93.1 ABNORMAL FINDINGS ON DIAGNOSTIC IMAGING OF HEART AND CORONARY CIRCULATION: ICD-10-CM

## 2025-07-10 DIAGNOSIS — I50.32 DIASTOLIC CHF, CHRONIC (H): ICD-10-CM

## 2025-07-10 DIAGNOSIS — I10 HYPERTENSION, UNSPECIFIED TYPE: ICD-10-CM

## 2025-07-10 DIAGNOSIS — I25.10 CORONARY ARTERY DISEASE INVOLVING NATIVE CORONARY ARTERY OF NATIVE HEART WITHOUT ANGINA PECTORIS: Primary | ICD-10-CM

## 2025-07-10 DIAGNOSIS — R53.83 FATIGUE, UNSPECIFIED TYPE: ICD-10-CM

## 2025-07-10 NOTE — PROGRESS NOTES
Clinic Care Coordination Contact  [facility]/Voicemail    Clinical Data: Care Coordinator Outreach    Outreach Documentation Number of Outreach Attempt   7/10/2025  11:29 AM 1       Left message on patient's voicemail with call back information and requested return call.      Plan: Care Coordinator will try to reach patient again in 10 business days.    Cyndi GAMBOA, B.S. [facility] Care Coordination  [facility] Clinics:  [place], [place] and [place]  [phone]  [email or web address]

## 2025-07-10 NOTE — PATIENT INSTRUCTIONS
Thanks for participating in a office visit with the [facility] clinic today.    You recently had an abnormal stress test concerning for possible coronary blockage.   You have been recommended for a coronary angiogram for further evaluation.   Risks and benefits were reviewed with verbal understanding.   Nothing to eat after midnight the evening prior to procedure.   Take 325 mg aspirin on the day of procedure ( 4 baby aspirin)  Hold lasix and spironolactone on day of procedure.   Blood pressures well controlled.   Continue current medical therapy.     Follow up post angiogram     Please call my nurse at   [phone]. Call with any questions or concerns.    Scheduling phone number: [phone].   Reminder: Please bring in all current medications, over the counter supplements and vitamin bottles to your next appointment.

## 2025-07-10 NOTE — PROGRESS NOTES
"CARDIOLOGY CLINIC NOTE    PRIMARY CARDIOLOGIST  Dr. Yepez    PRIMARY CARE PHYSICIAN:  Elsy Bah    HISTORY OF PRESENT ILLNESS:  Victoria Montalvo is a very pleasant 85-year-old female with a past medical history significant for coronary artery disease status post PCI to the left PDA in 2017, recurrent chest pain in 2021 with angiogram showing severe spasm of the proximal RCA, subclavian stenosis with intervention in 2013, hypertension, pulmonary hypertension, hyperlipidemia, diastolic heart failure, venous insufficiency, COPD and obstructive sleep apnea (untreated due to intolerance of CPAP).     In review, she underwent stenting of the left subclavian in 2013 after angiogram showed 85% stenosis.  In 2017 she had an abnormal stress test showing reversible anterior apical defect consistent with mild to moderate ischemia in the distribution of the LAD.  She subsequently underwent a coronary angiogram that showed mild to moderate disease in the LAD.  There was 80% left PDA narrowing followed by a 2.25 x 18 mm SHERLEY.  There was severe disease in a very tiny nondominant RCA.     Echocardiogram in 2017 showed a normal ejection fraction estimated at 55 to 60%, elevated RV systolic pressure, consistent with mild pulmonary hypertension, mild mitral regurgitation, mild to moderate tricuspid regurgitation and mild pulmonic regurgitation.     Nuclear stress test in 2018 was negative for ischemia.    Coronary angiogram in 2020 showed moderate disease after the distal PDA stent, consistent with spasm.  She had recurrent CP in 2021 and underwent coronary angiogram showing again severe spasm. She underwent a \"cold pressor \" test and IC NTG showing improvement.      Nuclear stress test in 2022 and 2023 were negative for inducible ischemia.     Echocardiogram 8/2024 showed a normal ejection fraction estimated at 60 to 65%, grade 1 diastolic dysfunction, normal RV size and function and mild mitral and tricuspid regurgitation.     She " had recurrent chest pain in December 2024 and isosorbide was increased to 90 mg daily.    In April, she presented to the emergency room with shortness of breath and cough.  She notes not taking diuretics for several days while in [place].  Fortunately, her workup was very reassuring.  EKG showed no acute ischemic changes.  Chest x-ray negative for acute process.  NT proBNP, troponin, BMP and CBC were unremarkable.  She received 1 dose of IV Lasix with improving symptoms.    She was last seen on 5/21/2025 with Dr. Yepez for evaluation of exertional dyspnea and atypical chest pain.  She underwent a nuclear stress test that showed a small area of mild ischemia in the apical segment of the LV. TID was present visually and quantitatively.  Medications were also changed with stopping amlodipine and starting diltiazem.    She returns to the office today for review of results.  Overall, she is feeling well on a cardiac standpoint with the exception of significant fatigue, progressive over the last few months.  She notes a resolution of chest discomfort and shortness of breath after stopping amlodipine and starting diltiazem.  She denies palpitations, PND, orthopnea, presyncope, syncope or edema.  She admits to decreasing furosemide from twice daily to daily due to nuisance urination.    Blood pressure is excellent at 122/66    Activity level is unchanged.  She has known sleep apnea, intolerant to CPAP    PAST MEDICAL HISTORY:  Past Medical History:   Diagnosis Date    Abnormal Papanicolaou smear of cervix and cervical HPV     colposcopy 1/97    Aortic valve sclerosis     Arthritis     Asthma     on preventative meds and has nebulizer    Chronic rhinitis     COPD (chronic obstructive pulmonary disease)     Coronary artery disease     4/4/17 SHERLEY--PDA    Coronary artery spasm     Diastolic CHF, chronic 02/22/2012    Gastro-oesophageal reflux disease     Hiatal hernia     Hyperlipidemia 10/31/2011    Obesity     KEN  (obstructive sleep apnea)     CPAP    Personal history of colonic polyps     colonoscopy 9/97, 2002 (due in 2007)    Pulmonary HTN     Seasonal allergies     Status post coronary angiogram 04/22/2020    Subclavian artery stenosis, left 08/07/2013    S/p stent in 2013     Subclinical hyperthyroidism 10/17/2016    Uncomplicated asthma 1995       MEDICATIONS:  Current Outpatient Medications   Medication Sig Dispense Refill    albuterol (PROAIR HFA/PROVENTIL HFA/VENTOLIN HFA) 108 (90 Base) MCG/ACT inhaler USE 2 INHALATIONS EVERY 6 HOURS AS NEEDED FOR SHORTNESS OF BREATH, DYSPNEA, OR WHEEZING 25.5 g 3    aspirin 81 MG tablet Take 81 mg by mouth every morning      Calcium 7172-2122 MG-UNIT CHEW Take 1 tablet by mouth every morning      Cholecalciferol (VITAMIN D3 PO) Take 4,000 Units by mouth every morning      CRANBERRY PO Take 1 capsule by mouth every morning      diltiazem ER COATED BEADS (CARDIZEM CD/CARTIA XT) 120 MG 24 hr capsule Take 1 capsule (120 mg) by mouth daily. 90 capsule 3    fluticasone (FLONASE) 50 MCG/ACT nasal spray USE 1 TO 2 SPRAYS IN EACH NOSTRIL DAILY 48 g 5    fluticasone-salmeterol (WIXELA INHUB) 250-50 MCG/ACT inhaler Inhale 1 puff into the lungs 2 times daily. 90 each 3    furosemide (LASIX) 20 MG tablet Take 1 tablet (20 mg) by mouth daily. 90 tablet 3    gabapentin (NEURONTIN) 100 MG capsule Take 1-3 capsules (100-300 mg) by mouth at bedtime. 90 capsule 11    ipratropium - albuterol 0.5 mg/2.5 mg/3 mL (DUONEB) 0.5-2.5 (3) MG/3ML neb solution Take 1 vial (3 mLs) by nebulization every 6 hours as needed for shortness of breath, wheezing or cough. 90 mL 11    isosorbide mononitrate (IMDUR) 30 MG 24 hr tablet Take 1 tablet (30 mg) by mouth daily. Take in addition to 60 mg daily to equal total of 90 mg daily. 90 tablet 3    isosorbide mononitrate (IMDUR) 60 MG 24 hr tablet Take 1 tablet (60 mg) by mouth daily. Take in addition to 30 mg daily to equal total of 90 mg daily. HOLD IF SYSTOLIC BLOOD  "PRESSURE IS LESS THAN 85 90 tablet 1    montelukast (SINGULAIR) 10 MG tablet Take 1 tablet (10 mg) by mouth at bedtime. 90 tablet 3    multivitamin w/minerals (MULTI-VITAMIN) tablet Take 1 tablet by mouth every morning      nitroGLYcerin (NITROSTAT) 0.4 MG sublingual tablet One tablet under the tongue every 5 minutes if needed for chest pain. May repeat every 5 minutes for a maximum of 3 doses in 15 minutes\" 25 tablet 3    omega-3 fatty acids 1200 MG capsule Take 1 capsule by mouth every morning      rosuvastatin (CRESTOR) 40 MG tablet Take 1 tablet (40 mg) by mouth daily. 90 tablet 3    spironolactone (ALDACTONE) 25 MG tablet Take 1 tablet (25 mg) by mouth every morning. 90 tablet 3     No current facility-administered medications for this visit.       SOCIAL HISTORY:  I have reviewed this patient's social history and updated it with pertinent information if needed. Victoria Montalvo  reports that she quit smoking about 32 years ago. Her smoking use included cigarettes. She started smoking about 52 years ago. She has a 30 pack-year smoking history. She has been exposed to tobacco smoke. She has never used smokeless tobacco. She reports current alcohol use. She reports that she does not use drugs.    PHYSICAL EXAM:  Pulse:  [83] 83  BP: (122)/(66) 122/66  SpO2:  [98 %] 98 %  158 lbs 3.2 oz    Constitutional: alert, no distress  Respiratory: Good bilateral air entry  Cardiovascular: Regular rate and rhythm  GI: nondistended  Neuropsychiatric: appropriate affact    ASSESSMENT/PLAN:  Pertinent issues addressed/ reviewed during this cardiology visit  Coronary artery disease - status post remote PCI to the left PDA in 2017.  Follow-up coronary angiogram showed severe spasm of the proximal RCA, managed on isosorbide.  Recent nuclear stress test showed a small area of mild ischemia in the apical segment of the LV. TID was present visually and quantitatively.  Results were reviewed with Dr. Yepez.  In the setting of significant " fatigue, despite resolution of chest pain and shortness of breath, coronary angiogram is recommended for definitive evaluation. All risks and benefits for this procedure have been explained to this patient and accepted.  This includes but is not limited to death, heart attack, stroke, blood clots, cardiac or vascular perforation, bleeding including the need for blood transfusion and the risk thereof, allergic reaction to x-ray dye, arrhythmia necessitating cardioversion, contrast dye nephropathy (including risks of temporary or permanent dialysis).  We talked about intracoronary stenting and the risks thereof and bypass surgery.    No history of bleeding problems or current bleeding, and no scheduled surgeries or procedures in the next year.  No patient understands and wishes to proceed with it.      Contrast allergy: No  Anticoagulation: No  Metformin: No  Oral DM meds: No  Insulin: No  Diuretic: Yes, hold furosemide and spironolactone on day of procedure  Use of phosphodiesterase type 5 inhibitor: No  Aspirin: Yes, take 325 mg on the morning of procedure  Pt informed to be NPO at midnight  Renal issues no  Pt has transportation and 24 hours post procedure monitoring set up.   Pt aware of no driving for 24 hours post procedure.       Subclavian stenosis in 2013 -status post stent placement  Diastolic heart failure - euvolemic upon exam, weight stable.  Continue current medical therapy.  Hypertension -well-controlled  Hyperlipidemia -LDL 64, continue rosuvastatin  Fatigue -likely multifactorial due to untreated sleep apnea, age, coronary artery disease.  Venous insufficiency - Stable.   Untreated sleep apnea    Follow-up post angiogram.       It was a pleasure seeing this patient in clinic today. Please do not hesitate to contact me with any future questions.     NIMESH Marquis, CNP  Cardiology - [facility]  07/10/2025       The level of medical decision making during this visit was of moderate  complexity.    This note was completed in part using dictation via the Dragon voice recognition software. Some word and grammatical errors may occur and must be interpreted in the appropriate clinical context.  If there are any questions pertaining to this issue, please contact me for further clarification.

## 2025-07-10 NOTE — LETTER
"7/10/2025    Elsy Bah MD  [address] [place] [address]    RE: Victoria Montalvo       Dear Colleague,     I had the pleasure of seeing Victoria Montalvo in the [facility].  CARDIOLOGY CLINIC NOTE    PRIMARY CARDIOLOGIST  Dr. Yepez    PRIMARY CARE PHYSICIAN:  Elsy Bah    HISTORY OF PRESENT ILLNESS:  Victoria Montalvo is a very pleasant 85-year-old female with a past medical history significant for coronary artery disease status post PCI to the left PDA in 2017, recurrent chest pain in 2021 with angiogram showing severe spasm of the proximal RCA, subclavian stenosis with intervention in 2013, hypertension, pulmonary hypertension, hyperlipidemia, diastolic heart failure, venous insufficiency, COPD and obstructive sleep apnea (untreated due to intolerance of CPAP).     In review, she underwent stenting of the left subclavian in 2013 after angiogram showed 85% stenosis.  In 2017 she had an abnormal stress test showing reversible anterior apical defect consistent with mild to moderate ischemia in the distribution of the LAD.  She subsequently underwent a coronary angiogram that showed mild to moderate disease in the LAD.  There was 80% left PDA narrowing followed by a 2.25 x 18 mm SHERLEY.  There was severe disease in a very tiny nondominant RCA.     Echocardiogram in 2017 showed a normal ejection fraction estimated at 55 to 60%, elevated RV systolic pressure, consistent with mild pulmonary hypertension, mild mitral regurgitation, mild to moderate tricuspid regurgitation and mild pulmonic regurgitation.     Nuclear stress test in 2018 was negative for ischemia.    Coronary angiogram in 2020 showed moderate disease after the distal PDA stent, consistent with spasm.  She had recurrent CP in 2021 and underwent coronary angiogram showing again severe spasm. She underwent a \"cold pressor \" test and IC NTG showing improvement.      Nuclear stress test in 2022 and 2023 were negative for " inducible ischemia.     Echocardiogram 8/2024 showed a normal ejection fraction estimated at 60 to 65%, grade 1 diastolic dysfunction, normal RV size and function and mild mitral and tricuspid regurgitation.     She had recurrent chest pain in December 2024 and isosorbide was increased to 90 mg daily.    In April, she presented to the emergency room with shortness of breath and cough.  She notes not taking diuretics for several days while in [place].  Fortunately, her workup was very reassuring.  EKG showed no acute ischemic changes.  Chest x-ray negative for acute process.  NT proBNP, troponin, BMP and CBC were unremarkable.  She received 1 dose of IV Lasix with improving symptoms.    She was last seen on 5/21/2025 with Dr. Yepez for evaluation of exertional dyspnea and atypical chest pain.  She underwent a nuclear stress test that showed a small area of mild ischemia in the apical segment of the LV. TID was present visually and quantitatively.  Medications were also changed with stopping amlodipine and starting diltiazem.    She returns to the office today for review of results.  Overall, she is feeling well on a cardiac standpoint with the exception of significant fatigue, progressive over the last few months.  She notes a resolution of chest discomfort and shortness of breath after stopping amlodipine and starting diltiazem.  She denies palpitations, PND, orthopnea, presyncope, syncope or edema.  She admits to decreasing furosemide from twice daily to daily due to nuisance urination.    Blood pressure is excellent at 122/66    Activity level is unchanged.  She has known sleep apnea, intolerant to CPAP    PAST MEDICAL HISTORY:  Past Medical History:   Diagnosis Date     Abnormal Papanicolaou smear of cervix and cervical HPV     colposcopy 1/97     Aortic valve sclerosis      Arthritis      Asthma     on preventative meds and has nebulizer     Chronic rhinitis      COPD (chronic obstructive pulmonary disease)       Coronary artery disease     4/4/17 SHERLEY--PDA     Coronary artery spasm      Diastolic CHF, chronic 02/22/2012     Gastro-oesophageal reflux disease      Hiatal hernia      Hyperlipidemia 10/31/2011     Obesity      KEN (obstructive sleep apnea)     CPAP     Personal history of colonic polyps     colonoscopy 9/97, 2002 (due in 2007)     Pulmonary HTN      Seasonal allergies      Status post coronary angiogram 04/22/2020     Subclavian artery stenosis, left 08/07/2013    S/p stent in 2013      Subclinical hyperthyroidism 10/17/2016     Uncomplicated asthma 1995       MEDICATIONS:  Current Outpatient Medications   Medication Sig Dispense Refill     albuterol (PROAIR HFA/PROVENTIL HFA/VENTOLIN HFA) 108 (90 Base) MCG/ACT inhaler USE 2 INHALATIONS EVERY 6 HOURS AS NEEDED FOR SHORTNESS OF BREATH, DYSPNEA, OR WHEEZING 25.5 g 3     aspirin 81 MG tablet Take 81 mg by mouth every morning       Calcium 3049-5161 MG-UNIT CHEW Take 1 tablet by mouth every morning       Cholecalciferol (VITAMIN D3 PO) Take 4,000 Units by mouth every morning       CRANBERRY PO Take 1 capsule by mouth every morning       diltiazem ER COATED BEADS (CARDIZEM CD/CARTIA XT) 120 MG 24 hr capsule Take 1 capsule (120 mg) by mouth daily. 90 capsule 3     fluticasone (FLONASE) 50 MCG/ACT nasal spray USE 1 TO 2 SPRAYS IN EACH NOSTRIL DAILY 48 g 5     fluticasone-salmeterol (WIXELA INHUB) 250-50 MCG/ACT inhaler Inhale 1 puff into the lungs 2 times daily. 90 each 3     furosemide (LASIX) 20 MG tablet Take 1 tablet (20 mg) by mouth daily. 90 tablet 3     gabapentin (NEURONTIN) 100 MG capsule Take 1-3 capsules (100-300 mg) by mouth at bedtime. 90 capsule 11     ipratropium - albuterol 0.5 mg/2.5 mg/3 mL (DUONEB) 0.5-2.5 (3) MG/3ML neb solution Take 1 vial (3 mLs) by nebulization every 6 hours as needed for shortness of breath, wheezing or cough. 90 mL 11     isosorbide mononitrate (IMDUR) 30 MG 24 hr tablet Take 1 tablet (30 mg) by mouth daily. Take in addition to  "60 mg daily to equal total of 90 mg daily. 90 tablet 3     isosorbide mononitrate (IMDUR) 60 MG 24 hr tablet Take 1 tablet (60 mg) by mouth daily. Take in addition to 30 mg daily to equal total of 90 mg daily. HOLD IF SYSTOLIC BLOOD PRESSURE IS LESS THAN 85 90 tablet 1     montelukast (SINGULAIR) 10 MG tablet Take 1 tablet (10 mg) by mouth at bedtime. 90 tablet 3     multivitamin w/minerals (MULTI-VITAMIN) tablet Take 1 tablet by mouth every morning       nitroGLYcerin (NITROSTAT) 0.4 MG sublingual tablet One tablet under the tongue every 5 minutes if needed for chest pain. May repeat every 5 minutes for a maximum of 3 doses in 15 minutes\" 25 tablet 3     omega-3 fatty acids 1200 MG capsule Take 1 capsule by mouth every morning       rosuvastatin (CRESTOR) 40 MG tablet Take 1 tablet (40 mg) by mouth daily. 90 tablet 3     spironolactone (ALDACTONE) 25 MG tablet Take 1 tablet (25 mg) by mouth every morning. 90 tablet 3     No current facility-administered medications for this visit.       SOCIAL HISTORY:  I have reviewed this patient's social history and updated it with pertinent information if needed. Victoria Montalvo  reports that she quit smoking about 32 years ago. Her smoking use included cigarettes. She started smoking about 52 years ago. She has a 30 pack-year smoking history. She has been exposed to tobacco smoke. She has never used smokeless tobacco. She reports current alcohol use. She reports that she does not use drugs.    PHYSICAL EXAM:  Pulse:  [83] 83  BP: (122)/(66) 122/66  SpO2:  [98 %] 98 %  158 lbs 3.2 oz    Constitutional: alert, no distress  Respiratory: Good bilateral air entry  Cardiovascular: Regular rate and rhythm  GI: nondistended  Neuropsychiatric: appropriate affact    ASSESSMENT/PLAN:  Pertinent issues addressed/ reviewed during this cardiology visit  Coronary artery disease - status post remote PCI to the left PDA in 2017.  Follow-up coronary angiogram showed severe spasm of the proximal " RCA, managed on isosorbide.  Recent nuclear stress test showed a small area of mild ischemia in the apical segment of the LV. TID was present visually and quantitatively.  Results were reviewed with Dr. Yepez.  In the setting of significant fatigue, despite resolution of chest pain and shortness of breath, coronary angiogram is recommended for definitive evaluation. All risks and benefits for this procedure have been explained to this patient and accepted.  This includes but is not limited to death, heart attack, stroke, blood clots, cardiac or vascular perforation, bleeding including the need for blood transfusion and the risk thereof, allergic reaction to x-ray dye, arrhythmia necessitating cardioversion, contrast dye nephropathy (including risks of temporary or permanent dialysis).  We talked about intracoronary stenting and the risks thereof and bypass surgery.    No history of bleeding problems or current bleeding, and no scheduled surgeries or procedures in the next year.  No patient understands and wishes to proceed with it.      Contrast allergy: No  Anticoagulation: No  Metformin: No  Oral DM meds: No  Insulin: No  Diuretic: Yes, hold furosemide and spironolactone on day of procedure  Use of phosphodiesterase type 5 inhibitor: No  Aspirin: Yes, take 325 mg on the morning of procedure  Pt informed to be NPO at midnight  Renal issues no  Pt has transportation and 24 hours post procedure monitoring set up.   Pt aware of no driving for 24 hours post procedure.       Subclavian stenosis in 2013 -status post stent placement  Diastolic heart failure - euvolemic upon exam, weight stable.  Continue current medical therapy.  Hypertension -well-controlled  Hyperlipidemia -LDL 64, continue rosuvastatin  Fatigue -likely multifactorial due to untreated sleep apnea, age, coronary artery disease.  Venous insufficiency - Stable.   Untreated sleep apnea    Follow-up post angiogram.       It was a pleasure seeing this patient  in clinic today. Please do not hesitate to contact me with any future questions.     NIMESH Marquis, CNP  Cardiology - [facility]  07/10/2025       The level of medical decision making during this visit was of moderate complexity.    This note was completed in part using dictation via the Dragon voice recognition software. Some word and grammatical errors may occur and must be interpreted in the appropriate clinical context.  If there are any questions pertaining to this issue, please contact me for further clarification.    Thank you for allowing me to participate in the care of your patient.      Sincerely,     NIMESH Marquis [facility]  cc:   Yogi Yepez MD  [address]  [place] [id number]

## 2025-07-16 DIAGNOSIS — I25.10 CORONARY ARTERY DISEASE INVOLVING NATIVE CORONARY ARTERY OF NATIVE HEART WITHOUT ANGINA PECTORIS: Primary | ICD-10-CM

## 2025-07-16 DIAGNOSIS — R53.83 FATIGUE, UNSPECIFIED TYPE: ICD-10-CM

## 2025-07-16 DIAGNOSIS — R93.1 ABNORMAL FINDINGS ON DIAGNOSTIC IMAGING OF HEART AND CORONARY CIRCULATION: ICD-10-CM

## 2025-07-16 RX ORDER — SODIUM CHLORIDE 9 MG/ML
INJECTION, SOLUTION INTRAVENOUS CONTINUOUS
Status: CANCELLED | OUTPATIENT
Start: 2025-07-16

## 2025-07-16 RX ORDER — LIDOCAINE 40 MG/G
CREAM TOPICAL
Status: CANCELLED | OUTPATIENT
Start: 2025-07-16

## 2025-07-16 RX ORDER — POTASSIUM CHLORIDE 1500 MG/1
20 TABLET, EXTENDED RELEASE ORAL
Status: CANCELLED | OUTPATIENT
Start: 2025-07-16

## 2025-07-16 RX ORDER — ASPIRIN 81 MG/1
243 TABLET, CHEWABLE ORAL ONCE
Status: CANCELLED | OUTPATIENT
Start: 2025-07-16

## 2025-07-16 RX ORDER — ASPIRIN 325 MG
325 TABLET ORAL ONCE
Status: CANCELLED | OUTPATIENT
Start: 2025-07-16 | End: 2025-07-16

## 2025-07-16 NOTE — PROGRESS NOTES
Coronary angiogram/PCI/Right Heart Cath prep instructions.     Patient is scheduled for a Coronary Angiogram at [facility] - [address] [place] [address] - Main Entrance of the Hospital on 7/17/25.  Check in time is at 10:00 am and procedure to follow.    Performing Cardiologist: Dr. Colby    Patient instructed to remain NPO for solid foods 8 hours prior to arrival and may have clear liquids up to 2 hours prior to arrival.    Patient does not require extra fluids prior to procedure.    Patient is not diabetic.    Patient is not on anticoagulation.    Patient is taking furosemide and has been instructed to hold it the morning of procedure. Pt will also hold spironolactone.     Patient is taking ASA 81mg daily and will take 4 tabs (324mg) the morning of the procedure.    Pt is not on a SGLT2 inhibitor.    Pt is not on a GLP-1 Agonist    Patient advised to take their other daily medications the morning of the procedure with small sips of water.     Patient advised to shower the night before and morning of their procedure with regular soap.    Verified patient does not have a contrast allergy.    Verified patient has someone available to drive them home from the hospital and can stay with them for 24 hours after the procedure.     Patient advised they will have bedrest post procedure.  Length of time is 2-6 hours and dependent on access site used for procedure.  This bedrest is to allow proper clotting of the access site to prevent bleeding.    Patient advised to notify care team with any new COVID like symptoms prior to procedure. Day of procedure phone number: Maria E at [phone]    Patient is aware of visitor policy.    Patient expresses understanding of above instructions and denies further questions at this time.      Lilli Price RN  [facility]

## 2025-07-17 ENCOUNTER — HOSPITAL ENCOUNTER (OUTPATIENT)
Facility: CLINIC | Age: 85
End: 2025-07-17
Attending: INTERNAL MEDICINE | Admitting: INTERNAL MEDICINE
Payer: COMMERCIAL

## 2025-07-17 VITALS
HEART RATE: 68 BPM | RESPIRATION RATE: 14 BRPM | OXYGEN SATURATION: 98 % | TEMPERATURE: 97 F | SYSTOLIC BLOOD PRESSURE: 96 MMHG | DIASTOLIC BLOOD PRESSURE: 42 MMHG

## 2025-07-17 DIAGNOSIS — R93.1 ABNORMAL FINDINGS ON DIAGNOSTIC IMAGING OF HEART AND CORONARY CIRCULATION: ICD-10-CM

## 2025-07-17 DIAGNOSIS — I25.10 ATHEROSCLEROSIS OF NATIVE CORONARY ARTERY OF NATIVE HEART, UNSPECIFIED WHETHER ANGINA PRESENT: Primary | ICD-10-CM

## 2025-07-17 DIAGNOSIS — I25.10 CORONARY ARTERY DISEASE INVOLVING NATIVE CORONARY ARTERY OF NATIVE HEART WITHOUT ANGINA PECTORIS: ICD-10-CM

## 2025-07-17 DIAGNOSIS — R53.83 FATIGUE, UNSPECIFIED TYPE: ICD-10-CM

## 2025-07-17 LAB
ANION GAP SERPL CALCULATED.3IONS-SCNC: 12 MMOL/L (ref 7–15)
APTT PPP: 27 SECONDS (ref 22–38)
ATRIAL RATE - MUSE: 63 BPM
BUN SERPL-MCNC: 15.4 MG/DL (ref 8–23)
CALCIUM SERPL-MCNC: 8.4 MG/DL (ref 8.8–10.4)
CHLORIDE SERPL-SCNC: 106 MMOL/L (ref 98–107)
CREAT SERPL-MCNC: 0.75 MG/DL (ref 0.51–0.95)
DIASTOLIC BLOOD PRESSURE - MUSE: NORMAL MMHG
EGFRCR SERPLBLD CKD-EPI 2021: 77 ML/MIN/1.73M2
ERYTHROCYTE [DISTWIDTH] IN BLOOD BY AUTOMATED COUNT: 13.8 % (ref 10–15)
GLUCOSE SERPL-MCNC: 92 MG/DL (ref 70–99)
HCO3 SERPL-SCNC: 21 MMOL/L (ref 22–29)
HCT VFR BLD AUTO: 38.3 % (ref 35–47)
HGB BLD-MCNC: 13 G/DL (ref 11.7–15.7)
INR PPP: 1.1 (ref 0.85–1.15)
INTERPRETATION ECG - MUSE: NORMAL
MCH RBC QN AUTO: 29.8 PG (ref 26.5–33)
MCHC RBC AUTO-ENTMCNC: 33.9 G/DL (ref 31.5–36.5)
MCV RBC AUTO: 88 FL (ref 78–100)
P AXIS - MUSE: 52 DEGREES
PLATELET # BLD AUTO: 162 10E3/UL (ref 150–450)
POTASSIUM SERPL-SCNC: 3.9 MMOL/L (ref 3.4–5.3)
PR INTERVAL - MUSE: 152 MS
PROTHROMBIN TIME: 14.3 SECONDS (ref 11.8–14.8)
QRS DURATION - MUSE: 84 MS
QT - MUSE: 438 MS
QTC - MUSE: 448 MS
R AXIS - MUSE: -39 DEGREES
RBC # BLD AUTO: 4.36 10E6/UL (ref 3.8–5.2)
SODIUM SERPL-SCNC: 139 MMOL/L (ref 135–145)
SYSTOLIC BLOOD PRESSURE - MUSE: NORMAL MMHG
T AXIS - MUSE: 50 DEGREES
VENTRICULAR RATE- MUSE: 63 BPM
WBC # BLD AUTO: 5.8 10E3/UL (ref 4–11)

## 2025-07-17 PROCEDURE — 36415 COLL VENOUS BLD VENIPUNCTURE: CPT | Performed by: INTERNAL MEDICINE

## 2025-07-17 PROCEDURE — 93010 ELECTROCARDIOGRAM REPORT: CPT | Mod: XU | Performed by: INTERNAL MEDICINE

## 2025-07-17 PROCEDURE — 250N000011 HC RX IP 250 OP 636: Performed by: INTERNAL MEDICINE

## 2025-07-17 PROCEDURE — 99152 MOD SED SAME PHYS/QHP 5/>YRS: CPT | Performed by: INTERNAL MEDICINE

## 2025-07-17 PROCEDURE — C1887 CATHETER, GUIDING: HCPCS | Performed by: INTERNAL MEDICINE

## 2025-07-17 PROCEDURE — 85014 HEMATOCRIT: CPT | Performed by: INTERNAL MEDICINE

## 2025-07-17 PROCEDURE — 250N000009 HC RX 250: Performed by: INTERNAL MEDICINE

## 2025-07-17 PROCEDURE — C1894 INTRO/SHEATH, NON-LASER: HCPCS | Performed by: INTERNAL MEDICINE

## 2025-07-17 PROCEDURE — 80048 BASIC METABOLIC PNL TOTAL CA: CPT | Performed by: INTERNAL MEDICINE

## 2025-07-17 PROCEDURE — 272N000001 HC OR GENERAL SUPPLY STERILE: Performed by: INTERNAL MEDICINE

## 2025-07-17 PROCEDURE — 93454 CORONARY ARTERY ANGIO S&I: CPT | Performed by: INTERNAL MEDICINE

## 2025-07-17 PROCEDURE — 250N000013 HC RX MED GY IP 250 OP 250 PS 637: Performed by: INTERNAL MEDICINE

## 2025-07-17 PROCEDURE — C1769 GUIDE WIRE: HCPCS | Performed by: INTERNAL MEDICINE

## 2025-07-17 PROCEDURE — 258N000003 HC RX IP 258 OP 636: Performed by: INTERNAL MEDICINE

## 2025-07-17 PROCEDURE — 85730 THROMBOPLASTIN TIME PARTIAL: CPT | Performed by: INTERNAL MEDICINE

## 2025-07-17 PROCEDURE — 85610 PROTHROMBIN TIME: CPT | Performed by: INTERNAL MEDICINE

## 2025-07-17 PROCEDURE — 93454 CORONARY ARTERY ANGIO S&I: CPT | Mod: 26 | Performed by: INTERNAL MEDICINE

## 2025-07-17 RX ORDER — ASPIRIN 81 MG/1
325 TABLET, CHEWABLE ORAL ONCE
Status: COMPLETED | OUTPATIENT
Start: 2025-07-17 | End: 2025-07-17

## 2025-07-17 RX ORDER — OXYCODONE HYDROCHLORIDE 10 MG/1
10 TABLET ORAL EVERY 4 HOURS PRN
Refills: 0 | OUTPATIENT
Start: 2025-07-17

## 2025-07-17 RX ORDER — IOPAMIDOL 755 MG/ML
INJECTION, SOLUTION INTRAVASCULAR
Status: DISCONTINUED | OUTPATIENT
Start: 2025-07-17 | End: 2025-07-22 | Stop reason: HOSPADM

## 2025-07-17 RX ORDER — NALOXONE HYDROCHLORIDE 0.4 MG/ML
0.4 INJECTION, SOLUTION INTRAMUSCULAR; INTRAVENOUS; SUBCUTANEOUS
Status: DISCONTINUED | OUTPATIENT
Start: 2025-07-17 | End: 2025-07-22 | Stop reason: HOSPADM

## 2025-07-17 RX ORDER — ATROPINE SULFATE 0.1 MG/ML
0.5 INJECTION INTRAVENOUS
Status: ACTIVE | OUTPATIENT
Start: 2025-07-17 | End: 2025-07-17

## 2025-07-17 RX ORDER — VERAPAMIL HYDROCHLORIDE 2.5 MG/ML
INJECTION INTRAVENOUS
Status: DISCONTINUED | OUTPATIENT
Start: 2025-07-17 | End: 2025-07-22 | Stop reason: HOSPADM

## 2025-07-17 RX ORDER — LIDOCAINE 40 MG/G
CREAM TOPICAL
Status: DISCONTINUED | OUTPATIENT
Start: 2025-07-17 | End: 2025-07-22 | Stop reason: HOSPADM

## 2025-07-17 RX ORDER — NITROGLYCERIN 5 MG/ML
VIAL (ML) INTRAVENOUS
Status: DISCONTINUED | OUTPATIENT
Start: 2025-07-17 | End: 2025-07-22 | Stop reason: HOSPADM

## 2025-07-17 RX ORDER — FENTANYL CITRATE 50 UG/ML
25 INJECTION, SOLUTION INTRAMUSCULAR; INTRAVENOUS
Refills: 0 | Status: DISCONTINUED | OUTPATIENT
Start: 2025-07-17 | End: 2025-07-22 | Stop reason: HOSPADM

## 2025-07-17 RX ORDER — NALOXONE HYDROCHLORIDE 0.4 MG/ML
0.2 INJECTION, SOLUTION INTRAMUSCULAR; INTRAVENOUS; SUBCUTANEOUS
Status: DISCONTINUED | OUTPATIENT
Start: 2025-07-17 | End: 2025-07-22 | Stop reason: HOSPADM

## 2025-07-17 RX ORDER — LIDOCAINE 40 MG/G
CREAM TOPICAL
OUTPATIENT
Start: 2025-07-17

## 2025-07-17 RX ORDER — ASPIRIN 81 MG/1
243 TABLET, CHEWABLE ORAL ONCE
Status: COMPLETED | OUTPATIENT
Start: 2025-07-17 | End: 2025-07-17

## 2025-07-17 RX ORDER — OXYCODONE HYDROCHLORIDE 5 MG/1
5 TABLET ORAL EVERY 4 HOURS PRN
Refills: 0 | OUTPATIENT
Start: 2025-07-17

## 2025-07-17 RX ORDER — FLUMAZENIL 0.1 MG/ML
0.2 INJECTION, SOLUTION INTRAVENOUS
Status: DISCONTINUED | OUTPATIENT
Start: 2025-07-17 | End: 2025-07-22 | Stop reason: HOSPADM

## 2025-07-17 RX ORDER — HEPARIN SODIUM 1000 [USP'U]/ML
INJECTION, SOLUTION INTRAVENOUS; SUBCUTANEOUS
Status: DISCONTINUED | OUTPATIENT
Start: 2025-07-17 | End: 2025-07-22 | Stop reason: HOSPADM

## 2025-07-17 RX ORDER — SODIUM CHLORIDE 9 MG/ML
INJECTION, SOLUTION INTRAVENOUS CONTINUOUS
Status: DISCONTINUED | OUTPATIENT
Start: 2025-07-17 | End: 2025-07-22 | Stop reason: HOSPADM

## 2025-07-17 RX ORDER — POTASSIUM CHLORIDE 1500 MG/1
20 TABLET, EXTENDED RELEASE ORAL
Status: DISCONTINUED | OUTPATIENT
Start: 2025-07-17 | End: 2025-07-22 | Stop reason: HOSPADM

## 2025-07-17 RX ORDER — FENTANYL CITRATE 50 UG/ML
INJECTION, SOLUTION INTRAMUSCULAR; INTRAVENOUS
Status: DISCONTINUED | OUTPATIENT
Start: 2025-07-17 | End: 2025-07-22 | Stop reason: HOSPADM

## 2025-07-17 RX ADMIN — SODIUM CHLORIDE: 0.9 INJECTION, SOLUTION INTRAVENOUS at 10:26

## 2025-07-17 RX ADMIN — ASPIRIN 81 MG CHEWABLE TABLET 325 MG: 81 TABLET CHEWABLE at 10:24

## 2025-07-17 ASSESSMENT — ACTIVITIES OF DAILY LIVING (ADL)
ADLS_ACUITY_SCORE: 47

## 2025-07-17 NOTE — PLAN OF CARE
Goal Outcome Evaluation:    Pt A&O. MD notified d/t pt reporting CP- 5/10 on L anterior chest, and lower blood pressures. No new orders received, patient okay to discharge per MD. Tolerating PO, denies N/V. TR band removed, dressing CDI no signs of bleeding present. CMS intact. Transfers ind. AUO. Pt received and verbalized discharge instructions. All belongings returned to patient. Daughter providing transportation home.

## 2025-07-17 NOTE — Clinical Note
Post Procedure Summary:  Prior to the start of the procedure and with procedural staff participation, I verbally confirmed the patient's identity using two indicators, relevant allergies, that the procedure was appropriate and matched the consent or emergent situation, and that the correct equipment/implants were available. Immediately prior to starting the procedure I conducted the Time Out with the procedural staff and re-confirmed th e patient's name, procedure, and site/side. (The Joint Commission universal protocol was followed.)  Yes                                        Sedatives: Fentanyl and Midazolam (Versed)     Vital signs, airway and pulse oximetry were monitored and remained stable throughout the procedure and sedation was maintained until the procedure was complete.  The patient was monitored by staff until sedation discharge criteria were met.     Krissy ent tolerance: Patient tolerated the procedure well with no immediate complications.  Patient denies nausea, shortness of breath and pain.  Transferred to cath/IR holding room via cart accompanied by RN.  Report provided to receiving RN and TR band site assessed together as well.

## 2025-07-17 NOTE — Clinical Note
Lab results reviewed and abnormals discussed with physician. Waiting on BMP as previous sample hemolyzed.  Dr. Colby  is aware we are waiting on the BMP.

## 2025-07-17 NOTE — PROVIDER NOTIFICATION
"Md notified d/t pt reporting 5/10 Left upper chest pain \"sharp\" description- feeling same as prior to procedure. /57.   - MD notified of BP 80's/50's     - MD saw patient at  bedside, No new orders received.   "

## 2025-07-17 NOTE — PRE-PROCEDURE
GENERAL PRE-PROCEDURE:   Procedure:  Coronary angiogram, possible percutaneous coronary intervention  Date/Time:  7/17/2025 12:08 PM    Verbal consent obtained?: Yes    Written consent obtained?: Yes    Risks and benefits: Risks, benefits and alternatives were discussed    DC Plan: Appropriate discharge home plan in place for patients who are going home after procedure   Consent given by:  Patient  Patient states understanding of procedure being performed: Yes    Patient's understanding of procedure matches consent: Yes    Procedure consent matches procedure scheduled: Yes    Expected level of sedation:  Moderate  Appropriately NPO:  Yes  ASA Class:  2  Mallampati  :  Grade 2- soft palate, base of uvula, tonsillar pillars, and portion of posterior pharyngeal wall visible  Lungs:  Lungs clear with good breath sounds bilaterally  History & Physical reviewed:  History and physical reviewed and no updates needed  Statement of review:  I have reviewed the lab findings, diagnostic data, medications, and the plan for sedation  I have examined the patient, reviewed the history, medications and pre procedural tests. The patient has known CAD sp old PCI PDA of left CX of left dominant vessel. She has a history of recurrent chest pain  and a recent nuclear stress test was deemed abnormal.   I have explained to the patient the risks of death, MI, stroke, hematoma, possible urgent bypass surgery for failed PCI, use of stents, thienopyridine agents, possible peripheral vascular complications, arrhythmia, the use of FFR in clinical decision-making and alternative of medical therapy alone in regards to left heart catheterization, left ventriculography, coronary angiography, and possible percutaneous coronary intervention. The patient voiced understanding and wishes to proceed. The patient has a good right radial pulse, normal ulnar pulse and a normal Andrew's sign.

## 2025-07-17 NOTE — PROCEDURES
[facility]    Procedure: Coronary angiogram    Date/Time: 7/17/2025 12:30 PM    Performed by: Aron Colby MD  Authorized by: Aron Colby MD      UNIVERSAL PROTOCOL   Site Marked: Yes  Prior Images Obtained and Reviewed:  Yes  Required items: Required blood products, implants, devices and special equipment available    Patient identity confirmed:  Verbally with patient  Patient was reevaluated immediately before administering moderate or deep sedation or anesthesia  Confirmation Checklist:  Patient's identity using two indicators  Time out: Immediately prior to the procedure a time out was called    Universal Protocol: the Joint Commission Universal Protocol was followed       ANESTHESIA    Local Anesthetic:  Lidocaine 1% without epinephrine      SEDATION  Patient Sedated: Yes    Sedation:  Fentanyl and midazolam  Vital signs: Vital signs monitored during sedation      PROCEDURE  Describe Procedure: Procedure  1) CAG  Approach RTR  Complications none  Indication chest pain, CAD sp PCI distal CX abnormal nuclear stress test  Findings  LMCA normal  LAD atheromatous change no focal stenosis  CX  atheromatous changes no focal stenosis  RCA small nondominant diffusely diseased    NO change since last angiogram  Plan  Medical therapy    Earnestine  Patient Tolerance:  Patient tolerated the procedure well with no immediate complications  Length of time physician/provider present for 1:1 monitoring during sedation: 15

## 2025-07-17 NOTE — DISCHARGE INSTRUCTIONS
Coronary Angiogram: What to Expect at Home  Your Recovery     A coronary angiogram is a test to examine the large blood vessels of your heart (coronary arteries). The doctor inserted a thin, flexible tube (catheter) into a blood vessel in your groin or wrist.  Your groin or wrist may have a bruise and feel sore for a few days after the procedure. You can do light activities around the house. But do not do anything strenuous until your doctor says it is okay. This may be for several days.  This care sheet gives you a general idea about how long it will take for you to recover. But each person recovers at a different pace. Follow the steps below to feel better as quickly as possible.  How can you care for yourself at home?  Activity    If the doctor gave you a sedative:  For 24 hours, don't do anything that requires attention to detail, such as going to work, making important decisions, or signing any legal documents. It takes time for the medicine's effects to completely wear off.  For your safety, do not drive or operate any machinery that could be dangerous. Wait until the medicine wears off and you can think clearly and react easily.     Do not do strenuous exercise and do not lift, pull, or push anything heavy until your doctor says it is okay. This may be for several days. You can walk around the house and do light activity, such as cooking.     If the catheter was placed in your groin, try not to walk up stairs for the first couple of days.     If the catheter was placed in your wrist, do not bend your wrist deeply for the first couple of days. Be careful using your hand to get into and out of a chair or bed.     Get regular exercise. Walking may be a good choice. Try for at least 30 minutes on most days of the week.   Diet    Drink plenty of fluids to help your body flush out the dye. If you have kidney, heart, or liver disease and have to limit fluids, talk with your doctor before you increase the amount of  fluids you drink.     Keep eating a heart-healthy diet that has lots of fruits, vegetables, and whole grains. If you have not been eating this way, talk to your doctor. You also may want to talk to a dietitian. This expert can help you to learn about healthy foods and plan meals.   Medicines    Your doctor will tell you if and when you can restart your medicines. You will also be given instructions about taking any new medicines.     If you stopped taking aspirin or some other blood thinner, your doctor will tell you when to start taking it again.     Your doctor may prescribe a blood-thinning medicine like aspirin or clopidogrel (Plavix). It is very important that you take these medicines exactly as directed in order to keep the coronary artery open and reduce your risk of a heart attack. Be safe with medicines. Call your doctor if you think you are having a problem with your medicine.   Care of the catheter site    For 1 or 2 days, keep a bandage over the spot where the catheter was inserted. The bandage probably will fall off in this time.     Put ice or a cold pack on the area for 10 to 20 minutes at a time to help with soreness or swelling. Put a thin cloth between the ice and your skin.     You may shower 24 to 48 hours after the procedure, if your doctor okays it. Pat the incision dry.     Do not soak the catheter site until it is healed. Don't take a bath for 1 week, or until your doctor tells you it is okay.     Watch for bleeding from the site. A small amount of blood (up to the size of a quarter) on the bandage can be normal.     If you are bleeding, lie down and press on the area for 15 minutes to try to make it stop. If the bleeding does not stop, call your doctor or seek immediate medical care.   Follow-up care is a key part of your treatment and safety. Be sure to make and go to all appointments, and call your doctor if you are having problems. It's also a good idea to know your test results and keep  "a list of the medicines you take.  When should you call for help?   Call 911 anytime you think you may need emergency care. For example, call if:    You passed out (lost consciousness).     You have severe trouble breathing.     You have sudden chest pain and shortness of breath, or you cough up blood.     You have symptoms of a heart attack. These may include:  Chest pain or pressure, or a strange feeling in the chest.  Sweating.  Shortness of breath.  Nausea or vomiting.  Pain, pressure, or a strange feeling in the back, neck, jaw, or upper belly, or in one or both shoulders or arms.  Lightheadedness or sudden weakness.  A fast or irregular heartbeat.     After you call 911, the  may tell you to chew 1 adult-strength or 2 to 4 low-dose aspirin. Wait for an ambulance. Do not try to drive yourself.     You have been diagnosed with angina, and you have symptoms that do not go away with rest or are not getting better within 5 minutes after you take a dose of nitroglycerin.   Call your doctor now or seek immediate medical care if:    You are bleeding from the area where the catheter was put in your artery.     You have a fast-growing, painful lump at the catheter site.     You have signs of infection, such as:  Increased pain, swelling, warmth, or redness.  Red streaks leading from the catheter site.  Pus draining from the catheter site.  A fever.     Your leg or hand is painful, looks blue, or feels cold, numb, or tingly.   Watch closely for changes in your health, and be sure to contact your doctor if you have any problems.  Where can you learn more?  Go to https://www.[email or web address].net/patiented  Enter D192 in the search box to learn more about \"Coronary Angiogram: What to Expect at Home.\"  Current as of: July 31, 2024  Content Version: 14.5    1067-3519 [facility] [employer] [facility].   Care instructions adapted under license by your healthcare professional. If you have questions about a medical condition or this " instruction, always ask your healthcare professional. [employer] disclaims any warranty or liability for your use of this information.

## 2025-07-18 ASSESSMENT — ACTIVITIES OF DAILY LIVING (ADL)
ADLS_ACUITY_SCORE: 47

## 2025-07-19 ASSESSMENT — ACTIVITIES OF DAILY LIVING (ADL)
ADLS_ACUITY_SCORE: 47

## 2025-07-20 ASSESSMENT — ACTIVITIES OF DAILY LIVING (ADL)
ADLS_ACUITY_SCORE: 47

## 2025-07-21 ASSESSMENT — ACTIVITIES OF DAILY LIVING (ADL)
ADLS_ACUITY_SCORE: 47

## 2025-07-24 ENCOUNTER — OFFICE VISIT (OUTPATIENT)
Dept: CARDIOLOGY | Facility: CLINIC | Age: 85
End: 2025-07-24
Payer: COMMERCIAL

## 2025-07-24 VITALS
BODY MASS INDEX: 28.69 KG/M2 | DIASTOLIC BLOOD PRESSURE: 68 MMHG | OXYGEN SATURATION: 97 % | WEIGHT: 155.9 LBS | SYSTOLIC BLOOD PRESSURE: 106 MMHG | HEART RATE: 90 BPM | HEIGHT: 62 IN

## 2025-07-24 DIAGNOSIS — R06.09 EXERTIONAL DYSPNEA: ICD-10-CM

## 2025-07-24 DIAGNOSIS — I25.10 CORONARY ARTERY DISEASE INVOLVING NATIVE CORONARY ARTERY OF NATIVE HEART WITHOUT ANGINA PECTORIS: Primary | ICD-10-CM

## 2025-07-24 DIAGNOSIS — E78.5 HYPERLIPIDEMIA LDL GOAL <130: ICD-10-CM

## 2025-07-24 DIAGNOSIS — I50.32 DIASTOLIC CHF, CHRONIC (H): ICD-10-CM

## 2025-07-24 DIAGNOSIS — I10 HYPERTENSION, UNSPECIFIED TYPE: ICD-10-CM

## 2025-07-24 DIAGNOSIS — G47.33 OSA (OBSTRUCTIVE SLEEP APNEA): ICD-10-CM

## 2025-07-24 RX ORDER — ISOSORBIDE MONONITRATE 60 MG/1
60 TABLET, EXTENDED RELEASE ORAL DAILY
Qty: 90 TABLET | Refills: 3 | Status: SHIPPED | OUTPATIENT
Start: 2025-07-24

## 2025-07-24 RX ORDER — ISOSORBIDE MONONITRATE 30 MG/1
30 TABLET, EXTENDED RELEASE ORAL DAILY
Qty: 90 TABLET | Refills: 3 | Status: SHIPPED | OUTPATIENT
Start: 2025-07-24

## 2025-07-24 NOTE — PATIENT INSTRUCTIONS
Thanks for participating in a office visit with the [facility] clinic today.    Reviewed results of coronary angiogram showing no new areas of blockage.   Last echocardiogram showed normal heart function and mild leak in 2 valves.   Continue current medication.   Follow up with pulmonology to reassess asthma and sleep apnea.   Encourage exercise and heart healthy diet.     Follow up in 1 year with Dr. Yepez     Please call my nurse at   [phone]. Call with any questions or concerns.    Scheduling phone number: [phone].   Reminder: Please bring in all current medications, over the counter supplements and vitamin bottles to your next appointment.

## 2025-07-24 NOTE — PROGRESS NOTES
"CARDIOLOGY CLINIC NOTE    PRIMARY CARDIOLOGIST  Dr. Yepez    PRIMARY CARE PHYSICIAN:  Elsy Bah    HISTORY OF PRESENT ILLNESS:  Victoria Montalvo is a very pleasant 85-year-old female with a past medical history significant for coronary artery disease status post PCI to the left PDA in 2017, recurrent chest pain in 2021 with angiogram showing severe spasm of the proximal RCA, subclavian stenosis with intervention in 2013, hypertension, pulmonary hypertension, hyperlipidemia, diastolic heart failure, venous insufficiency, COPD and obstructive sleep apnea (untreated due to intolerance of CPAP).     In review, she underwent stenting of the left subclavian in 2013 after angiogram showed 85% stenosis.  In 2017 she had an abnormal stress test showing reversible anterior apical defect consistent with mild to moderate ischemia in the distribution of the LAD.  She subsequently underwent a coronary angiogram that showed mild to moderate disease in the LAD.  There was 80% left PDA narrowing followed by a 2.25 x 18 mm SHERLEY.  There was severe disease in a very tiny nondominant RCA.     Echocardiogram in 2017 showed a normal ejection fraction estimated at 55 to 60%, elevated RV systolic pressure, consistent with mild pulmonary hypertension, mild mitral regurgitation, mild to moderate tricuspid regurgitation and mild pulmonic regurgitation.     Nuclear stress test in 2018 was negative for ischemia.    Coronary angiogram in 2020 showed moderate disease after the distal PDA stent, consistent with spasm.  She had recurrent CP in 2021 and underwent coronary angiogram showing again severe spasm. She underwent a \"cold pressor \" test and IC NTG showing improvement.      Nuclear stress test in 2022 and 2023 were negative for inducible ischemia.     Echocardiogram 8/2024 showed a normal ejection fraction estimated at 60 to 65%, grade 1 diastolic dysfunction, normal RV size and function and mild mitral and tricuspid regurgitation.   "   Nuclear stress test 6/5/2025 showed a small area of mild ischemia in the apical segment of the LV. TID was present visually and quantitatively.     Coronary angiogram 7/17/2025 showed no changed in anatomy since 2021. There is no focal stenosis > 40%. The RCA is small and diffuse, too small for intervention.     She returns to the office today accompanied by her daughter for post angiogram follow up.  She continues with intermittent chest discomfort and exertional dyspnea despite stable angiogram and echocardiogram.  We discussed possible noncardiac causes such as asthma and obstructive sleep apnea which is untreated.  She has not followed with pulmonology for several years.  She does use daily inhalers but has not used nebulizers for several months.    Upon exam, lungs are clear bilaterally, heart rate rhythm regular, no palpitations or evidence of fluid overload.  Right radial cath site shows resolving ecchymosis and palpable pulse.     Blood pressures 106/68, managed on diltiazem, furosemide, spironolactone and isosorbide.   Last blood work was reviewed, unremarkable BMP and CBC.  Lipid panel showed a total cholesterol 161, HDL 83, LDL 64 and triglycerides 70.    She does not engage in any routine exercise.  She remains very active doing household chores.  She has known sleep apnea, intolerant to CPAP  Compliant with all medication.    PAST MEDICAL HISTORY:  Past Medical History:   Diagnosis Date    Abnormal Papanicolaou smear of cervix and cervical HPV     colposcopy 1/97    Aortic valve sclerosis     Arthritis     Asthma     on preventative meds and has nebulizer    Chronic rhinitis     COPD (chronic obstructive pulmonary disease)     Coronary artery disease     4/4/17 SHERLEY--PDA    Coronary artery spasm     Diastolic CHF, chronic 02/22/2012    Gastro-oesophageal reflux disease     Hiatal hernia     Hyperlipidemia 10/31/2011    Obesity     KEN (obstructive sleep apnea)     CPAP    Personal history of colonic  polyps     colonoscopy 9/97, 2002 (due in 2007)    Pulmonary HTN     Seasonal allergies     Status post coronary angiogram 04/22/2020    Subclavian artery stenosis, left 08/07/2013    S/p stent in 2013     Subclinical hyperthyroidism 10/17/2016    Uncomplicated asthma 1995       MEDICATIONS:  Current Outpatient Medications   Medication Sig Dispense Refill    albuterol (PROAIR HFA/PROVENTIL HFA/VENTOLIN HFA) 108 (90 Base) MCG/ACT inhaler USE 2 INHALATIONS EVERY 6 HOURS AS NEEDED FOR SHORTNESS OF BREATH, DYSPNEA, OR WHEEZING 25.5 g 3    aspirin 81 MG tablet Take 81 mg by mouth every morning      Calcium 4716-6439 MG-UNIT CHEW Take 1 tablet by mouth every morning      Cholecalciferol (VITAMIN D3 PO) Take 4,000 Units by mouth every morning      CRANBERRY PO Take 1 capsule by mouth every morning      diltiazem ER COATED BEADS (CARDIZEM CD/CARTIA XT) 120 MG 24 hr capsule Take 1 capsule (120 mg) by mouth daily. 90 capsule 3    fluticasone (FLONASE) 50 MCG/ACT nasal spray USE 1 TO 2 SPRAYS IN EACH NOSTRIL DAILY 48 g 5    fluticasone-salmeterol (WIXELA INHUB) 250-50 MCG/ACT inhaler Inhale 1 puff into the lungs 2 times daily. 90 each 3    furosemide (LASIX) 20 MG tablet Take 1 tablet (20 mg) by mouth daily. 90 tablet 3    gabapentin (NEURONTIN) 100 MG capsule Take 1-3 capsules (100-300 mg) by mouth at bedtime. 90 capsule 11    ipratropium - albuterol 0.5 mg/2.5 mg/3 mL (DUONEB) 0.5-2.5 (3) MG/3ML neb solution Take 1 vial (3 mLs) by nebulization every 6 hours as needed for shortness of breath, wheezing or cough. 90 mL 11    isosorbide mononitrate (IMDUR) 30 MG 24 hr tablet Take 1 tablet (30 mg) by mouth daily. Take in addition to 60 mg daily to equal total of 90 mg daily. 90 tablet 3    isosorbide mononitrate (IMDUR) 60 MG 24 hr tablet Take 1 tablet (60 mg) by mouth daily. Take in addition to 30 mg daily to equal total of 90 mg daily. HOLD IF SYSTOLIC BLOOD PRESSURE IS LESS THAN 85 90 tablet 3    montelukast (SINGULAIR) 10 MG  "tablet Take 1 tablet (10 mg) by mouth at bedtime. 90 tablet 3    multivitamin w/minerals (MULTI-VITAMIN) tablet Take 1 tablet by mouth every morning      nitroGLYcerin (NITROSTAT) 0.4 MG sublingual tablet One tablet under the tongue every 5 minutes if needed for chest pain. May repeat every 5 minutes for a maximum of 3 doses in 15 minutes\" 25 tablet 3    omega-3 fatty acids 1200 MG capsule Take 1 capsule by mouth every morning      rosuvastatin (CRESTOR) 40 MG tablet Take 1 tablet (40 mg) by mouth daily. 90 tablet 3    spironolactone (ALDACTONE) 25 MG tablet Take 1 tablet (25 mg) by mouth every morning. 90 tablet 3     No current facility-administered medications for this visit.       SOCIAL HISTORY:  I have reviewed this patient's social history and updated it with pertinent information if needed. Victoria Montalvo  reports that she quit smoking about 32 years ago. Her smoking use included cigarettes. She started smoking about 52 years ago. She has a 30 pack-year smoking history. She has been exposed to tobacco smoke. She has never used smokeless tobacco. She reports current alcohol use. She reports that she does not use drugs.    PHYSICAL EXAM:  Pulse:  [90] 90  BP: (106)/(68) 106/68  SpO2:  [97 %] 97 %  155 lbs 14.4 oz    Constitutional: alert, no distress  Respiratory: Good bilateral air entry  Cardiovascular: Regular rate and rhythm  GI: nondistended  Neuropsychiatric: appropriate affact    ASSESSMENT/PLAN:  Pertinent issues addressed/ reviewed during this cardiology visit  Coronary artery disease - status post remote PCI to the left PDA in 2017.  Coronary angiogram 7/17/2025 showed no changed in anatomy since 2021. There is no focal stenosis > 40%. The RCA is small and diffuse, too small for intervention.  Continue current medical therapy.  Exertional dyspnea -likely multifactorial due to deconditioning, untreated sleep apnea, asthma, stable coronary disease and age.  Recommend follow-up with PCP and pulmonology " for workup of noncardiac causes of dyspnea.  Subclavian stenosis in 2013 -status post stent placement.  Continue aspirin and statin  Diastolic heart failure - euvolemic upon exam, weight stable.  Continue current medical therapy.  Hypertension -well-controlled  Hyperlipidemia -LDL 64, continue rosuvastatin  Untreated sleep apnea -encourage follow-up with sleep medicine    Follow-up in 1 year with DOMINIK or sooner if needed.       It was a pleasure seeing this patient in clinic today. Please do not hesitate to contact me with any future questions.     NIMESH Marquis, CNP  Cardiology - [facility]  07/24/2025       The level of medical decision making during this visit was of moderate complexity.    This note was completed in part using dictation via the Dragon voice recognition software. Some word and grammatical errors may occur and must be interpreted in the appropriate clinical context.  If there are any questions pertaining to this issue, please contact me for further clarification.

## 2025-07-24 NOTE — LETTER
"7/24/2025    Elsy Bah MD  [address] [place] [address]    RE: Victoria Montalov       Dear Colleague,     I had the pleasure of seeing Victoria Montalvo in the [facility].  CARDIOLOGY CLINIC NOTE    PRIMARY CARDIOLOGIST  Dr. Yepez    PRIMARY CARE PHYSICIAN:  Elsy Bah    HISTORY OF PRESENT ILLNESS:  Victoria Montalvo is a very pleasant 85-year-old female with a past medical history significant for coronary artery disease status post PCI to the left PDA in 2017, recurrent chest pain in 2021 with angiogram showing severe spasm of the proximal RCA, subclavian stenosis with intervention in 2013, hypertension, pulmonary hypertension, hyperlipidemia, diastolic heart failure, venous insufficiency, COPD and obstructive sleep apnea (untreated due to intolerance of CPAP).     In review, she underwent stenting of the left subclavian in 2013 after angiogram showed 85% stenosis.  In 2017 she had an abnormal stress test showing reversible anterior apical defect consistent with mild to moderate ischemia in the distribution of the LAD.  She subsequently underwent a coronary angiogram that showed mild to moderate disease in the LAD.  There was 80% left PDA narrowing followed by a 2.25 x 18 mm SHERLEY.  There was severe disease in a very tiny nondominant RCA.     Echocardiogram in 2017 showed a normal ejection fraction estimated at 55 to 60%, elevated RV systolic pressure, consistent with mild pulmonary hypertension, mild mitral regurgitation, mild to moderate tricuspid regurgitation and mild pulmonic regurgitation.     Nuclear stress test in 2018 was negative for ischemia.    Coronary angiogram in 2020 showed moderate disease after the distal PDA stent, consistent with spasm.  She had recurrent CP in 2021 and underwent coronary angiogram showing again severe spasm. She underwent a \"cold pressor \" test and IC NTG showing improvement.      Nuclear stress test in 2022 and 2023 were negative for " inducible ischemia.     Echocardiogram 8/2024 showed a normal ejection fraction estimated at 60 to 65%, grade 1 diastolic dysfunction, normal RV size and function and mild mitral and tricuspid regurgitation.     Nuclear stress test 6/5/2025 showed a small area of mild ischemia in the apical segment of the LV. TID was present visually and quantitatively.     Coronary angiogram 7/17/2025 showed no changed in anatomy since 2021. There is no focal stenosis > 40%. The RCA is small and diffuse, too small for intervention.     She returns to the office today accompanied by her daughter for post angiogram follow up.  She continues with intermittent chest discomfort and exertional dyspnea despite stable angiogram and echocardiogram.  We discussed possible noncardiac causes such as asthma and obstructive sleep apnea which is untreated.  She has not followed with pulmonology for several years.  She does use daily inhalers but has not used nebulizers for several months.    Upon exam, lungs are clear bilaterally, heart rate rhythm regular, no palpitations or evidence of fluid overload.  Right radial cath site shows resolving ecchymosis and palpable pulse.     Blood pressures 106/68, managed on diltiazem, furosemide, spironolactone and isosorbide.   Last blood work was reviewed, unremarkable BMP and CBC.  Lipid panel showed a total cholesterol 161, HDL 83, LDL 64 and triglycerides 70.    She does not engage in any routine exercise.  She remains very active doing household chores.  She has known sleep apnea, intolerant to CPAP  Compliant with all medication.    PAST MEDICAL HISTORY:  Past Medical History:   Diagnosis Date     Abnormal Papanicolaou smear of cervix and cervical HPV     colposcopy 1/97     Aortic valve sclerosis      Arthritis      Asthma     on preventative meds and has nebulizer     Chronic rhinitis      COPD (chronic obstructive pulmonary disease)      Coronary artery disease     4/4/17 SHERLEY--PDA     Coronary  artery spasm      Diastolic CHF, chronic 02/22/2012     Gastro-oesophageal reflux disease      Hiatal hernia      Hyperlipidemia 10/31/2011     Obesity      KEN (obstructive sleep apnea)     CPAP     Personal history of colonic polyps     colonoscopy 9/97, 2002 (due in 2007)     Pulmonary HTN      Seasonal allergies      Status post coronary angiogram 04/22/2020     Subclavian artery stenosis, left 08/07/2013    S/p stent in 2013      Subclinical hyperthyroidism 10/17/2016     Uncomplicated asthma 1995       MEDICATIONS:  Current Outpatient Medications   Medication Sig Dispense Refill     albuterol (PROAIR HFA/PROVENTIL HFA/VENTOLIN HFA) 108 (90 Base) MCG/ACT inhaler USE 2 INHALATIONS EVERY 6 HOURS AS NEEDED FOR SHORTNESS OF BREATH, DYSPNEA, OR WHEEZING 25.5 g 3     aspirin 81 MG tablet Take 81 mg by mouth every morning       Calcium 2250-3667 MG-UNIT CHEW Take 1 tablet by mouth every morning       Cholecalciferol (VITAMIN D3 PO) Take 4,000 Units by mouth every morning       CRANBERRY PO Take 1 capsule by mouth every morning       diltiazem ER COATED BEADS (CARDIZEM CD/CARTIA XT) 120 MG 24 hr capsule Take 1 capsule (120 mg) by mouth daily. 90 capsule 3     fluticasone (FLONASE) 50 MCG/ACT nasal spray USE 1 TO 2 SPRAYS IN EACH NOSTRIL DAILY 48 g 5     fluticasone-salmeterol (WIXELA INHUB) 250-50 MCG/ACT inhaler Inhale 1 puff into the lungs 2 times daily. 90 each 3     furosemide (LASIX) 20 MG tablet Take 1 tablet (20 mg) by mouth daily. 90 tablet 3     gabapentin (NEURONTIN) 100 MG capsule Take 1-3 capsules (100-300 mg) by mouth at bedtime. 90 capsule 11     ipratropium - albuterol 0.5 mg/2.5 mg/3 mL (DUONEB) 0.5-2.5 (3) MG/3ML neb solution Take 1 vial (3 mLs) by nebulization every 6 hours as needed for shortness of breath, wheezing or cough. 90 mL 11     isosorbide mononitrate (IMDUR) 30 MG 24 hr tablet Take 1 tablet (30 mg) by mouth daily. Take in addition to 60 mg daily to equal total of 90 mg daily. 90 tablet 3  "    isosorbide mononitrate (IMDUR) 60 MG 24 hr tablet Take 1 tablet (60 mg) by mouth daily. Take in addition to 30 mg daily to equal total of 90 mg daily. HOLD IF SYSTOLIC BLOOD PRESSURE IS LESS THAN 85 90 tablet 3     montelukast (SINGULAIR) 10 MG tablet Take 1 tablet (10 mg) by mouth at bedtime. 90 tablet 3     multivitamin w/minerals (MULTI-VITAMIN) tablet Take 1 tablet by mouth every morning       nitroGLYcerin (NITROSTAT) 0.4 MG sublingual tablet One tablet under the tongue every 5 minutes if needed for chest pain. May repeat every 5 minutes for a maximum of 3 doses in 15 minutes\" 25 tablet 3     omega-3 fatty acids 1200 MG capsule Take 1 capsule by mouth every morning       rosuvastatin (CRESTOR) 40 MG tablet Take 1 tablet (40 mg) by mouth daily. 90 tablet 3     spironolactone (ALDACTONE) 25 MG tablet Take 1 tablet (25 mg) by mouth every morning. 90 tablet 3     No current facility-administered medications for this visit.       SOCIAL HISTORY:  I have reviewed this patient's social history and updated it with pertinent information if needed. Victoria Montalvo  reports that she quit smoking about 32 years ago. Her smoking use included cigarettes. She started smoking about 52 years ago. She has a 30 pack-year smoking history. She has been exposed to tobacco smoke. She has never used smokeless tobacco. She reports current alcohol use. She reports that she does not use drugs.    PHYSICAL EXAM:  Pulse:  [90] 90  BP: (106)/(68) 106/68  SpO2:  [97 %] 97 %  155 lbs 14.4 oz    Constitutional: alert, no distress  Respiratory: Good bilateral air entry  Cardiovascular: Regular rate and rhythm  GI: nondistended  Neuropsychiatric: appropriate affact    ASSESSMENT/PLAN:  Pertinent issues addressed/ reviewed during this cardiology visit  Coronary artery disease - status post remote PCI to the left PDA in 2017.  Coronary angiogram 7/17/2025 showed no changed in anatomy since 2021. There is no focal stenosis > 40%. The RCA is small " and diffuse, too small for intervention.  Continue current medical therapy.  Exertional dyspnea -likely multifactorial due to deconditioning, untreated sleep apnea, asthma, stable coronary disease and age.  Recommend follow-up with PCP and pulmonology for workup of noncardiac causes of dyspnea.  Subclavian stenosis in 2013 -status post stent placement.  Continue aspirin and statin  Diastolic heart failure - euvolemic upon exam, weight stable.  Continue current medical therapy.  Hypertension -well-controlled  Hyperlipidemia -LDL 64, continue rosuvastatin  Untreated sleep apnea -encourage follow-up with sleep medicine    Follow-up in 1 year with DOMINIK or sooner if needed.       It was a pleasure seeing this patient in clinic today. Please do not hesitate to contact me with any future questions.     NIMESH Marquis, CNP  Cardiology - [facility]  07/24/2025       The level of medical decision making during this visit was of moderate complexity.    This note was completed in part using dictation via the Dragon voice recognition software. Some word and grammatical errors may occur and must be interpreted in the appropriate clinical context.  If there are any questions pertaining to this issue, please contact me for further clarification.    Thank you for allowing me to participate in the care of your patient.      Sincerely,     NIMESH Marquis CNP     [facility]  cc:   Yogi Yepez MD  [address]  [place] [id number]

## 2025-07-28 ENCOUNTER — PATIENT OUTREACH (OUTPATIENT)
Dept: CARE COORDINATION | Facility: CLINIC | Age: 85
End: 2025-07-28
Payer: COMMERCIAL

## 2025-07-28 DIAGNOSIS — J45.50 SEVERE PERSISTENT ASTHMA WITHOUT COMPLICATION (H): Primary | ICD-10-CM

## 2025-07-28 NOTE — PROGRESS NOTES
Clinic Care Coordination Contact  Community Health Worker Follow Up    Care Gaps:     Health Maintenance Due   Topic Date Due    COPD ACTION PLAN  Never done    ZOSTER VACCINE (1 of 2) Never done    DTAP/TDAP/TD VACCINE (1 - Tdap) 04/15/2007    PNEUMOCOCCAL VACCINE 50+ YEARS (2 of 2 - PCV) 12/08/2011    RSV VACCINE (1 - 1-dose 75+ series) Never done    HF ACTION PLAN  08/04/2020    PHQ-2 (once per calendar year)  01/01/2025    COVID-19 VACCINE (7 - 2024-25 season) 03/17/2025    ALT  05/22/2025     Pt is aware    Care Plan:   Care Plan: Transportation       Problem: Lack of transportation       Long-Range Goal: Establish reliable transportation       Start Date: 1/6/2025 Expected End Date: 1/13/2026    This Visit's Progress: 60% Recent Progress: 40%    Note:     Barriers: patient does not drive long distances  Strengths: engaged in CC  Patient expressed understanding of goal: Yes  Action steps to achieve this goal:  1. I will review list of transportation resources sent to me including [employer], [employer], [employer], etc.   2. I will reach out to my insurance provider to inquire if I have any benefits to afford over the counter vitamins like fish oil, calcium, etc.   3. I will find out what is covered for me for nail care and see podiatry for nail trimmings.   4. I will take my medications as prescribed.   5. I will discuss, review, schedule & complete recommended overdue health maintenance with my Primary Care Provider.   5. I will contact my care team with questions, concerns, support needs. I will use the clinic as a resource and I understand I can contact my clinic with 24/7 after hours services available. Care Coordinator will remain available as needed.                                Care Plan: Advance Care Plan       Problem: Patient does not have a valid Health Care Directive       Goal: Complete Health Care Directive       Start Date: 7/28/2025 Expected End Date: 10/28/2025    Note:     Barriers:  None  Strengths: motivated, engaged in care coordination.   Patient expressed understanding of goal: yes  Action steps to achieve this goal:  1. CHW will email me a health care directive.   2. I will complete the health care directive. I can check with my bank about notary services, locate a nearby notary [email or web address]. I understand to make this document legal I will need to sign this in the presence of two witnesses who are not my listed health care agents, or having this notarized. (Dates must match)    3. I will provide a copy of my health care directive to my care team when completed. I can email a copy to [email or web address].   4. I can visit [email or web address] website or call [employer] at #[phone] for additional information or questions.   5. I will contact my care team with any barriers, questions or assistance needed with this goal. Care coordinator will remain available as needed.                                 Intervention and Education during outreach: Patient states that she still has the transportation resources available however hasn't had to use them. Her son was in town and took her to recent cardiology appts, will be seeing pulmonology in November.   CHW discussed completing a HCD, patient would be interested. Sent a blank copy via email per her request and set a new goal.   Writer communicated the risks of unencrypted electronic communication and the patient and/or patient representative has agreed to accept the risks and receive unencrypted communication related to the information or resources we have discussed. We reviewed that no PHI will be included.  Email address verified with the patient.  Will include electronic communication form for patient/caregiver to complete.    CHW Plan: Next outreach in one month.     Cyndi GAMBOA, B.S. [facility] Care Coordination  [facility]:  [place], [place]  and [place]  [phone]  [email or web address]

## 2025-07-28 NOTE — Clinical Note
Future Appointments 11/5/2025  2:00 PM     PULMONARY FUNCTION      [facility] 11/5/2025  3:00 PM    Catherine Arce PA* [facility] 12/12/2025 3:00 PM    Elsy Bah MD    EAFP                EA

## 2025-08-14 ENCOUNTER — PATIENT OUTREACH (OUTPATIENT)
Dept: CARE COORDINATION | Facility: CLINIC | Age: 85
End: 2025-08-14
Payer: COMMERCIAL

## 2025-08-20 ENCOUNTER — HOSPITAL ENCOUNTER (EMERGENCY)
Facility: CLINIC | Age: 85
Discharge: HOME | End: 2025-08-20
Attending: EMERGENCY MEDICINE
Payer: COMMERCIAL

## 2025-08-20 ENCOUNTER — APPOINTMENT (OUTPATIENT)
Dept: GENERAL RADIOLOGY | Facility: CLINIC | Age: 85
End: 2025-08-20
Attending: EMERGENCY MEDICINE
Payer: COMMERCIAL

## 2025-08-20 VITALS
OXYGEN SATURATION: 94 % | DIASTOLIC BLOOD PRESSURE: 91 MMHG | HEART RATE: 86 BPM | RESPIRATION RATE: 18 BRPM | TEMPERATURE: 97.6 F | SYSTOLIC BLOOD PRESSURE: 146 MMHG

## 2025-08-20 DIAGNOSIS — T84.020A: Primary | ICD-10-CM

## 2025-08-20 LAB
ANION GAP SERPL CALCULATED.3IONS-SCNC: 10 MMOL/L (ref 7–15)
BASOPHILS # BLD AUTO: 0.03 10E3/UL (ref 0–0.2)
BASOPHILS NFR BLD AUTO: 0.4 %
BUN SERPL-MCNC: 17.3 MG/DL (ref 8–23)
CALCIUM SERPL-MCNC: 8.8 MG/DL (ref 8.8–10.4)
CHLORIDE SERPL-SCNC: 104 MMOL/L (ref 98–107)
CREAT SERPL-MCNC: 0.79 MG/DL (ref 0.51–0.95)
EGFRCR SERPLBLD CKD-EPI 2021: 72 ML/MIN/1.73M2
EOSINOPHIL # BLD AUTO: 0.17 10E3/UL (ref 0–0.7)
EOSINOPHIL NFR BLD AUTO: 2.3 %
ERYTHROCYTE [DISTWIDTH] IN BLOOD BY AUTOMATED COUNT: 14.4 % (ref 10–15)
GLUCOSE SERPL-MCNC: 112 MG/DL (ref 70–99)
HCO3 SERPL-SCNC: 25 MMOL/L (ref 22–29)
HCT VFR BLD AUTO: 37.8 % (ref 35–47)
HGB BLD-MCNC: 12.8 G/DL (ref 11.7–15.7)
HOLD SPECIMEN: NORMAL
HOLD SPECIMEN: NORMAL
IMM GRANULOCYTES # BLD: <0.03 10E3/UL
IMM GRANULOCYTES NFR BLD: 0.3 %
LYMPHOCYTES # BLD AUTO: 2.06 10E3/UL (ref 0.8–5.3)
LYMPHOCYTES NFR BLD AUTO: 27.4 %
MCH RBC QN AUTO: 29.6 PG (ref 26.5–33)
MCHC RBC AUTO-ENTMCNC: 33.9 G/DL (ref 31.5–36.5)
MCV RBC AUTO: 87.5 FL (ref 78–100)
MONOCYTES # BLD AUTO: 0.54 10E3/UL (ref 0–1.3)
MONOCYTES NFR BLD AUTO: 7.2 %
NEUTROPHILS # BLD AUTO: 4.7 10E3/UL (ref 1.6–8.3)
NEUTROPHILS NFR BLD AUTO: 62.4 %
NRBC # BLD AUTO: <0.03 10E3/UL
NRBC BLD AUTO-RTO: 0 /100
PLATELET # BLD AUTO: 183 10E3/UL (ref 150–450)
POTASSIUM SERPL-SCNC: 4 MMOL/L (ref 3.4–5.3)
RBC # BLD AUTO: 4.32 10E6/UL (ref 3.8–5.2)
SODIUM SERPL-SCNC: 139 MMOL/L (ref 135–145)
WBC # BLD AUTO: 7.52 10E3/UL (ref 4–11)

## 2025-08-20 PROCEDURE — 80048 BASIC METABOLIC PNL TOTAL CA: CPT | Performed by: EMERGENCY MEDICINE

## 2025-08-20 PROCEDURE — 999N000055 HC STATISTIC END TITIAL CO2 MONITORING

## 2025-08-20 PROCEDURE — 999N000065 XR HIP PORTABLE RIGHT 2-3 VIEWS

## 2025-08-20 PROCEDURE — 999N000157 HC STATISTIC RCP TIME EA 10 MIN

## 2025-08-20 PROCEDURE — 250N000011 HC RX IP 250 OP 636: Performed by: EMERGENCY MEDICINE

## 2025-08-20 PROCEDURE — 99152 MOD SED SAME PHYS/QHP 5/>YRS: CPT

## 2025-08-20 PROCEDURE — 96376 TX/PRO/DX INJ SAME DRUG ADON: CPT | Mod: 59

## 2025-08-20 PROCEDURE — 73502 X-RAY EXAM HIP UNI 2-3 VIEWS: CPT

## 2025-08-20 PROCEDURE — 99285 EMERGENCY DEPT VISIT HI MDM: CPT | Mod: 25 | Performed by: EMERGENCY MEDICINE

## 2025-08-20 PROCEDURE — 85025 COMPLETE CBC W/AUTO DIFF WBC: CPT | Performed by: EMERGENCY MEDICINE

## 2025-08-20 PROCEDURE — 27265 TREAT HIP DISLOCATION: CPT | Mod: RT

## 2025-08-20 PROCEDURE — 36415 COLL VENOUS BLD VENIPUNCTURE: CPT | Performed by: EMERGENCY MEDICINE

## 2025-08-20 PROCEDURE — 96374 THER/PROPH/DIAG INJ IV PUSH: CPT

## 2025-08-20 PROCEDURE — 999N000065 XR HIP PORT RIGHT 1 VIEW

## 2025-08-20 PROCEDURE — 999N000156 HC STATISTIC RCP CONSULT EA 30 MIN

## 2025-08-20 RX ORDER — HYDROMORPHONE HYDROCHLORIDE 1 MG/ML
0.5 INJECTION, SOLUTION INTRAMUSCULAR; INTRAVENOUS; SUBCUTANEOUS EVERY 30 MIN PRN
Refills: 0 | Status: DISCONTINUED | OUTPATIENT
Start: 2025-08-20 | End: 2025-08-20 | Stop reason: HOSPADM

## 2025-08-20 RX ORDER — PROPOFOL 10 MG/ML
INJECTION, EMULSION INTRAVENOUS DAILY PRN
Status: COMPLETED | OUTPATIENT
Start: 2025-08-20 | End: 2025-08-20

## 2025-08-20 RX ORDER — PROPOFOL 10 MG/ML
70 INJECTION, EMULSION INTRAVENOUS ONCE
Status: DISCONTINUED | OUTPATIENT
Start: 2025-08-20 | End: 2025-08-20 | Stop reason: HOSPADM

## 2025-08-20 RX ADMIN — PROPOFOL 70 MG: 10 INJECTION, EMULSION INTRAVENOUS at 04:02

## 2025-08-20 RX ADMIN — HYDROMORPHONE HYDROCHLORIDE 0.5 MG: 1 INJECTION, SOLUTION INTRAMUSCULAR; INTRAVENOUS; SUBCUTANEOUS at 02:42

## 2025-08-20 RX ADMIN — PROPOFOL 60 MG: 10 INJECTION, EMULSION INTRAVENOUS at 04:35

## 2025-08-20 RX ADMIN — PROPOFOL 30 MG: 10 INJECTION, EMULSION INTRAVENOUS at 04:05

## 2025-08-20 RX ADMIN — HYDROMORPHONE HYDROCHLORIDE 0.5 MG: 1 INJECTION, SOLUTION INTRAMUSCULAR; INTRAVENOUS; SUBCUTANEOUS at 01:55

## 2025-08-20 ASSESSMENT — COLUMBIA-SUICIDE SEVERITY RATING SCALE - C-SSRS
6. HAVE YOU EVER DONE ANYTHING, STARTED TO DO ANYTHING, OR PREPARED TO DO ANYTHING TO END YOUR LIFE?: NO
1. IN THE PAST MONTH, HAVE YOU WISHED YOU WERE DEAD OR WISHED YOU COULD GO TO SLEEP AND NOT WAKE UP?: NO
2. HAVE YOU ACTUALLY HAD ANY THOUGHTS OF KILLING YOURSELF IN THE PAST MONTH?: NO

## 2025-08-20 ASSESSMENT — ACTIVITIES OF DAILY LIVING (ADL)
ADLS_ACUITY_SCORE: 57
ADLS_ACUITY_SCORE: 47
ADLS_ACUITY_SCORE: 57
ADLS_ACUITY_SCORE: 47

## 2025-08-21 ENCOUNTER — PATIENT OUTREACH (OUTPATIENT)
Dept: CARE COORDINATION | Facility: CLINIC | Age: 85
End: 2025-08-21
Payer: COMMERCIAL

## 2025-08-27 ENCOUNTER — PATIENT OUTREACH (OUTPATIENT)
Dept: CARE COORDINATION | Facility: CLINIC | Age: 85
End: 2025-08-27
Payer: COMMERCIAL

## (undated) DEVICE — KIT HAND CONTROL ANGIOTOUCH ACIST 65CM AT-P65

## (undated) DEVICE — DECANTER VIAL 2006S

## (undated) DEVICE — SOL ADH LIQUID BENZOIN SWAB 0.6ML C1544

## (undated) DEVICE — SU SILK 2-0 SH 30" K833H

## (undated) DEVICE — INTRO SHEATH 4FRX10CM PINNACLE RSS402

## (undated) DEVICE — PACK GOWN 3/PK DISP XL SBA32GPFCB

## (undated) DEVICE — TOTE ANGIO CORP PC15AT SAN32CC83O

## (undated) DEVICE — SYR 10ML LL W/O NDL 302995

## (undated) DEVICE — TUBING SMOKE EVAC PNEUMOCLEAR HIGH FLOW 0620050250

## (undated) DEVICE — ENDO SCOPE WARMER SEAL  C3101

## (undated) DEVICE — MANIFOLD KIT ANGIO AUTOMATED 014613

## (undated) DEVICE — Device

## (undated) DEVICE — SMART CAPNOLINE H PLUS, ADULT/INTERMEDIATE O2, LONG

## (undated) DEVICE — CATH DIAGNOSTIC RADIAL 5FR TIG 4.0

## (undated) DEVICE — TUBING SUCTION 10'X3/16" N510

## (undated) DEVICE — GUIDEWIRE VERRATA PLUS PRESSURE 185CM JTIP 10185JP

## (undated) DEVICE — KIT LG BORE TOUHY ACCESS PLUS MAP152

## (undated) DEVICE — NDL PERC ENTRY THINWALL 18GA 7.0" G00166

## (undated) DEVICE — PREP CHLORAPREP 26ML TINTED HI-LITE ORANGE 930815

## (undated) DEVICE — GUIDEWIRE VASC 0.035INX150CM INQWIRE J TIP IQ35F150J3F/A

## (undated) DEVICE — ENDO TROCAR FIRST ENTRY KII FIOS Z-THRD 12X100MM CTF73

## (undated) DEVICE — INTRO GLIDESHEATH SLENDER 6FR 10X45CM 60-1060

## (undated) DEVICE — DEFIB PRO-PADZ LVP LQD GEL ADULT 8900-2105-01

## (undated) DEVICE — DRAIN JACKSON PRATT ROUND SIL 19FR W/TROCAR LF JP-2232

## (undated) DEVICE — GLOVE BIOGEL PI MICRO INDICATOR UNDERGLOVE SZ 8.5 48985

## (undated) DEVICE — CATH ANGIO INFINITI JL4 4FRX100CM 538420

## (undated) DEVICE — LINEN GOWN XLG 5407

## (undated) DEVICE — LINEN TOWEL PACK X6 WHITE 5487

## (undated) DEVICE — KIT CONNECTOR FOR OLYMPUS ENDOSCOPES DEFENDO 100310

## (undated) DEVICE — SU PLEDGET SOFT TFE 13MMX7MMX1.5MM D7044

## (undated) DEVICE — APPLICATORS COTTON TIP 6"X2 STERILE LF C15053-006

## (undated) DEVICE — SUCTION DRY CHEST DRAIN OASIS 3600-100

## (undated) DEVICE — TUBE GASTROSTOMY MIC ENFIT 18FR 8100-18

## (undated) DEVICE — CATH ANGIO INFINITI 3DRC 4FRX100CM 538476

## (undated) DEVICE — DEVICE SUTURE PASSER 14GA WECK EFX EFXSP2

## (undated) DEVICE — ESU LIGASURE MARYLAND LAPAROSCOPIC SLR/DVDR 5MMX37CM LF1937

## (undated) DEVICE — DRAPE IOBAN INCISE 23X17" 6650EZ

## (undated) DEVICE — SOL NACL 0.9% IRRIG 1000ML BOTTLE 2F7124

## (undated) DEVICE — GLOVE BIOGEL PI MICRO SZ 8.0 48580

## (undated) DEVICE — CATH DIAG 4FR JL 4.5 538417

## (undated) DEVICE — SU VICRYL 0 UR-6 27" J603H

## (undated) DEVICE — ESU GROUND PAD ADULT W/CORD E7507

## (undated) DEVICE — SU TICRON 2 GS-21DA 36" 3146-81

## (undated) DEVICE — ENDO SEALING CAP SMARTCAP

## (undated) DEVICE — SUCTION MANIFOLD NEPTUNE 2 SYS 4 PORT 0702-020-000

## (undated) DEVICE — SLEEVE TR BAND RADIAL COMPRESSION DEVICE 24CM TRB24-REG

## (undated) DEVICE — SU VICRYL 4-0 PS-2 18" UND J496H

## (undated) DEVICE — MEDITRACE MULTIFUNTION ADULT RADIOTRANSPARENT ELECTRODE FOR ZOLL

## (undated) DEVICE — SU SILK 0 SH 30" K834H

## (undated) DEVICE — DRSG STERI STRIP 1/2X4" R1547

## (undated) DEVICE — CATH DIAG 4FR ANG PIG 538453S

## (undated) DEVICE — CONNECTOR CARDIO BLAKE DRAIN BCC2

## (undated) DEVICE — LINEN TOWEL PACK X5 5464

## (undated) RX ORDER — LIDOCAINE HYDROCHLORIDE 10 MG/ML
INJECTION, SOLUTION EPIDURAL; INFILTRATION; INTRACAUDAL; PERINEURAL
Status: DISPENSED
Start: 2025-07-17

## (undated) RX ORDER — CEFAZOLIN SODIUM/WATER 2 G/20 ML
SYRINGE (ML) INTRAVENOUS
Status: DISPENSED
Start: 2024-01-12

## (undated) RX ORDER — FENTANYL CITRATE 50 UG/ML
INJECTION, SOLUTION INTRAMUSCULAR; INTRAVENOUS
Status: DISPENSED
Start: 2025-07-17

## (undated) RX ORDER — OXYCODONE HYDROCHLORIDE 5 MG/1
TABLET ORAL
Status: DISPENSED
Start: 2024-01-12

## (undated) RX ORDER — ALBUTEROL SULFATE 90 UG/1
AEROSOL, METERED RESPIRATORY (INHALATION)
Status: DISPENSED
Start: 2024-01-12

## (undated) RX ORDER — ACETAMINOPHEN 325 MG/1
TABLET ORAL
Status: DISPENSED
Start: 2024-01-12

## (undated) RX ORDER — VERAPAMIL HYDROCHLORIDE 2.5 MG/ML
INJECTION INTRAVENOUS
Status: DISPENSED
Start: 2025-07-17

## (undated) RX ORDER — FENTANYL CITRATE 50 UG/ML
INJECTION, SOLUTION INTRAMUSCULAR; INTRAVENOUS
Status: DISPENSED
Start: 2021-07-01

## (undated) RX ORDER — FENTANYL CITRATE 50 UG/ML
INJECTION, SOLUTION INTRAMUSCULAR; INTRAVENOUS
Status: DISPENSED
Start: 2024-01-12

## (undated) RX ORDER — IPRATROPIUM BROMIDE AND ALBUTEROL SULFATE 2.5; .5 MG/3ML; MG/3ML
SOLUTION RESPIRATORY (INHALATION)
Status: DISPENSED
Start: 2017-01-01

## (undated) RX ORDER — HYDROMORPHONE HYDROCHLORIDE 1 MG/ML
INJECTION, SOLUTION INTRAMUSCULAR; INTRAVENOUS; SUBCUTANEOUS
Status: DISPENSED
Start: 2024-01-12

## (undated) RX ORDER — NITROGLYCERIN 5 MG/ML
VIAL (ML) INTRAVENOUS
Status: DISPENSED
Start: 2025-07-17

## (undated) RX ORDER — CEFAZOLIN SODIUM 1 G/3ML
INJECTION, POWDER, FOR SOLUTION INTRAMUSCULAR; INTRAVENOUS
Status: DISPENSED
Start: 2024-01-12

## (undated) RX ORDER — HEPARIN SODIUM 1000 [USP'U]/ML
INJECTION, SOLUTION INTRAVENOUS; SUBCUTANEOUS
Status: DISPENSED
Start: 2025-07-17

## (undated) RX ORDER — POTASSIUM CHLORIDE 1500 MG/1
TABLET, EXTENDED RELEASE ORAL
Status: DISPENSED
Start: 2021-07-01

## (undated) RX ORDER — ENOXAPARIN SODIUM 100 MG/ML
INJECTION SUBCUTANEOUS
Status: DISPENSED
Start: 2024-01-12

## (undated) RX ORDER — HEPARIN SODIUM 1000 [USP'U]/ML
INJECTION, SOLUTION INTRAVENOUS; SUBCUTANEOUS
Status: DISPENSED
Start: 2021-07-01

## (undated) RX ORDER — HYDROMORPHONE HCL IN WATER/PF 6 MG/30 ML
PATIENT CONTROLLED ANALGESIA SYRINGE INTRAVENOUS
Status: DISPENSED
Start: 2024-01-12

## (undated) RX ORDER — NITROGLYCERIN 5 MG/ML
VIAL (ML) INTRAVENOUS
Status: DISPENSED
Start: 2021-07-01

## (undated) RX ORDER — LIDOCAINE HYDROCHLORIDE 10 MG/ML
INJECTION, SOLUTION EPIDURAL; INFILTRATION; INTRACAUDAL; PERINEURAL
Status: DISPENSED
Start: 2021-07-01

## (undated) RX ORDER — HEPARIN SODIUM 200 [USP'U]/100ML
INJECTION, SOLUTION INTRAVENOUS
Status: DISPENSED
Start: 2021-07-01

## (undated) RX ORDER — FENTANYL CITRATE-0.9 % NACL/PF 10 MCG/ML
PLASTIC BAG, INJECTION (ML) INTRAVENOUS
Status: DISPENSED
Start: 2025-07-17